# Patient Record
Sex: MALE | Race: WHITE | NOT HISPANIC OR LATINO | Employment: OTHER | URBAN - METROPOLITAN AREA
[De-identification: names, ages, dates, MRNs, and addresses within clinical notes are randomized per-mention and may not be internally consistent; named-entity substitution may affect disease eponyms.]

---

## 2017-05-28 ENCOUNTER — GENERIC CONVERSION - ENCOUNTER (OUTPATIENT)
Dept: OTHER | Facility: OTHER | Age: 72
End: 2017-05-28

## 2017-05-30 ENCOUNTER — GENERIC CONVERSION - ENCOUNTER (OUTPATIENT)
Dept: OTHER | Facility: OTHER | Age: 72
End: 2017-05-30

## 2017-06-20 ENCOUNTER — ALLSCRIPTS OFFICE VISIT (OUTPATIENT)
Dept: OTHER | Facility: OTHER | Age: 72
End: 2017-06-20

## 2017-09-01 DIAGNOSIS — N18.30 CHRONIC KIDNEY DISEASE, STAGE III (MODERATE) (HCC): ICD-10-CM

## 2017-09-01 DIAGNOSIS — E87.6 HYPOKALEMIA: ICD-10-CM

## 2017-09-01 DIAGNOSIS — I12.9 HYPERTENSIVE CHRONIC KIDNEY DISEASE WITH STAGE 1 THROUGH STAGE 4 CHRONIC KIDNEY DISEASE, OR UNSPECIFIED CHRONIC KIDNEY DISEASE: ICD-10-CM

## 2017-09-01 DIAGNOSIS — R60.0 LOCALIZED EDEMA: ICD-10-CM

## 2017-09-01 DIAGNOSIS — R80.9 PROTEINURIA: ICD-10-CM

## 2017-09-11 ENCOUNTER — ALLSCRIPTS OFFICE VISIT (OUTPATIENT)
Dept: OTHER | Facility: OTHER | Age: 72
End: 2017-09-11

## 2017-09-18 LAB — HCV AB SER-ACNC: NON REACTIVE

## 2017-09-28 ENCOUNTER — ALLSCRIPTS OFFICE VISIT (OUTPATIENT)
Dept: OTHER | Facility: OTHER | Age: 72
End: 2017-09-28

## 2017-10-09 DIAGNOSIS — I12.9 HYPERTENSIVE CHRONIC KIDNEY DISEASE WITH STAGE 1 THROUGH STAGE 4 CHRONIC KIDNEY DISEASE, OR UNSPECIFIED CHRONIC KIDNEY DISEASE: ICD-10-CM

## 2017-10-17 ENCOUNTER — GENERIC CONVERSION - ENCOUNTER (OUTPATIENT)
Dept: OTHER | Facility: OTHER | Age: 72
End: 2017-10-17

## 2017-10-26 NOTE — PROGRESS NOTES
Assessment  1  Benign hypertension with chronic kidney disease, stage III (403 10,585 3) (I12 9,N18 3)   2  Chronic kidney disease (CKD), stage III (moderate) (585 3) (N18 3)   3  Hypokalemia (276 8) (E87 6)   4  Proteinuria (791 0) (R80 9)    Plan  Benign hypertension with chronic kidney disease, stage III    · Doxazosin Mesylate 8 MG Oral Tablet; take 1/2 tablet bid   Rx By: Louis Gentile; Dispense: 30 Days ; #:15 Tablet; Refill: 0;For: Benign hypertension with chronic kidney disease, stage III; EDDIE = N; Record  Benign hypertension with chronic kidney disease, stage III, Bilateral edema of lower  extremity, Chronic kidney disease (CKD), stage III (moderate), Hypokalemia, Proteinuria    · VAS LOWER LIMB VENOUS DUPLEX STUDY, COMPLETE BILATERAL; Status:Hold For -  Scheduling; Requested for:50Yis4651;    Perform:[facility]; Due:46Evz2025; Ordered; For:Benign hypertension with chronic kidney disease, stage III, Bilateral edema of lower extremity, Chronic kidney disease (CKD), stage III (moderate), Hypokalemia, Proteinuria; Ordered By:Misael Yun;  Benign hypertension with chronic kidney disease, stage III, Chronic kidney disease  (CKD), stage III (moderate), Hypokalemia, Proteinuria    · Edarbi 40 MG Oral Tablet; TAKE 1 TABLET DAILY; To Be Done: [id number]   Rx By: Louis Gentile; For: Benign hypertension with chronic kidney disease, stage III, Chronic kidney disease (CKD), stage III (moderate), Hypokalemia, Proteinuria; EDDIE = N; Request Administration; Last Updated By: Aric Bowden; 9/11/2017 2:05:37 PM   · (1) BASIC METABOLIC PROFILE; Status:Active; Requested KLE:[id number];    Perform:[facility]; Due:07Ghh0429; Ordered; For:Benign hypertension with chronic kidney disease, stage III, Chronic kidney disease (CKD), stage III (moderate), Hypokalemia, Proteinuria; Ordered By:Anjel Yun Proper;   · (1) CBC/PLT/DIFF; Status:Active; Requested JXM:[id number];     Perform:[facility]; NTA:[id number];ZHZWUGN; For:Benign hypertension with chronic kidney disease, stage III, Chronic kidney disease (CKD), stage III (moderate), Hypokalemia, Proteinuria; Ordered By:Misael Yun;   · (1) CK (CPK); Status:Active; Requested CQX:[id number]; Perform:[facility]; GPX:[id number];VUUUNOY; For:Benign hypertension with chronic kidney disease, stage III, Chronic kidney disease (CKD), stage III (moderate), Hypokalemia, Proteinuria; Ordered By:Fox Yun;   · (1) COMPREHENSIVE METABOLIC PANEL; Status:Active; Requested IJN:[id number]; Perform:[facility]; JKK:[id number];TODZWAE; For:Benign hypertension with chronic kidney disease, stage III, Chronic kidney disease (CKD), stage III (moderate), Hypokalemia, Proteinuria; Ordered By:Fox Yun;   · (1) MAGNESIUM; Status:Active; Requested ZND:[id number]; Perform:[facility]; EJL:[id number];FMTEFTR; For:Benign hypertension with chronic kidney disease, stage III, Chronic kidney disease (CKD), stage III (moderate), Hypokalemia, Proteinuria; Ordered By:Fox Yun;   · (1) PHOSPHORUS; Status:Active; Requested VALDO:[id number]; Perform:[facility]; OPH:[id number];JAJLHOB; For:Benign hypertension with chronic kidney disease, stage III, Chronic kidney disease (CKD), stage III (moderate), Hypokalemia, Proteinuria; Ordered By:Misael Yun;   · (1) PTH N-TERMINAL (INTACT); Status:Active; Requested UQR:[id number]; Perform:[facility]; [id number];LHSJAWV; For:Benign hypertension with chronic kidney disease, stage III, Chronic kidney disease (CKD), stage III (moderate), Hypokalemia, Proteinuria; Ordered By:Misael Yun;   · (1) URINE PROTEIN CREATININE RATIO; Status:Active; Requested NQC:[id number]; Perform:[facility]; MYU:[id number];GQLWSGO; For:Benign hypertension with chronic kidney disease, stage III, Chronic kidney disease (CKD), stage III (moderate), Hypokalemia, Proteinuria;  Ordered By:Jama Nina Kwon;   · (1) VITAMIN D 25-HYDROXY; Status:Active; Requested IPI:[id number]; Perform:[facility]; QLQ:49JME1401;BORDKLC; For:Benign hypertension with chronic kidney disease, stage III, Chronic kidney disease (CKD), stage III (moderate), Hypokalemia, Proteinuria; Ordered By:Nina Yun;   · Follow-up visit in 4 Months Evaluation and Treatment  Follow-up  Status: Hold For -  Scheduling  Requested for: 27Awh5901   Ordered; For: Benign hypertension with chronic kidney disease, stage III, Chronic kidney disease (CKD), stage III (moderate), Hypokalemia, Proteinuria; Ordered By: August Paz Performed:  Due: 79UNF9220   · 1 - Ella Doss (Nurse Practitioner) Co-Management  *Please recheck blood  pressure check blood pressure machine  Goal less than 130/80 given proteinuria  Goal to taper off doxazosin and use Edarbi and possibly switch cardia XT to amlodipine  given the fact the patient is on a beta blocker  May require a small dose of a diuretic going forward given edema  Status: Hold For -  Scheduling  Requested for: 90LKO9953   Ordered; For: Benign hypertension with chronic kidney disease, stage III, Chronic kidney disease (CKD), stage III (moderate), Hypokalemia, Proteinuria; Ordered By: August Paz Performed:  Due: 80DJU1046  Care Summary provided  : Yes    1  Medication changes today:  -Decrease doxazosin  -Begin Edarbi 40 mg a day in the morning  2  Please go for lab work approximately September 20 which is about 1-2 weeks after making the above medication change  3  Please take one week of blood pressure readings morning and evening, sitting and standing  Bring in those blood pressure readings along with your blood pressure machine to see my advance practitioner either [facility] or Juliet Arce in about 2-3 weeks  4   Your goal blood pressure at home on average is less than 130/80 given the protein in the urine    5   Please go for an ultrasound of your legs now to make sure you do not have a blood clot in the legs  6   General measures:  -Avoid salt, avoidance will help swelling, also help your blood pressure and also help your protein in the urine  -Continue exercising just as you're doing  -Keep your legs elevated when you're sitting if possible to help the swelling, and uses support hose as needed  7  Follow-up in 4 months with the lab work is ordered  At that appointment in 4 months, bring in one week of blood pressure readings morning and evening, sitting and standing  Discussion/Summary    #1  Chronic Kidney Disease stage III: Baseline creatinine appears 1 2?1 5 mg/dL  There is no hematuria but there is some degree of proteinuria 1 3 g by a urine protein creatinine ratio  The diagnosis is most compatible with diabetic nephropathy/hypertensive nephrosclerosis/arteriolar nephrosclerosis  The patient did have an episode of AK I most likely related to mild volume depletion/angiotensin receptor 2 blocker  for the proteinuria:are normalnormalchains normalB/C normalstudies:blood pressure less than 130/80 given proteinuriaantihypertensive regimen from doxazosin to an angiotensin receptor 2 blocker  The doxazosin may contribute to orthostatic symptoms  The [place] will help his proteinuria  We will monitor labs closely as we make the switch  I would start with 40 mg once daily without a diuretic dyslipidemia as you're doingdiabetes mellitus as you're doingVolume: Patient has edema right greater than left which he states is related to total hip replacement  To be complete I would check a venous duplex  duplex nowsaltleg elevated when ablehose as neededfor adding a small dose of torsemide going forward depending on his blood pressure and swellingHypertension: Goals outlined less than 130/80  saltchanges as outlined aboveoptions:to taper doxazosinadding a small dose of torsemideswitching Cartia XT 2 amlodipine given the fact he is on a beta blockerElectrolytes: Normal at this time   Maintain potassium citrate for stone preventionMineral bone disorder: Monitor vitamin D, PTH intact level, magnesium calcium and phosphorus  Treat accordingly  Anemia: Hemoglobin remains normal Diabetes mellitus/dyslipidemia per your discretionspent an additional 25 minutes going over the differential diagnosis for his kidney disease with the patient and answering all questions  I will reviewed ways to treat swelling and protein in his urine  The patient was counseled regarding diagnostic results,-- instructions for management,-- risk factor reductions,-- prognosis,-- impressions,-- importance of compliance with treatment  total time of encounter was 50 minutes-- and-- 25 minutes was spent counseling  The patient has the current Goals: Monitor blood pressure at homesaltfor testing as outlined  Patient is able to Self-Care  Possible side effects of new medications were reviewed with the patient/guardian today  The treatment plan was reviewed with the patient/guardian  The patient/guardian understands and agrees with the treatment plan      Reason For Visit  Chronic Kidney Disease stage III      History of Present Illness  42-year-old male who is in [facility] May 2017  Apryl Russell He apparently had AK I  He also had orthostatic hypotension on an angiotensin receptor 2 blocker and alpha blocker and diuretic  He was restarted on doxazosin  Creatinine in June was 1 5 mg/dL which was felt to be baseline  Baseline apparently 1 2?1 5 mg/dL  Apparently renal ultrasound and urinalysis was bland  Of note, creatinine is 2 0 which came down to 1 12 mg/dL in the hospital demonstrated ejection fraction of 22%, grade 1 diastolic dysfunction, mild aortic insufficiency and mild tricuspid regurgitationultrasound was unremarkable except for nonobstructing left renal calculus measuring 1 cm, no hydronephrosis or any other abnormality dating 5/30/17asymptomatic does complain of modest foamy urine?  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 and does use compression stockings at times  GI symptomsheadaches, occasional dizziness upon standing a week to every other week  They last just a few seconds  Blood pressure may run 90?110/60s at that time  wrist symptoms at times and left knee related to goutunusual skin rash      Review of Systems  See history of present illnessrest of review of systems as completely reviewed the patient is otherwise negative      Past Medical History    The active problems and past medical history were reviewed and updated today  1  As aboveSquamous cell CA of both hands removed in the pastSarcoidosis in remissionHistory of goutHistory of ankylosing spondylitisHypertension controlledDiabetes mellitus for proximally 10 years, no overt retinopathy or neuropathy      Surgical History    The surgical history was reviewed and updated today  Family History    The family history was reviewed and updated today  Father had ESRD? Etiology was on hemodialysis for 10 yearsgrandfather had kidney disease?with a history of hypertension       Social History  The social history was reviewed and updated today  Current Meds   1  Allopurinol 300 MG Oral Tablet; take 1/2 tablet daily; Therapy: 38VTA2694 to Recorded   2  Breo Ellipta 100-25 MCG/INH Inhalation Aerosol Powder Breath Activated; USE 1   INHALATION ONCE DAILY; Therapy: 00PJP0301 to Recorded   3  Cartia  MG Oral Capsule Extended Release 24 Hour; take 1 capsule daily; Therapy: 97PFH8284 to Recorded   4   Colcrys 0 6 MG Oral Tablet; TAKE 1 TABLET DAILY AS DIRECTED; Therapy: 10GQY4729 to Recorded   5  Doxazosin Mesylate 8 MG Oral Tablet; take 1/2 tablet bid; Therapy: 04JBV5979 to Recorded   6  Incruse Ellipta 62 5 MCG/INH Inhalation Aerosol Powder Breath Activated; INHALE 1   PUFF DAILY; Therapy: 62JHR4665 to Recorded   7  Iron 325 (65 Fe) MG Oral Tablet; Take 1 tablet daily; Therapy: 45CGS5645 to (Evaluate:05Yrf1278) Recorded   8  Januvia 100 MG Oral Tablet; TAKE 1 TABLET DAILY; Therapy: 63LIB6137 to Recorded   9  Metoprolol Tartrate 100 MG Oral Tablet; TAKE 1 TABLET TWICE DAILY; Therapy: 55WQI3548 to (Evaluate:64Let9175) Recorded   10  NexIUM 40 MG Oral Capsule Delayed Release; take 1 capsule daily; Therapy: 51ODR2587 to (Evaluate:2018) Recorded   11  Potassium Citrate ER 10 MEQ (1080 MG) Oral Tablet Extended Release; Take 1 tablet    twice daily; Therapy: 87ETX6347 to Recorded   12  Pravastatin Sodium 40 MG Oral Tablet; Take 1 tablet daily; Therapy: 69GNP5171 to Recorded   13  Slow-Mag 71 5-119 MG Oral Tablet Delayed Release; take 2 tablet twice daily; Therapy: 03XCO1244 to Recorded   14  SulfaSALAzine 500 MG Oral Tablet Delayed Release; ONE TABLET IN THE AM, 1/2    TABLET IN PM;    Therapy: 86Lhz9437 to Recorded   15  Superplex-T Oral Tablet; TAKE 1 TABLET DAILY AS DIRECTED; Therapy: 69BUO8939 to Recorded   16  Vitamin D3 2000 UNIT Oral Capsule; Take 1 tablet daily; Therapy: 49SKY0917 to Recorded    The medication list was reviewed and updated today  Allergies  1   No Known Drug Allergies    Vitals  Vital Signs    Recorded: 2017 01:38PM Recorded: 2017 01:13PM   Heart Rate 56     Pulse Quality Regular     Systolic 627, LUE, Sitting 148, LUE, Standing 144, LUE, Sitting   Diastolic 72, LUE, Sitting 70, LUE, Standing 78, LUE, Sitting   BP Comments BP equal; BP standin/66 hr 56 reg     Height  6 ft 7 in    Weight  185 lb 9 6 oz    BMI Calculated  20 91    BSA Calculated  2 21      Physical Exam    Constitutional: General appearance: No acute distress, well appearing and well nourished  -- Well-developed well-nourished no well-developed, well-nourished and in no acute distress  ENT: External ears and nose appear normal      Eyes: Anicteric sclerae  Neck: No bruit heard over either carotid  JVD:  No JVD present  Pulmonary: Respiratory effort: No increased work of breathing or signs of respiratory distress  -- Auscultation of lungs: Clear to auscultation  Cardiovascular: Auscultation of heart: Normal rate and rhythm, normal S1 and S2, without murmurs  -- Regular rate and rhythm without murmurs rubs or gallops  Abdomen: Non-tender, no masses  -- Normal bowel sounds, soft and nontender without hepatosplenomegaly or bruits  Extremities: No cyanosis, clubbing or edema  -- Clubbing, cyanosis; 1?2 plus lower extremity edema two thirds of the way up the pretibial region right greater than left, trace on the left  Rash: No rash present  Neurologic: Non Focal      Psychiatric: Orientation to person, place, and time: Normal  -- and-- Mood and affect: Normal     Back: No CVA tenderness        Results/Data  Nephrology Flowsheet [id number] 04:05PM [facility]     Test Name Result Flag Reference   Urine Protein Creatinine Ratio 1 3       Nephrology Flowsheet [id number] 03:33PM Truitt Kussmaul     Test Name Result Flag Reference   WBC 6 9     Hemoglobin 14 3     Hematocrit 41 7     Platelets 923     Sodium 145     Potassium 4 5     Chloride 109     Carbon Dioxide 28     Calcium 9 5     GLUCOSE 122     BUN 17 6     Serum Creatinine 1 25     GFR, NON- 58     Magnesium 1 9     Phosphorus 3 5     Urinalysis Date 09/01/2017     SPECIFIC GRAVITY UA 1 011     BLOOD UA Negative     PH UA 5 0     PROTEIN UA 2+     NITRITE UA Negative     LEUKOCYTE ESTERASE UA Negative     WBC, Urine 0-5     RBC 0-5     BACTERIA None Seen     Glucose, Urine Negative         Thank you very much for allowing me to participate in the care of this patient  If you have any questions, please do not hesitate to contact me        Signatures   Electronically signed by : SURENDRA Wilson ; Sep 11 2017  2:10PM EST                       (Author)

## 2017-10-27 NOTE — PROGRESS NOTES
Assessment  1  Benign hypertension with chronic kidney disease, stage III (403 10,585 3) (I12 9,N18 3)   2  Bilateral edema of lower extremity (782 3) (R60 0)   3  Chronic kidney disease (CKD), stage III (moderate) (585 3) (N18 3)   4  Proteinuria (791 0) (R80 9)    Plan  Benign hypertension with chronic kidney disease, stage III    · Doxazosin Mesylate 8 MG Oral Tablet   Rx By: Truitt Kussmaul; Dispense: 30 Days ; #:15 Tablet; Refill: 0;For: Benign hypertension with chronic kidney disease, stage III; EDDIE = N; Record; Last Updated By: Guero Keyes; 2017 10:40:38 AM   · Latrice Levers 40 MG Oral Tablet; TAKE 1 TABLET DAILY   Rx By: Guero Keyes; Dispense: 30 Days ; #:30 Tablet; Refill: 5;For: Benign hypertension with chronic kidney disease, stage III; EDDIE = N; Record   · (1) BASIC METABOLIC PROFILE; Status:Active; Requested JFU:[id number];    Perform:[facility]; WVJ:[id number]; Ordered; For:Benign hypertension with chronic kidney disease, stage III; Ordered By:Yvonne Ron;   · (1) BASIC METABOLIC PROFILE; Status:Active; Requested GDY:[id number];    Perform:[facility]; RGC:[id number]; Ordered; For:Benign hypertension with chronic kidney disease, stage III; Ordered By:Yvonne Ron;   · (1) CBC/ PLT (NO DIFF); Status:Active; Requested [id number];    Perform:[facility]; SXO:[id number]; Ordered; For:Benign hypertension with chronic kidney disease, stage III; Ordered By:Yvonne Ron;   · (1) MAGNESIUM; Status:Active; Requested RLJ:[id number];    Perform:[facility]; [id number]; Ordered; For:Benign hypertension with chronic kidney disease, stage III; Ordered By:Yvonne Ron;   · (1) PHOSPHORUS; Status:Active; Requested DMI:[id number];    Perform:[facility]; MHK:[id number]; Ordered; For:Benign hypertension with chronic kidney disease, stage III; Ordered By:Yvnone Ron;   · (1) URINE PROTEIN CREATININE RATIO; Status:Active;  Requested for:2017; Perform:[facility]; IWF:36CDT1351; Ordered; For:Benign hypertension with chronic kidney disease, stage III; Ordered By:Yvonne Ron;   · Follow-up visit in 6 weeks Evaluation and Treatment  Follow-up  Status: Hold For -  Scheduling  Requested for: [id number]   Ordered; For: Benign hypertension with chronic kidney disease, stage III; Ordered By: Homer Donato Performed:  Due: 69JLT3217    Discussion/Summary    Hypertension- Goal blood pressure is <130/80  Antihypertensive regimen includes cartia xl 240mg daily, doxazosin 4mg twice a day, metoprolol 100mg twice a day, and edarbi 40mg daily  Avoid salt in your diet  Exercise as able  Please continue to monitor your blood pressures at home  Your blood pressure today is just slightly elevated  Home blood pressure cuff correlates well  Please take edarbi 40mg daily and stop doxazosin completely  I will call your cardiologist, Dr Magdalena Montilla, to let him know  Kidney Disease stage III- Baseline creatinine 1 2-1 5  Creatinine and electrolytes stable  Venous Doppler which was done at [facility] was negative for LE DVT  up with me in November and Dr Radhika Etienne in January  Please call the office with any questions or concerns  The patient was counseled regarding instructions for management  Patient is able to Self-Care  Possible side effects of new medications were reviewed with the patient/guardian today  The treatment plan was reviewed with the patient/guardian  The patient/guardian understands and agrees with the treatment plan      Reason For Visit  follow up      History of Present Illness  Mehreen Laurent presents to the office for a blood pressure check and home cuff correlation  At his last office appointment, his doxazosin was decreased and he was started on edarbi 40mg daily  The patient is feeling well  He is wearing a boot in the office today since he had surgery last week for a toe wound  He has no pain   He is still walking, with his crutches, every night after dinner  He denies SOB  He denies lightheadedness or dizziness  The patient was not taking his edarbi or his doxazosin recently as his cardiologist told him not to take the edarbi and continue the doxazosin which was in opposition to what Dr Benjamin Perdue had told him  The patient has proteinuria and edarbi would improve this  His blood pressures at home are usually in the 130s/70s  Review of Systems    Constitutional: no fever,-- no chills,-- no recent weight gain-- and-- no recent weight loss  Integumentary: no rashes  Gastrointestinal: no nausea,-- no diarrhea-- and-- no vomiting  Respiratory: no shortness of breath  Cardiovascular: no chest pain-- and-- no lower extremity edema  Musculoskeletal: no joint pain-- and-- no joint swelling  Neurological: no headache,-- no lightheadedness-- and-- no dizziness  Genitourinary: no dysuria  Past Medical History    The active problems and past medical history were reviewed and updated today  Surgical History    The surgical history was reviewed and updated today  Family History    The family history was reviewed and updated today  Social History  The social history was reviewed and updated today  The social history was reviewed and is unchanged  Current Meds   1  Allopurinol 300 MG Oral Tablet; take 1/2 tablet daily; Therapy: [id number] to Recorded   2  Breo Ellipta 100-25 MCG/INH Inhalation Aerosol Powder Breath Activated; USE 1   INHALATION ONCE DAILY; Therapy: 94DJI6439 to Recorded   3  Cartia  MG Oral Capsule Extended Release 24 Hour; take 1 capsule daily; Therapy: 21EUO6369 to Recorded   4  Colcrys 0 6 MG Oral Tablet; TAKE 1 TABLET DAILY AS DIRECTED; Therapy: 51VZN7430 to Recorded   5  Doxazosin Mesylate 8 MG Oral Tablet; take 1/2 tablet bid; Therapy: 41TPM4405 to (Evaluate:11Oct2017); Last Rx:11Sep2017 Ordered   6   Incruse Ellipta 62 5 MCG/INH Inhalation Aerosol Powder Breath Activated; INHALE 1   PUFF DAILY; Therapy: 07OOS6180 to Recorded   7  Iron 325 (65 Fe) MG Oral Tablet; Take 1 tablet daily; Therapy: 00MUI0178 to (Evaluate:71Mvq8670) Recorded   8  Januvia 100 MG Oral Tablet; TAKE 1 TABLET DAILY; Therapy: 90TQK1124 to Recorded   9  Metoprolol Tartrate 100 MG Oral Tablet; TAKE 1 TABLET TWICE DAILY; Therapy: 10SXF7671 to (Evaluate:63Ixb9001) Recorded   10  NexIUM 40 MG Oral Capsule Delayed Release; take 1 capsule daily; Therapy: 68RME1366 to (Evaluate:15Jun2018) Recorded   11  Potassium Citrate ER 10 MEQ (1080 MG) Oral Tablet Extended Release; Take 1 tablet    twice daily; Therapy: 41VZT2252 to Recorded   12  Pravastatin Sodium 40 MG Oral Tablet; Take 1 tablet daily; Therapy: 65AUG8355 to Recorded   13  Slow-Mag 71 5-119 MG Oral Tablet Delayed Release; take 2 tablet twice daily; Therapy: 19RKK6878 to Recorded   14  SulfaSALAzine 500 MG Oral Tablet Delayed Release; ONE TABLET IN THE AM, 1/2    TABLET IN PM;    Therapy: 20Jun2017 to Recorded   15  Superplex-T Oral Tablet; TAKE 1 TABLET DAILY AS DIRECTED; Therapy: 43GJI7557 to Recorded   16  Vitamin D3 2000 UNIT Oral Capsule; Take 1 tablet daily; Therapy: 70UNA4819 to Recorded    The medication list was reviewed and updated today  Allergies  1  No Known Drug Allergies    Vitals  Vital Signs    ** Printed in Appendix #1 below  Physical Exam    Constitutional: General appearance: No acute distress, well appearing and well nourished  ENT: External ears and nose appear normal      Eyes: Anicteric sclerae  Neck: No bruit heard over either carotid  JVD:  No JVD present  Pulmonary: Respiratory effort: No increased work of breathing or signs of respiratory distress  -- Auscultation of lungs: Clear to auscultation  Cardiovascular: Auscultation of heart: Normal rate and rhythm, normal S1 and S2, without murmurs  Abdomen: Non-tender, no masses  Extremities:  trace le edema  Rash: No rash present  Neurologic: Non Focal      Psychiatric: Orientation to person, place, and time: Normal  -- and-- Mood and affect: Normal        Results/Data  Nephrology Flowsheet [id number] 08:56AM Ezequiel Padilla   [facility]     Test Name Result Flag Reference   Sodium 139     Potassium 4 5     Chloride 103     Carbon Dioxide 29     Calcium 9 7     GLUCOSE 126     BUN 28     Serum Creatinine 1 42     GFR, NON- 48         Thank you very much for allowing me to participate in the care of this patient  If you have any questions, please do not hesitate to contact me        Signatures   Electronically signed by : PHIL Amaro; Sep 28 2017 10:40AM EST                       (Author)    Electronically signed by : Rosanne Ellis MD; Sep 28 2017  5:35PM EST                          Appendix #1     Vital Signs   Patient: Jimenez Barajas ; : 1945; MRN: [id number]      Recorded: 63LCO6224 10:10AM Recorded: 98YVA5224 10:09AM Recorded: 67NTD3165 09:58AM   Heart Rate 53  60     Systolic 775 mm Hg, LUE, Sitting 158 mm Hg, RUE, Sitting 160 mm Hg, LUE, Sitting 152 mm Hg, RUE, Sitting    Diastolic 87 mm Hg, LUE, Sitting 76 mm Hg, RUE, Sitting 80 mm Hg, LUE, Sitting 77 mm Hg, RUE, Sitting    BP Comments home office office home    Height     6 ft 7 in   Weight     181 lb    BMI Calculated     20 39 kg/m2   BSA Calculated     2 18 m2

## 2017-11-10 ENCOUNTER — ALLSCRIPTS OFFICE VISIT (OUTPATIENT)
Dept: OTHER | Facility: OTHER | Age: 72
End: 2017-11-10

## 2017-11-11 NOTE — PROGRESS NOTES
Assessment    1  Benign hypertension with chronic kidney disease, stage III (403 10,585 3) (I12 9,N18 3)   2  Bilateral edema of lower extremity (782 3) (R60 0)    Plan  Benign hypertension with chronic kidney disease, stage III    · Restrict the salt in your diet by avoiding highly salted foods ; Status:Complete;   Done:10Nov2017   Ordered;hypertension with chronic kidney disease, stage III; Ordered By:Yvonne Ron; Discussion/Summary    Hypertension- Goal blood pressure is <130/80  Antihypertensive regimen includes cartia xl 240mg daily, metoprolol 50mg twice a day, and edarbi 40mg daily  Avoid salt in your diet  Exercise as able  Please continue to monitor your blood pressures at home  Your blood pressure today is slightly elevated in the office but at home his pressures are controlled  No changes today  Will continue to monitor  Kidney Disease stage III- Baseline creatinine 1 2-1 5  Creatinine and electrolytes stable  Resolved  up with Dr Patric Barrow in January 5 @ 3pm  Please call the office with any questions or concerns  The patient was counseled regarding diagnostic results,-- instructions for management  Patient is able to Self-Care  Possible side effects of new medications were reviewed with the patient/guardian today  The treatment plan was reviewed with the patient/guardian  The patient/guardian understands and agrees with the treatment plan      Reason For Visit  follow up      History of Present Illness  Sylvester Walter presents to the office for a blood pressure check  His blood pressure does drop when he stands but he does not have any symptoms  His blood pressures at home sitting range from 720-450 systolic  Standing his blood pressures are 923 to 261 systolic  He states if he doesnât rest before taking his blood pressure, they will be more elevated up in the 895Z systolic  He tries to eat a low sodium diet but is not always able to on October 17th with two stents placed   They started him on effient and decreased his metoprolol  Review of Systems   Constitutional: no fever,-- no chills,-- no anorexia,-- no recent weight gain-- and-- no recent weight loss  Integumentary: no rashes  Gastrointestinal: no nausea,-- no diarrhea-- and-- no vomiting  Respiratory: no shortness of breath  Cardiovascular: no chest pain-- and-- no lower extremity edema  Musculoskeletal: no joint pain-- and-- no joint swelling  Neurological: no headache,-- no lightheadedness-- and-- no dizziness  Genitourinary: no dysuria  Past Medical History    The active problems and past medical history were reviewed and updated today  Surgical History    The surgical history was reviewed and updated today  Family History    The family history was reviewed and updated today  Social History  The social history was reviewed and updated today  The social history was reviewed and is unchanged  Current Meds   1  Allopurinol 300 MG Oral Tablet; take 1/2 tablet daily; Therapy: 99JSE0106 to Recorded   2  Breo Ellipta 100-25 MCG/INH Inhalation Aerosol Powder Breath Activated; USE 1 INHALATION ONCE DAILY; Therapy: 66ZZY8959 to Recorded   3  Cartia  MG Oral Capsule Extended Release 24 Hour; take 1 capsule daily; Therapy: 11HHU4357 to Recorded   4  Colcrys 0 6 MG Oral Tablet; TAKE 1 TABLET DAILY AS DIRECTED; Therapy: 21NUX2662 to Recorded   5  Edarbi 40 MG Oral Tablet; TAKE 1 TABLET DAILY; Therapy: 27XCB0210 to (Evaluate:27Mar2018); Last Rx:73Voa2848 Ordered   6  Incruse Ellipta 62 5 MCG/INH Inhalation Aerosol Powder Breath Activated; INHALE 1 PUFF DAILY; Therapy: 61DHG0080 to Recorded   7  Iron 325 (65 Fe) MG Oral Tablet; Take 1 tablet daily; Therapy: 36ADV7861 to (Evaluate:05Xfa9744) Recorded   8  Januvia 100 MG Oral Tablet; TAKE 1 TABLET DAILY; Therapy: 70KFK3838 to Recorded   9  Metoprolol Tartrate 100 MG Oral Tablet; TAKE 1 TABLET TWICE DAILY; Therapy: 48NIV6769 to (Evaluate:78Sct4214) Recorded   10  NexIUM 40 MG Oral Capsule Delayed Release; take 1 capsule daily; Therapy: 18FHJ6747 to (Evaluate:15Jun2018) Recorded   11  Potassium Citrate ER 10 MEQ (1080 MG) Oral Tablet Extended Release; Take 1 tablet  twice daily; Therapy: 09SNR8359 to Recorded   12  Pravastatin Sodium 40 MG Oral Tablet; Take 1 tablet daily; Therapy: 03WFE0705 to Recorded   13  Slow-Mag 71 5-119 MG Oral Tablet Delayed Release; take 2 tablet twice daily; Therapy: 24TFJ0450 to Recorded   14  SulfaSALAzine 500 MG Oral Tablet Delayed Release; ONE TABLET IN THE AM, 1/2  TABLET IN PM;  Therapy: 96Aah1389 to Recorded   15  Superplex-T Oral Tablet; TAKE 1 TABLET DAILY AS DIRECTED; Therapy: 32LUA7652 to Recorded   16  Vitamin D3 2000 UNIT Oral Capsule; Take 1 tablet daily; Therapy: 40CCT3999 to Recorded    The medication list was reviewed and updated today  Allergies    1  No Known Drug Allergies    Vitals  Vital Signs    ** Printed in Appendix #1 below  Physical Exam   Constitutional: General appearance: No acute distress, well appearing and well nourished  ENT: External ears and nose appear normal     Eyes: Anicteric sclerae  Neck: No bruit heard over either carotid  JVD:  No JVD present  Pulmonary: Respiratory effort: No increased work of breathing or signs of respiratory distress  -- Auscultation of lungs: Clear to auscultation  Cardiovascular: Auscultation of heart: Normal rate and rhythm, normal S1 and S2, without murmurs  Abdomen: Non-tender, no masses  Extremities: No cyanosis, clubbing or edema  Rash: No rash present    Neurologic: Non Focal     Psychiatric: Orientation to person, place, and time: Normal  -- and-- Mood and affect: Normal        Results/Data  Nephrology Flowsheet [id number] 03:27PM [facility] Scheu[facility]     Test Name Result Flag Reference   Urine Protein Creatinine Ratio 1 0       Nephrology Flowsheet [id number] 03:25PM Melbourne Scheuermann [facility]     Test Name Result Flag Reference   Sodium 140     Potassium 4 6     Chloride 102     Carbon Dioxide 30     Calcium 9 7     GLUCOSE 133     BUN 17     Serum Creatinine 1 48     GFR, NON- 48     Magnesium 1 9     Phosphorus 3 5     Uric Acid 6 6           Thank you very much for allowing me to participate in the care of this patient  If you have any questions, please do not hesitate to contact me  Future Appointments    Date/Time Provider Specialty Site   2018 03:00 PM SURENDRA Cruz   Nephrology [address]       Signatures   Electronically signed by : Marin Worthy, [facility]; Nov 10 2017  9:12AM EST                       (Author)    Electronically signed by : SURENDRA Villanueva ; Nov 10 2017  2:53PM EST                          Appendix #1  Vital Signs   Patient: Paulette Monk ; : 1945; MRN: [id number]   Recorded: 22BOU3196 09:02AM Recorded: 19UMO5647 09:01AM Recorded: 02KZO4481 08:54AM   Heart Rate  52    Systolic 310 mm Hg, RUE, Sitting 158 mm Hg, RUE, Sitting    Diastolic 82 mm Hg, RUE, Sitting 82 mm Hg, RUE, Sitting    Height  6 ft 7 in 6 ft 7 in   Weight  178 lb  178 lb    BMI Calculated  20 05 kg/m2 20 05 kg/m2   BSA Calculated  2 17 m2 2 17 m2

## 2018-01-02 ENCOUNTER — GENERIC CONVERSION - ENCOUNTER (OUTPATIENT)
Dept: OTHER | Facility: OTHER | Age: 73
End: 2018-01-02

## 2018-01-02 DIAGNOSIS — I12.9 HYPERTENSIVE CHRONIC KIDNEY DISEASE WITH STAGE 1 THROUGH STAGE 4 CHRONIC KIDNEY DISEASE, OR UNSPECIFIED CHRONIC KIDNEY DISEASE: ICD-10-CM

## 2018-01-02 DIAGNOSIS — N18.30 CHRONIC KIDNEY DISEASE, STAGE III (MODERATE) (HCC): ICD-10-CM

## 2018-01-02 DIAGNOSIS — R60.0 LOCALIZED EDEMA: ICD-10-CM

## 2018-01-02 DIAGNOSIS — E87.6 HYPOKALEMIA: ICD-10-CM

## 2018-01-02 DIAGNOSIS — R80.9 PROTEINURIA: ICD-10-CM

## 2018-01-03 ENCOUNTER — GENERIC CONVERSION - ENCOUNTER (OUTPATIENT)
Dept: OTHER | Facility: OTHER | Age: 73
End: 2018-01-03

## 2018-01-05 ENCOUNTER — GENERIC CONVERSION - ENCOUNTER (OUTPATIENT)
Dept: OTHER | Facility: OTHER | Age: 73
End: 2018-01-05

## 2018-01-14 VITALS
BODY MASS INDEX: 20.94 KG/M2 | HEIGHT: 78 IN | DIASTOLIC BLOOD PRESSURE: 87 MMHG | HEART RATE: 53 BPM | SYSTOLIC BLOOD PRESSURE: 173 MMHG | WEIGHT: 181 LBS

## 2018-01-14 VITALS
WEIGHT: 178 LBS | SYSTOLIC BLOOD PRESSURE: 154 MMHG | BODY MASS INDEX: 20.59 KG/M2 | HEIGHT: 78 IN | DIASTOLIC BLOOD PRESSURE: 82 MMHG | HEART RATE: 52 BPM

## 2018-01-14 VITALS
HEIGHT: 78 IN | BODY MASS INDEX: 21.47 KG/M2 | WEIGHT: 185.6 LBS | HEART RATE: 56 BPM | SYSTOLIC BLOOD PRESSURE: 158 MMHG | DIASTOLIC BLOOD PRESSURE: 72 MMHG

## 2018-01-15 VITALS
DIASTOLIC BLOOD PRESSURE: 66 MMHG | WEIGHT: 181 LBS | SYSTOLIC BLOOD PRESSURE: 136 MMHG | HEIGHT: 78 IN | HEART RATE: 48 BPM | BODY MASS INDEX: 20.94 KG/M2

## 2018-01-24 VITALS
SYSTOLIC BLOOD PRESSURE: 112 MMHG | WEIGHT: 183 LBS | HEART RATE: 56 BPM | HEIGHT: 78 IN | BODY MASS INDEX: 21.17 KG/M2 | DIASTOLIC BLOOD PRESSURE: 82 MMHG

## 2018-01-24 VITALS
DIASTOLIC BLOOD PRESSURE: 82 MMHG | SYSTOLIC BLOOD PRESSURE: 114 MMHG | HEART RATE: 56 BPM | WEIGHT: 183 LBS | HEIGHT: 78 IN | BODY MASS INDEX: 21.17 KG/M2

## 2018-01-24 VITALS — SYSTOLIC BLOOD PRESSURE: 120 MMHG | DIASTOLIC BLOOD PRESSURE: 70 MMHG | HEART RATE: 64 BPM

## 2018-05-01 DIAGNOSIS — R60.0 LOCALIZED EDEMA: ICD-10-CM

## 2018-05-01 DIAGNOSIS — I12.9 HYPERTENSIVE CHRONIC KIDNEY DISEASE WITH STAGE 1 THROUGH STAGE 4 CHRONIC KIDNEY DISEASE, OR UNSPECIFIED CHRONIC KIDNEY DISEASE: ICD-10-CM

## 2018-05-01 DIAGNOSIS — N18.30 CHRONIC KIDNEY DISEASE, STAGE III (MODERATE) (HCC): ICD-10-CM

## 2018-05-01 DIAGNOSIS — R80.9 PROTEINURIA: ICD-10-CM

## 2018-05-16 ENCOUNTER — OFFICE VISIT (OUTPATIENT)
Dept: NEPHROLOGY | Facility: CLINIC | Age: 73
End: 2018-05-16
Payer: MEDICARE

## 2018-05-16 VITALS
BODY MASS INDEX: 26.48 KG/M2 | HEIGHT: 70 IN | SYSTOLIC BLOOD PRESSURE: 144 MMHG | DIASTOLIC BLOOD PRESSURE: 80 MMHG | WEIGHT: 185 LBS

## 2018-05-16 DIAGNOSIS — I12.9 HYPERTENSIVE CHRONIC KIDNEY DISEASE WITH STAGE 1 THROUGH STAGE 4 CHRONIC KIDNEY DISEASE, OR UNSPECIFIED CHRONIC KIDNEY DISEASE: ICD-10-CM

## 2018-05-16 DIAGNOSIS — R80.1 PERSISTENT PROTEINURIA: ICD-10-CM

## 2018-05-16 DIAGNOSIS — N18.30 CHRONIC KIDNEY DISEASE, STAGE III (MODERATE) (HCC): Primary | ICD-10-CM

## 2018-05-16 DIAGNOSIS — E78.5 DYSLIPIDEMIA: ICD-10-CM

## 2018-05-16 PROCEDURE — 99214 OFFICE O/P EST MOD 30 MIN: CPT | Performed by: INTERNAL MEDICINE

## 2018-05-16 RX ORDER — DILTIAZEM HYDROCHLORIDE 240 MG/1
1 CAPSULE, COATED, EXTENDED RELEASE ORAL DAILY
COMMUNITY
Start: 2017-06-20 | End: 2019-08-14

## 2018-05-16 RX ORDER — PRAVASTATIN SODIUM 40 MG
40 TABLET ORAL DAILY
Refills: 3 | COMMUNITY
Start: 2018-05-08 | End: 2019-08-14

## 2018-05-16 RX ORDER — METOPROLOL TARTRATE 50 MG/1
50 TABLET, FILM COATED ORAL 2 TIMES DAILY
Refills: 3 | COMMUNITY
Start: 2018-03-15 | End: 2018-12-18 | Stop reason: ALTCHOICE

## 2018-05-16 RX ORDER — PNV NO.95/FERROUS FUM/FOLIC AC 28MG-0.8MG
1 TABLET ORAL DAILY
COMMUNITY
Start: 2017-06-20

## 2018-05-16 RX ORDER — SULFASALAZINE 500 MG/1
TABLET ORAL
Refills: 3 | COMMUNITY
Start: 2018-05-08 | End: 2020-12-30

## 2018-05-16 RX ORDER — PRASUGREL 10 MG/1
10 TABLET, FILM COATED ORAL DAILY
COMMUNITY

## 2018-05-16 RX ORDER — ALLOPURINOL 300 MG/1
0.5 TABLET ORAL DAILY
COMMUNITY
Start: 2017-06-20 | End: 2021-01-27 | Stop reason: SDUPTHER

## 2018-05-16 RX ORDER — ESOMEPRAZOLE MAGNESIUM 40 MG/1
40 CAPSULE, DELAYED RELEASE ORAL DAILY
Refills: 6 | COMMUNITY
Start: 2018-04-23 | End: 2020-11-11

## 2018-05-16 RX ORDER — AZILSARTAN KAMEDOXOMIL 40 MG/1
40 TABLET ORAL 2 TIMES DAILY
Refills: 5 | COMMUNITY
Start: 2018-04-26 | End: 2018-06-22 | Stop reason: SDUPTHER

## 2018-05-16 RX ORDER — B-COMPLEX WITH VITAMIN C
1 TABLET ORAL DAILY
COMMUNITY
Start: 2017-06-20

## 2018-05-16 RX ORDER — ASPIRIN 325 MG
81 TABLET ORAL DAILY
COMMUNITY
End: 2020-09-15

## 2018-05-16 RX ORDER — CHLORTHALIDONE 25 MG/1
25 TABLET ORAL DAILY
Qty: 30 TABLET | Refills: 5 | Status: SHIPPED | OUTPATIENT
Start: 2018-05-16 | End: 2018-07-12 | Stop reason: SDUPTHER

## 2018-05-16 RX ORDER — FLUTICASONE FUROATE AND VILANTEROL 100; 25 UG/1; UG/1
POWDER RESPIRATORY (INHALATION) DAILY
COMMUNITY
Start: 2017-06-20 | End: 2020-06-26 | Stop reason: SDUPTHER

## 2018-05-16 RX ORDER — CHOLECALCIFEROL (VITAMIN D3) 125 MCG
1 CAPSULE ORAL DAILY
COMMUNITY
Start: 2017-06-20

## 2018-05-16 RX ORDER — COLCHICINE 0.6 MG/1
0.6 TABLET, FILM COATED ORAL DAILY
Refills: 3 | COMMUNITY
Start: 2018-05-08 | End: 2020-06-16 | Stop reason: SDUPTHER

## 2018-05-16 RX ORDER — UMECLIDINIUM 62.5 UG/1
1 AEROSOL, POWDER ORAL DAILY
Refills: 5 | COMMUNITY
Start: 2018-04-16 | End: 2021-07-08 | Stop reason: SDUPTHER

## 2018-05-16 NOTE — PATIENT INSTRUCTIONS
1   Medication changes today:  -add chlorthalidone 25 mg once a day  Watch for worsening of dizziness or lightheadedness  2   Please wait 1-2 weeks after starting the new medication and then go for nonfasting any time of the day lab work  3   Please wait a full 2 weeks after starting the new medication, and then take 1 week a blood pressure readings morning and evening, sitting and standing and send those in   4   Follow-up in 3 months:  -please go for fasting lab work prior to your appointment  -please bring in 1 week a blood pressure readings morning and evening, sitting and standing  5 General instructions:  -avoid salt  -avoid medications such as Motrin, Naprosyn, ibuprofen, Aleve or Advil or Celebrex as they can affect your kidney function; you can use Tylenol as needed for pain or fevers if you have no liver problems  -avoid medications such as Sudafed or other medications with decongestants as they can raise your blood pressure  -try to exercise at least 30 minutes at least 3 days a week with an ultimate goal of 5 days a week  You 6    Your goal blood pressure at home is on average less than 118/75

## 2018-05-16 NOTE — LETTER
May 16, 2018     MARGE Whitfield DO  [address]  [address] [address]    Patient: Ton Sahu   YOB: 1945   Date of Visit: 5/16/2018       Dear Dr Juan Connors: Thank you for referring Pernell Aguilar to me for evaluation  Below are my notes for this consultation  If you have questions, please do not hesitate to call me  I look forward to following your patient along with you  Sincerely,        Deepa Pfeiffer MD        CC: MD Alfredo Jung MD Bonnell Brookes, MD  5/16/2018  9:10 AM  Sign at close encounter  RENAL FOLLOW UP NOTE: td    ASSESSMENT AND PLAN:     1  Chronic Kidney Disease stage III: Baseline creatinine appears 1 2-1 5 mg/dL  This is most compatible diabetic nephropathy  Of note, all serologies are negative including SPEP UPEP and light chains  The diagnosis is most compatible with diabetic nephropathy/hypertensive nephrosclerosis/arteriolar nephrosclerosis/episode of SHAY in the past   Currently creatinine doing well at 1 24 mg/dL  However, urine protein creatinine ratio is 3 1 g  his blood pressure however is not at goal which would at least be less than 130/80 possibly even closer to 125/75 given proteinuria  Recommendations:  -add chlorthalidone 25 mg daily  -monitor renal function as we make the adjustments  -monitor blood pressure closely to get to goal  -consider adding spironolactone if needed  -goal try to maintain proteinuria is close to 1 g or less as possible  -continue to treat diabetes mellitus and dyslipidemia per your discretion  2  Volume:  Euvolemic at this time  3  Hypertension:  Goal blood pressure less than 125/75 is outlined above  Please see above recommendations  4   Electrolytes: All acceptable at this time  Monitor potassium  5   Mineral bone disorder:  All acceptable including PTH intact level  Check vitamin-D next visit    6   Anemia:  Hemoglobin normal   7   Dyslipidemia:  Treat dyslipidemia as you're doing 8   Diabetes mellitus:  Per your discretion  9  CAD:  Followed by Dr Ghislaine Simon at    Status post MI in October involving 2 cardiac stents  10   Sarcoidosis  Followed closely by Dr Dinora German  PATIENT INSTRUCTIONS:    Patient Instructions   1  Medication changes today:  -add chlorthalidone 25 mg once a day  Watch for worsening of dizziness or lightheadedness  2   Please wait 1-2 weeks after starting the new medication and then go for nonfasting any time of the day lab work  3   Please wait a full 2 weeks after starting the new medication, and then take 1 week a blood pressure readings morning and evening, sitting and standing and send those in   4   Follow-up in 3 months:  -please go for fasting lab work prior to your appointment  -please bring in 1 week a blood pressure readings morning and evening, sitting and standing  5 General instructions:  -avoid salt  -avoid medications such as Motrin, Naprosyn, ibuprofen, Aleve or Advil or Celebrex as they can affect your kidney function; you can use Tylenol as needed for pain or fevers if you have no liver problems  -avoid medications such as Sudafed or other medications with decongestants as they can raise your blood pressure  -try to exercise at least 30 minutes at least 3 days a week with an ultimate goal of 5 days a week  You 6  Your goal blood pressure at home is on average less than 118/75        Subjective:   Patient overall feels fairly well  No fevers chills cough or colds except for postnasal drip related to allergies  Good appetite energy  No gastrointestinal symptoms  Very rare intermittent chest pain followed closely by Cardiology and unchanged, dyspnea on exertion which is chronic related to sarcoidosis, leg swelling on the right lower extremity which is chronic status post hip replacement and hernia repair with negative Dopplers in the past according the patient    No active urinary symptoms except for slight bubbles in his urine which is unchanged  No headaches, occasional dizziness lightheadedness upon standing about 2 times a week which is chronic  He gets up slowly  Blood pressure medications:  -diltiazem extended release:  240 mg daily  - Edarbi 40 mg twice a day  -Lopressor 50 mg twice a day  BP AT HOME:  -a m :  143/77 without orthostatic changes  -supper:  143/77 without orthostatic changes  -bedtime:  147/78 without orthostatic changes  Heart rate typically 50s-60s    ROS:  See HPI, otherwise review of systems as completely reviewed with the patient are negative    Past Medical History:   Diagnosis Date    Anemia     Chronic kidney disease     [facility]     [facility]     Hypertension      No past surgical history on file  Family History   Problem Relation Age of Onset    Hypertension Mother     Kidney disease Father       reports that he has never smoked  He has never used smokeless tobacco  He reports that he does not drink alcohol or use drugs  I COMPLETELY REVIEWED THE PAST MEDICAL HISTORY/PAST SURGICAL HISTORY/SOCIAL HISTORY/FAMILY HISTORY/AND MEDICATIONS  AND UPDATED ALL    Objective:     Vitals:   Vitals:    05/16/18 0835   BP: 144/80   BP sitting on left:  150/74 with a heart of 60 and regular a  BP standing on left:  158/84 with a heart rate of 64 and regular    Weight (last 2 days)     Date/Time   Weight    05/16/18 0829  83 9 (185)            Body mass index is 26 54 kg/m²      Physical Exam: General:  No acute distress  Skin:  No acute rash  Eyes:  No scleral icterus, noninjected  ENT:  Moist mucous membranes  Neck:  Supple, no jugular venous distention  Back   No CVAT  Chest:  Clear to auscultation and percussion  CVS:  Regular rate and rhythm without a rub or gallop; grade 2/6 systolic ejection type murmur  Abdomen:  Soft and nontender with normal bowel sounds  Extremities:  No cyanosis and no edema  Neuro:  Grossly intact  Psych:  Alert, oriented x3 and appropriate      Medications:    Current Outpatient Prescriptions:     allopurinol (ZYLOPRIM) 300 mg tablet, Take 0 5 tablets by mouth daily, Disp: , Rfl:     aspirin 325 mg tablet, Take 325 mg by mouth daily, Disp: , Rfl:     B Complex-C (SUPERPLEX-T) TABS, Take 1 tablet by mouth daily, Disp: , Rfl:     Cholecalciferol (VITAMIN D3) 2000 units TABS, Take 1 tablet by mouth daily, Disp: , Rfl:     COLCRYS 0 6 MG tablet, Take 0 6 mg by mouth daily, Disp: , Rfl: 3    diltiazem (CARTIA XT) 240 mg 24 hr capsule, Take 1 capsule by mouth daily, Disp: , Rfl:     EDARBI 40 MG tablet, Take 40 tablets by mouth 2 (two) times a day, Disp: , Rfl: 5    esomeprazole (NexIUM) 40 MG capsule, Take 40 mg by mouth 2 (two) times a day, Disp: , Rfl: 6    Ferrous Sulfate (IRON) 325 (65 Fe) MG TABS, Take 1 tablet by mouth daily, Disp: , Rfl:     fluticasone furoate-vilanterol (BREO ELLIPTA), Inhale daily, Disp: , Rfl:     INCRUSE ELLIPTA 62 5 MCG/INH AEPB, Take 1 puff by mouth daily, Disp: , Rfl: 5    magnesium chloride-calcium (SLOW-MAG) 71 5-119 mg, Take 2 tablets by mouth 2 (two) times a day, Disp: , Rfl:     metoprolol tartrate (LOPRESSOR) 50 mg tablet, Take 50 mg by mouth 2 (two) times a day, Disp: , Rfl: 3    prasugrel (EFFIENT) tablet, Take 1 tablet by mouth daily, Disp: , Rfl:     pravastatin (PRAVACHOL) 40 mg tablet, Take 40 mg by mouth daily, Disp: , Rfl: 3    sitaGLIPtin (JANUVIA) 100 mg tablet, Take 1 tablet by mouth daily, Disp: , Rfl:     sulfaSALAzine (AZULFIDINE) 500 mg tablet, TAKE 1 AND 1/2 TABLETS BY MOUTH ONE TIME DAILY AS DIRECTED, Disp: , Rfl: 3    chlorthalidone 25 mg tablet, Take 1 tablet (25 mg total) by mouth daily, Disp: 30 tablet, Rfl: 5    Lab, Imaging and other studies: I have personally reviewed pertinent labs  Laboratory Results:  No results found for this or any previous visit              Radiology review:   chest X-ray    Ultrasound      Portions of the record may have been created with voice recognition software   Occasional wrong word or "sound a like" substitutions may have occurred due to the inherent limitations of voice recognition software   Read the chart carefully and recognize, using context, where substitutions have occurred

## 2018-05-16 NOTE — PROGRESS NOTES
RENAL FOLLOW UP NOTE: td    ASSESSMENT AND PLAN:     1  Chronic Kidney Disease stage III: Baseline creatinine appears 1 2-1 5 mg/dL  This is most compatible diabetic nephropathy  Of note, all serologies are negative including SPEP UPEP and light chains  The diagnosis is most compatible with diabetic nephropathy/hypertensive nephrosclerosis/arteriolar nephrosclerosis/episode of SHAY in the past   Currently creatinine doing well at 1 24 mg/dL  However, urine protein creatinine ratio is 3 1 g  his blood pressure however is not at goal which would at least be less than 130/80 possibly even closer to 125/75 given proteinuria  Recommendations:  -add chlorthalidone 25 mg daily  -monitor renal function as we make the adjustments  -monitor blood pressure closely to get to goal  -consider adding spironolactone if needed  -goal try to maintain proteinuria is close to 1 g or less as possible  -continue to treat diabetes mellitus and dyslipidemia per your discretion  2  Volume:  Euvolemic at this time  3  Hypertension:  Goal blood pressure less than 125/75 is outlined above  Please see above recommendations  4   Electrolytes: All acceptable at this time  Monitor potassium  5   Mineral bone disorder:  All acceptable including PTH intact level  Check vitamin-D next visit  6   Anemia:  Hemoglobin normal   7   Dyslipidemia:  Treat dyslipidemia as you're doing   8  Diabetes mellitus:  Per your discretion  9  CAD:  Followed by Dr Aida Bear at [facility]   Status post MI in October involving 2 cardiac stents  10   Sarcoidosis  Followed closely by Dr Gunjan Thompson  PATIENT INSTRUCTIONS:    Patient Instructions   1  Medication changes today:  -add chlorthalidone 25 mg once a day  Watch for worsening of dizziness or lightheadedness  2   Please wait 1-2 weeks after starting the new medication and then go for nonfasting any time of the day lab work    3   Please wait a full 2 weeks after starting the new medication, and then take 1 week a blood pressure readings morning and evening, sitting and standing and send those in   4   Follow-up in 3 months:  -please go for fasting lab work prior to your appointment  -please bring in 1 week a blood pressure readings morning and evening, sitting and standing  5 General instructions:  -avoid salt  -avoid medications such as Motrin, Naprosyn, ibuprofen, Aleve or Advil or Celebrex as they can affect your kidney function; you can use Tylenol as needed for pain or fevers if you have no liver problems  -avoid medications such as Sudafed or other medications with decongestants as they can raise your blood pressure  -try to exercise at least 30 minutes at least 3 days a week with an ultimate goal of 5 days a week  You 6  Your goal blood pressure at home is on average less than 118/75        Subjective:   Patient overall feels fairly well  No fevers chills cough or colds except for postnasal drip related to allergies  Good appetite energy  No gastrointestinal symptoms  Very rare intermittent chest pain followed closely by Cardiology and unchanged, dyspnea on exertion which is chronic related to sarcoidosis, leg swelling on the right lower extremity which is chronic status post hip replacement and hernia repair with negative Dopplers in the past according the patient  No active urinary symptoms except for slight bubbles in his urine which is unchanged  No headaches, occasional dizziness lightheadedness upon standing about 2 times a week which is chronic  He gets up slowly    Blood pressure medications:  -diltiazem extended release:  240 mg daily  - Edarbi 40 mg twice a day  -Lopressor 50 mg twice a day  BP AT HOME:  -a m :  143/77 without orthostatic changes  -supper:  143/77 without orthostatic changes  -bedtime:  147/78 without orthostatic changes  Heart rate typically 50s-60s    ROS:  See HPI, otherwise review of systems as completely reviewed with the patient are negative    Past Medical History:   Diagnosis Date    Anemia     Chronic kidney disease     [facility] [address] [facility]     [facility]     Hypertension      No past surgical history on file  Family History   Problem Relation Age of Onset    Hypertension Mother     Kidney disease Father       reports that he has never smoked  He has never used smokeless tobacco  He reports that he does not drink alcohol or use drugs  I COMPLETELY REVIEWED THE PAST MEDICAL HISTORY/PAST SURGICAL HISTORY/SOCIAL HISTORY/FAMILY HISTORY/AND MEDICATIONS  AND UPDATED ALL    Objective:     Vitals:   Vitals:    05/16/18 0835   BP: 144/80   BP sitting on left:  150/74 with a heart of 60 and regular a  BP standing on left:  158/84 with a heart rate of 64 and regular    Weight (last 2 days)     Date/Time   Weight    05/16/18 0829  83 9 (185)            Body mass index is 26 54 kg/m²      Physical Exam: General:  No acute distress  Skin:  No acute rash  Eyes:  No scleral icterus, noninjected  ENT:  Moist mucous membranes  Neck:  Supple, no jugular venous distention  Back   No CVAT  Chest:  Clear to auscultation and percussion  CVS:  Regular rate and rhythm without a rub or gallop; grade 2/6 systolic ejection type murmur  Abdomen:  Soft and nontender with normal bowel sounds  Extremities:  No cyanosis and no edema  Neuro:  Grossly intact  Psych:  Alert, oriented x3 and appropriate      Medications:    Current Outpatient Prescriptions:     allopurinol (ZYLOPRIM) 300 mg tablet, Take 0 5 tablets by mouth daily, Disp: , Rfl:     aspirin 325 mg tablet, Take 325 mg by mouth daily, Disp: , Rfl:     B Complex-C (SUPERPLEX-T) TABS, Take 1 tablet by mouth daily, Disp: , Rfl:     Cholecalciferol (VITAMIN D3) 2000 units TABS, Take 1 tablet by mouth daily, Disp: , Rfl:     COLCRYS 0 6 MG tablet, Take 0 6 mg by mouth daily, Disp: , Rfl: 3    diltiazem (CARTIA XT) 240 mg 24 hr capsule, Take 1 capsule by mouth daily, Disp: , Rfl:     EDARBI 40 MG tablet, Take 40 tablets by mouth 2 (two) times a day, Disp: , Rfl: 5    esomeprazole (NexIUM) 40 MG capsule, Take 40 mg by mouth 2 (two) times a day, Disp: , Rfl: 6    Ferrous Sulfate (IRON) 325 (65 Fe) MG TABS, Take 1 tablet by mouth daily, Disp: , Rfl:     fluticasone furoate-vilanterol (BREO ELLIPTA), Inhale daily, Disp: , Rfl:     INCRUSE ELLIPTA 62 5 MCG/INH AEPB, Take 1 puff by mouth daily, Disp: , Rfl: 5    magnesium chloride-calcium (SLOW-MAG) 71 5-119 mg, Take 2 tablets by mouth 2 (two) times a day, Disp: , Rfl:     metoprolol tartrate (LOPRESSOR) 50 mg tablet, Take 50 mg by mouth 2 (two) times a day, Disp: , Rfl: 3    prasugrel (EFFIENT) tablet, Take 1 tablet by mouth daily, Disp: , Rfl:     pravastatin (PRAVACHOL) 40 mg tablet, Take 40 mg by mouth daily, Disp: , Rfl: 3    sitaGLIPtin (JANUVIA) 100 mg tablet, Take 1 tablet by mouth daily, Disp: , Rfl:     sulfaSALAzine (AZULFIDINE) 500 mg tablet, TAKE 1 AND 1/2 TABLETS BY MOUTH ONE TIME DAILY AS DIRECTED, Disp: , Rfl: 3    chlorthalidone 25 mg tablet, Take 1 tablet (25 mg total) by mouth daily, Disp: 30 tablet, Rfl: 5    Lab, Imaging and other studies: I have personally reviewed pertinent labs  Laboratory Results:  No results found for this or any previous visit  Radiology review:   chest X-ray    Ultrasound      Portions of the record may have been created with voice recognition software   Occasional wrong word or "sound a like" substitutions may have occurred due to the inherent limitations of voice recognition software   Read the chart carefully and recognize, using context, where substitutions have occurred

## 2018-06-05 ENCOUNTER — DOCUMENTATION (OUTPATIENT)
Dept: NEPHROLOGY | Facility: CLINIC | Age: 73
End: 2018-06-05

## 2018-06-05 DIAGNOSIS — I12.9 HYPERTENSIVE CHRONIC KIDNEY DISEASE WITH STAGE 1 THROUGH STAGE 4 CHRONIC KIDNEY DISEASE, OR UNSPECIFIED CHRONIC KIDNEY DISEASE: ICD-10-CM

## 2018-06-05 DIAGNOSIS — N18.30 CHRONIC KIDNEY DISEASE, STAGE 3 (MODERATE): Primary | ICD-10-CM

## 2018-06-05 LAB
BUN/CREAT SERPL: 18.5
EXT GLUCOSE BLD: 120
EXTERNAL ANION GAP: 14
EXTERNAL BUN: 28
EXTERNAL CALCIUM: 10.1
EXTERNAL CHLORIDE: 103
EXTERNAL CO2: 28
EXTERNAL CREATININE: 1.51
EXTERNAL EGFR: 46
EXTERNAL POTASSIUM: 4.8
EXTERNAL SODIUM: 140

## 2018-06-06 DIAGNOSIS — N18.30 CKD (CHRONIC KIDNEY DISEASE), STAGE III (HCC): Primary | ICD-10-CM

## 2018-06-20 ENCOUNTER — TELEPHONE (OUTPATIENT)
Dept: NEPHROLOGY | Facility: CLINIC | Age: 73
End: 2018-06-20

## 2018-06-20 NOTE — TELEPHONE ENCOUNTER
Left message for patient to return call to office to discuss creatinine level which is 1 58  This is close to baseline  NO changes needed at this time and patient to repeat BMP in three weeks

## 2018-06-22 DIAGNOSIS — I12.9 PARENCHYMAL RENAL HYPERTENSION, STAGE 1 THROUGH STAGE 4 OR UNSPECIFIED CHRONIC KIDNEY DISEASE: Primary | ICD-10-CM

## 2018-06-25 RX ORDER — AZILSARTAN KAMEDOXOMIL 40 MG/1
TABLET ORAL
Qty: 60 TABLET | Refills: 6 | Status: SHIPPED | OUTPATIENT
Start: 2018-06-25 | End: 2020-04-30

## 2018-06-25 RX ORDER — AZILSARTAN KAMEDOXOMIL 40 MG/1
TABLET ORAL
Qty: 60 TABLET | Refills: 5 | Status: SHIPPED | OUTPATIENT
Start: 2018-06-25 | End: 2018-11-16 | Stop reason: SDUPTHER

## 2018-07-12 ENCOUNTER — TELEPHONE (OUTPATIENT)
Dept: NEPHROLOGY | Facility: CLINIC | Age: 73
End: 2018-07-12

## 2018-07-12 DIAGNOSIS — R80.1 PERSISTENT PROTEINURIA: ICD-10-CM

## 2018-07-12 DIAGNOSIS — I12.9 HYPERTENSIVE CHRONIC KIDNEY DISEASE WITH STAGE 1 THROUGH STAGE 4 CHRONIC KIDNEY DISEASE, OR UNSPECIFIED CHRONIC KIDNEY DISEASE: ICD-10-CM

## 2018-07-12 DIAGNOSIS — N18.30 CHRONIC KIDNEY DISEASE, STAGE III (MODERATE) (HCC): ICD-10-CM

## 2018-07-12 DIAGNOSIS — E78.5 DYSLIPIDEMIA: ICD-10-CM

## 2018-07-12 RX ORDER — CHLORTHALIDONE 25 MG/1
12.5 TABLET ORAL DAILY
Qty: 30 TABLET | Refills: 5 | Status: SHIPPED | OUTPATIENT
Start: 2018-07-12 | End: 2019-05-10 | Stop reason: SDUPTHER

## 2018-07-12 NOTE — TELEPHONE ENCOUNTER
Left message for patient to return call regarding reduction of chlorthalidone to 12 5 mg by mouth once daily and to repeat morning/evening/sitting/standing BP and heart rates in 3 weeks after the start of the 12 5 mg chlorthalidone    Thank you       ----- Message from Pascual Amin MD sent at 7/12/2018 11:01 AM EDT -----  See my note and orders

## 2018-07-12 NOTE — PROGRESS NOTES
Left message for patient to return call regarding reduction of chlorthalidone to 12 5 mg by mouth once daily and to repeat morning/evening/sitting/standing BP and heart rates in 3 weeks after the start of the 12 5 mg chlorthalidone  Thank you

## 2018-07-12 NOTE — PROGRESS NOTES
Blood pressures at home:  -a m :  124/65  -p m :  110/63  Patient also had a little bit of lightheadedness in the evening  Recommendations:  -decrease chlorthalidone 12 5 mg daily  -wait 2-3 weeks and take another week a readings morning and evening, sitting only

## 2018-08-03 ENCOUNTER — OFFICE VISIT (OUTPATIENT)
Dept: NEPHROLOGY | Facility: CLINIC | Age: 73
End: 2018-08-03
Payer: MEDICARE

## 2018-08-03 VITALS
HEART RATE: 47 BPM | HEIGHT: 70 IN | WEIGHT: 178 LBS | DIASTOLIC BLOOD PRESSURE: 48 MMHG | SYSTOLIC BLOOD PRESSURE: 114 MMHG | BODY MASS INDEX: 25.48 KG/M2

## 2018-08-03 DIAGNOSIS — I12.9 HYPERTENSIVE CHRONIC KIDNEY DISEASE WITH STAGE 1 THROUGH STAGE 4 CHRONIC KIDNEY DISEASE, OR UNSPECIFIED CHRONIC KIDNEY DISEASE: ICD-10-CM

## 2018-08-03 DIAGNOSIS — R80.1 PERSISTENT PROTEINURIA: ICD-10-CM

## 2018-08-03 DIAGNOSIS — E78.5 DYSLIPIDEMIA: ICD-10-CM

## 2018-08-03 DIAGNOSIS — N18.30 CHRONIC KIDNEY DISEASE, STAGE III (MODERATE) (HCC): Primary | ICD-10-CM

## 2018-08-03 PROCEDURE — 99214 OFFICE O/P EST MOD 30 MIN: CPT | Performed by: INTERNAL MEDICINE

## 2018-08-03 NOTE — LETTER
August 3, 2018     MARGE Barahona DO  [address]  [address] [address]    Patient: Jovani Baig   YOB: 1945   Date of Visit: 8/3/2018       Dear Dr Mehreen Nielson: Thank you for referring Zaid Mandujano to me for evaluation  Below are my notes for this consultation  If you have questions, please do not hesitate to call me  I look forward to following your patient along with you  Sincerely,        Carole Barragan MD        CC: MD Anne Randle MD Milissa Logan, MD  8/3/2018  9:16 AM  Sign at close encounter  RENAL FOLLOW UP NOTE: td    ASSESSMENT AND PLAN:  1   CKD stage 3   · Etiology:  Diabetic nephropathy/hypertensive nephrosclerosis/arteriolar nephrosclerosis/episodes of SHAY in the past   All serologies were negative including multiple myeloma evaluation  · Baseline creatinine:  1 2-1 5  · Current creatinine:  1 48  · Urine protein creatinine ratio: To be done  Recommendations:  · Treat hypertension-please see below  · Treat dyslipidemia-please see below  · Maintain proteinuria less than 1 g or as low as possible  · Avoid nephrotoxic agents such as NSAIDs, patient counseled as such  2   Volume:  Euvolemic  3  Hypertension:       Current blood pressure averages:  AM:  129/67 without orthostatic changes  PM:  127/68 without orthostatic changes  Heart rate in the 50s    · Goal blood pressure:  125/75 or less  Recommendations:  ·   No changes at this time  He tolerates his minimal orthostatic symptoms  4   Electrolytes:  Acceptable at this time  5   Mineral bone disorder:  · Calcium/magnesium/phosphorus:  Calcium 10 no other labs available  Will obtain  · PTH intact: To be done  · Vitamin-D:  To be done  6  Dyslipidemia:  · Goal LDL:  Less than 70 given CAD  Recommendations: To be done  7  Anemia: To be done  8    Other problems:  · Diabetes mellitus per your discretion  · CAD followed by Dr Didi Santana through [facility]   Status post MI last October involving to cardiac stents  Patient may require further testing/intervention  I did discuss with the patient regarding the possibility of a cardiac catheterization which may lead to worsening kidney function in even occasional hemodialysis with the kidney machine in rarely permanent hemodialysis the kidney machine  If we do go this step, I would recommend admitting him the day before for hydration and Mucomyst   Patient understands all of the implications  · Sarcoidosis followed by Ainsley [facility]          PATIENT INSTRUCTIONS:    Patient Instructions   1  Please go for fasting lab work in the morning now, in the next few days  2  No medication changes today  3   If you do need any further heart testing such as a cardiac catheterization please let me know right away so that I can coordinate care with Dr Guan Cons your cardiologist   4   Follow-up in 4 months:  · Please go for fasting lab work prior to your appointment  · Please bring in 1 week a blood pressure readings morning and evening, sitting and standing  Make sure to do the morning readings before taking any blood pressure medications, and do the evening readings at consistent time such as close to bedtime  · Please bring in your blood pressure machine next visit in 4 months for correlation with the office machine  5  General instructions:  -avoid salt  -avoid medications such as Motrin, Naprosyn, ibuprofen, Aleve or Advil or Celebrex as they can affect your kidney function; you can use Tylenol as needed for pain or fevers if you have no liver problems  -avoid medications such as Sudafed or other medications with decongestants as they can raise your blood pressure  -try to exercise at least 30 minutes at least 3 days a week with an ultimate goal of 5 days a week            Subjective:   Currently patient is having difficulty with anginal type symptoms when he exerts himself which usually resolves after about 10 min    It is burning type substernal   He did have an exercise stress test this last week and is going to follow up with his cardiologist   It is possible he may need further testing  Currently has no chest pain as we are sitting here  He denies any change in his usual dyspnea on exertion  He denies any leg swelling  He denies any fevers chills cough or colds  Good energy and good appetite  He denies any nausea vomiting or diarrhea  He denies any dysuria or hematuria, but does have his chronic foamy urine  No headaches, but he does have some occasional dizziness and lightheadedness upon standing  Blood pressure medications:  -chlorthalidone 12 5 mg daily  -cardia  mg daily  - Edarbi 40 mg daily  -Lopressor 50 mg twice a day    ROS:  See HPI, otherwise review of systems as completely reviewed with the patient are negative    Past Medical History:   Diagnosis Date    Anemia     Chronic kidney disease     [facility]osis [facility] [address] [facility]     Diabetes mellitus ([facility] )     Hypertension      History reviewed  No pertinent surgical history  Family History   Problem Relation Age of Onset    Hypertension Mother     Kidney disease Father       reports that he has never smoked  He has never used smokeless tobacco  He reports that he does not drink alcohol or use drugs  I COMPLETELY REVIEWED THE PAST MEDICAL HISTORY/PAST SURGICAL HISTORY/SOCIAL HISTORY/FAMILY HISTORY/AND MEDICATIONS  AND UPDATED ALL    Objective:     Vitals:   Vitals:    08/03/18 0836   BP: (!) 114/48   Pulse: (!) 47   BP sitting on right:  140/62 with a heart rate of 52 and regular  BP standing on right:  128/62 with a heart rate of 52 and regular    Weight (last 2 days)     Date/Time   Weight    08/03/18 0836  80 7 (178)            Body mass index is 25 54 kg/m²      Physical Exam: General:  No acute distress  Skin:  No acute rash  Eyes:  No scleral icterus, noninjected  ENT:  Moist mucous membranes  Neck:  Supple, no jugular venous distention  Back   No CVAT  Chest:  Clear to auscultation and percussion  CVS:  Regular rate and rhythm with grade 1/6 systolic ejection type murmur, no rub or gallops appreciable  Abdomen:  Soft and nontender with normal bowel sounds  Extremities:  No cyanosis and no edema  Neuro:  Grossly intact  Psych:  Alert, oriented x3 and appropriate      Medications:    Current Outpatient Prescriptions:     allopurinol (ZYLOPRIM) 300 mg tablet, Take 0 5 tablets by mouth daily, Disp: , Rfl:     aspirin 325 mg tablet, Take 325 mg by mouth daily, Disp: , Rfl:     B Complex-C (SUPERPLEX-T) TABS, Take 1 tablet by mouth daily, Disp: , Rfl:     chlorthalidone 25 mg tablet, Take 0 5 tablets (12 5 mg total) by mouth daily, Disp: 30 tablet, Rfl: 5    Cholecalciferol (VITAMIN D3) 2000 units TABS, Take 1 tablet by mouth daily, Disp: , Rfl:     COLCRYS 0 6 MG tablet, Take 0 6 mg by mouth daily, Disp: , Rfl: 3    diltiazem (CARTIA XT) 240 mg 24 hr capsule, Take 1 capsule by mouth daily, Disp: , Rfl:     EDARBI 40 MG tablet, TAKE 1 TABLET TWICE A DAY, Disp: 60 tablet, Rfl: 6    EDARBI 40 MG tablet, TAKE 1 TABLET TWICE A DAY, Disp: 60 tablet, Rfl: 5    esomeprazole (NexIUM) 40 MG capsule, Take 40 mg by mouth 2 (two) times a day, Disp: , Rfl: 6    Ferrous Sulfate (IRON) 325 (65 Fe) MG TABS, Take 1 tablet by mouth daily, Disp: , Rfl:     fluticasone furoate-vilanterol (BREO ELLIPTA), Inhale daily, Disp: , Rfl:     INCRUSE ELLIPTA 62 5 MCG/INH AEPB, Take 1 puff by mouth daily, Disp: , Rfl: 5    magnesium chloride-calcium (SLOW-MAG) 71 5-119 mg, Take 2 tablets by mouth 2 (two) times a day, Disp: , Rfl:     metoprolol tartrate (LOPRESSOR) 50 mg tablet, Take 50 mg by mouth 2 (two) times a day, Disp: , Rfl: 3    prasugrel (EFFIENT) tablet, Take 1 tablet by mouth daily, Disp: , Rfl:     pravastatin (PRAVACHOL) 40 mg tablet, Take 40 mg by mouth daily, Disp: , Rfl: 3    sitaGLIPtin (JANUVIA) 100 mg tablet, Take 1 tablet by mouth daily, Disp: , Rfl:     sulfaSALAzine (AZULFIDINE) 500 mg tablet, TAKE 1 AND 1/2 TABLETS BY MOUTH ONE TIME DAILY AS DIRECTED, Disp: , Rfl: 3    Lab, Imaging and other studies: I have personally reviewed pertinent labs  Laboratory Results:  Results for orders placed or performed in visit on 78/52/58   Basic metabolic panel   Result Value Ref Range    EXTERNAL SODIUM 140     EXTERNAL POTASSIUM 4 8     EXTERNAL CHLORIDE 103     EXTERNAL CO2 28     EXTERNAL BUN 28     EXTERNAL CREATININE 1 51     EXT Glucose Bld 120     EXTERNAL CALCIUM 10 1     EXTERNAL ANION GAP 14     EXTERNAL EGFR 46     BUN/Creatinine Ratio 18 5                Radiology review:   chest X-ray    Ultrasound      Portions of the record may have been created with voice recognition software   Occasional wrong word or "sound a like" substitutions may have occurred due to the inherent limitations of voice recognition software   Read the chart carefully and recognize, using context, where substitutions have occurred

## 2018-08-03 NOTE — PROGRESS NOTES
RENAL FOLLOW UP NOTE: td    ASSESSMENT AND PLAN:  1   CKD stage 3   · Etiology:  Diabetic nephropathy/hypertensive nephrosclerosis/arteriolar nephrosclerosis/episodes of SHAY in the past   All serologies were negative including multiple myeloma evaluation  · Baseline creatinine:  1 2-1 5  · Current creatinine:  1 48  · Urine protein creatinine ratio: To be done  Recommendations:  · Treat hypertension-please see below  · Treat dyslipidemia-please see below  · Maintain proteinuria less than 1 g or as low as possible  · Avoid nephrotoxic agents such as NSAIDs, patient counseled as such  2   Volume:  Euvolemic  3  Hypertension:       Current blood pressure averages:  AM:  129/67 without orthostatic changes  PM:  127/68 without orthostatic changes  Heart rate in the 50s    · Goal blood pressure:  125/75 or less  Recommendations:  ·   No changes at this time  He tolerates his minimal orthostatic symptoms  4   Electrolytes:  Acceptable at this time  5   Mineral bone disorder:  · Calcium/magnesium/phosphorus:  Calcium 10 no other labs available  Will obtain  · PTH intact: To be done  · Vitamin-D:  To be done  6  Dyslipidemia:  · Goal LDL:  Less than 70 given CAD  Recommendations: To be done  7  Anemia: To be done  8  Other problems:  · Diabetes mellitus per your discretion  · CAD followed by Dr Magdalena Montilla through [facility]   Status post MI last October involving to cardiac stents  Patient may require further testing/intervention  I did discuss with the patient regarding the possibility of a cardiac catheterization which may lead to worsening kidney function in even occasional hemodialysis with the kidney machine in rarely permanent hemodialysis the kidney machine  If we do go this step, I would recommend admitting him the day before for hydration and Mucomyst   Patient understands all of the implications  · Sarcoidosis followed by Fer Beaulieu          PATIENT INSTRUCTIONS:    Patient Instructions   1  Please go for fasting lab work in the morning now, in the next few days  2  No medication changes today  3   If you do need any further heart testing such as a cardiac catheterization please let me know right away so that I can coordinate care with Dr Anabella Spencer your cardiologist   4   Follow-up in 4 months:  · Please go for fasting lab work prior to your appointment  · Please bring in 1 week a blood pressure readings morning and evening, sitting and standing  Make sure to do the morning readings before taking any blood pressure medications, and do the evening readings at consistent time such as close to bedtime  · Please bring in your blood pressure machine next visit in 4 months for correlation with the office machine  5  General instructions:  -avoid salt  -avoid medications such as Motrin, Naprosyn, ibuprofen, Aleve or Advil or Celebrex as they can affect your kidney function; you can use Tylenol as needed for pain or fevers if you have no liver problems  -avoid medications such as Sudafed or other medications with decongestants as they can raise your blood pressure  -try to exercise at least 30 minutes at least 3 days a week with an ultimate goal of 5 days a week            Subjective:   Currently patient is having difficulty with anginal type symptoms when he exerts himself which usually resolves after about 10 min  It is burning type substernal   He did have an exercise stress test this last week and is going to follow up with his cardiologist   It is possible he may need further testing  Currently has no chest pain as we are sitting here  He denies any change in his usual dyspnea on exertion  He denies any leg swelling    He denies any fevers chills cough or colds  Good energy and good appetite  He denies any nausea vomiting or diarrhea  He denies any dysuria or hematuria, but does have his chronic foamy urine  No headaches, but he does have some occasional dizziness and lightheadedness upon standing  Blood pressure medications:  -chlorthalidone 12 5 mg daily  -cardia  mg daily  - Edarbi 40 mg daily  -Lopressor 50 mg twice a day    ROS:  See HPI, otherwise review of systems as completely reviewed with the patient are negative    Past Medical History:   Diagnosis Date    Anemia     Chronic kidney disease     [facility]osis ([facility] )     Diabetes mellitus ([facility] )     Hypertension      History reviewed  No pertinent surgical history  Family History   Problem Relation Age of Onset    Hypertension Mother     Kidney disease Father       reports that he has never smoked  He has never used smokeless tobacco  He reports that he does not drink alcohol or use drugs  I COMPLETELY REVIEWED THE PAST MEDICAL HISTORY/PAST SURGICAL HISTORY/SOCIAL HISTORY/FAMILY HISTORY/AND MEDICATIONS  AND UPDATED ALL    Objective:     Vitals:   Vitals:    08/03/18 0836   BP: (!) 114/48   Pulse: (!) 47   BP sitting on right:  140/62 with a heart rate of 52 and regular  BP standing on right:  128/62 with a heart rate of 52 and regular    Weight (last 2 days)     Date/Time   Weight    08/03/18 0836  80 7 (178)            Body mass index is 25 54 kg/m²      Physical Exam: General:  No acute distress  Skin:  No acute rash  Eyes:  No scleral icterus, noninjected  ENT:  Moist mucous membranes  Neck:  Supple, no jugular venous distention  Back   No CVAT  Chest:  Clear to auscultation and percussion  CVS:  Regular rate and rhythm with grade 1/6 systolic ejection type murmur, no rub or gallops appreciable  Abdomen:  Soft and nontender with normal bowel sounds  Extremities:  No cyanosis and no edema  Neuro:  Grossly intact  Psych:  Alert, oriented x3 and appropriate      Medications:    Current Outpatient Prescriptions:     allopurinol (ZYLOPRIM) 300 mg tablet, Take 0 5 tablets by mouth daily, Disp: , Rfl:     aspirin 325 mg tablet, Take 325 mg by mouth daily, Disp: , Rfl:     B Complex-C (SUPERPLEX-T) TABS, Take 1 tablet by mouth daily, Disp: , Rfl:     chlorthalidone 25 mg tablet, Take 0 5 tablets (12 5 mg total) by mouth daily, Disp: 30 tablet, Rfl: 5    Cholecalciferol (VITAMIN D3) 2000 units TABS, Take 1 tablet by mouth daily, Disp: , Rfl:     COLCRYS 0 6 MG tablet, Take 0 6 mg by mouth daily, Disp: , Rfl: 3    diltiazem (CARTIA XT) 240 mg 24 hr capsule, Take 1 capsule by mouth daily, Disp: , Rfl:     EDARBI 40 MG tablet, TAKE 1 TABLET TWICE A DAY, Disp: 60 tablet, Rfl: 6    EDARBI 40 MG tablet, TAKE 1 TABLET TWICE A DAY, Disp: 60 tablet, Rfl: 5    esomeprazole (NexIUM) 40 MG capsule, Take 40 mg by mouth 2 (two) times a day, Disp: , Rfl: 6    Ferrous Sulfate (IRON) 325 (65 Fe) MG TABS, Take 1 tablet by mouth daily, Disp: , Rfl:     fluticasone furoate-vilanterol (BREO ELLIPTA), Inhale daily, Disp: , Rfl:     INCRUSE ELLIPTA 62 5 MCG/INH AEPB, Take 1 puff by mouth daily, Disp: , Rfl: 5    magnesium chloride-calcium (SLOW-MAG) 71 5-119 mg, Take 2 tablets by mouth 2 (two) times a day, Disp: , Rfl:     metoprolol tartrate (LOPRESSOR) 50 mg tablet, Take 50 mg by mouth 2 (two) times a day, Disp: , Rfl: 3    prasugrel (EFFIENT) tablet, Take 1 tablet by mouth daily, Disp: , Rfl:     pravastatin (PRAVACHOL) 40 mg tablet, Take 40 mg by mouth daily, Disp: , Rfl: 3    sitaGLIPtin (JANUVIA) 100 mg tablet, Take 1 tablet by mouth daily, Disp: , Rfl:     sulfaSALAzine (AZULFIDINE) 500 mg tablet, TAKE 1 AND 1/2 TABLETS BY MOUTH ONE TIME DAILY AS DIRECTED, Disp: , Rfl: 3    Lab, Imaging and other studies: I have personally reviewed pertinent labs    Laboratory Results:  Results for orders placed or performed in visit on 31/98/72   Basic metabolic panel   Result Value Ref Range    EXTERNAL SODIUM 140     EXTERNAL POTASSIUM 4 8     EXTERNAL CHLORIDE 103     EXTERNAL CO2 28     EXTERNAL BUN 28     EXTERNAL CREATININE 1 51     EXT Glucose Bld 120     EXTERNAL CALCIUM 10 1     EXTERNAL ANION GAP 14     EXTERNAL EGFR 46     BUN/Creatinine Ratio 18 5 Radiology review:   chest X-ray    Ultrasound      Portions of the record may have been created with voice recognition software   Occasional wrong word or "sound a like" substitutions may have occurred due to the inherent limitations of voice recognition software   Read the chart carefully and recognize, using context, where substitutions have occurred

## 2018-08-03 NOTE — PATIENT INSTRUCTIONS
1   Please go for fasting lab work in the morning now, in the next few days  2  No medication changes today  3   If you do need any further heart testing such as a cardiac catheterization please let me know right away so that I can coordinate care with Dr Manohar Hernandez your cardiologist   4   Follow-up in 4 months:  · Please go for fasting lab work prior to your appointment  · Please bring in 1 week a blood pressure readings morning and evening, sitting and standing  Make sure to do the morning readings before taking any blood pressure medications, and do the evening readings at consistent time such as close to bedtime  · Please bring in your blood pressure machine next visit in 4 months for correlation with the office machine  5   General instructions:  -avoid salt  -avoid medications such as Motrin, Naprosyn, ibuprofen, Aleve or Advil or Celebrex as they can affect your kidney function; you can use Tylenol as needed for pain or fevers if you have no liver problems  -avoid medications such as Sudafed or other medications with decongestants as they can raise your blood pressure  -try to exercise at least 30 minutes at least 3 days a week with an ultimate goal of 5 days a week

## 2018-08-13 LAB
25(OH)D3 SERPL-MCNC: 46.5 NG/ML (ref 30–100)
BILIRUB UR QL STRIP: NEGATIVE
EXT GLUCOSE BLD: 132
EXTERNAL BACTERIA (UA): ABNORMAL
EXTERNAL BUN: 28
EXTERNAL CALCIUM: 9.8
EXTERNAL CHLORIDE: 103
EXTERNAL CO2: 30
EXTERNAL CREATININE: 1.46
EXTERNAL EGFR: 48
EXTERNAL POTASSIUM: 4.4
EXTERNAL SODIUM: 139
GLUCOSE UR STRIP-MCNC: NEGATIVE MG/DL
HCT VFR BLD AUTO: 40.2 % (ref 36.5–49.3)
HGB BLD-MCNC: 13.5 G/DL (ref 12–17)
HGB UR QL STRIP.AUTO: NEGATIVE
KETONES UR STRIP-MCNC: NEGATIVE MG/DL
LEUKOCYTE ESTERASE UR QL STRIP: NEGATIVE
NITRITE UR QL STRIP: NEGATIVE
PH UR STRIP.AUTO: 5 [PH] (ref 4.5–8)
PLATELET # BLD AUTO: 164 THOUSANDS/UL (ref 149–390)
PROT UR STRIP-MCNC: ABNORMAL MG/DL
PROT/CREAT 24H UR: 0.5 MG/G{CREAT}
SL AMB POCT EPITHELEAL CELLS UA: ABNORMAL
SP GR UR STRIP.AUTO: 1.01 (ref 1–1.03)
WBC # BLD AUTO: 8.7 THOUSAND/UL
WBC #/AREA URNS AUTO: ABNORMAL /HPF

## 2018-08-16 DIAGNOSIS — N18.30 CKD (CHRONIC KIDNEY DISEASE), STAGE III (HCC): Primary | ICD-10-CM

## 2018-08-16 RX ORDER — SPIRONOLACTONE 25 MG/1
TABLET ORAL
Qty: 15 TABLET | Refills: 5 | Status: SHIPPED | OUTPATIENT
Start: 2018-08-16 | End: 2018-12-18

## 2018-08-16 NOTE — TELEPHONE ENCOUNTER
----- Message from Martínez Harris MD sent at 8/16/2018  2:06 PM EDT -----  The patient's potassium is 3 4  Recommendations:  1  Increase potassium in the diet  2  Add spironolactone 25 mg Monday Wednesday and Friday  3  Watch for any breast enlargement/breast soreness  4  Repeat a basic metabolic profile 1-2 weeks  5    Repeat blood pressures in 2-3 weeks morning and evening, sitting and standing

## 2018-08-17 LAB
EXT GLUCOSE BLD: 96
EXTERNAL ALBUMIN: 4.1
EXTERNAL ALK PHOS: 74
EXTERNAL ALT: 22
EXTERNAL AST: 30
EXTERNAL BUN: 22
EXTERNAL CALCIUM: 9.5
EXTERNAL CHLORIDE: 104
EXTERNAL CO2: 26
EXTERNAL CREATININE: 1.26
EXTERNAL EGFR: 57
EXTERNAL POTASSIUM: 3.4
EXTERNAL SODIUM: 140
EXTERNAL T.BILIRUBIN: 0.6
EXTERNAL TOTAL PROTEIN: 6.6
HCT VFR BLD AUTO: 41.4 % (ref 36.5–49.3)
HGB BLD-MCNC: 14.4 G/DL (ref 12–17)
PLATELET # BLD AUTO: 177 THOUSANDS/UL (ref 149–390)
WBC # BLD AUTO: 10.5 THOUSAND/UL

## 2018-11-16 DIAGNOSIS — I12.9 PARENCHYMAL RENAL HYPERTENSION, STAGE 1 THROUGH STAGE 4 OR UNSPECIFIED CHRONIC KIDNEY DISEASE: ICD-10-CM

## 2018-11-19 RX ORDER — AZILSARTAN KAMEDOXOMIL 40 MG/1
TABLET ORAL
Qty: 60 TABLET | Refills: 5 | Status: SHIPPED | OUTPATIENT
Start: 2018-11-19 | End: 2018-12-18 | Stop reason: SDUPTHER

## 2018-12-14 LAB
25(OH)D3 SERPL-MCNC: 38.8 NG/ML (ref 30–100)
CHOLEST SERPL-MCNC: 175 MG/DL (ref 50–200)
CHOLEST/HDLC SERPL: 4.06 {RATIO}
EXT GLUCOSE BLD: 129
EXTERNAL ALBUMIN: 4
EXTERNAL ALK PHOS: 75
EXTERNAL ALT: 17
EXTERNAL AST: 22
EXTERNAL BUN: 35
EXTERNAL CALCIUM: 9.5
EXTERNAL CHLORIDE: 108
EXTERNAL CO2: 23
EXTERNAL CREATININE: 1.62
EXTERNAL EGFR: 42
EXTERNAL POTASSIUM: 5.7
EXTERNAL SODIUM: 137
EXTERNAL T.BILIRUBIN: 0.4
EXTERNAL TOTAL PROTEIN: 6.8
HCT VFR BLD AUTO: 37.3 % (ref 36.5–49.3)
HDLC SERPL-MCNC: 43 MG/DL (ref 40–60)
HGB BLD-MCNC: 12.5 G/DL (ref 12–17)
LDLC SERPL CALC-MCNC: 97 MG/DL (ref 0–100)
PLATELET # BLD AUTO: 191 THOUSANDS/UL (ref 149–390)
PROT/CREAT UR: 0.11 MG/G{CREAT}
PTH-INTACT SERPL-MCNC: 28 PG/ML (ref 18.4–80.1)
TRIGL SERPL-MCNC: 175 MG/DL (ref ?–150)
WBC # BLD AUTO: 10.1 THOUSAND/UL

## 2018-12-17 ENCOUNTER — TELEPHONE (OUTPATIENT)
Dept: NEPHROLOGY | Facility: CLINIC | Age: 73
End: 2018-12-17

## 2018-12-17 NOTE — TELEPHONE ENCOUNTER
----- Message from Jocelyn Holden MD sent at 12/14/2018 10:11 AM EST -----  The patient's creatinine slightly higher  His potassium is 5 7   1  Please have the patient stop spironolactone  2  Review all medications with the patient's and update the medication less  3  Have him follow a low-potassium diet  4  Please have him go for a repeat basic metabolic profile early next week either 12/17, or 12/18 nonfasting  5  He needs to go for also the rest the follow-up labs that were ordered last appointment that he did not go for including a urine protein creatinine ratio when he does his labs early next week in the morning but again not fasting

## 2018-12-18 ENCOUNTER — OFFICE VISIT (OUTPATIENT)
Dept: NEPHROLOGY | Facility: CLINIC | Age: 73
End: 2018-12-18
Payer: MEDICARE

## 2018-12-18 VITALS
WEIGHT: 188 LBS | BODY MASS INDEX: 26.92 KG/M2 | SYSTOLIC BLOOD PRESSURE: 122 MMHG | HEART RATE: 60 BPM | HEIGHT: 70 IN | DIASTOLIC BLOOD PRESSURE: 58 MMHG

## 2018-12-18 DIAGNOSIS — I12.9 HYPERTENSIVE CHRONIC KIDNEY DISEASE WITH STAGE 1 THROUGH STAGE 4 CHRONIC KIDNEY DISEASE, OR UNSPECIFIED CHRONIC KIDNEY DISEASE: ICD-10-CM

## 2018-12-18 DIAGNOSIS — N18.30 CHRONIC KIDNEY DISEASE, STAGE III (MODERATE) (HCC): Primary | ICD-10-CM

## 2018-12-18 DIAGNOSIS — E87.5 HYPERKALEMIA: ICD-10-CM

## 2018-12-18 DIAGNOSIS — R80.1 PERSISTENT PROTEINURIA: ICD-10-CM

## 2018-12-18 PROCEDURE — 99214 OFFICE O/P EST MOD 30 MIN: CPT | Performed by: PHYSICIAN ASSISTANT

## 2018-12-18 RX ORDER — METOPROLOL TARTRATE 50 MG/1
50 TABLET, FILM COATED ORAL
COMMUNITY
End: 2021-01-27 | Stop reason: SDUPTHER

## 2018-12-18 RX ORDER — POTASSIUM CHLORIDE 750 MG/1
20 TABLET, EXTENDED RELEASE ORAL 2 TIMES DAILY
Refills: 3 | COMMUNITY
Start: 2018-11-20 | End: 2018-12-18 | Stop reason: ALTCHOICE

## 2018-12-18 NOTE — PROGRESS NOTES
Assessment and Plan:    Luma Read was seen today for follow-up and chronic kidney disease  Diagnoses and all orders for this visit:    Chronic kidney disease, stage III (moderate) ([facility])  -     Basic metabolic panel  -     Basic metabolic panel; Future  -     Magnesium; Future  -     Phosphorus; Future  -     Protein / creatinine ratio, urine; Future  -     CBC; Future  -     PTH, intact; Future    Hypertensive chronic kidney disease with stage 1 through stage 4 chronic kidney disease, or unspecified chronic kidney disease    Persistent proteinuria    Hyperkalemia      Chronic Kidney Disease stage III- Baseline creatinine is 1 2-1 5  Etiology presumed from diabetic nephropathy, hypertensive nephrosclerosis, arteriolar nephrosclerosis, and SHAY episodes  Hypertension- Goal blood pressure is <125/75  Avoid salt in your diet  Stay active  Avoid non steroidals  Antihypertensive regimen includes edarbi 40mg twice a day, chlorthalidone 12 5mg daily, lopressor 50mg twice a day, diltiazem 240mg daily  Please call the office for blood pressures >759 systolic since you are stopping the spironolactone  Proteinuria- UPC ratio in August was 0 5 and now is 0 11  Bone Mineral Disease- PTH and Vitamin D levels acceptable  Hyperkalemia- Spironolactone stopped on 12/18/18  Please stop potassium supplement also  Repeat blood work after the holidays  Follow up with Dr Erlinda Shin in 5 months  Please call the office with any questions or concerns  Reason for Visit: Follow-up and Chronic Kidney Disease    HPI: Valerie Alarcon is a 72 y o  male who is here for follow up of chronic kidney disease  Since his last appointment 4 months ago, he has been feeling well and offers no acute complaints  His potassium was elevated on his last blood work and Dr Erlinda Shin stopped his spironolactone  At home his blood pressures are well below 139 systolic  He denies lightheadedness or dizziness    He keeps quite active and still works part time at [employer]  ROS: A complete review of systems was performed and was negative unless otherwise noted in the history of present illness      Allergies:   Oxycodone    Medications:     Current Outpatient Prescriptions:     allopurinol (ZYLOPRIM) 300 mg tablet, Take 0 5 tablets by mouth daily, Disp: , Rfl:     aspirin 325 mg tablet, Take 325 mg by mouth daily, Disp: , Rfl:     B Complex-C (SUPERPLEX-T) TABS, Take 1 tablet by mouth daily, Disp: , Rfl:     chlorthalidone 25 mg tablet, Take 0 5 tablets (12 5 mg total) by mouth daily, Disp: 30 tablet, Rfl: 5    Cholecalciferol (VITAMIN D3) 2000 units TABS, Take 1 tablet by mouth daily, Disp: , Rfl:     COLCRYS 0 6 MG tablet, Take 0 6 mg by mouth daily, Disp: , Rfl: 3    diltiazem (CARTIA XT) 240 mg 24 hr capsule, Take 1 capsule by mouth daily, Disp: , Rfl:     EDARBI 40 MG tablet, TAKE 1 TABLET TWICE A DAY, Disp: 60 tablet, Rfl: 6    esomeprazole (NexIUM) 40 MG capsule, Take 40 mg by mouth 2 (two) times a day, Disp: , Rfl: 6    Ferrous Sulfate (IRON) 325 (65 Fe) MG TABS, Take 1 tablet by mouth daily, Disp: , Rfl:     fluticasone furoate-vilanterol (BREO ELLIPTA), Inhale daily, Disp: , Rfl:     INCRUSE ELLIPTA 62 5 MCG/INH AEPB, Take 1 puff by mouth daily, Disp: , Rfl: 5    magnesium chloride-calcium (SLOW-MAG) 71 5-119 mg, Take 2 tablets by mouth 2 (two) times a day, Disp: , Rfl:     metoprolol tartrate (LOPRESSOR) 50 mg tablet, Take 50 mg by mouth every 12 (twelve) hours, Disp: , Rfl:     prasugrel (EFFIENT) tablet, Take 1 tablet by mouth daily, Disp: , Rfl:     pravastatin (PRAVACHOL) 40 mg tablet, Take 40 mg by mouth daily, Disp: , Rfl: 3    sitaGLIPtin (JANUVIA) 100 mg tablet, Take 1 tablet by mouth daily, Disp: , Rfl:     sulfaSALAzine (AZULFIDINE) 500 mg tablet, TAKE 1 AND 1/2 TABLETS BY MOUTH ONE TIME DAILY AS DIRECTED, Disp: , Rfl: 3    Past Medical History:   Diagnosis Date    Anemia     Chronic kidney disease  Cirrhosis ([facility] )     Diabetes mellitus ([facility] )     Hypertension      No past surgical history on file  Family History   Problem Relation Age of Onset    Hypertension Mother     Kidney disease Father       reports that he has never smoked  He has never used smokeless tobacco  He reports that he does not drink alcohol or use drugs  Physical Exam:   Vitals:    12/18/18 1109 12/18/18 1133 12/18/18 1134   BP:  136/72 122/58   BP Location:  Left arm Right arm   Patient Position:  Sitting Sitting   Cuff Size:  Standard Standard   Pulse:  60    Weight: 85 3 kg (188 lb)     Height: 5' 10" (1 778 m)       Body mass index is 26 98 kg/m²  General: NAD  Neuro: AAO  Neck: supple  Skin: no rash  Heart: RRR  Lungs: CTAB  Abdomen: soft nt nd  Extremities: no edema    Procedure:  No results found for this or any previous visit  Labs reviewed      Lab Results   Component Value Date    CALCIUM 9 5 12/11/2018    K 5 7 12/11/2018    CO2 23 12/11/2018     12/11/2018    BUN 35 12/11/2018    CREATININE 1 62 12/11/2018

## 2018-12-18 NOTE — LETTER
2018     MARGE Brenner DO  [address]  [address] [address]    Patient: Donell Lacey   YOB: 1945   Date of Visit: 2018       Dear Dr Ameena Interiano: Thank you for referring Radha Thomas to me for evaluation  Below are my notes for this consultation  If you have questions, please do not hesitate to call me  I look forward to following your patient along with you  Sincerely,        [address] [place] [address], [place]        CC: No Recipients  [address] [place] [address]  2018 11:35 AM  Incomplete  Assessment and Plan:    Karis Ramsey was seen today for follow-up and chronic kidney disease  Diagnoses and all orders for this visit:    Chronic kidney disease, stage III (moderate) (HCC)    Hypertensive chronic kidney disease with stage 1 through stage 4 chronic kidney disease, or unspecified chronic kidney disease    Persistent proteinuria    Hyperkalemia      Chronic Kidney Disease stage III- Baseline creatinine is 1 2-1 5  Etiology presumed from diabetic nephropathy, hypertensive nephrosclerosis, arteriolar nephrosclerosis, and SHAY episodes  Hypertension- Goal blood pressure is <125/75  Avoid salt in your diet  Stay active  Avoid non steroidals  Antihypertensive regimen includes edarbi 40mg twice a day, chlorthalidone 12 5mg daily, lopressor 50mg twice a day, diltiazem 240mg daily  Please call the office for     Proteinuria- UPC ratio in August was 0 5 and now is 0 11  Bone Mineral Disease- Vitamin D level acceptable  Hyperkalemia- Spironolactone stopped on 18  Please stop potassium supplement also  Repeat blood work after the holidays  Follow up with Dr Eric Zurita in 5 months  Please call the office with any questions or concerns  Reason for Visit: Follow-up and Chronic Kidney Disease    HPI: Donell Lacey is a 72 y o  male who is here for follow up of chronic kidney disease    Since his last appointment 4 months ago, he has been feeling well and offers no acute complaints  His potassium was elevated on his last blood work and Dr Eric Zurita stopped his spironolactone  At home his blood pressures are well below 939 systolic  He denies lightheadedness or dizziness  ROS: A complete review of systems was performed and was negative unless otherwise noted in the history of present illness      Allergies:   Oxycodone    Medications:     Current Outpatient Prescriptions:     allopurinol (ZYLOPRIM) 300 mg tablet, Take 0 5 tablets by mouth daily, Disp: , Rfl:     aspirin 325 mg tablet, Take 325 mg by mouth daily, Disp: , Rfl:     B Complex-C (SUPERPLEX-T) TABS, Take 1 tablet by mouth daily, Disp: , Rfl:     chlorthalidone 25 mg tablet, Take 0 5 tablets (12 5 mg total) by mouth daily, Disp: 30 tablet, Rfl: 5    Cholecalciferol (VITAMIN D3) 2000 units TABS, Take 1 tablet by mouth daily, Disp: , Rfl:     COLCRYS 0 6 MG tablet, Take 0 6 mg by mouth daily, Disp: , Rfl: 3    diltiazem (CARTIA XT) 240 mg 24 hr capsule, Take 1 capsule by mouth daily, Disp: , Rfl:     EDARBI 40 MG tablet, TAKE 1 TABLET TWICE A DAY, Disp: 60 tablet, Rfl: 6    EDARBI 40 MG tablet, TAKE 1 TABLET TWICE A DAY, Disp: 60 tablet, Rfl: 5    esomeprazole (NexIUM) 40 MG capsule, Take 40 mg by mouth 2 (two) times a day, Disp: , Rfl: 6    Ferrous Sulfate (IRON) 325 (65 Fe) MG TABS, Take 1 tablet by mouth daily, Disp: , Rfl:     fluticasone furoate-vilanterol (BREO ELLIPTA), Inhale daily, Disp: , Rfl:     INCRUSE ELLIPTA 62 5 MCG/INH AEPB, Take 1 puff by mouth daily, Disp: , Rfl: 5    magnesium chloride-calcium (SLOW-MAG) 71 5-119 mg, Take 2 tablets by mouth 2 (two) times a day, Disp: , Rfl:     metoprolol tartrate (LOPRESSOR) 50 mg tablet, Take 50 mg by mouth 2 (two) times a day, Disp: , Rfl: 3    potassium chloride (K-DUR,KLOR-CON) 10 mEq tablet, Take 20 mEq by mouth 2 (two) times a day, Disp: , Rfl: 3    prasugrel (EFFIENT) tablet, Take 1 tablet by mouth daily, Disp: , Rfl:    pravastatin (PRAVACHOL) 40 mg tablet, Take 40 mg by mouth daily, Disp: , Rfl: 3    sitaGLIPtin (JANUVIA) 100 mg tablet, Take 1 tablet by mouth daily, Disp: , Rfl:     spironolactone (ALDACTONE) 25 mg tablet, Take one tablet on Monday,Wednesday and Friday , Disp: 15 tablet, Rfl: 5    sulfaSALAzine (AZULFIDINE) 500 mg tablet, TAKE 1 AND 1/2 TABLETS BY MOUTH ONE TIME DAILY AS DIRECTED, Disp: , Rfl: 3    Past Medical History:   Diagnosis Date    Anemia     Chronic kidney disease     [facility]     [facility]     Hypertension      No past surgical history on file  Family History   Problem Relation Age of Onset    Hypertension Mother     Kidney disease Father       reports that he has never smoked  He has never used smokeless tobacco  He reports that he does not drink alcohol or use drugs  Physical Exam:   Vitals:    12/18/18 1109   Weight: 85 3 kg (188 lb)   Height: 5' 10" (1 778 m)     Body mass index is 26 98 kg/m²  General: NAD  Neuro: AAO  Neck: supple  Skin: no rash  Heart: RRR  Lungs: CTAB  Abdomen: soft nt nd  Extremities: no edema    Procedure:  No results found for this or any previous visit  Labs reviewed      Lab Results   Component Value Date    CALCIUM 9 5 12/11/2018    K 5 7 12/11/2018    CO2 23 12/11/2018     12/11/2018    BUN 35 12/11/2018    CREATININE 1 62 12/11/2018

## 2018-12-18 NOTE — PATIENT INSTRUCTIONS
Chronic Kidney Disease stage III- Baseline creatinine is 1 2-1 5  Etiology presumed from diabetic nephropathy, hypertensive nephrosclerosis, arteriolar nephrosclerosis, and SHAY episodes  Hypertension- Goal blood pressure is <125/75  Avoid salt in your diet  Stay active  Avoid non steroidals  Antihypertensive regimen includes edarbi 40mg twice a day, chlorthalidone 12 5mg daily, lopressor 50mg twice a day, diltiazem 240mg daily  Please call the office for blood pressures >729 systolic since you are stopping the spironolactone  Proteinuria- UPC ratio in August was 0 5 and now is 0 11  Bone Mineral Disease- Vitamin D level acceptable  Hyperkalemia- Spironolactone stopped on 12/18/18  Please stop potassium supplement also  Repeat blood work after the holidays  Follow up with Dr Thea Kendall in 5 months  Please call the office with any questions or concerns

## 2019-01-03 LAB
EXT GLUCOSE BLD: 129
EXTERNAL BUN: 24
EXTERNAL CALCIUM: 9.4
EXTERNAL CHLORIDE: 103
EXTERNAL CO2: 27
EXTERNAL CREATININE: 1.53
EXTERNAL EGFR: 45
EXTERNAL POTASSIUM: 3.9
EXTERNAL SODIUM: 139

## 2019-01-16 DIAGNOSIS — N18.30 CKD (CHRONIC KIDNEY DISEASE), STAGE III (HCC): ICD-10-CM

## 2019-01-16 RX ORDER — SPIRONOLACTONE 25 MG/1
TABLET ORAL
Qty: 15 TABLET | Refills: 5 | OUTPATIENT
Start: 2019-01-16

## 2019-01-30 DIAGNOSIS — E87.5 HYPERKALEMIA: ICD-10-CM

## 2019-01-30 DIAGNOSIS — N18.30 CKD (CHRONIC KIDNEY DISEASE), STAGE III (HCC): Primary | ICD-10-CM

## 2019-01-30 DIAGNOSIS — I10 HYPERTENSION, UNSPECIFIED TYPE: ICD-10-CM

## 2019-01-30 DIAGNOSIS — R80.1 PERSISTENT PROTEINURIA: ICD-10-CM

## 2019-01-30 NOTE — TELEPHONE ENCOUNTER
Pt recently changes to [facility] and will not cover his Edarbi 40 mg, pt was provided a short supply for 30 days until Provider can switch medication  Alternative drugs provided by [facility]    Losartan 50 mg   Valsartan 160 mg

## 2019-01-31 RX ORDER — VALSARTAN 160 MG/1
160 TABLET ORAL DAILY
Qty: 90 TABLET | Refills: 3 | OUTPATIENT
Start: 2019-01-31

## 2019-01-31 NOTE — TELEPHONE ENCOUNTER
There was a warning from FDA that the valsartan is being recalled    Please find out which is the approved medications by the insurance is not on recall and then I can refill as

## 2019-02-18 ENCOUNTER — TELEPHONE (OUTPATIENT)
Dept: NEPHROLOGY | Facility: CLINIC | Age: 74
End: 2019-02-18

## 2019-02-18 NOTE — TELEPHONE ENCOUNTER
I called and left message for patient to call back to schedule April follow up appt with Dr Good [place]

## 2019-03-28 LAB
CREAT ?TM UR-SCNC: 97 UMOL/L
EXT PROTEIN URINE: 28.9
PROT/CREAT UR: 0.29 MG/G{CREAT}

## 2019-03-29 LAB
EXT GLUCOSE BLD: 135
EXTERNAL BUN: 30
EXTERNAL CALCIUM: 9.8
EXTERNAL CHLORIDE: 103
EXTERNAL CO2: 29
EXTERNAL CREATININE: 1.61
EXTERNAL EGFR: 42
EXTERNAL POTASSIUM: 4.7
EXTERNAL SODIUM: 139
HCT VFR BLD AUTO: 41.3 % (ref 36.5–49.3)
HGB BLD-MCNC: 13.7 G/DL (ref 12–17)
MAGNESIUM SERPL-MCNC: 2.2 MG/DL (ref 1.6–2.6)
PHOSPHATE SERPL-MCNC: 3.6 MG/DL (ref 2.3–4.1)
PLATELET # BLD AUTO: 183 THOUSANDS/UL (ref 149–390)
PROT/CREAT UR: 0.29 MG/G{CREAT} (ref 0–0.1)
PTH-INTACT SERPL-MCNC: 31 PG/ML
WBC # BLD AUTO: 8.7 THOUSAND/UL

## 2019-04-05 ENCOUNTER — OFFICE VISIT (OUTPATIENT)
Dept: NEPHROLOGY | Facility: CLINIC | Age: 74
End: 2019-04-05
Payer: MEDICARE

## 2019-04-05 VITALS — HEIGHT: 70 IN | BODY MASS INDEX: 27.57 KG/M2 | WEIGHT: 192.6 LBS

## 2019-04-05 DIAGNOSIS — R80.1 PERSISTENT PROTEINURIA: ICD-10-CM

## 2019-04-05 DIAGNOSIS — I12.9 HYPERTENSIVE CHRONIC KIDNEY DISEASE WITH STAGE 1 THROUGH STAGE 4 CHRONIC KIDNEY DISEASE, OR UNSPECIFIED CHRONIC KIDNEY DISEASE: Primary | ICD-10-CM

## 2019-04-05 DIAGNOSIS — N18.30 CHRONIC KIDNEY DISEASE, STAGE III (MODERATE) (HCC): ICD-10-CM

## 2019-04-05 DIAGNOSIS — E78.5 DYSLIPIDEMIA: ICD-10-CM

## 2019-04-05 PROCEDURE — 99214 OFFICE O/P EST MOD 30 MIN: CPT | Performed by: INTERNAL MEDICINE

## 2019-05-10 DIAGNOSIS — N18.30 CHRONIC KIDNEY DISEASE, STAGE III (MODERATE) (HCC): ICD-10-CM

## 2019-05-10 DIAGNOSIS — R80.1 PERSISTENT PROTEINURIA: ICD-10-CM

## 2019-05-10 DIAGNOSIS — E78.5 DYSLIPIDEMIA: ICD-10-CM

## 2019-05-10 DIAGNOSIS — I12.9 PARENCHYMAL RENAL HYPERTENSION, STAGE 1 THROUGH STAGE 4 OR UNSPECIFIED CHRONIC KIDNEY DISEASE: ICD-10-CM

## 2019-05-10 DIAGNOSIS — I12.9 HYPERTENSIVE CHRONIC KIDNEY DISEASE WITH STAGE 1 THROUGH STAGE 4 CHRONIC KIDNEY DISEASE, OR UNSPECIFIED CHRONIC KIDNEY DISEASE: ICD-10-CM

## 2019-05-10 RX ORDER — CHLORTHALIDONE 25 MG/1
TABLET ORAL
Qty: 15 TABLET | Refills: 4 | Status: SHIPPED | OUTPATIENT
Start: 2019-05-10 | End: 2019-10-09 | Stop reason: SDUPTHER

## 2019-05-10 RX ORDER — AZILSARTAN KAMEDOXOMIL 40 MG/1
TABLET ORAL
Qty: 60 TABLET | Refills: 5 | Status: SHIPPED | OUTPATIENT
Start: 2019-05-10 | End: 2019-11-08 | Stop reason: SDUPTHER

## 2019-08-12 ENCOUNTER — TELEPHONE (OUTPATIENT)
Dept: NEPHROLOGY | Facility: CLINIC | Age: 74
End: 2019-08-12

## 2019-08-14 ENCOUNTER — OFFICE VISIT (OUTPATIENT)
Dept: NEPHROLOGY | Facility: CLINIC | Age: 74
End: 2019-08-14
Payer: MEDICARE

## 2019-08-14 VITALS
SYSTOLIC BLOOD PRESSURE: 124 MMHG | HEIGHT: 70 IN | HEART RATE: 60 BPM | WEIGHT: 177.8 LBS | DIASTOLIC BLOOD PRESSURE: 72 MMHG | BODY MASS INDEX: 25.45 KG/M2

## 2019-08-14 DIAGNOSIS — I12.9 HYPERTENSIVE CHRONIC KIDNEY DISEASE WITH STAGE 1 THROUGH STAGE 4 CHRONIC KIDNEY DISEASE, OR UNSPECIFIED CHRONIC KIDNEY DISEASE: Primary | ICD-10-CM

## 2019-08-14 DIAGNOSIS — E78.5 DYSLIPIDEMIA: ICD-10-CM

## 2019-08-14 DIAGNOSIS — R80.1 PERSISTENT PROTEINURIA: ICD-10-CM

## 2019-08-14 DIAGNOSIS — N18.30 CHRONIC KIDNEY DISEASE, STAGE III (MODERATE) (HCC): ICD-10-CM

## 2019-08-14 LAB
25(OH)D3 SERPL-MCNC: 42.6 NG/ML (ref 30–100)
CHOLEST SERPL-MCNC: 166 MG/DL (ref 50–200)
CHOLEST/HDLC SERPL: 3.53 {RATIO}
CK SERPL-CCNC: 112 U/L (ref 39–308)
EXT GLUCOSE BLD: 110
EXTERNAL ALBUMIN: 4.2
EXTERNAL ALK PHOS: 64
EXTERNAL ALT: 21
EXTERNAL AST: 29
EXTERNAL BUN: 30
EXTERNAL CALCIUM: 9.5
EXTERNAL CHLORIDE: 107
EXTERNAL CO2: 28
EXTERNAL CREATININE: 1.57
EXTERNAL EGFR: 44
EXTERNAL POTASSIUM: 4.5
EXTERNAL SODIUM: 142
EXTERNAL T.BILIRUBIN: 0.6
EXTERNAL TOTAL PROTEIN: 6.8
HCT VFR BLD AUTO: 40.3 % (ref 36.5–49.3)
HDLC SERPL-MCNC: 47 MG/DL (ref 40–60)
HGB BLD-MCNC: 13.6 G/DL (ref 12–17)
LDLC SERPL CALC-MCNC: 93 MG/DL (ref 0–100)
MAGNESIUM SERPL-MCNC: 2 MG/DL (ref 1.6–2.6)
PHOSPHATE SERPL-MCNC: 3.4 MG/DL (ref 2.3–4.1)
PLATELET # BLD AUTO: 154 THOUSANDS/UL (ref 149–390)
PROT/CREAT UR: 0.21 MG/G{CREAT} (ref 0–0.1)
PTH-INTACT SERPL-MCNC: 35 PG/ML
TRIGL SERPL-MCNC: 128 MG/DL (ref ?–150)
WBC # BLD AUTO: 8.8 THOUSAND/UL

## 2019-08-14 PROCEDURE — 99214 OFFICE O/P EST MOD 30 MIN: CPT | Performed by: INTERNAL MEDICINE

## 2019-08-14 RX ORDER — ATORVASTATIN CALCIUM 20 MG/1
20 TABLET, FILM COATED ORAL DAILY
Qty: 30 TABLET | Refills: 5 | Status: SHIPPED | OUTPATIENT
Start: 2019-08-14 | End: 2019-12-05 | Stop reason: SDUPTHER

## 2019-08-14 RX ORDER — AMLODIPINE BESYLATE 5 MG/1
5 TABLET ORAL DAILY
Qty: 30 TABLET | Refills: 5 | Status: SHIPPED | OUTPATIENT
Start: 2019-08-14 | End: 2019-12-27 | Stop reason: SDUPTHER

## 2019-08-14 NOTE — LETTER
August 14, 2019     MARGE Ozuna DO  [address]  [address] [address]    Patient: Pretty Mike   YOB: 1945   Date of Visit: 8/14/2019       Dear Dr Pretty Escamilla: Thank you for referring Juliana Colon to me for evaluation  Below are my notes for this consultation  If you have questions, please do not hesitate to call me  I look forward to following your patient along with you  Sincerely,        Daneen Gilford, MD        CC: Beverlee Richard, MD Curtistine Nation, MD Daneen Gilford, MD  8/14/2019 11:31 AM  Sign at close encounter  RENAL FOLLOW UP NOTE: td    ASSESSMENT AND PLAN:  1   CKD OWSEV 9  · Etiology:  Diabetic nephropathy/hypertensive nephrosclerosis/arteriolar nephrosclerosis/episodes of SHAY in the past   All serologies were negative including multiple myeloma evaluation  · Baseline creatinine:  1 2-1 62  · Current creatinine:  1 57 at  baseline  · Urine protein creatinine ratio:  0 2 1 g at goal  Recommendations:  · Treat hypertension-please see below  · Treat dyslipidemia-please see below  · Maintain proteinuria less than 1 g or as low as possible  · Avoid nephrotoxic agents such as NSAIDs, patient counseled as such  2   Volume:  Euvolemic  3   Hypertension:       Current blood pressure averages:  AM:  120/66, standing 108/67  PM:  112/64, standing 111/66  Heart rate:   50-60 range occasionally in the 40s     · Goal blood pressure:  Less than  120-125/80   Recommendations:  ·  Push nonmedical regimen including weight loss, isotonic exercise and avoidance of salt; patient counseled as such  · Medication changes today:  Switch Cardizem to amlodipine 5 mg daily watch for swelling and monitor blood pressure  This is because of the bradycardia that was witnessed under anesthesia  There is no absolute indication for the Cardizem given the lack of arrhythmia as   4   Electrolytes:  Acceptable at this time    5   Mineral bone disorder:   · Calcium/magnesium/phosphorus: All acceptable including calcium 9 5  · PTH intact:  3 5 at goal  · Vitamin-D:  42 6 AT GOAL  6   Dyslipidemia:  · Goal LDL:  Less than 70 given CAD  · Current lipid profile:  LDL 93/HDL 47/triglycerides 128  Recommendations:  ADJUST by switching to atorvastatin 20 mg daily may have to adjust the dose going forward to get goal   7   Anemia:  normal hemoglobin at 13 6  8   Other problems:  · Diabetes mellitus per your discretion  · CAD followed by Dr Cynthia Ventura through [facility]   Status post MI last October involving to cardiac stents  · Sarcoidosis followed by [facility]          PATIENT INSTRUCTIONS:    Patient Instructions   1  Medication changes today:  · Stop Pravastatin/Pravachol  · Begin atorvastatin/Lipitor 20 mg daily  Watch for muscle aches or joint pains and call if you developed any  · Begin coenzyme Q10 100 mg daily which helps decrease any side effects of cholesterol medications and is an anti inflammatory  · Stop Carti xt or diltiazem XT now  · Begin amlodipine 5 mg daily in the morning  Watch for swelling and call if you developed any  If he notices any dizziness or lightheadedness call me  2  Please wait 2 weeks after making the above medication changes and send in an additional week a blood pressure readings morning evening, sitting and standing:  · Take the morning readings before any medications  · Take the evening readings before supper  · When taking standing readings, keep your arm supported at heart level and not dangling  Then please send these readings in    3  Please go for nonfasting lab work in 6 weeks    4  Follow-up in 4 months:  -please bring in 1 week a blood pressure readings morning evening, sitting and standing is outlined above  -please go for fasting lab work prior to your appointment    5  General instructions:  -avoid salt  -avoid medications such as Motrin, Naprosyn, ibuprofen, Aleve or Advil or Celebrex as they can affect your kidney function; you can use Tylenol as needed for pain or fevers if you have no liver problems  -avoid medications such as Sudafed or other medications with decongestants as they can raise your blood pressure  -try to exercise at least 30 minutes at least 3 days a week with an ultimate goal of 5 days a week            Subjective: The patient overall is feeling well  No fevers chills cough or colds  Good appetite and good energy  No urinary symptoms except for minimal bubbles/foam in the urine which is unchanged  No gastrointestinal symptoms  No cardiovascular symptoms including swelling of the legs except for rare ankle edema  No headaches, dizziness or lightheadedness  Blood pressure medications:  -lopressor 50 mg the morning and 25 mg in the evening  -Edarbi 40 mg twice a day  -cartia  mg daily  -chlorthalidone 12 5 mg daily    ROS:  See HPI, otherwise review of systems as completely reviewed with the patient are negative    Past Medical History:   Diagnosis Date    Anemia     Cancer ([facility] )     Chronic kidney disease     Cirrhosis ([facility] )     Diabetes mellitus ([place] [address] )     H/O heart artery stent     Hypertension      Past Surgical History:   Procedure Laterality Date    COLONOSCOPY  06/24/2019    EGD AND COLONOSCOPY  06/24/2019     Family History   Problem Relation Age of Onset    Hypertension Mother     Kidney disease Father       reports that he has never smoked  He has never used smokeless tobacco  He reports that he does not drink alcohol or use drugs      I COMPLETELY REVIEWED THE PAST MEDICAL HISTORY/PAST SURGICAL HISTORY/SOCIAL HISTORY/FAMILY HISTORY/AND MEDICATIONS  AND UPDATED ALL    Objective:     Vitals:   Vitals:    08/14/19 1049   BP: 124/72   Pulse: 60    BP sitting on right:  140/62 with a heart rate of 60 and regular  BP standing on right:  120/64 with a heart rate of 60 and regular    Weight (last 2 days)     Date/Time   Weight    08/14/19 1049   80 6 (177 8)            Wt Readings from Last 3 Encounters:   08/14/19 80 6 kg (177 lb 12 8 oz)   04/05/19 87 4 kg (192 lb 9 6 oz)   12/18/18 85 3 kg (188 lb)       Body mass index is 25 51 kg/m²      Physical Exam: General:  No acute distress  Skin:  No acute rash  Eyes:  No scleral icterus, noninjected; mild ptosis of the left eye which is chronic  ENT:  Moist mucous membranes  Neck:  Supple, no jugular venous distention  Back   No CVAT  Chest:  Clear to auscultation and percussion, good respiratory effort  CVS:  Regular rate and rhythm without a rub, murmurs or gallops  Abdomen:  Soft and nontender with normal bowel sounds  Extremities:  No cyanosis and trace ankle edema  Neuro:  Grossly intact  Psych:  Alert, oriented x3 and appropriate      Medications:    Current Outpatient Medications:     allopurinol (ZYLOPRIM) 300 mg tablet, Take 0 5 tablets by mouth daily, Disp: , Rfl:     aspirin 325 mg tablet, Take 81 mg by mouth daily , Disp: , Rfl:     B Complex-C (SUPERPLEX-T) TABS, Take 1 tablet by mouth daily, Disp: , Rfl:     chlorthalidone 25 mg tablet, TAKE "ONE-HALF" TABLET (12 5 MG) BY MOUTH DAILY, Disp: 15 tablet, Rfl: 4    Cholecalciferol (VITAMIN D3) 2000 units TABS, Take 1 tablet by mouth daily, Disp: , Rfl:     COLCRYS 0 6 MG tablet, Take 0 6 mg by mouth daily, Disp: , Rfl: 3    EDARBI 40 MG tablet, TAKE 1 TABLET TWICE A DAY, Disp: 60 tablet, Rfl: 6    esomeprazole (NexIUM) 40 MG capsule, Take 40 mg by mouth daily, Disp: , Rfl: 6    Ferrous Sulfate (IRON) 325 (65 Fe) MG TABS, Take 1 tablet by mouth daily, Disp: , Rfl:     fluticasone furoate-vilanterol (BREO ELLIPTA), Inhale daily, Disp: , Rfl:     INCRUSE ELLIPTA 62 5 MCG/INH AEPB, Take 1 puff by mouth daily, Disp: , Rfl: 5    magnesium chloride-calcium (SLOW-MAG) 71 5-119 mg, Take 2 tablets by mouth 2 (two) times a day, Disp: , Rfl:     metoprolol tartrate (LOPRESSOR) 50 mg tablet, Take 50 mg by mouth Take 50mg in the Am and 25 mg in the evening, Disp: , Rfl:     prasugrel (EFFIENT) tablet, Take 10 mg by mouth daily , Disp: , Rfl:     sitaGLIPtin (JANUVIA) 100 mg tablet, Take 1 tablet by mouth daily, Disp: , Rfl:     sulfaSALAzine (AZULFIDINE) 500 mg tablet, TAKE 1 AND 1/2 TABLETS BY MOUTH ONE TIME DAILY AS DIRECTED, Disp: , Rfl: 3    amLODIPine (NORVASC) 5 mg tablet, Take 1 tablet (5 mg total) by mouth daily, Disp: 30 tablet, Rfl: 5    atorvastatin (LIPITOR) 20 mg tablet, Take 1 tablet (20 mg total) by mouth daily, Disp: 30 tablet, Rfl: 5    EDARBI 40 MG tablet, TAKE 1 TABLET TWICE A DAY (Patient not taking: Reported on 8/14/2019), Disp: 60 tablet, Rfl: 5    Lab, Imaging and other studies: I have personally reviewed pertinent labs  Laboratory Results:  Results for orders placed or performed in visit on 08/14/19   Comprehensive metabolic panel   Result Value Ref Range    SODIUM 142     POTASSIUM 4 5     CHLORIDE 107     CO2 28     BUN 30     CREATININE 1 57     Glucose 110     EXTERNAL CALCIUM 9 5     TOTAL PROTEIN 6 8     ALBUMIN 4 2     AST 29     ALT 21     ALK PHOS 64     EXTERNAL TOT  BILIRUBIN 0 6     EXTERNAL EGFR 44     Phosphorus 3 4 2 3 - 4 1 mg/dL    Magnesium 2 0 1 6 - 2 6 mg/dL    Total  39 - 308 U/L    Triglycerides 128 150 mg/dL    Cholesterol 166 50 - 200 mg/dL    LDL Calculated 93 0 - 100 mg/dL    HDL, Direct 47 40 - 60 mg/dL    Chol HDLC Ratio 3 53     Vit D, 25-Hydroxy 42 6 30 0 - 100 0 ng/mL    PTH, Intact 35     WBC 8 80 Thousand/uL    Hemoglobin 13 6 12 0 - 17 0 g/dL    Hematocrit 40 3 36 5 - 49 3 %    Platelets 653 339 - 255 Thousands/uL    Prot/Creat Ratio, Ur 0 21 (A) 0 00 - 0 10             Invalid input(s): ALBUMIN      Radiology review:   chest X-ray    Ultrasound      Portions of the record may have been created with voice recognition software  Occasional wrong word or "sound a like" substitutions may have occurred due to the inherent limitations of voice recognition software  Read the chart carefully and recognize, using context, where substitutions have occurred

## 2019-08-14 NOTE — PATIENT INSTRUCTIONS
1  Medication changes today:  · Stop Pravastatin/Pravachol  · Begin atorvastatin/Lipitor 20 mg daily  Watch for muscle aches or joint pains and call if you developed any  · Begin coenzyme Q10 100 mg daily which helps decrease any side effects of cholesterol medications and is an anti inflammatory  · Stop Carti xt or diltiazem XT now  · Begin amlodipine 5 mg daily in the morning  Watch for swelling and call if you developed any  If he notices any dizziness or lightheadedness call me  2  Please wait 2 weeks after making the above medication changes and send in an additional week a blood pressure readings morning evening, sitting and standing:  · Take the morning readings before any medications  · Take the evening readings before supper  · When taking standing readings, keep your arm supported at heart level and not dangling  Then please send these readings in    3  Please go for nonfasting lab work in 6 weeks    4  Follow-up in 4 months:  -please bring in 1 week a blood pressure readings morning evening, sitting and standing is outlined above  -please go for fasting lab work prior to your appointment    5  General instructions:  -avoid salt  -avoid medications such as Motrin, Naprosyn, ibuprofen, Aleve or Advil or Celebrex as they can affect your kidney function; you can use Tylenol as needed for pain or fevers if you have no liver problems  -avoid medications such as Sudafed or other medications with decongestants as they can raise your blood pressure  -try to exercise at least 30 minutes at least 3 days a week with an ultimate goal of 5 days a week

## 2019-09-09 ENCOUNTER — DOCUMENTATION (OUTPATIENT)
Dept: NEPHROLOGY | Facility: CLINIC | Age: 74
End: 2019-09-09

## 2019-09-09 NOTE — PROGRESS NOTES
Patient's home blood pressure readings are as follows:  -a m :  111/65 without orthostatic changes  -p  m :  110/68 without orthostatic changes  Heart rate 50-60    Blood pressure is doing quite well no changes at this time  Make sure he has no signs or symptoms of dizziness or lightheadedness or leg swelling

## 2019-09-10 LAB
CK SERPL-CCNC: 184 U/L (ref 39–308)
EXT GLUCOSE BLD: 111
EXTERNAL ALBUMIN: 4
EXTERNAL ALK PHOS: 67
EXTERNAL ALT: 27
EXTERNAL AST: 37
EXTERNAL BUN: 31
EXTERNAL CALCIUM: 9.5
EXTERNAL CHLORIDE: 105
EXTERNAL CO2: 27
EXTERNAL CREATININE: 1.35
EXTERNAL EGFR: 52
EXTERNAL POTASSIUM: 4.4
EXTERNAL SODIUM: 140
EXTERNAL T.BILIRUBIN: 0.6
EXTERNAL TOTAL PROTEIN: 6.7

## 2019-09-10 NOTE — PROGRESS NOTES
Lm for patient regarding his blood pressure reading advise pt to call if he is having and symptoms of dizziness, lightheaded, or leg swelling

## 2019-10-09 DIAGNOSIS — I12.9 HYPERTENSIVE CHRONIC KIDNEY DISEASE WITH STAGE 1 THROUGH STAGE 4 CHRONIC KIDNEY DISEASE, OR UNSPECIFIED CHRONIC KIDNEY DISEASE: ICD-10-CM

## 2019-10-09 DIAGNOSIS — N18.30 CHRONIC KIDNEY DISEASE, STAGE III (MODERATE) (HCC): ICD-10-CM

## 2019-10-09 DIAGNOSIS — E78.5 DYSLIPIDEMIA: ICD-10-CM

## 2019-10-09 DIAGNOSIS — R80.1 PERSISTENT PROTEINURIA: ICD-10-CM

## 2019-10-09 RX ORDER — CHLORTHALIDONE 25 MG/1
TABLET ORAL
Qty: 15 TABLET | Refills: 4 | Status: SHIPPED | OUTPATIENT
Start: 2019-10-09 | End: 2020-03-03

## 2019-10-22 ENCOUNTER — TELEPHONE (OUTPATIENT)
Dept: NEPHROLOGY | Facility: CLINIC | Age: 74
End: 2019-10-22

## 2019-10-22 NOTE — TELEPHONE ENCOUNTER
I called and left message for patient to call back to schedule December follow up appt with Dr Kimberly Way

## 2019-11-08 DIAGNOSIS — I12.9 PARENCHYMAL RENAL HYPERTENSION, STAGE 1 THROUGH STAGE 4 OR UNSPECIFIED CHRONIC KIDNEY DISEASE: ICD-10-CM

## 2019-11-08 RX ORDER — AZILSARTAN KAMEDOXOMIL 40 MG/1
TABLET ORAL
Qty: 60 TABLET | Refills: 5 | Status: SHIPPED | OUTPATIENT
Start: 2019-11-08 | End: 2020-04-21

## 2019-12-02 ENCOUNTER — DOCUMENTATION (OUTPATIENT)
Dept: NEPHROLOGY | Facility: CLINIC | Age: 74
End: 2019-12-02

## 2019-12-02 LAB
CHOLEST SERPL-MCNC: 140 MG/DL (ref 50–200)
CHOLEST/HDLC SERPL: 3.04 {RATIO}
CK SERPL-CCNC: 156 U/L (ref 39–308)
EXT GLUCOSE BLD: 114
EXTERNAL ALBUMIN: 4.1
EXTERNAL ALK PHOS: 67
EXTERNAL ALT: 22
EXTERNAL AST: 31
EXTERNAL BUN: 23
EXTERNAL CALCIUM: 9.5
EXTERNAL CHLORIDE: 101
EXTERNAL CO2: 27
EXTERNAL CREATININE: 1.33
EXTERNAL EGFR: 53
EXTERNAL POTASSIUM: 3.6
EXTERNAL SODIUM: 138
EXTERNAL T.BILIRUBIN: 0.6
EXTERNAL TOTAL PROTEIN: 6.9
HCT VFR BLD AUTO: 41 % (ref 36.5–49.3)
HDLC SERPL-MCNC: 46 MG/DL (ref 40–?)
HGB BLD-MCNC: 13.8 G/DL (ref 12–17)
LDLC SERPL CALC-MCNC: 72 MG/DL (ref 0–100)
MAGNESIUM SERPL-MCNC: 1.8 MG/DL (ref 1.6–2.6)
PHOSPHATE SERPL-MCNC: 3.4 MG/DL (ref 2.3–4.1)
PLATELET # BLD AUTO: 183 THOUSANDS/UL (ref 149–390)
PROT/CREAT UR: 0.16 MG/G{CREAT} (ref 0–0.1)
PTH-INTACT SERPL-MCNC: 29 PG/ML
TRIGL SERPL-MCNC: 111 MG/DL (ref ?–150)
WBC # BLD AUTO: 7.8 THOUSAND/UL

## 2019-12-05 ENCOUNTER — OFFICE VISIT (OUTPATIENT)
Dept: NEPHROLOGY | Facility: CLINIC | Age: 74
End: 2019-12-05
Payer: MEDICARE

## 2019-12-05 VITALS
HEART RATE: 57 BPM | WEIGHT: 176.6 LBS | DIASTOLIC BLOOD PRESSURE: 59 MMHG | SYSTOLIC BLOOD PRESSURE: 116 MMHG | BODY MASS INDEX: 25.28 KG/M2 | HEIGHT: 70 IN

## 2019-12-05 DIAGNOSIS — E78.5 DYSLIPIDEMIA: ICD-10-CM

## 2019-12-05 DIAGNOSIS — R80.1 PERSISTENT PROTEINURIA: ICD-10-CM

## 2019-12-05 DIAGNOSIS — E87.6 HYPOKALEMIA: ICD-10-CM

## 2019-12-05 DIAGNOSIS — I12.9 HYPERTENSIVE CHRONIC KIDNEY DISEASE WITH STAGE 1 THROUGH STAGE 4 CHRONIC KIDNEY DISEASE, OR UNSPECIFIED CHRONIC KIDNEY DISEASE: Primary | ICD-10-CM

## 2019-12-05 DIAGNOSIS — N18.30 CHRONIC KIDNEY DISEASE, STAGE III (MODERATE) (HCC): ICD-10-CM

## 2019-12-05 PROCEDURE — 99214 OFFICE O/P EST MOD 30 MIN: CPT | Performed by: INTERNAL MEDICINE

## 2019-12-05 RX ORDER — ATORVASTATIN CALCIUM 20 MG/1
30 TABLET, FILM COATED ORAL DAILY
Qty: 45 TABLET | Refills: 5 | Status: SHIPPED | OUTPATIENT
Start: 2019-12-05 | End: 2020-04-30

## 2019-12-05 NOTE — PATIENT INSTRUCTIONS
1  Medication changes today:  · Please increase atorvastatin/Lipitor to 30 mg a day or 1 and 1/2 of a tablet once a day  Watch for body aches or joint pains and call if you developed any  2  Please increase the potassium in your diet including a lot of fruits and vegetables  3  Please go for nonfasting lab work any time of the day in about 1-2 weeks after adjusting your diet    4  Please go for nonfasting lab work in 6 weeks after the above medication changes    5  Follow-up in 4 months:  -please bring in 1 week a blood pressure readings morning evening, sitting and standing is outlined below:  · Take the morning readings before any medications  · Take the evening readings before supper and before taking any medications at that time  · When taking standing readings, keep your arm supported at heart level and not dangling  · Make sure you are sitting with your back supported in feet on the ground on crossed  · Make sure you have not taken any coffee or caffeine products or exercised or smoke cigarettes at least 30 minutes before taking your blood pressure  -PLEASE BRING IN YOUR BLOOD PRESSURE MACHINE AT THE NEXT VISIT TO CORRELATE WITH THE OFFICE MACHINE  -please go for fasting lab work prior to your appointment    6  General instructions:   AVOID SALT BUT NOT ADDING AN READING LABELS TO MAKE SURE THERE IS LOW-SALT IN THE FOOD THAT YOU ARE EATING     Avoid nonsteroidal anti-inflammatory drugs such as Naprosyn, ibuprofen, Aleve, Advil, Celebrex, etc   You can use Tylenol as needed if you do not have any liver condition to be concerned about     Avoid medications such as Sudafed or decongestants and antihistamines that contained the D component which is the decongestant  You can take antihistamines without the decongestant or D component   Try to avoid medications such as pantoprazole or  Protonix/Nexium or Esomeprazole)/Prilosec or omeprazole/Prevacid or lansoprazole/AcipHex or Rabeprazole    If you are able to, use Pepcid as this is safer for your kidneys   Try to exercise at least 30 minutes 3 days a week to begin with with an ultimate goal of 5 days a week for at least 30 minutes     Please do not drink more than 2 glasses of alcohol/wine on a daily basis as this may contribute to your high blood pressure

## 2019-12-05 NOTE — PROGRESS NOTES
RENAL FOLLOW UP NOTE: td    ASSESSMENT AND PLAN:  1   CKD stage 3  · Etiology:  Diabetic nephropathy/hypertensive nephrosclerosis/arteriolar nephrosclerosis/episodes of SHAY in the past   All serologies were negative including multiple myeloma evaluation  · Baseline creatinine:  1 2-1 62  · Current creatinine:  1 33 at baseline  · Urine protein creatinine ratio:  0 16 g at goal  Recommendations:  · Treat hypertension-please see below  · Treat dyslipidemia-please see below  · Maintain proteinuria less than 1 g or as low as possible  · Avoid nephrotoxic agents such as NSAIDs, patient counseled as such  2   Volume:  Euvolemic  3   Hypertension:       Current blood pressure averages:  AM:   114/67 without orthostatic changes  PM:  116/68 without orthostatic changes  Heart rate:  50-60 range     · Goal blood pressure:  Less than  125/80 given CAD  Recommendations:  ·  Push nonmedical regimen including weight loss, isotonic exercise and avoidance of salt; patient counseled as such  · Medication changes today:  No changes as patient is at goal  4   Electrolytes:  Borderline hypokalemia 3 6:  Recheck on high potassium diet if still low consider small dose of spironolactone  5   Mineral bone disorder:   · Calcium/magnesium/phosphorus:  All acceptable including calcium 9 5  · PTH intact:  29 at goal  · Vitamin-D:  42 6 at goal from last visit recheck next visit  Currently on supplements  6   Dyslipidemia:  · Goal LDL:  Less than 70 given CAD  · Current lipid profile:  LDL 72/HDL 46/triglycerides 111  Recommendations:  Adjust:  I would increase atorvastatin at 30 mg daily  7   Anemia:  normal hemoglobin at 13 8  8   Other problems:  · Diabetes mellitus per your discretion  · CAD followed by Dr Hector Galeano through [facility]   Status post MI last October involving to cardiac stents  :  Recent emesis of LAD lesion with repeat PTCA drug-eluting stent 2018  Circumflex artery and right coronary artery 50% stenosis    Ejection fraction 55%  · Sarcoidosis followed by Dr Street  · Kyphoscoliosis  · Cirrhosis of the liver  · Gout  · Ankylosing spondylitis       PATIENT INSTRUCTIONS:    Patient Instructions   1  Medication changes today:  · Please increase atorvastatin/Lipitor to 30 mg a day or 1 and 1/2 of a tablet once a day  Watch for body aches or joint pains and call if you developed any  2  Please increase the potassium in your diet including a lot of fruits and vegetables  3  Please go for nonfasting lab work any time of the day in about 1-2 weeks after adjusting your diet    4  Please go for nonfasting lab work in 6 weeks after the above medication changes    5  Follow-up in 4 months:  -please bring in 1 week a blood pressure readings morning evening, sitting and standing is outlined below:  · Take the morning readings before any medications  · Take the evening readings before supper and before taking any medications at that time  · When taking standing readings, keep your arm supported at heart level and not dangling  · Make sure you are sitting with your back supported in feet on the ground on crossed  · Make sure you have not taken any coffee or caffeine products or exercised or smoke cigarettes at least 30 minutes before taking your blood pressure  -PLEASE BRING IN YOUR BLOOD PRESSURE MACHINE AT THE NEXT VISIT TO CORRELATE WITH THE OFFICE MACHINE  -please go for fasting lab work prior to your appointment    6  General instructions:   AVOID SALT BUT NOT ADDING AN READING LABELS TO MAKE SURE THERE IS LOW-SALT IN THE FOOD THAT YOU ARE EATING     Avoid nonsteroidal anti-inflammatory drugs such as Naprosyn, ibuprofen, Aleve, Advil, Celebrex, etc   You can use Tylenol as needed if you do not have any liver condition to be concerned about     Avoid medications such as Sudafed or decongestants and antihistamines that contained the D component which is the decongestant    You can take antihistamines without the decongestant or D component   Try to avoid medications such as pantoprazole or  Protonix/Nexium or Esomeprazole)/Prilosec or omeprazole/Prevacid or lansoprazole/AcipHex or Rabeprazole  If you are able to, use Pepcid as this is safer for your kidneys   Try to exercise at least 30 minutes 3 days a week to begin with with an ultimate goal of 5 days a week for at least 30 minutes     Please do not drink more than 2 glasses of alcohol/wine on a daily basis as this may contribute to your high blood pressure  Subjective: There has been no hospitalizations or acute illnesses since last visit  The patient overall is feeling well  No fevers chills cough or colds  Good appetite and good energy  No hematuria, dysuria, voiding symptoms or foamy urine  No gastrointestinal symptoms  No chest pain; only mild dyspnea on exertion which is chronic; mild swelling of his legs only at certain times but nothing persistent  No headaches, dizziness or lightheadedness  Blood pressure medications:  · Metoprolol 50 mg in the morning, 25 mg in the evening  · Edarbi 20 mg twice a day  · Chlorthalidone 12 5 mg daily in the morning  · Amlodipine 5 mg daily in the morning    ROS:  See HPI, otherwise review of systems as completely reviewed with the patient are negative    Past Medical History:   Diagnosis Date    Anemia     [facility] [facility]     Chronic kidney disease     [facility]     [facility]     H/O heart artery stent     Hypertension      Past Surgical History:   Procedure Laterality Date    COLONOSCOPY  06/24/2019    EGD AND COLONOSCOPY  06/24/2019     Family History   Problem Relation Age of Onset    Hypertension Mother     Kidney disease Father       reports that he has never smoked  He has never used smokeless tobacco  He reports that he does not drink alcohol or use drugs      I COMPLETELY REVIEWED THE PAST MEDICAL HISTORY/PAST SURGICAL HISTORY/SOCIAL HISTORY/FAMILY HISTORY/AND MEDICATIONS  AND UPDATED ALL    Objective:     Vitals:   Vitals:    12/05/19 0918   BP: 116/59   Pulse: 57    BP sitting on right:  120/58 with a heart rate of 60 and regular  BP standing on right:  120/58 with a heart rate of 60 and regular    Weight (last 2 days)     Date/Time   Weight    12/05/19 0918   80 1 (176 6)            Wt Readings from Last 3 Encounters:   12/05/19 80 1 kg (176 lb 9 6 oz)   08/14/19 80 6 kg (177 lb 12 8 oz)   04/05/19 87 4 kg (192 lb 9 6 oz)       Body mass index is 25 34 kg/m²      Physical Exam: General:  No acute distress  Skin:  No acute rash  Eyes:  No scleral icterus, noninjected, no discharge from eyes  ENT:  Moist mucous membranes  Neck:  Supple, no jugular venous distention, no lymphadenopathy and no thyromegaly  Back   No CVAT/kyphoscoliosis  Chest:  Clear to auscultation and percussion, good respiratory effort  CVS:  Regular rate and rhythm without a rub, murmurs or gallops  Abdomen:  Soft and nontender with normal bowel sounds  Extremities:  No cyanosis and no edema, no arthritic changes, normal range of motion  Neuro:  Grossly intact  Psych:  Alert, oriented x3 and appropriate      Medications:    Current Outpatient Medications:     allopurinol (ZYLOPRIM) 300 mg tablet, Take 0 5 tablets by mouth daily, Disp: , Rfl:     amLODIPine (NORVASC) 5 mg tablet, Take 1 tablet (5 mg total) by mouth daily, Disp: 30 tablet, Rfl: 5    aspirin 325 mg tablet, Take 81 mg by mouth daily , Disp: , Rfl:     atorvastatin (LIPITOR) 20 mg tablet, Take 1 5 tablets (30 mg total) by mouth daily, Disp: 45 tablet, Rfl: 5    B Complex-C (SUPERPLEX-T) TABS, Take 1 tablet by mouth daily, Disp: , Rfl:     chlorthalidone 25 mg tablet, TAKE "ONE-HALF" TABLET (12 5 MG) BY MOUTH DAILY, Disp: 15 tablet, Rfl: 4    Cholecalciferol (VITAMIN D3) 2000 units TABS, Take 1 tablet by mouth daily, Disp: , Rfl:     COLCRYS 0 6 MG tablet, Take 0 6 mg by mouth daily, Disp: , Rfl: 3    EDARBI 40 MG tablet, TAKE 1 TABLET TWICE A DAY, Disp: 60 tablet, Rfl: 6    EDARBI 40 MG tablet, TAKE 1 TABLET TWICE A DAY, Disp: 60 tablet, Rfl: 5    esomeprazole (NexIUM) 40 MG capsule, Take 40 mg by mouth daily, Disp: , Rfl: 6    Ferrous Sulfate (IRON) 325 (65 Fe) MG TABS, Take 1 tablet by mouth daily, Disp: , Rfl:     fluticasone furoate-vilanterol (BREO ELLIPTA), Inhale daily, Disp: , Rfl:     INCRUSE ELLIPTA 62 5 MCG/INH AEPB, Take 1 puff by mouth daily, Disp: , Rfl: 5    magnesium chloride-calcium (SLOW-MAG) 71 5-119 mg, Take 2 tablets by mouth 2 (two) times a day, Disp: , Rfl:     metoprolol tartrate (LOPRESSOR) 50 mg tablet, Take 50 mg by mouth Take 50mg in the Am and 25 mg in the evening, Disp: , Rfl:     prasugrel (EFFIENT) tablet, Take 10 mg by mouth daily , Disp: , Rfl:     sitaGLIPtin (JANUVIA) 100 mg tablet, Take 1 tablet by mouth daily, Disp: , Rfl:     sulfaSALAzine (AZULFIDINE) 500 mg tablet, TAKE 1 AND 1/2 TABLETS BY MOUTH ONE TIME DAILY AS DIRECTED, Disp: , Rfl: 3    Lab, Imaging and other studies: I have personally reviewed pertinent labs  Laboratory Results:  Results for orders placed or performed in visit on 12/02/19   Comprehensive metabolic panel   Result Value Ref Range    SODIUM 138     POTASSIUM 3 6     CHLORIDE 101     CO2 27     BUN 23     CREATININE 1 33     Glucose 114     EXTERNAL CALCIUM 9 5     TOTAL PROTEIN 6 9     ALBUMIN 4 1     AST 31     ALT 22     ALK PHOS 67     EXTERNAL TOT   BILIRUBIN 0 6     EXTERNAL EGFR 53     Phosphorus 3 4 2 3 - 4 1 mg/dL    Magnesium 1 8 1 6 - 2 6 mg/dL    Total  39 - 308 U/L    Triglycerides 111 150 mg/dL    Cholesterol 140 50 - 200 mg/dL    LDL Calculated 72 0 - 100 mg/dL    HDL, Direct 46 40 mg/dL    Chol HDLC Ratio 3 04     PTH, Intact 29     Prot/Creat Ratio, Ur 0 16 (A) 0 00 - 0 10    WBC 7 80 Thousand/uL    Hemoglobin 13 8 12 0 - 17 0 g/dL    Hematocrit 41 0 36 5 - 49 3 %    Platelets 862 993 - 757 Thousands/uL       Results from last 7 days   Lab Units 11/30/19   WBC Thousand/uL 7 80 HEMOGLOBIN g/dL 13 8   HEMATOCRIT % 41 0   PLATELETS Thousands/uL 183   POTASSIUM  3 6   CHLORIDE  101   CO2  27   BUN  23   CREATININE  1 33   CALCIUM  9 5   MAGNESIUM mg/dL 1 8   PHOSPHORUS mg/dL 3 4         Radiology review:   chest X-ray    Ultrasound      Portions of the record may have been created with voice recognition software  Occasional wrong word or "sound a like" substitutions may have occurred due to the inherent limitations of voice recognition software  Read the chart carefully and recognize, using context, where substitutions have occurred

## 2019-12-05 NOTE — LETTER
December 5, 2019     MARGE Verde DO  [address]  [address] [address]    Patient: Bunny Underwood   YOB: 1945   Date of Visit: 12/5/2019       Dear Dr Eladia Sequeira: Thank you for referring Kenn Favre to me for evaluation  Below are my notes for this consultation  If you have questions, please do not hesitate to call me  I look forward to following your patient along with you  Sincerely,        Ulysses Felix MD        CC: MD Jesus Shirley MD Delmon Addison, MD  12/5/2019  9:55 AM  Sign at close encounter  RENAL FOLLOW UP NOTE: td    ASSESSMENT AND PLAN:  1   CKD BRGGD 1  · Etiology:  Diabetic nephropathy/hypertensive nephrosclerosis/arteriolar nephrosclerosis/episodes of SHAY in the past   All serologies were negative including multiple myeloma evaluation  · Baseline creatinine:  1 2-1 62  · Current creatinine:  1 33 at baseline  · Urine protein creatinine ratio:  0 16 g at goal  Recommendations:  · Treat hypertension-please see below  · Treat dyslipidemia-please see below  · Maintain proteinuria less than 1 g or as low as possible  · Avoid nephrotoxic agents such as NSAIDs, patient counseled as such  2   Volume:  Euvolemic  3   Hypertension:       Current blood pressure averages:  AM:    114/67 without orthostatic changes  PM:  116/68 without orthostatic changes  Heart rate:  50-60 range     · Goal blood pressure:  Less than  125/80 given CAD  Recommendations:  ·  Push nonmedical regimen including weight loss, isotonic exercise and avoidance of salt; patient counseled as such  · Medication changes today:  No changes as patient is at goal  4   Electrolytes:  Borderline hypokalemia 3 6:  Recheck on high potassium diet if still low consider small dose of spironolactone    5   Mineral bone disorder:   · Calcium/magnesium/phosphorus:  All acceptable including calcium 9 5  · PTH intact:  29 at goal  · Vitamin-D:  42 6  at goal from last visit recheck next visit  Currently on supplements  6   Dyslipidemia:  · Goal LDL:  Less than 70 given CAD  · Current lipid profile:  LDL  72/HDL 46/triglycerides 111  Recommendations:  Adjust:  I would increase atorvastatin at 30 mg daily  7   Anemia:  normal hemoglobin at 13 8  8   Other problems:  · Diabetes mellitus per your discretion  · CAD followed by Dr Jamilah Portillo [facility]   Status post MI last October involving to cardiac stents  :  Recent emesis of LAD lesion with repeat PTCA drug-eluting stent 2018  Circumflex artery and right coronary artery 50% stenosis  Ejection fraction 55%  · Sarcoidosis followed by Dr Street  · Kyphoscoliosis  · Cirrhosis of the liver  · Gout  · Ankylosing spondylitis       PATIENT INSTRUCTIONS:    Patient Instructions   1  Medication changes today:  · Please increase atorvastatin/Lipitor to 30 mg a day or 1 and 1/2 of a tablet once a day  Watch for body aches or joint pains and call if you developed any  2  Please increase the potassium in your diet including a lot of fruits and vegetables  3  Please go for nonfasting lab work any time of the day in about 1-2 weeks after adjusting your diet    4  Please go for nonfasting lab work in 6 weeks after the above medication changes    5   Follow-up in 4 months:  -please bring in 1 week a blood pressure readings morning evening, sitting and standing is outlined below:  · Take the morning readings before any medications  · Take the evening readings before supper and before taking any medications at that time  · When taking standing readings, keep your arm supported at heart level and not dangling  · Make sure you are sitting with your back supported in feet on the ground on crossed  · Make sure you have not taken any coffee or caffeine products or exercised or smoke cigarettes at least 30 minutes before taking your blood pressure  -PLEASE BRING IN YOUR BLOOD PRESSURE MACHINE AT THE NEXT VISIT TO CORRELATE WITH THE OFFICE MACHINE  -please go for fasting lab work prior to your appointment    6  General instructions:   AVOID SALT BUT NOT ADDING AN READING LABELS TO MAKE SURE THERE IS LOW-SALT IN THE FOOD THAT YOU ARE EATING     Avoid nonsteroidal anti-inflammatory drugs such as Naprosyn, ibuprofen, Aleve, Advil, Celebrex, etc   You can use Tylenol as needed if you do not have any liver condition to be concerned about     Avoid medications such as Sudafed or decongestants and antihistamines that contained the D component which is the decongestant  You can take antihistamines without the decongestant or D component   Try to avoid medications such as pantoprazole or  Protonix/Nexium or Esomeprazole)/Prilosec or omeprazole/Prevacid or lansoprazole/AcipHex or Rabeprazole  If you are able to, use Pepcid as this is safer for your kidneys   Try to exercise at least 30 minutes 3 days a week to begin with with an ultimate goal of 5 days a week for at least 30 minutes     Please do not drink more than 2 glasses of alcohol/wine on a daily basis as this may contribute to your high blood pressure  Subjective: There has been no hospitalizations or acute illnesses since last visit  The patient overall is feeling well  No fevers chills cough or colds    Good appetite and good energy  No hematuria, dysuria, voiding symptoms or foamy urine  No gastrointestinal symptoms  No chest pain; only mild dyspnea on exertion which is chronic; mild swelling of his legs only at certain times but nothing persistent  No headaches, dizziness or lightheadedness  Blood pressure medications:  · Metoprolol 50 mg in the morning, 25 mg in the evening  · Edarbi 20 mg twice a day  · Chlorthalidone 12 5 mg daily in the morning  · Amlodipine 5 mg daily in the morning    ROS:  See HPI, otherwise review of systems as completely reviewed with the patient are negative    Past Medical History:   Diagnosis Date    Anemia     [facility] [address] [facility]     Chronic kidney disease     [facility]     [facility] ([facility]     H/O heart artery stent     Hypertension      Past Surgical History:   Procedure Laterality Date    COLONOSCOPY  06/24/2019    EGD AND COLONOSCOPY  06/24/2019     Family History   Problem Relation Age of Onset    Hypertension Mother     Kidney disease Father       reports that he has never smoked  He has never used smokeless tobacco  He reports that he does not drink alcohol or use drugs  I COMPLETELY REVIEWED THE PAST MEDICAL HISTORY/PAST SURGICAL HISTORY/SOCIAL HISTORY/FAMILY HISTORY/AND MEDICATIONS  AND UPDATED ALL    Objective:     Vitals:   Vitals:    12/05/19 0918   BP: 116/59   Pulse: 57    BP sitting on right:  120/58 with a heart rate of 60 and regular  BP standing on right:  120/58 with a heart rate of 60 and regular    Weight (last 2 days)     Date/Time   Weight    12/05/19 0918   80 1 (176 6)            Wt Readings from Last 3 Encounters:   12/05/19 80 1 kg (176 lb 9 6 oz)   08/14/19 80 6 kg (177 lb 12 8 oz)   04/05/19 87 4 kg (192 lb 9 6 oz)       Body mass index is 25 34 kg/m²      Physical Exam: General:  No acute distress  Skin:  No acute rash  Eyes:  No scleral icterus, noninjected, no discharge from eyes  ENT:  Moist mucous membranes  Neck:  Supple, no jugular venous distention, no lymphadenopathy and no thyromegaly  Back   No CVAT/kyphoscoliosis  Chest:  Clear to auscultation and percussion, good respiratory effort  CVS:  Regular rate and rhythm without a rub, murmurs or gallops  Abdomen:  Soft and nontender with normal bowel sounds  Extremities:  No cyanosis and no edema, no arthritic changes, normal range of motion  Neuro:  Grossly intact  Psych:  Alert, oriented x3 and appropriate      Medications:    Current Outpatient Medications:     allopurinol (ZYLOPRIM) 300 mg tablet, Take 0 5 tablets by mouth daily, Disp: , Rfl:     amLODIPine (NORVASC) 5 mg tablet, Take 1 tablet (5 mg total) by mouth daily, Disp: 30 tablet, Rfl: 5    aspirin 325 mg tablet, Take 81 mg by mouth daily , Disp: , Rfl:     atorvastatin (LIPITOR) 20 mg tablet, Take 1 5 tablets (30 mg total) by mouth daily, Disp: 45 tablet, Rfl: 5    B Complex-C (SUPERPLEX-T) TABS, Take 1 tablet by mouth daily, Disp: , Rfl:     chlorthalidone 25 mg tablet, TAKE "ONE-HALF" TABLET (12 5 MG) BY MOUTH DAILY, Disp: 15 tablet, Rfl: 4    Cholecalciferol (VITAMIN D3) 2000 units TABS, Take 1 tablet by mouth daily, Disp: , Rfl:     COLCRYS 0 6 MG tablet, Take 0 6 mg by mouth daily, Disp: , Rfl: 3    EDARBI 40 MG tablet, TAKE 1 TABLET TWICE A DAY, Disp: 60 tablet, Rfl: 6    EDARBI 40 MG tablet, TAKE 1 TABLET TWICE A DAY, Disp: 60 tablet, Rfl: 5    esomeprazole (NexIUM) 40 MG capsule, Take 40 mg by mouth daily, Disp: , Rfl: 6    Ferrous Sulfate (IRON) 325 (65 Fe) MG TABS, Take 1 tablet by mouth daily, Disp: , Rfl:     fluticasone furoate-vilanterol (BREO ELLIPTA), Inhale daily, Disp: , Rfl:     INCRUSE ELLIPTA 62 5 MCG/INH AEPB, Take 1 puff by mouth daily, Disp: , Rfl: 5    magnesium chloride-calcium (SLOW-MAG) 71 5-119 mg, Take 2 tablets by mouth 2 (two) times a day, Disp: , Rfl:     metoprolol tartrate (LOPRESSOR) 50 mg tablet, Take 50 mg by mouth Take 50mg in the Am and 25 mg in the evening, Disp: , Rfl:     prasugrel (EFFIENT) tablet, Take 10 mg by mouth daily , Disp: , Rfl:     sitaGLIPtin (JANUVIA) 100 mg tablet, Take 1 tablet by mouth daily, Disp: , Rfl:     sulfaSALAzine (AZULFIDINE) 500 mg tablet, TAKE 1 AND 1/2 TABLETS BY MOUTH ONE TIME DAILY AS DIRECTED, Disp: , Rfl: 3    Lab, Imaging and other studies: I have personally reviewed pertinent labs    Laboratory Results:  Results for orders placed or performed in visit on 12/02/19   Comprehensive metabolic panel   Result Value Ref Range    SODIUM 138     POTASSIUM 3 6     CHLORIDE 101     CO2 27     BUN 23     CREATININE 1 33     Glucose 114     EXTERNAL CALCIUM 9 5     TOTAL PROTEIN 6 9     ALBUMIN 4 1     AST 31     ALT 22     ALK PHOS 67     EXTERNAL TOT  BILIRUBIN 0 6     EXTERNAL EGFR 53     Phosphorus 3 4 2 3 - 4 1 mg/dL    Magnesium 1 8 1 6 - 2 6 mg/dL    Total  39 - 308 U/L    Triglycerides 111 150 mg/dL    Cholesterol 140 50 - 200 mg/dL    LDL Calculated 72 0 - 100 mg/dL    HDL, Direct 46 40 mg/dL    Chol HDLC Ratio 3 04     PTH, Intact 29     Prot/Creat Ratio, Ur 0 16 (A) 0 00 - 0 10    WBC 7 80 Thousand/uL    Hemoglobin 13 8 12 0 - 17 0 g/dL    Hematocrit 41 0 36 5 - 49 3 %    Platelets 417 816 - 178 Thousands/uL       Results from last 7 days   Lab Units 11/30/19   WBC Thousand/uL 7 80   HEMOGLOBIN g/dL 13 8   HEMATOCRIT % 41 0   PLATELETS Thousands/uL 183   POTASSIUM  3 6   CHLORIDE  101   CO2  27   BUN  23   CREATININE  1 33   CALCIUM  9 5   MAGNESIUM mg/dL 1 8   PHOSPHORUS mg/dL 3 4         Radiology review:   chest X-ray    Ultrasound      Portions of the record may have been created with voice recognition software  Occasional wrong word or "sound a like" substitutions may have occurred due to the inherent limitations of voice recognition software  Read the chart carefully and recognize, using context, where substitutions have occurred

## 2019-12-17 ENCOUNTER — DOCUMENTATION (OUTPATIENT)
Dept: NEPHROLOGY | Facility: CLINIC | Age: 74
End: 2019-12-17

## 2019-12-17 LAB — POTASSIUM SERPL-SCNC: 4.4 MMOL/L (ref 3.5–5.3)

## 2019-12-27 DIAGNOSIS — N18.30 CHRONIC KIDNEY DISEASE, STAGE III (MODERATE) (HCC): ICD-10-CM

## 2019-12-27 DIAGNOSIS — I12.9 HYPERTENSIVE CHRONIC KIDNEY DISEASE WITH STAGE 1 THROUGH STAGE 4 CHRONIC KIDNEY DISEASE, OR UNSPECIFIED CHRONIC KIDNEY DISEASE: ICD-10-CM

## 2019-12-27 DIAGNOSIS — R80.1 PERSISTENT PROTEINURIA: ICD-10-CM

## 2019-12-27 DIAGNOSIS — E78.5 DYSLIPIDEMIA: ICD-10-CM

## 2019-12-27 RX ORDER — AMLODIPINE BESYLATE 5 MG/1
TABLET ORAL
Qty: 30 TABLET | Refills: 5 | Status: SHIPPED | OUTPATIENT
Start: 2019-12-27 | End: 2020-06-30

## 2020-01-10 ENCOUNTER — DOCUMENTATION (OUTPATIENT)
Dept: NEPHROLOGY | Facility: CLINIC | Age: 75
End: 2020-01-10

## 2020-01-10 LAB
CK SERPL-CCNC: 122 U/L (ref 39–308)
EXT GLUCOSE BLD: 118
EXTERNAL ALBUMIN: 3.9
EXTERNAL ALK PHOS: 78
EXTERNAL ALT: 26
EXTERNAL AST: 30
EXTERNAL BUN: 20
EXTERNAL CALCIUM: 9.6
EXTERNAL CHLORIDE: 102
EXTERNAL CO2: 27
EXTERNAL CREATININE: 1.27
EXTERNAL EGFR: 56
EXTERNAL POTASSIUM: 3.7
EXTERNAL SODIUM: 139
EXTERNAL T.BILIRUBIN: 0.6
EXTERNAL TOTAL PROTEIN: 6.5

## 2020-01-13 ENCOUNTER — TELEPHONE (OUTPATIENT)
Dept: NEPHROLOGY | Facility: CLINIC | Age: 75
End: 2020-01-13

## 2020-01-13 ENCOUNTER — DOCUMENTATION (OUTPATIENT)
Dept: NEPHROLOGY | Facility: CLINIC | Age: 75
End: 2020-01-13

## 2020-01-13 DIAGNOSIS — I12.9 HYPERTENSIVE CHRONIC KIDNEY DISEASE WITH STAGE 1 THROUGH STAGE 4 CHRONIC KIDNEY DISEASE, OR UNSPECIFIED CHRONIC KIDNEY DISEASE: ICD-10-CM

## 2020-01-13 DIAGNOSIS — R80.1 PERSISTENT PROTEINURIA: ICD-10-CM

## 2020-01-13 DIAGNOSIS — N18.30 CHRONIC KIDNEY DISEASE, STAGE III (MODERATE) (HCC): ICD-10-CM

## 2020-01-13 DIAGNOSIS — E78.5 DYSLIPIDEMIA: ICD-10-CM

## 2020-01-13 NOTE — TELEPHONE ENCOUNTER
Patient called the office stated that he is having shoulder pain for about a month now  Last seen 12/5/2019 atorvastatin was increased to 30 mg daily; most recent labs from 1/9/2020 CK level of 122  Verified with patient if he has done any excessive work stated he has not  Patient is unable to do much with the pain he is having

## 2020-03-03 DIAGNOSIS — N18.30 CHRONIC KIDNEY DISEASE, STAGE III (MODERATE) (HCC): ICD-10-CM

## 2020-03-03 DIAGNOSIS — R80.1 PERSISTENT PROTEINURIA: ICD-10-CM

## 2020-03-03 DIAGNOSIS — I12.9 HYPERTENSIVE CHRONIC KIDNEY DISEASE WITH STAGE 1 THROUGH STAGE 4 CHRONIC KIDNEY DISEASE, OR UNSPECIFIED CHRONIC KIDNEY DISEASE: ICD-10-CM

## 2020-03-03 DIAGNOSIS — E78.5 DYSLIPIDEMIA: ICD-10-CM

## 2020-03-03 RX ORDER — CHLORTHALIDONE 25 MG/1
TABLET ORAL
Qty: 15 TABLET | Refills: 4 | Status: SHIPPED | OUTPATIENT
Start: 2020-03-03 | End: 2020-07-29

## 2020-03-23 ENCOUNTER — TELEPHONE (OUTPATIENT)
Dept: NEPHROLOGY | Facility: CLINIC | Age: 75
End: 2020-03-23

## 2020-03-23 NOTE — TELEPHONE ENCOUNTER
Rescheduled from 3/31 due to COVID 19  Would like to reschedule appt at a later date, pt refused a virtual visit by phone or video

## 2020-03-23 NOTE — TELEPHONE ENCOUNTER
I called and left a message asking patient if we were able to do a Tele - Med phone call visit for 3/31 kristina lewis/ Dr Kimberly [address]

## 2020-04-15 ENCOUNTER — TELEPHONE (OUTPATIENT)
Dept: NEPHROLOGY | Facility: CLINIC | Age: 75
End: 2020-04-15

## 2020-04-16 ENCOUNTER — DOCUMENTATION (OUTPATIENT)
Dept: NEPHROLOGY | Facility: CLINIC | Age: 75
End: 2020-04-16

## 2020-04-16 LAB
CHOLEST SERPL-MCNC: 136 MG/DL (ref 50–200)
CHOLEST/HDLC SERPL: 2.95 {RATIO}
CK SERPL-CCNC: 134 U/L (ref 39–308)
EXT GLUCOSE BLD: 115
EXTERNAL ALBUMIN: 4
EXTERNAL ALK PHOS: 72
EXTERNAL ALT: 24
EXTERNAL AST: 30
EXTERNAL BUN: 25
EXTERNAL CALCIUM: 9.4
EXTERNAL CHLORIDE: 101
EXTERNAL CO2: 30
EXTERNAL CREATININE: 1.43
EXTERNAL EGFR: 49
EXTERNAL POTASSIUM: 4.1
EXTERNAL SODIUM: 139
EXTERNAL T.BILIRUBIN: 0.5
EXTERNAL TOTAL PROTEIN: 6.8
HCT VFR BLD AUTO: 40.8 % (ref 36.5–49.3)
HDLC SERPL-MCNC: 46 MG/DL (ref 40–?)
HGB BLD-MCNC: 13.8 G/DL (ref 12–17)
LDLC SERPL CALC-MCNC: 70 MG/DL (ref 0–100)
MAGNESIUM SERPL-MCNC: 1.8 MG/DL (ref 1.6–2.6)
PHOSPHATE SERPL-MCNC: 3.6 MG/DL (ref 2.3–4.1)
PLATELET # BLD AUTO: 180 THOUSANDS/UL (ref 149–390)
PROT/CREAT UR: 0.07 MG/G{CREAT} (ref 0–0.1)
PTH-INTACT SERPL-MCNC: 44 PG/ML
TRIGL SERPL-MCNC: 98 MG/DL (ref ?–150)
WBC # BLD AUTO: 8.5 THOUSAND/UL

## 2020-04-21 ENCOUNTER — TELEMEDICINE (OUTPATIENT)
Dept: NEPHROLOGY | Facility: CLINIC | Age: 75
End: 2020-04-21
Payer: MEDICARE

## 2020-04-21 VITALS — WEIGHT: 170 LBS | BODY MASS INDEX: 24.34 KG/M2 | HEIGHT: 70 IN

## 2020-04-21 DIAGNOSIS — N18.30 CHRONIC KIDNEY DISEASE, STAGE III (MODERATE) (HCC): ICD-10-CM

## 2020-04-21 DIAGNOSIS — R80.1 PERSISTENT PROTEINURIA: ICD-10-CM

## 2020-04-21 DIAGNOSIS — E87.5 HYPERKALEMIA: ICD-10-CM

## 2020-04-21 DIAGNOSIS — E78.5 DYSLIPIDEMIA: ICD-10-CM

## 2020-04-21 DIAGNOSIS — E55.9 VITAMIN D DEFICIENCY: ICD-10-CM

## 2020-04-21 DIAGNOSIS — I12.9 HYPERTENSIVE CHRONIC KIDNEY DISEASE WITH STAGE 1 THROUGH STAGE 4 CHRONIC KIDNEY DISEASE, OR UNSPECIFIED CHRONIC KIDNEY DISEASE: Primary | ICD-10-CM

## 2020-04-21 PROCEDURE — 99442 PR PHYS/QHP TELEPHONE EVALUATION 11-20 MIN: CPT | Performed by: INTERNAL MEDICINE

## 2020-04-23 ENCOUNTER — DOCUMENTATION (OUTPATIENT)
Dept: NEPHROLOGY | Facility: CLINIC | Age: 75
End: 2020-04-23

## 2020-04-30 DIAGNOSIS — R80.1 PERSISTENT PROTEINURIA: ICD-10-CM

## 2020-04-30 DIAGNOSIS — N18.30 CHRONIC KIDNEY DISEASE, STAGE III (MODERATE) (HCC): ICD-10-CM

## 2020-04-30 DIAGNOSIS — I12.9 HYPERTENSIVE CHRONIC KIDNEY DISEASE WITH STAGE 1 THROUGH STAGE 4 CHRONIC KIDNEY DISEASE, OR UNSPECIFIED CHRONIC KIDNEY DISEASE: ICD-10-CM

## 2020-04-30 DIAGNOSIS — E78.5 DYSLIPIDEMIA: ICD-10-CM

## 2020-04-30 DIAGNOSIS — I12.9 PARENCHYMAL RENAL HYPERTENSION, STAGE 1 THROUGH STAGE 4 OR UNSPECIFIED CHRONIC KIDNEY DISEASE: ICD-10-CM

## 2020-04-30 RX ORDER — ATORVASTATIN CALCIUM 20 MG/1
TABLET, FILM COATED ORAL
Qty: 45 TABLET | Refills: 5 | Status: SHIPPED | OUTPATIENT
Start: 2020-04-30 | End: 2020-09-15

## 2020-04-30 RX ORDER — AZILSARTAN KAMEDOXOMIL 40 MG/1
TABLET ORAL
Qty: 60 TABLET | Refills: 5 | Status: SHIPPED | OUTPATIENT
Start: 2020-04-30 | End: 2020-10-22

## 2020-06-10 ENCOUNTER — OFFICE VISIT (OUTPATIENT)
Dept: FAMILY MEDICINE CLINIC | Facility: CLINIC | Age: 75
End: 2020-06-10
Payer: MEDICARE

## 2020-06-10 VITALS
HEIGHT: 71 IN | DIASTOLIC BLOOD PRESSURE: 86 MMHG | RESPIRATION RATE: 16 BRPM | SYSTOLIC BLOOD PRESSURE: 152 MMHG | OXYGEN SATURATION: 97 % | WEIGHT: 171 LBS | TEMPERATURE: 98.8 F | BODY MASS INDEX: 23.94 KG/M2 | HEART RATE: 76 BPM

## 2020-06-10 DIAGNOSIS — I12.9 HYPERTENSIVE CHRONIC KIDNEY DISEASE WITH STAGE 1 THROUGH STAGE 4 CHRONIC KIDNEY DISEASE, OR UNSPECIFIED CHRONIC KIDNEY DISEASE: ICD-10-CM

## 2020-06-10 DIAGNOSIS — N18.30 CHRONIC KIDNEY DISEASE, STAGE III (MODERATE) (HCC): ICD-10-CM

## 2020-06-10 DIAGNOSIS — E87.5 HYPERKALEMIA: Primary | ICD-10-CM

## 2020-06-10 DIAGNOSIS — E78.5 DYSLIPIDEMIA: ICD-10-CM

## 2020-06-10 PROCEDURE — 3008F BODY MASS INDEX DOCD: CPT | Performed by: FAMILY MEDICINE

## 2020-06-10 PROCEDURE — 3079F DIAST BP 80-89 MM HG: CPT | Performed by: FAMILY MEDICINE

## 2020-06-10 PROCEDURE — 99214 OFFICE O/P EST MOD 30 MIN: CPT | Performed by: FAMILY MEDICINE

## 2020-06-10 PROCEDURE — 3077F SYST BP >= 140 MM HG: CPT | Performed by: FAMILY MEDICINE

## 2020-06-10 PROCEDURE — 1036F TOBACCO NON-USER: CPT | Performed by: FAMILY MEDICINE

## 2020-06-10 PROCEDURE — 1160F RVW MEDS BY RX/DR IN RCRD: CPT | Performed by: FAMILY MEDICINE

## 2020-06-10 RX ORDER — ASPIRIN 81 MG/1
81 TABLET ORAL DAILY
COMMUNITY

## 2020-06-16 DIAGNOSIS — M1A.9XX0 CHRONIC GOUT WITHOUT TOPHUS, UNSPECIFIED CAUSE, UNSPECIFIED SITE: Primary | ICD-10-CM

## 2020-06-16 RX ORDER — COLCHICINE 0.6 MG/1
0.6 TABLET, FILM COATED ORAL DAILY
Qty: 30 TABLET | Refills: 3 | Status: SHIPPED | OUTPATIENT
Start: 2020-06-16 | End: 2020-10-13 | Stop reason: SDUPTHER

## 2020-06-26 DIAGNOSIS — J43.9 PULMONARY EMPHYSEMA, UNSPECIFIED EMPHYSEMA TYPE (HCC): Primary | ICD-10-CM

## 2020-06-26 RX ORDER — FLUTICASONE FUROATE AND VILANTEROL 100; 25 UG/1; UG/1
1 POWDER RESPIRATORY (INHALATION) DAILY
Qty: 1 INHALER | Refills: 3 | Status: SHIPPED | OUTPATIENT
Start: 2020-06-26 | End: 2020-10-13 | Stop reason: SDUPTHER

## 2020-06-30 DIAGNOSIS — I12.9 HYPERTENSIVE CHRONIC KIDNEY DISEASE WITH STAGE 1 THROUGH STAGE 4 CHRONIC KIDNEY DISEASE, OR UNSPECIFIED CHRONIC KIDNEY DISEASE: ICD-10-CM

## 2020-06-30 DIAGNOSIS — E78.5 DYSLIPIDEMIA: ICD-10-CM

## 2020-06-30 DIAGNOSIS — N18.30 CHRONIC KIDNEY DISEASE, STAGE III (MODERATE) (HCC): ICD-10-CM

## 2020-06-30 DIAGNOSIS — R80.1 PERSISTENT PROTEINURIA: ICD-10-CM

## 2020-06-30 RX ORDER — AMLODIPINE BESYLATE 5 MG/1
TABLET ORAL
Qty: 30 TABLET | Refills: 5 | Status: SHIPPED | OUTPATIENT
Start: 2020-06-30 | End: 2020-12-21

## 2020-07-29 ENCOUNTER — OFFICE VISIT (OUTPATIENT)
Dept: PULMONOLOGY | Facility: CLINIC | Age: 75
End: 2020-07-29
Payer: MEDICARE

## 2020-07-29 VITALS
HEART RATE: 62 BPM | SYSTOLIC BLOOD PRESSURE: 126 MMHG | HEIGHT: 71 IN | TEMPERATURE: 98 F | WEIGHT: 161.25 LBS | OXYGEN SATURATION: 96 % | DIASTOLIC BLOOD PRESSURE: 70 MMHG | BODY MASS INDEX: 22.57 KG/M2

## 2020-07-29 DIAGNOSIS — E78.5 DYSLIPIDEMIA: ICD-10-CM

## 2020-07-29 DIAGNOSIS — R80.1 PERSISTENT PROTEINURIA: ICD-10-CM

## 2020-07-29 DIAGNOSIS — I12.9 HYPERTENSIVE CHRONIC KIDNEY DISEASE WITH STAGE 1 THROUGH STAGE 4 CHRONIC KIDNEY DISEASE, OR UNSPECIFIED CHRONIC KIDNEY DISEASE: ICD-10-CM

## 2020-07-29 DIAGNOSIS — N18.30 CHRONIC KIDNEY DISEASE, STAGE III (MODERATE) (HCC): ICD-10-CM

## 2020-07-29 DIAGNOSIS — D86.9 SARCOIDOSIS: ICD-10-CM

## 2020-07-29 DIAGNOSIS — I10 HYPERTENSION, ESSENTIAL: ICD-10-CM

## 2020-07-29 DIAGNOSIS — J44.9 CHRONIC OBSTRUCTIVE PULMONARY DISEASE, UNSPECIFIED COPD TYPE (HCC): Primary | ICD-10-CM

## 2020-07-29 PROCEDURE — 3074F SYST BP LT 130 MM HG: CPT | Performed by: INTERNAL MEDICINE

## 2020-07-29 PROCEDURE — 3078F DIAST BP <80 MM HG: CPT | Performed by: INTERNAL MEDICINE

## 2020-07-29 PROCEDURE — 1160F RVW MEDS BY RX/DR IN RCRD: CPT | Performed by: INTERNAL MEDICINE

## 2020-07-29 PROCEDURE — 1036F TOBACCO NON-USER: CPT | Performed by: INTERNAL MEDICINE

## 2020-07-29 PROCEDURE — 3008F BODY MASS INDEX DOCD: CPT | Performed by: INTERNAL MEDICINE

## 2020-07-29 PROCEDURE — 99213 OFFICE O/P EST LOW 20 MIN: CPT | Performed by: INTERNAL MEDICINE

## 2020-07-29 RX ORDER — CHLORTHALIDONE 25 MG/1
TABLET ORAL
Qty: 15 TABLET | Refills: 4 | Status: SHIPPED | OUTPATIENT
Start: 2020-07-29 | End: 2020-12-21

## 2020-07-29 NOTE — PROGRESS NOTES
Assessment/Plan:    COPD (chronic obstructive pulmonary disease) [facility])    Mr Yeyo Castro was diagnosed with COPD many years back and is currently on treatment with Brio 100, Incruse and Ventolin  Currently his exercise tolerance is more than 2 blocks and he has occasional cough and no wheezing  No phlegm  No chest pain or palpitations  No hoarseness of voice or dysphagia  He gargles throat after using Breo  He is a never smoker but admits to secondhand smoke exposure  He also worked 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 [employer] industry  His previous PFT from May 2015 showed severe airflow obstruction with diffusion defect  He also had evidence of air trapping  There was improvement in FEV1 with bronchodilator consistent with asthma  His last CT scan from February 2019 showed scarring in the left lung  He denies any runny nose or sneezing  On clinical examination, his chest was clear except for occasional coarse inspiratory crackles at the left lung base  I have advised him to continue breo and incruse  I had a long discussion with him and have answered all his questions  Sarcoidosis    He was diagnosed with sarcoidosis many years back after a lung biopsy and was given steroid therapy for some time  His previous PFT from May 2015 showed severe airflow obstruction with diffusion defect  He has history of working with beryllium  Hypertension, essential  He has history of hypertension and is on treatment with amlodipine chlorthalidone and metoprolol       Diagnoses and all orders for this visit:    Chronic obstructive pulmonary disease, unspecified COPD type ([address] )    Sarcoidosis    Hypertension, essential    Other orders  -     co-enzyme Q-10 50 MG capsule; Take by mouth daily          Subjective:      Patient ID: Yadira Dobbs is a 69 y o  male  Mr Yeyo Castro has COPD from his previous smoking    Currently his exercise tolerance is at least 2 blocks and has no significant cough or phlegm or wheezing or chest pain   He has been on treatment with Breo 100 and in flows Ellipta  He has no hoarseness of voice or significant dysphagia  He rinse his mouth and gargles throat after using Breo  He also has a rescue albuterol inhaler which she uses rare rarely  His previous PFT showed severe airflow obstruction with diffusion defect  His previous CT scan showed evidence of scarring  He is up-to-date with his influenza and pneumococcal vaccination  He denies any hemoptysis weight loss or anorexia  He has history of pulmonary sarcoidosis and was on steroid therapy in the past   Currently he is not on any specific therapy  His CT scan showed evidence of scarring  He denies any joint pain or eye symptoms or skin rashes  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     He also has history of gout  He is on chronic sulfasalazine therapy  He also has history of hypertension hyperlipidemia and gastroesophageal reflux disease  The following portions of the patient's history were reviewed and updated as appropriate: allergies, current medications, past family history, past medical history, past social history, past surgical history and problem list     Review of Systems   Constitutional: Negative for activity change, chills, fever and unexpected weight change  HENT: Negative for congestion, postnasal drip, rhinorrhea, sore throat and voice change  Eyes: Negative for discharge and visual disturbance  Respiratory: Positive for shortness of breath  Negative for cough, choking, chest tightness, wheezing and stridor  Cardiovascular: Negative for chest pain, palpitations and leg swelling  Gastrointestinal: Negative for abdominal distention, abdominal pain, constipation, diarrhea, nausea and vomiting  Endocrine: Negative for polyuria  Genitourinary: Negative for difficulty urinating, frequency and urgency  Musculoskeletal: Negative for arthralgias, back pain and joint swelling  Skin: Negative for color change and pallor  Allergic/Immunologic: Negative for environmental allergies  Neurological: Negative for dizziness, syncope and headaches  Psychiatric/Behavioral: Negative for agitation and sleep disturbance  The patient is not nervous/anxious  Objective:      /70 (BP Location: Right arm, Patient Position: Sitting, Cuff Size: Adult)   Pulse 62   Temp 98 °F (36 7 °C) (Tympanic)   Ht 5' 11" (1 803 m)   Wt 73 1 kg (161 lb 4 oz)   SpO2 96%   BMI 22 49 kg/m²          Physical Exam   Constitutional: He is oriented to person, place, and time  He appears well-developed and well-nourished  HENT:   Head: Normocephalic  Eyes: Conjunctivae are normal  Right eye exhibits no discharge  Left eye exhibits no discharge  No scleral icterus  Neck: No JVD present  No tracheal deviation present  No thyromegaly present  Cardiovascular: Normal rate, regular rhythm and normal heart sounds  No murmur heard  Pulmonary/Chest: Effort normal  No respiratory distress  He has no wheezes  He has rales (Occasional coarse crackles right lung base)  Surgical scar right side posteriorly  Abdominal: He exhibits no distension  There is no tenderness  There is no guarding  Musculoskeletal: He exhibits no edema, tenderness or deformity  Lymphadenopathy:     He has no cervical adenopathy  Neurological: He is alert and oriented to person, place, and time  Skin: Skin is warm and dry  No rash noted  No erythema  No pallor  Psychiatric: He has a normal mood and affect   Judgment and thought content normal

## 2020-07-29 NOTE — LETTER
July 29, 2020     MARGE Cuevas DO  [address]  [address] [address]    Patient: Geovany Grimes   YOB: 1945   Date of Visit: 7/29/2020       Dear Dr John Camara: Thank you for referring Ama Fisher to me for evaluation  Below are my notes for this consultation  If you have questions, please do not hesitate to call me  I look forward to following your patient along with you  Sincerely,        Lizzeth Walton MD        CC: No Recipients  Lizzeth Walton MD  7/29/2020  4:19 PM  Sign at close encounter  Assessment/Plan:    COPD (chronic obstructive pulmonary disease) 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  His previous PFT from May 2015 showed severe airflow obstruction with diffusion defect  He also had evidence of air trapping  There was improvement in FEV1 with bronchodilator consistent with asthma  His last CT scan from February 2019 showed scarring in the left lung  He denies any runny nose or sneezing  On clinical examination, his chest was clear except for occasional coarse inspiratory crackles at the left lung base  I have advised him to continue breo and incruse  I had a long discussion with him and have answered all his questions  Sarcoidosis    He was diagnosed with sarcoidosis many years back after a lung biopsy and was given steroid therapy for some time  His previous PFT from May 2015 showed severe airflow obstruction with diffusion defect  He has history of working with beryllium      Hypertension, essential  He has history of hypertension and is on treatment with amlodipine chlorthalidone and metoprolol       Diagnoses and all orders for this visit:    Chronic obstructive pulmonary disease, unspecified COPD type ([facility] )    Sarcoidosis    Hypertension, essential    Other orders  -     co-enzyme Q-10 50 MG capsule; Take by mouth daily          Subjective:      Patient ID: Rosemary Awad is a 69 y o  male  Mr Duane Parks has COPD from his previous smoking  Currently his exercise tolerance is at least 2 blocks and has no significant cough or phlegm or wheezing or chest pain  He has been on treatment with Breo 100 and in flows Ellipta  He has no hoarseness of voice or significant dysphagia  He rinse his mouth and gargles throat after using Breo  He also has a rescue albuterol inhaler which she uses rare rarely  His previous PFT showed severe airflow obstruction with diffusion defect  His previous CT scan showed evidence of scarring  He is up-to-date with his influenza and pneumococcal vaccination  He denies any hemoptysis weight loss or anorexia  He has history of pulmonary sarcoidosis and was on steroid therapy in the past   Currently he is not on any specific therapy  His CT scan showed evidence of scarring  He denies any joint pain or eye symptoms or skin rashes  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     He also has history of gout  He is on chronic sulfasalazine therapy  He also has history of hypertension hyperlipidemia and gastroesophageal reflux disease  The following portions of the patient's history were reviewed and updated as appropriate: allergies, current medications, past family history, past medical history, past social history, past surgical history and problem list     Review of Systems   Constitutional: Negative for activity change, chills, fever and unexpected weight change     HENT: Negative for congestion, postnasal drip, rhinorrhea, sore throat and voice change  Eyes: Negative for discharge and visual disturbance  Respiratory: Positive for shortness of breath  Negative for cough, choking, chest tightness, wheezing and stridor  Cardiovascular: Negative for chest pain, palpitations and leg swelling  Gastrointestinal: Negative for abdominal distention, abdominal pain, constipation, diarrhea, nausea and vomiting  Endocrine: Negative for polyuria  Genitourinary: Negative for difficulty urinating, frequency and urgency  Musculoskeletal: Negative for arthralgias, back pain and joint swelling  Skin: Negative for color change and pallor  Allergic/Immunologic: Negative for environmental allergies  Neurological: Negative for dizziness, syncope and headaches  Psychiatric/Behavioral: Negative for agitation and sleep disturbance  The patient is not nervous/anxious  Objective:      /70 (BP Location: Right arm, Patient Position: Sitting, Cuff Size: Adult)   Pulse 62   Temp 98 °F (36 7 °C) (Tympanic)   Ht 5' 11" (1 803 m)   Wt 73 1 kg (161 lb 4 oz)   SpO2 96%   BMI 22 49 kg/m²           Physical Exam   Constitutional: He is oriented to person, place, and time  He appears well-developed and well-nourished  HENT:   Head: Normocephalic  Eyes: Conjunctivae are normal  Right eye exhibits no discharge  Left eye exhibits no discharge  No scleral icterus  Neck: No JVD present  No tracheal deviation present  No thyromegaly present  Cardiovascular: Normal rate, regular rhythm and normal heart sounds  No murmur heard  Pulmonary/Chest: Effort normal  No respiratory distress  He has no wheezes  He has rales (Occasional coarse crackles right lung base)  Surgical scar right side posteriorly  Abdominal: He exhibits no distension  There is no tenderness  There is no guarding  Musculoskeletal: He exhibits no edema, tenderness or deformity  Lymphadenopathy:     He has no cervical adenopathy     Neurological: He is alert and oriented to person, place, and time  Skin: Skin is warm and dry  No rash noted  No erythema  No pallor  Psychiatric: He has a normal mood and affect  Judgment and thought content normal          Jonnathan Katz MD  7/29/2020  4:14 PM  Sign at close encounter  Assessment/Plan:    COP[facility]ch[facility]) [facility] )    Mr Lobito Chapin was diagnosed with COPD many years back and is currently on treatment with Brio 100, Incruse and Ventolin  Currently his exercise tolerance is more than 2 blocks and he has occasional cough and no wheezing  No phlegm  No chest pain or palpitations  No hoarseness of voice or dysphagia  He gargles throat after using Breo  He is a never smoker but admits to secondhand smoke exposure  He also worked 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 [employer] industry  His previous PFT from May 2015 showed severe airflow obstruction with diffusion defect  He also had evidence of air trapping  There was improvement in FEV1 with bronchodilator consistent with asthma  His last CT scan from February 2019 showed scarring in the left lung  He denies any runny nose or sneezing  On clinical examination, his chest was clear except for occasional coarse inspiratory crackles at the left lung base  I have advised him to continue breo and incruse  I had a long discussion with him and have answered all his questions  Sarcoidosis    He was diagnosed with sarcoidosis many years back after a lung biopsy and was given steroid therapy for some time  His previous PFT from May 2015 showed severe airflow obstruction with diffusion defect  He has history of working with beryllium  Hypertension, essential  He has history of hypertension and is on treatment with amlodipine chlorthalidone and metoprolol       {Assess/PlanSmartLinks:88518}      Subjective:      Patient ID: Noel Jhaveri is a 69 y o  male      HPI    {Common ambulatory SmartLinks:68124}    Review of Systems      Objective:      /70 (BP Location: Right arm, Patient Position: Sitting, Cuff Size: Adult)   Pulse 62   Temp 98 °F (36 7 °C) (Tympanic)   Ht 5' 11" (1 803 m)   Wt 73 1 kg (161 lb 4 oz)   SpO2 96%   BMI 22 49 kg/m²           Physical Exam

## 2020-07-29 NOTE — LETTER
July 29, 2020     MARGE Velarde DO  [address]  [address] [address]    Patient: Alejandra Prader   YOB: 1945   Date of Visit: 7/29/2020       Dear Dr Ladarius Castellano: Thank you for referring Lynnae Boeck to me for evaluation  Below are my notes for this consultation  If you have questions, please do not hesitate to call me  I look forward to following your patient along with you  Sincerely,        Len Koyanagi, MD        CC: No Recipients  Len Koyanagi, MD  7/29/2020  4:19 PM  Sign at close encounter  Assessment/Plan:    COPD (chronic obstructive pulmonary disease) [facility])    Mr Lynnae Boeck was diagnosed with COPD many years back and is currently on treatment with Brio 100, Incruse and Ventolin  Currently his exercise tolerance is more than 2 blocks and he has occasional cough and no wheezing  No phlegm  No chest pain or palpitations  No hoarseness of voice or dysphagia  He gargles throat after using Breo  He is a never smoker but admits to secondhand smoke exposure  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  His previous PFT from May 2015 showed severe airflow obstruction with diffusion defect  He also had evidence of air trapping  There was improvement in FEV1 with bronchodilator consistent with asthma  His last CT scan from February 2019 showed scarring in the left lung  He denies any runny nose or sneezing  On clinical examination, his chest was clear except for occasional coarse inspiratory crackles at the left lung base  I have advised him to continue breo and incruse  I had a long discussion with him and have answered all his questions  Sarcoidosis    He was diagnosed with sarcoidosis many years back after a lung biopsy and was given steroid therapy for some time  His previous PFT from May 2015 showed severe airflow obstruction with diffusion defect  He has history of working with beryllium      Hypertension, essential  He has history of hypertension and is on treatment with amlodipine chlorthalidone and metoprolol       Diagnoses and all orders for this visit:    Chronic obstructive pulmonary disease, unspecified COPD type ([address] )    Sarcoidosis    Hypertension, essential    Other orders  -     co-enzyme Q-10 50 MG capsule; Take by mouth daily          Subjective:      Patient ID: Brittani Herring is a 69 y o  male  Mr Nitin Tong has COPD from his previous smoking  Currently his exercise tolerance is at least 2 blocks and has no significant cough or phlegm or wheezing or chest pain  He has been on treatment with Breo 100 and in flows Ellipta  He has no hoarseness of voice or significant dysphagia  He rinse his mouth and gargles throat after using Breo  He also has a rescue albuterol inhaler which she uses rare rarely  His previous PFT showed severe airflow obstruction with diffusion defect  His previous CT scan showed evidence of scarring  He is up-to-date with his influenza and pneumococcal vaccination  He denies any hemoptysis weight loss or anorexia  He has history of pulmonary sarcoidosis and was on steroid therapy in the past   Currently he is not on any specific therapy  His CT scan showed evidence of scarring  He denies any joint pain or eye symptoms or skin rashes  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     He also has history of gout  He is on chronic sulfasalazine therapy  He also has history of hypertension hyperlipidemia and gastroesophageal reflux disease  The following portions of the patient's history were reviewed and updated as appropriate: allergies, current medications, past family history, past medical history, past social history, past surgical history and problem list     Review of Systems   Constitutional: Negative for activity change, chills, fever and unexpected weight change     HENT: Negative for congestion, postnasal drip, rhinorrhea, sore throat and voice change  Eyes: Negative for discharge and visual disturbance  Respiratory: Positive for shortness of breath  Negative for cough, choking, chest tightness, wheezing and stridor  Cardiovascular: Negative for chest pain, palpitations and leg swelling  Gastrointestinal: Negative for abdominal distention, abdominal pain, constipation, diarrhea, nausea and vomiting  Endocrine: Negative for polyuria  Genitourinary: Negative for difficulty urinating, frequency and urgency  Musculoskeletal: Negative for arthralgias, back pain and joint swelling  Skin: Negative for color change and pallor  Allergic/Immunologic: Negative for environmental allergies  Neurological: Negative for dizziness, syncope and headaches  Psychiatric/Behavioral: Negative for agitation and sleep disturbance  The patient is not nervous/anxious  Objective:      /70 (BP Location: Right arm, Patient Position: Sitting, Cuff Size: Adult)   Pulse 62   Temp 98 °F (36 7 °C) (Tympanic)   Ht 5' 11" (1 803 m)   Wt 73 1 kg (161 lb 4 oz)   SpO2 96%   BMI 22 49 kg/m²           Physical Exam   Constitutional: He is oriented to person, place, and time  He appears well-developed and well-nourished  HENT:   Head: Normocephalic  Eyes: Conjunctivae are normal  Right eye exhibits no discharge  Left eye exhibits no discharge  No scleral icterus  Neck: No JVD present  No tracheal deviation present  No thyromegaly present  Cardiovascular: Normal rate, regular rhythm and normal heart sounds  No murmur heard  Pulmonary/Chest: Effort normal  No respiratory distress  He has no wheezes  He has rales (Occasional coarse crackles right lung base)  Surgical scar right side posteriorly  Abdominal: He exhibits no distension  There is no tenderness  There is no guarding  Musculoskeletal: He exhibits no edema, tenderness or deformity  Lymphadenopathy:     He has no cervical adenopathy     Neurological: He is alert and oriented to person, place, and time  Skin: Skin is warm and dry  No rash noted  No erythema  No pallor  Psychiatric: He has a normal mood and affect  Judgment and thought content normal          Titus Richter MD  7/29/2020  4:14 PM  Sign at close encounter  Assessment/Plan:    [facility]    Mr Nino Camp was diagnosed with COPD many years back and is currently on treatment with Brio 100, Incruse and Ventolin  Currently his exercise tolerance is more than 2 blocks and he has occasional cough and no wheezing  No phlegm  No chest pain or palpitations  No hoarseness of voice or dysphagia  He gargles throat after using Breo  He is a never smoker but admits to secondhand smoke exposure  He also worked 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 [employer] industry  His previous PFT from May 2015 showed severe airflow obstruction with diffusion defect  He also had evidence of air trapping  There was improvement in FEV1 with bronchodilator consistent with asthma  His last CT scan from February 2019 showed scarring in the left lung  He denies any runny nose or sneezing  On clinical examination, his chest was clear except for occasional coarse inspiratory crackles at the left lung base  I have advised him to continue breo and incruse  I had a long discussion with him and have answered all his questions  Sarcoidosis    He was diagnosed with sarcoidosis many years back after a lung biopsy and was given steroid therapy for some time  His previous PFT from May 2015 showed severe airflow obstruction with diffusion defect  He has history of working with beryllium  Hypertension, essential  He has history of hypertension and is on treatment with amlodipine chlorthalidone and metoprolol       {Assess/PlanSmartLinks:40555}      Subjective:      Patient ID: Ashley Kaufman is a 69 y o  male      HPI    {Common ambulatory SmartLinks:40155}    Review of Systems      Objective:      /70 (BP Location: Right arm, Patient Position: Sitting, Cuff Size: Adult)   Pulse 62   Temp 98 °F (36 7 °C) (Tympanic)   Ht 5' 11" (1 803 m)   Wt 73 1 kg (161 lb 4 oz)   SpO2 96%   BMI 22 49 kg/m²           Physical Exam

## 2020-07-29 NOTE — LETTER
July 29, 2020     MARGE Bhardwaj DO  [address]  [address] [address]    Patient: Alanna Chong   YOB: 1945   Date of Visit: 7/29/2020       Dear Dr Noreen Márquez: Thank you for referring Lara Ellis to me for evaluation  Below are my notes for this consultation  If you have questions, please do not hesitate to call me  I look forward to following your patient along with you  Sincerely,        Tobi Estrada MD        CC: No Recipients  Tobi Estrada MD  7/29/2020  4:19 PM  Sign at close encounter  Assessment/Plan:    COPD (chronic obstructive pulmonary disease) [facility])    Mr Lara Ellis was diagnosed with COPD many years back and is currently on treatment with Brio 100, Incruse and Ventolin  Currently his exercise tolerance is more than 2 blocks and he has occasional cough and no wheezing  No phlegm  No chest pain or palpitations  No hoarseness of voice or dysphagia  He gargles throat after using Breo  He is a never smoker but admits to secondhand smoke exposure  He also worked 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 [employer] industry  His previous PFT from May 2015 showed severe airflow obstruction with diffusion defect  He also had evidence of air trapping  There was improvement in FEV1 with bronchodilator consistent with asthma  His last CT scan from February 2019 showed scarring in the left lung  He denies any runny nose or sneezing  On clinical examination, his chest was clear except for occasional coarse inspiratory crackles at the left lung base  I have advised him to continue breo and incruse  I had a long discussion with him and have answered all his questions  Sarcoidosis    He was diagnosed with sarcoidosis many years back after a lung biopsy and was given steroid therapy for some time  His previous PFT from May 2015 showed severe airflow obstruction with diffusion defect  He has history of working with beryllium      Hypertension, essential  He has history of hypertension and is on treatment with amlodipine chlorthalidone and metoprolol       Diagnoses and all orders for this visit:    Chronic obstructive pulmonary disease, unspecified COPD type ([facility] [address] )    Sarcoidosis    Hypertension, essential    Other orders  -     co-enzyme Q-10 50 MG capsule; Take by mouth daily          Subjective:      Patient ID: Elin Olsen is a 69 y o  male  Mr Fu Later has COPD from his previous smoking  Currently his exercise tolerance is at least 2 blocks and has no significant cough or phlegm or wheezing or chest pain  He has been on treatment with Breo 100 and in flows Ellipta  He has no hoarseness of voice or significant dysphagia  He rinse his mouth and gargles throat after using Breo  He also has a rescue albuterol inhaler which she uses rare rarely  His previous PFT showed severe airflow obstruction with diffusion defect  His previous CT scan showed evidence of scarring  He is up-to-date with his influenza and pneumococcal vaccination  He denies any hemoptysis weight loss or anorexia  He has history of pulmonary sarcoidosis and was on steroid therapy in the past   Currently he is not on any specific therapy  His CT scan showed evidence of scarring  He denies any joint pain or eye symptoms or skin rashes  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     He also has history of gout  He is on chronic sulfasalazine therapy  He also has history of hypertension hyperlipidemia and gastroesophageal reflux disease  The following portions of the patient's history were reviewed and updated as appropriate: allergies, current medications, past family history, past medical history, past social history, past surgical history and problem list     Review of Systems   Constitutional: Negative for activity change, chills, fever and unexpected weight change     HENT: Negative for congestion, postnasal drip, rhinorrhea, sore throat and voice change  Eyes: Negative for discharge and visual disturbance  Respiratory: Positive for shortness of breath  Negative for cough, choking, chest tightness, wheezing and stridor  Cardiovascular: Negative for chest pain, palpitations and leg swelling  Gastrointestinal: Negative for abdominal distention, abdominal pain, constipation, diarrhea, nausea and vomiting  Endocrine: Negative for polyuria  Genitourinary: Negative for difficulty urinating, frequency and urgency  Musculoskeletal: Negative for arthralgias, back pain and joint swelling  Skin: Negative for color change and pallor  Allergic/Immunologic: Negative for environmental allergies  Neurological: Negative for dizziness, syncope and headaches  Psychiatric/Behavioral: Negative for agitation and sleep disturbance  The patient is not nervous/anxious  Objective:      /70 (BP Location: Right arm, Patient Position: Sitting, Cuff Size: Adult)   Pulse 62   Temp 98 °F (36 7 °C) (Tympanic)   Ht 5' 11" (1 803 m)   Wt 73 1 kg (161 lb 4 oz)   SpO2 96%   BMI 22 49 kg/m²           Physical Exam   Constitutional: He is oriented to person, place, and time  He appears well-developed and well-nourished  HENT:   Head: Normocephalic  Eyes: Conjunctivae are normal  Right eye exhibits no discharge  Left eye exhibits no discharge  No scleral icterus  Neck: No JVD present  No tracheal deviation present  No thyromegaly present  Cardiovascular: Normal rate, regular rhythm and normal heart sounds  No murmur heard  Pulmonary/Chest: Effort normal  No respiratory distress  He has no wheezes  He has rales (Occasional coarse crackles right lung base)  Surgical scar right side posteriorly  Abdominal: He exhibits no distension  There is no tenderness  There is no guarding  Musculoskeletal: He exhibits no edema, tenderness or deformity  Lymphadenopathy:     He has no cervical adenopathy     Neurological: He is alert and oriented to person, place, and time  Skin: Skin is warm and dry  No rash noted  No erythema  No pallor  Psychiatric: He has a normal mood and affect  Judgment and thought content normal          Sonia Carrillo MD  2020  4:20 PM  Sign at close encounter  Assessment/Plan:    COPD (chronic obstructive pulmonary disease) ([facility] )    Mr Radha Thomas was diagnosed with COPD many years back and is currently on treatment with Brio 100, Incruse and Ventolin  Currently his exercise tolerance is more than 2 blocks and he has occasional cough and no wheezing  No phlegm  No chest pain or palpitations  No hoarseness of voice or dysphagia  He gargles throat after using Breo  He is a never smoker but admits to secondhand smoke exposure  He also worked 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 [employer] industry  His previous PFT from May 2015 showed severe airflow obstruction with diffusion defect  He also had evidence of air trapping  There was improvement in FEV1 with bronchodilator consistent with asthma  His last CT scan from 2019 showed scarring in the left lung  He denies any runny nose or sneezing  On clinical examination, his chest was clear except for occasional coarse inspiratory crackles at the left lung base  I have advised him to continue breo and incruse  I had a long discussion with him and have answered all his questions  Sarcoidosis    He was diagnosed with sarcoidosis many years back after a lung biopsy and was given steroid therapy for some time  His previous PFT from May 2015 showed severe airflow obstruction with diffusion defect  He has history of working with beryllium  Hypertension, essential  He has history of hypertension and is on treatment with amlodipine chlorthalidone and metoprolol       Diagnoses and all orders for this visit:    Chronic obstructive pulmonary disease, unspecified COPD type ([facility] )    Sarcoidosis    Hypertension, essential    Other orders  -     co-enzyme Q-10 50 MG capsule;  Take by mouth daily          Subjective:      Patient ID: Alejandra Prader is a 69 y o  male      HPI    The following portions of the patient's history were reviewed and updated as appropriate: allergies, current medications, past family history, past medical history, past social history, past surgical history and problem list     Review of Systems      Objective:      /70 (BP Location: Right arm, Patient Position: Sitting, Cuff Size: Adult)   Pulse 62   Temp 98 °F (36 7 °C) (Tympanic)   Ht 5' 11" (1 803 m)   Wt 73 1 kg (161 lb 4 oz)   SpO2 96%   BMI 22 49 kg/m²           Physical Exam

## 2020-07-29 NOTE — ASSESSMENT & PLAN NOTE
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

## 2020-07-29 NOTE — ASSESSMENT & PLAN NOTE
He was diagnosed with sarcoidosis many years back after a lung biopsy and was given steroid therapy for some time  His previous PFT from May 2015 showed severe airflow obstruction with diffusion defect  He has history of working with beryllium

## 2020-07-29 NOTE — LETTER
July 29, 2020     MARGE Meier DO  [address]  [address] [address]    Patient: Richardean Dubin   YOB: 1945   Date of Visit: 7/29/2020       Dear Dr Ly Civil: Thank you for referring Kala Gomez to me for evaluation  Below are my notes for this consultation  If you have questions, please do not hesitate to call me  I look forward to following your patient along with you  Sincerely,        Joey Herring MD        CC: No Recipients  Joey Herring MD  7/29/2020  4:16 PM  Sign at close encounter  Assessment/Plan:    COPD (chronic obstructive pulmonary disease) [facility])    Mr Kala Gomez was diagnosed with COPD many years back and is currently on treatment with Brio 100, Incruse and Ventolin  Currently his exercise tolerance is more than 2 blocks and he has occasional cough and no wheezing  No phlegm  No chest pain or palpitations  No hoarseness of voice or dysphagia  He gargles throat after using Breo  He is a never smoker but admits to secondhand smoke exposure  He also worked 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  His previous PFT from May 2015 showed severe airflow obstruction with diffusion defect  He also had evidence of air trapping  There was improvement in FEV1 with bronchodilator consistent with asthma  His last CT scan from February 2019 showed scarring in the left lung  He denies any runny nose or sneezing  On clinical examination, his chest was clear except for occasional coarse inspiratory crackles at the left lung base  I have advised him to continue breo and incruse  I had a long discussion with him and have answered all his questions  Sarcoidosis    He was diagnosed with sarcoidosis many years back after a lung biopsy and was given steroid therapy for some time  His previous PFT from May 2015 showed severe airflow obstruction with diffusion defect  He has history of working with beryllium      Hypertension, essential  He has history of hypertension and is on treatment with amlodipine chlorthalidone and metoprolol       {Assess/PlanSmartLinks:33623}      Subjective:      Patient ID: Kalpesh Miller is a 69 y o  male  Mr Lauren Lorenz has COPD from his previous smoking  Currently his exercise tolerance is at least 2 blocks and has no significant cough or phlegm or wheezing or chest pain  He has been on treatment with Breo 100 and in flows Ellipta  He has no hoarseness of voice or significant dysphagia  He rinse his mouth and gargles throat after using Breo  He also has a rescue albuterol inhaler which she uses rare rarely  His previous PFT showed severe airflow obstruction with diffusion defect  His previous CT scan showed evidence of scarring  He is up-to-date with his influenza and pneumococcal vaccination  He denies any hemoptysis weight loss or anorexia  He has history of pulmonary sarcoidosis and was on steroid therapy in the past   Currently he is not on any specific therapy  His CT scan showed evidence of scarring  He denies any joint pain or eye symptoms or skin rashes  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     He also has history of gout  He is on chronic sulfasalazine therapy  He also has history of hypertension hyperlipidemia and gastroesophageal reflux disease  {[facility] ambulatory SmartLinks:54466}    Review of Systems   Constitutional: Negative for activity change, chills, fever and unexpected weight change  HENT: Negative for congestion, postnasal drip, rhinorrhea, sore throat and voice change  Eyes: Negative for discharge and visual disturbance  Respiratory: Positive for shortness of breath  Negative for cough, choking, chest tightness, wheezing and stridor  Cardiovascular: Negative for chest pain, palpitations and leg swelling  Gastrointestinal: Negative for abdominal distention, abdominal pain, constipation, diarrhea, nausea and vomiting     Endocrine: Negative for polyuria  Genitourinary: Negative for difficulty urinating, frequency and urgency  Musculoskeletal: Negative for arthralgias, back pain and joint swelling  Skin: Negative for color change and pallor  Allergic/Immunologic: Negative for environmental allergies  Neurological: Negative for dizziness, syncope and headaches  Psychiatric/Behavioral: Negative for agitation and sleep disturbance  The patient is not nervous/anxious  Objective:      /70 (BP Location: Right arm, Patient Position: Sitting, Cuff Size: Adult)   Pulse 62   Temp 98 °F (36 7 °C) (Tympanic)   Ht 5' 11" (1 803 m)   Wt 73 1 kg (161 lb 4 oz)   SpO2 96%   BMI 22 49 kg/m²           Physical Exam   Constitutional: He is oriented to person, place, and time  He appears well-developed and well-nourished  HENT:   Head: Normocephalic  Eyes: Conjunctivae are normal  Right eye exhibits no discharge  Left eye exhibits no discharge  No scleral icterus  Neck: No JVD present  No tracheal deviation present  No thyromegaly present  Cardiovascular: Normal rate, regular rhythm and normal heart sounds  No murmur heard  Pulmonary/Chest: Effort normal  No respiratory distress  He has no wheezes  He has rales (Occasional coarse crackles right lung base)  Surgical scar right side posteriorly  Abdominal: He exhibits no distension  There is no tenderness  There is no guarding  Musculoskeletal: He exhibits no edema, tenderness or deformity  Lymphadenopathy:     He has no cervical adenopathy  Neurological: He is alert and oriented to person, place, and time  Skin: Skin is warm and dry  No rash noted  No erythema  No pallor  Psychiatric: He has a normal mood and affect   Judgment and thought content normal          Cezar Ervin MD  7/29/2020  4:14 PM  Sign at close encounter  Assessment/Plan:    COPD (chronic obstructive pulmonary disease) [facility])    Mr Taisha Young was diagnosed with COPD many years back and is currently on treatment with Brio 100, Incruse and Ventolin  Currently his exercise tolerance is more than 2 blocks and he has occasional cough and no wheezing  No phlegm  No chest pain or palpitations  No hoarseness of voice or dysphagia  He gargles throat after using Breo  He is a never smoker but admits to secondhand smoke exposure  He also worked 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 [employer] industry  His previous PFT from May 2015 showed severe airflow obstruction with diffusion defect  He also had evidence of air trapping  There was improvement in FEV1 with bronchodilator consistent with asthma  His last CT scan from February 2019 showed scarring in the left lung  He denies any runny nose or sneezing  On clinical examination, his chest was clear except for occasional coarse inspiratory crackles at the left lung base  I have advised him to continue breo and incruse  I had a long discussion with him and have answered all his questions  Sarcoidosis    He was diagnosed with sarcoidosis many years back after a lung biopsy and was given steroid therapy for some time  His previous PFT from May 2015 showed severe airflow obstruction with diffusion defect  He has history of working with beryllium  Hypertension, essential  He has history of hypertension and is on treatment with amlodipine chlorthalidone and metoprolol       {Assess/PlanSmartLinks:77986}      Subjective:      Patient ID: Juan Joshi is a 69 y o  male      HPI    {Common ambulatory SmartLinks:31195}    Review of Systems      Objective:      /70 (BP Location: Right arm, Patient Position: Sitting, Cuff Size: Adult)   Pulse 62   Temp 98 °F (36 7 °C) (Tympanic)   Ht 5' 11" (1 803 m)   Wt 73 1 kg (161 lb 4 oz)   SpO2 96%   BMI 22 49 kg/m²           Physical Exam

## 2020-07-29 NOTE — PROGRESS NOTES
Assessment/Plan:    COPD (chronic obstructive pulmonary disease) 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  His previous PFT from May 2015 showed severe airflow obstruction with diffusion defect  He also had evidence of air trapping  There was improvement in FEV1 with bronchodilator consistent with asthma  His last CT scan from February 2019 showed scarring in the left lung  He denies any runny nose or sneezing  On clinical examination, his chest was clear except for occasional coarse inspiratory crackles at the left lung base  I have advised him to continue breo and incruse  I had a long discussion with him and have answered all his questions  Sarcoidosis    He was diagnosed with sarcoidosis many years back after a lung biopsy and was given steroid therapy for some time  His previous PFT from May 2015 showed severe airflow obstruction with diffusion defect  He has history of working with beryllium  Hypertension, essential  He has history of hypertension and is on treatment with amlodipine chlorthalidone and metoprolol       Diagnoses and all orders for this visit:    Chronic obstructive pulmonary disease, unspecified COPD type ([address] )    Sarcoidosis    Hypertension, essential    Other orders  -     co-enzyme Q-10 50 MG capsule; Take by mouth daily          Subjective:      Patient ID: Immanuel Morris is a 69 y o  male      HPI    The following portions of the patient's history were reviewed and updated as appropriate: allergies, current medications, past family history, past medical history, past social history, past surgical history and problem list     Review of Systems      Objective:      /70 (BP Location: Right arm, Patient Position: Sitting, Cuff Size: Adult)   Pulse 62   Temp 98 °F (36 7 °C) (Tympanic)   Ht 5' 11" (1 803 m)   Wt 73 1 kg (161 lb 4 oz)   SpO2 96%   BMI 22 49 kg/m²          Physical Exam

## 2020-08-13 ENCOUNTER — OFFICE VISIT (OUTPATIENT)
Dept: FAMILY MEDICINE CLINIC | Facility: CLINIC | Age: 75
End: 2020-08-13
Payer: MEDICARE

## 2020-08-13 VITALS
BODY MASS INDEX: 22.51 KG/M2 | WEIGHT: 160.8 LBS | OXYGEN SATURATION: 96 % | HEART RATE: 66 BPM | HEIGHT: 71 IN | DIASTOLIC BLOOD PRESSURE: 66 MMHG | SYSTOLIC BLOOD PRESSURE: 110 MMHG | TEMPERATURE: 97.3 F | RESPIRATION RATE: 18 BRPM

## 2020-08-13 DIAGNOSIS — J43.9 PULMONARY EMPHYSEMA, UNSPECIFIED EMPHYSEMA TYPE (HCC): ICD-10-CM

## 2020-08-13 DIAGNOSIS — I12.9 HYPERTENSIVE CHRONIC KIDNEY DISEASE WITH STAGE 1 THROUGH STAGE 4 CHRONIC KIDNEY DISEASE, OR UNSPECIFIED CHRONIC KIDNEY DISEASE: ICD-10-CM

## 2020-08-13 DIAGNOSIS — J44.9 CHRONIC OBSTRUCTIVE PULMONARY DISEASE, UNSPECIFIED COPD TYPE (HCC): Primary | ICD-10-CM

## 2020-08-13 DIAGNOSIS — E78.5 DYSLIPIDEMIA: ICD-10-CM

## 2020-08-13 DIAGNOSIS — N18.30 CHRONIC KIDNEY DISEASE, STAGE III (MODERATE) (HCC): ICD-10-CM

## 2020-08-13 DIAGNOSIS — H92.02 EARACHE ON LEFT: ICD-10-CM

## 2020-08-13 PROCEDURE — 3078F DIAST BP <80 MM HG: CPT | Performed by: FAMILY MEDICINE

## 2020-08-13 PROCEDURE — 3008F BODY MASS INDEX DOCD: CPT | Performed by: FAMILY MEDICINE

## 2020-08-13 PROCEDURE — 3074F SYST BP LT 130 MM HG: CPT | Performed by: FAMILY MEDICINE

## 2020-08-13 PROCEDURE — 1160F RVW MEDS BY RX/DR IN RCRD: CPT | Performed by: FAMILY MEDICINE

## 2020-08-13 PROCEDURE — 99214 OFFICE O/P EST MOD 30 MIN: CPT | Performed by: FAMILY MEDICINE

## 2020-08-13 PROCEDURE — 4040F PNEUMOC VAC/ADMIN/RCVD: CPT | Performed by: FAMILY MEDICINE

## 2020-08-13 PROCEDURE — 1036F TOBACCO NON-USER: CPT | Performed by: FAMILY MEDICINE

## 2020-08-13 NOTE — PROGRESS NOTES
Assessment/Plan: Can't hear, either ear  Bad prob for him  Wants to find out why  F/u and eval multiple med issues as listed  Spring, BBG's which he is taking QID and one page scanned in  Ave fbs 140 and PP BS s 140 denHCOPD - stable, f/u and eval beHL - controlled be   Problem List Items Addressed This Visit     None            Subjective:      Patient ID: Enoch Bro is a 69 y o  male  HPI    The following portions of the patient's history were reviewed and updated as appropriate:   He has a past medical history of Anemia, Arthritis, Gee's esophagus, Breast lump, Cancer ([facility] [address] ), Chronic kidney disease, Cirrhosis ([facility] [address] ), Coronary artery disease, Diabetes mellitus ([facility] [address] ), H/O heart artery stent, Hypertension, Inguinal hernia, 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[facility] [address] ), Skin cancer, and SOB (shortness of breath)  ,  does not have any pertinent problems on file  ,   has a past surgical history that includes Colonoscopy (04/11/2016); EGD AND COLONOSCOPY (06/24/2019); Cardiac catheterization; Tonsillectomy; Thumb fusion; Hernia repair; ERCP (09/11/2014); Multiple tooth extractions; Cervical fusion; Liver biopsy; Hip surgery (Right); Kidney stone surgery; Cholecystectomy; and Skin biopsy (05/2020)  ,  family history includes Atrial fibrillation in his mother; COPD in his father; Heart disease in his father and mother; Hypertension in his mother; Kidney disease in his father  ,   reports that he has never smoked  He has never used smokeless tobacco  He reports that he does not drink alcohol or use drugs  ,  is allergic to oxycodone     Current Outpatient Medications   Medication Sig Dispense Refill    allopurinol (ZYLOPRIM) 300 mg tablet Take 0 5 tablets by mouth daily      amLODIPine (NORVASC) 5 mg tablet TAKE 1 TABLET (5 MG) BY MOUTH DAILY 30 tablet 5    aspirin (ECOTRIN LOW STRENGTH) 81 mg EC tablet Take 81 mg by mouth daily      atorvastatin (LIPITOR) 20 mg tablet ONE AND "ONE-HALF" TABLETS EVERY DAY (Patient taking differently: Take 20 mg by mouth daily ) 45 tablet 5    B Complex-C (SUPERPLEX-T) TABS Take 1 tablet by mouth daily      chlorthalidone 25 mg tablet TAKE "ONE-HALF" TABLET (12 5 MG) BY MOUTH DAILY 15 tablet 4    Cholecalciferol (VITAMIN D3) 2000 units TABS Take 1 tablet by mouth daily      co-enzyme Q-10 50 MG capsule Take by mouth daily      COLCRYS 0 6 MG tablet Take 1 tablet (0 6 mg total) by mouth daily 30 tablet 3    EDARBI 40 MG tablet TAKE 1 TABLET TWICE A DAY 60 tablet 5    esomeprazole (NexIUM) 40 MG capsule Take 40 mg by mouth daily  6    Ferrous Sulfate (IRON) 325 (65 Fe) MG TABS Take 1 tablet by mouth daily      fluticasone-vilanterol (Breo Ellipta) 100-25 mcg/inh inhaler Inhale 1 puff daily 1 Inhaler 3    INCRUSE ELLIPTA 62 5 MCG/INH AEPB Take 1 puff by mouth daily  5    magnesium chloride-calcium (SLOW-MAG) 71 5-119 mg Take 2 tablets by mouth 2 (two) times a day      metoprolol tartrate (LOPRESSOR) 50 mg tablet Take 50 mg by mouth Take 50mg in the Am and 25 mg in the evening      prasugrel (EFFIENT) tablet Take 10 mg by mouth daily       sitaGLIPtin (JANUVIA) 100 mg tablet Take 1 tablet by mouth daily      sulfaSALAzine (AZULFIDINE) 500 mg tablet TAKE 1 AND 1/2 TABLETS BY MOUTH ONE TIME DAILY AS DIRECTED  3    aspirin 325 mg tablet Take 81 mg by mouth daily        No current facility-administered medications for this visit  Review of Systems   Constitutional: Negative for activity change, appetite change, chills, diaphoresis, fatigue, fever and unexpected weight change  HENT: Negative for congestion, dental problem, drooling, ear discharge, ear pain, facial swelling, mouth sores, nosebleeds, postnasal drip, rhinorrhea, trouble swallowing and voice change  Eyes: Negative for photophobia, pain, discharge, redness, itching and visual disturbance     Respiratory: Negative for apnea (e what every 3 months the), cough ( no on June stressed the was), choking, chest tightness and shortness of breath  Cardiovascular: Negative for chest pain and leg swelling  Gastrointestinal: Negative for abdominal distention, abdominal pain, constipation, diarrhea and nausea  Endocrine: Negative for polydipsia, polyphagia and polyuria  Genitourinary: Negative for decreased urine volume, difficulty urinating, dysuria, enuresis and hematuria (mono was going on but seemed cancel okay this very odd)  Musculoskeletal: Negative for arthralgias, back pain, gait problem and joint swelling  Skin: Negative for color change, pallor, rash and wound  Allergic/Immunologic: Negative for immunocompromised state  Neurological: Negative for dizziness, seizures, syncope, facial asymmetry, speech difficulty, light-headedness and headaches  Hematological: Negative for adenopathy  Psychiatric/Behavioral: Negative for agitation, behavioral problems, confusion and decreased concentration  Objective:  Vitals:    08/13/20 1003   BP: 110/66   BP Location: Left arm   Patient Position: Sitting   Cuff Size: Adult   Pulse: 66   Resp: 18   Temp: (!) 97 3 °F (36 3 °C)   TempSrc: Temporal   SpO2: 96%   Weight: 72 9 kg (160 lb 12 8 oz)   Height: 5' 11" (1 803 m)     Body mass index is 22 43 kg/m²  Physical Exam  Vitals signs and nursing note reviewed  Constitutional:       Appearance: He is well-developed  He is not diaphoretic  HENT:      Head: Normocephalic and atraumatic  Nose: Nose normal    Eyes:      Pupils: Pupils are equal, round, and reactive to light  Neck:      Musculoskeletal: Normal range of motion and neck supple  Trachea: No tracheal deviation  Cardiovascular:      Rate and Rhythm: Normal rate and regular rhythm  Heart sounds: Normal heart sounds  Pulmonary:      Effort: Pulmonary effort is normal       Breath sounds: Normal breath sounds  No wheezing  Abdominal:      General: Bowel sounds are normal       Palpations: Abdomen is soft  Musculoskeletal: Normal range of motion  Lymphadenopathy:      Cervical: No cervical adenopathy  Skin:     General: Skin is warm and dry  Neurological:      Mental Status: He is alert and oriented to person, place, and time     Psychiatric:         Behavior: Behavior normal

## 2020-08-13 NOTE — PATIENT INSTRUCTIONS
Of the house are about life Cerumen Impaction   WHAT YOU NEED TO KNOW:   Cerumen impaction is the blockage of the outer ear canal by tightly packed cerumen (earwax)  It is generally treated with procedures such as flushing or suctioning the ear canal or the use of instruments to remove the impaction  DISCHARGE INSTRUCTIONS:   Medicines:  · Ear drops: These are used to soften the wax in your ear  Wax softening ear drops may be bought without a prescription  Ask your healthcare provider how often you should use this medicine  Read the instructions carefully before you use the ear drops  Do the following when you put in ear drops:     ¨ Warm the drops by holding the bottle in your hands for a few minutes  Cold ear drops may make you dizzy  ¨ Lie down with the affected ear toward the ceiling  You may also stand with your head tilted to one side  ¨ Pull your ear lobe up and back, and place the correct number of drops into the ear  ¨ Keep your ear facing up for 5 to 10 minutes so the drops coat the outer ear canal      ¨ Gently clean the outer part of the ear with a cotton swab  Do not  place the cotton swab or anything inside your ear canal  This increases the risk of damaging your eardrum  · Take your medicine as directed  Contact your healthcare provider if you think your medicine is not helping or if you have side effects  Tell him of her if you are allergic to any medicine  Keep a list of the medicines, vitamins, and herbs you take  Include the amounts, and when and why you take them  Bring the list or the pill bottles to follow-up visits  Carry your medicine list with you in case of an emergency  Follow up with your healthcare provider as directed:  Write down your questions so you remember to ask them during your visits  Contact your healthcare provider if:   · You have a fever  · You have trouble hearing or ringing in your ear      · You have questions about your condition or care   Return to the emergency department if:   · You feel dizzy  · You have discharge or blood coming out of your ear  · Your ear pain does not go away or gets worse  © 2017 [employer] Information is for End User's use only and may not be sold, redistributed or otherwise used for commercial purposes  All illustrations and images included in CareNotes® are the copyrighted property of [employer]  or [employer]ped[employer]  The above information is an  only  It is not intended as medical advice for individual conditions or treatments  Talk to your doctor, nurse or pharmacist before following any medical regimen to see if it is safe and effective for you

## 2020-09-10 NOTE — PROGRESS NOTES
RENAL FOLLOW UP NOTE: td    ASSESSMENT AND PLAN:   1   CKD stage 3  · Etiology:  Diabetic nephropathy/hypertensive nephrosclerosis/arteriolar nephrosclerosis/episodes of SHAY in the past   All serologies were negative including multiple myeloma evaluation  · Baseline creatinine:  1 2-1 62  · Current creatinine:  1 35 at baseline  · Urine protein creatinine ratio:  0 19 g at goal  Recommendations:  · Treat hypertension-please see below  · Treat dyslipidemia-please see below  · Maintain proteinuria less than 1 g or as low as possible  · Avoid nephrotoxic agents such as NSAIDs, patient counseled as such    2   Volume:  Euvolemic    3   Hypertension:       Current blood pressure averages:  None at this time    · Goal blood pressure:  Less than  125/80 given CAD    Recommendations:  ·  Push nonmedical regimen including weight loss, isotonic exercise and avoidance of salt; patient counseled as such  · Medication changes today:  I will have the patient send in 1 week a blood pressure readings  Currently his diastolics are low so therefore, would not push the issue of decreasing his systolic readings in order to avoid hypoperfusion  4   Electrolytes:  All acceptable including a potassium of 4 4     5   Mineral bone disorder:   · Calcium/magnesium/phosphorus:  All acceptable including calcium 9 5  · PTH intact:  37 2 which is normal  · Vitamin-D:Currently on supplements  49 9 at goal    6   Dyslipidemia:  · Goal LDL:  Less than 70 given CAD  · Current lipid profile:  LDL 84/HDL 48/triglycerides 113 8  Recommendations:  Above goal so adjust statin:  I would increase atorvastatin to 40 mg daily    7   Anemia:  normal hemoglobin at 14 2    8   Other problems:  · Diabetes mellitus per your discretion  · CAD followed by Dr Shea Urrutia through [facility]   Status post MI last October involving to cardiac stents  :  Recent emesis of LAD lesion with repeat PTCA drug-eluting stent 2018   Circumflex artery and right coronary artery 50% stenosis   Ejection fraction 55%  · Sarcoidosis followed by Dr Street  · Kyphoscoliosis  · Cirrhosis of the liver  · Gout  · Ankylosing spondylitis    PATIENT INSTRUCTIONS:    Patient Instructions   1  Medication changes today:   Please increase atorvastatin/Lipitor to 40 mg a day  Watch for body aches or joint pains and call if you developed any  2  Please go for not fasting  lab work in [address] [place] [address] after making the above medication change  3  Please take 1 week a blood pressure readings AT THIS TIME     AS FOLLOWS  MORNING AND EVENING, SITTING AND STANDING as follows:  · TAKE THE MORNING READINGS BEFORE ANY MEDICATIONS AND WHEN YOU ARE RELAX FOR SEVERAL MINUTES  · TAKE THE EVENING READINGS BEFORE SUPPER/DINNER AND BEFORE ANY MEDICATIONS AND AGAIN WHEN YOU ARE RELAX FOR SEVERAL MINUTES  · PLEASE INCLUDE HEART RATE WITH YOUR BLOOD PRESSURE READINGS  · When taking standing readings, keep your arm supported at heart level and not dangling  · Make sure you are sitting with your back supported and feet on the ground and do not cross your legs or feet  · Make sure you have not taken any coffee or caffeine products or exercised or smoke cigarettes at least 30 minutes before taking your blood pressure  Then please mail these readings into the office    4  Follow-up in 4  months   Please bring in 1 week a blood pressure readings morning evening, sitting and standing is outlined above   PLEASE BRING IN YOUR BLOOD PRESSURE MACHINE FOR CORRELATION WITH THE OFFICE MACHINE AT THE NEXT VISIT   Please go for fasting lab work 1-2 weeks prior to your appointment      5   General instructions:   AVOID SALT BUT NOT ADDING AN READING LABELS TO MAKE SURE THERE IS LOW-SALT IN THE FOOD THAT YOU ARE EATING     Avoid nonsteroidal anti-inflammatory drugs such as Naprosyn, ibuprofen, Aleve, Advil, Celebrex, Meloxicam (Mobic) etc   You can use Tylenol as needed if you do not have any liver condition to be concerned about     Avoid medications such as Sudafed or decongestants and antihistamines that contained the D component which is the decongestant  You can take antihistamines without the decongestant or D component   Try to avoid medications such as pantoprazole or  Protonix/Nexium or Esomeprazole)/Prilosec or omeprazole/Prevacid or lansoprazole/AcipHex or Rabeprazole  If you are able to, use Pepcid as this is safer for your kidneys   Try to exercise at least 30 minutes 3 days a week to begin with with an ultimate goal of 5 days a week for at least 30 minutes       Please do not drink more than 2 glasses of alcohol/wine on a daily basis as this may contribute to your high blood pressure  Heart Healthy Diet   WHAT YOU NEED TO KNOW:   A heart healthy diet is an eating plan low in total fat, unhealthy fats, and sodium (salt)  A heart healthy diet helps decrease your risk for heart disease and stroke  Limit the amount of fat you eat to 25% to 35% of your total daily calories  Limit sodium to less than 2,300 mg each day  DISCHARGE INSTRUCTIONS:   Healthy fats:  Healthy fats can help improve cholesterol levels  The risk for heart disease is decreased when cholesterol levels are normal  Choose healthy fats, such as the following:  · Unsaturated fat  is found in foods such as soybean, canola, olive, corn, and safflower oils  It is also found in soft tub margarine that is made with liquid vegetable oil  · Omega-3 fat  is found in certain fish, such as salmon, tuna, and trout, and in walnuts and flaxseed  Unhealthy fats:  Unhealthy fats can cause unhealthy cholesterol levels in your blood and increase your risk of heart disease  Limit unhealthy fats, such as the following:  · Cholesterol  is found in animal foods, such as eggs and lobster, and in dairy products made from whole milk  Limit cholesterol to less than 300 milligrams (mg) each day   You may need to limit cholesterol to 200 mg each day if you have heart disease  · Saturated fat  is found in meats, such as schuster and hamburger  It is also found in chicken or turkey skin, whole milk, and butter  Limit saturated fat to less than 7% of your total daily calories  Limit saturated fat to less than 6% if you have heart disease or are at increased risk for it  · Trans fat  is found in packaged foods, such as potato chips and cookies  It is also in hard margarine, some fried foods, and shortening  Avoid trans fats as much as possible    Heart healthy foods and drinks to include:  Ask your dietitian or healthcare provider how many servings to have from each of the following food groups:  · Grains:      ¨ Whole-wheat breads, cereals, and pastas, and brown rice    ¨ Low-fat, low-sodium crackers and chips    · Vegetables:      ¨ Broccoli, green beans, green peas, and spinach    ¨ Collards, kale, and lima beans    ¨ Carrots, sweet potatoes, tomatoes, and peppers    ¨ Canned vegetables with no salt added    · Fruits:      ¨ Bananas, peaches, pears, and pineapple    ¨ Grapes, raisins, and dates    ¨ Oranges, tangerines, grapefruit, orange juice, and grapefruit juice    ¨ Apricots, mangoes, melons, and papaya    ¨ Raspberries and strawberries    ¨ Canned fruit with no added sugar    · Low-fat dairy products:      ¨ Nonfat (skim) milk, 1% milk, and low-fat almond, cashew, or soy milks fortified with calcium    ¨ Low-fat cheese, regular or frozen yogurt, and cottage cheese    · Meats and proteins , such as lean cuts of beef and pork (loin, leg, round), skinless chicken and turkey, legumes, soy products, egg whites, and nuts  Foods and drinks to limit or avoid:  Ask your dietitian or healthcare provider about these and other foods that are high in unhealthy fat, sodium, and sugar:  · Snack or packaged foods , such as frozen dinners, cookies, macaroni and cheese, and cereals with more than 300 mg of sodium per serving    · Canned or dry mixes  for cakes, soups, sauces, or gravies    · Vegetables with added sodium , such as instant potatoes, vegetables with added sauces, or regular canned vegetables    · Other foods high in sodium , such as ketchup, barbecue sauce, salad dressing, pickles, olives, soy sauce, and miso    · High-fat dairy foods  such as whole or 2% milk, cream cheese, or sour cream, and cheeses     · High-fat protein foods  such as high-fat cuts of beef (T-bone steaks, ribs), chicken or turkey with skin, and organ meats, such as liver    · Cured or smoked meats , such as hot dogs, schuster, and sausage    · Unhealthy fats and oils , such as butter, stick margarine, shortening, and cooking oils such as coconut or palm oil    · Food and drinks high in sugar , such as soft drinks (soda), sports drinks, sweetened tea, candy, cake, cookies, pies, and doughnuts  Other diet guidelines to follow:   · Eat more foods containing omega-3 fats  Eat fish high in omega-3 fats at least 2 times a week  · Limit alcohol  Too much alcohol can damage your heart and raise your blood pressure  Women should limit alcohol to 1 drink a day  Men should limit alcohol to 2 drinks a day  A drink of alcohol is 12 ounces of beer, 5 ounces of wine, or 1½ ounces of liquor  · Choose low-sodium foods  High-sodium foods can lead to high blood pressure  Add little or no salt to food you prepare  Use herbs and spices in place of salt  · Eat more fiber  to help lower cholesterol levels  Eat at least 5 servings of fruits and vegetables each day  Eat 3 ounces of whole-grain foods each day  Legumes (beans) are also a good source of fiber  Lifestyle guidelines:   · Do not smoke  Nicotine and other chemicals in cigarettes and cigars can cause lung and heart damage  Ask your healthcare provider for information if you currently smoke and need help to quit  E-cigarettes or smokeless tobacco still contain nicotine  Talk to your healthcare provider before you use these products       · Exercise regularly  to help you maintain a healthy weight and improve your blood pressure and cholesterol levels  Ask your healthcare provider about the best exercise plan for you  Do not start an exercise program without asking your healthcare provider  Follow up with your healthcare provider as directed:  Write down your questions so you remember to ask them during your visits  © 2017 [employer] Information is for End User's use only and may not be sold, redistributed or otherwise used for commercial purposes  All illustrations and images included in CareNotes® are the copyrighted property of [employer]  or [employer]  The above information is an  only  It is not intended as medical advice for individual conditions or treatments  Talk to your doctor, nurse or pharmacist before following any medical regimen to see if it is safe and effective for you  Subjective: There has been no hospitalizations or acute illnesses since last visit  The patient overall is feeling well  No fevers, chills, or cough or colds    Good appetite and good energy  No hematuria, dysuria, voiding symptoms or foamy urine, except for occasional bubbles in the urine  No gastrointestinal symptoms  No cardiovascular symptoms including swelling of the legs  No headaches, dizziness or lightheadedness  Blood pressure medications:  · Metoprolol tartrate 50 mg in the morning and 25 mg the evening  · Edarbi 40 mg twice a day  · Chlorthalidone 12 5 mg daily  · Amlodipine 5 mg daily in the morning    ROS:  See HPI, otherwise review of systems as completely reviewed with the patient are negative    Past Medical History:   Diagnosis Date    Anemia     Arthritis     Gee's esophagus     Breast lump     Cancer [facility]     Chronic kidney disease     [facility]     Coronary artery disease     cardiac cath; stents     Diabetes mellitus [facility]     H/O heart artery stent     Hypertension     Inguinal hernia     Right    Sarcoidosis     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     Skin cancer     SOB (shortness of breath)      Past Surgical History:   Procedure Laterality Date    CARDIAC CATHETERIZATION      CERVICAL FUSION      CHOLECYSTECTOMY      COLONOSCOPY  04/11/2016    EGD AND COLONOSCOPY  06/24/2019    ERCP  09/11/2014    transab esop hiat griffin rpr    ESOPHAGOGASTRIC FUNDOPLASTY      HERNIA REPAIR Right     p I/griffin init reduc >5 yr    HIP SURGERY Right     Replacement    KIDNEY STONE SURGERY      Fragmenting    LIVER BIOPSY      MULTIPLE TOOTH EXTRACTIONS      "extraction of all maxillary teeth"    SKIN BIOPSY  05/2020    THUMB FUSION      with graft    TONSILLECTOMY       Family History   Problem Relation Age of Onset    Hypertension Mother     Atrial fibrillation Mother     Heart disease Mother     Kidney disease Father     COPD Father     Heart disease Father     Asthma Neg Hx     Lung cancer Neg Hx       reports that he has never smoked  He has never used smokeless tobacco  He reports that he does not drink alcohol or use drugs  I COMPLETELY REVIEWED THE PAST MEDICAL HISTORY/PAST SURGICAL HISTORY/SOCIAL HISTORY/FAMILY HISTORY/AND MEDICATIONS  AND UPDATED ALL    Objective:     Vitals:   BP sitting on right:  140/60 with a heart rate of 64 regular  BP standing on right:  130/70 with a heart rate of 64 regular  Vitals:    09/15/20 0924   Temp: (!) 97 2 °F (36 2 °C)       Weight (last 2 days)     Date/Time   Weight    09/15/20 0924   72 6 (160)            Wt Readings from Last 3 Encounters:   09/15/20 72 6 kg (160 lb)   08/13/20 72 9 kg (160 lb 12 8 oz)   07/29/20 73 1 kg (161 lb 4 oz)       Body mass index is 22 32 kg/m²      Physical Exam: General:  No acute distress  Skin:  No acute rash  Eyes:  No scleral icterus, noninjected, no discharge from eyes  ENT:  Moist mucous membranes  Neck:  Supple, no jugular venous distention, trachea is midline, no lymphadenopathy and no thyromegaly  Back No CVAT  Chest:  Clear to auscultation and percussion, good respiratory effort  CVS:  Regular rate and rhythm without a rub, or gallops or murmurs  Abdomen:  Soft and nontender with normal bowel sounds  Extremities:  No cyanosis and no edema, no arthritic changes, normal range of motion  Neuro:  Grossly intact  Psych:  Alert, oriented x3 and appropriate      Medications:    Current Outpatient Medications:     allopurinol (ZYLOPRIM) 300 mg tablet, Take 0 5 tablets by mouth daily, Disp: , Rfl:     amLODIPine (NORVASC) 5 mg tablet, TAKE 1 TABLET (5 MG) BY MOUTH DAILY, Disp: 30 tablet, Rfl: 5    aspirin (ECOTRIN LOW STRENGTH) 81 mg EC tablet, Take 81 mg by mouth daily, Disp: , Rfl:     atorvastatin (LIPITOR) 20 mg tablet, Take 2 tablets (40 mg total) by mouth daily, Disp: 60 tablet, Rfl: 5    B Complex-C (SUPERPLEX-T) TABS, Take 1 tablet by mouth daily, Disp: , Rfl:     chlorthalidone 25 mg tablet, TAKE "ONE-HALF" TABLET (12 5 MG) BY MOUTH DAILY, Disp: 15 tablet, Rfl: 4    Cholecalciferol (VITAMIN D3) 2000 units TABS, Take 1 tablet by mouth daily, Disp: , Rfl:     co-enzyme Q-10 50 MG capsule, Take by mouth daily, Disp: , Rfl:     COLCRYS 0 6 MG tablet, Take 1 tablet (0 6 mg total) by mouth daily, Disp: 30 tablet, Rfl: 3    EDARBI 40 MG tablet, TAKE 1 TABLET TWICE A DAY, Disp: 60 tablet, Rfl: 5    esomeprazole (NexIUM) 40 MG capsule, Take 40 mg by mouth daily, Disp: , Rfl: 6    Ferrous Sulfate (IRON) 325 (65 Fe) MG TABS, Take 1 tablet by mouth daily, Disp: , Rfl:     fluticasone-vilanterol (Breo Ellipta) 100-25 mcg/inh inhaler, Inhale 1 puff daily, Disp: 1 Inhaler, Rfl: 3    INCRUSE ELLIPTA 62 5 MCG/INH AEPB, Take 1 puff by mouth daily, Disp: , Rfl: 5    magnesium chloride-calcium (SLOW-MAG) 71 5-119 mg, Take 2 tablets by mouth 2 (two) times a day, Disp: , Rfl:     metoprolol tartrate (LOPRESSOR) 50 mg tablet, Take 50 mg by mouth Take 50mg in the Am and 25 mg in the evening, Disp: , Rfl:     prasugrel (EFFIENT) tablet, Take 10 mg by mouth daily , Disp: , Rfl:     sitaGLIPtin (JANUVIA) 100 mg tablet, Take 1 tablet by mouth daily, Disp: , Rfl:     sulfaSALAzine (AZULFIDINE) 500 mg tablet, TAKE 1 AND 1/2 TABLETS BY MOUTH ONE TIME DAILY AS DIRECTED, Disp: , Rfl: 3    Lab, Imaging and other studies: I have personally reviewed pertinent labs    Laboratory Results:  Results for orders placed or performed in visit on 09/11/20   Comprehensive metabolic panel   Result Value Ref Range    Sodium 141 133 - 145 mmol/L    Potassium 4 4 3 5 - 5 0 mmol/L    Chloride 106 96 - 108 mmol/L    CO2 28 22 - 33 mmol/L    ANION GAP 7 4 - 13 mmol/L    BUN 27 (H) 6 - 20 mg/dL    Creatinine 1 35 (H) 0 50 - 1 20 mg/dL    Glucose, Fasting 126 (H) 70 - 100 mg/dL    Calcium 9 5 8 4 - 10 2 mg/dL    AST 24 15 - 41 U/L    ALT 18 5 - 63 U/L    Alkaline Phosphatase 63 8 10 - 129 U/L    Total Protein 6 6 6 4 - 8 3 g/dL    Albumin 4 2 3 4 - 4 8 g/dL    Total Bilirubin 0 58 0 30 - 1 20 mg/dL    eGFR 52 ml/min/1 73sq m   CK   Result Value Ref Range    Total CK 92 8 49 - 397 U/L   CBC   Result Value Ref Range    WBC 8 40 4 31 - 10 16 Thousand/uL    RBC 4 37 3 88 - 5 62 Million/uL    Hemoglobin 14 2 12 0 - 17 0 g/dL    Hematocrit 41 6 36 5 - 49 3 %    MCV 95 82 - 98 fL    MCH 32 5 26 8 - 34 3 pg    MCHC 34 1 31 4 - 37 4 g/dL    RDW 13 1 11 6 - 15 1 %    Platelets 969 788 - 784 Thousands/uL    MPV 9 2 8 9 - 12 7 fL   Lipid Panel with Direct LDL reflex   Result Value Ref Range    Cholesterol 155 <=200 mg/dL    Triglycerides 113 8 <=150 mg/dL    HDL, Direct 48 >=40 mg/dL    LDL Calculated 84 0 - 100 mg/dL   Magnesium   Result Value Ref Range    Magnesium 2 0 1 6 - 2 6 mg/dL   Phosphorus   Result Value Ref Range    Phosphorus 3 3 2 5 - 5 0 mg/dL   Vitamin D 25 hydroxy   Result Value Ref Range    Vit D, 25-Hydroxy 49 9 30 0 - 100 0 ng/mL   PTH, intact   Result Value Ref Range    PTH 37 2 18 4 - 80 1 pg/mL       Results from last 7 days   Lab Units 09/11/20  1025   WBC Thousand/uL 8 40   HEMOGLOBIN g/dL 14 2   HEMATOCRIT % 41 6   PLATELETS Thousands/uL 178   POTASSIUM mmol/L 4 4   CHLORIDE mmol/L 106   CO2 mmol/L 28   BUN mg/dL 27*   CREATININE mg/dL 1 35*   CALCIUM mg/dL 9 5   MAGNESIUM mg/dL 2 0   PHOSPHORUS mg/dL 3 3         Radiology review:   chest X-ray    Ultrasound      Portions of the record may have been created with voice recognition software  Occasional wrong word or "sound a like" substitutions may have occurred due to the inherent limitations of voice recognition software  Read the chart carefully and recognize, using context, where substitutions have occurred

## 2020-09-11 ENCOUNTER — TELEPHONE (OUTPATIENT)
Dept: NEPHROLOGY | Facility: CLINIC | Age: 75
End: 2020-09-11

## 2020-09-11 ENCOUNTER — APPOINTMENT (OUTPATIENT)
Dept: LAB | Facility: HOSPITAL | Age: 75
End: 2020-09-11
Attending: INTERNAL MEDICINE
Payer: MEDICARE

## 2020-09-11 DIAGNOSIS — E87.5 HYPERKALEMIA: ICD-10-CM

## 2020-09-11 DIAGNOSIS — R80.1 PERSISTENT PROTEINURIA: ICD-10-CM

## 2020-09-11 DIAGNOSIS — E78.5 DYSLIPIDEMIA: ICD-10-CM

## 2020-09-11 DIAGNOSIS — N18.30 CHRONIC KIDNEY DISEASE, STAGE III (MODERATE) (HCC): ICD-10-CM

## 2020-09-11 DIAGNOSIS — I12.9 HYPERTENSIVE CHRONIC KIDNEY DISEASE WITH STAGE 1 THROUGH STAGE 4 CHRONIC KIDNEY DISEASE, OR UNSPECIFIED CHRONIC KIDNEY DISEASE: ICD-10-CM

## 2020-09-11 DIAGNOSIS — N18.30 CHRONIC KIDNEY DISEASE, STAGE III (MODERATE) (HCC): Primary | ICD-10-CM

## 2020-09-11 DIAGNOSIS — E55.9 VITAMIN D DEFICIENCY: ICD-10-CM

## 2020-09-11 LAB
25(OH)D3 SERPL-MCNC: 49.9 NG/ML (ref 30–100)
ALBUMIN SERPL BCP-MCNC: 4.2 G/DL (ref 3.4–4.8)
ALP SERPL-CCNC: 63.8 U/L (ref 10–129)
ALT SERPL W P-5'-P-CCNC: 18 U/L (ref 5–63)
ANION GAP SERPL CALCULATED.3IONS-SCNC: 7 MMOL/L (ref 4–13)
AST SERPL W P-5'-P-CCNC: 24 U/L (ref 15–41)
BILIRUB SERPL-MCNC: 0.58 MG/DL (ref 0.3–1.2)
BUN SERPL-MCNC: 27 MG/DL (ref 6–20)
CALCIUM SERPL-MCNC: 9.5 MG/DL (ref 8.4–10.2)
CHLORIDE SERPL-SCNC: 106 MMOL/L (ref 96–108)
CHOLEST SERPL-MCNC: 155 MG/DL
CK SERPL-CCNC: 92.8 U/L (ref 49–397)
CO2 SERPL-SCNC: 28 MMOL/L (ref 22–33)
CREAT SERPL-MCNC: 1.35 MG/DL (ref 0.5–1.2)
CREAT UR-MCNC: 88.3 MG/DL
ERYTHROCYTE [DISTWIDTH] IN BLOOD BY AUTOMATED COUNT: 13.1 % (ref 11.6–15.1)
GFR SERPL CREATININE-BSD FRML MDRD: 52 ML/MIN/1.73SQ M
GLUCOSE P FAST SERPL-MCNC: 126 MG/DL (ref 70–100)
HCT VFR BLD AUTO: 41.6 % (ref 36.5–49.3)
HDLC SERPL-MCNC: 48 MG/DL
HGB BLD-MCNC: 14.2 G/DL (ref 12–17)
LDLC SERPL CALC-MCNC: 84 MG/DL (ref 0–100)
MAGNESIUM SERPL-MCNC: 2 MG/DL (ref 1.6–2.6)
MCH RBC QN AUTO: 32.5 PG (ref 26.8–34.3)
MCHC RBC AUTO-ENTMCNC: 34.1 G/DL (ref 31.4–37.4)
MCV RBC AUTO: 95 FL (ref 82–98)
PHOSPHATE SERPL-MCNC: 3.3 MG/DL (ref 2.5–5)
PLATELET # BLD AUTO: 178 THOUSANDS/UL (ref 149–390)
PMV BLD AUTO: 9.2 FL (ref 8.9–12.7)
POTASSIUM SERPL-SCNC: 4.4 MMOL/L (ref 3.5–5)
PROT SERPL-MCNC: 6.6 G/DL (ref 6.4–8.3)
PROT UR-MCNC: 17 MG/DL
PROT/CREAT UR: 0.19 MG/G{CREAT} (ref 0–0.1)
PTH-INTACT SERPL-MCNC: 37.2 PG/ML (ref 18.4–80.1)
RBC # BLD AUTO: 4.37 MILLION/UL (ref 3.88–5.62)
SODIUM SERPL-SCNC: 141 MMOL/L (ref 133–145)
TRIGL SERPL-MCNC: 113.8 MG/DL
WBC # BLD AUTO: 8.4 THOUSAND/UL (ref 4.31–10.16)

## 2020-09-11 PROCEDURE — 82570 ASSAY OF URINE CREATININE: CPT | Performed by: INTERNAL MEDICINE

## 2020-09-11 PROCEDURE — 83735 ASSAY OF MAGNESIUM: CPT

## 2020-09-11 PROCEDURE — 80053 COMPREHEN METABOLIC PANEL: CPT

## 2020-09-11 PROCEDURE — 84156 ASSAY OF PROTEIN URINE: CPT | Performed by: INTERNAL MEDICINE

## 2020-09-11 PROCEDURE — 83970 ASSAY OF PARATHORMONE: CPT

## 2020-09-11 PROCEDURE — 36415 COLL VENOUS BLD VENIPUNCTURE: CPT

## 2020-09-11 PROCEDURE — 84100 ASSAY OF PHOSPHORUS: CPT

## 2020-09-11 PROCEDURE — 85027 COMPLETE CBC AUTOMATED: CPT

## 2020-09-11 PROCEDURE — 82550 ASSAY OF CK (CPK): CPT

## 2020-09-11 PROCEDURE — 80061 LIPID PANEL: CPT

## 2020-09-11 PROCEDURE — 82306 VITAMIN D 25 HYDROXY: CPT

## 2020-09-11 NOTE — TELEPHONE ENCOUNTER
Patient needs orders placed, he will be going for labs at [facility]

## 2020-09-15 ENCOUNTER — OFFICE VISIT (OUTPATIENT)
Dept: NEPHROLOGY | Facility: CLINIC | Age: 75
End: 2020-09-15
Payer: MEDICARE

## 2020-09-15 VITALS — HEIGHT: 71 IN | WEIGHT: 160 LBS | TEMPERATURE: 97.2 F | BODY MASS INDEX: 22.4 KG/M2

## 2020-09-15 DIAGNOSIS — E87.5 HYPERKALEMIA: ICD-10-CM

## 2020-09-15 DIAGNOSIS — N18.30 CHRONIC KIDNEY DISEASE, STAGE III (MODERATE) (HCC): ICD-10-CM

## 2020-09-15 DIAGNOSIS — I12.9 HYPERTENSIVE CHRONIC KIDNEY DISEASE WITH STAGE 1 THROUGH STAGE 4 CHRONIC KIDNEY DISEASE, OR UNSPECIFIED CHRONIC KIDNEY DISEASE: Primary | ICD-10-CM

## 2020-09-15 DIAGNOSIS — R80.1 PERSISTENT PROTEINURIA: ICD-10-CM

## 2020-09-15 DIAGNOSIS — E78.5 DYSLIPIDEMIA: ICD-10-CM

## 2020-09-15 PROCEDURE — 99214 OFFICE O/P EST MOD 30 MIN: CPT | Performed by: INTERNAL MEDICINE

## 2020-09-15 RX ORDER — ATORVASTATIN CALCIUM 20 MG/1
40 TABLET, FILM COATED ORAL DAILY
Qty: 60 TABLET | Refills: 5 | Status: SHIPPED | OUTPATIENT
Start: 2020-09-15 | End: 2021-02-25

## 2020-09-15 NOTE — LETTER
September 15, 2020     MARGE Byrd DO  [address]  [address] [address]    Patient: Yadira Dobbs   YOB: 1945   Date of Visit: 9/15/2020       Dear Dr Savannah Grant: Thank you for referring Yeyo Castro to me for evaluation  Below are my notes for this consultation  If you have questions, please do not hesitate to call me  I look forward to following your patient along with you  Sincerely,        Lamin Chaudhry MD        CC: MD Felisha Iniguez MD Rojean Mighty, MD  9/15/2020  9:53 AM  Sign when Signing Visit  RENAL FOLLOW UP NOTE: td    ASSESSMENT AND PLAN:   1   CKD OUSFK 5  · Etiology:  Diabetic nephropathy/hypertensive nephrosclerosis/arteriolar nephrosclerosis/episodes of SHAY in the past   All serologies were negative including multiple myeloma evaluation  · Baseline creatinine:  1 2-1 62  · Current creatinine:  1 35 at baseline  · Urine protein creatinine ratio:  0 19 g at goal  Recommendations:  · Treat hypertension-please see below  · Treat dyslipidemia-please see below  · Maintain proteinuria less than 1 g or as low as possible  · Avoid nephrotoxic agents such as NSAIDs, patient counseled as such    2   Volume:  Euvolemic    3   Hypertension:       Current blood pressure averages:  None at this time    · Goal blood pressure:  Less than  125/80 given CAD    Recommendations:  ·  Push nonmedical regimen including weight loss, isotonic exercise and avoidance of salt; patient counseled as such  · Medication changes today:  I will have the patient send in 1 week a blood pressure readings  Currently his diastolics are low so therefore, would not push the issue of decreasing his systolic readings in order to avoid hypoperfusion      4   Electrolytes:  All acceptable including a potassium of 4 4     5   Mineral bone disorder:   · Calcium/magnesium/phosphorus:  All acceptable including calcium 9 5  · PTH intact:  37 2 which is normal  · Vitamin-D:Currently on supplements  49 9 at goal    6   Dyslipidemia:  · Goal LDL:  Less than 70 given CAD  · Current lipid profile:  LDL 84/HDL 48/triglycerides 113 8  Recommendations:  Above goal so adjust statin:  I would increase atorvastatin to 40 mg daily    7   Anemia:  normal hemoglobin at 14 2    8   Other problems:  · Diabetes mellitus per your discretion  · CAD followed by Dr Yves Jean through [facility]   Status post MI last October involving to cardiac stents  :  Recent emesis of LAD lesion with repeat PTCA drug-eluting stent 2018  Circumflex artery and right coronary artery 50% stenosis   Ejection fraction 55%  · Sarcoidosis followed by Dr Street  · Kyphoscoliosis  · Cirrhosis of the liver  · Gout  · Ankylosing spondylitis    PATIENT INSTRUCTIONS:    Patient Instructions   1  Medication changes today:   Please increase atorvastatin/Lipitor to 40 mg a day  Watch for body aches or joint pains and call if you developed any  2  Please go for not fasting  lab work in [address] [place] [address] after making the above medication change  3  Please take 1 week a blood pressure readings AT THIS TIME     AS FOLLOWS  MORNING AND EVENING, SITTING AND STANDING as follows:  · TAKE THE MORNING READINGS BEFORE ANY MEDICATIONS AND WHEN YOU ARE RELAX FOR SEVERAL MINUTES  · TAKE THE EVENING READINGS BEFORE SUPPER/DINNER AND BEFORE ANY MEDICATIONS AND AGAIN WHEN YOU ARE RELAX FOR SEVERAL MINUTES  · PLEASE INCLUDE HEART RATE WITH YOUR BLOOD PRESSURE READINGS  · When taking standing readings, keep your arm supported at heart level and not dangling  · Make sure you are sitting with your back supported and feet on the ground and do not cross your legs or feet  · Make sure you have not taken any coffee or caffeine products or exercised or smoke cigarettes at least 30 minutes before taking your blood pressure  Then please mail these readings into the office    4   Follow-up in 4  months   Please bring in 1 week a blood pressure readings morning evening, sitting and standing is outlined above   PLEASE BRING IN YOUR BLOOD PRESSURE MACHINE FOR CORRELATION WITH THE OFFICE MACHINE AT THE NEXT VISIT   Please go for fasting lab work 1-2 weeks prior to your appointment      5  General instructions:   AVOID SALT BUT NOT ADDING AN READING LABELS TO MAKE SURE THERE IS LOW-SALT IN THE FOOD THAT YOU ARE EATING     Avoid nonsteroidal anti-inflammatory drugs such as Naprosyn, ibuprofen, Aleve, Advil, Celebrex, Meloxicam (Mobic) etc   You can use Tylenol as needed if you do not have any liver condition to be concerned about     Avoid medications such as Sudafed or decongestants and antihistamines that contained the D component which is the decongestant  You can take antihistamines without the decongestant or D component   Try to avoid medications such as pantoprazole or  Protonix/Nexium or Esomeprazole)/Prilosec or omeprazole/Prevacid or lansoprazole/AcipHex or Rabeprazole  If you are able to, use Pepcid as this is safer for your kidneys   Try to exercise at least 30 minutes 3 days a week to begin with with an ultimate goal of 5 days a week for at least 30 minutes       Please do not drink more than 2 glasses of alcohol/wine on a daily basis as this may contribute to your high blood pressure  Heart Healthy Diet   WHAT YOU NEED TO KNOW:   A heart healthy diet is an eating plan low in total fat, unhealthy fats, and sodium (salt)  A heart healthy diet helps decrease your risk for heart disease and stroke  Limit the amount of fat you eat to 25% to 35% of your total daily calories  Limit sodium to less than 2,300 mg each day  DISCHARGE INSTRUCTIONS:   Healthy fats:  Healthy fats can help improve cholesterol levels  The risk for heart disease is decreased when cholesterol levels are normal  Choose healthy fats, such as the following:  · Unsaturated fat  is found in foods such as soybean, canola, olive, corn, and safflower oils   It is also found in soft tub margarine that is made with liquid vegetable oil  · Omega-3 fat  is found in certain fish, such as salmon, tuna, and trout, and in walnuts and flaxseed  Unhealthy fats:  Unhealthy fats can cause unhealthy cholesterol levels in your blood and increase your risk of heart disease  Limit unhealthy fats, such as the following:  · Cholesterol  is found in animal foods, such as eggs and lobster, and in dairy products made from whole milk  Limit cholesterol to less than 300 milligrams (mg) each day  You may need to limit cholesterol to 200 mg each day if you have heart disease  · Saturated fat  is found in meats, such as schuster and hamburger  It is also found in chicken or turkey skin, whole milk, and butter  Limit saturated fat to less than 7% of your total daily calories  Limit saturated fat to less than 6% if you have heart disease or are at increased risk for it  · Trans fat  is found in packaged foods, such as potato chips and cookies  It is also in hard margarine, some fried foods, and shortening  Avoid trans fats as much as possible    Heart healthy foods and drinks to include:  Ask your dietitian or healthcare provider how many servings to have from each of the following food groups:  · Grains:      ¨ Whole-wheat breads, cereals, and pastas, and brown rice    ¨ Low-fat, low-sodium crackers and chips    · Vegetables:      ¨ Broccoli, green beans, green peas, and spinach    ¨ Collards, kale, and lima beans    ¨ Carrots, sweet potatoes, tomatoes, and peppers    ¨ Canned vegetables with no salt added    · Fruits:      ¨ Bananas, peaches, pears, and pineapple    ¨ Grapes, raisins, and dates    ¨ Oranges, tangerines, grapefruit, orange juice, and grapefruit juice    ¨ Apricots, mangoes, melons, and papaya    ¨ Raspberries and strawberries    ¨ Canned fruit with no added sugar    · Low-fat dairy products:      ¨ Nonfat (skim) milk, 1% milk, and low-fat almond, cashew, or soy milks fortified with calcium    ¨ Low-fat cheese, regular or frozen yogurt, and cottage cheese    · Meats and proteins , such as lean cuts of beef and pork (loin, leg, round), skinless chicken and turkey, legumes, soy products, egg whites, and nuts  Foods and drinks to limit or avoid:  Ask your dietitian or healthcare provider about these and other foods that are high in unhealthy fat, sodium, and sugar:  · Snack or packaged foods , such as frozen dinners, cookies, macaroni and cheese, and cereals with more than 300 mg of sodium per serving    · Canned or dry mixes  for cakes, soups, sauces, or gravies    · Vegetables with added sodium , such as instant potatoes, vegetables with added sauces, or regular canned vegetables    · Other foods high in sodium , such as ketchup, barbecue sauce, salad dressing, pickles, olives, soy sauce, and miso    · High-fat dairy foods  such as whole or 2% milk, cream cheese, or sour cream, and cheeses     · High-fat protein foods  such as high-fat cuts of beef (T-bone steaks, ribs), chicken or turkey with skin, and organ meats, such as liver    · Cured or smoked meats , such as hot dogs, schuster, and sausage    · Unhealthy fats and oils , such as butter, stick margarine, shortening, and cooking oils such as coconut or palm oil    · Food and drinks high in sugar , such as soft drinks (soda), sports drinks, sweetened tea, candy, cake, cookies, pies, and doughnuts  Other diet guidelines to follow:   · Eat more foods containing omega-3 fats  Eat fish high in omega-3 fats at least 2 times a week  · Limit alcohol  Too much alcohol can damage your heart and raise your blood pressure  Women should limit alcohol to 1 drink a day  Men should limit alcohol to 2 drinks a day  A drink of alcohol is 12 ounces of beer, 5 ounces of wine, or 1½ ounces of liquor  · Choose low-sodium foods  High-sodium foods can lead to high blood pressure  Add little or no salt to food you prepare   Use herbs and spices in place of salt     · Eat more fiber  to help lower cholesterol levels  Eat at least 5 servings of fruits and vegetables each day  Eat 3 ounces of whole-grain foods each day  Legumes (beans) are also a good source of fiber  Lifestyle guidelines:   · Do not smoke  Nicotine and other chemicals in cigarettes and cigars can cause lung and heart damage  Ask your healthcare provider for information if you currently smoke and need help to quit  E-cigarettes or smokeless tobacco still contain nicotine  Talk to your healthcare provider before you use these products  · Exercise regularly  to help you maintain a healthy weight and improve your blood pressure and cholesterol levels  Ask your healthcare provider about the best exercise plan for you  Do not start an exercise program without asking your healthcare provider  Follow up with your healthcare provider as directed:  Write down your questions so you remember to ask them during your visits  © 2017 [facility] Information is for End User's use only and may not be sold, redistributed or otherwise used for commercial purposes  All illustrations and images included in CareNotes® are the copyrighted property of A D A M [employer] Inc  or Niko Cespedes  The above information is an  only  It is not intended as medical advice for individual conditions or treatments  Talk to your doctor, nurse or pharmacist before following any medical regimen to see if it is safe and effective for you  Subjective: There has been no hospitalizations or acute illnesses since last visit  The patient overall is feeling well  No fevers, chills, or cough or colds    Good appetite and good energy  No hematuria, dysuria, voiding symptoms or foamy urine, except for occasional bubbles in the urine  No gastrointestinal symptoms  No cardiovascular symptoms including swelling of the legs  No headaches, dizziness or lightheadedness  Blood pressure medications:  · Metoprolol tartrate 50 mg in the morning and 25 mg the evening  · Edarbi 40 mg twice a day  · Chlorthalidone 12 5 mg daily  · Amlodipine 5 mg daily in the morning    ROS:  See HPI, otherwise review of systems as completely reviewed with the patient are negative    Past Medical History:   Diagnosis Date    Anemia     Arthritis     Gee's esophagus     Breast lump     Cancer ([place] [id number] )     Chronic kidney disease     Cirrhosis ([place] [address] )     Coronary artery disease     cardiac cath; stents     Diabetes mellitus ([place] [address] )     H/O heart artery stent     Hypertension     Inguinal hernia     Right    Sarcoidosis     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[place] [address] )     Skin cancer     SOB (shortness of breath)      Past Surgical History:   Procedure Laterality Date    CARDIAC CATHETERIZATION      CERVICAL FUSION      CHOLECYSTECTOMY      COLONOSCOPY  04/11/2016    EGD AND COLONOSCOPY  06/24/2019    ERCP  09/11/2014    transab esop hiat griffin rpr    ESOPHAGOGASTRIC FUNDOPLASTY      HERNIA REPAIR Right     p I/griffin init reduc >5 yr    HIP SURGERY Right     Replacement    KIDNEY STONE SURGERY      Fragmenting    LIVER BIOPSY      MULTIPLE TOOTH EXTRACTIONS      "extraction of all maxillary teeth"    SKIN BIOPSY  05/2020    THUMB FUSION      with graft    TONSILLECTOMY       Family History   Problem Relation Age of Onset    Hypertension Mother     Atrial fibrillation Mother     Heart disease Mother     Kidney disease Father     COPD Father     Heart disease Father     Asthma Neg Hx     Lung cancer Neg Hx       reports that he has never smoked  He has never used smokeless tobacco  He reports that he does not drink alcohol or use drugs      I COMPLETELY REVIEWED THE PAST MEDICAL HISTORY/PAST SURGICAL HISTORY/SOCIAL HISTORY/FAMILY HISTORY/AND MEDICATIONS  AND UPDATED ALL    Objective:     Vitals:   BP sitting on right:  140/60 with a heart rate of 64 regular  BP standing on right:  130/70 with a heart rate of 64 regular  Vitals:    09/15/20 0924   Temp: (!) 97 2 °F (36 2 °C)       Weight (last 2 days)     Date/Time   Weight    09/15/20 0924   72 6 (160)            Wt Readings from Last 3 Encounters:   09/15/20 72 6 kg (160 lb)   08/13/20 72 9 kg (160 lb 12 8 oz)   07/29/20 73 1 kg (161 lb 4 oz)       Body mass index is 22 32 kg/m²      Physical Exam: General:  No acute distress  Skin:  No acute rash  Eyes:  No scleral icterus, noninjected, no discharge from eyes  ENT:  Moist mucous membranes  Neck:  Supple, no jugular venous distention, trachea is midline, no lymphadenopathy and no thyromegaly  Back   No CVAT  Chest:  Clear to auscultation and percussion, good respiratory effort  CVS:  Regular rate and rhythm without a rub, or gallops or murmurs  Abdomen:  Soft and nontender with normal bowel sounds  Extremities:  No cyanosis and no edema, no arthritic changes, normal range of motion  Neuro:  Grossly intact  Psych:  Alert, oriented x3 and appropriate      Medications:    Current Outpatient Medications:     allopurinol (ZYLOPRIM) 300 mg tablet, Take 0 5 tablets by mouth daily, Disp: , Rfl:     amLODIPine (NORVASC) 5 mg tablet, TAKE 1 TABLET (5 MG) BY MOUTH DAILY, Disp: 30 tablet, Rfl: 5    aspirin (ECOTRIN LOW STRENGTH) 81 mg EC tablet, Take 81 mg by mouth daily, Disp: , Rfl:     atorvastatin (LIPITOR) 20 mg tablet, Take 2 tablets (40 mg total) by mouth daily, Disp: 60 tablet, Rfl: 5    B Complex-C (SUPERPLEX-T) TABS, Take 1 tablet by mouth daily, Disp: , Rfl:     chlorthalidone 25 mg tablet, TAKE "ONE-HALF" TABLET (12 5 MG) BY MOUTH DAILY, Disp: 15 tablet, Rfl: 4    Cholecalciferol (VITAMIN D3) 2000 units TABS, Take 1 tablet by mouth daily, Disp: , Rfl:     co-enzyme Q-10 50 MG capsule, Take by mouth daily, Disp: , Rfl:     COLCRYS 0 6 MG tablet, Take 1 tablet (0 6 mg total) by mouth daily, Disp: 30 tablet, Rfl: 3    EDARBI 40 MG tablet, TAKE 1 TABLET TWICE A DAY, Disp: 60 tablet, Rfl: 5    esomeprazole (NexIUM) 40 MG capsule, Take 40 mg by mouth daily, Disp: , Rfl: 6    Ferrous Sulfate (IRON) 325 (65 Fe) MG TABS, Take 1 tablet by mouth daily, Disp: , Rfl:     fluticasone-vilanterol (Breo Ellipta) 100-25 mcg/inh inhaler, Inhale 1 puff daily, Disp: 1 Inhaler, Rfl: 3    INCRUSE ELLIPTA 62 5 MCG/INH AEPB, Take 1 puff by mouth daily, Disp: , Rfl: 5    magnesium chloride-calcium (SLOW-MAG) 71 5-119 mg, Take 2 tablets by mouth 2 (two) times a day, Disp: , Rfl:     metoprolol tartrate (LOPRESSOR) 50 mg tablet, Take 50 mg by mouth Take 50mg in the Am and 25 mg in the evening, Disp: , Rfl:     prasugrel (EFFIENT) tablet, Take 10 mg by mouth daily , Disp: , Rfl:     sitaGLIPtin (JANUVIA) 100 mg tablet, Take 1 tablet by mouth daily, Disp: , Rfl:     sulfaSALAzine (AZULFIDINE) 500 mg tablet, TAKE 1 AND 1/2 TABLETS BY MOUTH ONE TIME DAILY AS DIRECTED, Disp: , Rfl: 3    Lab, Imaging and other studies: I have personally reviewed pertinent labs    Laboratory Results:  Results for orders placed or performed in visit on 09/11/20   Comprehensive metabolic panel   Result Value Ref Range    Sodium 141 133 - 145 mmol/L    Potassium 4 4 3 5 - 5 0 mmol/L    Chloride 106 96 - 108 mmol/L    CO2 28 22 - 33 mmol/L    ANION GAP 7 4 - 13 mmol/L    BUN 27 (H) 6 - 20 mg/dL    Creatinine 1 35 (H) 0 50 - 1 20 mg/dL    Glucose, Fasting 126 (H) 70 - 100 mg/dL    Calcium 9 5 8 4 - 10 2 mg/dL    AST 24 15 - 41 U/L    ALT 18 5 - 63 U/L    Alkaline Phosphatase 63 8 10 - 129 U/L    Total Protein 6 6 6 4 - 8 3 g/dL    Albumin 4 2 3 4 - 4 8 g/dL    Total Bilirubin 0 58 0 30 - 1 20 mg/dL    eGFR 52 ml/min/1 73sq m   CK   Result Value Ref Range    Total CK 92 8 49 - 397 U/L   CBC   Result Value Ref Range    WBC 8 40 4 31 - 10 16 Thousand/uL    RBC 4 37 3 88 - 5 62 Million/uL    Hemoglobin 14 2 12 0 - 17 0 g/dL    Hematocrit 41 6 36 5 - 49 3 %    MCV 95 82 - 98 fL    MCH 32 5 26 8 - 34 3 pg    MCHC 34 1 31 4 - 37 4 g/dL    RDW 13 1 11 6 - 15 1 %    Platelets 113 149 - 390 Thousands/uL    MPV 9 2 8 9 - 12 7 fL   Lipid Panel with Direct LDL reflex   Result Value Ref Range    Cholesterol 155 <=200 mg/dL    Triglycerides 113 8 <=150 mg/dL    HDL, Direct 48 >=40 mg/dL    LDL Calculated 84 0 - 100 mg/dL   Magnesium   Result Value Ref Range    Magnesium 2 0 1 6 - 2 6 mg/dL   Phosphorus   Result Value Ref Range    Phosphorus 3 3 2 5 - 5 0 mg/dL   Vitamin D 25 hydroxy   Result Value Ref Range    Vit D, 25-Hydroxy 49 9 30 0 - 100 0 ng/mL   PTH, intact   Result Value Ref Range    PTH 37 2 18 4 - 80 1 pg/mL       Results from last 7 days   Lab Units 09/11/20  1025   WBC Thousand/uL 8 40   HEMOGLOBIN g/dL 14 2   HEMATOCRIT % 41 6   PLATELETS Thousands/uL 178   POTASSIUM mmol/L 4 4   CHLORIDE mmol/L 106   CO2 mmol/L 28   BUN mg/dL 27*   CREATININE mg/dL 1 35*   CALCIUM mg/dL 9 5   MAGNESIUM mg/dL 2 0   PHOSPHORUS mg/dL 3 3         Radiology review:   chest X-ray    Ultrasound      Portions of the record may have been created with voice recognition software  Occasional wrong word or "sound a like" substitutions may have occurred due to the inherent limitations of voice recognition software  Read the chart carefully and recognize, using context, where substitutions have occurred

## 2020-09-15 NOTE — LETTER
September 15, 2020     MARGE Hdez DO  [address]  [address] [place] [address]    Patient: Kalpesh Miller   YOB: 1945   Date of Visit: 9/15/2020       Dear Dr Chilo Sun: Thank you for referring Lauren Lorenz to me for evaluation  Below are my notes for this consultation  If you have questions, please do not hesitate to call me  I look forward to following your patient along with you  Sincerely,        Herminia Jc MD        CC: MD Gloria Rajan MD Birdie Sjogren, MD  9/15/2020  9:52 AM  Incomplete  RENAL FOLLOW UP NOTE: td    ASSESSMENT AND PLAN:   1   CKD stage 3  · Etiology:  Diabetic nephropathy/hypertensive nephrosclerosis/arteriolar nephrosclerosis/episodes of SHAY in the past   All serologies were negative including multiple myeloma evaluation  · Baseline creatinine:  1 2-1 62  · Current creatinine:  1 35 at baseline  · Urine protein creatinine ratio:  0 19 g at goal  Recommendations:  · Treat hypertension-please see below  · Treat dyslipidemia-please see below  · Maintain proteinuria less than 1 g or as low as possible  · Avoid nephrotoxic agents such as NSAIDs, patient counseled as such    2   Volume:  Euvolemic    3   Hypertension:       Current blood pressure averages:  None at this time    · Goal blood pressure:  Less than  125/80 given CAD    Recommendations:  ·  Push nonmedical regimen including weight loss, isotonic exercise and avoidance of salt; patient counseled as such  · Medication changes today:  I will have the patient send in 1 week a blood pressure readings  Currently his diastolics are low so therefore, would not push the issue of decreasing his systolic readings in order to avoid hypoperfusion      4   Electrolytes:  All acceptable including a potassium of 4 4     5   Mineral bone disorder:   · Calcium/magnesium/phosphorus:  All acceptable including calcium 9 5  · PTH intact:  37 2 which is normal  · Vitamin-D:Currently on supplements  49 9 at goal    6   Dyslipidemia:  · Goal LDL:  Less than 70 given CAD  · Current lipid profile:  LDL 84/HDL 48/triglycerides 113 8  Recommendations:  Above goal so adjust statin:  I would increase atorvastatin to 40 mg daily    7   Anemia:  normal hemoglobin at 14 2    8   Other problems:  · Diabetes mellitus per your discretion  · CAD followed by Dr Kyle Branham through [facility]   Status post MI last October involving to cardiac stents  :  Recent emesis of LAD lesion with repeat PTCA drug-eluting stent 2018  Circumflex artery and right coronary artery 50% stenosis   Ejection fraction 55%  · Sarcoidosis followed by Dr Street  · Kyphoscoliosis  · Cirrhosis of the liver  · Gout  · Ankylosing spondylitis    PATIENT INSTRUCTIONS:    Patient Instructions   1  Medication changes today:   Please increase atorvastatin/Lipitor to 40 mg a day  Watch for body aches or joint pains and call if you developed any  2  Please go for not fasting  lab work in [address] [place] [address] after making the above medication change  3  Please take 1 week a blood pressure readings AT THIS TIME     AS FOLLOWS  MORNING AND EVENING, SITTING AND STANDING as follows:  · TAKE THE MORNING READINGS BEFORE ANY MEDICATIONS AND WHEN YOU ARE RELAX FOR SEVERAL MINUTES  · TAKE THE EVENING READINGS BEFORE SUPPER/DINNER AND BEFORE ANY MEDICATIONS AND AGAIN WHEN YOU ARE RELAX FOR SEVERAL MINUTES  · PLEASE INCLUDE HEART RATE WITH YOUR BLOOD PRESSURE READINGS  · When taking standing readings, keep your arm supported at heart level and not dangling  · Make sure you are sitting with your back supported and feet on the ground and do not cross your legs or feet  · Make sure you have not taken any coffee or caffeine products or exercised or smoke cigarettes at least 30 minutes before taking your blood pressure  Then please mail these readings into the office    4   Follow-up in 4  months   Please bring in 1 week a blood pressure readings morning evening, sitting and standing is outlined above   PLEASE BRING IN YOUR BLOOD PRESSURE MACHINE FOR CORRELATION WITH THE OFFICE MACHINE AT THE NEXT VISIT   Please go for fasting lab work 1-2 weeks prior to your appointment      5  General instructions:   AVOID SALT BUT NOT ADDING AN READING LABELS TO MAKE SURE THERE IS LOW-SALT IN THE FOOD THAT YOU ARE EATING     Avoid nonsteroidal anti-inflammatory drugs such as Naprosyn, ibuprofen, Aleve, Advil, Celebrex, Meloxicam (Mobic) etc   You can use Tylenol as needed if you do not have any liver condition to be concerned about     Avoid medications such as Sudafed or decongestants and antihistamines that contained the D component which is the decongestant  You can take antihistamines without the decongestant or D component   Try to avoid medications such as pantoprazole or  Protonix/Nexium or Esomeprazole)/Prilosec or omeprazole/Prevacid or lansoprazole/AcipHex or Rabeprazole  If you are able to, use Pepcid as this is safer for your kidneys   Try to exercise at least 30 minutes 3 days a week to begin with with an ultimate goal of 5 days a week for at least 30 minutes       Please do not drink more than 2 glasses of alcohol/wine on a daily basis as this may contribute to your high blood pressure  Heart Healthy Diet   WHAT YOU NEED TO KNOW:   A heart healthy diet is an eating plan low in total fat, unhealthy fats, and sodium (salt)  A heart healthy diet helps decrease your risk for heart disease and stroke  Limit the amount of fat you eat to 25% to 35% of your total daily calories  Limit sodium to less than 2,300 mg each day  DISCHARGE INSTRUCTIONS:   Healthy fats:  Healthy fats can help improve cholesterol levels  The risk for heart disease is decreased when cholesterol levels are normal  Choose healthy fats, such as the following:  · Unsaturated fat  is found in foods such as soybean, canola, olive, corn, and safflower oils   It is also found in soft tub margarine that is made with liquid vegetable oil  · Omega-3 fat  is found in certain fish, such as salmon, tuna, and trout, and in walnuts and flaxseed  Unhealthy fats:  Unhealthy fats can cause unhealthy cholesterol levels in your blood and increase your risk of heart disease  Limit unhealthy fats, such as the following:  · Cholesterol  is found in animal foods, such as eggs and lobster, and in dairy products made from whole milk  Limit cholesterol to less than 300 milligrams (mg) each day  You may need to limit cholesterol to 200 mg each day if you have heart disease  · Saturated fat  is found in meats, such as schuster and hamburger  It is also found in chicken or turkey skin, whole milk, and butter  Limit saturated fat to less than 7% of your total daily calories  Limit saturated fat to less than 6% if you have heart disease or are at increased risk for it  · Trans fat  is found in packaged foods, such as potato chips and cookies  It is also in hard margarine, some fried foods, and shortening  Avoid trans fats as much as possible    Heart healthy foods and drinks to include:  Ask your dietitian or healthcare provider how many servings to have from each of the following food groups:  · Grains:      ¨ Whole-wheat breads, cereals, and pastas, and brown rice    ¨ Low-fat, low-sodium crackers and chips    · Vegetables:      ¨ Broccoli, green beans, green peas, and spinach    ¨ Collards, kale, and lima beans    ¨ Carrots, sweet potatoes, tomatoes, and peppers    ¨ Canned vegetables with no salt added    · Fruits:      ¨ Bananas, peaches, pears, and pineapple    ¨ Grapes, raisins, and dates    ¨ Oranges, tangerines, grapefruit, orange juice, and grapefruit juice    ¨ Apricots, mangoes, melons, and papaya    ¨ Raspberries and strawberries    ¨ Canned fruit with no added sugar    · Low-fat dairy products:      ¨ Nonfat (skim) milk, 1% milk, and low-fat almond, cashew, or soy milks fortified with calcium    ¨ Low-fat cheese, regular or frozen yogurt, and cottage cheese    · Meats and proteins , such as lean cuts of beef and pork (loin, leg, round), skinless chicken and turkey, legumes, soy products, egg whites, and nuts  Foods and drinks to limit or avoid:  Ask your dietitian or healthcare provider about these and other foods that are high in unhealthy fat, sodium, and sugar:  · Snack or packaged foods , such as frozen dinners, cookies, macaroni and cheese, and cereals with more than 300 mg of sodium per serving    · Canned or dry mixes  for cakes, soups, sauces, or gravies    · Vegetables with added sodium , such as instant potatoes, vegetables with added sauces, or regular canned vegetables    · Other foods high in sodium , such as ketchup, barbecue sauce, salad dressing, pickles, olives, soy sauce, and miso    · High-fat dairy foods  such as whole or 2% milk, cream cheese, or sour cream, and cheeses     · High-fat protein foods  such as high-fat cuts of beef (T-bone steaks, ribs), chicken or turkey with skin, and organ meats, such as liver    · Cured or smoked meats , such as hot dogs, schuster, and sausage    · Unhealthy fats and oils , such as butter, stick margarine, shortening, and cooking oils such as coconut or palm oil    · Food and drinks high in sugar , such as soft drinks (soda), sports drinks, sweetened tea, candy, cake, cookies, pies, and doughnuts  Other diet guidelines to follow:   · Eat more foods containing omega-3 fats  Eat fish high in omega-3 fats at least 2 times a week  · Limit alcohol  Too much alcohol can damage your heart and raise your blood pressure  Women should limit alcohol to 1 drink a day  Men should limit alcohol to 2 drinks a day  A drink of alcohol is 12 ounces of beer, 5 ounces of wine, or 1½ ounces of liquor  · Choose low-sodium foods  High-sodium foods can lead to high blood pressure  Add little or no salt to food you prepare   Use herbs and spices in place of salt     · Eat more fiber  to help lower cholesterol levels  Eat at least 5 servings of fruits and vegetables each day  Eat 3 ounces of whole-grain foods each day  Legumes (beans) are also a good source of fiber  Lifestyle guidelines:   · Do not smoke  Nicotine and other chemicals in cigarettes and cigars can cause lung and heart damage  Ask your healthcare provider for information if you currently smoke and need help to quit  E-cigarettes or smokeless tobacco still contain nicotine  Talk to your healthcare provider before you use these products  · Exercise regularly  to help you maintain a healthy weight and improve your blood pressure and cholesterol levels  Ask your healthcare provider about the best exercise plan for you  Do not start an exercise program without asking your healthcare provider  Follow up with your healthcare provider as directed:  Write down your questions so you remember to ask them during your visits  © 2017 [facility] Information is for End User's use only and may not be sold, redistributed or otherwise used for commercial purposes  All illustrations and images included in CareNotes® are the copyrighted property of A D A M [employer]  or Niko Cespedes  The above information is an  only  It is not intended as medical advice for individual conditions or treatments  Talk to your doctor, nurse or pharmacist before following any medical regimen to see if it is safe and effective for you  Subjective: There has been no hospitalizations or acute illnesses since last visit  The patient overall is feeling well  No fevers, chills, or cough or colds    Good appetite and good energy  No hematuria, dysuria, voiding symptoms or foamy urine, except for occasional bubbles in the urine  No gastrointestinal symptoms  No cardiovascular symptoms including swelling of the legs  No headaches, dizziness or lightheadedness  Blood pressure medications:  · Metoprolol tartrate 50 mg in the morning and 25 mg the evening  · Edarbi 40 mg twice a day  · Chlorthalidone 12 5 mg daily  · Amlodipine 5 mg daily in the morning    ROS:  See HPI, otherwise review of systems as completely reviewed with the patient are negative    Past Medical History:   Diagnosis Date    Anemia     Arthritis     Gee's esophagus     Breast lump     Cancer ([place] [address] )     Chronic kidney disease     Cirrhosis ([place] [address] )     Coronary artery disease     cardiac cath; stents     Diabetes mellitus ([place] [address] )     H/O heart artery stent     Hypertension     Inguinal hernia     Right    Sarcoidosis     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[place] [address] )     Skin cancer     SOB (shortness of breath)      Past Surgical History:   Procedure Laterality Date    CARDIAC CATHETERIZATION      CERVICAL FUSION      CHOLECYSTECTOMY      COLONOSCOPY  04/11/2016    EGD AND COLONOSCOPY  06/24/2019    ERCP  09/11/2014    transab esop hiat griffin rpr    ESOPHAGOGASTRIC FUNDOPLASTY      HERNIA REPAIR Right     p I/griffin init reduc >5 yr    HIP SURGERY Right     Replacement    KIDNEY STONE SURGERY      Fragmenting    LIVER BIOPSY      MULTIPLE TOOTH EXTRACTIONS      "extraction of all maxillary teeth"    SKIN BIOPSY  05/2020    THUMB FUSION      with graft    TONSILLECTOMY       Family History   Problem Relation Age of Onset    Hypertension Mother     Atrial fibrillation Mother     Heart disease Mother     Kidney disease Father     COPD Father     Heart disease Father     Asthma Neg Hx     Lung cancer Neg Hx       reports that he has never smoked  He has never used smokeless tobacco  He reports that he does not drink alcohol or use drugs      I COMPLETELY REVIEWED THE PAST MEDICAL HISTORY/PAST SURGICAL HISTORY/SOCIAL HISTORY/FAMILY HISTORY/AND MEDICATIONS  AND UPDATED ALL    Objective:     Vitals:   BP sitting on right:  140/60 with a heart rate of 64 regular  BP standing on right:  130/70 with a heart rate of 64 regular  Vitals:    09/15/20 0924   Temp: (!) 97 2 °F (36 2 °C)       Weight (last 2 days)     Date/Time   Weight    09/15/20 0924   72 6 (160)            Wt Readings from Last 3 Encounters:   09/15/20 72 6 kg (160 lb)   08/13/20 72 9 kg (160 lb 12 8 oz)   07/29/20 73 1 kg (161 lb 4 oz)       Body mass index is 22 32 kg/m²      Physical Exam: General:  No acute distress  Skin:  No acute rash  Eyes:  No scleral icterus, noninjected, no discharge from eyes  ENT:  Moist mucous membranes  Neck:  Supple, no jugular venous distention, trachea is midline, no lymphadenopathy and no thyromegaly  Back   No CVAT  Chest:  Clear to auscultation and percussion, good respiratory effort  CVS:  Regular rate and rhythm without a rub, or gallops or murmurs  Abdomen:  Soft and nontender with normal bowel sounds  Extremities:  No cyanosis and no edema, no arthritic changes, normal range of motion  Neuro:  Grossly intact  Psych:  Alert, oriented x3 and appropriate      Medications:    Current Outpatient Medications:     allopurinol (ZYLOPRIM) 300 mg tablet, Take 0 5 tablets by mouth daily, Disp: , Rfl:     amLODIPine (NORVASC) 5 mg tablet, TAKE 1 TABLET (5 MG) BY MOUTH DAILY, Disp: 30 tablet, Rfl: 5    aspirin (ECOTRIN LOW STRENGTH) 81 mg EC tablet, Take 81 mg by mouth daily, Disp: , Rfl:     atorvastatin (LIPITOR) 20 mg tablet, Take 2 tablets (40 mg total) by mouth daily, Disp: 60 tablet, Rfl: 5    B Complex-C (SUPERPLEX-T) TABS, Take 1 tablet by mouth daily, Disp: , Rfl:     chlorthalidone 25 mg tablet, TAKE "ONE-HALF" TABLET (12 5 MG) BY MOUTH DAILY, Disp: 15 tablet, Rfl: 4    Cholecalciferol (VITAMIN D3) 2000 units TABS, Take 1 tablet by mouth daily, Disp: , Rfl:     co-enzyme Q-10 50 MG capsule, Take by mouth daily, Disp: , Rfl:     COLCRYS 0 6 MG tablet, Take 1 tablet (0 6 mg total) by mouth daily, Disp: 30 tablet, Rfl: 3    EDARBI 40 MG tablet, TAKE 1 TABLET TWICE A DAY, Disp: 60 tablet, Rfl: 5    esomeprazole (NexIUM) 40 MG capsule, Take 40 mg by mouth daily, Disp: , Rfl: 6    Ferrous Sulfate (IRON) 325 (65 Fe) MG TABS, Take 1 tablet by mouth daily, Disp: , Rfl:     fluticasone-vilanterol (Breo Ellipta) 100-25 mcg/inh inhaler, Inhale 1 puff daily, Disp: 1 Inhaler, Rfl: 3    INCRUSE ELLIPTA 62 5 MCG/INH AEPB, Take 1 puff by mouth daily, Disp: , Rfl: 5    magnesium chloride-calcium (SLOW-MAG) 71 5-119 mg, Take 2 tablets by mouth 2 (two) times a day, Disp: , Rfl:     metoprolol tartrate (LOPRESSOR) 50 mg tablet, Take 50 mg by mouth Take 50mg in the Am and 25 mg in the evening, Disp: , Rfl:     prasugrel (EFFIENT) tablet, Take 10 mg by mouth daily , Disp: , Rfl:     sitaGLIPtin (JANUVIA) 100 mg tablet, Take 1 tablet by mouth daily, Disp: , Rfl:     sulfaSALAzine (AZULFIDINE) 500 mg tablet, TAKE 1 AND 1/2 TABLETS BY MOUTH ONE TIME DAILY AS DIRECTED, Disp: , Rfl: 3    Lab, Imaging and other studies: I have personally reviewed pertinent labs    Laboratory Results:  Results for orders placed or performed in visit on 09/11/20   Comprehensive metabolic panel   Result Value Ref Range    Sodium 141 133 - 145 mmol/L    Potassium 4 4 3 5 - 5 0 mmol/L    Chloride 106 96 - 108 mmol/L    CO2 28 22 - 33 mmol/L    ANION GAP 7 4 - 13 mmol/L    BUN 27 (H) 6 - 20 mg/dL    Creatinine 1 35 (H) 0 50 - 1 20 mg/dL    Glucose, Fasting 126 (H) 70 - 100 mg/dL    Calcium 9 5 8 4 - 10 2 mg/dL    AST 24 15 - 41 U/L    ALT 18 5 - 63 U/L    Alkaline Phosphatase 63 8 10 - 129 U/L    Total Protein 6 6 6 4 - 8 3 g/dL    Albumin 4 2 3 4 - 4 8 g/dL    Total Bilirubin 0 58 0 30 - 1 20 mg/dL    eGFR 52 ml/min/1 73sq m   CK   Result Value Ref Range    Total CK 92 8 49 - 397 U/L   CBC   Result Value Ref Range    WBC 8 40 4 31 - 10 16 Thousand/uL    RBC 4 37 3 88 - 5 62 Million/uL    Hemoglobin 14 2 12 0 - 17 0 g/dL    Hematocrit 41 6 36 5 - 49 3 %    MCV 95 82 - 98 fL    MCH 32 5 26 8 - 34 3 pg    MCHC 34 1 31 4 - 37 4 g/dL    RDW 13 1 11 6 - 15 1 %    Platelets 108 149 - 390 Thousands/uL    MPV 9 2 8 9 - 12 7 fL   Lipid Panel with Direct LDL reflex   Result Value Ref Range    Cholesterol 155 <=200 mg/dL    Triglycerides 113 8 <=150 mg/dL    HDL, Direct 48 >=40 mg/dL    LDL Calculated 84 0 - 100 mg/dL   Magnesium   Result Value Ref Range    Magnesium 2 0 1 6 - 2 6 mg/dL   Phosphorus   Result Value Ref Range    Phosphorus 3 3 2 5 - 5 0 mg/dL   Vitamin D 25 hydroxy   Result Value Ref Range    Vit D, 25-Hydroxy 49 9 30 0 - 100 0 ng/mL   PTH, intact   Result Value Ref Range    PTH 37 2 18 4 - 80 1 pg/mL       Results from last 7 days   Lab Units 09/11/20  1025   WBC Thousand/uL 8 40   HEMOGLOBIN g/dL 14 2   HEMATOCRIT % 41 6   PLATELETS Thousands/uL 178   POTASSIUM mmol/L 4 4   CHLORIDE mmol/L 106   CO2 mmol/L 28   BUN mg/dL 27*   CREATININE mg/dL 1 35*   CALCIUM mg/dL 9 5   MAGNESIUM mg/dL 2 0   PHOSPHORUS mg/dL 3 3         Radiology review:   chest X-ray    Ultrasound      Portions of the record may have been created with voice recognition software  Occasional wrong word or "sound a like" substitutions may have occurred due to the inherent limitations of voice recognition software  Read the chart carefully and recognize, using context, where substitutions have occurred  Lore Pérez MD  9/14/2020  7:39 AM  Sign when Signing Visit  RENAL FOLLOW UP NOTE: td    ASSESSMENT AND PLAN:   1   CKD JAFBM 1  · Etiology:  Diabetic nephropathy/hypertensive nephrosclerosis/arteriolar nephrosclerosis/episodes of SHAY in the past   All serologies were negative including multiple myeloma evaluation  · Baseline creatinine:  1 2-1 62  · Current creatinine:  1 35 at baseline  · Urine protein creatinine ratio:  0 19 g at goal  Recommendations:  · Treat hypertension-please see below  · Treat dyslipidemia-please see below  · Maintain proteinuria less than 1 g or as low as possible  · Avoid nephrotoxic agents such as NSAIDs, patient counseled as such    2   Volume:  Euvolemic    3   Hypertension:       Current blood pressure averages:  A m :  P m :    HR :    · Goal blood pressure:  Less than  125/80 given CAD    Recommendations:  ·  Push nonmedical regimen including weight loss, isotonic exercise and avoidance of salt; patient counseled as such  · Medication changes today:***    4   Electrolytes:  All acceptable including a potassium of 4 4     5   Mineral bone disorder:   · Calcium/magnesium/phosphorus:  All acceptable including calcium 9 5  · PTH intact:  37 2 which is normal  · Vitamin-D:Currently on supplements  49 9 at goal    6   Dyslipidemia:  · Goal LDL:  Less than 70 given CAD  · Current lipid profile:  LDL 84/HDL 48/triglycerides 113 8  Recommendations:  Above goal so adjust statin:***    7   Anemia:  normal hemoglobin at 14 2    8   Other problems:  · Diabetes mellitus per your discretion  · CAD followed by Dr Jose F Tran [facility]   Status post MI last October involving to cardiac stents  :  Recent emesis of LAD lesion with repeat PTCA drug-eluting stent 2018  Circumflex artery and right coronary artery 50% stenosis   Ejection fraction 55%  · Sarcoidosis followed by Dr Street  · Kyphoscoliosis  · Cirrhosis of the liver  · Gout  · Ankylosing spondylitis    PATIENT INSTRUCTIONS:    There are no Patient Instructions on file for this visit  Subjective: There has been no hospitalizations or acute illnesses since last visit  The patient overall is feeling well  No fevers, chills, or cough or colds    Good appetite and good energy  No hematuria, dysuria, voiding symptoms or foamy urine  No gastrointestinal symptoms  No cardiovascular symptoms including swelling of the legs  No headaches, dizziness or lightheadedness  Blood pressure medications:      ROS:  See HPI, otherwise review of systems as completely reviewed with the patient are negative    Past Medical History:   Diagnosis Date    Anemia     Arthritis     Gee's esophagus     Breast lump     [facility]     Chronic kidney disease     [facility]rhosis [facility]     Coronary artery disease     cardiac cath; stents     Diabetes mellitus [facility]     H/O heart artery stent     Hypertension     Inguinal hernia     Right    Sarcoidosis     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[facility])     Skin cancer     SOB (shortness of breath)      Past Surgical History:   Procedure Laterality Date    CARDIAC CATHETERIZATION      CERVICAL FUSION      CHOLECYSTECTOMY      COLONOSCOPY  04/11/2016    EGD AND COLONOSCOPY  06/24/2019    ERCP  09/11/2014    transab esop hiat griffin rpr    ESOPHAGOGASTRIC FUNDOPLASTY      HERNIA REPAIR Right     p I/griffin init reduc >5 yr    HIP SURGERY Right     Replacement    KIDNEY STONE SURGERY      Fragmenting    LIVER BIOPSY      MULTIPLE TOOTH EXTRACTIONS      "extraction of all maxillary teeth"    SKIN BIOPSY  05/2020    THUMB FUSION      with graft    TONSILLECTOMY       Family History   Problem Relation Age of Onset    Hypertension Mother     Atrial fibrillation Mother     Heart disease Mother     Kidney disease Father     COPD Father     Heart disease Father     Asthma Neg Hx     Lung cancer Neg Hx       reports that he has never smoked  He has never used smokeless tobacco  He reports that he does not drink alcohol or use drugs  I COMPLETELY REVIEWED THE PAST MEDICAL HISTORY/PAST SURGICAL HISTORY/SOCIAL HISTORY/FAMILY HISTORY/AND MEDICATIONS  AND UPDATED ALL    Objective:     Vitals: There were no vitals filed for this visit  Weight (last 2 days)     None        Wt Readings from Last 3 Encounters:   08/13/20 72 9 kg (160 lb 12 8 oz)   07/29/20 73 1 kg (161 lb 4 oz)   06/10/20 77 6 kg (171 lb)       There is no height or weight on file to calculate BMI      Physical Exam: General:  No acute distress  Skin:  No acute rash  Eyes:  No scleral icterus, noninjected, no discharge from eyes  ENT:  Moist mucous membranes  Neck:  Supple, no jugular venous distention, trachea is midline, no lymphadenopathy and no thyromegaly  Back   No CVAT  Chest:  Clear to auscultation and percussion, good respiratory effort  CVS:  Regular rate and rhythm without a rub, or gallops or murmurs  Abdomen:  Soft and nontender with normal bowel sounds  Extremities:  No cyanosis and no edema, no arthritic changes, normal range of motion  Neuro:  Grossly intact  Psych:  Alert, oriented x3 and appropriate      Medications:    Current Outpatient Medications:     allopurinol (ZYLOPRIM) 300 mg tablet, Take 0 5 tablets by mouth daily, Disp: , Rfl:     amLODIPine (NORVASC) 5 mg tablet, TAKE 1 TABLET (5 MG) BY MOUTH DAILY, Disp: 30 tablet, Rfl: 5    aspirin (ECOTRIN LOW STRENGTH) 81 mg EC tablet, Take 81 mg by mouth daily, Disp: , Rfl:     aspirin 325 mg tablet, Take 81 mg by mouth daily , Disp: , Rfl:     atorvastatin (LIPITOR) 20 mg tablet, ONE AND "ONE-HALF" TABLETS EVERY DAY (Patient taking differently: Take 20 mg by mouth daily ), Disp: 45 tablet, Rfl: 5    B Complex-C (SUPERPLEX-T) TABS, Take 1 tablet by mouth daily, Disp: , Rfl:     chlorthalidone 25 mg tablet, TAKE "ONE-HALF" TABLET (12 5 MG) BY MOUTH DAILY, Disp: 15 tablet, Rfl: 4    Cholecalciferol (VITAMIN D3) 2000 units TABS, Take 1 tablet by mouth daily, Disp: , Rfl:     co-enzyme Q-10 50 MG capsule, Take by mouth daily, Disp: , Rfl:     COLCRYS 0 6 MG tablet, Take 1 tablet (0 6 mg total) by mouth daily, Disp: 30 tablet, Rfl: 3    EDARBI 40 MG tablet, TAKE 1 TABLET TWICE A DAY, Disp: 60 tablet, Rfl: 5    esomeprazole (NexIUM) 40 MG capsule, Take 40 mg by mouth daily, Disp: , Rfl: 6    Ferrous Sulfate (IRON) 325 (65 Fe) MG TABS, Take 1 tablet by mouth daily, Disp: , Rfl:     fluticasone-vilanterol (Breo Ellipta) 100-25 mcg/inh inhaler, Inhale 1 puff daily, Disp: 1 Inhaler, Rfl: 3    INCRUSE ELLIPTA 62 5 MCG/INH AEPB, Take 1 puff by mouth daily, Disp: , Rfl: 5    magnesium chloride-calcium (SLOW-MAG) 71 5-119 mg, Take 2 tablets by mouth 2 (two) times a day, Disp: , Rfl:     metoprolol tartrate (LOPRESSOR) 50 mg tablet, Take 50 mg by mouth Take 50mg in the Am and 25 mg in the evening, Disp: , Rfl:     prasugrel (EFFIENT) tablet, Take 10 mg by mouth daily , Disp: , Rfl:     sitaGLIPtin (JANUVIA) 100 mg tablet, Take 1 tablet by mouth daily, Disp: , Rfl:     sulfaSALAzine (AZULFIDINE) 500 mg tablet, TAKE 1 AND 1/2 TABLETS BY MOUTH ONE TIME DAILY AS DIRECTED, Disp: , Rfl: 3    Lab, Imaging and other studies: I have personally reviewed pertinent labs  Laboratory Results:  Results for orders placed or performed in visit on 04/16/20   Comprehensive metabolic panel   Result Value Ref Range    SODIUM 139     POTASSIUM 4 1     CHLORIDE 101     CO2 30     BUN 25     CREATININE 1 43     Glucose 115     EXTERNAL CALCIUM 9 4     TOTAL PROTEIN 6 8     ALBUMIN 4 0     AST 30     ALT 24     ALK PHOS 72     EXTERNAL TOT  BILIRUBIN 0 5     EXTERNAL EGFR 49     Phosphorus 3 6 2 3 - 4 1 mg/dL    Magnesium 1 8 1 6 - 2 6 mg/dL    Total  39 - 308 U/L    Triglycerides 98 150 mg/dL    Cholesterol 136 50 - 200 mg/dL    LDL Calculated 70 0 - 100 mg/dL    HDL, Direct 46 40 mg/dL    Chol HDLC Ratio 2 95     PTH, Intact 44     Prot/Creat Ratio, Ur 0 07 0 00 - 0 10    WBC 8 50 Thousand/uL    Hemoglobin 13 8 12 0 - 17 0 g/dL    Hematocrit 40 8 36 5 - 49 3 %    Platelets 897 927 - 552 Thousands/uL             Invalid input(s): ALBUMIN      Radiology review:   chest X-ray    Ultrasound      Portions of the record may have been created with voice recognition software  Occasional wrong word or "sound a like" substitutions may have occurred due to the inherent limitations of voice recognition software  Read the chart carefully and recognize, using context, where substitutions have occurred

## 2020-09-15 NOTE — PATIENT INSTRUCTIONS
1  Medication changes today:   Please increase atorvastatin/Lipitor to 40 mg a day  Watch for body aches or joint pains and call if you developed any  2  Please go for not fasting  lab work in [address] [place] [address] after making the above medication change  3  Please take 1 week a blood pressure readings AT THIS TIME     AS FOLLOWS  MORNING AND EVENING, SITTING AND STANDING as follows:  · TAKE THE MORNING READINGS BEFORE ANY MEDICATIONS AND WHEN YOU ARE RELAX FOR SEVERAL MINUTES  · TAKE THE EVENING READINGS BEFORE SUPPER/DINNER AND BEFORE ANY MEDICATIONS AND AGAIN WHEN YOU ARE RELAX FOR SEVERAL MINUTES  · PLEASE INCLUDE HEART RATE WITH YOUR BLOOD PRESSURE READINGS  · When taking standing readings, keep your arm supported at heart level and not dangling  · Make sure you are sitting with your back supported and feet on the ground and do not cross your legs or feet  · Make sure you have not taken any coffee or caffeine products or exercised or smoke cigarettes at least 30 minutes before taking your blood pressure  Then please mail these readings into the office    4  Follow-up in 4  months   Please bring in 1 week a blood pressure readings morning evening, sitting and standing is outlined above   PLEASE BRING IN YOUR BLOOD PRESSURE MACHINE FOR CORRELATION WITH THE OFFICE MACHINE AT THE NEXT VISIT   Please go for fasting lab work 1-2 weeks prior to your appointment      5  General instructions:   AVOID SALT BUT NOT ADDING AN READING LABELS TO MAKE SURE THERE IS LOW-SALT IN THE FOOD THAT YOU ARE EATING     Avoid nonsteroidal anti-inflammatory drugs such as Naprosyn, ibuprofen, Aleve, Advil, Celebrex, Meloxicam (Mobic) etc   You can use Tylenol as needed if you do not have any liver condition to be concerned about     Avoid medications such as Sudafed or decongestants and antihistamines that contained the D component which is the decongestant  You can take antihistamines without the decongestant or D component       Try to avoid medications such as pantoprazole or  Protonix/Nexium or Esomeprazole)/Prilosec or omeprazole/Prevacid or lansoprazole/AcipHex or Rabeprazole  If you are able to, use Pepcid as this is safer for your kidneys   Try to exercise at least 30 minutes 3 days a week to begin with with an ultimate goal of 5 days a week for at least 30 minutes       Please do not drink more than 2 glasses of alcohol/wine on a daily basis as this may contribute to your high blood pressure  Heart Healthy Diet   WHAT YOU NEED TO KNOW:   A heart healthy diet is an eating plan low in total fat, unhealthy fats, and sodium (salt)  A heart healthy diet helps decrease your risk for heart disease and stroke  Limit the amount of fat you eat to 25% to 35% of your total daily calories  Limit sodium to less than 2,300 mg each day  DISCHARGE INSTRUCTIONS:   Healthy fats:  Healthy fats can help improve cholesterol levels  The risk for heart disease is decreased when cholesterol levels are normal  Choose healthy fats, such as the following:  · Unsaturated fat  is found in foods such as soybean, canola, olive, corn, and safflower oils  It is also found in soft tub margarine that is made with liquid vegetable oil  · Omega-3 fat  is found in certain fish, such as salmon, tuna, and trout, and in walnuts and flaxseed  Unhealthy fats:  Unhealthy fats can cause unhealthy cholesterol levels in your blood and increase your risk of heart disease  Limit unhealthy fats, such as the following:  · Cholesterol  is found in animal foods, such as eggs and lobster, and in dairy products made from whole milk  Limit cholesterol to less than 300 milligrams (mg) each day  You may need to limit cholesterol to 200 mg each day if you have heart disease  · Saturated fat  is found in meats, such as schuster and hamburger  It is also found in chicken or turkey skin, whole milk, and butter  Limit saturated fat to less than 7% of your total daily calories  Limit saturated fat to less than 6% if you have heart disease or are at increased risk for it  · Trans fat  is found in packaged foods, such as potato chips and cookies  It is also in hard margarine, some fried foods, and shortening  Avoid trans fats as much as possible    Heart healthy foods and drinks to include:  Ask your dietitian or healthcare provider how many servings to have from each of the following food groups:  · Grains:      ¨ Whole-wheat breads, cereals, and pastas, and brown rice    ¨ Low-fat, low-sodium crackers and chips    · Vegetables:      ¨ Broccoli, green beans, green peas, and spinach    ¨ Collards, kale, and lima beans    ¨ Carrots, sweet potatoes, tomatoes, and peppers    ¨ Canned vegetables with no salt added    · Fruits:      ¨ Bananas, peaches, pears, and pineapple    ¨ Grapes, raisins, and dates    ¨ Oranges, tangerines, grapefruit, orange juice, and grapefruit juice    ¨ Apricots, mangoes, melons, and papaya    ¨ Raspberries and strawberries    ¨ Canned fruit with no added sugar    · Low-fat dairy products:      ¨ Nonfat (skim) milk, 1% milk, and low-fat almond, cashew, or soy milks fortified with calcium    ¨ Low-fat cheese, regular or frozen yogurt, and cottage cheese    · Meats and proteins , such as lean cuts of beef and pork (loin, leg, round), skinless chicken and turkey, legumes, soy products, egg whites, and nuts  Foods and drinks to limit or avoid:  Ask your dietitian or healthcare provider about these and other foods that are high in unhealthy fat, sodium, and sugar:  · Snack or packaged foods , such as frozen dinners, cookies, macaroni and cheese, and cereals with more than 300 mg of sodium per serving    · Canned or dry mixes  for cakes, soups, sauces, or gravies    · Vegetables with added sodium , such as instant potatoes, vegetables with added sauces, or regular canned vegetables    · Other foods high in sodium , such as ketchup, barbecue sauce, salad dressing, pickles, olives, soy sauce, and miso    · High-fat dairy foods  such as whole or 2% milk, cream cheese, or sour cream, and cheeses     · High-fat protein foods  such as high-fat cuts of beef (T-bone steaks, ribs), chicken or turkey with skin, and organ meats, such as liver    · Cured or smoked meats , such as hot dogs, schuster, and sausage    · Unhealthy fats and oils , such as butter, stick margarine, shortening, and cooking oils such as coconut or palm oil    · Food and drinks high in sugar , such as soft drinks (soda), sports drinks, sweetened tea, candy, cake, cookies, pies, and doughnuts  Other diet guidelines to follow:   · Eat more foods containing omega-3 fats  Eat fish high in omega-3 fats at least 2 times a week  · Limit alcohol  Too much alcohol can damage your heart and raise your blood pressure  Women should limit alcohol to 1 drink a day  Men should limit alcohol to 2 drinks a day  A drink of alcohol is 12 ounces of beer, 5 ounces of wine, or 1½ ounces of liquor  · Choose low-sodium foods  High-sodium foods can lead to high blood pressure  Add little or no salt to food you prepare  Use herbs and spices in place of salt  · Eat more fiber  to help lower cholesterol levels  Eat at least 5 servings of fruits and vegetables each day  Eat 3 ounces of whole-grain foods each day  Legumes (beans) are also a good source of fiber  Lifestyle guidelines:   · Do not smoke  Nicotine and other chemicals in cigarettes and cigars can cause lung and heart damage  Ask your healthcare provider for information if you currently smoke and need help to quit  E-cigarettes or smokeless tobacco still contain nicotine  Talk to your healthcare provider before you use these products  · Exercise regularly  to help you maintain a healthy weight and improve your blood pressure and cholesterol levels  Ask your healthcare provider about the best exercise plan for you   Do not start an exercise program without asking your healthcare provider  Follow up with your healthcare provider as directed:  Write down your questions so you remember to ask them during your visits  © 2017 [employer] Information is for End User's use only and may not be sold, redistributed or otherwise used for commercial purposes  All illustrations and images included in CareNotes® are the copyrighted property of [employer]  or [employer]ped[employer]  The above information is an  only  It is not intended as medical advice for individual conditions or treatments  Talk to your doctor, nurse or pharmacist before following any medical regimen to see if it is safe and effective for you

## 2020-10-13 DIAGNOSIS — J43.9 PULMONARY EMPHYSEMA, UNSPECIFIED EMPHYSEMA TYPE (HCC): ICD-10-CM

## 2020-10-13 DIAGNOSIS — M1A.9XX0 CHRONIC GOUT WITHOUT TOPHUS, UNSPECIFIED CAUSE, UNSPECIFIED SITE: ICD-10-CM

## 2020-10-13 RX ORDER — FLUTICASONE FUROATE AND VILANTEROL 100; 25 UG/1; UG/1
1 POWDER RESPIRATORY (INHALATION) DAILY
COMMUNITY
End: 2020-10-13 | Stop reason: SDUPTHER

## 2020-10-13 RX ORDER — COLCHICINE 0.6 MG/1
0.6 TABLET, FILM COATED ORAL DAILY
Qty: 30 TABLET | Refills: 3 | Status: SHIPPED | OUTPATIENT
Start: 2020-10-13 | End: 2021-01-27 | Stop reason: SDUPTHER

## 2020-10-13 RX ORDER — FLUTICASONE FUROATE AND VILANTEROL TRIFENATATE 100; 25 UG/1; UG/1
1 POWDER RESPIRATORY (INHALATION) DAILY
Qty: 1 INHALER | Refills: 3 | Status: SHIPPED | OUTPATIENT
Start: 2020-10-13 | End: 2021-02-08

## 2020-10-22 DIAGNOSIS — I12.9 PARENCHYMAL RENAL HYPERTENSION, STAGE 1 THROUGH STAGE 4 OR UNSPECIFIED CHRONIC KIDNEY DISEASE: ICD-10-CM

## 2020-10-22 RX ORDER — AZILSARTAN KAMEDOXOMIL 40 MG/1
TABLET ORAL
Qty: 60 TABLET | Refills: 5 | Status: SHIPPED | OUTPATIENT
Start: 2020-10-22 | End: 2021-04-22

## 2020-10-23 ENCOUNTER — LAB (OUTPATIENT)
Dept: LAB | Facility: HOSPITAL | Age: 75
End: 2020-10-23
Attending: INTERNAL MEDICINE
Payer: MEDICARE

## 2020-10-23 DIAGNOSIS — E87.5 HYPERKALEMIA: ICD-10-CM

## 2020-10-23 DIAGNOSIS — E78.5 DYSLIPIDEMIA: ICD-10-CM

## 2020-10-23 DIAGNOSIS — R80.1 PERSISTENT PROTEINURIA: ICD-10-CM

## 2020-10-23 DIAGNOSIS — I12.9 HYPERTENSIVE CHRONIC KIDNEY DISEASE WITH STAGE 1 THROUGH STAGE 4 CHRONIC KIDNEY DISEASE, OR UNSPECIFIED CHRONIC KIDNEY DISEASE: ICD-10-CM

## 2020-10-23 DIAGNOSIS — N18.30 CHRONIC KIDNEY DISEASE, STAGE III (MODERATE) (HCC): ICD-10-CM

## 2020-10-23 LAB
ALBUMIN SERPL BCP-MCNC: 4.1 G/DL (ref 3.4–4.8)
ALP SERPL-CCNC: 64.2 U/L (ref 10–129)
ALT SERPL W P-5'-P-CCNC: 19 U/L (ref 5–63)
ANION GAP SERPL CALCULATED.3IONS-SCNC: 8 MMOL/L (ref 4–13)
AST SERPL W P-5'-P-CCNC: 28 U/L (ref 15–41)
BILIRUB SERPL-MCNC: 0.38 MG/DL (ref 0.3–1.2)
BUN SERPL-MCNC: 24 MG/DL (ref 6–20)
CALCIUM SERPL-MCNC: 9.6 MG/DL (ref 8.4–10.2)
CHLORIDE SERPL-SCNC: 105 MMOL/L (ref 96–108)
CK SERPL-CCNC: 144.8 U/L (ref 49–397)
CO2 SERPL-SCNC: 30 MMOL/L (ref 22–33)
CREAT SERPL-MCNC: 1.26 MG/DL (ref 0.5–1.2)
GFR SERPL CREATININE-BSD FRML MDRD: 57 ML/MIN/1.73SQ M
GLUCOSE P FAST SERPL-MCNC: 110 MG/DL (ref 70–100)
POTASSIUM SERPL-SCNC: 4.7 MMOL/L (ref 3.5–5)
PROT SERPL-MCNC: 6.5 G/DL (ref 6.4–8.3)
SODIUM SERPL-SCNC: 143 MMOL/L (ref 133–145)

## 2020-10-23 PROCEDURE — 82550 ASSAY OF CK (CPK): CPT

## 2020-10-23 PROCEDURE — 36415 COLL VENOUS BLD VENIPUNCTURE: CPT

## 2020-10-23 PROCEDURE — 80053 COMPREHEN METABOLIC PANEL: CPT

## 2020-10-27 ENCOUNTER — DOCUMENTATION (OUTPATIENT)
Dept: NEPHROLOGY | Facility: CLINIC | Age: 75
End: 2020-10-27

## 2020-11-11 DIAGNOSIS — K21.9 GASTROESOPHAGEAL REFLUX DISEASE, UNSPECIFIED WHETHER ESOPHAGITIS PRESENT: Primary | ICD-10-CM

## 2020-11-11 RX ORDER — ESOMEPRAZOLE MAGNESIUM 40 MG/1
CAPSULE, DELAYED RELEASE ORAL
Qty: 90 CAPSULE | Refills: 3 | Status: SHIPPED | OUTPATIENT
Start: 2020-11-11 | End: 2021-03-09 | Stop reason: CLARIF

## 2020-11-12 ENCOUNTER — OFFICE VISIT (OUTPATIENT)
Dept: FAMILY MEDICINE CLINIC | Facility: CLINIC | Age: 75
End: 2020-11-12
Payer: MEDICARE

## 2020-11-12 VITALS
BODY MASS INDEX: 23.24 KG/M2 | HEART RATE: 55 BPM | SYSTOLIC BLOOD PRESSURE: 140 MMHG | TEMPERATURE: 96.8 F | WEIGHT: 166 LBS | OXYGEN SATURATION: 98 % | DIASTOLIC BLOOD PRESSURE: 80 MMHG | HEIGHT: 71 IN | RESPIRATION RATE: 18 BRPM

## 2020-11-12 DIAGNOSIS — J44.9 CHRONIC OBSTRUCTIVE PULMONARY DISEASE, UNSPECIFIED COPD TYPE (HCC): ICD-10-CM

## 2020-11-12 DIAGNOSIS — N18.31 STAGE 3A CHRONIC KIDNEY DISEASE (HCC): ICD-10-CM

## 2020-11-12 DIAGNOSIS — K21.9 GASTROESOPHAGEAL REFLUX DISEASE, UNSPECIFIED WHETHER ESOPHAGITIS PRESENT: Primary | ICD-10-CM

## 2020-11-12 DIAGNOSIS — J43.9 PULMONARY EMPHYSEMA, UNSPECIFIED EMPHYSEMA TYPE (HCC): ICD-10-CM

## 2020-11-12 DIAGNOSIS — I12.9 HYPERTENSIVE CHRONIC KIDNEY DISEASE WITH STAGE 1 THROUGH STAGE 4 CHRONIC KIDNEY DISEASE, OR UNSPECIFIED CHRONIC KIDNEY DISEASE: ICD-10-CM

## 2020-11-12 PROBLEM — M47.895 OTHER SPONDYLOSIS, THORACOLUMBAR REGION: Status: ACTIVE | Noted: 2020-11-12

## 2020-11-12 PROBLEM — E11.9 TYPE 2 DIABETES MELLITUS WITHOUT COMPLICATION, WITHOUT LONG-TERM CURRENT USE OF INSULIN (HCC): Status: ACTIVE | Noted: 2020-11-12

## 2020-11-12 PROCEDURE — 99215 OFFICE O/P EST HI 40 MIN: CPT | Performed by: FAMILY MEDICINE

## 2020-11-12 RX ORDER — A/SINGAPORE/GP1908/2015 IVR-180 (AN A/MICHIGAN/45/2015 (H1N1)PDM09-LIKE VIRUS, A/HONG KONG/4801/2014, NYMC X-263B (H3N2) (AN A/HONG KONG/4801/2014-LIKE VIRUS), AND B/BRISBANE/60/2008, WILD TYPE (A B/BRISBANE/60/2008-LIKE VIRUS) 15; 15; 15 UG/.5ML; UG/.5ML; UG/.5ML
1 INJECTION, SUSPENSION INTRAMUSCULAR
COMMUNITY
Start: 2020-10-12 | End: 2021-01-28 | Stop reason: ALTCHOICE

## 2020-11-16 ENCOUNTER — TELEPHONE (OUTPATIENT)
Dept: ADMINISTRATIVE | Facility: OTHER | Age: 75
End: 2020-11-16

## 2020-11-24 LAB
LEFT EYE DIABETIC RETINOPATHY: NORMAL
RIGHT EYE DIABETIC RETINOPATHY: NORMAL

## 2020-12-21 DIAGNOSIS — I12.9 HYPERTENSIVE CHRONIC KIDNEY DISEASE WITH STAGE 1 THROUGH STAGE 4 CHRONIC KIDNEY DISEASE, OR UNSPECIFIED CHRONIC KIDNEY DISEASE: ICD-10-CM

## 2020-12-21 DIAGNOSIS — R80.1 PERSISTENT PROTEINURIA: ICD-10-CM

## 2020-12-21 DIAGNOSIS — N18.30 CHRONIC KIDNEY DISEASE, STAGE III (MODERATE) (HCC): ICD-10-CM

## 2020-12-21 DIAGNOSIS — E78.5 DYSLIPIDEMIA: ICD-10-CM

## 2020-12-21 RX ORDER — CHLORTHALIDONE 25 MG/1
TABLET ORAL
Qty: 15 TABLET | Refills: 4 | Status: SHIPPED | OUTPATIENT
Start: 2020-12-21 | End: 2021-05-17

## 2020-12-21 RX ORDER — AMLODIPINE BESYLATE 5 MG/1
TABLET ORAL
Qty: 30 TABLET | Refills: 5 | Status: SHIPPED | OUTPATIENT
Start: 2020-12-21 | End: 2021-06-18

## 2020-12-30 DIAGNOSIS — M47.895 OTHER SPONDYLOSIS, THORACOLUMBAR REGION: Primary | ICD-10-CM

## 2020-12-30 RX ORDER — SULFASALAZINE 500 MG/1
TABLET ORAL
Qty: 135 TABLET | Refills: 3 | Status: SHIPPED | OUTPATIENT
Start: 2020-12-30 | End: 2021-11-22 | Stop reason: SDUPTHER

## 2021-01-13 ENCOUNTER — TRANSCRIBE ORDERS (OUTPATIENT)
Dept: LAB | Facility: CLINIC | Age: 76
End: 2021-01-13

## 2021-01-13 ENCOUNTER — TRANSCRIBE ORDERS (OUTPATIENT)
Dept: LAB | Facility: HOSPITAL | Age: 76
End: 2021-01-13

## 2021-01-13 ENCOUNTER — LAB (OUTPATIENT)
Dept: LAB | Facility: HOSPITAL | Age: 76
End: 2021-01-13
Attending: INTERNAL MEDICINE
Payer: MEDICARE

## 2021-01-13 ENCOUNTER — TRANSCRIBE ORDERS (OUTPATIENT)
Dept: ADMINISTRATIVE | Facility: HOSPITAL | Age: 76
End: 2021-01-13

## 2021-01-13 DIAGNOSIS — I25.119 ATHEROSCLEROSIS OF NATIVE CORONARY ARTERY WITH ANGINA PECTORIS, UNSPECIFIED WHETHER NATIVE OR TRANSPLANTED HEART (HCC): ICD-10-CM

## 2021-01-13 DIAGNOSIS — I25.2 OLD MYOCARDIAL INFARCTION: ICD-10-CM

## 2021-01-13 DIAGNOSIS — R80.1 PERSISTENT PROTEINURIA: ICD-10-CM

## 2021-01-13 DIAGNOSIS — Z51.81 ENCOUNTER FOR THERAPEUTIC DRUG MONITORING: ICD-10-CM

## 2021-01-13 DIAGNOSIS — Z79.02 ENCOUNTER FOR LONG-TERM (CURRENT) USE OF ANTIPLATELETS/ANTITHROMBOTICS: ICD-10-CM

## 2021-01-13 DIAGNOSIS — Z95.5 STENTED CORONARY ARTERY: ICD-10-CM

## 2021-01-13 DIAGNOSIS — N18.30 CHRONIC KIDNEY DISEASE, STAGE III (MODERATE) (HCC): ICD-10-CM

## 2021-01-13 DIAGNOSIS — R23.8 VESICULAR ERUPTION OF SKIN: ICD-10-CM

## 2021-01-13 DIAGNOSIS — E78.5 HYPERLIPIDEMIA, UNSPECIFIED HYPERLIPIDEMIA TYPE: Primary | ICD-10-CM

## 2021-01-13 DIAGNOSIS — E78.9 LIPID DISORDER: Primary | ICD-10-CM

## 2021-01-13 DIAGNOSIS — I12.9 HYPERTENSIVE CHRONIC KIDNEY DISEASE WITH STAGE 1 THROUGH STAGE 4 CHRONIC KIDNEY DISEASE, OR UNSPECIFIED CHRONIC KIDNEY DISEASE: ICD-10-CM

## 2021-01-13 DIAGNOSIS — E87.5 HYPERKALEMIA: ICD-10-CM

## 2021-01-13 DIAGNOSIS — E78.5 HYPERLIPIDEMIA, UNSPECIFIED HYPERLIPIDEMIA TYPE: ICD-10-CM

## 2021-01-13 DIAGNOSIS — E78.5 DYSLIPIDEMIA: ICD-10-CM

## 2021-01-13 DIAGNOSIS — Z95.5 STENTED CORONARY ARTERY: Primary | ICD-10-CM

## 2021-01-13 LAB
ALBUMIN SERPL BCP-MCNC: 4 G/DL (ref 3.4–4.8)
ALP SERPL-CCNC: 69.3 U/L (ref 10–129)
ALT SERPL W P-5'-P-CCNC: 21 U/L (ref 5–63)
ANION GAP SERPL CALCULATED.3IONS-SCNC: 6 MMOL/L (ref 4–13)
AST SERPL W P-5'-P-CCNC: 27 U/L (ref 15–41)
BILIRUB SERPL-MCNC: 0.59 MG/DL (ref 0.3–1.2)
BUN SERPL-MCNC: 25 MG/DL (ref 6–20)
CALCIUM SERPL-MCNC: 9.5 MG/DL (ref 8.4–10.2)
CHLORIDE SERPL-SCNC: 104 MMOL/L (ref 96–108)
CHOLEST SERPL-MCNC: 148 MG/DL
CK SERPL-CCNC: 140.8 U/L (ref 49–397)
CO2 SERPL-SCNC: 30 MMOL/L (ref 22–33)
CREAT SERPL-MCNC: 1.36 MG/DL (ref 0.5–1.2)
CREAT UR-MCNC: 101 MG/DL
ERYTHROCYTE [DISTWIDTH] IN BLOOD BY AUTOMATED COUNT: 13.3 % (ref 11.6–15.1)
GFR SERPL CREATININE-BSD FRML MDRD: 51 ML/MIN/1.73SQ M
GLUCOSE P FAST SERPL-MCNC: 128 MG/DL (ref 70–105)
HCT VFR BLD AUTO: 41.4 % (ref 36.5–49.3)
HDLC SERPL-MCNC: 48 MG/DL
HGB BLD-MCNC: 13.8 G/DL (ref 12–17)
LDLC SERPL CALC-MCNC: 79 MG/DL (ref 0–100)
MAGNESIUM SERPL-MCNC: 1.9 MG/DL (ref 1.6–2.6)
MCH RBC QN AUTO: 31.6 PG (ref 26.8–34.3)
MCHC RBC AUTO-ENTMCNC: 33.3 G/DL (ref 31.4–37.4)
MCV RBC AUTO: 95 FL (ref 82–98)
PA ADP BLD-ACNC: 75 PRU (ref 194–418)
PHOSPHATE SERPL-MCNC: 3.1 MG/DL (ref 2.5–5)
PLATELET # BLD AUTO: 176 THOUSANDS/UL (ref 149–390)
PMV BLD AUTO: 9.3 FL (ref 8.9–12.7)
POTASSIUM SERPL-SCNC: 4.8 MMOL/L (ref 3.5–5)
PROT SERPL-MCNC: 6.4 G/DL (ref 6.4–8.3)
PROT UR-MCNC: 88 MG/DL
PROT/CREAT UR: 0.87 MG/G{CREAT} (ref 0–0.1)
RBC # BLD AUTO: 4.37 MILLION/UL (ref 3.88–5.62)
SODIUM SERPL-SCNC: 140 MMOL/L (ref 133–145)
TRIGL SERPL-MCNC: 105.9 MG/DL
WBC # BLD AUTO: 7.08 THOUSAND/UL (ref 4.31–10.16)

## 2021-01-13 PROCEDURE — 82550 ASSAY OF CK (CPK): CPT

## 2021-01-13 PROCEDURE — 83735 ASSAY OF MAGNESIUM: CPT

## 2021-01-13 PROCEDURE — 85576 BLOOD PLATELET AGGREGATION: CPT

## 2021-01-13 PROCEDURE — 85027 COMPLETE CBC AUTOMATED: CPT

## 2021-01-13 PROCEDURE — 84100 ASSAY OF PHOSPHORUS: CPT

## 2021-01-13 PROCEDURE — 80053 COMPREHEN METABOLIC PANEL: CPT

## 2021-01-13 PROCEDURE — 80061 LIPID PANEL: CPT

## 2021-01-13 PROCEDURE — 36415 COLL VENOUS BLD VENIPUNCTURE: CPT

## 2021-01-13 PROCEDURE — 82570 ASSAY OF URINE CREATININE: CPT

## 2021-01-13 PROCEDURE — 84156 ASSAY OF PROTEIN URINE: CPT

## 2021-01-14 ENCOUNTER — IMMUNIZATIONS (OUTPATIENT)
Dept: FAMILY MEDICINE CLINIC | Facility: HOSPITAL | Age: 76
End: 2021-01-14

## 2021-01-14 DIAGNOSIS — Z23 ENCOUNTER FOR IMMUNIZATION: Primary | ICD-10-CM

## 2021-01-14 PROCEDURE — 0001A SARS-COV-2 / COVID-19 MRNA VACCINE (PFIZER-BIONTECH) 30 MCG: CPT

## 2021-01-14 PROCEDURE — 91300 SARS-COV-2 / COVID-19 MRNA VACCINE (PFIZER-BIONTECH) 30 MCG: CPT

## 2021-01-18 ENCOUNTER — DOCUMENTATION (OUTPATIENT)
Dept: NEPHROLOGY | Facility: CLINIC | Age: 76
End: 2021-01-18

## 2021-01-18 NOTE — PROGRESS NOTES
Home blood pressure readings:  -a m :  119/63 no orthostatic changes  -p  m :  124/65, no orthostatic changes  -heart rate:  50-70 range      Patient is at goal so no changes

## 2021-01-25 ENCOUNTER — LAB (OUTPATIENT)
Dept: LAB | Facility: HOSPITAL | Age: 76
End: 2021-01-25
Payer: MEDICARE

## 2021-01-25 DIAGNOSIS — E78.9 LIPID DISORDER: ICD-10-CM

## 2021-01-25 PROBLEM — E11.21 DIABETIC NEPHROPATHY ASSOCIATED WITH TYPE 2 DIABETES MELLITUS (HCC): Status: ACTIVE | Noted: 2021-01-25

## 2021-01-25 LAB
ALT SERPL W P-5'-P-CCNC: 27 U/L (ref 5–63)
AST SERPL W P-5'-P-CCNC: 34 U/L (ref 15–41)
CK MB SERPL-MCNC: 2.4 % (ref 0–2.5)
CK MB SERPL-MCNC: 4.4 NG/ML (ref 0.1–5.9)
CK SERPL-CCNC: 183.6 U/L (ref 49–397)
GLUCOSE P FAST SERPL-MCNC: 126 MG/DL (ref 70–105)
LDLC SERPL DIRECT ASSAY-MCNC: 73.3 MG/DL (ref 0–129)

## 2021-01-25 PROCEDURE — 82553 CREATINE MB FRACTION: CPT

## 2021-01-25 PROCEDURE — 83721 ASSAY OF BLOOD LIPOPROTEIN: CPT

## 2021-01-25 PROCEDURE — 84450 TRANSFERASE (AST) (SGOT): CPT

## 2021-01-25 PROCEDURE — 82947 ASSAY GLUCOSE BLOOD QUANT: CPT

## 2021-01-25 PROCEDURE — 84460 ALANINE AMINO (ALT) (SGPT): CPT

## 2021-01-25 PROCEDURE — 36415 COLL VENOUS BLD VENIPUNCTURE: CPT

## 2021-01-25 PROCEDURE — 82550 ASSAY OF CK (CPK): CPT

## 2021-01-27 DIAGNOSIS — M1A.9XX0 CHRONIC GOUT WITHOUT TOPHUS, UNSPECIFIED CAUSE, UNSPECIFIED SITE: ICD-10-CM

## 2021-01-27 DIAGNOSIS — I10 HYPERTENSION, ESSENTIAL: Primary | ICD-10-CM

## 2021-01-27 RX ORDER — COLCHICINE 0.6 MG/1
0.6 TABLET, FILM COATED ORAL DAILY
Qty: 30 TABLET | Refills: 6 | Status: SHIPPED | OUTPATIENT
Start: 2021-01-27 | End: 2021-01-28

## 2021-01-27 RX ORDER — ALLOPURINOL 300 MG/1
150 TABLET ORAL DAILY
Qty: 45 TABLET | Refills: 6 | Status: SHIPPED | OUTPATIENT
Start: 2021-01-27 | End: 2021-07-09 | Stop reason: SDUPTHER

## 2021-01-27 RX ORDER — METOPROLOL TARTRATE 50 MG/1
50 TABLET, FILM COATED ORAL 2 TIMES DAILY
Qty: 60 TABLET | Refills: 6 | Status: SHIPPED | OUTPATIENT
Start: 2021-01-27 | End: 2021-01-28

## 2021-01-28 ENCOUNTER — OFFICE VISIT (OUTPATIENT)
Dept: NEPHROLOGY | Facility: CLINIC | Age: 76
End: 2021-01-28
Payer: MEDICARE

## 2021-01-28 ENCOUNTER — OFFICE VISIT (OUTPATIENT)
Dept: PULMONOLOGY | Facility: CLINIC | Age: 76
End: 2021-01-28
Payer: MEDICARE

## 2021-01-28 VITALS — HEIGHT: 71 IN | WEIGHT: 169 LBS | BODY MASS INDEX: 23.66 KG/M2

## 2021-01-28 VITALS
HEIGHT: 71 IN | TEMPERATURE: 96.8 F | BODY MASS INDEX: 23.73 KG/M2 | WEIGHT: 169.5 LBS | OXYGEN SATURATION: 98 % | HEART RATE: 66 BPM | DIASTOLIC BLOOD PRESSURE: 72 MMHG | SYSTOLIC BLOOD PRESSURE: 130 MMHG

## 2021-01-28 DIAGNOSIS — N18.31 STAGE 3A CHRONIC KIDNEY DISEASE (HCC): ICD-10-CM

## 2021-01-28 DIAGNOSIS — R80.1 PERSISTENT PROTEINURIA: ICD-10-CM

## 2021-01-28 DIAGNOSIS — D86.9 SARCOIDOSIS: ICD-10-CM

## 2021-01-28 DIAGNOSIS — E11.21 DIABETIC NEPHROPATHY ASSOCIATED WITH TYPE 2 DIABETES MELLITUS (HCC): ICD-10-CM

## 2021-01-28 DIAGNOSIS — I12.9 HYPERTENSIVE CHRONIC KIDNEY DISEASE WITH STAGE 1 THROUGH STAGE 4 CHRONIC KIDNEY DISEASE, OR UNSPECIFIED CHRONIC KIDNEY DISEASE: Primary | ICD-10-CM

## 2021-01-28 DIAGNOSIS — I10 HYPERTENSION, ESSENTIAL: ICD-10-CM

## 2021-01-28 DIAGNOSIS — E78.5 DYSLIPIDEMIA: ICD-10-CM

## 2021-01-28 DIAGNOSIS — M1A.9XX0 CHRONIC GOUT WITHOUT TOPHUS, UNSPECIFIED CAUSE, UNSPECIFIED SITE: ICD-10-CM

## 2021-01-28 DIAGNOSIS — J44.9 CHRONIC OBSTRUCTIVE PULMONARY DISEASE, UNSPECIFIED COPD TYPE (HCC): Primary | ICD-10-CM

## 2021-01-28 DIAGNOSIS — E87.5 HYPERKALEMIA: ICD-10-CM

## 2021-01-28 PROCEDURE — 99214 OFFICE O/P EST MOD 30 MIN: CPT | Performed by: INTERNAL MEDICINE

## 2021-01-28 PROCEDURE — 99213 OFFICE O/P EST LOW 20 MIN: CPT | Performed by: INTERNAL MEDICINE

## 2021-01-28 RX ORDER — COLCHICINE 0.6 MG/1
TABLET, FILM COATED ORAL
Qty: 30 TABLET | Refills: 3 | Status: SHIPPED | OUTPATIENT
Start: 2021-01-28 | End: 2021-02-03 | Stop reason: SDUPTHER

## 2021-01-28 RX ORDER — METOPROLOL TARTRATE 50 MG/1
TABLET, FILM COATED ORAL
Qty: 180 TABLET | Refills: 3 | Status: SHIPPED | OUTPATIENT
Start: 2021-01-28 | End: 2021-02-03 | Stop reason: SDUPTHER

## 2021-01-28 NOTE — PROGRESS NOTES
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 [employer]  His previous PFT from May 2015 showed severe airflow obstruction with diffusion defect  He also had evidence of air trapping  There was improvement in FEV1 with bronchodilator indicating a component of asthma  His last CT scan from February 2019 showed scarring in the left lung  He denies any runny nose or sneezing  On clinical examination, his chest was clear  I have advised him to continue breo and incruse  I had a long discussion with him and have answered all his questions    Sarcoidosis  He was diagnosed with sarcoidosis many years back after a lung biopsy and was given steroid therapy for some time  His previous PFT from May 2015 showed severe airflow obstruction with diffusion defect  He has history of working with beryllium  Hypertension, essential   He has history of hypertension and has been on treatment with chlorthalidone and amlodipine  Diagnoses and all orders for this visit:    Chronic obstructive pulmonary disease, unspecified COPD type ([address] )    Sarcoidosis    Hypertension, essential          Subjective:      Patient ID: Karyn Huber is a 70 y o  male  Mr Dania Mcgraw has come for follow-up for his COPD  Currently his exercise tolerance is good and he does not have any significant cough or phlegm or wheeze or chest pain  He denies any swelling of feet  He has no hoarseness of voice or dysphagia  He has been on [employer] and Incruse Ellipta    He gargles throat after using the steroid inhaler  He has no fever or chills  He has been observing COVID precautions  he has history of sarcoidosis  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  He has history of hypertension and this has been controlled with chlorthalidone and amlodipine      The following portions of the patient's history were reviewed and updated as appropriate: allergies, current medications, past family history, past medical history, past social history, past surgical history and problem list     Review of Systems   Constitutional: Negative for appetite change, chills, fatigue and fever  HENT: Negative for congestion, hearing loss, postnasal drip, rhinorrhea, sneezing, sore throat, trouble swallowing and voice change  Eyes: Negative for visual disturbance  Respiratory: Negative for cough, chest tightness, shortness of breath and wheezing  Gastrointestinal: Negative for abdominal pain, constipation, diarrhea, nausea and vomiting  Endocrine: Negative for polyuria  Genitourinary: Negative for dysuria, frequency and urgency  Musculoskeletal: Positive for joint swelling (ankles)  Negative for arthralgias and gait problem  Neurological: Negative for dizziness, seizures and headaches  Psychiatric/Behavioral: Negative for sleep disturbance  The patient is not nervous/anxious  Objective:      /72 (BP Location: Right arm, Patient Position: Sitting, Cuff Size: Adult)   Pulse 66   Temp (!) 96 8 °F (36 °C) (Tympanic)   Ht 5' 11" (1 803 m)   Wt 76 9 kg (169 lb 8 oz)   SpO2 98%   BMI 23 64 kg/m²          Physical Exam  Vitals signs reviewed  Constitutional:       General: He is not in acute distress  Appearance: He is not ill-appearing or toxic-appearing  HENT:      Head: Normocephalic  Eyes:      General: No scleral icterus  Conjunctiva/sclera: Conjunctivae normal    Cardiovascular:      Rate and Rhythm: Normal rate and regular rhythm        Heart sounds: Normal heart sounds  No murmur  Pulmonary:      Effort: No respiratory distress  Breath sounds: Normal breath sounds  No stridor  No wheezing, rhonchi or rales  Abdominal:      General: Bowel sounds are normal       Palpations: Abdomen is soft  Tenderness: There is no abdominal tenderness  There is no guarding  Musculoskeletal:      Right lower leg: No edema  Left lower leg: No edema  Lymphadenopathy:      Cervical: No cervical adenopathy  Skin:     Coloration: Skin is pale  Skin is not jaundiced  Neurological:      Mental Status: He is alert and oriented to person, place, and time  Gait: Gait normal    Psychiatric:         Mood and Affect: Mood normal          Behavior: Behavior normal          Thought Content:  Thought content normal          Judgment: Judgment normal

## 2021-01-28 NOTE — PATIENT INSTRUCTIONS
1  Medication changes today:   No medication changes today    2  In 3 months:  · Please go for non fasting  lab work   · Please also send me 1 week a blood pressure readings as outlined:  TAKE THE MORNING READINGS BEFORE ANY MEDICATIONS AND WHEN YOU ARE RELAXED FOR SEVERAL MINUTES  TAKE THE EVENING READINGS BETWEEN 7PM AND 10PM AT LEAST 30 MINUTES BEFORE OR AFTER DINNER/EATING/COFFEE INTAKE OR ALCOHOL INTAKE, AND CERTAINLY BEFORE ANY BLOOD PRESSURE MEDICATIONS  AGAIN, PLEASE MAKE SURE YOU ARE RELAXED FOR SEVERAL MINUTES BEFORE TAKING HER BLOOD PRESSURE READINGS  PLEASE INCLUDE HEART RATE WITH YOUR BLOOD PRESSURE READINGS  When taking standing readings, keep your arm supported at heart level and not dangling  Make sure you are sitting with your back supported and feet on the ground and do not cross your legs or feet  Make sure you have not taken any coffee or caffeine products or exercised or smoke cigarettes at least 30 minutes before taking your blood pressure        3  Follow-up in 6  months   Please bring in 1 week a blood pressure readings morning evening, sitting and standing is outlined above   PLEASE BRING AN YOUR BLOOD PRESSURE MACHINE TO CORRELATE WITH THE OFFICE MACHINE AT THIS NEXT SCHEDULED VISIT   Please go for fasting lab work 1-2 weeks prior to your appointment      4  General instructions:   AVOID SALT BUT NOT ADDING AN READING LABELS TO MAKE SURE THERE IS LOW-SALT IN THE FOOD THAT YOU ARE EATING  o Goal is less than 2 g of sodium intake or less than 5 g of sodium chloride intake per day     Avoid nonsteroidal anti-inflammatory drugs such as Naprosyn, ibuprofen, Aleve, Advil, Celebrex, Meloxicam (Mobic) etc   You can use Tylenol as needed if you do not have any liver condition to be concerned about     Avoid medications such as Sudafed or decongestants and antihistamines that contained the D component which is the decongestant    You can take antihistamines without the decongestant or D component   Try to avoid medications such as pantoprazole or  Protonix/Nexium or Esomeprazole)/Prilosec or omeprazole/Prevacid or lansoprazole/AcipHex or Rabeprazole  If you are able to, use Pepcid as this is safer for your kidneys   Try to exercise at least 30 minutes 3 days a week to begin with with an ultimate goal of 5 days a week for at least 30 minutes     Please do not drink more than 2 glasses of alcohol/wine on a daily basis as this may contribute to your high blood pressure

## 2021-01-28 NOTE — ASSESSMENT & PLAN NOTE
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

## 2021-01-28 NOTE — LETTER
January 28, 2021     Kvng Davis, [address] [facility]  [address] [address]  Professor [address]iana [place] [address]    Patient: Erika Fofana   YOB: 1945   Date of Visit: 1/28/2021       Dear Dr Tracy Worthy: Thank you for referring Yahaira Sherwood to me for evaluation  Below are my notes for this consultation  If you have questions, please do not hesitate to call me  I look forward to following your patient along with you  Sincerely,        Jane Richter MD        CC: MD William Huddleston MD [facility], MD  1/28/2021 12:03 PM  Sign when Signing Visit  RENAL FOLLOW UP NOTE: td    ASSESSMENT AND PLAN:  1   CKD LLRUI 8  · Etiology:  Diabetic nephropathy/hypertensive nephrosclerosis/arteriolar nephrosclerosis/episodes of SHAY in the past   All serologies were negative including multiple myeloma evaluation  · Baseline creatinine:  1 2-1 62  · Current creatinine:  1 36 at baseline  · Urine protein creatinine ratio:  0 87 g at goal  Recommendations:  · Treat hypertension-please see below  · Treat dyslipidemia-please see below  · Maintain proteinuria less than 1 g or as low as possible  · Avoid nephrotoxic agents such as NSAIDs, patient counseled as such     2   Volume:  Euvolemic     3   Hypertension:       Current blood pressure averages:   A m :  119/63 without orthostatic changes  P m :  124/65 without orthostatic changes  Heart rate:  50-70 range     · Goal blood pressure:  Less than  125/80 given CAD     Recommendations:  ·  Push nonmedical regimen including weight loss, isotonic exercise and avoidance of salt; patient counseled as such  · Medication changes today:    · No changes as patient is at goal     4   Electrolytes:  All acceptable including a potassium of 4 8     5   Mineral bone disorder:   · Calcium/magnesium/phosphorus:  All acceptable including calcium 9 5  · PTH intact:  37 2 which is normal, from last visit  · Vitamin-D:Currently on supplements   49 9 at goal, from last visit     6   Dyslipidemia:  · Goal LDL:  Less than 70 given CAD  · Current lipid profile:  LDL 79/HDL 48/triglycerides 106  Recommendations:  Above goal so adjust statin :  Atorvastatin just increased to 40 mg twice a day per Cardiology      7   Anemia:  normal hemoglobin at 13 8     8   Other problems:  · Diabetes mellitus per your discretion  · CAD followed by Dr Tammie Fenton through [address] post MI last October involving to cardiac stents  :  Recent emesis of LAD lesion with repeat PTCA drug-eluting stent 2018  Circumflex artery and right coronary artery 50% stenosis   Ejection fraction 55%  · Sarcoidosis followed by Dr Street  · Kyphoscoliosis  · Cirrhosis of the liver  · Gout  · Ankylosing spondylitis      PATIENT INSTRUCTIONS:    Patient Instructions   1  Medication changes today:   No medication changes today    2  In 3 months:  · Please go for non fasting  lab work   · Please also send me 1 week a blood pressure readings as outlined:  TAKE THE MORNING READINGS BEFORE ANY MEDICATIONS AND WHEN YOU ARE RELAXED FOR SEVERAL MINUTES  TAKE THE EVENING READINGS BETWEEN 7PM AND 10PM AT LEAST 30 MINUTES BEFORE OR AFTER DINNER/EATING/COFFEE INTAKE OR ALCOHOL INTAKE, AND CERTAINLY BEFORE ANY BLOOD PRESSURE MEDICATIONS  AGAIN, PLEASE MAKE SURE YOU ARE RELAXED FOR SEVERAL MINUTES BEFORE TAKING HER BLOOD PRESSURE READINGS  PLEASE INCLUDE HEART RATE WITH YOUR BLOOD PRESSURE READINGS  When taking standing readings, keep your arm supported at heart level and not dangling  Make sure you are sitting with your back supported and feet on the ground and do not cross your legs or feet  Make sure you have not taken any coffee or caffeine products or exercised or smoke cigarettes at least 30 minutes before taking your blood pressure        3   Follow-up in 6  months   Please bring in 1 week a blood pressure readings morning evening, sitting and standing is outlined above   PLEASE BRING AN YOUR BLOOD PRESSURE MACHINE TO CORRELATE WITH THE OFFICE MACHINE AT THIS NEXT SCHEDULED VISIT   Please go for fasting lab work 1-2 weeks prior to your appointment      4  General instructions:   AVOID SALT BUT NOT ADDING AN READING LABELS TO MAKE SURE THERE IS LOW-SALT IN THE FOOD THAT YOU ARE EATING  o Goal is less than 2 g of sodium intake or less than 5 g of sodium chloride intake per day     Avoid nonsteroidal anti-inflammatory drugs such as Naprosyn, ibuprofen, Aleve, Advil, Celebrex, Meloxicam (Mobic) etc   You can use Tylenol as needed if you do not have any liver condition to be concerned about     Avoid medications such as Sudafed or decongestants and antihistamines that contained the D component which is the decongestant  You can take antihistamines without the decongestant or D component   Try to avoid medications such as pantoprazole or  Protonix/Nexium or Esomeprazole)/Prilosec or omeprazole/Prevacid or lansoprazole/AcipHex or Rabeprazole  If you are able to, use Pepcid as this is safer for your kidneys   Try to exercise at least 30 minutes 3 days a week to begin with with an ultimate goal of 5 days a week for at least 30 minutes     Please do not drink more than 2 glasses of alcohol/wine on a daily basis as this may contribute to your high blood pressure  Subjective: There has been no hospitalizations or acute illnesses since last visit  The patient overall is feeling well  No fevers, chills, or cough or colds    Good appetite and good energy  No hematuria, dysuria, voiding symptoms or foamy urine  No gastrointestinal symptoms  The patient denies any chest pain, does have very mild shortness of breath with exertion secondary to sarcoidosis; only intermittent occasional lower extremity swelling that is not persistent  No headaches, dizziness or lightheadedness  Blood pressure medications:  · Metoprolol 50 mg in the a m, 25 mg in the evening  · Edarbi 40 mg twice a day  · Chlorthalidone 12 5 mg daily in the morning  · Amlodipine 5 mg daily in the morning    ROS:  See HPI, otherwise review of systems as completely reviewed with the patient are negative    Past Medical History:   Diagnosis Date    Anemia     Arthritis     Gee's esophagus     Breast lump     [facility] )     Chronic kidney disease     Cirrhosis ([facility] )     Coronary artery disease     cardiac cath; stents     Diabetes mellitus ([facility] )     H/O heart artery stent     Hypertension     Inguinal hernia     Right    Sarcoidosis     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[facility] )     Skin cancer     SOB (shortness of breath)      Past Surgical History:   Procedure Laterality Date    CARDIAC CATHETERIZATION      CERVICAL FUSION      CHOLECYSTECTOMY      COLONOSCOPY  04/11/2016    EGD AND COLONOSCOPY  06/24/2019    ERCP  09/11/2014    transab esop hiat griffin rpr    ESOPHAGOGASTRIC FUNDOPLASTY      HERNIA REPAIR Right     p I/griffin init reduc >5 yr    HIP SURGERY Right     Replacement    KIDNEY STONE SURGERY      Fragmenting    LIVER BIOPSY      MULTIPLE TOOTH EXTRACTIONS      "extraction of all maxillary teeth"    SKIN BIOPSY  05/2020    THUMB FUSION      with graft    TONSILLECTOMY       Family History   Problem Relation Age of Onset    Hypertension Mother     Atrial fibrillation Mother     Heart disease Mother     Kidney disease Father     COPD Father     Heart disease Father     Asthma Neg Hx     Lung cancer Neg Hx       reports that he has never smoked  He has never used smokeless tobacco  He reports that he does not drink alcohol or use drugs      I COMPLETELY REVIEWED THE PAST MEDICAL HISTORY/PAST SURGICAL HISTORY/SOCIAL HISTORY/FAMILY HISTORY/AND MEDICATIONS  AND UPDATED ALL    Objective:     Vitals:   BP sitting on right:146/66 with a heart rate of 56 and regular  BP standing on right:  128/68 with a heart rate of 56 regular      Weight (last 2 days)     Date/Time   Weight    01/28/21 1134   76 7 (169)            Wt Readings from Last 3 Encounters:   01/28/21 76 7 kg (169 lb)   01/28/21 76 9 kg (169 lb 8 oz)   11/12/20 75 3 kg (166 lb)       Body mass index is 23 57 kg/m²      Physical Exam: General:  No acute distress  Skin:  No acute rash  Eyes:  No scleral icterus, noninjected, no discharge from eyes  ENT:  Moist mucous membranes  Neck:  Supple, no jugular venous distention, trachea is midline, no lymphadenopathy and no thyromegaly  Back   No CVAT  Chest:  Clear to auscultation and percussion, good respiratory effort  CVS:  Regular rate and rhythm without a rub, or gallops or murmurs  Abdomen:  Soft and nontender with normal bowel sounds  Extremities:  No cyanosis and no edema, no arthritic changes, normal range of motion  Neuro:  Grossly intact  Psych:  Alert, oriented x3 and appropriate      Medications:    Current Outpatient Medications:     allopurinol (ZYLOPRIM) 300 mg tablet, Take 0 5 tablets (150 mg total) by mouth daily, Disp: 45 tablet, Rfl: 6    amLODIPine (NORVASC) 5 mg tablet, TAKE 1 TABLET (5 MG) BY MOUTH DAILY, Disp: 30 tablet, Rfl: 5    aspirin (ECOTRIN LOW STRENGTH) 81 mg EC tablet, Take 81 mg by mouth daily, Disp: , Rfl:     atorvastatin (LIPITOR) 20 mg tablet, Take 2 tablets (40 mg total) by mouth daily (Patient taking differently: Take 40 mg by mouth 2 (two) times a day ), Disp: 60 tablet, Rfl: 5    B Complex-C (SUPERPLEX-T) TABS, Take 1 tablet by mouth daily, Disp: , Rfl:     chlorthalidone 25 mg tablet, TAKE "ONE-HALF" TABLET (12 5 MG) BY MOUTH DAILY, Disp: 15 tablet, Rfl: 4    Cholecalciferol (VITAMIN D3) 2000 units TABS, Take 1 tablet by mouth daily, Disp: , Rfl:     co-enzyme Q-10 50 MG capsule, Take by mouth daily, Disp: , Rfl:     Colcrys 0 6 MG tablet, Take 1 tablet (0 6 mg total) by mouth daily, Disp: 30 tablet, Rfl: 6    Edarbi 40 MG tablet, TAKE 1 TABLET TWICE A DAY, Disp: 60 tablet, Rfl: 5    esomeprazole (NexIUM) 40 MG capsule, TAKE 1 CAPSULE BY MOUTH EVERY DAY, Disp: 90 capsule, Rfl: 3    Ferrous Sulfate (IRON) 325 (65 Fe) MG TABS, Take 1 tablet by mouth daily, Disp: , Rfl:     fluticasone-vilanterol (Breo Ellipta) 100-25 mcg/inh inhaler, Inhale 1 puff daily, Disp: 1 Inhaler, Rfl: 3    INCRUSE ELLIPTA 62 5 MCG/INH AEPB, Take 1 puff by mouth daily, Disp: , Rfl: 5    magnesium chloride-calcium (SLOW-MAG) 71 5-119 mg, Take 2 tablets by mouth 2 (two) times a day, Disp: , Rfl:     metoprolol tartrate (LOPRESSOR) 50 mg tablet, Take 1 tablet (50 mg total) by mouth 2 (two) times a day Take 50mg in the Am and 25 mg in the evening, Disp: 60 tablet, Rfl: 6    prasugrel (EFFIENT) tablet, Take 10 mg by mouth daily , Disp: , Rfl:     sitaGLIPtin (JANUVIA) 100 mg tablet, Take 1 tablet by mouth daily, Disp: , Rfl:     sulfaSALAzine (AZULFIDINE) 500 mg tablet, TAKE 1 AND 1/2 TABLETS BY MOUTH ONE TIME DAILY, Disp: 135 tablet, Rfl: 3    Lab, Imaging and other studies: I have personally reviewed pertinent labs  Laboratory Results:  Results for orders placed or performed in visit on 01/25/21   LDL cholesterol, direct   Result Value Ref Range    LDL Direct 73 3 0 - 129 mg/dl   ALT   Result Value Ref Range    ALT 27 5 - 63 U/L   AST   Result Value Ref Range    AST 34 15 - 41 U/L   CK (with reflex to MB)   Result Value Ref Range    Total  6 49 - 397 U/L   Glucose, fasting   Result Value Ref Range    Glucose, Fasting 126 (H) 70 - 105 mg/dL   CKMB   Result Value Ref Range    CK-MB Index 2 4 0 0 - 2 5 %    CK-MB 4 4 0 1 - 5 9 ng/mL             Invalid input(s): ALBUMIN      Radiology review:   chest X-ray    Ultrasound      Portions of the record may have been created with voice recognition software  Occasional wrong word or "sound a like" substitutions may have occurred due to the inherent limitations of voice recognition software  Read the chart carefully and recognize, using context, where substitutions have occurred

## 2021-02-03 DIAGNOSIS — M1A.9XX0 CHRONIC GOUT WITHOUT TOPHUS, UNSPECIFIED CAUSE, UNSPECIFIED SITE: ICD-10-CM

## 2021-02-03 DIAGNOSIS — I10 HYPERTENSION, ESSENTIAL: ICD-10-CM

## 2021-02-03 RX ORDER — METOPROLOL TARTRATE 50 MG/1
50 TABLET, FILM COATED ORAL 2 TIMES DAILY
Qty: 180 TABLET | Refills: 3 | Status: SHIPPED | OUTPATIENT
Start: 2021-02-03 | End: 2021-11-22 | Stop reason: SDUPTHER

## 2021-02-03 RX ORDER — COLCHICINE 0.6 MG/1
0.6 TABLET, FILM COATED ORAL DAILY
Qty: 30 TABLET | Refills: 3 | Status: SHIPPED | OUTPATIENT
Start: 2021-02-03 | End: 2021-06-06

## 2021-02-05 ENCOUNTER — IMMUNIZATIONS (OUTPATIENT)
Dept: FAMILY MEDICINE CLINIC | Facility: HOSPITAL | Age: 76
End: 2021-02-05

## 2021-02-05 DIAGNOSIS — Z23 ENCOUNTER FOR IMMUNIZATION: Primary | ICD-10-CM

## 2021-02-05 PROCEDURE — 0002A SARS-COV-2 / COVID-19 MRNA VACCINE (PFIZER-BIONTECH) 30 MCG: CPT

## 2021-02-05 PROCEDURE — 91300 SARS-COV-2 / COVID-19 MRNA VACCINE (PFIZER-BIONTECH) 30 MCG: CPT

## 2021-02-08 DIAGNOSIS — J43.9 PULMONARY EMPHYSEMA, UNSPECIFIED EMPHYSEMA TYPE (HCC): ICD-10-CM

## 2021-02-08 RX ORDER — FLUTICASONE FUROATE AND VILANTEROL TRIFENATATE 100; 25 UG/1; UG/1
POWDER RESPIRATORY (INHALATION)
Qty: 60 INHALER | Refills: 3 | Status: SHIPPED | OUTPATIENT
Start: 2021-02-08 | End: 2021-06-06

## 2021-02-25 DIAGNOSIS — N18.30 CHRONIC KIDNEY DISEASE, STAGE III (MODERATE) (HCC): ICD-10-CM

## 2021-02-25 DIAGNOSIS — R80.1 PERSISTENT PROTEINURIA: ICD-10-CM

## 2021-02-25 DIAGNOSIS — I12.9 HYPERTENSIVE CHRONIC KIDNEY DISEASE WITH STAGE 1 THROUGH STAGE 4 CHRONIC KIDNEY DISEASE, OR UNSPECIFIED CHRONIC KIDNEY DISEASE: ICD-10-CM

## 2021-02-25 DIAGNOSIS — E78.5 DYSLIPIDEMIA: ICD-10-CM

## 2021-02-25 RX ORDER — ATORVASTATIN CALCIUM 20 MG/1
40 TABLET, FILM COATED ORAL DAILY
Qty: 60 TABLET | Refills: 5 | Status: SHIPPED | OUTPATIENT
Start: 2021-02-25 | End: 2021-07-09 | Stop reason: SDUPTHER

## 2021-02-26 DIAGNOSIS — E11.9 TYPE 2 DIABETES MELLITUS WITHOUT COMPLICATION, WITHOUT LONG-TERM CURRENT USE OF INSULIN (HCC): Primary | ICD-10-CM

## 2021-02-26 RX ORDER — SITAGLIPTIN 100 MG/1
TABLET, FILM COATED ORAL
Qty: 90 TABLET | Refills: 3 | Status: SHIPPED | OUTPATIENT
Start: 2021-02-26 | End: 2022-03-25

## 2021-03-08 ENCOUNTER — TELEPHONE (OUTPATIENT)
Dept: FAMILY MEDICINE CLINIC | Facility: CLINIC | Age: 76
End: 2021-03-08

## 2021-03-08 DIAGNOSIS — K21.00 GASTROESOPHAGEAL REFLUX DISEASE WITH ESOPHAGITIS WITHOUT HEMORRHAGE: Primary | ICD-10-CM

## 2021-03-08 NOTE — TELEPHONE ENCOUNTER
Pt is out of esomeprazole (NexIUM) 40 MG capsule       It is not formulary other choices are:    Famotadine 20/40  Omeprazole 10/20/40  Pantoprazole 20/40  Ranitidine 150/300    These are all his Tier 1 choices        Jennifer Feliciano

## 2021-03-09 DIAGNOSIS — K21.9 GASTROESOPHAGEAL REFLUX DISEASE, UNSPECIFIED WHETHER ESOPHAGITIS PRESENT: Primary | ICD-10-CM

## 2021-03-09 RX ORDER — PANTOPRAZOLE SODIUM 40 MG/1
40 TABLET, DELAYED RELEASE ORAL DAILY
Qty: 90 TABLET | Refills: 1 | Status: CANCELLED | OUTPATIENT
Start: 2021-03-09

## 2021-03-09 RX ORDER — PANTOPRAZOLE SODIUM 40 MG/1
40 TABLET, DELAYED RELEASE ORAL DAILY
COMMUNITY
End: 2021-07-20

## 2021-03-11 RX ORDER — PANTOPRAZOLE SODIUM 40 MG/1
40 TABLET, DELAYED RELEASE ORAL DAILY
COMMUNITY
End: 2021-03-11 | Stop reason: SDUPTHER

## 2021-03-12 RX ORDER — PANTOPRAZOLE SODIUM 40 MG/1
TABLET, DELAYED RELEASE ORAL
Qty: 90 TABLET | Refills: 1 | Status: SHIPPED | OUTPATIENT
Start: 2021-03-12 | End: 2021-07-19

## 2021-03-16 ENCOUNTER — OFFICE VISIT (OUTPATIENT)
Dept: FAMILY MEDICINE CLINIC | Facility: CLINIC | Age: 76
End: 2021-03-16
Payer: MEDICARE

## 2021-03-16 VITALS
TEMPERATURE: 96.7 F | DIASTOLIC BLOOD PRESSURE: 92 MMHG | HEIGHT: 71 IN | SYSTOLIC BLOOD PRESSURE: 146 MMHG | WEIGHT: 168.8 LBS | RESPIRATION RATE: 18 BRPM | HEART RATE: 70 BPM | OXYGEN SATURATION: 97 % | BODY MASS INDEX: 23.63 KG/M2

## 2021-03-16 DIAGNOSIS — Z71.89 LIVING WILL, COUNSELING/DISCUSSION: ICD-10-CM

## 2021-03-16 DIAGNOSIS — E11.9 TYPE 2 DIABETES MELLITUS WITHOUT COMPLICATION, WITHOUT LONG-TERM CURRENT USE OF INSULIN (HCC): Primary | ICD-10-CM

## 2021-03-16 DIAGNOSIS — R63.8 INCREASED BMI: ICD-10-CM

## 2021-03-16 DIAGNOSIS — Z00.00 ENCOUNTER FOR MEDICARE ANNUAL WELLNESS EXAM: ICD-10-CM

## 2021-03-16 PROCEDURE — 99215 OFFICE O/P EST HI 40 MIN: CPT | Performed by: FAMILY MEDICINE

## 2021-03-16 PROCEDURE — G0438 PPPS, INITIAL VISIT: HCPCS | Performed by: FAMILY MEDICINE

## 2021-03-16 PROCEDURE — 1123F ACP DISCUSS/DSCN MKR DOCD: CPT | Performed by: FAMILY MEDICINE

## 2021-03-16 NOTE — PROGRESS NOTES
Assessment/Plan:  70 y o male, well-known to me for many years presents for f/u and evaluation of the following medical issues:     F/u and eval HTN:  Controlled on 3 agents, nicely    F/u and eval NIDDM: taking Januvia 100 mg OD  Cks BBG four times a day  Consdidering going to TID    F/u and eval HLD:  On Lipitor 20 mg  1 5 tabs OD    F/u and eval GERD-- Nexium was d/c by insur, so I rx'ed Protonix 40 mg OD on 3/9/21    F/u and eval deconditioning:  Walking less and less  Stay more active     F/u and eval polypharmacy:  All medications reviewed in depth and discussed with pt, jemma the use of acid reducing medications, both OTC and Rx  F/u and eval Covid vacc:   rec'd 1st dose Jan 14 and 2d on 2/5/21 at [facility]           Problem List Items Addressed This Visit        Endocrine    Type 2 diabetes mellitus without complication, without long-term current use of insulin ([facility] ) - Primary    Relevant Orders    HEMOGLOBIN A1C W/ EAG ESTIMATION      Other Visit Diagnoses     Increased BMI        Encounter for Medicare annual wellness exam        Living will, counseling/discussion        Five Wishes given            Subjective:      Patient ID: Becky Montesinos is a 70 y o  male  HPI   -- 70 y o male, well-known to me for many years presents for f/u and evaluation of the following medical issues:     F/u and eval HTN:  Controlled on 3 agents, nicely    F/u and eval NIDDM: taking Januvia 100 mg OD  Cks BBG four times a day  Consdidering going to TID    F/u and eval HLD:  On Lipitor 20 mg  1 5 tabs OD    F/u and eval GERD-- Nexium was d/c by insur, so I rx'ed Protonix 40 mg OD on 3/9/21    F/u and eval deconditioning:  Walking less and less  Stay more active     F/u and eval polypharmacy:  All medications reviewed in depth and discussed with pt, jemma the use of acid reducing medications, both OTC and Rx       F/u and eval Covid vacc:   rec'd 1st dose Jan 14 and 2d on 2/5/21 at [facility]    The following portions of the patient's history were reviewed and updated as appropriate:   Past Medical History:  He has a past medical history of Anemia, Arthritis, Gee's esophagus, Breast lump, Cancer ([facility] ), Chronic kidney disease, Cirrhosis ([place] [id number] ), Coronary artery disease, Diabetes mellitus ([place] [address] ), H/O heart artery stent, Hypertension, Inguinal hernia, Sarcoidosis, 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[place] [address] ), Skin cancer, and SOB (shortness of breath)  ,  _______________________________________________________________________  Medical Problems:  does not have any pertinent problems on file ,  _______________________________________________________________________  Past Surgical History:   has a past surgical history that includes Colonoscopy (04/11/2016); EGD AND COLONOSCOPY (06/24/2019); Cardiac catheterization; Tonsillectomy; Thumb fusion; ERCP (09/11/2014); Multiple tooth extractions; Cervical fusion; Liver biopsy; Hip surgery (Right); Kidney stone surgery; Cholecystectomy; Skin biopsy (05/2020); Esophagogastric fundoplasty; and Hernia repair (Right)  ,  _______________________________________________________________________  Family History:  family history includes Atrial fibrillation in his mother; COPD in his father; Heart disease in his father and mother; Hypertension in his mother; Kidney disease in his father ,  _______________________________________________________________________  Social History:   reports that he has never smoked  He has never used smokeless tobacco  He reports that he does not drink alcohol or use drugs  ,  _______________________________________________________________________  Allergies:  is allergic to oxycodone     _______________________________________________________________________  Current Outpatient Medications   Medication Sig Dispense Refill    allopurinol (ZYLOPRIM) 300 mg tablet Take 0 5 tablets (150 mg total) by mouth daily 45 tablet 6    amLODIPine (NORVASC) 5 mg tablet TAKE 1 TABLET (5 MG) BY MOUTH DAILY 30 tablet 5    aspirin (ECOTRIN LOW STRENGTH) 81 mg EC tablet Take 81 mg by mouth daily      atorvastatin (LIPITOR) 20 mg tablet TAKE 2 TABLETS (40 MG TOTAL) BY MOUTH DAILY 60 tablet 5    B Complex-C (SUPERPLEX-T) TABS Take 1 tablet by mouth daily      Breo Ellipta 100-25 MCG/INH inhaler INHALE 1 PUFF BY MOUTH EVERY DAY 60 Inhaler 3    chlorthalidone 25 mg tablet TAKE "ONE-HALF" TABLET (12 5 MG) BY MOUTH DAILY 15 tablet 4    Cholecalciferol (VITAMIN D3) 2000 units TABS Take 1 tablet by mouth daily      co-enzyme Q-10 50 MG capsule Take by mouth daily      Colcrys 0 6 MG tablet Take 1 tablet (0 6 mg total) by mouth daily 30 tablet 3    Edarbi 40 MG tablet TAKE 1 TABLET TWICE A DAY 60 tablet 5    Ferrous Sulfate (IRON) 325 (65 Fe) MG TABS Take 1 tablet by mouth daily      INCRUSE ELLIPTA 62 5 MCG/INH AEPB Take 1 puff by mouth daily  5    Januvia 100 MG tablet TAKE 1 TABLET BY MOUTH EVERY DAY 90 tablet 3    magnesium chloride-calcium (SLOW-MAG) 71 5-119 mg Take 2 tablets by mouth 2 (two) times a day      metoprolol tartrate (LOPRESSOR) 50 mg tablet Take 1 tablet (50 mg total) by mouth 2 (two) times a day 180 tablet 3    pantoprazole (PROTONIX) 40 mg tablet Take 40 mg by mouth daily half an hour before breakfast  90 tablet 1    prasugrel (EFFIENT) tablet Take 10 mg by mouth daily       sulfaSALAzine (AZULFIDINE) 500 mg tablet TAKE 1 AND 1/2 TABLETS BY MOUTH ONE TIME DAILY 135 tablet 3    pantoprazole (PROTONIX) 40 mg tablet Take 40 mg by mouth daily       No current facility-administered medications for this visit       _______________________________________________________________________  Review of Systems   Constitutional: Negative for activity change, appetite change, chills, diaphoresis, fatigue, fever and unexpected weight change     HENT: Negative for congestion, dental problem, drooling, ear discharge, ear pain, facial swelling, hearing loss, mouth sores, nosebleeds, postnasal drip, rhinorrhea, sinus pain, trouble swallowing and voice change  Eyes: Negative for photophobia, pain, discharge, redness, itching and visual disturbance  Respiratory: Negative for apnea, cough, choking, chest tightness and shortness of breath  Quite good now - controlled on meds  Cardiovascular: Negative for chest pain, palpitations and leg swelling  Denies any and all cardiac symptoms   Gastrointestinal: Negative for abdominal distention, abdominal pain, constipation, diarrhea and nausea  Endocrine: Negative for polydipsia, polyphagia and polyuria  Genitourinary: Negative for decreased urine volume, difficulty urinating, dysuria, enuresis and hematuria  Musculoskeletal: Positive for arthralgias, back pain, gait problem and myalgias  Negative for joint swelling  Has alkalosing spondylitis, ch, burned out   Skin: Negative for color change, pallor, rash and wound  Allergic/Immunologic: Negative for immunocompromised state  Neurological: Negative for dizziness, seizures, syncope, facial asymmetry, speech difficulty, light-headedness and headaches  Hematological: Negative for adenopathy  Psychiatric/Behavioral: Negative for agitation, behavioral problems, confusion and decreased concentration  The patient is nervous/anxious  Objective:  Vitals:    03/16/21 1009   BP: 146/92   Pulse: 70   Resp: 18   Temp: (!) 96 7 °F (35 9 °C)   SpO2: 97%   Weight: 76 6 kg (168 lb 12 8 oz)   Height: 5' 11" (1 803 m)     Body mass index is 23 54 kg/m²  Physical Exam  Vitals signs and nursing note reviewed  Constitutional:       Appearance: Normal appearance  He is well-developed and normal weight  He is not diaphoretic  HENT:      Head: Normocephalic and atraumatic  Nose: Nose normal    Eyes:      General: No scleral icterus  Pupils: Pupils are equal, round, and reactive to light  Neck:      Musculoskeletal: Normal range of motion and neck supple        Trachea: No tracheal deviation  Cardiovascular:      Rate and Rhythm: Normal rate and regular rhythm  Heart sounds: Normal heart sounds  No murmur  No gallop  Pulmonary:      Effort: Pulmonary effort is normal  No respiratory distress  Breath sounds: Normal breath sounds  No stridor  No wheezing, rhonchi or rales  Abdominal:      General: Bowel sounds are normal       Palpations: Abdomen is soft  Comments: Pt thinner than I've ever seen him  Wt 166 lbs, but was reportedly 160 3-4 mos ago    Musculoskeletal: Normal range of motion  General: Swelling, tenderness and deformity (significant scoliosis and effect upon the chest cavity) present  Right lower leg: No edema  Left lower leg: Edema present  Lymphadenopathy:      Cervical: No cervical adenopathy  Skin:     General: Skin is warm and dry  Coloration: Skin is not jaundiced or pale  Findings: No bruising (particularly on forearms, 2o Effient)  Neurological:      General: No focal deficit present  Mental Status: He is alert and oriented to person, place, and time  Mental status is at baseline  Cranial Nerves: No cranial nerve deficit  Sensory: No sensory deficit  Motor: No weakness  Coordination: Coordination normal    Psychiatric:         Mood and Affect: Mood normal          Behavior: Behavior normal          Thought Content: Thought content normal          Judgment: Judgment normal         Comorbidities addressed herein increase the amount and complexity of the data evaluated by me and pose a risk of complications and /or morbidity re pt management  It is my role to address these issues with the pt, and family if present, such that their understanding of the key problems and issues listed and discussed  are understood     This I do through thorough explaination, both verbally, and with illustrations--eg:  Barrentt's esophagus, long discussion, In addition, considerable time was spent by me in reviewing all tests, consults from speciality physicians, ordering medications, and determining the best tests to be ordered for this patient's medical condition  I have spent 40 minutes with Patient  today in which greater than 50% of this time was spent in counseling/coordination of care regarding Diagnostic results, Prognosis, Risks and benefits of tx options, Intructions for management, Patient and family education, Importance of tx compliance, Risk factor reductions and Impressions

## 2021-03-16 NOTE — PATIENT INSTRUCTIONS
Medicare Preventive Visit Patient Instructions  Thank you for completing your Welcome to Medicare Visit or Medicare Annual Wellness Visit today  Your next wellness visit will be due in one year (3/17/2022)  The screening/preventive services that you may require over the next 5-10 years are detailed below  Some tests may not apply to you based off risk factors and/or age  Screening tests ordered at today's visit but not completed yet may show as past due  Also, please note that scanned in results may not display below  Preventive Screenings:  Service Recommendations Previous Testing/Comments   Colorectal Cancer Screening  · Colonoscopy    · Fecal Occult Blood Test (FOBT)/Fecal Immunochemical Test (FIT)  · Fecal DNA/Cologuard Test  · Flexible Sigmoidoscopy Age: 54-65 years old   Colonoscopy: every 10 years (May be performed more frequently if at higher risk)  OR  FOBT/FIT: every 1 year  OR  Cologuard: every 3 years  OR  Sigmoidoscopy: every 5 years  Screening may be recommended earlier than age 48 if at higher risk for colorectal cancer  Also, an individualized decision between you and your healthcare provider will decide whether screening between the ages of 74-80 would be appropriate   Colonoscopy: 06/24/2019  FOBT/FIT: Not on file  Cologuard: Not on file  Sigmoidoscopy: Not on file    Screening Current     Prostate Cancer Screening Individualized decision between patient and health care provider in men between ages of 53-78   Medicare will cover every 12 months beginning on the day after your 50th birthday PSA: No results in last 5 years           Hepatitis C Screening Once for adults born between 1945 and 1965  More frequently in patients at high risk for Hepatitis C Hep C Antibody: 09/08/2017    Screening Current   Diabetes Screening 1-2 times per year if you're at risk for diabetes or have pre-diabetes Fasting glucose: 126 mg/dL   A1C: No results in last 5 years    Screening Not Indicated  History Diabetes Cholesterol Screening Once every 5 years if you don't have a lipid disorder  May order more often based on risk factors  Lipid panel: 01/13/2021    Screening Not Indicated  History Lipid Disorder      Other Preventive Screenings Covered by Medicare:  1  Abdominal Aortic Aneurysm (AAA) Screening: covered once if your at risk  You're considered to be at risk if you have a family history of AAA or a male between the age of 73-68 who smoking at least 100 cigarettes in your lifetime  2  Lung Cancer Screening: covers low dose CT scan once per year if you meet all of the following conditions: (1) Age 50-69; (2) No signs or symptoms of lung cancer; (3) Current smoker or have quit smoking within the last 15 years; (4) You have a tobacco smoking history of at least 30 pack years (packs per day x number of years you smoked); (5) You get a written order from a healthcare provider  3  Glaucoma Screening: covered annually if you're considered high risk: (1) You have diabetes OR (2) Family history of glaucoma OR (3)  aged 48 and older OR (3)  American aged 72 and older  3  Osteoporosis Screening: covered every 2 years if you meet one of the following conditions: (1) Have a vertebral abnormality; (2) On glucocorticoid therapy for more than 3 months; (3) Have primary hyperparathyroidism; (4) On osteoporosis medications and need to assess response to drug therapy  5  HIV Screening: covered annually if you're between the age of 12-76  Also covered annually if you are younger than 13 and older than 72 with risk factors for HIV infection  For pregnant patients, it is covered up to 3 times per pregnancy      Immunizations:  Immunization Recommendations   Influenza Vaccine Annual influenza vaccination during flu season is recommended for all persons aged >= 6 months who do not have contraindications   Pneumococcal Vaccine (Prevnar and Pneumovax)  * Prevnar = PCV13  * Pneumovax = PPSV23 Adults 25-60 years old: 1-3 doses may be recommended based on certain risk factors  Adults 72 years old: Prevnar (PCV13) vaccine recommended followed by Pneumovax (PPSV23) vaccine  If already received PPSV23 since turning 65, then PCV13 recommended at least one year after PPSV23 dose  Hepatitis B Vaccine 3 dose series if at intermediate or high risk (ex: diabetes, end stage renal disease, liver disease)   Tetanus (Td) Vaccine - COST NOT COVERED BY MEDICARE PART B Following completion of primary series, a booster dose should be given every 10 years to maintain immunity against tetanus  Td may also be given as tetanus wound prophylaxis  Tdap Vaccine - COST NOT COVERED BY MEDICARE PART B Recommended at least once for all adults  For pregnant patients, recommended with each pregnancy  Shingles Vaccine (Shingrix) - COST NOT COVERED BY MEDICARE PART B  2 shot series recommended in those aged 48 and above     Health Maintenance Due:      Topic Date Due    Colorectal Cancer Screening  06/24/2029    Hepatitis C Screening  Completed     Immunizations Due:      Topic Date Due    Pneumococcal Vaccine: 65+ Years (2 of 2 - PPSV23) 11/17/2015    DTaP,Tdap,and Td Vaccines (2 - Td) 11/22/2020     Advance Directives   What are advance directives? Advance directives are legal documents that state your wishes and plans for medical care  These plans are made ahead of time in case you lose your ability to make decisions for yourself  Advance directives can apply to any medical decision, such as the treatments you want, and if you want to donate organs  What are the types of advance directives? There are many types of advance directives, and each state has rules about how to use them  You may choose a combination of any of the following:  · Living will: This is a written record of the treatment you want  You can also choose which treatments you do not want, which to limit, and which to stop at a certain time   This includes surgery, medicine, IV fluid, and tube feedings  · Durable power of  for healthcare [facility]): This is a written record that states who you want to make healthcare choices for you when you are unable to make them for yourself  This person, called a proxy, is usually a family member or a friend  You may choose more than 1 proxy  · Do not resuscitate (DNR) order:  A DNR order is used in case your heart stops beating or you stop breathing  It is a request not to have certain forms of treatment, such as CPR  A DNR order may be included in other types of advance directives  · Medical directive: This covers the care that you want if you are in a coma, near death, or unable to make decisions for yourself  You can list the treatments you want for each condition  Treatment may include pain medicine, surgery, blood transfusions, dialysis, IV or tube feedings, and a ventilator (breathing machine)  · Values history: This document has questions about your views, beliefs, and how you feel and think about life  This information can help others choose the care that you would choose  Why are advance directives important? An advance directive helps you control your care  Although spoken wishes may be used, it is better to have your wishes written down  Spoken wishes can be misunderstood, or not followed  Treatments may be given even if you do not want them  An advance directive may make it easier for your family to make difficult choices about your care  © Copyright [employer] 2018 Information is for End User's use only and may not be sold, redistributed or otherwise used for commercial purposes  All illustrations and images included in CareNotes® are the copyrighted property of [employer]  or [employer] DevicesType 2 Diabetes in the Older Adult   WHAT YOU NEED TO KNOW:   The risk for type 2 diabetes increases as a person gets older   Type 2 diabetes means your pancreas does not make enough insulin, or your body does not use insulin well  Insulin helps move sugar out of the blood so it can be used for energy  Diabetes cannot be cured, but it can be managed  DISCHARGE INSTRUCTIONS:   Call or have someone close to you call your local emergency number (911 in the [address] [facility]) for any of the following:   · You have any of the following signs of a stroke:      ? Numbness or drooping on one side of your face     ? Weakness in an arm or leg    ? Confusion or difficulty speaking    ? Dizziness, a severe headache, or vision loss    · You have any of the following signs of a heart attack:      ? Squeezing, pressure, or pain in your chest    ? You may  also have any of the following:     § Discomfort or pain in your back, neck, jaw, stomach, or arm    § Shortness of breath    § Nausea or vomiting    § Lightheadedness or a sudden cold sweat    Return to the emergency department if:   · Your blood sugar level is higher than your goal and does not come down with treatment  · You have signs of a high blood sugar level, such as blurred or double vision  · You have signs of a high ketone level, such as fruity, sweet smelling breath, or shallow breathing  · You have symptoms of a low blood sugar level, such as trouble thinking, sweating, or a pounding heartbeat  · Your blood sugar level is lower than normal and does not improve with treatment  Call your doctor or diabetes care team if:   · You are vomiting or have diarrhea  · You have an upset stomach and cannot eat the foods on your meal plan  · You feel weak or more tired than usual     · You feel dizzy, have headaches, or are easily irritated  · Your skin is red, warm, dry, or swollen  · You have a wound that does not heal     · You have numbness in your arms or legs  · You have trouble coping with diabetes, or you feel anxious or depressed  · You have problems with your memory  · You have changes in your vision      · You have questions or concerns about your condition or care  Manage diabetes and prevent problems:  Sometimes type 2 diabetes can be managed with changes in nutrition and physical activity  · Work with your diabetes care team to create plans to meet your needs  Your diabetes care team may include a physician, nurse practitioner, and physician assistant  It may also include a diabetes nurse educator, dietitian, and an exercise specialist  Family members, or others who are close to you, may also be part of the team  You and your team will make goals and plans to manage diabetes and other health problems  For example, the plan will include how to manage medicines you may take for diabetes and for other health conditions  The plans and goals will be specific to your needs and abilities  Your plan will change as your needs and abilities change  · Manage other health issues as directed  Health issues may include high blood pressure, high cholesterol levels, and heart problems  Health issues may also include depression  Together you and your care team can create a plan to manage any other health issues  · Try to be physically active for 30 to 60 minutes most days of the week  Physical activity, such as exercise, helps keep your blood sugar level steady and lowers your risk for heart disease  Physical activity can help improve your balance and strength and lower your risk for falls  Start slowly  Activity can be done in 10-minute intervals  ? Set a goal for 30 minutes of aerobic activity at least 5 times a week  Aerobic activity helps your heart stay strong  Aerobic activity includes walking, bicycling, dancing, swimming, and raking leaves  ? Set a goal for strength training 2 times a week  Strength training helps you keep the muscles you have and build new muscles  Strength training includes lifting weights, climbing stairs, and doing yoga or ananya chi     ? Stay steady on your on your feet with balancing activities    These include walking backwards, standing on one foot, and walking heel to toe in a straight line  · Maintain a healthy weight  Ask your provider what a healthy weight is for you  A healthy weight can help you control diabetes and prevent heart disease  Ask your provider to help you create a weight loss plan if you are overweight  Weight loss of 10 to 15 pounds can help make a difference in managing diabetes  Together you and your care team can set manageable weight loss goals  · Know the risks if you choose to drink alcohol  Alcohol can cause your blood sugar levels to be low if you use insulin  Alcohol can cause high blood sugar levels and weight gain if you drink too much  Women 21 years or older and men 72 years or older should limit alcohol to 1 drink a day  Men 21 to 64 years should limit alcohol to 2 drinks a day  A drink of alcohol is 12 ounces of beer, 5 ounces of wine, or 1½ ounces of liquor  · Do not smoke  Nicotine and other chemicals in cigarettes can cause lung disease and other health problems  It can also cause blood vessel damage that makes diabetes more difficult to manage  Ask your healthcare provider for information if you currently smoke and need help to quit  Do not use e-cigarettes or smokeless tobacco in place of cigarettes or to help you quit  They still contain nicotine  Diabetes education:  Diabetes education will start right away  Members of your care team teach you, your family, and caregivers the following:  · How to check your blood sugar level: You will learn when to check your blood sugar level and what the level should be  You will learn what to do if your level is too high or too low  Write down the times of your checks and your levels  Take them to all follow-up appointments  · About diabetes medicine: You and your family members will be taught how to draw up and give insulin, if needed  You will learn how much insulin you need and what time to inject insulin   You will be taught when not to give insulin  Your team will also teach you how to dispose of needles and syringes  If you need oral diabetes medicine, you will be taught about side effects  You will also be taught when to take or not take the medicine  · About nutrition:  A dietitian will help you make a meal plan to keep your blood sugar level steady  You will learn how food affects your blood sugar levels  You will also learn to keep track of sugar and starchy foods (carbohydrates)  Do not skip meals  Your blood sugar level may drop too low if you have taken insulin and do not eat  · How to prevent complications:  Diabetes that is not well controlled can lead to health problems  Examples include foot sores, retinopathy (vision loss), and peripheral neuropathy (loss of feeling in your hands and feet)  Your team will help you know when to get regular checkups, such as vision checks  They will teach you how to watch for problems and when to get a problem checked  Other ways to manage diabetes:   · Check your feet every day for sores  Look at your whole foot, including the bottom, and between and under your toes  Check for wounds, corns, and calluses  Use a mirror to see the bottom of your feet  The skin on your feet may be shiny, tight, dry, or darker than normal  Your feet may also be cold and pale  Feel your feet by running your hands along the tops, bottoms, sides, and between your toes  Redness, swelling, and warmth are signs of blood flow problems that can lead to a foot ulcer  Do not try to remove corns or calluses yourself  · Wear medical alert identification  Wear medical alert jewelry or carry a card that says you have type 2 diabetes  Ask your healthcare provider where to get these items  · Ask about vaccines  You have a higher risk for serious illness if you get the flu, pneumonia, or hepatitis   Ask your healthcare provider if you should get a flu, pneumonia, shingles, or hepatitis B vaccine, and when to get the vaccine  · Get help from family and friends  You may need help checking your blood sugar level, giving insulin injections, or preparing your meals  You may also need help to check your feet for sores  Ask your family and friends to help you with these tasks  Talk to your care team if you need someone at home to help you  Follow up with your doctor or diabetes care team as directed: You will need to return to meet with different care team members  You may need tests to monitor for problems  Write down your questions so you remember to ask them during your visits  © Copyright [address] [place] [address] Information is for End User's use only and may not be sold, redistributed or otherwise used for commercial purposes  All illustrations and images included in CareNotes® are the copyrighted property of [employer]  or [facility][note 3] Rebecca Putnam   The above information is an  only  It is not intended as medical advice for individual conditions or treatments  Talk to your doctor, nurse or pharmacist before following any medical regimen to see if it is safe and effective for you

## 2021-03-16 NOTE — PROGRESS NOTES
Diabetic Foot Exam    Right Foot/Ankle   Right Foot Inspection  Skin Exam: skin normal, skin intact, dry skin, callus and callus no warmth, no erythema, no maceration, no abnormal color, no pre-ulcer and no ulcer                          Toe Exam: ROM and strength within normal limits  Sensory   Vibration: intact  Proprioception: intact   Monofilament testing: intact  Vascular  Capillary refills: < 3 seconds  The right DP pulse is 2+  The right PT pulse is 1+  Left Foot/Ankle  Left Foot Inspection  Skin Exam: skin normal, skin intact, dry skin and callusno warmth, no erythema, no maceration, normal color, no pre-ulcer and no ulcer                         Toe Exam: ROM and strength within normal limits                   Sensory   Vibration: intact  Proprioception: intact  Monofilament: intact  Vascular  Capillary refills: elevated  The left DP pulse is 2+  The left PT pulse is 1+  Assign Risk Category:  Deformity present; No loss of protective sensation; No weak pulses       Risk: 1   Assessment and Plan:     Problem List Items Addressed This Visit     None           Preventive health issues were discussed with patient, and age appropriate screening tests were ordered as noted in patient's After Visit Summary  Personalized health advice and appropriate referrals for health education or preventive services given if needed, as noted in patient's After Visit Summary       History of Present Illness:     Patient presents for Medicare Annual Wellness visit    Patient Care Team:  Liz Sullivan DO as PCP - General (Family Medicine)  MD Michael Shirley MD (Pulmonary Disease)     Problem List:     Patient Active Problem List   Diagnosis    Chronic kidney disease, stage III (moderate)    Dyslipidemia    Persistent proteinuria    Hypertensive chronic kidney disease with stage 1 through stage 4 chronic kidney disease, or unspecified chronic kidney disease    Hyperkalemia    Hypertension, essential  COPD (chronic obstructive pulmonary disease) (HCC)    Sarcoidosis    Other spondylosis, thoracolumbar region    Type 2 diabetes mellitus without complication, without long-term current use of insulin (HCC)    Diabetic nephropathy associated with type 2 diabetes mellitus ([facility] [address] [facility] )      Past Medical and Surgical History:     Past Medical History:   Diagnosis Date    Anemia     Arthritis     Gee's esophagus     Breast lump     Cancer (HCC)     Chronic kidney disease     Cirrhosis ([facility] )     Coronary artery disease     cardiac cath; stents     [facility]     H/O heart artery stent     Hypertension     Inguinal hernia     Right    Sarcoidosis     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HCC)     Skin cancer     SOB (shortness of breath)      Past Surgical History:   Procedure Laterality Date    CARDIAC CATHETERIZATION      CERVICAL FUSION      CHOLECYSTECTOMY      COLONOSCOPY  04/11/2016    EGD AND COLONOSCOPY  06/24/2019    ERCP  09/11/2014    transab esop hiat griffin rpr    ESOPHAGOGASTRIC FUNDOPLASTY      HERNIA REPAIR Right     p I/griffin init reduc >5 yr    HIP SURGERY Right     Replacement    KIDNEY STONE SURGERY      Fragmenting    LIVER BIOPSY      MULTIPLE TOOTH EXTRACTIONS      "extraction of all maxillary teeth"    SKIN BIOPSY  05/2020    THUMB FUSION      with graft    TONSILLECTOMY        Family History:     Family History   Problem Relation Age of Onset    Hypertension Mother     Atrial fibrillation Mother     Heart disease Mother     Kidney disease Father     COPD Father     Heart disease Father     Asthma Neg Hx     Lung cancer Neg Hx       Social History:     E-Cigarette/Vaping    E-Cigarette Use Never User      E-Cigarette/Vaping Substances    Nicotine No     THC No     CBD No     Flavoring No     Other No     Unknown No      Social History     Socioeconomic History    Marital status:       Spouse name: None    Number of children: 0    Years of education: None    Highest education level: Associate degree: occupational, technical, or vocational program   Occupational History    Occupation: RETIRED     Comment: jewlery repair   Social Needs    Financial resource strain: Not very hard    Food insecurity     Worry: Never true     Inability: Never true    Transportation needs     Medical: No     Non-medical: No   Tobacco Use    Smoking status: Never Smoker    Smokeless tobacco: Never Used    Tobacco comment: RETIRED   Substance and Sexual Activity    Alcohol use: No     Comment: Alcohol intake:   Occasional wine - As per [employer] Drug use: No    Sexual activity: None   Lifestyle    Physical activity     Days per week: 7 days     Minutes per session: 30 min    Stress: Not at all   Relationships    Social connections     Talks on phone: More than three times a week     Gets together: More than three times a week     Attends Scientologist service: Never     Active member of club or organization: Yes     Attends meetings of clubs or organizations: 1 to 4 times per year     Relationship status:     Intimate partner violence     Fear of current or ex partner: None     Emotionally abused: None     Physically abused: None     Forced sexual activity: None   Other Topics Concern    None   Social History Narrative    · Most recent tobacco use screenin2020      · Do you currently or have you served in [employer]:   No      · Were you activated, into active duty, as a member of the [employer] or as a Reservist:   No      · Alcohol intake:   Occasional,Wine     · Asbestos exposure:   No      · TB exposure:   No      · Animal exposure:   No      · Exercise level:   Heavy      · Caffeine intake:    Moderate                 no cpap or oxygen     - As per [facility]       Medications and Allergies:     Current Outpatient Medications   Medication Sig Dispense Refill    allopurinol (ZYLOPRIM) 300 mg tablet Take 0 5 tablets (150 mg total) by mouth daily 45 tablet 6    amLODIPine (NORVASC) 5 mg tablet TAKE 1 TABLET (5 MG) BY MOUTH DAILY 30 tablet 5    aspirin (ECOTRIN LOW STRENGTH) 81 mg EC tablet Take 81 mg by mouth daily      atorvastatin (LIPITOR) 20 mg tablet TAKE 2 TABLETS (40 MG TOTAL) BY MOUTH DAILY 60 tablet 5    B Complex-C (SUPERPLEX-T) TABS Take 1 tablet by mouth daily      Breo Ellipta 100-25 MCG/INH inhaler INHALE 1 PUFF BY MOUTH EVERY DAY 60 Inhaler 3    chlorthalidone 25 mg tablet TAKE "ONE-HALF" TABLET (12 5 MG) BY MOUTH DAILY 15 tablet 4    Cholecalciferol (VITAMIN D3) 2000 units TABS Take 1 tablet by mouth daily      co-enzyme Q-10 50 MG capsule Take by mouth daily      Colcrys 0 6 MG tablet Take 1 tablet (0 6 mg total) by mouth daily 30 tablet 3    Edarbi 40 MG tablet TAKE 1 TABLET TWICE A DAY 60 tablet 5    Ferrous Sulfate (IRON) 325 (65 Fe) MG TABS Take 1 tablet by mouth daily      INCRUSE ELLIPTA 62 5 MCG/INH AEPB Take 1 puff by mouth daily  5    Januvia 100 MG tablet TAKE 1 TABLET BY MOUTH EVERY DAY 90 tablet 3    magnesium chloride-calcium (SLOW-MAG) 71 5-119 mg Take 2 tablets by mouth 2 (two) times a day      metoprolol tartrate (LOPRESSOR) 50 mg tablet Take 1 tablet (50 mg total) by mouth 2 (two) times a day 180 tablet 3    pantoprazole (PROTONIX) 40 mg tablet Take 40 mg by mouth daily half an hour before breakfast  90 tablet 1    prasugrel (EFFIENT) tablet Take 10 mg by mouth daily       sulfaSALAzine (AZULFIDINE) 500 mg tablet TAKE 1 AND 1/2 TABLETS BY MOUTH ONE TIME DAILY 135 tablet 3    pantoprazole (PROTONIX) 40 mg tablet Take 40 mg by mouth daily       No current facility-administered medications for this visit        Allergies   Allergen Reactions    Oxycodone GI Intolerance      Immunizations:     Immunization History   Administered Date(s) Administered    H1N1, All Formulations 12/28/2009    INFLUENZA 09/23/2010, 09/13/2011, 08/30/2012, 10/11/2013, 10/15/2014, 10/27/2015, 09/29/2016, 10/26/2017, 11/20/2018, 11/07/2019, 10/16/2020    Pneumococcal Conjugate 13-Valent 11/17/2014    Pneumococcal Polysaccharide PPV23 10/01/1997, 10/01/2003    SARS-CoV-2 / COVID-19 mRNA IM (Pfizer-BioNTech) 01/14/2021, 02/05/2021    Tdap 11/22/2010    Zoster 01/01/2017    influenza, trivalent, adjuvanted 11/07/2019      Health Maintenance:         Topic Date Due    Colorectal Cancer Screening  06/24/2029    Hepatitis C Screening  Completed         Topic Date Due    Pneumococcal Vaccine: 65+ Years (2 of 2 - PPSV23) 11/17/2015    DTaP,Tdap,and Td Vaccines (2 - Td) 11/22/2020      Medicare Health Risk Assessment:     /92   Pulse 70   Temp (!) 96 7 °F (35 9 °C)   Resp 18   Ht 5' 11" (1 803 m)   Wt 76 6 kg (168 lb 12 8 oz)   SpO2 97%   BMI 23 54 kg/m²          Health Risk Assessment:   Patient rates overall health as good  Patient feels that their physical health rating is same  Patient is very satisfied with their life  Eyesight was rated as same  Hearing was rated as same  Patient feels that their emotional and mental health rating is same  Patients states they are never, rarely angry  Patient states they are never, rarely unusually tired/fatigued  Pain experienced in the last 7 days has been none  Patient states that he has experienced no weight loss or gain in last 6 months  Depression Screening:   PHQ-2 Score: 0      Fall Risk Screening: In the past year, patient has experienced: no history of falling in past year      Home Safety:  Patient does not have trouble with stairs inside or outside of their home  Patient has working smoke alarms and has working carbon monoxide detector  Home safety hazards include: none  Nutrition:   Current diet is Regular  Medications:   Patient is currently taking over-the-counter supplements  OTC medications include: see medication list  Patient is able to manage medications       Activities of Daily Living (ADLs)/Instrumental Activities of Daily Living (IADLs): Walk and transfer into and out of bed and chair?: Yes  Dress and groom yourself?: Yes    Bathe or shower yourself?: Yes    Feed yourself? Yes  Do your laundry/housekeeping?: Yes  Manage your money, pay your bills and track your expenses?: Yes  Make your own meals?: Yes    Do your own shopping?: Yes    Previous Hospitalizations:   Any hospitalizations or ED visits within the last 12 months?: No      Advance Care Planning:   Living will: Yes    Durable POA for healthcare: Yes    Advanced directive: Yes    Advanced directive counseling given: Yes    Five wishes given: Yes    Patient declined ACP directive: No    End of Life Decisions reviewed with patient: Yes    Provider agrees with end of life decisions: Yes      Comments: Has living will  POA is Karen Cano -- contractor, "he'll take care of me"    Cognitive Screening:   Provider or family/friend/caregiver concerned regarding cognition?: No    PREVENTIVE SCREENINGS      Cardiovascular Screening:    General: Screening Not Indicated, History Lipid Disorder and Risks and Benefits Discussed      Diabetes Screening:     General: Screening Not Indicated, History Diabetes and Risks and Benefits Discussed      Colorectal Cancer Screening:     General: Screening Current      Prostate Cancer Screening:      Due for: PSA      Osteoporosis Screening:    General: Risks and Benefits Discussed    Due for: DXA Axial      Abdominal Aortic Aneurysm (AAA) Screening:    Risk factors include: age between 73-69 yo        General: Screening Not Indicated      Lung Cancer Screening:     General: Screening Not Indicated      Hepatitis C Screening:    General: Screening Current    Hep C Screening Accepted: No     Screening, Brief Intervention, and Referral to Treatment (SBIRT)    Screening  Typical number of drinks in a day: 0  Typical number of drinks in a week: 0  Interpretation: Low risk drinking behavior      Single Item Drug Screening:  How often have you used an illegal drug (including marijuana) or a prescription medication for non-medical reasons in the past year? never    Single Item Drug Screen Score: 0  Interpretation: Negative screen for possible drug use disorder    Brief Intervention  Alcohol & drug use screenings were reviewed  No concerns regarding substance use disorder identified  Other Counseling Topics:   Car/seat belt/driving safety, skin self-exam, sunscreen and calcium and vitamin D intake and regular weightbearing exercise         Harris Frey, DO

## 2021-04-22 DIAGNOSIS — I12.9 PARENCHYMAL RENAL HYPERTENSION, STAGE 1 THROUGH STAGE 4 OR UNSPECIFIED CHRONIC KIDNEY DISEASE: ICD-10-CM

## 2021-04-22 RX ORDER — AZILSARTAN KAMEDOXOMIL 40 MG/1
TABLET ORAL
Qty: 60 TABLET | Refills: 5 | Status: SHIPPED | OUTPATIENT
Start: 2021-04-22 | End: 2021-10-15

## 2021-04-23 ENCOUNTER — APPOINTMENT (OUTPATIENT)
Dept: LAB | Facility: HOSPITAL | Age: 76
End: 2021-04-23
Attending: INTERNAL MEDICINE
Payer: MEDICARE

## 2021-04-23 DIAGNOSIS — E87.5 HYPERKALEMIA: ICD-10-CM

## 2021-04-23 DIAGNOSIS — E11.9 TYPE 2 DIABETES MELLITUS WITHOUT COMPLICATION, WITHOUT LONG-TERM CURRENT USE OF INSULIN (HCC): ICD-10-CM

## 2021-04-23 DIAGNOSIS — E78.5 DYSLIPIDEMIA: ICD-10-CM

## 2021-04-23 DIAGNOSIS — N18.31 STAGE 3A CHRONIC KIDNEY DISEASE (HCC): ICD-10-CM

## 2021-04-23 DIAGNOSIS — I12.9 HYPERTENSIVE CHRONIC KIDNEY DISEASE WITH STAGE 1 THROUGH STAGE 4 CHRONIC KIDNEY DISEASE, OR UNSPECIFIED CHRONIC KIDNEY DISEASE: ICD-10-CM

## 2021-04-23 DIAGNOSIS — E11.21 DIABETIC NEPHROPATHY ASSOCIATED WITH TYPE 2 DIABETES MELLITUS (HCC): ICD-10-CM

## 2021-04-23 DIAGNOSIS — R80.1 PERSISTENT PROTEINURIA: ICD-10-CM

## 2021-04-23 LAB
ANION GAP SERPL CALCULATED.3IONS-SCNC: 6 MMOL/L (ref 4–13)
BUN SERPL-MCNC: 33 MG/DL (ref 6–20)
CALCIUM SERPL-MCNC: 9.6 MG/DL (ref 8.4–10.2)
CHLORIDE SERPL-SCNC: 104 MMOL/L (ref 96–108)
CO2 SERPL-SCNC: 31 MMOL/L (ref 22–33)
CREAT SERPL-MCNC: 1.3 MG/DL (ref 0.5–1.2)
EST. AVERAGE GLUCOSE BLD GHB EST-MCNC: 128 MG/DL
GFR SERPL CREATININE-BSD FRML MDRD: 54 ML/MIN/1.73SQ M
GLUCOSE P FAST SERPL-MCNC: 141 MG/DL (ref 70–105)
HBA1C MFR BLD: 6.1 %
POTASSIUM SERPL-SCNC: 4.6 MMOL/L (ref 3.5–5)
SODIUM SERPL-SCNC: 141 MMOL/L (ref 133–145)

## 2021-04-23 PROCEDURE — 80048 BASIC METABOLIC PNL TOTAL CA: CPT

## 2021-04-23 PROCEDURE — 83036 HEMOGLOBIN GLYCOSYLATED A1C: CPT

## 2021-04-23 PROCEDURE — 36415 COLL VENOUS BLD VENIPUNCTURE: CPT

## 2021-04-26 PROBLEM — E11.22 TYPE 2 DIABETES MELLITUS WITH STAGE 3 CHRONIC KIDNEY DISEASE (HCC): Status: ACTIVE | Noted: 2021-04-26

## 2021-04-26 PROBLEM — N18.30 TYPE 2 DIABETES MELLITUS WITH STAGE 3 CHRONIC KIDNEY DISEASE (HCC): Status: ACTIVE | Noted: 2021-04-26

## 2021-04-27 NOTE — PROGRESS NOTES
Monitor Summary:   -ST   (r)PVCs   .14/.08/.36     RENAL FOLLOW UP NOTE: td    ASSESSMENT AND PLAN:  1   CKD stage 3  · Etiology:  Diabetic nephropathy/hypertensive nephrosclerosis/arteriolar nephrosclerosis/episodes of SHAY in the past   All serologies were negative including multiple myeloma evaluation  · Baseline creatinine:  1 2-1 62  · Current creatinine:  1 57 at baseline  · Urine protein creatinine ratio:  0 21 g at goal  Recommendations:  · Treat hypertension-please see below  · Treat dyslipidemia-please see below  · Maintain proteinuria less than 1 g or as low as possible  · Avoid nephrotoxic agents such as NSAIDs, patient counseled as such  2   Volume:  Euvolemic  3   Hypertension:       Current blood pressure averages:  AM:  120/66, standing 108/67  PM:  112/64, standing 111/66  Heart rate:  50-60 range occasionally in the 40s     · Goal blood pressure:  Less than  120-125/80   Recommendations:  ·  Push nonmedical regimen including weight loss, isotonic exercise and avoidance of salt; patient counseled as such  · Medication changes today:  Switch Cardizem to amlodipine 5 mg daily watch for swelling and monitor blood pressure  This is because of the bradycardia that was witnessed under anesthesia  There is no absolute indication for the Cardizem given the lack of arrhythmia as   4   Electrolytes:  Acceptable at this time  5   Mineral bone disorder:   · Calcium/magnesium/phosphorus:  All acceptable including calcium 9 5  · PTH intact:  35 at goal  · Vitamin-D:  42 6 AT GOAL  6   Dyslipidemia:  · Goal LDL:  Less than 70 given CAD  · Current lipid profile:  LDL 93/HDL 47/triglycerides 128  Recommendations:  ADJUST by switching to atorvastatin 20 mg daily may have to adjust the dose going forward to get goal   7   Anemia:  normal hemoglobin at 13 6  8   Other problems:  · Diabetes mellitus per your discretion  · CAD followed by Dr Fiona Pike through [facility]   Status post MI last October involving to cardiac stents     · Sarcoidosis followed by Dr Street          PATIENT INSTRUCTIONS:    Patient Instructions   1  Medication changes today:  · Stop Pravastatin/Pravachol  · Begin atorvastatin/Lipitor 20 mg daily  Watch for muscle aches or joint pains and call if you developed any  · Begin coenzyme Q10 100 mg daily which helps decrease any side effects of cholesterol medications and is an anti inflammatory  · Stop Carti xt or diltiazem XT now  · Begin amlodipine 5 mg daily in the morning  Watch for swelling and call if you developed any  If he notices any dizziness or lightheadedness call me  2  Please wait 2 weeks after making the above medication changes and send in an additional week a blood pressure readings morning evening, sitting and standing:  · Take the morning readings before any medications  · Take the evening readings before supper  · When taking standing readings, keep your arm supported at heart level and not dangling  Then please send these readings in    3  Please go for nonfasting lab work in 6 weeks    4  Follow-up in 4 months:  -please bring in 1 week a blood pressure readings morning evening, sitting and standing is outlined above  -please go for fasting lab work prior to your appointment    5  General instructions:  -avoid salt  -avoid medications such as Motrin, Naprosyn, ibuprofen, Aleve or Advil or Celebrex as they can affect your kidney function; you can use Tylenol as needed for pain or fevers if you have no liver problems  -avoid medications such as Sudafed or other medications with decongestants as they can raise your blood pressure  -try to exercise at least 30 minutes at least 3 days a week with an ultimate goal of 5 days a week            Subjective: The patient overall is feeling well  No fevers chills cough or colds    Good appetite and good energy  No urinary symptoms except for minimal bubbles/foam in the urine which is unchanged  No gastrointestinal symptoms  No cardiovascular symptoms including swelling of the legs except for rare ankle edema  No headaches, dizziness or lightheadedness  Blood pressure medications:  -lopressor 50 mg the morning and 25 mg in the evening  -Edarbi 40 mg twice a day  -cartia  mg daily  -chlorthalidone 12 5 mg daily    ROS:  See HPI, otherwise review of systems as completely reviewed with the patient are negative    Past Medical History:   Diagnosis Date    Anemia     Cancer ([facility]     Chronic kidney disease     Cirrhosis ([facility] )     Diabetes mellitus ([facility] )     H/O heart artery stent     Hypertension      Past Surgical History:   Procedure Laterality Date    COLONOSCOPY  06/24/2019    EGD AND COLONOSCOPY  06/24/2019     Family History   Problem Relation Age of Onset    Hypertension Mother     Kidney disease Father       reports that he has never smoked  He has never used smokeless tobacco  He reports that he does not drink alcohol or use drugs  I COMPLETELY REVIEWED THE PAST MEDICAL HISTORY/PAST SURGICAL HISTORY/SOCIAL HISTORY/FAMILY HISTORY/AND MEDICATIONS  AND UPDATED ALL    Objective:     Vitals:   Vitals:    08/14/19 1049   BP: 124/72   Pulse: 60    BP sitting on right:  140/62 with a heart rate of 60 and regular  BP standing on right:  120/64 with a heart rate of 60 and regular    Weight (last 2 days)     Date/Time   Weight    08/14/19 1049   80 6 (177 8)            Wt Readings from Last 3 Encounters:   08/14/19 80 6 kg (177 lb 12 8 oz)   04/05/19 87 4 kg (192 lb 9 6 oz)   12/18/18 85 3 kg (188 lb)       Body mass index is 25 51 kg/m²      Physical Exam: General:  No acute distress  Skin:  No acute rash  Eyes:  No scleral icterus, noninjected; mild ptosis of the left eye which is chronic  ENT:  Moist mucous membranes  Neck:  Supple, no jugular venous distention  Back   No CVAT  Chest:  Clear to auscultation and percussion, good respiratory effort  CVS:  Regular rate and rhythm without a rub, murmurs or gallops  Abdomen:  Soft and nontender with normal bowel sounds  Extremities: No cyanosis and trace ankle edema  Neuro:  Grossly intact  Psych:  Alert, oriented x3 and appropriate      Medications:    Current Outpatient Medications:     allopurinol (ZYLOPRIM) 300 mg tablet, Take 0 5 tablets by mouth daily, Disp: , Rfl:     aspirin 325 mg tablet, Take 81 mg by mouth daily , Disp: , Rfl:     B Complex-C (SUPERPLEX-T) TABS, Take 1 tablet by mouth daily, Disp: , Rfl:     chlorthalidone 25 mg tablet, TAKE "ONE-HALF" TABLET (12 5 MG) BY MOUTH DAILY, Disp: 15 tablet, Rfl: 4    Cholecalciferol (VITAMIN D3) 2000 units TABS, Take 1 tablet by mouth daily, Disp: , Rfl:     COLCRYS 0 6 MG tablet, Take 0 6 mg by mouth daily, Disp: , Rfl: 3    EDARBI 40 MG tablet, TAKE 1 TABLET TWICE A DAY, Disp: 60 tablet, Rfl: 6    esomeprazole (NexIUM) 40 MG capsule, Take 40 mg by mouth daily, Disp: , Rfl: 6    Ferrous Sulfate (IRON) 325 (65 Fe) MG TABS, Take 1 tablet by mouth daily, Disp: , Rfl:     fluticasone furoate-vilanterol (BREO ELLIPTA), Inhale daily, Disp: , Rfl:     INCRUSE ELLIPTA 62 5 MCG/INH AEPB, Take 1 puff by mouth daily, Disp: , Rfl: 5    magnesium chloride-calcium (SLOW-MAG) 71 5-119 mg, Take 2 tablets by mouth 2 (two) times a day, Disp: , Rfl:     metoprolol tartrate (LOPRESSOR) 50 mg tablet, Take 50 mg by mouth Take 50mg in the Am and 25 mg in the evening, Disp: , Rfl:     prasugrel (EFFIENT) tablet, Take 10 mg by mouth daily , Disp: , Rfl:     sitaGLIPtin (JANUVIA) 100 mg tablet, Take 1 tablet by mouth daily, Disp: , Rfl:     sulfaSALAzine (AZULFIDINE) 500 mg tablet, TAKE 1 AND 1/2 TABLETS BY MOUTH ONE TIME DAILY AS DIRECTED, Disp: , Rfl: 3    amLODIPine (NORVASC) 5 mg tablet, Take 1 tablet (5 mg total) by mouth daily, Disp: 30 tablet, Rfl: 5    atorvastatin (LIPITOR) 20 mg tablet, Take 1 tablet (20 mg total) by mouth daily, Disp: 30 tablet, Rfl: 5    EDARBI 40 MG tablet, TAKE 1 TABLET TWICE A DAY (Patient not taking: Reported on 8/14/2019), Disp: 60 tablet, Rfl: 5    Lab, Imaging and other studies: I have personally reviewed pertinent labs  Laboratory Results:  Results for orders placed or performed in visit on 08/14/19   Comprehensive metabolic panel   Result Value Ref Range    SODIUM 142     POTASSIUM 4 5     CHLORIDE 107     CO2 28     BUN 30     CREATININE 1 57     Glucose 110     EXTERNAL CALCIUM 9 5     TOTAL PROTEIN 6 8     ALBUMIN 4 2     AST 29     ALT 21     ALK PHOS 64     EXTERNAL TOT  BILIRUBIN 0 6     EXTERNAL EGFR 44     Phosphorus 3 4 2 3 - 4 1 mg/dL    Magnesium 2 0 1 6 - 2 6 mg/dL    Total  39 - 308 U/L    Triglycerides 128 150 mg/dL    Cholesterol 166 50 - 200 mg/dL    LDL Calculated 93 0 - 100 mg/dL    HDL, Direct 47 40 - 60 mg/dL    Chol HDLC Ratio 3 53     Vit D, 25-Hydroxy 42 6 30 0 - 100 0 ng/mL    PTH, Intact 35     WBC 8 80 Thousand/uL    Hemoglobin 13 6 12 0 - 17 0 g/dL    Hematocrit 40 3 36 5 - 49 3 %    Platelets 741 385 - 837 Thousands/uL    Prot/Creat Ratio, Ur 0 21 (A) 0 00 - 0 10             Invalid input(s): ALBUMIN      Radiology review:   chest X-ray    Ultrasound      Portions of the record may have been created with voice recognition software  Occasional wrong word or "sound a like" substitutions may have occurred due to the inherent limitations of voice recognition software  Read the chart carefully and recognize, using context, where substitutions have occurred

## 2021-04-28 ENCOUNTER — CLINICAL SUPPORT (OUTPATIENT)
Dept: FAMILY MEDICINE CLINIC | Facility: CLINIC | Age: 76
End: 2021-04-28
Payer: MEDICARE

## 2021-04-28 DIAGNOSIS — Z23 NEED FOR VACCINATION: Primary | ICD-10-CM

## 2021-04-28 PROCEDURE — G0009 ADMIN PNEUMOCOCCAL VACCINE: HCPCS

## 2021-04-28 PROCEDURE — 90471 IMMUNIZATION ADMIN: CPT

## 2021-04-28 PROCEDURE — 90715 TDAP VACCINE 7 YRS/> IM: CPT

## 2021-04-28 PROCEDURE — 90732 PPSV23 VACC 2 YRS+ SUBQ/IM: CPT

## 2021-05-17 DIAGNOSIS — I12.9 HYPERTENSIVE CHRONIC KIDNEY DISEASE WITH STAGE 1 THROUGH STAGE 4 CHRONIC KIDNEY DISEASE, OR UNSPECIFIED CHRONIC KIDNEY DISEASE: ICD-10-CM

## 2021-05-17 DIAGNOSIS — N18.30 CHRONIC KIDNEY DISEASE, STAGE III (MODERATE) (HCC): ICD-10-CM

## 2021-05-17 DIAGNOSIS — E78.5 DYSLIPIDEMIA: ICD-10-CM

## 2021-05-17 DIAGNOSIS — R80.1 PERSISTENT PROTEINURIA: ICD-10-CM

## 2021-05-17 RX ORDER — CHLORTHALIDONE 25 MG/1
TABLET ORAL
Qty: 15 TABLET | Refills: 4 | Status: SHIPPED | OUTPATIENT
Start: 2021-05-17 | End: 2021-10-15

## 2021-06-06 DIAGNOSIS — J43.9 PULMONARY EMPHYSEMA, UNSPECIFIED EMPHYSEMA TYPE (HCC): ICD-10-CM

## 2021-06-06 DIAGNOSIS — M1A.9XX0 CHRONIC GOUT WITHOUT TOPHUS, UNSPECIFIED CAUSE, UNSPECIFIED SITE: ICD-10-CM

## 2021-06-06 RX ORDER — FLUTICASONE FUROATE AND VILANTEROL TRIFENATATE 100; 25 UG/1; UG/1
POWDER RESPIRATORY (INHALATION)
Qty: 1 EACH | Refills: 3 | Status: SHIPPED | OUTPATIENT
Start: 2021-06-06 | End: 2021-11-01

## 2021-06-06 RX ORDER — COLCHICINE 0.6 MG/1
TABLET, FILM COATED ORAL
Qty: 30 TABLET | Refills: 3 | Status: SHIPPED | OUTPATIENT
Start: 2021-06-06 | End: 2021-06-08 | Stop reason: SDUPTHER

## 2021-06-08 DIAGNOSIS — M1A.9XX0 CHRONIC GOUT WITHOUT TOPHUS, UNSPECIFIED CAUSE, UNSPECIFIED SITE: ICD-10-CM

## 2021-06-08 RX ORDER — COLCHICINE 0.6 MG/1
0.6 TABLET ORAL DAILY
Qty: 30 TABLET | Refills: 3 | Status: SHIPPED | OUTPATIENT
Start: 2021-06-08 | End: 2021-08-28

## 2021-06-18 DIAGNOSIS — E78.5 DYSLIPIDEMIA: ICD-10-CM

## 2021-06-18 DIAGNOSIS — I12.9 HYPERTENSIVE CHRONIC KIDNEY DISEASE WITH STAGE 1 THROUGH STAGE 4 CHRONIC KIDNEY DISEASE, OR UNSPECIFIED CHRONIC KIDNEY DISEASE: ICD-10-CM

## 2021-06-18 DIAGNOSIS — N18.30 CHRONIC KIDNEY DISEASE, STAGE III (MODERATE) (HCC): ICD-10-CM

## 2021-06-18 DIAGNOSIS — R80.1 PERSISTENT PROTEINURIA: ICD-10-CM

## 2021-06-18 RX ORDER — AMLODIPINE BESYLATE 5 MG/1
TABLET ORAL
Qty: 30 TABLET | Refills: 5 | Status: SHIPPED | OUTPATIENT
Start: 2021-06-18 | End: 2021-12-15

## 2021-07-02 ENCOUNTER — TELEPHONE (OUTPATIENT)
Dept: NEPHROLOGY | Facility: CLINIC | Age: 76
End: 2021-07-02

## 2021-07-05 ENCOUNTER — APPOINTMENT (OUTPATIENT)
Dept: LAB | Facility: HOSPITAL | Age: 76
End: 2021-07-05
Payer: MEDICARE

## 2021-07-05 DIAGNOSIS — E78.2: ICD-10-CM

## 2021-07-05 LAB
ALT SERPL W P-5'-P-CCNC: 18 U/L (ref 5–63)
AST SERPL W P-5'-P-CCNC: 23 U/L (ref 15–41)
CHOLEST SERPL-MCNC: 153 MG/DL
HDLC SERPL-MCNC: 49 MG/DL
LDLC SERPL CALC-MCNC: 74 MG/DL (ref 0–100)
NONHDLC SERPL-MCNC: 104 MG/DL
TRIGL SERPL-MCNC: 151 MG/DL

## 2021-07-05 PROCEDURE — 80061 LIPID PANEL: CPT

## 2021-07-05 PROCEDURE — 84460 ALANINE AMINO (ALT) (SGPT): CPT

## 2021-07-05 PROCEDURE — 84450 TRANSFERASE (AST) (SGOT): CPT

## 2021-07-05 PROCEDURE — 36415 COLL VENOUS BLD VENIPUNCTURE: CPT

## 2021-07-08 DIAGNOSIS — J43.9 PULMONARY EMPHYSEMA, UNSPECIFIED EMPHYSEMA TYPE (HCC): Primary | ICD-10-CM

## 2021-07-08 RX ORDER — UMECLIDINIUM 62.5 UG/1
1 AEROSOL, POWDER ORAL DAILY
Qty: 30 BLISTER | Refills: 0 | Status: SHIPPED | OUTPATIENT
Start: 2021-07-08 | End: 2021-08-26 | Stop reason: SDUPTHER

## 2021-07-08 NOTE — TELEPHONE ENCOUNTER
Cl Gomez from [email or web address] called for refill request for [employer]  States she tried to transfer from [facility] however the rx was too old and she was unable to do so  New order is now pending your approval for refill

## 2021-07-09 DIAGNOSIS — M1A.9XX0 CHRONIC GOUT WITHOUT TOPHUS, UNSPECIFIED CAUSE, UNSPECIFIED SITE: ICD-10-CM

## 2021-07-09 DIAGNOSIS — I12.9 HYPERTENSIVE CHRONIC KIDNEY DISEASE WITH STAGE 1 THROUGH STAGE 4 CHRONIC KIDNEY DISEASE, OR UNSPECIFIED CHRONIC KIDNEY DISEASE: ICD-10-CM

## 2021-07-09 DIAGNOSIS — E78.5 DYSLIPIDEMIA: ICD-10-CM

## 2021-07-09 DIAGNOSIS — N18.30 CHRONIC KIDNEY DISEASE, STAGE III (MODERATE) (HCC): ICD-10-CM

## 2021-07-09 DIAGNOSIS — R80.1 PERSISTENT PROTEINURIA: ICD-10-CM

## 2021-07-09 RX ORDER — ALLOPURINOL 300 MG/1
150 TABLET ORAL DAILY
Qty: 45 TABLET | Refills: 3 | Status: SHIPPED | OUTPATIENT
Start: 2021-07-09 | End: 2021-11-22 | Stop reason: SDUPTHER

## 2021-07-09 RX ORDER — ATORVASTATIN CALCIUM 20 MG/1
40 TABLET, FILM COATED ORAL DAILY
Qty: 180 TABLET | Refills: 3 | Status: SHIPPED | OUTPATIENT
Start: 2021-07-09 | End: 2021-07-20

## 2021-07-19 DIAGNOSIS — K21.00 GASTROESOPHAGEAL REFLUX DISEASE WITH ESOPHAGITIS WITHOUT HEMORRHAGE: ICD-10-CM

## 2021-07-19 RX ORDER — PANTOPRAZOLE SODIUM 40 MG/1
TABLET, DELAYED RELEASE ORAL
Qty: 30 TABLET | Refills: 1 | Status: SHIPPED | OUTPATIENT
Start: 2021-07-19 | End: 2021-09-16

## 2021-07-20 ENCOUNTER — OFFICE VISIT (OUTPATIENT)
Dept: FAMILY MEDICINE CLINIC | Facility: CLINIC | Age: 76
End: 2021-07-20
Payer: MEDICARE

## 2021-07-20 VITALS
DIASTOLIC BLOOD PRESSURE: 68 MMHG | OXYGEN SATURATION: 98 % | SYSTOLIC BLOOD PRESSURE: 132 MMHG | BODY MASS INDEX: 24.08 KG/M2 | HEIGHT: 71 IN | TEMPERATURE: 97.5 F | WEIGHT: 172 LBS | HEART RATE: 67 BPM | RESPIRATION RATE: 18 BRPM

## 2021-07-20 DIAGNOSIS — I10 HYPERTENSION, ESSENTIAL: ICD-10-CM

## 2021-07-20 DIAGNOSIS — K21.00 GASTROESOPHAGEAL REFLUX DISEASE WITH ESOPHAGITIS WITHOUT HEMORRHAGE: Primary | ICD-10-CM

## 2021-07-20 DIAGNOSIS — J44.9 CHRONIC OBSTRUCTIVE PULMONARY DISEASE, UNSPECIFIED COPD TYPE (HCC): ICD-10-CM

## 2021-07-20 DIAGNOSIS — Z23 NEED FOR VACCINATION: ICD-10-CM

## 2021-07-20 DIAGNOSIS — M1A.9XX0 CHRONIC GOUT WITHOUT TOPHUS, UNSPECIFIED CAUSE, UNSPECIFIED SITE: ICD-10-CM

## 2021-07-20 DIAGNOSIS — N18.31 STAGE 3A CHRONIC KIDNEY DISEASE (HCC): ICD-10-CM

## 2021-07-20 DIAGNOSIS — E11.9 TYPE 2 DIABETES MELLITUS WITHOUT COMPLICATION, WITHOUT LONG-TERM CURRENT USE OF INSULIN (HCC): ICD-10-CM

## 2021-07-20 PROCEDURE — 99214 OFFICE O/P EST MOD 30 MIN: CPT | Performed by: FAMILY MEDICINE

## 2021-07-20 RX ORDER — ATORVASTATIN CALCIUM 80 MG/1
80 TABLET, FILM COATED ORAL DAILY
COMMUNITY
End: 2021-11-22 | Stop reason: SDUPTHER

## 2021-07-20 NOTE — PROGRESS NOTES
Assessment/Plan:  70 y o male, alert, in NAD, well-known to me for many years presents for f/u and evaluation of the following medical issues:     F/u and eval HTN: 132/68 at goal, on Norvasc, Edarbi, metoprolol    F/u and eval NIDDM: on Januvia 100 mg OD    F/u and eval HLD:  On Lipitor 80  mg OD-- Dr Henrique Story put on as chol 174, on June 30  Dr Alexander Mcgee ordered more lab for 2-3  Mo     F/u and eval deconditioning:  Walking , remodeling Mother's house-- she was a hoarder    F/u and eval polypharmacy:  All med reviewed and discussed  Problem List Items Addressed This Visit        Endocrine    Type 2 diabetes mellitus without complication, without long-term current use of insulin (HCC)       Respiratory    COPD (chronic obstructive pulmonary disease) ([facility])       Cardiovascular and Mediastinum    Hypertension, essential       Genitourinary    Chronic kidney disease, stage III (moderate) (HCC)      Other Visit Diagnoses     Gastroesophageal reflux disease with esophagitis without hemorrhage    -  Primary    on Protonix 40 mg 1/2 hr ac b'fst - been 2 yrs since sees Dr Moose Barrow and see soon for EGD    Chronic gout without tophus, unspecified cause, unspecified site        Need for vaccination        Had both Covid shots  Subjective: 70 y o male, in NAD     Patient ID: Bertis Mcburney is a 70 y o  male  HPI-- 70 y o male, alert, in NAD, well-known to me for many years presents for f/u and evaluation of the following medical issues:     F/u and eval HTN: 132/68 at goal, on Norvasc, Edarbi, metoprolol    F/u and eval NIDDM: on Januvia 100 mg OD    F/u and eval HLD:  On Lipitor 80  mg OD-- Dr Henrique Story put on as chol 174, on June 30    Dr Alexander Mcgee ordered more lab for 2-3  Mo     F/u and eval deconditioning:  Walking , remodeling Mother's house-- she was a hoarder    F/u and eval polypharmacy:  All med reviewed and discussed    The following portions of the patient's history were reviewed and updated as appropriate:   Past Medical History:  He has a past medical history of Anemia, Arthritis, Gee's esophagus, Breast lump, Cancer ([facility] ), Chronic kidney disease, Cirrhosis ([facility] ), Coronary artery disease, Diabetes mellitus ([facility] ), H/O heart artery stent, Hypertension, Inguinal hernia, Sarcoidosis, 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[facility] ), Skin cancer, and SOB (shortness of breath)  ,  _______________________________________________________________________  Medical Problems:  does not have any pertinent problems on file ,  _______________________________________________________________________  Past Surgical History:   has a past surgical history that includes Colonoscopy (04/11/2016); EGD AND COLONOSCOPY (06/24/2019); Cardiac catheterization; Tonsillectomy; Thumb fusion; ERCP (09/11/2014); Multiple tooth extractions; Cervical fusion; Liver biopsy; Hip surgery (Right); Kidney stone surgery; Cholecystectomy; Skin biopsy (05/2020); Esophagogastric fundoplasty; Hernia repair (Right); and US guidance (10/22/2014)  ,  _______________________________________________________________________  Family History:  family history includes Atrial fibrillation in his mother; COPD in his father; Heart disease in his father and mother; Hypertension in his mother; Kidney disease in his father ,  _______________________________________________________________________  Social History:   reports that he has never smoked  He has never used smokeless tobacco  He reports that he does not drink alcohol and does not use drugs  ,  _______________________________________________________________________  Allergies:  is allergic to oxycodone     _______________________________________________________________________  Current Outpatient Medications   Medication Sig Dispense Refill    allopurinol (ZYLOPRIM) 300 mg tablet Take 0 5 tablets (150 mg total) by mouth daily 45 tablet 3    amLODIPine (NORVASC) 5 mg tablet TAKE 1 TABLET (5 MG) BY MOUTH DAILY 30 tablet 5    aspirin (ECOTRIN LOW STRENGTH) 81 mg EC tablet Take 81 mg by mouth daily      atorvastatin (LIPITOR) 80 mg tablet Take 80 mg by mouth daily      B Complex-C (SUPERPLEX-T) TABS Take 1 tablet by mouth daily      Breo Ellipta 100-25 MCG/INH inhaler INHALE 1 PUFF BY MOUTH EVERY DAY 1 each 3    chlorthalidone 25 mg tablet TAKE "ONE-HALF" TABLET (12 5 MG) BY MOUTH DAILY 15 tablet 4    Cholecalciferol (VITAMIN D3) 2000 units TABS Take 1 tablet by mouth daily      co-enzyme Q-10 50 MG capsule Take by mouth daily      colchicine (Colcrys) 0 6 mg tablet Take 1 tablet (0 6 mg total) by mouth daily 30 tablet 03    Edarbi 40 MG tablet TAKE 1 TABLET TWICE A DAY 60 tablet 5    Ferrous Sulfate (IRON) 325 (65 Fe) MG TABS Take 1 tablet by mouth daily      Incruse Ellipta 62 5 MCG/INH AEPB inhaler Inhale 1 puff daily 30 blister 0    Januvia 100 MG tablet TAKE 1 TABLET BY MOUTH EVERY DAY 90 tablet 3    magnesium chloride-calcium (SLOW-MAG) 71 5-119 mg Take 2 tablets by mouth 2 (two) times a day      metoprolol tartrate (LOPRESSOR) 50 mg tablet Take 1 tablet (50 mg total) by mouth 2 (two) times a day 180 tablet 3    pantoprazole (PROTONIX) 40 mg tablet TAKE 40 MG BY MOUTH DAILY HALF AN HOUR BEFORE BREAKFAST  30 tablet 1    prasugrel (EFFIENT) tablet Take 10 mg by mouth daily       sulfaSALAzine (AZULFIDINE) 500 mg tablet TAKE 1 AND 1/2 TABLETS BY MOUTH ONE TIME DAILY 135 tablet 3     No current facility-administered medications for this visit      _______________________________________________________________________  Review of Systems   Constitutional: Negative for activity change, appetite change, chills, diaphoresis, fatigue, fever and unexpected weight change  HENT: Negative for congestion, dental problem, drooling, ear discharge, ear pain, facial swelling, hearing loss, mouth sores, nosebleeds, postnasal drip, rhinorrhea, sinus pain, trouble swallowing and voice change      Eyes: Negative for photophobia, pain, discharge, redness, itching and visual disturbance  Respiratory: Negative for apnea, cough, choking, chest tightness and shortness of breath  Quite good now - controlled on meds  Cardiovascular: Negative for chest pain, palpitations and leg swelling  Denies any and all cardiac symptoms   Gastrointestinal: Negative for abdominal distention, abdominal pain, constipation, diarrhea and nausea  Endocrine: Negative for polydipsia, polyphagia and polyuria  Genitourinary: Negative for decreased urine volume, difficulty urinating, dysuria, enuresis and hematuria  Musculoskeletal: Positive for arthralgias, back pain, gait problem and myalgias  Negative for joint swelling  Has alkalosing spondylitis, ch, burned out   Skin: Negative for color change, pallor, rash and wound  Allergic/Immunologic: Negative for immunocompromised state  Neurological: Negative for dizziness, seizures, syncope, facial asymmetry, speech difficulty, light-headedness and headaches  Hematological: Negative for adenopathy  Psychiatric/Behavioral: Negative for agitation, behavioral problems, confusion and decreased concentration  The patient is nervous/anxious  Objective:  Vitals:    07/20/21 1215   BP: 132/68   Pulse: 67   Resp: 18   Temp: 97 5 °F (36 4 °C)   SpO2: 98%   Weight: 78 kg (172 lb)   Height: 5' 11" (1 803 m)     Body mass index is 23 99 kg/m²  Physical Exam  Vitals and nursing note reviewed  Constitutional:       General: He is not in acute distress  Appearance: Normal appearance  He is well-developed and normal weight  He is not ill-appearing, toxic-appearing or diaphoretic  HENT:      Head: Normocephalic and atraumatic  Nose: Nose normal    Eyes:      General: No scleral icterus  Pupils: Pupils are equal, round, and reactive to light  Neck:      Trachea: No tracheal deviation  Cardiovascular:      Rate and Rhythm: Normal rate and regular rhythm  Heart sounds: Normal heart sounds  No murmur heard  No gallop  Pulmonary:      Effort: Pulmonary effort is normal  No respiratory distress  Breath sounds: Normal breath sounds  No stridor  No wheezing, rhonchi or rales  Abdominal:      General: Bowel sounds are normal       Palpations: Abdomen is soft  Comments: Pt thinner than I've ever seen him  Wt 166 lbs, but was reportedly 160 3-4 mos ago    Musculoskeletal:         General: Swelling, tenderness and deformity (significant scoliosis and effect upon the chest cavity) present  Normal range of motion  Cervical back: Normal range of motion and neck supple  Right lower leg: No edema  Left lower leg: No edema  Lymphadenopathy:      Cervical: No cervical adenopathy  Skin:     General: Skin is warm and dry  Coloration: Skin is not jaundiced or pale  Findings: No bruising (particularly on forearms, 2o Effient)  Neurological:      General: No focal deficit present  Mental Status: He is alert and oriented to person, place, and time  Mental status is at baseline  Cranial Nerves: No cranial nerve deficit  Sensory: No sensory deficit  Motor: No weakness  Coordination: Coordination normal    Psychiatric:         Mood and Affect: Mood normal          Behavior: Behavior normal          Thought Content: Thought content normal          Judgment: Judgment normal        35 min with pt in uninterrupted time  This time was spent reviewing previous records, reviewing previous laboratory and other tests, taking history from patient, examination of patient, discussion of prognosis and treatment, ordering laboratory tests, ordering medications, and completion of the medical record

## 2021-08-16 ENCOUNTER — APPOINTMENT (OUTPATIENT)
Dept: LAB | Facility: HOSPITAL | Age: 76
End: 2021-08-16
Payer: MEDICARE

## 2021-08-16 DIAGNOSIS — E78.5 HYPERLIPIDEMIA, UNSPECIFIED HYPERLIPIDEMIA TYPE: ICD-10-CM

## 2021-08-16 DIAGNOSIS — I25.119 ATHEROSCLEROSIS OF NATIVE CORONARY ARTERY WITH ANGINA PECTORIS, UNSPECIFIED WHETHER NATIVE OR TRANSPLANTED HEART (HCC): ICD-10-CM

## 2021-08-16 DIAGNOSIS — Z95.5 HISTORY OF HEART ARTERY STENT: ICD-10-CM

## 2021-08-16 LAB
ALT SERPL W P-5'-P-CCNC: 21 U/L (ref 5–63)
AST SERPL W P-5'-P-CCNC: 26 U/L (ref 15–41)
CK SERPL-CCNC: 116.9 U/L (ref 49–397)
GLUCOSE P FAST SERPL-MCNC: 135 MG/DL (ref 70–105)
LDLC SERPL DIRECT ASSAY-MCNC: 51 MG/DL (ref 0–129)

## 2021-08-16 PROCEDURE — 82550 ASSAY OF CK (CPK): CPT

## 2021-08-16 PROCEDURE — 83721 ASSAY OF BLOOD LIPOPROTEIN: CPT

## 2021-08-16 PROCEDURE — 36415 COLL VENOUS BLD VENIPUNCTURE: CPT

## 2021-08-16 PROCEDURE — 84450 TRANSFERASE (AST) (SGOT): CPT

## 2021-08-16 PROCEDURE — 82947 ASSAY GLUCOSE BLOOD QUANT: CPT

## 2021-08-16 PROCEDURE — 84460 ALANINE AMINO (ALT) (SGPT): CPT

## 2021-08-26 ENCOUNTER — OFFICE VISIT (OUTPATIENT)
Dept: PULMONOLOGY | Facility: CLINIC | Age: 76
End: 2021-08-26
Payer: MEDICARE

## 2021-08-26 VITALS
TEMPERATURE: 97.4 F | HEIGHT: 71 IN | OXYGEN SATURATION: 97 % | WEIGHT: 171 LBS | BODY MASS INDEX: 23.94 KG/M2 | DIASTOLIC BLOOD PRESSURE: 68 MMHG | SYSTOLIC BLOOD PRESSURE: 128 MMHG | HEART RATE: 63 BPM

## 2021-08-26 DIAGNOSIS — I10 HYPERTENSION, ESSENTIAL: ICD-10-CM

## 2021-08-26 DIAGNOSIS — D86.9 SARCOIDOSIS: ICD-10-CM

## 2021-08-26 DIAGNOSIS — J43.9 PULMONARY EMPHYSEMA, UNSPECIFIED EMPHYSEMA TYPE (HCC): Primary | ICD-10-CM

## 2021-08-26 PROCEDURE — 99214 OFFICE O/P EST MOD 30 MIN: CPT | Performed by: INTERNAL MEDICINE

## 2021-08-26 RX ORDER — UMECLIDINIUM 62.5 UG/1
1 AEROSOL, POWDER ORAL DAILY
Qty: 90 BLISTER | Refills: 0 | Status: SHIPPED | OUTPATIENT
Start: 2021-08-26 | End: 2022-04-12

## 2021-08-26 NOTE — ASSESSMENT & PLAN NOTE
He has history of hypertension and has been on treatment with metoprolol,  chlorthalidone and amlodipine

## 2021-08-26 NOTE — ASSESSMENT & PLAN NOTE
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

## 2021-08-26 NOTE — PROGRESS NOTES
Assessment/Plan:    COPD (chronic obstructive pulmonary disease) [facility])  Mr Cristela Pond was diagnosed with COPD many years back and is currently on treatment with Breo 100, Incruse and Ventolin  Currently his exercise tolerance is more than 2 blocks and he has occasional cough and no wheezing  No phlegm  No chest pain or palpitations  No hoarseness of voice or dysphagia  He gargles throat after using Breo  He is a never smoker but admits to secondhand smoke exposure  He also worked 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 [employer]  His previous PFT from May 2015 showed severe airflow obstruction with diffusion defect  He also had evidence of air trapping  There was improvement in FEV1 with bronchodilator indicating a component of asthma  His previous CT scan from February 2019 showed scarring in the left lung  He denies any runny nose or sneezing  On clinical examination, his chest was clear  I have advised him to continue breo and incruse  I had a long discussion with him and have answered all his questions  Sarcoidosis  He was diagnosed with sarcoidosis many years back after a lung biopsy and was given steroid therapy for some time  His previous PFT from May 2015 showed severe airflow obstruction with diffusion defect  He has history of working with beryllium  But the sarcoidosis happened before his exposure to beryllium  Hypertension, essential  He has history of hypertension and has been on treatment with metoprolol,  chlorthalidone and amlodipine           Diagnoses and all orders for this visit:    Pulmonary emphysema, unspecified emphysema type ([address] )  -     Incruse Ellipta 62 5 MCG/INH AEPB inhaler; Inhale 1 puff daily    Sarcoidosis    Hypertension, essential          Subjective:      Patient ID: Jay Enriquez is a 70 y o  male  Mr Cristela Pond has come for follow-up for his COPD  Currently he is on treatment with Breo and Incruse    His exercise tolerance is more than 2 blocks and he does not have any significant cough or phlegm or wheeze or chest pain  No palpitations  No swelling of feet  No hoarseness of voice or dysphagia  He gargles throat after using Breo  He had COVID vaccination  His last CT scan from February 2019 showed only scarring  He is a never smoker but has history of secondhand smoke exposure  He also has previous history of sarcoidosis and he had it around the age of 14  He also subsequently worked with [employer]  Currently he is asymptomatic with regard to that  He also has hypertension and currently this well controlled  The following portions of the patient's history were reviewed and updated as appropriate: allergies, current medications, past family history, past medical history, past social history, past surgical history and problem list     Review of Systems   Constitutional: Negative for appetite change, chills, fatigue and fever  HENT: Negative for hearing loss, rhinorrhea, sneezing, sore throat, trouble swallowing and voice change  Eyes: Negative for visual disturbance (Cataract and glaucoma)  Respiratory: Positive for shortness of breath  Negative for cough, chest tightness, wheezing and stridor  Cardiovascular: Negative for chest pain, palpitations and leg swelling  Gastrointestinal: Negative for abdominal pain, constipation, diarrhea, nausea and vomiting  Endocrine: Negative for polyuria  Genitourinary: Negative for dysuria, frequency and urgency  Musculoskeletal: Positive for arthralgias  Negative for back pain, gait problem and neck pain  Skin: Negative for rash  Allergic/Immunologic: Negative for environmental allergies  Neurological: Positive for light-headedness  Negative for dizziness, seizures, syncope and headaches  Psychiatric/Behavioral: Negative for agitation and sleep disturbance  The patient is not nervous/anxious            Objective:      /68 (BP Location: Right arm, Patient Position: Sitting, Cuff Size: Adult)   Pulse 63   Temp (!) 97 4 °F (36 3 °C) (Tympanic)   Ht 5' 11" (1 803 m)   Wt 77 6 kg (171 lb)   SpO2 97%   BMI 23 85 kg/m²          Physical Exam  Vitals reviewed  Constitutional:       General: He is not in acute distress  Appearance: He is not ill-appearing, toxic-appearing or diaphoretic  HENT:      Head: Normocephalic  Eyes:      General: No scleral icterus  Conjunctiva/sclera: Conjunctivae normal    Neck:      Comments: Carotid surgery scar  Cardiovascular:      Rate and Rhythm: Normal rate and regular rhythm  Heart sounds: Normal heart sounds  No murmur heard  Pulmonary:      Effort: No respiratory distress  Breath sounds: Normal breath sounds  No stridor  No wheezing, rhonchi or rales  Comments: Mild kyphosis  Chest:      Chest wall: No tenderness  Abdominal:      General: Bowel sounds are normal       Palpations: Abdomen is soft  Tenderness: There is no abdominal tenderness  There is no guarding  Musculoskeletal:      Cervical back: No rigidity  Right lower leg: No edema  Left lower leg: No edema  Comments: Deeper trans contracture left palm   Lymphadenopathy:      Cervical: No cervical adenopathy  Skin:     General: Skin is warm  Coloration: Skin is not jaundiced or pale  Neurological:      Mental Status: He is alert and oriented to person, place, and time  Gait: Gait normal    Psychiatric:         Mood and Affect: Mood normal          Behavior: Behavior normal          Thought Content:  Thought content normal          Judgment: Judgment normal

## 2021-08-26 NOTE — ASSESSMENT & PLAN NOTE
He was diagnosed with sarcoidosis many years back after a lung biopsy and was given steroid therapy for some time  His previous PFT from May 2015 showed severe airflow obstruction with diffusion defect  He has history of working with beryllium  But the sarcoidosis happened before his exposure to beryllium

## 2021-08-27 DIAGNOSIS — M1A.9XX0 CHRONIC GOUT WITHOUT TOPHUS, UNSPECIFIED CAUSE, UNSPECIFIED SITE: ICD-10-CM

## 2021-08-28 RX ORDER — COLCHICINE 0.6 MG/1
TABLET, FILM COATED ORAL
Qty: 30 TABLET | Refills: 3 | Status: SHIPPED | OUTPATIENT
Start: 2021-08-28 | End: 2021-08-30

## 2021-08-30 DIAGNOSIS — M1A.9XX0 CHRONIC GOUT WITHOUT TOPHUS, UNSPECIFIED CAUSE, UNSPECIFIED SITE: ICD-10-CM

## 2021-08-30 RX ORDER — COLCHICINE 0.6 MG/1
TABLET, FILM COATED ORAL
Qty: 30 TABLET | Refills: 3 | Status: SHIPPED | OUTPATIENT
Start: 2021-08-30 | End: 2021-12-15

## 2021-08-30 NOTE — TELEPHONE ENCOUNTER
Medication pended again for approval due to error with e prescribing this to [facility]

## 2021-09-02 ENCOUNTER — TELEPHONE (OUTPATIENT)
Dept: FAMILY MEDICINE CLINIC | Facility: CLINIC | Age: 76
End: 2021-09-02

## 2021-09-03 ENCOUNTER — TELEPHONE (OUTPATIENT)
Dept: FAMILY MEDICINE CLINIC | Facility: CLINIC | Age: 76
End: 2021-09-03

## 2021-09-16 DIAGNOSIS — K21.00 GASTROESOPHAGEAL REFLUX DISEASE WITH ESOPHAGITIS WITHOUT HEMORRHAGE: ICD-10-CM

## 2021-09-16 RX ORDER — PANTOPRAZOLE SODIUM 40 MG/1
TABLET, DELAYED RELEASE ORAL
Qty: 30 TABLET | Refills: 1 | Status: SHIPPED | OUTPATIENT
Start: 2021-09-16 | End: 2021-11-12

## 2021-10-15 DIAGNOSIS — I12.9 HYPERTENSIVE CHRONIC KIDNEY DISEASE WITH STAGE 1 THROUGH STAGE 4 CHRONIC KIDNEY DISEASE, OR UNSPECIFIED CHRONIC KIDNEY DISEASE: ICD-10-CM

## 2021-10-15 DIAGNOSIS — R80.1 PERSISTENT PROTEINURIA: ICD-10-CM

## 2021-10-15 DIAGNOSIS — E78.5 DYSLIPIDEMIA: ICD-10-CM

## 2021-10-15 DIAGNOSIS — N18.30 CHRONIC KIDNEY DISEASE, STAGE III (MODERATE) (HCC): ICD-10-CM

## 2021-10-15 DIAGNOSIS — I12.9 PARENCHYMAL RENAL HYPERTENSION, STAGE 1 THROUGH STAGE 4 OR UNSPECIFIED CHRONIC KIDNEY DISEASE: ICD-10-CM

## 2021-10-15 RX ORDER — CHLORTHALIDONE 25 MG/1
TABLET ORAL
Qty: 15 TABLET | Refills: 4 | Status: SHIPPED | OUTPATIENT
Start: 2021-10-15 | End: 2021-12-16

## 2021-10-15 RX ORDER — AZILSARTAN KAMEDOXOMIL 40 MG/1
TABLET ORAL
Qty: 60 TABLET | Refills: 5 | Status: SHIPPED | OUTPATIENT
Start: 2021-10-15 | End: 2021-11-22 | Stop reason: SDUPTHER

## 2021-10-21 NOTE — PATIENT INSTRUCTIONS
Chronic Hypertension   WHAT YOU NEED TO KNOW:   Hypertension is high blood pressure  Your blood pressure is the force of your blood moving against the walls of your arteries  Hypertension causes your blood pressure to get so high that your heart has to work much harder than normal  This can damage your heart  Even if you have hypertension for years, lifestyle changes, medicines, or both can help bring your blood pressure to normal   DISCHARGE INSTRUCTIONS:   Call your local emergency number (911 in the [address] [facility]) or have someone call if:   · You have chest pain  · You have any of the following signs of a heart attack:      ? Squeezing, pressure, or pain in your chest    ? You may  also have any of the following:     § Discomfort or pain in your back, neck, jaw, stomach, or arm    § Shortness of breath    § Nausea or vomiting    § Lightheadedness or a sudden cold sweat    · You become confused or have difficulty speaking  · You suddenly feel lightheaded or have trouble breathing  Return to the emergency department if:   · You have a severe headache or vision loss  · You have weakness in an arm or leg  Call your doctor if:   · You feel faint, dizzy, confused, or drowsy  · You have been taking your blood pressure medicine but your pressure is higher than your provider says it should be  · You have questions or concerns about your condition or care  Medicines: You may need any of the following:  · Antihypertensives  may be used to help lower your blood pressure  Several kinds of medicines are available  Your healthcare provider may change the medicine or medicines you currently take  This may be needed if your blood pressure is often high when you check it at home or you are having other problems with blood pressure control  · Diuretics  help decrease extra fluid that collects in your body  This will help lower your BP  You may urinate more often while you take this medicine      · Cholesterol Vaccine Information Statement(s) was given today. This has been reviewed, questions answered, and verbal consent given by Parent for injection(s) and administration of Influenza (Inactivated).      Patient tolerated without incident. See immunization grid for documentation.         medicine  helps lower your cholesterol level  A low cholesterol level helps prevent heart disease and makes it easier to control your blood pressure  · Take your medicine as directed  Contact your healthcare provider if you think your medicine is not helping or if you have side effects  Tell him or her if you are allergic to any medicine  Keep a list of the medicines, vitamins, and herbs you take  Include the amounts, and when and why you take them  Bring the list or the pill bottles to follow-up visits  Carry your medicine list with you in case of an emergency  Follow up with your doctor as directed: You will need to return to have your blood pressure checked and to have other lab tests done  Write down your questions so you remember to ask them during your visits  Stages of hypertension:       · Normal blood pressure is 119/79 or lower   Your healthcare provider may only check your blood pressure each year if it stays at a normal level  · Elevated blood pressure is 120/79 to 129/79   This is sometimes called prehypertension  Your healthcare provider may suggest lifestyle changes to help lower your blood pressure to a normal level  He or she may then check it again in 3 to 6 months  · Stage 1 hypertension is 130/80  to 139/89   Your provider may recommend lifestyle changes, medication, and checks every 3 to 6 months until your blood pressure is controlled  · Stage 2 hypertension is 140/90 or higher   Your provider will recommend lifestyle changes and have you take 2 kinds of hypertension medicines  You will also need to have your blood pressure checked monthly until it is controlled  Manage chronic hypertension:   · Check your blood pressure at home  Avoid smoking, caffeine, and exercise at least 30 minutes before checking your blood pressure  Sit and rest for 5 minutes before you take your blood pressure  Extend your arm and support it on a flat surface   Your arm should be at the same level as your heart  Follow the directions that came with your blood pressure monitor  Check your blood pressure 2 times, 1 minute apart, before you take your medicine in the morning  Also check your blood pressure before your evening meal  Keep a record of your readings and bring it to your follow-up visits  Ask your healthcare provider what your blood pressure should be  · Manage any other health conditions you have  Health conditions such as diabetes can increase your risk for hypertension  Follow your healthcare provider's instructions and take all your medicines as directed  Talk to your healthcare provider about any new health conditions you have recently developed  · Ask about all medicines  Certain medicines can increase your blood pressure  Examples include oral birth control pills, decongestants, herbal supplements, and NSAIDs, such as ibuprofen  Your healthcare provider can tell you which medicines are safe for you to take  This includes prescription and over-the-counter medicines  Lifestyle changes you can make to lower your blood pressure: Your provider may want you to make more lifestyle changes if you are having trouble controlling your blood pressure  This may feel difficult over time, especially if you think you are making good changes but your pressure is still high  It might help to focus on one new change at a time  For example, try to add 1 more day of exercise, or exercise for an extra 10 minutes on 2 days  Small changes can make a big difference  Your healthcare provider can also refer you to specialists such as a dietitian who can help you make small changes  Your family members may be included in helping you learn to create lifestyle changes, such as the following:  · Limit sodium (salt) as directed  Too much sodium can affect your fluid balance  Check labels to find low-sodium or no-salt-added foods  Some low-sodium foods use potassium salts for flavor   Too much potassium can also cause health problems  Your healthcare provider will tell you how much sodium and potassium are safe for you to have in a day  He or she may recommend that you limit sodium to 2,300 mg a day  · Follow the meal plan recommended by your healthcare provider  A dietitian or your provider can give you more information on low-sodium plans or the DASH (Dietary Approaches to Stop Hypertension) eating plan  The DASH plan is low in sodium, processed sugar, unhealthy fats, and total fat  It is high in potassium, calcium, and fiber  These can be found in vegetables, fruit, and whole-grain foods  · Be physically active throughout the day  Physical activity, such as exercise, can help control your blood pressure and your weight  Be physically active for at least 30 minutes per day, on most days of the week  Include aerobic activity, such as walking or riding a bicycle  Also include strength training at least 2 times each week  Your healthcare providers can help you create a physical activity plan  · Decrease stress  This may help lower your blood pressure  Learn ways to relax, such as deep breathing or listening to music  · Limit alcohol as directed  Alcohol can increase your blood pressure  A drink of alcohol is 12 ounces of beer, 5 ounces of wine, or 1½ ounces of liquor  · Do not smoke  Nicotine and other chemicals in cigarettes and cigars can increase your blood pressure and also cause lung damage  Ask your healthcare provider for information if you currently smoke and need help to quit  E-cigarettes or smokeless tobacco still contain nicotine  Talk to your healthcare provider before you use these products  © Copyright [employer] 2021 Information is for End User's use only and may not be sold, redistributed or otherwise used for commercial purposes   All illustrations and images included in CareNotes® are the copyrighted property of A D A M , Inc  or Sonu Orosco  The above information is an  only  It is not intended as medical advice for individual conditions or treatments  Talk to your doctor, nurse or pharmacist before following any medical regimen to see if it is safe and effective for you

## 2021-10-27 ENCOUNTER — APPOINTMENT (OUTPATIENT)
Dept: LAB | Facility: HOSPITAL | Age: 76
End: 2021-10-27
Attending: INTERNAL MEDICINE
Payer: MEDICARE

## 2021-10-27 DIAGNOSIS — N18.31 STAGE 3A CHRONIC KIDNEY DISEASE (HCC): ICD-10-CM

## 2021-10-27 DIAGNOSIS — I12.9 HYPERTENSIVE CHRONIC KIDNEY DISEASE WITH STAGE 1 THROUGH STAGE 4 CHRONIC KIDNEY DISEASE, OR UNSPECIFIED CHRONIC KIDNEY DISEASE: ICD-10-CM

## 2021-10-27 DIAGNOSIS — E78.5 DYSLIPIDEMIA: ICD-10-CM

## 2021-10-27 DIAGNOSIS — R80.1 PERSISTENT PROTEINURIA: ICD-10-CM

## 2021-10-27 DIAGNOSIS — E87.5 HYPERKALEMIA: ICD-10-CM

## 2021-10-27 DIAGNOSIS — E11.21 DIABETIC NEPHROPATHY ASSOCIATED WITH TYPE 2 DIABETES MELLITUS (HCC): ICD-10-CM

## 2021-10-28 ENCOUNTER — APPOINTMENT (OUTPATIENT)
Dept: LAB | Facility: HOSPITAL | Age: 76
End: 2021-10-28
Attending: INTERNAL MEDICINE
Payer: MEDICARE

## 2021-10-28 LAB
ALBUMIN SERPL BCP-MCNC: 3.9 G/DL (ref 3.4–4.8)
ALP SERPL-CCNC: 93.2 U/L (ref 10–129)
ALT SERPL W P-5'-P-CCNC: 19 U/L (ref 5–63)
ANION GAP SERPL CALCULATED.3IONS-SCNC: 7 MMOL/L (ref 4–13)
AST SERPL W P-5'-P-CCNC: 25 U/L (ref 15–41)
BILIRUB SERPL-MCNC: 0.33 MG/DL (ref 0.3–1.2)
BUN SERPL-MCNC: 32 MG/DL (ref 6–20)
CALCIUM SERPL-MCNC: 9.5 MG/DL (ref 8.4–10.2)
CHLORIDE SERPL-SCNC: 101 MMOL/L (ref 96–108)
CHOLEST SERPL-MCNC: 115 MG/DL
CK SERPL-CCNC: 122.6 U/L (ref 49–397)
CO2 SERPL-SCNC: 29 MMOL/L (ref 22–33)
CREAT SERPL-MCNC: 1.33 MG/DL (ref 0.5–1.2)
CREAT UR-MCNC: 46.9 MG/DL
ERYTHROCYTE [DISTWIDTH] IN BLOOD BY AUTOMATED COUNT: 13 % (ref 11.6–15.1)
GFR SERPL CREATININE-BSD FRML MDRD: 53 ML/MIN/1.73SQ M
GLUCOSE P FAST SERPL-MCNC: 170 MG/DL (ref 70–105)
HCT VFR BLD AUTO: 41 % (ref 36.5–49.3)
HDLC SERPL-MCNC: 43 MG/DL
HGB BLD-MCNC: 13.8 G/DL (ref 12–17)
LDLC SERPL CALC-MCNC: 43 MG/DL (ref 0–100)
MAGNESIUM SERPL-MCNC: 1.8 MG/DL (ref 1.6–2.6)
MCH RBC QN AUTO: 32 PG (ref 26.8–34.3)
MCHC RBC AUTO-ENTMCNC: 33.7 G/DL (ref 31.4–37.4)
MCV RBC AUTO: 95 FL (ref 82–98)
PHOSPHATE SERPL-MCNC: 3.7 MG/DL (ref 2.5–5)
PLATELET # BLD AUTO: 180 THOUSANDS/UL (ref 149–390)
PMV BLD AUTO: 9.3 FL (ref 8.9–12.7)
POTASSIUM SERPL-SCNC: 4.4 MMOL/L (ref 3.5–5)
PROT SERPL-MCNC: 6.8 G/DL (ref 6.4–8.3)
PROT UR-MCNC: 16 MG/DL
PROT/CREAT UR: 0.34 MG/G{CREAT} (ref 0–0.1)
PTH-INTACT SERPL-MCNC: 33.9 PG/ML (ref 18.4–80.1)
RBC # BLD AUTO: 4.31 MILLION/UL (ref 3.88–5.62)
SODIUM SERPL-SCNC: 137 MMOL/L (ref 133–145)
TRIGL SERPL-MCNC: 142.9 MG/DL
WBC # BLD AUTO: 8.04 THOUSAND/UL (ref 4.31–10.16)

## 2021-10-28 PROCEDURE — 85027 COMPLETE CBC AUTOMATED: CPT

## 2021-10-28 PROCEDURE — 83970 ASSAY OF PARATHORMONE: CPT

## 2021-10-28 PROCEDURE — 80061 LIPID PANEL: CPT

## 2021-10-28 PROCEDURE — 80053 COMPREHEN METABOLIC PANEL: CPT

## 2021-10-28 PROCEDURE — 82306 VITAMIN D 25 HYDROXY: CPT

## 2021-10-28 PROCEDURE — 83735 ASSAY OF MAGNESIUM: CPT

## 2021-10-28 PROCEDURE — 82570 ASSAY OF URINE CREATININE: CPT

## 2021-10-28 PROCEDURE — 84100 ASSAY OF PHOSPHORUS: CPT

## 2021-10-28 PROCEDURE — 84156 ASSAY OF PROTEIN URINE: CPT

## 2021-10-28 PROCEDURE — 36415 COLL VENOUS BLD VENIPUNCTURE: CPT

## 2021-10-28 PROCEDURE — 82550 ASSAY OF CK (CPK): CPT

## 2021-10-30 DIAGNOSIS — J43.9 PULMONARY EMPHYSEMA, UNSPECIFIED EMPHYSEMA TYPE (HCC): ICD-10-CM

## 2021-11-01 RX ORDER — FLUTICASONE FUROATE AND VILANTEROL TRIFENATATE 100; 25 UG/1; UG/1
POWDER RESPIRATORY (INHALATION)
Qty: 60 BLISTER | Refills: 3 | Status: SHIPPED | OUTPATIENT
Start: 2021-11-01 | End: 2021-11-30

## 2021-11-03 LAB
25(OH)D2 SERPL-MCNC: <1 NG/ML
25(OH)D3 SERPL-MCNC: 41 NG/ML
25(OH)D3+25(OH)D2 SERPL-MCNC: 41 NG/ML

## 2021-11-05 ENCOUNTER — OFFICE VISIT (OUTPATIENT)
Dept: FAMILY MEDICINE CLINIC | Facility: CLINIC | Age: 76
End: 2021-11-05
Payer: MEDICARE

## 2021-11-05 VITALS
TEMPERATURE: 96.7 F | SYSTOLIC BLOOD PRESSURE: 140 MMHG | HEART RATE: 62 BPM | RESPIRATION RATE: 20 BRPM | HEIGHT: 71 IN | DIASTOLIC BLOOD PRESSURE: 78 MMHG | OXYGEN SATURATION: 95 % | BODY MASS INDEX: 24.78 KG/M2 | WEIGHT: 177 LBS

## 2021-11-05 DIAGNOSIS — I10 HYPERTENSION, ESSENTIAL: ICD-10-CM

## 2021-11-05 DIAGNOSIS — Z13.9 ENCOUNTER FOR SCREENING INVOLVING SOCIAL DETERMINANTS OF HEALTH (SDOH): ICD-10-CM

## 2021-11-05 DIAGNOSIS — J44.9 CHRONIC OBSTRUCTIVE PULMONARY DISEASE, UNSPECIFIED COPD TYPE (HCC): ICD-10-CM

## 2021-11-05 DIAGNOSIS — K21.00 GASTROESOPHAGEAL REFLUX DISEASE WITH ESOPHAGITIS WITHOUT HEMORRHAGE: ICD-10-CM

## 2021-11-05 DIAGNOSIS — J43.9 PULMONARY EMPHYSEMA, UNSPECIFIED EMPHYSEMA TYPE (HCC): Primary | ICD-10-CM

## 2021-11-05 DIAGNOSIS — N18.31 STAGE 3A CHRONIC KIDNEY DISEASE (HCC): ICD-10-CM

## 2021-11-05 DIAGNOSIS — Z79.899 ENCOUNTER FOR LONG-TERM (CURRENT) USE OF MEDICATIONS: ICD-10-CM

## 2021-11-05 DIAGNOSIS — E11.9 TYPE 2 DIABETES MELLITUS WITHOUT COMPLICATION, WITHOUT LONG-TERM CURRENT USE OF INSULIN (HCC): ICD-10-CM

## 2021-11-05 DIAGNOSIS — Z79.899 ENCOUNTER FOR LONG-TERM CURRENT USE OF HIGH RISK MEDICATION: ICD-10-CM

## 2021-11-05 DIAGNOSIS — Z79.899 POLYPHARMACY: ICD-10-CM

## 2021-11-05 PROCEDURE — 99214 OFFICE O/P EST MOD 30 MIN: CPT | Performed by: FAMILY MEDICINE

## 2021-11-10 ENCOUNTER — OFFICE VISIT (OUTPATIENT)
Dept: NEPHROLOGY | Facility: CLINIC | Age: 76
End: 2021-11-10
Payer: MEDICARE

## 2021-11-10 VITALS — BODY MASS INDEX: 24.78 KG/M2 | WEIGHT: 177 LBS | HEIGHT: 71 IN

## 2021-11-10 DIAGNOSIS — I12.9 HYPERTENSIVE CHRONIC KIDNEY DISEASE WITH STAGE 1 THROUGH STAGE 4 CHRONIC KIDNEY DISEASE, OR UNSPECIFIED CHRONIC KIDNEY DISEASE: Primary | ICD-10-CM

## 2021-11-10 DIAGNOSIS — R80.1 PERSISTENT PROTEINURIA: ICD-10-CM

## 2021-11-10 DIAGNOSIS — E78.5 DYSLIPIDEMIA: ICD-10-CM

## 2021-11-10 DIAGNOSIS — N18.31 STAGE 3A CHRONIC KIDNEY DISEASE (HCC): ICD-10-CM

## 2021-11-10 DIAGNOSIS — E11.22 TYPE 2 DIABETES MELLITUS WITH STAGE 3A CHRONIC KIDNEY DISEASE, UNSPECIFIED WHETHER LONG TERM INSULIN USE (HCC): ICD-10-CM

## 2021-11-10 DIAGNOSIS — N18.31 TYPE 2 DIABETES MELLITUS WITH STAGE 3A CHRONIC KIDNEY DISEASE, UNSPECIFIED WHETHER LONG TERM INSULIN USE (HCC): ICD-10-CM

## 2021-11-10 PROCEDURE — 99214 OFFICE O/P EST MOD 30 MIN: CPT | Performed by: INTERNAL MEDICINE

## 2021-11-11 ENCOUNTER — DOCUMENTATION (OUTPATIENT)
Dept: NEPHROLOGY | Facility: CLINIC | Age: 76
End: 2021-11-11

## 2021-11-11 ENCOUNTER — IMMUNIZATIONS (OUTPATIENT)
Dept: FAMILY MEDICINE CLINIC | Facility: HOSPITAL | Age: 76
End: 2021-11-11

## 2021-11-11 DIAGNOSIS — Z23 ENCOUNTER FOR IMMUNIZATION: Primary | ICD-10-CM

## 2021-11-11 PROCEDURE — 0001A COVID-19 PFIZER VACC 0.3 ML: CPT

## 2021-11-11 PROCEDURE — 91300 COVID-19 PFIZER VACC 0.3 ML: CPT

## 2021-11-12 DIAGNOSIS — K21.00 GASTROESOPHAGEAL REFLUX DISEASE WITH ESOPHAGITIS WITHOUT HEMORRHAGE: ICD-10-CM

## 2021-11-12 RX ORDER — PANTOPRAZOLE SODIUM 40 MG/1
TABLET, DELAYED RELEASE ORAL
Qty: 30 TABLET | Refills: 1 | Status: SHIPPED | OUTPATIENT
Start: 2021-11-12 | End: 2021-11-22 | Stop reason: SDUPTHER

## 2021-11-22 ENCOUNTER — TELEPHONE (OUTPATIENT)
Dept: FAMILY MEDICINE CLINIC | Facility: CLINIC | Age: 76
End: 2021-11-22

## 2021-11-22 DIAGNOSIS — K21.00 GASTROESOPHAGEAL REFLUX DISEASE WITH ESOPHAGITIS WITHOUT HEMORRHAGE: ICD-10-CM

## 2021-11-22 DIAGNOSIS — I12.9 PARENCHYMAL RENAL HYPERTENSION, STAGE 1 THROUGH STAGE 4 OR UNSPECIFIED CHRONIC KIDNEY DISEASE: ICD-10-CM

## 2021-11-22 DIAGNOSIS — M1A.9XX0 CHRONIC GOUT WITHOUT TOPHUS, UNSPECIFIED CAUSE, UNSPECIFIED SITE: ICD-10-CM

## 2021-11-22 DIAGNOSIS — E78.5 DYSLIPIDEMIA: Primary | ICD-10-CM

## 2021-11-22 DIAGNOSIS — I10 HYPERTENSION, ESSENTIAL: ICD-10-CM

## 2021-11-22 DIAGNOSIS — M47.895 OTHER SPONDYLOSIS, THORACOLUMBAR REGION: ICD-10-CM

## 2021-11-22 RX ORDER — AZILSARTAN KAMEDOXOMIL 40 MG/1
40 TABLET ORAL 2 TIMES DAILY
Qty: 180 TABLET | Refills: 3 | Status: SHIPPED | OUTPATIENT
Start: 2021-11-22

## 2021-11-22 RX ORDER — METOPROLOL TARTRATE 50 MG/1
50 TABLET, FILM COATED ORAL 2 TIMES DAILY
Qty: 180 TABLET | Refills: 3 | Status: SHIPPED | OUTPATIENT
Start: 2021-11-22

## 2021-11-22 RX ORDER — PANTOPRAZOLE SODIUM 40 MG/1
TABLET, DELAYED RELEASE ORAL
Qty: 90 TABLET | Refills: 3 | Status: SHIPPED | OUTPATIENT
Start: 2021-11-22

## 2021-11-22 RX ORDER — ALLOPURINOL 300 MG/1
150 TABLET ORAL DAILY
Qty: 45 TABLET | Refills: 3 | Status: SHIPPED | OUTPATIENT
Start: 2021-11-22

## 2021-11-22 RX ORDER — SULFASALAZINE 500 MG/1
750 TABLET ORAL DAILY
Qty: 135 TABLET | Refills: 3 | Status: SHIPPED | OUTPATIENT
Start: 2021-11-22 | End: 2022-05-25 | Stop reason: SDUPTHER

## 2021-11-22 RX ORDER — ATORVASTATIN CALCIUM 80 MG/1
80 TABLET, FILM COATED ORAL DAILY
Qty: 90 TABLET | Refills: 3 | Status: SHIPPED | OUTPATIENT
Start: 2021-11-22

## 2021-11-30 DIAGNOSIS — J43.9 PULMONARY EMPHYSEMA, UNSPECIFIED EMPHYSEMA TYPE (HCC): ICD-10-CM

## 2021-11-30 RX ORDER — FLUTICASONE FUROATE AND VILANTEROL TRIFENATATE 100; 25 UG/1; UG/1
POWDER RESPIRATORY (INHALATION)
Qty: 60 EACH | Refills: 2 | Status: SHIPPED | OUTPATIENT
Start: 2021-11-30 | End: 2022-04-12 | Stop reason: SDUPTHER

## 2021-12-15 DIAGNOSIS — R80.1 PERSISTENT PROTEINURIA: ICD-10-CM

## 2021-12-15 DIAGNOSIS — N18.30 CHRONIC KIDNEY DISEASE, STAGE III (MODERATE) (HCC): ICD-10-CM

## 2021-12-15 DIAGNOSIS — E78.5 DYSLIPIDEMIA: ICD-10-CM

## 2021-12-15 DIAGNOSIS — M1A.9XX0 CHRONIC GOUT WITHOUT TOPHUS, UNSPECIFIED CAUSE, UNSPECIFIED SITE: ICD-10-CM

## 2021-12-15 DIAGNOSIS — I12.9 HYPERTENSIVE CHRONIC KIDNEY DISEASE WITH STAGE 1 THROUGH STAGE 4 CHRONIC KIDNEY DISEASE, OR UNSPECIFIED CHRONIC KIDNEY DISEASE: ICD-10-CM

## 2021-12-15 RX ORDER — AMLODIPINE BESYLATE 5 MG/1
TABLET ORAL
Qty: 30 TABLET | Refills: 5 | Status: SHIPPED | OUTPATIENT
Start: 2021-12-15 | End: 2022-03-09

## 2021-12-15 RX ORDER — COLCHICINE 0.6 MG/1
TABLET ORAL
Qty: 30 TABLET | Refills: 3 | Status: SHIPPED | OUTPATIENT
Start: 2021-12-15 | End: 2021-12-16

## 2021-12-16 DIAGNOSIS — M1A.9XX0 CHRONIC GOUT WITHOUT TOPHUS, UNSPECIFIED CAUSE, UNSPECIFIED SITE: ICD-10-CM

## 2021-12-16 RX ORDER — CHLORTHALIDONE 25 MG/1
TABLET ORAL
Qty: 45 TABLET | Refills: 4 | Status: SHIPPED | OUTPATIENT
Start: 2021-12-16 | End: 2022-03-09

## 2021-12-16 RX ORDER — COLCHICINE 0.6 MG/1
TABLET ORAL
Qty: 90 TABLET | Refills: 3 | Status: SHIPPED | OUTPATIENT
Start: 2021-12-16

## 2021-12-21 ENCOUNTER — TELEPHONE (OUTPATIENT)
Dept: FAMILY MEDICINE CLINIC | Facility: CLINIC | Age: 76
End: 2021-12-21

## 2021-12-21 DIAGNOSIS — R06.02 SHORTNESS OF BREATH: Primary | ICD-10-CM

## 2021-12-21 RX ORDER — ALBUTEROL SULFATE 90 UG/1
2 AEROSOL, METERED RESPIRATORY (INHALATION) EVERY 6 HOURS PRN
COMMUNITY
End: 2021-12-21 | Stop reason: SDUPTHER

## 2021-12-21 RX ORDER — ALBUTEROL SULFATE 90 UG/1
2 AEROSOL, METERED RESPIRATORY (INHALATION) EVERY 6 HOURS PRN
Qty: 6.7 G | Refills: 2 | Status: SHIPPED | OUTPATIENT
Start: 2021-12-21

## 2022-01-13 ENCOUNTER — OFFICE VISIT (OUTPATIENT)
Dept: FAMILY MEDICINE CLINIC | Facility: CLINIC | Age: 77
End: 2022-01-13
Payer: MEDICARE

## 2022-01-13 VITALS
HEIGHT: 71 IN | HEART RATE: 60 BPM | SYSTOLIC BLOOD PRESSURE: 165 MMHG | TEMPERATURE: 98.4 F | BODY MASS INDEX: 24.95 KG/M2 | OXYGEN SATURATION: 95 % | DIASTOLIC BLOOD PRESSURE: 85 MMHG | WEIGHT: 178.2 LBS

## 2022-01-13 DIAGNOSIS — R07.9 CHEST PAIN, UNSPECIFIED TYPE: ICD-10-CM

## 2022-01-13 DIAGNOSIS — Z79.899 ENCOUNTER FOR LONG-TERM (CURRENT) USE OF MEDICATIONS: ICD-10-CM

## 2022-01-13 DIAGNOSIS — J44.9 CHRONIC OBSTRUCTIVE PULMONARY DISEASE, UNSPECIFIED COPD TYPE (HCC): ICD-10-CM

## 2022-01-13 DIAGNOSIS — Z79.899 POLYPHARMACY: ICD-10-CM

## 2022-01-13 DIAGNOSIS — Z13.9 ENCOUNTER FOR SCREENING INVOLVING SOCIAL DETERMINANTS OF HEALTH (SDOH): ICD-10-CM

## 2022-01-13 DIAGNOSIS — N18.31 STAGE 3A CHRONIC KIDNEY DISEASE (HCC): ICD-10-CM

## 2022-01-13 DIAGNOSIS — I10 HYPERTENSION, ESSENTIAL: Primary | ICD-10-CM

## 2022-01-13 PROCEDURE — 99496 TRANSJ CARE MGMT HIGH F2F 7D: CPT | Performed by: FAMILY MEDICINE

## 2022-01-13 NOTE — PROGRESS NOTES
Assessment/Plan:  78 y o male, in NAD, is well-known to me for many years presents for f/u and evaluation of the following medical issues for a TCM visit after hosp at the [facility] Jan 6-7 for chest pain  I have reviewed the discharge summary from [place] and have discussed all his medications with him  Blood pressure initially slightly higher but upon recheck by me 130/ 80s  He does see renal specialist, Dr Janiya Bassett, about every 6 months and is generally well controlled  We talked about his GFR which is nicely in the 50-54 range  He was concerned about that, but I reassured him  1  Hypertension, essential  Comments:  165/85    2  Chest pain, unspecified type  Comments:  went to [place], 2 days, ECHO done and enzymes were neg  Went Jan 6-7, 2021  No problem sinse    3  Chronic obstructive pulmonary disease, unspecified COPD type ([facility] [address] )    4  Polypharmacy    5  Encounter for long-term (current) use of medications  Comments: Many meds - all reviewed  6  Encounter for screening involving social determinants of health [facility])  Comments:  Patient has cataracts and does not want to drive at night  7  Stage 3a chronic kidney disease (HCC)  Comments:  Seeing Dr Pam Garcia q 6 mo  GFR fifty-six which places have squarely mild renal insufficiency group          Subjective:      Patient ID: Verita Gottron is a 78 y o  male  HPI    The following portions of the patient's history were reviewed and updated as appropriate: He  has a past medical history of Anemia, Arthritis, Gee's esophagus, Breast lump, Cancer ([address] ), Chronic kidney disease, Cirrhosis ([address] ), Coronary artery disease, Diabetes mellitus ([address] ), H/O heart artery stent, Hypertension, Inguinal hernia, Sarcoidosis, 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[address] ), Skin cancer, and SOB (shortness of breath)    He   Patient Active Problem List    Diagnosis Date Noted    Type 2 diabetes mellitus with stage 3 chronic kidney disease ([address] ) 04/26/2021    Diabetic nephropathy associated with type 2 diabetes mellitus ([place] [id number] ) 01/25/2021    Other spondylosis, thoracolumbar region 11/12/2020    Type 2 diabetes mellitus without complication, without long-term current use of insulin ([place] [id number] ) 11/12/2020    COPD (chronic obstructive pulmonary disease) ([place] [address] ) 07/29/2020    Sarcoidosis 07/29/2020    Hypertension, essential 12/04/2019    Hyperkalemia 12/18/2018    Chronic kidney disease, stage III (moderate) ([place] [address] ) 05/16/2018    Dyslipidemia 05/16/2018    Persistent proteinuria 05/16/2018    Hypertensive chronic kidney disease with stage 1 through stage 4 chronic kidney disease, or unspecified chronic kidney disease 05/16/2018     He  has a past surgical history that includes Colonoscopy (04/11/2016); EGD AND COLONOSCOPY (06/24/2019); Cardiac catheterization; Tonsillectomy; Thumb fusion; ERCP (09/11/2014); Multiple tooth extractions; Cervical fusion; Liver biopsy; Hip surgery (Right); Kidney stone surgery; Cholecystectomy; Skin biopsy (05/2020); Esophagogastric fundoplasty; Hernia repair (Right); and US guidance (10/22/2014)  His family history includes Atrial fibrillation in his mother; COPD in his father; Heart disease in his father and mother; Hypertension in his mother; Kidney disease in his father  He  reports that he has never smoked  He has never used smokeless tobacco  He reports that he does not drink alcohol and does not use drugs    Current Outpatient Medications   Medication Sig Dispense Refill    albuterol (PROVENTIL HFA,VENTOLIN HFA) 90 mcg/act inhaler Inhale 2 puffs every 6 (six) hours as needed for wheezing or shortness of breath 6 7 g 2    allopurinol (ZYLOPRIM) 300 mg tablet Take 0 5 tablets (150 mg total) by mouth daily 45 tablet 3    amLODIPine (NORVASC) 5 mg tablet TAKE 1 TABLET (5 MG) BY MOUTH DAILY 30 tablet 5    aspirin (ECOTRIN LOW STRENGTH) 81 mg EC tablet Take 81 mg by mouth daily      atorvastatin (LIPITOR) 80 mg tablet Take 1 tablet (80 mg total) by mouth daily 90 tablet 3    azilsartan medoxomil (Edarbi) 40 MG tablet Take 1 tablet (40 mg total) by mouth 2 (two) times a day 180 tablet 3    B Complex-C (SUPERPLEX-T) TABS Take 1 tablet by mouth daily      Breo Ellipta 100-25 MCG/INH inhaler INHALE ONE PUFF BY MOUTH EVERY DAY 60 each 2    chlorthalidone 25 mg tablet TAKE "ONE-HALF" TABLET (12 5 MG) BY MOUTH DAILY 45 tablet 4    Cholecalciferol (VITAMIN D3) 2000 units TABS Take 1 tablet by mouth daily      co-enzyme Q-10 50 MG capsule Take by mouth daily      colchicine (COLCRYS) 0 6 mg tablet TAKE 1 TABLET (0 6 MG TOTAL) BY MOUTH DAILY 90 tablet 3    Ferrous Sulfate (IRON) 325 (65 Fe) MG TABS Take 1 tablet by mouth daily      Incruse Ellipta 62 5 MCG/INH AEPB inhaler Inhale 1 puff daily 90 blister 0    Januvia 100 MG tablet TAKE 1 TABLET BY MOUTH EVERY DAY (Patient not taking: Reported on 11/10/2021) 90 tablet 3    magnesium chloride-calcium (SLOW-MAG) 71 5-119 mg Take 2 tablets by mouth 2 (two) times a day      metoprolol tartrate (LOPRESSOR) 50 mg tablet Take 1 tablet (50 mg total) by mouth 2 (two) times a day 50 mg in the morning and 25 mg in the evening 180 tablet 3    pantoprazole (PROTONIX) 40 mg tablet Take 1 tablet daily half hour before eating breakfast 90 tablet 3    prasugrel (EFFIENT) tablet Take 10 mg by mouth daily       sulfaSALAzine (AZULFIDINE) 500 mg tablet Take 1 5 tablets (750 mg total) by mouth daily 135 tablet 3     No current facility-administered medications for this visit       Current Outpatient Medications on File Prior to Visit   Medication Sig    albuterol (PROVENTIL HFA,VENTOLIN HFA) 90 mcg/act inhaler Inhale 2 puffs every 6 (six) hours as needed for wheezing or shortness of breath    allopurinol (ZYLOPRIM) 300 mg tablet Take 0 5 tablets (150 mg total) by mouth daily    amLODIPine (NORVASC) 5 mg tablet TAKE 1 TABLET (5 MG) BY MOUTH DAILY    aspirin (ECOTRIN LOW STRENGTH) 81 mg EC tablet Take 81 mg by mouth daily    atorvastatin (LIPITOR) 80 mg tablet Take 1 tablet (80 mg total) by mouth daily    azilsartan medoxomil (Edarbi) 40 MG tablet Take 1 tablet (40 mg total) by mouth 2 (two) times a day    B Complex-C (SUPERPLEX-T) TABS Take 1 tablet by mouth daily    Breo Ellipta 100-25 MCG/INH inhaler INHALE ONE PUFF BY MOUTH EVERY DAY    chlorthalidone 25 mg tablet TAKE "ONE-HALF" TABLET (12 5 MG) BY MOUTH DAILY    Cholecalciferol (VITAMIN D3) 2000 units TABS Take 1 tablet by mouth daily    co-enzyme Q-10 50 MG capsule Take by mouth daily    colchicine (COLCRYS) 0 6 mg tablet TAKE 1 TABLET (0 6 MG TOTAL) BY MOUTH DAILY    Ferrous Sulfate (IRON) 325 (65 Fe) MG TABS Take 1 tablet by mouth daily    Incruse Ellipta 62 5 MCG/INH AEPB inhaler Inhale 1 puff daily    Januvia 100 MG tablet TAKE 1 TABLET BY MOUTH EVERY DAY (Patient not taking: Reported on 11/10/2021)    magnesium chloride-calcium (SLOW-MAG) 71 5-119 mg Take 2 tablets by mouth 2 (two) times a day    metoprolol tartrate (LOPRESSOR) 50 mg tablet Take 1 tablet (50 mg total) by mouth 2 (two) times a day 50 mg in the morning and 25 mg in the evening    pantoprazole (PROTONIX) 40 mg tablet Take 1 tablet daily half hour before eating breakfast    prasugrel (EFFIENT) tablet Take 10 mg by mouth daily     sulfaSALAzine (AZULFIDINE) 500 mg tablet Take 1 5 tablets (750 mg total) by mouth daily     No current facility-administered medications on file prior to visit  He is allergic to oxycodone       Review of Systems   Constitutional: Negative for activity change, appetite change, chills, diaphoresis, fatigue, fever and unexpected weight change  HENT: Negative for congestion, dental problem, drooling, ear discharge, ear pain, facial swelling, hearing loss, mouth sores, nosebleeds, postnasal drip, rhinorrhea, sinus pain, trouble swallowing and voice change  Eyes: Negative for photophobia, pain, discharge, redness, itching and visual disturbance     Respiratory: Negative for apnea, cough, choking, chest tightness and shortness of breath  Quite good now - controlled on meds  Cardiovascular: Negative for chest pain, palpitations and leg swelling  Denies any and all cardiac symptoms   Gastrointestinal: Negative for abdominal distention, abdominal pain, constipation, diarrhea and nausea  Endocrine: Negative for polydipsia, polyphagia and polyuria  Genitourinary: Negative for decreased urine volume, difficulty urinating, dysuria, enuresis and hematuria  Musculoskeletal: Positive for arthralgias, back pain, gait problem and myalgias  Negative for joint swelling  Has alkalosing spondylitis, ch, burned out   Skin: Negative for color change, pallor, rash and wound  Allergic/Immunologic: Negative for immunocompromised state  Neurological: Negative for dizziness, seizures, syncope, facial asymmetry, speech difficulty, light-headedness and headaches  Hematological: Negative for adenopathy  Psychiatric/Behavioral: Negative for agitation, behavioral problems, confusion and decreased concentration  The patient is nervous/anxious  Objective:      /85 (BP Location: Right arm, Patient Position: Sitting)   Pulse 60   Temp 98 4 °F (36 9 °C)   Ht 5' 11" (1 803 m)   Wt 80 8 kg (178 lb 3 2 oz)   SpO2 95%   BMI 24 85 kg/m²          Physical Exam  Vitals and nursing note reviewed  Constitutional:       General: He is not in acute distress  Appearance: Normal appearance  He is well-developed and normal weight  He is not ill-appearing, toxic-appearing or diaphoretic  HENT:      Head: Normocephalic and atraumatic  Right Ear: Tympanic membrane normal       Left Ear: Tympanic membrane normal       Nose: Nose normal    Eyes:      General: No scleral icterus  Pupils: Pupils are equal, round, and reactive to light  Neck:      Trachea: No tracheal deviation  Cardiovascular:      Rate and Rhythm: Normal rate and regular rhythm        Heart sounds: Normal heart sounds  No murmur heard  No gallop  Pulmonary:      Effort: Pulmonary effort is normal  No respiratory distress  Breath sounds: Normal breath sounds  No stridor  No wheezing, rhonchi or rales  Abdominal:      General: Bowel sounds are normal       Palpations: Abdomen is soft  Comments: Pt thinner than I've ever seen him  Wt 166 lbs, but was reportedly 160 3-4 mos ago    Musculoskeletal:         General: No swelling, tenderness or deformity (significant scoliosis and effect upon the chest cavity)  Normal range of motion  Cervical back: Normal range of motion and neck supple  Right lower leg: No edema  Left lower leg: No edema  Lymphadenopathy:      Cervical: No cervical adenopathy  Skin:     General: Skin is warm and dry  Coloration: Skin is not jaundiced or pale  Findings: No bruising (particularly on forearms, 2o Effient)  Neurological:      General: No focal deficit present  Mental Status: He is alert and oriented to person, place, and time  Mental status is at baseline  Cranial Nerves: No cranial nerve deficit  Sensory: No sensory deficit  Motor: No weakness  Coordination: Coordination normal    Psychiatric:         Mood and Affect: Mood normal          Behavior: Behavior normal          Thought Content: Thought content normal          Judgment: Judgment normal          40-45 min with pt from 4:25 to 5:28 pm     This time was spent reviewing previous records, reviewing previous laboratory and other tests, taking history from patient, examination of patient, discussion of prognosis and treatment, ordering laboratory tests, ordering medications, and completion of the medical record

## 2022-01-13 NOTE — PATIENT INSTRUCTIONS
Shortness of Breath   WHAT YOU NEED TO KNOW:   Shortness of breath is a feeling that you cannot get enough air when you breathe in  You may have this feeling only during activity, or all the time  Your symptoms can range from mild to severe  Shortness of breath may be a sign of a serious health condition that needs immediate care  DISCHARGE INSTRUCTIONS:   Return to the emergency department if:   · Your signs and symptoms are the same or worse within 24 hours of treatment  · The skin over your ribs or on your neck sinks in when you breathe  · You feel confused or dizzy  Contact your healthcare provider if:   · You have new or worsening symptoms  · You have questions or concerns about your condition or care  Medicines:   · Medicines  may be used to treat the cause of your symptoms  You may need medicine to treat a bacterial infection or reduce anxiety  Other medicines may be used to open your airway, reduce swelling, or remove extra fluid  If you have a heart condition, you may need medicine to help your heart beat more strongly or regularly  · Take your medicine as directed  Contact your healthcare provider if you think your medicine is not helping or if you have side effects  Tell him or her if you are allergic to any medicine  Keep a list of the medicines, vitamins, and herbs you take  Include the amounts, and when and why you take them  Bring the list or the pill bottles to follow-up visits  Carry your medicine list with you in case of an emergency  Manage shortness of breath:   · Create an action plan  You and your healthcare provider can work together to create a plan for how to handle shortness of breath  The plan can include daily activities, treatment changes, and what to do if you have severe breathing problems  · Lean forward on your elbows when you sit  This helps your lungs expand and may make it easier to breathe      · Use pursed-lip breathing any time you feel short of breath  Breathe in through your nose and then slowly breathe out through your mouth with your lips slightly puckered  It should take you twice as long to breathe out as it did to breathe in  · Do not smoke  Nicotine and other chemicals in cigarettes and cigars can cause lung damage and make shortness of breath worse  Ask your healthcare provider for information if you currently smoke and need help to quit  E-cigarettes or smokeless tobacco still contain nicotine  Talk to your healthcare provider before you use these products  · Reach or maintain a healthy weight  Your healthcare provider can help you create a safe weight loss plan if you are overweight  · Exercise as directed  Exercise can help your lungs work more easily  Exercise can also help you lose weight if needed  Try to get at least 30 minutes of exercise most days of the week  Follow up with your healthcare provider or specialist as directed:  Write down your questions so you remember to ask them during your visits  © [employer] 2021 Information is for End User's use only and may not be sold, redistributed or otherwise used for commercial purposes  All illustrations and images included in CareNotes® are the copyrighted property of [employer]  or [address] [place], [place] [address]jasmin   The above information is an  only  It is not intended as medical advice for individual conditions or treatments  Talk to your doctor, nurse or pharmacist before following any medical regimen to see if it is safe and effective for you

## 2022-02-08 ENCOUNTER — APPOINTMENT (OUTPATIENT)
Dept: LAB | Facility: HOSPITAL | Age: 77
End: 2022-02-08
Attending: INTERNAL MEDICINE
Payer: MEDICARE

## 2022-02-08 DIAGNOSIS — E78.5 DYSLIPIDEMIA: ICD-10-CM

## 2022-02-08 DIAGNOSIS — N18.31 TYPE 2 DIABETES MELLITUS WITH STAGE 3A CHRONIC KIDNEY DISEASE, UNSPECIFIED WHETHER LONG TERM INSULIN USE (HCC): ICD-10-CM

## 2022-02-08 DIAGNOSIS — N18.31 STAGE 3A CHRONIC KIDNEY DISEASE (HCC): ICD-10-CM

## 2022-02-08 DIAGNOSIS — R80.1 PERSISTENT PROTEINURIA: ICD-10-CM

## 2022-02-08 DIAGNOSIS — I12.9 HYPERTENSIVE CHRONIC KIDNEY DISEASE WITH STAGE 1 THROUGH STAGE 4 CHRONIC KIDNEY DISEASE, OR UNSPECIFIED CHRONIC KIDNEY DISEASE: ICD-10-CM

## 2022-02-08 DIAGNOSIS — E11.22 TYPE 2 DIABETES MELLITUS WITH STAGE 3A CHRONIC KIDNEY DISEASE, UNSPECIFIED WHETHER LONG TERM INSULIN USE (HCC): ICD-10-CM

## 2022-02-08 LAB
ANION GAP SERPL CALCULATED.3IONS-SCNC: 8 MMOL/L (ref 4–13)
BUN SERPL-MCNC: 26 MG/DL (ref 6–20)
CALCIUM SERPL-MCNC: 9.7 MG/DL (ref 8.4–10.2)
CHLORIDE SERPL-SCNC: 103 MMOL/L (ref 96–108)
CO2 SERPL-SCNC: 27 MMOL/L (ref 22–33)
CREAT SERPL-MCNC: 1.55 MG/DL (ref 0.5–1.2)
CREAT UR-MCNC: 148 MG/DL
GFR SERPL CREATININE-BSD FRML MDRD: 43 ML/MIN/1.73SQ M
GLUCOSE P FAST SERPL-MCNC: 161 MG/DL (ref 70–105)
POTASSIUM SERPL-SCNC: 4.9 MMOL/L (ref 3.5–5)
PROT UR-MCNC: 23 MG/DL
PROT/CREAT UR: 0.16 MG/G{CREAT} (ref 0–0.1)
SODIUM SERPL-SCNC: 138 MMOL/L (ref 133–145)

## 2022-02-08 PROCEDURE — 80048 BASIC METABOLIC PNL TOTAL CA: CPT

## 2022-02-08 PROCEDURE — 82570 ASSAY OF URINE CREATININE: CPT

## 2022-02-08 PROCEDURE — 84156 ASSAY OF PROTEIN URINE: CPT

## 2022-02-08 PROCEDURE — 36415 COLL VENOUS BLD VENIPUNCTURE: CPT

## 2022-02-14 LAB
LEFT EYE DIABETIC RETINOPATHY: NORMAL
RIGHT EYE DIABETIC RETINOPATHY: NORMAL
SEVERITY (EYE EXAM): NORMAL

## 2022-03-01 ENCOUNTER — OFFICE VISIT (OUTPATIENT)
Dept: PULMONOLOGY | Facility: CLINIC | Age: 77
End: 2022-03-01
Payer: MEDICARE

## 2022-03-01 VITALS
WEIGHT: 181.5 LBS | TEMPERATURE: 97 F | SYSTOLIC BLOOD PRESSURE: 146 MMHG | HEART RATE: 70 BPM | OXYGEN SATURATION: 97 % | BODY MASS INDEX: 25.41 KG/M2 | HEIGHT: 71 IN | DIASTOLIC BLOOD PRESSURE: 80 MMHG

## 2022-03-01 DIAGNOSIS — I10 HYPERTENSION, ESSENTIAL: ICD-10-CM

## 2022-03-01 DIAGNOSIS — J44.9 CHRONIC OBSTRUCTIVE PULMONARY DISEASE, UNSPECIFIED COPD TYPE (HCC): Primary | ICD-10-CM

## 2022-03-01 DIAGNOSIS — D86.9 SARCOIDOSIS: ICD-10-CM

## 2022-03-01 PROCEDURE — 99214 OFFICE O/P EST MOD 30 MIN: CPT | Performed by: INTERNAL MEDICINE

## 2022-03-01 NOTE — ASSESSMENT & PLAN NOTE
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  His previous PFT from May 2015 showed severe airflow obstruction with diffusion defect  He also had evidence of air trapping  There was improvement in FEV1 with bronchodilator indicating a component of asthma  His previous CT scan from February 2019 showed scarring in the left lung  He denies any runny nose or sneezing  On clinical examination, his chest auscultation revealed bilateral inspiratory coarse crackles at lung bases  I have ordered a high-resolution CT scan of the chest  I have advised him to continue breo and incruse  I had a long discussion with him and have answered all his questions

## 2022-03-01 NOTE — PROGRESS NOTES
Assessment/Plan:    COPD (chronic obstructive pulmonary disease) 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 [employer] industry  His previous PFT from May 2015 showed severe airflow obstruction with diffusion defect  He also had evidence of air trapping  There was improvement in FEV1 with bronchodilator indicating a component of asthma  His previous CT scan from February 2019 showed scarring in the left lung  He denies any runny nose or sneezing  On clinical examination, his chest auscultation revealed bilateral inspiratory coarse crackles at lung bases  I have ordered a high-resolution CT scan of the chest  I have advised him to continue breo and incruse  I had a long discussion with him and have answered all his questions  Sarcoidosis  He was diagnosed with sarcoidosis many years back after a lung biopsy and was given steroid therapy for some time  His previous PFT from May 2015 showed severe airflow obstruction with diffusion defect  He has history of working with beryllium  But the sarcoidosis happened before his exposure to beryllium           Diagnoses and all orders for this visit:    Chronic obstructive pulmonary disease, unspecified COPD type ([address] )  -     CT chest high resolution; Future    Sarcoidosis  -     CT chest high resolution; Future    Hypertension, essential          Subjective:      Patient ID: Yadira Dobbs is a 78 y o  male  Yeyo Castro came for follow-up for his COPD currently his exercise tolerance is at least 2 blocks and he does not have any significant wheezing    He has occasional nonproductive cough  No sinus issues  He has been on treatment with Breo and Incruse  He rarely uses albuterol inhaler  He denied any chest pain or palpitations  He has previous history of sarcoidosis  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  No fever or chills  His appetite is good  He has no hoarseness of voice or dysphagia  No swelling of feet or palpitations  He remains very active      The following portions of the patient's history were reviewed and updated as appropriate: allergies, current medications, past family history, past medical history, past social history, past surgical history and problem list     Review of Systems   Constitutional: Negative for appetite change, chills, fatigue and fever  HENT: Negative for hearing loss, rhinorrhea, sneezing, sore throat and trouble swallowing  Eyes: Positive for visual disturbance  Respiratory: Positive for shortness of breath  Negative for cough, chest tightness and wheezing  Cardiovascular: Negative for chest pain, palpitations and leg swelling  Gastrointestinal: Negative for abdominal pain, diarrhea, nausea and vomiting  Genitourinary: Negative for dysuria, frequency and urgency  Musculoskeletal: Positive for arthralgias  Negative for gait problem  Skin: Negative for rash  Allergic/Immunologic: Negative for environmental allergies  Neurological: Negative for dizziness, seizures, syncope, light-headedness and headaches  Psychiatric/Behavioral: Negative for agitation and sleep disturbance  The patient is not nervous/anxious  Objective:      /80 (BP Location: Left arm, Patient Position: Sitting, Cuff Size: Adult)   Pulse 70   Temp (!) 97 °F (36 1 °C) (Tympanic)   Ht 5' 11" (1 803 m)   Wt 82 3 kg (181 lb 8 oz)   SpO2 97%   BMI 25 31 kg/m²          Physical Exam  Vitals reviewed  Constitutional:       General: He is not in acute distress       Appearance: He is not ill-appearing, toxic-appearing or diaphoretic  HENT:      Head: Normocephalic  Mouth/Throat:      Mouth: Mucous membranes are moist    Eyes:      General: No scleral icterus  Conjunctiva/sclera: Conjunctivae normal    Cardiovascular:      Rate and Rhythm: Normal rate and regular rhythm  Heart sounds: Normal heart sounds  No murmur heard  Pulmonary:      Effort: No respiratory distress  Breath sounds: No stridor  Rales (Bibasilar inspiratory crackles) present  No wheezing or rhonchi  Comments: Mild kyphosis  Chest:      Chest wall: No tenderness  Abdominal:      General: Bowel sounds are normal       Palpations: Abdomen is soft  Tenderness: There is no abdominal tenderness  There is no guarding  Musculoskeletal:      Cervical back: No rigidity  Right lower leg: No edema  Left lower leg: No edema  Lymphadenopathy:      Cervical: No cervical adenopathy  Skin:     Coloration: Skin is not jaundiced or pale  Findings: No rash  Neurological:      Mental Status: He is alert and oriented to person, place, and time  Gait: Gait normal    Psychiatric:         Mood and Affect: Mood normal          Behavior: Behavior normal          Thought Content: Thought content normal          Judgment: Judgment normal        I spent 30 minutes of time taking care of this patient  The majority of this time was spent directly with the patient counseling as well as coordinating care

## 2022-03-04 ENCOUNTER — HOSPITAL ENCOUNTER (OUTPATIENT)
Dept: CT IMAGING | Facility: HOSPITAL | Age: 77
Discharge: HOME | End: 2022-03-04
Attending: INTERNAL MEDICINE
Payer: MEDICARE

## 2022-03-04 DIAGNOSIS — J44.9 CHRONIC OBSTRUCTIVE PULMONARY DISEASE, UNSPECIFIED COPD TYPE (HCC): ICD-10-CM

## 2022-03-04 DIAGNOSIS — D86.9 SARCOIDOSIS: ICD-10-CM

## 2022-03-04 PROCEDURE — 71250 CT THORAX DX C-: CPT

## 2022-03-04 PROCEDURE — G1004 CDSM NDSC: HCPCS

## 2022-03-09 DIAGNOSIS — R80.1 PERSISTENT PROTEINURIA: ICD-10-CM

## 2022-03-09 DIAGNOSIS — I12.9 HYPERTENSIVE CHRONIC KIDNEY DISEASE WITH STAGE 1 THROUGH STAGE 4 CHRONIC KIDNEY DISEASE, OR UNSPECIFIED CHRONIC KIDNEY DISEASE: ICD-10-CM

## 2022-03-09 DIAGNOSIS — E78.5 DYSLIPIDEMIA: ICD-10-CM

## 2022-03-09 DIAGNOSIS — N18.30 CHRONIC KIDNEY DISEASE, STAGE III (MODERATE) (HCC): ICD-10-CM

## 2022-03-09 RX ORDER — CHLORTHALIDONE 25 MG/1
TABLET ORAL
Qty: 15 TABLET | Refills: 4 | Status: SHIPPED | OUTPATIENT
Start: 2022-03-09 | End: 2022-06-02

## 2022-03-09 RX ORDER — AMLODIPINE BESYLATE 5 MG/1
TABLET ORAL
Qty: 90 TABLET | Refills: 5 | Status: SHIPPED | OUTPATIENT
Start: 2022-03-09

## 2022-03-11 DIAGNOSIS — J44.9 CHRONIC OBSTRUCTIVE PULMONARY DISEASE, UNSPECIFIED COPD TYPE (HCC): Primary | ICD-10-CM

## 2022-03-11 NOTE — PROGRESS NOTES
Called patient on his cell phone  Left message regarding CT scan results  He has not had a PFT recently  We will order 1 and follow him in the pulmonary office as scheduled

## 2022-03-14 ENCOUNTER — OFFICE VISIT (OUTPATIENT)
Dept: FAMILY MEDICINE CLINIC | Facility: CLINIC | Age: 77
End: 2022-03-14
Payer: MEDICARE

## 2022-03-14 VITALS
HEIGHT: 71 IN | RESPIRATION RATE: 18 BRPM | HEART RATE: 71 BPM | SYSTOLIC BLOOD PRESSURE: 130 MMHG | OXYGEN SATURATION: 96 % | TEMPERATURE: 97.4 F | BODY MASS INDEX: 25.48 KG/M2 | DIASTOLIC BLOOD PRESSURE: 68 MMHG | WEIGHT: 182 LBS

## 2022-03-14 DIAGNOSIS — E11.9 TYPE 2 DIABETES MELLITUS WITHOUT COMPLICATION, WITHOUT LONG-TERM CURRENT USE OF INSULIN (HCC): ICD-10-CM

## 2022-03-14 DIAGNOSIS — N18.31 STAGE 3A CHRONIC KIDNEY DISEASE (HCC): ICD-10-CM

## 2022-03-14 DIAGNOSIS — Z01.818 PRE-OP EVALUATION: ICD-10-CM

## 2022-03-14 DIAGNOSIS — Z79.899 ENCOUNTER FOR LONG-TERM CURRENT USE OF HIGH RISK MEDICATION: ICD-10-CM

## 2022-03-14 DIAGNOSIS — M47.895 OTHER SPONDYLOSIS, THORACOLUMBAR REGION: ICD-10-CM

## 2022-03-14 DIAGNOSIS — I10 HYPERTENSION, ESSENTIAL: Primary | ICD-10-CM

## 2022-03-14 DIAGNOSIS — Z79.899 ENCOUNTER FOR LONG-TERM (CURRENT) USE OF MEDICATIONS: ICD-10-CM

## 2022-03-14 DIAGNOSIS — Z79.899 POLYPHARMACY: ICD-10-CM

## 2022-03-14 PROCEDURE — 99215 OFFICE O/P EST HI 40 MIN: CPT | Performed by: FAMILY MEDICINE

## 2022-03-14 NOTE — PROGRESS NOTES
Assessment/Plan:  78 y o male, in NAD , is here for clearance of cataract surgery on OS 4/8 and OD 4/22 by Dr Anai Rivera  Pt is fine and looking forward to the surg in Apr   No distress  He is concerned however because he cannot lay flat due to spondylosis of cervical spine  I told him this should not be a problem and that actually he should go right down the street several blocks to the ophthalmology operating center and test out the table or chair that he will sit in  This will give him reassurance and he should not have to worry  F/u and eval HTN:  Blood pressure controlled    F/u and eval NIDDM:  Doing well on monotherapy-Januvia 100 mg once daily  Relevant labs:  Hemoglobin A1C/EST AVG Glucose   Lab Results   Component Value Date    HGBA1C 6 4 (H) 01/07/2022     01/07/2022     Fasting glucose   Lab Results   Component Value Date    GLUF 161 (H) 02/08/2022       F/u and eval HLD:  Doing well on Lipitor 80 mg once daily  Relevant labs:  Lipid Profile:   Lab Results   Component Value Date    CHOLESTEROL 115 10/28/2021    HDL 43 10/28/2021    TRIG 142 9 10/28/2021    LDLCALC 43 10/28/2021    [place] 104 07/05/2021       F/u and eval deconditioning:  Tries to walk and stay active but is limited by his terrible spinal spondylosis and kyphosis     F/u weights:  Claims he is at his [employer] and that is up about 4-5 lb from normal  Previous weights: Wt Readings from Last 3 Encounters:   03/14/22 82 6 kg (182 lb)   03/01/22 82 3 kg (181 lb 8 oz)   01/13/22 80 8 kg (178 lb 3 2 oz)       F/u and eval polypharmacy:  All medications have been reviewed and there are many  All questions answered           1  Hypertension, essential  Comments:  Controlled on multiple meds    2  Polypharmacy  Comments: All medications reviewed and discussed    3  Encounter for long-term (current) use of medications  Comments:  Patient on high dose Lipitor--80 mg plus others    4   Stage 3a chronic kidney disease ([place] [address] )    5  Other spondylosis, thoracolumbar region  Comments:  Has said it difficult impact on lifestyle exercise walking sleeping relaxing    6  Type 2 diabetes mellitus without complication, without long-term current use of insulin ([facility])  Comments:  Note patient on Januvia 100 mg once daily    7  Encounter for long-term current use of high risk medication  Comments:  Continues on Effient 10 mg once daily status post pulmonary emboli, remote    8  Pre-op evaluation  Comments:  Preop evaluation performed and he is obviously high risk but this is a minor surgery so he should do fine          Subjective:      Patient ID: Verita Gottron is a 78 y o  male  HPI    The following portions of the patient's history were reviewed and updated as appropriate: He  has a past medical history of Anemia, Arthritis, Gee's esophagus, Breast lump, Cancer ([place] [address] ), Chronic kidney disease, Cirrhosis ([place] [address] ), Coronary artery disease, Diabetes mellitus ([place] [address] ), H/O heart artery stent, Hypertension, Inguinal hernia, Sarcoidosis, 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[place] [address] ), Skin cancer, and SOB (shortness of breath)    He   Patient Active Problem List    Diagnosis Date Noted    Type 2 diabetes mellitus with stage 3 chronic kidney disease ([place] [address] ) 04/26/2021    Diabetic nephropathy associated with type 2 diabetes mellitus ([place] [address] ) 01/25/2021    Other spondylosis, thoracolumbar region 11/12/2020    Type 2 diabetes mellitus without complication, without long-term current use of insulin ([place] [address] ) 11/12/2020    COPD (chronic obstructive pulmonary disease) ([place] [address] ) 07/29/2020    Sarcoidosis 07/29/2020    Hypertension, essential 12/04/2019    Hyperkalemia 12/18/2018    [facility], [facility] III ([facility] ) 05/16/2018    Dyslipidemia 05/16/2018    Persistent proteinuria 05/16/2018    Hypertensive chronic kidney disease with stage 1 through stage 4 chronic kidney disease, or unspecified chronic kidney disease 05/16/2018     He  has a past surgical history that includes Colonoscopy (04/11/2016); EGD AND COLONOSCOPY (06/24/2019); Cardiac catheterization; Tonsillectomy; Thumb fusion; ERCP (09/11/2014); Multiple tooth extractions; Cervical fusion; Liver biopsy; Hip surgery (Right); Kidney stone surgery; Cholecystectomy; Skin biopsy (05/2020); Esophagogastric fundoplasty; Hernia repair (Right); and US guidance (10/22/2014)  His family history includes Atrial fibrillation in his mother; COPD in his father; Heart disease in his father and mother; Hypertension in his mother; Kidney disease in his father  He  reports that he has never smoked  He has never used smokeless tobacco  He reports that he does not drink alcohol and does not use drugs    Current Outpatient Medications   Medication Sig Dispense Refill    albuterol (PROVENTIL HFA,VENTOLIN HFA) 90 mcg/act inhaler Inhale 2 puffs every 6 (six) hours as needed for wheezing or shortness of breath 6 7 g 2    allopurinol (ZYLOPRIM) 300 mg tablet Take 0 5 tablets (150 mg total) by mouth daily 45 tablet 3    amLODIPine (NORVASC) 5 mg tablet TAKE 1 TABLET (5 MG) BY MOUTH DAILY 90 tablet 5    aspirin (ECOTRIN LOW STRENGTH) 81 mg EC tablet Take 81 mg by mouth daily      atorvastatin (LIPITOR) 80 mg tablet Take 1 tablet (80 mg total) by mouth daily 90 tablet 3    azilsartan medoxomil (Edarbi) 40 MG tablet Take 1 tablet (40 mg total) by mouth 2 (two) times a day 180 tablet 3    B Complex-C (SUPERPLEX-T) TABS Take 1 tablet by mouth daily      Breo Ellipta 100-25 MCG/INH inhaler INHALE ONE PUFF BY MOUTH EVERY DAY 60 each 2    chlorthalidone 25 mg tablet TAKE "ONE-HALF" TABLET (12 5 MG) BY MOUTH DAILY 15 tablet 4    Cholecalciferol (VITAMIN D3) 2000 units TABS Take 1 tablet by mouth daily      co-enzyme Q-10 50 MG capsule Take by mouth daily      colchicine (COLCRYS) 0 6 mg tablet TAKE 1 TABLET (0 6 MG TOTAL) BY MOUTH DAILY 90 tablet 3    Ferrous Sulfate (IRON) 325 (65 Fe) MG TABS Take 1 tablet by mouth daily      Incruse Ellipta 62 5 MCG/INH AEPB inhaler Inhale 1 puff daily 90 blister 0    Januvia 100 MG tablet TAKE 1 TABLET BY MOUTH EVERY DAY 90 tablet 3    magnesium chloride-calcium (SLOW-MAG) 71 5-119 mg Take 2 tablets by mouth 2 (two) times a day      metoprolol tartrate (LOPRESSOR) 50 mg tablet Take 1 tablet (50 mg total) by mouth 2 (two) times a day 50 mg in the morning and 25 mg in the evening 180 tablet 3    pantoprazole (PROTONIX) 40 mg tablet Take 1 tablet daily half hour before eating breakfast 90 tablet 3    prasugrel (EFFIENT) tablet Take 10 mg by mouth daily       sulfaSALAzine (AZULFIDINE) 500 mg tablet Take 1 5 tablets (750 mg total) by mouth daily 135 tablet 3     No current facility-administered medications for this visit       Current Outpatient Medications on File Prior to Visit   Medication Sig    albuterol (PROVENTIL HFA,VENTOLIN HFA) 90 mcg/act inhaler Inhale 2 puffs every 6 (six) hours as needed for wheezing or shortness of breath    allopurinol (ZYLOPRIM) 300 mg tablet Take 0 5 tablets (150 mg total) by mouth daily    amLODIPine (NORVASC) 5 mg tablet TAKE 1 TABLET (5 MG) BY MOUTH DAILY    aspirin (ECOTRIN LOW STRENGTH) 81 mg EC tablet Take 81 mg by mouth daily    atorvastatin (LIPITOR) 80 mg tablet Take 1 tablet (80 mg total) by mouth daily    azilsartan medoxomil (Edarbi) 40 MG tablet Take 1 tablet (40 mg total) by mouth 2 (two) times a day    B Complex-C (SUPERPLEX-T) TABS Take 1 tablet by mouth daily    Breo Ellipta 100-25 MCG/INH inhaler INHALE ONE PUFF BY MOUTH EVERY DAY    chlorthalidone 25 mg tablet TAKE "ONE-HALF" TABLET (12 5 MG) BY MOUTH DAILY    Cholecalciferol (VITAMIN D3) 2000 units TABS Take 1 tablet by mouth daily    co-enzyme Q-10 50 MG capsule Take by mouth daily    colchicine (COLCRYS) 0 6 mg tablet TAKE 1 TABLET (0 6 MG TOTAL) BY MOUTH DAILY    Ferrous Sulfate (IRON) 325 (65 Fe) MG TABS Take 1 tablet by mouth daily    Incruse Ellipta 62 5 MCG/INH AEPB inhaler Inhale 1 puff daily    Januvia 100 MG tablet TAKE 1 TABLET BY MOUTH EVERY DAY    magnesium chloride-calcium (SLOW-MAG) 71 5-119 mg Take 2 tablets by mouth 2 (two) times a day    metoprolol tartrate (LOPRESSOR) 50 mg tablet Take 1 tablet (50 mg total) by mouth 2 (two) times a day 50 mg in the morning and 25 mg in the evening    pantoprazole (PROTONIX) 40 mg tablet Take 1 tablet daily half hour before eating breakfast    prasugrel (EFFIENT) tablet Take 10 mg by mouth daily     sulfaSALAzine (AZULFIDINE) 500 mg tablet Take 1 5 tablets (750 mg total) by mouth daily     No current facility-administered medications on file prior to visit  He is allergic to oxycodone       Review of Systems   Constitutional: Negative for activity change, appetite change, chills, diaphoresis, fatigue, fever and unexpected weight change  HENT: Negative for congestion, dental problem, drooling, ear discharge, ear pain, facial swelling, hearing loss, mouth sores, nosebleeds, postnasal drip, rhinorrhea, sinus pain, trouble swallowing and voice change  Eyes: Negative for photophobia, pain, discharge, redness, itching and visual disturbance  Respiratory: Negative for apnea, cough, choking, chest tightness and shortness of breath  Quite good now - controlled on meds  Cardiovascular: Negative for chest pain, palpitations and leg swelling  Denies any and all cardiac symptoms   Gastrointestinal: Negative for abdominal distention, abdominal pain, constipation, diarrhea and nausea  Endocrine: Negative for polydipsia, polyphagia and polyuria  Genitourinary: Negative for decreased urine volume, difficulty urinating, dysuria, enuresis and hematuria  Musculoskeletal: Positive for arthralgias, back pain, gait problem and myalgias  Negative for joint swelling  Has alkalosing spondylitis, ch, burned out   Skin: Negative for color change, pallor, rash and wound     Allergic/Immunologic: Negative for immunocompromised state  Neurological: Negative for dizziness, seizures, syncope, facial asymmetry, speech difficulty, light-headedness and headaches  Hematological: Negative for adenopathy  Psychiatric/Behavioral: Negative for agitation, behavioral problems, confusion and decreased concentration  The patient is nervous/anxious  Objective:      /68 (BP Location: Left arm, Patient Position: Sitting, Cuff Size: Standard)   Pulse 71   Temp (!) 97 4 °F (36 3 °C) (Temporal)   Resp 18   Ht 5' 11" (1 803 m)   Wt 82 6 kg (182 lb)   SpO2 96%   BMI 25 38 kg/m²          Physical Exam  Vitals and nursing note reviewed  Constitutional:       General: He is not in acute distress  Appearance: Normal appearance  He is well-developed and normal weight  He is not ill-appearing, toxic-appearing or diaphoretic  HENT:      Head: Normocephalic and atraumatic  Right Ear: Tympanic membrane normal       Left Ear: Tympanic membrane normal       Nose: Nose normal    Eyes:      General: No scleral icterus  Pupils: Pupils are equal, round, and reactive to light  Neck:      Trachea: No tracheal deviation  Cardiovascular:      Rate and Rhythm: Normal rate and regular rhythm  Heart sounds: Normal heart sounds  No murmur heard  No gallop  Pulmonary:      Effort: Pulmonary effort is normal  No respiratory distress  Breath sounds: Normal breath sounds  No stridor  No wheezing, rhonchi or rales  Abdominal:      General: Bowel sounds are normal       Palpations: Abdomen is soft  Comments: Pt thinner than I've ever seen him  Wt 166 lbs, but was reportedly 160 3-4 mos ago    Musculoskeletal:         General: No swelling, tenderness or deformity (significant scoliosis and effect upon the chest cavity)  Normal range of motion  Cervical back: Normal range of motion and neck supple  Right lower leg: No edema  Left lower leg: No edema     Lymphadenopathy:      Cervical: No cervical adenopathy  Skin:     General: Skin is warm and dry  Coloration: Skin is not jaundiced or pale  Findings: No bruising (particularly on forearms, 2o Effient)  Neurological:      General: No focal deficit present  Mental Status: He is alert and oriented to person, place, and time  Mental status is at baseline  Cranial Nerves: No cranial nerve deficit  Sensory: No sensory deficit  Motor: No weakness  Coordination: Coordination normal    Psychiatric:         Mood and Affect: Mood normal          Behavior: Behavior normal          Thought Content: Thought content normal          Judgment: Judgment normal        40 min with pt and all the above issues addressed and all questions aswers -- even those fears he has re the cataract surgeries  NOTE: all of the above issues discussed include chronic illness(es)  with severe exacerbations OR pose threats to life or body function if not managed/monitored effectively  I am as concerned about the long term effects of these disease states, as much as the short term effects  I spent considerable time assuring that my patient  understands  I reviewed prior internal and external notes,  consults,  hospital discharges,  and any other appropriate documents  I  have discussed the management and interpretation of them as well  Again, patient expresses understanding

## 2022-03-14 NOTE — PATIENT INSTRUCTIONS
Hypoglycemia in a Person with Diabetes   WHAT YOU NEED TO KNOW:   Hypoglycemia is a serious condition that happens when your blood glucose (sugar) level drops too low  The blood sugar level is usually too high in a person with diabetes, but the level can also drop too low  It is important to follow your diabetes management plan to keep your blood sugar level steady  DISCHARGE INSTRUCTIONS:   Have someone call your local emergency number (911 in the [address] [facility]) if:   · You have a seizure or pass out  · Your blood sugar is less than 50 mg/dL and does not respond to treatment  · You feel you are going to pass out  · You have trouble thinking clearly  Call your doctor or diabetes care team if:   · You have had symptoms of low blood sugar several times  · You have questions about the amount of insulin or diabetes medicine you are taking  · You have questions or concerns about your condition or care  Medicines:   · Insulin or diabetes medicine  help to keep your blood sugar under control  · Glucagon  may be needed if you have severe hypoglycemia  · Take your medicine as directed  Contact your healthcare provider if you think your medicine is not helping or if you have side effects  Tell him or her if you are allergic to any medicine  Keep a list of the medicines, vitamins, and herbs you take  Include the amounts, and when and why you take them  Bring the list or the pill bottles to follow-up visits  Carry your medicine list with you in case of an emergency  Manage hypoglycemia:   · Check your blood sugar level right away if you have symptoms of hypoglycemia  Hypoglycemia usually happens when your blood sugar level is 70 mg/dL or below  Ask your diabetes care team what blood sugar level is too low for you  · If your blood sugar level is too low, eat or drink 15 grams of fast-acting carbohydrate    Examples of this amount of fast-acting carbohydrate are 4 ounces (½ cup) of fruit juice or 4 ounces of regular soda  Other examples are 2 tablespoons of raisins or 1 tube of glucose gel  Check your blood sugar level 15 minutes later  Sit still as you wait  If the level is still low (less than 100 mg/dL), have another 15 grams of carbohydrate  When the level returns to 100 mg/dL, eat a meal if it is time  If your meal time is more than 1 hour away, eat a snack  The snack should contain carbohydrates, such as the following:    ? 3/4 cup of cereal    ? 1 cup of skim or low fat milk    ? 6 soda crackers    ? 1/2 of a turkey sandwich    ? 15 fat-free chips  This will help prevent another drop in blood sugar  Always carefully follow your diabetes care team's instructions on how to treat low blood sugar levels  · Always carry a source of fast-acting carbohydrate  If you have symptoms of hypoglycemia and you do not have a blood glucose meter, have a source of fast-acting carbohydrate anyway  Avoid carbohydrate foods that are high in fat  The fat content may make the carbohydrate take longer to increase your blood sugar level  Ask your diabetes care team if you should carry a glucagon kit  Glucagon is a medicine that is injected when you develop severe hypoglycemia and become unconscious  Check the expiration date every month and replace it before it expires  · Teach others how to help you if you have symptoms of hypoglycemia  Tell them about the symptoms of hypoglycemia  Ask them to give you a source of fast-acting carbohydrate if you cannot get it yourself  Ask them to give you a glucagon injection if you have signs of hypoglycemia and you become unconscious or have a seizure  Ask them to call the local emergency number (911 in the [address] [facility])   This is an emergency  Tell them never to try to make you swallow anything if you faint or have a seizure  · Wear medical alert jewelry  or carry a card that says you have diabetes  Ask where to get these items         Prevent hypoglycemia:   · Take diabetes medicine as directed  Take your medicine at the right time and in the right amount  Do not  double the amount of medicine you take unless instructed by your diabetes care team  Ally Gage may need oral diabetes medicine, insulin, or both to help control your blood sugar levels  Your healthcare provider will teach you how and when to take oral diabetes medicine  You will also be taught about side effects oral diabetes medicine can cause  Insulin may be added if oral diabetes medicine becomes less effective over time  Insulin may be injected, or given through a pump or pen  You and your care team will discuss which method is best for you  ? An insulin pump  is an implanted device that gives your insulin 24 hours a day  An insulin pump prevents the need for multiple insulin injections in a day  ? An insulin pen  is a device prefilled with the right amount of insulin  · Eat meals and snacks as directed  Talk to your dietitian or diabetes care team about a meal plan that is right for you  Do not skip meals  · Check your blood sugar level as directed  Ask your diabetes care team what your blood sugar levels should be before and after you eat  Ask when and how often to check your blood sugar level  You may need to check a drop of blood in a glucose test machine  You may need to check at least 3 times each day  Record your blood sugar level results and take the record with you when you see your care team  They may use it to make changes to your medicine, food, or exercise schedules  Your care team provider may recommend a continuous glucose monitor (CGM)  A CGM is a device that is worn at all times  The CGM checks your blood sugar every 5 minutes  It sends results to an electronic device such as a smart phone  · Check your blood sugar level before you exercise  Physical activity, such as exercise, can decrease your blood sugar level   If your blood sugar level is less than 100 mg/dL, have a carbohydrate snack  Examples are 4 to 6 crackers, ½ banana, 8 ounces (1 cup) of nonfat or 1% milk, or 4 ounces (½ cup) of juice  If you will be active for more than 1 hour, you may need to check your blood sugar level every 30 minutes  Your diabetes care team may also recommend that you check your blood sugar level after your activity  · Know the risks if you choose to drink alcohol  Alcohol can cause your blood sugar levels to be low if you use insulin  Alcohol can cause high blood sugar levels and weight gain if you drink too much  Women 21 years or older and men 72 years or older should limit alcohol to 1 drink a day  Men aged 24 to 59 years should limit alcohol to 2 drinks a day  A drink of alcohol is 12 ounces of beer, 5 ounces of wine, or 1½ ounces of liquor  Follow up with your doctor or diabetes care team or specialist as directed: You may need your insulin or diabetes medicine changed if you continue to have hypoglycemia episodes  Write down your questions so you remember to ask them during your visits  © [employer] 2022 Information is for End User's use only and may not be sold, redistributed or otherwise used for commercial purposes  All illustrations and images included in CareNotes® are the copyrighted property of A D A M , Inc  or [facility] Rebecca Putnam   The above information is an  only  It is not intended as medical advice for individual conditions or treatments  Talk to your doctor, nurse or pharmacist before following any medical regimen to see if it is safe and effective for you  Hypoglycemia in a Person with Diabetes   WHAT YOU NEED TO KNOW:   Hypoglycemia is a serious condition that happens when your blood glucose (sugar) level drops too low  The blood sugar level is usually too high in a person with diabetes, but the level can also drop too low  It is important to follow your diabetes management plan to keep your blood sugar level steady    DISCHARGE INSTRUCTIONS:   Have someone call your local emergency number (911 in the [address] [facility]) if:   · You have a seizure or pass out  · Your blood sugar is less than 50 mg/dL and does not respond to treatment  · You feel you are going to pass out  · You have trouble thinking clearly  Call your doctor or diabetes care team if:   · You have had symptoms of low blood sugar several times  · You have questions about the amount of insulin or diabetes medicine you are taking  · You have questions or concerns about your condition or care  Medicines:   · Insulin or diabetes medicine  help to keep your blood sugar under control  · Glucagon  may be needed if you have severe hypoglycemia  · Take your medicine as directed  Contact your healthcare provider if you think your medicine is not helping or if you have side effects  Tell him or her if you are allergic to any medicine  Keep a list of the medicines, vitamins, and herbs you take  Include the amounts, and when and why you take them  Bring the list or the pill bottles to follow-up visits  Carry your medicine list with you in case of an emergency  Manage hypoglycemia:   · Check your blood sugar level right away if you have symptoms of hypoglycemia  Hypoglycemia usually happens when your blood sugar level is 70 mg/dL or below  Ask your diabetes care team what blood sugar level is too low for you  · If your blood sugar level is too low, eat or drink 15 grams of fast-acting carbohydrate  Examples of this amount of fast-acting carbohydrate are 4 ounces (½ cup) of fruit juice or 4 ounces of regular soda  Other examples are 2 tablespoons of raisins or 1 tube of glucose gel  Check your blood sugar level 15 minutes later  Sit still as you wait  If the level is still low (less than 100 mg/dL), have another 15 grams of carbohydrate  When the level returns to 100 mg/dL, eat a meal if it is time  If your meal time is more than 1 hour away, eat a snack   The snack should contain carbohydrates, such as the following:    ? 3/4 cup of cereal    ? 1 cup of skim or low fat milk    ? 6 soda crackers    ? 1/2 of a turkey sandwich    ? 15 fat-free chips  This will help prevent another drop in blood sugar  Always carefully follow your diabetes care team's instructions on how to treat low blood sugar levels  · Always carry a source of fast-acting carbohydrate  If you have symptoms of hypoglycemia and you do not have a blood glucose meter, have a source of fast-acting carbohydrate anyway  Avoid carbohydrate foods that are high in fat  The fat content may make the carbohydrate take longer to increase your blood sugar level  Ask your diabetes care team if you should carry a glucagon kit  Glucagon is a medicine that is injected when you develop severe hypoglycemia and become unconscious  Check the expiration date every month and replace it before it expires  · Teach others how to help you if you have symptoms of hypoglycemia  Tell them about the symptoms of hypoglycemia  Ask them to give you a source of fast-acting carbohydrate if you cannot get it yourself  Ask them to give you a glucagon injection if you have signs of hypoglycemia and you become unconscious or have a seizure  Ask them to call the local emergency number (911 in the [address] [place] [address],[address])   This is an emergency  Tell them never to try to make you swallow anything if you faint or have a seizure  · Wear medical alert jewelry  or carry a card that says you have diabetes  Ask where to get these items  Prevent hypoglycemia:   · Take diabetes medicine as directed  Take your medicine at the right time and in the right amount  Do not  double the amount of medicine you take unless instructed by your diabetes care team  Delon Posada may need oral diabetes medicine, insulin, or both to help control your blood sugar levels  Your healthcare provider will teach you how and when to take oral diabetes medicine   You will also be taught about side effects oral diabetes medicine can cause  Insulin may be added if oral diabetes medicine becomes less effective over time  Insulin may be injected, or given through a pump or pen  You and your care team will discuss which method is best for you  ? An insulin pump  is an implanted device that gives your insulin 24 hours a day  An insulin pump prevents the need for multiple insulin injections in a day  ? An insulin pen  is a device prefilled with the right amount of insulin  · Eat meals and snacks as directed  Talk to your dietitian or diabetes care team about a meal plan that is right for you  Do not skip meals  · Check your blood sugar level as directed  Ask your diabetes care team what your blood sugar levels should be before and after you eat  Ask when and how often to check your blood sugar level  You may need to check a drop of blood in a glucose test machine  You may need to check at least 3 times each day  Record your blood sugar level results and take the record with you when you see your care team  They may use it to make changes to your medicine, food, or exercise schedules  Your care team provider may recommend a continuous glucose monitor (CGM)  A CGM is a device that is worn at all times  The CGM checks your blood sugar every 5 minutes  It sends results to an electronic device such as a smart phone  · Check your blood sugar level before you exercise  Physical activity, such as exercise, can decrease your blood sugar level  If your blood sugar level is less than 100 mg/dL, have a carbohydrate snack  Examples are 4 to 6 crackers, ½ banana, 8 ounces (1 cup) of nonfat or 1% milk, or 4 ounces (½ cup) of juice  If you will be active for more than 1 hour, you may need to check your blood sugar level every 30 minutes  Your diabetes care team may also recommend that you check your blood sugar level after your activity  · Know the risks if you choose to drink alcohol    Alcohol can cause your blood sugar levels to be low if you use insulin  Alcohol can cause high blood sugar levels and weight gain if you drink too much  Women 21 years or older and men 72 years or older should limit alcohol to 1 drink a day  Men aged 24 to 59 years should limit alcohol to 2 drinks a day  A drink of alcohol is 12 ounces of beer, 5 ounces of wine, or 1½ ounces of liquor  Follow up with your doctor or diabetes care team or specialist as directed: You may need your insulin or diabetes medicine changed if you continue to have hypoglycemia episodes  Write down your questions so you remember to ask them during your visits  © Copyright [employer] 2022 Information is for End User's use only and may not be sold, redistributed or otherwise used for commercial purposes  All illustrations and images included in CareNotes® are the copyrighted property of A [employer] A M , Inc  or Sonu Putnam   The above information is an  only  It is not intended as medical advice for individual conditions or treatments  Talk to your doctor, nurse or pharmacist before following any medical regimen to see if it is safe and effective for you  Cataract Removal   AMBULATORY CARE:   What you need to know about cataract removal:  Cataract removal is a procedure to remove a cloudy lens from your eye  An artificial lens called an intraocular lens (IOL) is put in its place  This will improve your vision  The procedure is usually done on one eye at a time  Your healthcare provider will wait about 4 weeks before removing your other cataract  How to prepare for cataract removal:   · Your healthcare provider will measure your eye's length and how much it curves  He or she will also check your vision  This helps him or her choose the best IOL to put in your eye when the cataract is removed  · Your healthcare provider may tell you to stop taking anticoagulants 1 to 2 weeks before your procedure   Your provider may give you antibiotic eyedrops to use for a few days before your procedure to prevent infection  Make arrangements for someone to drive you home after your procedure  What happens during the procedure:   · Your healthcare provider may put drops in your eye to dilate your pupil  This makes it easier for healthcare providers to put in the IOL without damaging your eye  He or she may also use eye drops to numb your eyes  Your healthcare provider may, instead, give you a shot of numbing medicine beside, under, or in your eye  You will also be given a pill to relax you and reduce your anxiety  · After your pupil is fully dilated, a tool will hold your eye open to prevent blinking  A small incision will be made in your cornea (clear covering over your iris)  A tiny instrument will be placed next to the cloudy lens  This instrument uses sound waves to break the lens into small pieces that are suctioned out  The IOL will then be placed in this area  The incision will close on its own  Antibiotic and anti-inflammatory drops may be placed in your eye  What will happen after the procedure: You will be able to go home after the procedure  · An eye shield  may be used to cover your eye and protect it from damage  · Eyedrops  will be given to help prevent infection and decrease inflammation  Risks: You may develop an eye infection and bleeding inside the eye  Your retina may swell, or a piece may break off  This is called detachment  You may go blind  You may develop glaucoma (increased pressure inside your eye)  You may have swelling in and damage to your cornea  Sometimes the area where the IOL is placed also clouds up  This may happen months or even years after the procedure  Seek care immediately if:   · You suddenly see flashes or floaters, followed by partial vision loss like a curtain covered your eye  · Your eye suddenly goes blind       · You have severe eye pain with eye redness, swelling, new floaters, and more blurry vision  Contact your ophthalmologist if:   · You have changes in your vision  · Your eye pain increases  · Your eye is red, swollen, or draining fluid or pus  · You have a headache or nausea, or you are vomiting  · You have questions or concerns about your condition or care  Medicines:   · Eyedrops  contain medicine to help prevent infection and decrease inflammation  Wash your hands before you instill eyedrops  Do not touch the tip of the dropper to your eye  Ask your healthcare provider for more information about how to instill eyedrops  · Take your medicine as directed  Contact your healthcare provider if you think your medicine is not helping or if you have side effects  Tell him or her if you are allergic to any medicine  Keep a list of the medicines, vitamins, and herbs you take  Include the amounts, and when and why you take them  Bring the list or the pill bottles to follow-up visits  Carry your medicine list with you in case of an emergency  Follow up with your ophthalmologist within 24 hours and as directed: You will need to have your eye checked  Write down your questions so you remember to ask them during your visits  Eye care:   · Wear your eye shield when you sleep to prevent damage  · Do not rub your eye  · Keep dust, dirt, and water out of your eye to prevent infection  · Do not lift heavy objects or bend over  Both can increase the pressure in your eye  Ask your healthcare provider how much weight is safe for you to lift  You may be told not to lift anything heavier than 25 pounds for 3 weeks after your procedure  · Wear UVB protective sunglasses and a brimmed hat outside to help prevent sun damage to your eyes  · If you smoke, it is never too late to quit  Smoking can damage your eye and prevent it from healing after your procedure  Do not smoke or let anyone smoke around you   Ask your healthcare provider for information if you need help quitting  © Copyright [employer] 2022 Information is for End User's use only and may not be sold, redistributed or otherwise used for commercial purposes  All illustrations and images included in CareNotes® are the copyrighted property of A D A M , Inc  or Sonu Orosco  The above information is an  only  It is not intended as medical advice for individual conditions or treatments  Talk to your doctor, nurse or pharmacist before following any medical regimen to see if it is safe and effective for you

## 2022-03-25 DIAGNOSIS — E11.9 TYPE 2 DIABETES MELLITUS WITHOUT COMPLICATION, WITHOUT LONG-TERM CURRENT USE OF INSULIN (HCC): ICD-10-CM

## 2022-03-25 RX ORDER — SITAGLIPTIN 100 MG/1
TABLET, FILM COATED ORAL
Qty: 90 TABLET | Refills: 0 | Status: SHIPPED | OUTPATIENT
Start: 2022-03-25 | End: 2022-06-27

## 2022-04-12 DIAGNOSIS — J43.9 PULMONARY EMPHYSEMA, UNSPECIFIED EMPHYSEMA TYPE (HCC): ICD-10-CM

## 2022-04-12 NOTE — TELEPHONE ENCOUNTER
4/12/2022 - Pt walked in and said he needs refills of his Breo Ellipta inhaler and his Incruse Ellipta inhaler  There is an unsigned order in the system already for the Incruse Ellipta inhaler  Pt wants the refills sent to [address] and please advise him when the scripts have been sent

## 2022-04-13 RX ORDER — UMECLIDINIUM 62.5 UG/1
1 AEROSOL, POWDER ORAL DAILY
Qty: 90 BLISTER | Refills: 1 | Status: SHIPPED | OUTPATIENT
Start: 2022-04-13 | End: 2022-08-01

## 2022-04-13 RX ORDER — FLUTICASONE FUROATE AND VILANTEROL TRIFENATATE 100; 25 UG/1; UG/1
1 POWDER RESPIRATORY (INHALATION) DAILY
Qty: 60 EACH | Refills: 2 | Status: SHIPPED | OUTPATIENT
Start: 2022-04-13 | End: 2022-06-01

## 2022-05-05 ENCOUNTER — APPOINTMENT (OUTPATIENT)
Dept: LAB | Facility: HOSPITAL | Age: 77
End: 2022-05-05
Attending: INTERNAL MEDICINE
Payer: MEDICARE

## 2022-05-05 DIAGNOSIS — R80.1 PERSISTENT PROTEINURIA: ICD-10-CM

## 2022-05-05 DIAGNOSIS — I12.9 HYPERTENSIVE CHRONIC KIDNEY DISEASE WITH STAGE 1 THROUGH STAGE 4 CHRONIC KIDNEY DISEASE, OR UNSPECIFIED CHRONIC KIDNEY DISEASE: ICD-10-CM

## 2022-05-05 DIAGNOSIS — N18.31 TYPE 2 DIABETES MELLITUS WITH STAGE 3A CHRONIC KIDNEY DISEASE, UNSPECIFIED WHETHER LONG TERM INSULIN USE (HCC): ICD-10-CM

## 2022-05-05 DIAGNOSIS — E11.22 TYPE 2 DIABETES MELLITUS WITH STAGE 3A CHRONIC KIDNEY DISEASE, UNSPECIFIED WHETHER LONG TERM INSULIN USE (HCC): ICD-10-CM

## 2022-05-05 DIAGNOSIS — N18.31 STAGE 3A CHRONIC KIDNEY DISEASE (HCC): ICD-10-CM

## 2022-05-05 DIAGNOSIS — E78.5 DYSLIPIDEMIA: ICD-10-CM

## 2022-05-05 LAB
ALBUMIN SERPL BCP-MCNC: 4 G/DL (ref 3.5–5)
ALP SERPL-CCNC: 64 U/L (ref 34–104)
ALT SERPL W P-5'-P-CCNC: 17 U/L (ref 7–52)
ANION GAP SERPL CALCULATED.3IONS-SCNC: 7 MMOL/L (ref 4–13)
AST SERPL W P-5'-P-CCNC: 26 U/L (ref 13–39)
BILIRUB SERPL-MCNC: 0.43 MG/DL (ref 0.2–1)
BUN SERPL-MCNC: 24 MG/DL (ref 5–25)
CALCIUM SERPL-MCNC: 9.6 MG/DL (ref 8.4–10.2)
CHLORIDE SERPL-SCNC: 102 MMOL/L (ref 96–108)
CK MB SERPL-MCNC: 2.5 % (ref 0–2.5)
CK MB SERPL-MCNC: 4.5 NG/ML (ref 0.6–6.3)
CK SERPL-CCNC: 178 U/L (ref 39–308)
CO2 SERPL-SCNC: 30 MMOL/L (ref 21–32)
CREAT SERPL-MCNC: 1.48 MG/DL (ref 0.6–1.3)
CREAT UR-MCNC: 77.6 MG/DL
ERYTHROCYTE [DISTWIDTH] IN BLOOD BY AUTOMATED COUNT: 13 % (ref 11.6–15.1)
GFR SERPL CREATININE-BSD FRML MDRD: 45 ML/MIN/1.73SQ M
GLUCOSE P FAST SERPL-MCNC: 128 MG/DL (ref 65–99)
HCT VFR BLD AUTO: 42.5 % (ref 36.5–49.3)
HGB BLD-MCNC: 14.8 G/DL (ref 12–17)
MAGNESIUM SERPL-MCNC: 1.9 MG/DL (ref 1.9–2.7)
MCH RBC QN AUTO: 32.9 PG (ref 26.8–34.3)
MCHC RBC AUTO-ENTMCNC: 34.8 G/DL (ref 31.4–37.4)
MCV RBC AUTO: 94 FL (ref 82–98)
PHOSPHATE SERPL-MCNC: 3.5 MG/DL (ref 2.3–4.1)
PLATELET # BLD AUTO: 175 THOUSANDS/UL (ref 149–390)
PMV BLD AUTO: 9 FL (ref 8.9–12.7)
POTASSIUM SERPL-SCNC: 4.7 MMOL/L (ref 3.5–5.3)
PROT SERPL-MCNC: 6.9 G/DL (ref 6.4–8.4)
PROT UR-MCNC: 28 MG/DL
PROT/CREAT UR: 0.36 MG/G{CREAT} (ref 0–0.1)
PTH-INTACT SERPL-MCNC: 46 PG/ML (ref 18.4–80.1)
RBC # BLD AUTO: 4.5 MILLION/UL (ref 3.88–5.62)
SODIUM SERPL-SCNC: 139 MMOL/L (ref 135–147)
WBC # BLD AUTO: 8.27 THOUSAND/UL (ref 4.31–10.16)

## 2022-05-05 PROCEDURE — 80053 COMPREHEN METABOLIC PANEL: CPT

## 2022-05-05 PROCEDURE — 84156 ASSAY OF PROTEIN URINE: CPT

## 2022-05-05 PROCEDURE — 82550 ASSAY OF CK (CPK): CPT

## 2022-05-05 PROCEDURE — 83735 ASSAY OF MAGNESIUM: CPT

## 2022-05-05 PROCEDURE — 84100 ASSAY OF PHOSPHORUS: CPT

## 2022-05-05 PROCEDURE — 85027 COMPLETE CBC AUTOMATED: CPT

## 2022-05-05 PROCEDURE — 82553 CREATINE MB FRACTION: CPT

## 2022-05-05 PROCEDURE — 36415 COLL VENOUS BLD VENIPUNCTURE: CPT

## 2022-05-05 PROCEDURE — 82570 ASSAY OF URINE CREATININE: CPT

## 2022-05-05 PROCEDURE — 83970 ASSAY OF PARATHORMONE: CPT

## 2022-05-18 ENCOUNTER — OFFICE VISIT (OUTPATIENT)
Dept: NEPHROLOGY | Facility: CLINIC | Age: 77
End: 2022-05-18
Payer: MEDICARE

## 2022-05-18 VITALS — WEIGHT: 178 LBS | HEIGHT: 71 IN | BODY MASS INDEX: 24.92 KG/M2

## 2022-05-18 DIAGNOSIS — I12.9 HYPERTENSIVE CHRONIC KIDNEY DISEASE WITH STAGE 1 THROUGH STAGE 4 CHRONIC KIDNEY DISEASE, OR UNSPECIFIED CHRONIC KIDNEY DISEASE: Primary | ICD-10-CM

## 2022-05-18 DIAGNOSIS — N18.32 STAGE 3B CHRONIC KIDNEY DISEASE (HCC): ICD-10-CM

## 2022-05-18 DIAGNOSIS — R80.1 PERSISTENT PROTEINURIA: ICD-10-CM

## 2022-05-18 DIAGNOSIS — E78.5 DYSLIPIDEMIA: ICD-10-CM

## 2022-05-18 DIAGNOSIS — E87.5 HYPERKALEMIA: ICD-10-CM

## 2022-05-18 DIAGNOSIS — E11.21 DIABETIC NEPHROPATHY ASSOCIATED WITH TYPE 2 DIABETES MELLITUS (HCC): ICD-10-CM

## 2022-05-18 PROCEDURE — 99214 OFFICE O/P EST MOD 30 MIN: CPT | Performed by: INTERNAL MEDICINE

## 2022-05-18 NOTE — LETTER
May 18, 2022     Rigo Rasheed DO  [address] [facility]  [address] [facility]  [address] [place] [place] [address]    Patient: Key Gusman   YOB: 1945   Date of Visit: 5/18/2022       Dear Dr Boston He: Thank you for referring Alfredo Martinez to me for evaluation  Below are my notes for this consultation  If you have questions, please do not hesitate to call me  I look forward to following your patient along with you  Sincerely,        Hong Brannon MD        CC: MD Hong Nelson MD  5/18/2022  2:23 PM  Sign when Signing Visit  RENAL FOLLOW UP NOTE: td     ASSESSMENT AND PLAN:  1  CKD stage 3  Etiology: Diabetic nephropathy/hypertensive nephrosclerosis/arteriolar nephrosclerosis/episodes of SHAY in the past  All serologies were negative including multiple myeloma evaluation  Baseline creatinine: 1 2-1 62  Current creatinine: 1 48 at baseline   Urine protein creatinine ratio: 0 36 g at goal  Recommendations:   Treat hypertension-please see below   Treat dyslipidemia-please see below   Maintain proteinuria less than 1 g or as low as possible   Avoid nephrotoxic agents such as NSAIDs, patient counseled as such  2  Volume: Euvolemic   3  Hypertension:   Current blood pressure averages:   He will drop of his readings but apparently doing quite well   Goal blood pressure: Less than 125/80 given CAD  Recommendations:   Push nonmedical regimen including weight loss, isotonic exercise and avoidance of salt; patient counseled as such   Medication changes today:   No changes pending home readings:  Make sure not too low we potentially could decrease his medications  4  Electrolytes: All acceptable   5  Mineral bone disorder:   Calcium/magnesium/phosphorus: All acceptable   PTH intact: 46 which is normal, from last visit   Vitamin-D:Currently on supplements  41 0 as of 10/28/2021 at goal  6   Dyslipidemia:   Goal LDL: Less than 70 given CAD   Current lipid profile: LDL 43/HDL 43/triglycerides 143  Recommendations: At goal so no changes   7  Anemia: normal hemoglobin at 14 8   8  Other problems:   Diabetes mellitus per your discretion   CAD followed by Dr Tammie Fenton through [facility]  Status post MI last October involving to cardiac stents  : Recent emesis of LAD lesion with repeat PTCA drug-eluting stent 2018  Circumflex artery and right coronary artery 50% stenosis  Ejection fraction 55%  Sarcoidosis followed by Kanchan Hughes   Kyphoscoliosis   Cirrhosis of the liver   Gout   Ankylosing spondylitis      GI health maintenance: Last colonoscopy: APPROXIMATELY 3-4 YEARS AGO, 5 years follow-up so approximately 1-2 years per Dr Briceno [place]:    Patient Instructions   1  Medication changes today:   No medication changes today  2  Please take 1 week a blood pressure readings  at this time     AS FOLLOWS  MORNING AND EVENING, SITTING AND STANDING as follows:  · TAKE THE MORNING READINGS BEFORE ANY MEDICATIONS AND WHEN YOU ARE RELAXED FOR SEVERAL MINUTES  · TAKE THE EVENING READINGS:  BETWEEN 7-10 P M ; PRIOR TO ANY MEDICATIONS; AT LEAST IN OUR  FROM DINNER; AND CERTAINLY AFTER RELAXING FOR A FEW MINUTES  · PLEASE INCLUDE HEART RATE WITH YOUR BLOOD PRESSURE READINGS  · When taking standing readings, keep your arm supported at heart level and not dangling  · Make sure you are sitting with your back supported and feet on the ground and do not cross your legs or feet  · Make sure you have not taken any coffee or caffeine products or exercised or smoke cigarettes at least 30 minutes before taking your blood pressure  Then please mail these readings into the office    3   Follow-up in 6  months   Please bring in 1 week a blood pressure readings morning evening, sitting and standing is outlined above   PLEASE BRING AN YOUR BLOOD PRESSURE MACHINE TO CORRELATE WITH THE OFFICE MACHINE AT THIS NEXT SCHEDULED VISIT   Please go for fasting lab work 1-2 weeks prior to your appointment      4  General instructions:   AVOID SALT BUT NOT ADDING AN READING LABELS TO MAKE SURE THERE IS LOW-SALT IN THE FOOD THAT YOU ARE EATING  o Goal is less than 2 g of sodium intake or less than 5 g of sodium chloride intake per day     Avoid nonsteroidal anti-inflammatory drugs such as Naprosyn, ibuprofen, Aleve, Advil, Celebrex, Meloxicam (Mobic) etc   You can use Tylenol as needed if you do not have any liver condition to be concerned about     Avoid medications such as Sudafed or decongestants and antihistamines that contained the D component which is the decongestant  You can take antihistamines without the decongestant or D component   Try to exercise at least 30 minutes 3 days a week to begin with with an ultimate goal of 5 days a week for at least 30 minutes     Please do not drink more than 2 glasses of alcohol/wine on a daily basis as this may contribute to your high blood pressure  Subjective: There has been no hospitalizations or acute illnesses since last visit  The patient overall is feeling well  No fevers, chills, or cough or colds    Good appetite and good energy  No hematuria, dysuria, voiding symptoms or foamy urine  No gastrointestinal symptoms  No cardiovascular symptoms including swelling of the legs  No headaches, dizziness or lightheadedness  Blood pressure medications renal pertinent medications:   Metoprolol tartrate 50 mg daily in the morning and 25 in the evening   Chlorthalidone 12 5 mg daily   Amlodipine 5 mg daily morning   Edarbi 40 mg twice a day   Slow-Mag 2 tablets twice a day   Iron once a day   Vitamin-D 2000 units daily   Atorvastatin 80 mg daily   Januvia 100 mg daily in the morning      ROS:  See HPI, otherwise review of systems as completely reviewed with the patient are negative    Past Medical History:   Diagnosis Date    Anemia     Arthritis     Gee's esophagus     Breast lump     Cancer [facility] [address] [facility]     Chronic kidney disease     [facility]osis [facility] [address] [facility]     Coronary artery disease     cardiac cath; stents     Diabetes mellitus ([facility] [address] [facility]     H/O heart artery stent     Hypertension     Inguinal hernia     Right    Sarcoidosis     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HCC)     Skin cancer     SOB (shortness of breath)      Past Surgical History:   Procedure Laterality Date    CARDIAC CATHETERIZATION      CERVICAL FUSION      CHOLECYSTECTOMY      COLONOSCOPY  04/11/2016    EGD AND COLONOSCOPY  06/24/2019    ERCP  09/11/2014    transab esop hiat griffin rpr    ESOPHAGOGASTRIC FUNDOPLASTY      HERNIA REPAIR Right     p I/griffin init reduc >5 yr    HIP SURGERY Right     Replacement    KIDNEY STONE SURGERY      Fragmenting    LIVER BIOPSY      MULTIPLE TOOTH EXTRACTIONS      "extraction of all maxillary teeth"    SKIN BIOPSY  05/2020    THUMB FUSION      with graft    TONSILLECTOMY      US GUIDANCE  10/22/2014     Family History   Problem Relation Age of Onset    Hypertension Mother     Atrial fibrillation Mother     Heart disease Mother     Kidney disease Father     COPD Father     Heart disease Father     Asthma Neg Hx     Lung cancer Neg Hx       reports that he has never smoked  He has never used smokeless tobacco  He reports that he does not drink alcohol and does not use drugs  I COMPLETELY REVIEWED THE PAST MEDICAL HISTORY/PAST SURGICAL HISTORY/SOCIAL HISTORY/FAMILY HISTORY/AND MEDICATIONS  AND UPDATED ALL    Objective:     Vitals:   BP sitting on right:  118/60 with a heart rate of 64 regular  BP standing on right:  110/62 with a heart rate of 64 regular    Weight (last 2 days)     Date/Time Weight    05/18/22 1404 80 7 (178)        Wt Readings from Last 3 Encounters:   05/18/22 80 7 kg (178 lb)   03/14/22 82 6 kg (182 lb)   03/01/22 82 3 kg (181 lb 8 oz)       Body mass index is 24 83 kg/m²      Physical Exam: General:  No acute distress kyphoscoliosis  Skin:  No acute rash  Eyes:  No scleral icterus, noninjected, no discharge from eyes  ENT:  Moist mucous membranes  Neck:  Supple, no jugular venous distention, trachea is midline, no lymphadenopathy and no thyromegaly  Back   No CVAT  Chest:  Clear to auscultation and percussion, good respiratory effort  CVS:  Regular rate and rhythm without a rub, or gallops or murmurs  Abdomen:  Soft and nontender with normal bowel sounds  Extremities:  No cyanosis and no edema, no arthritic changes, normal range of motion  Neuro:  Grossly intact  Psych:  Alert, oriented x3 and appropriate      Medications:    Current Outpatient Medications:     albuterol (PROVENTIL HFA,VENTOLIN HFA) 90 mcg/act inhaler, Inhale 2 puffs every 6 (six) hours as needed for wheezing or shortness of breath, Disp: 6 7 g, Rfl: 2    allopurinol (ZYLOPRIM) 300 mg tablet, Take 0 5 tablets (150 mg total) by mouth daily, Disp: 45 tablet, Rfl: 3    amLODIPine (NORVASC) 5 mg tablet, TAKE 1 TABLET (5 MG) BY MOUTH DAILY, Disp: 90 tablet, Rfl: 5    aspirin (ECOTRIN LOW STRENGTH) 81 mg EC tablet, Take 81 mg by mouth daily, Disp: , Rfl:     atorvastatin (LIPITOR) 80 mg tablet, Take 1 tablet (80 mg total) by mouth daily, Disp: 90 tablet, Rfl: 3    azilsartan medoxomil (Edarbi) 40 MG tablet, Take 1 tablet (40 mg total) by mouth 2 (two) times a day, Disp: 180 tablet, Rfl: 3    B Complex-C (SUPERPLEX-T) TABS, Take 1 tablet by mouth daily, Disp: , Rfl:     chlorthalidone 25 mg tablet, TAKE "ONE-HALF" TABLET (12 5 MG) BY MOUTH DAILY, Disp: 15 tablet, Rfl: 4    Cholecalciferol (VITAMIN D3) 2000 units TABS, Take 1 tablet by mouth daily, Disp: , Rfl:     co-enzyme Q-10 50 MG capsule, Take 100 mg by mouth in the morning , Disp: , Rfl:     colchicine (COLCRYS) 0 6 mg tablet, TAKE 1 TABLET (0 6 MG TOTAL) BY MOUTH DAILY, Disp: 90 tablet, Rfl: 3    Ferrous Sulfate (IRON) 325 (65 Fe) MG TABS, Take 1 tablet by mouth daily, Disp: , Rfl:     fluticasone-vilanterol (Breo Ellipta) 100-25 mcg/inh inhaler, Inhale 1 puff daily Rinse mouth after use , Disp: 60 each, Rfl: 2    Incruse Ellipta 62 5 MCG/INH AEPB inhaler, INHALE 1 PUFF DAILY, Disp: 90 blister, Rfl: 1    Januvia 100 MG tablet, TAKE ONE TABLET DAILY, Disp: 90 tablet, Rfl: 0    magnesium chloride-calcium (SLOW-MAG) 71 5-119 mg, Take 2 tablets by mouth 2 (two) times a day, Disp: , Rfl:     metoprolol tartrate (LOPRESSOR) 50 mg tablet, Take 1 tablet (50 mg total) by mouth 2 (two) times a day 50 mg in the morning and 25 mg in the evening, Disp: 180 tablet, Rfl: 3    pantoprazole (PROTONIX) 40 mg tablet, Take 1 tablet daily half hour before eating breakfast, Disp: 90 tablet, Rfl: 3    prasugrel (EFFIENT) tablet, Take 10 mg by mouth daily , Disp: , Rfl:     sulfaSALAzine (AZULFIDINE) 500 mg tablet, Take 1 5 tablets (750 mg total) by mouth daily, Disp: 135 tablet, Rfl: 3    Lab, Imaging and other studies: I have personally reviewed pertinent labs    Laboratory Results:  Results for orders placed or performed in visit on 05/05/22   Comprehensive metabolic panel   Result Value Ref Range    Sodium 139 135 - 147 mmol/L    Potassium 4 7 3 5 - 5 3 mmol/L    Chloride 102 96 - 108 mmol/L    CO2 30 21 - 32 mmol/L    ANION GAP 7 4 - 13 mmol/L    BUN 24 5 - 25 mg/dL    Creatinine 1 48 (H) 0 60 - 1 30 mg/dL    Glucose, Fasting 128 (H) 65 - 99 mg/dL    Calcium 9 6 8 4 - 10 2 mg/dL    AST 26 13 - 39 U/L    ALT 17 7 - 52 U/L    Alkaline Phosphatase 64 34 - 104 U/L    Total Protein 6 9 6 4 - 8 4 g/dL    Albumin 4 0 3 5 - 5 0 g/dL    Total Bilirubin 0 43 0 20 - 1 00 mg/dL    eGFR 45 ml/min/1 73sq m   CBC   Result Value Ref Range    WBC 8 27 4 31 - 10 16 Thousand/uL    RBC 4 50 3 88 - 5 62 Million/uL    Hemoglobin 14 8 12 0 - 17 0 g/dL    Hematocrit 42 5 36 5 - 49 3 %    MCV 94 82 - 98 fL    MCH 32 9 26 8 - 34 3 pg    MCHC 34 8 31 4 - 37 4 g/dL    RDW 13 0 11 6 - 15 1 %    Platelets 058 880 - 323 Thousands/uL    MPV 9 0 8 9 - 12 7 fL   CK   Result Value Ref Range    Total  39 - 308 U/L   Magnesium   Result Value Ref Range    Magnesium 1 9 1 9 - 2 7 mg/dL   Phosphorus   Result Value Ref Range    Phosphorus 3 5 2 3 - 4 1 mg/dL   Protein / creatinine ratio, urine   Result Value Ref Range    Creatinine, Ur 77 6 mg/dL    Protein Urine Random 28 mg/dL    Prot/Creat Ratio, Ur 0 36 (H) 0 00 - 0 10   PTH, intact   Result Value Ref Range    PTH 46 0 18 4 - 80 1 pg/mL   CKMB   Result Value Ref Range    CK-MB Index 2 5 0 0 - 2 5 %    CK-MB 4 5 0 6 - 6 3 ng/mL             Invalid input(s): ALBUMIN      Radiology review:   chest X-ray    Ultrasound      Portions of the record may have been created with voice recognition software  Occasional wrong word or "sound a like" substitutions may have occurred due to the inherent limitations of voice recognition software  Read the chart carefully and recognize, using context, where substitutions have occurred

## 2022-05-18 NOTE — PATIENT INSTRUCTIONS
1  Medication changes today:  No medication changes today  2  Please take 1 week a blood pressure readings  at this time     AS FOLLOWS  MORNING AND EVENING, SITTING AND STANDING as follows:  TAKE THE MORNING READINGS BEFORE ANY MEDICATIONS AND WHEN YOU ARE RELAXED FOR SEVERAL MINUTES  TAKE THE EVENING READINGS:  BETWEEN 7-10 P M ; PRIOR TO ANY MEDICATIONS; AT LEAST IN OUR  FROM DINNER; AND CERTAINLY AFTER RELAXING FOR A FEW MINUTES  PLEASE INCLUDE HEART RATE WITH YOUR BLOOD PRESSURE READINGS  When taking standing readings, keep your arm supported at heart level and not dangling  Make sure you are sitting with your back supported and feet on the ground and do not cross your legs or feet  Make sure you have not taken any coffee or caffeine products or exercised or smoke cigarettes at least 30 minutes before taking your blood pressure  Then please mail these readings into the office    3  Follow-up in 6  months  Please bring in 1 week a blood pressure readings morning evening, sitting and standing is outlined above  PLEASE BRING AN YOUR BLOOD PRESSURE MACHINE TO CORRELATE WITH THE OFFICE MACHINE AT THIS NEXT SCHEDULED VISIT  Please go for fasting lab work 1-2 weeks prior to your appointment      4  General instructions:  AVOID SALT BUT NOT ADDING AN READING LABELS TO MAKE SURE THERE IS LOW-SALT IN THE FOOD THAT YOU ARE EATING  Goal is less than 2 g of sodium intake or less than 5 g of sodium chloride intake per day    Avoid nonsteroidal anti-inflammatory drugs such as Naprosyn, ibuprofen, Aleve, Advil, Celebrex, Meloxicam (Mobic) etc   You can use Tylenol as needed if you do not have any liver condition to be concerned about    Avoid medications such as Sudafed or decongestants and antihistamines that contained the D component which is the decongestant  You can take antihistamines without the decongestant or D component        Try to exercise at least 30 minutes 3 days a week to begin with with an ultimate goal of 5 days a week for at least 30 minutes    Please do not drink more than 2 glasses of alcohol/wine on a daily basis as this may contribute to your high blood pressure

## 2022-05-18 NOTE — PROGRESS NOTES
RENAL FOLLOW UP NOTE: td     ASSESSMENT AND PLAN:  1  CKD stage 3  Etiology: Diabetic nephropathy/hypertensive nephrosclerosis/arteriolar nephrosclerosis/episodes of SHAY in the past  All serologies were negative including multiple myeloma evaluation  Baseline creatinine: 1 2-1 62  Current creatinine: 1 48 at baseline   Urine protein creatinine ratio: 0 36 g at goal  Recommendations:   Treat hypertension-please see below   Treat dyslipidemia-please see below   Maintain proteinuria less than 1 g or as low as possible   Avoid nephrotoxic agents such as NSAIDs, patient counseled as such  2  Volume: Euvolemic   3  Hypertension:   Current blood pressure averages:   He will drop of his readings but apparently doing quite well   Goal blood pressure: Less than 125/80 given CAD  Recommendations:   Push nonmedical regimen including weight loss, isotonic exercise and avoidance of salt; patient counseled as such   Medication changes today:   No changes pending home readings:  Make sure not too low we potentially could decrease his medications  4  Electrolytes: All acceptable   5  Mineral bone disorder:   Calcium/magnesium/phosphorus: All acceptable   PTH intact: 46 which is normal, from last visit   Vitamin-D:Currently on supplements  41 0 as of 10/28/2021 at goal  6  Dyslipidemia:   Goal LDL: Less than 70 given CAD   Current lipid profile: LDL 43/HDL 43/triglycerides 143  Recommendations: At goal so no changes   7  Anemia: normal hemoglobin at 14 8   8  Other problems:   Diabetes mellitus per your discretion   CAD followed by Dr Luz Arnett [facility]  Status post MI last October involving to cardiac stents  : Recent emesis of LAD lesion with repeat PTCA drug-eluting stent 2018  Circumflex artery and right coronary artery 50% stenosis  Ejection fraction 55%     Sarcoidosis followed by Dr Street   Kyphoscoliosis   Cirrhosis of the liver   Gout   Ankylosing spondylitis      GI health maintenance: Last colonoscopy: APPROXIMATELY 3-4 YEARS AGO, 5 years follow-up so approximately 1-2 years per Dr Mary Jane Durham:    Patient Instructions   1  Medication changes today:   No medication changes today  2  Please take 1 week a blood pressure readings  at this time     AS FOLLOWS  MORNING AND EVENING, SITTING AND STANDING as follows:  · TAKE THE MORNING READINGS BEFORE ANY MEDICATIONS AND WHEN YOU ARE RELAXED FOR SEVERAL MINUTES  · TAKE THE EVENING READINGS:  BETWEEN 7-10 P M ; PRIOR TO ANY MEDICATIONS; AT LEAST IN OUR  FROM DINNER; AND CERTAINLY AFTER RELAXING FOR A FEW MINUTES  · PLEASE INCLUDE HEART RATE WITH YOUR BLOOD PRESSURE READINGS  · When taking standing readings, keep your arm supported at heart level and not dangling  · Make sure you are sitting with your back supported and feet on the ground and do not cross your legs or feet  · Make sure you have not taken any coffee or caffeine products or exercised or smoke cigarettes at least 30 minutes before taking your blood pressure  Then please mail these readings into the office    3  Follow-up in 6  months   Please bring in 1 week a blood pressure readings morning evening, sitting and standing is outlined above   PLEASE BRING AN YOUR BLOOD PRESSURE MACHINE TO CORRELATE WITH THE OFFICE MACHINE AT THIS NEXT SCHEDULED VISIT   Please go for fasting lab work 1-2 weeks prior to your appointment      4   General instructions:   AVOID SALT BUT NOT ADDING AN READING LABELS TO MAKE SURE THERE IS LOW-SALT IN THE FOOD THAT YOU ARE EATING  o Goal is less than 2 g of sodium intake or less than 5 g of sodium chloride intake per day     Avoid nonsteroidal anti-inflammatory drugs such as Naprosyn, ibuprofen, Aleve, Advil, Celebrex, Meloxicam (Mobic) etc   You can use Tylenol as needed if you do not have any liver condition to be concerned about     Avoid medications such as Sudafed or decongestants and antihistamines that contained the D component which is the decongestant  You can take antihistamines without the decongestant or D component   Try to exercise at least 30 minutes 3 days a week to begin with with an ultimate goal of 5 days a week for at least 30 minutes     Please do not drink more than 2 glasses of alcohol/wine on a daily basis as this may contribute to your high blood pressure  Subjective: There has been no hospitalizations or acute illnesses since last visit  The patient overall is feeling well  No fevers, chills, or cough or colds    Good appetite and good energy  No hematuria, dysuria, voiding symptoms or foamy urine  No gastrointestinal symptoms  No cardiovascular symptoms including swelling of the legs  No headaches, dizziness or lightheadedness  Blood pressure medications renal pertinent medications:   Metoprolol tartrate 50 mg daily in the morning and 25 in the evening   Chlorthalidone 12 5 mg daily   Amlodipine 5 mg daily morning   Edarbi 40 mg twice a day   Slow-Mag 2 tablets twice a day   Iron once a day   Vitamin-D 2000 units daily   Atorvastatin 80 mg daily   Januvia 100 mg daily in the morning      ROS:  See HPI, otherwise review of systems as completely reviewed with the patient are negative    Past Medical History:   Diagnosis Date    Anemia     Arthritis     Gee's esophagus     Breast lump     [facility] [address]     Chronic kidney disease     [facility] [address]     Coronary artery disease     cardiac cath; stents     Diabetes mellitus [facility] [address] [facility]     H/O heart artery stent     Hypertension     Inguinal hernia     Right    Sarcoidosis     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 [address] [facility]     Skin cancer     SOB (shortness of breath)      Past Surgical History:   Procedure Laterality Date    CARDIAC CATHETERIZATION      CERVICAL FUSION      CHOLECYSTECTOMY      COLONOSCOPY  04/11/2016    EGD AND COLONOSCOPY  06/24/2019    ERCP  09/11/2014    transab esop hiat griffin rpr    ESOPHAGOGASTRIC FUNDOPLASTY      HERNIA REPAIR Right     p I/griffin init reduc >5 yr    HIP SURGERY Right     Replacement    KIDNEY STONE SURGERY      Fragmenting    LIVER BIOPSY      MULTIPLE TOOTH EXTRACTIONS      "extraction of all maxillary teeth"    SKIN BIOPSY  05/2020    THUMB FUSION      with graft    TONSILLECTOMY      US GUIDANCE  10/22/2014     Family History   Problem Relation Age of Onset    Hypertension Mother     Atrial fibrillation Mother     Heart disease Mother     Kidney disease Father     COPD Father     Heart disease Father     Asthma Neg Hx     Lung cancer Neg Hx       reports that he has never smoked  He has never used smokeless tobacco  He reports that he does not drink alcohol and does not use drugs  I COMPLETELY REVIEWED THE PAST MEDICAL HISTORY/PAST SURGICAL HISTORY/SOCIAL HISTORY/FAMILY HISTORY/AND MEDICATIONS  AND UPDATED ALL    Objective:     Vitals:   BP sitting on right:  118/60 with a heart rate of 64 regular  BP standing on right:  110/62 with a heart rate of 64 regular    Weight (last 2 days)     Date/Time Weight    05/18/22 1404 80 7 (178)        Wt Readings from Last 3 Encounters:   05/18/22 80 7 kg (178 lb)   03/14/22 82 6 kg (182 lb)   03/01/22 82 3 kg (181 lb 8 oz)       Body mass index is 24 83 kg/m²      Physical Exam: General:  No acute distress kyphoscoliosis  Skin:  No acute rash  Eyes:  No scleral icterus, noninjected, no discharge from eyes  ENT:  Moist mucous membranes  Neck:  Supple, no jugular venous distention, trachea is midline, no lymphadenopathy and no thyromegaly  Back   No CVAT  Chest:  Clear to auscultation and percussion, good respiratory effort  CVS:  Regular rate and rhythm without a rub, or gallops or murmurs  Abdomen:  Soft and nontender with normal bowel sounds  Extremities:  No cyanosis and no edema, no arthritic changes, normal range of motion  Neuro:  Grossly intact  Psych:  Alert, oriented x3 and appropriate      Medications:    Current Outpatient Medications:    albuterol (PROVENTIL HFA,VENTOLIN HFA) 90 mcg/act inhaler, Inhale 2 puffs every 6 (six) hours as needed for wheezing or shortness of breath, Disp: 6 7 g, Rfl: 2    allopurinol (ZYLOPRIM) 300 mg tablet, Take 0 5 tablets (150 mg total) by mouth daily, Disp: 45 tablet, Rfl: 3    amLODIPine (NORVASC) 5 mg tablet, TAKE 1 TABLET (5 MG) BY MOUTH DAILY, Disp: 90 tablet, Rfl: 5    aspirin (ECOTRIN LOW STRENGTH) 81 mg EC tablet, Take 81 mg by mouth daily, Disp: , Rfl:     atorvastatin (LIPITOR) 80 mg tablet, Take 1 tablet (80 mg total) by mouth daily, Disp: 90 tablet, Rfl: 3    azilsartan medoxomil (Edarbi) 40 MG tablet, Take 1 tablet (40 mg total) by mouth 2 (two) times a day, Disp: 180 tablet, Rfl: 3    B Complex-C (SUPERPLEX-T) TABS, Take 1 tablet by mouth daily, Disp: , Rfl:     chlorthalidone 25 mg tablet, TAKE "ONE-HALF" TABLET (12 5 MG) BY MOUTH DAILY, Disp: 15 tablet, Rfl: 4    Cholecalciferol (VITAMIN D3) 2000 units TABS, Take 1 tablet by mouth daily, Disp: , Rfl:     co-enzyme Q-10 50 MG capsule, Take 100 mg by mouth in the morning , Disp: , Rfl:     colchicine (COLCRYS) 0 6 mg tablet, TAKE 1 TABLET (0 6 MG TOTAL) BY MOUTH DAILY, Disp: 90 tablet, Rfl: 3    Ferrous Sulfate (IRON) 325 (65 Fe) MG TABS, Take 1 tablet by mouth daily, Disp: , Rfl:     fluticasone-vilanterol (Breo Ellipta) 100-25 mcg/inh inhaler, Inhale 1 puff daily Rinse mouth after use , Disp: 60 each, Rfl: 2    Incruse Ellipta 62 5 MCG/INH AEPB inhaler, INHALE 1 PUFF DAILY, Disp: 90 blister, Rfl: 1    Januvia 100 MG tablet, TAKE ONE TABLET DAILY, Disp: 90 tablet, Rfl: 0    magnesium chloride-calcium (SLOW-MAG) 71 5-119 mg, Take 2 tablets by mouth 2 (two) times a day, Disp: , Rfl:     metoprolol tartrate (LOPRESSOR) 50 mg tablet, Take 1 tablet (50 mg total) by mouth 2 (two) times a day 50 mg in the morning and 25 mg in the evening, Disp: 180 tablet, Rfl: 3    pantoprazole (PROTONIX) 40 mg tablet, Take 1 tablet daily half hour before eating breakfast, Disp: 90 tablet, Rfl: 3    prasugrel (EFFIENT) tablet, Take 10 mg by mouth daily , Disp: , Rfl:     sulfaSALAzine (AZULFIDINE) 500 mg tablet, Take 1 5 tablets (750 mg total) by mouth daily, Disp: 135 tablet, Rfl: 3    Lab, Imaging and other studies: I have personally reviewed pertinent labs    Laboratory Results:  Results for orders placed or performed in visit on 05/05/22   Comprehensive metabolic panel   Result Value Ref Range    Sodium 139 135 - 147 mmol/L    Potassium 4 7 3 5 - 5 3 mmol/L    Chloride 102 96 - 108 mmol/L    CO2 30 21 - 32 mmol/L    ANION GAP 7 4 - 13 mmol/L    BUN 24 5 - 25 mg/dL    Creatinine 1 48 (H) 0 60 - 1 30 mg/dL    Glucose, Fasting 128 (H) 65 - 99 mg/dL    Calcium 9 6 8 4 - 10 2 mg/dL    AST 26 13 - 39 U/L    ALT 17 7 - 52 U/L    Alkaline Phosphatase 64 34 - 104 U/L    Total Protein 6 9 6 4 - 8 4 g/dL    Albumin 4 0 3 5 - 5 0 g/dL    Total Bilirubin 0 43 0 20 - 1 00 mg/dL    eGFR 45 ml/min/1 73sq m   CBC   Result Value Ref Range    WBC 8 27 4 31 - 10 16 Thousand/uL    RBC 4 50 3 88 - 5 62 Million/uL    Hemoglobin 14 8 12 0 - 17 0 g/dL    Hematocrit 42 5 36 5 - 49 3 %    MCV 94 82 - 98 fL    MCH 32 9 26 8 - 34 3 pg    MCHC 34 8 31 4 - 37 4 g/dL    RDW 13 0 11 6 - 15 1 %    Platelets 011 500 - 566 Thousands/uL    MPV 9 0 8 9 - 12 7 fL   CK   Result Value Ref Range    Total  39 - 308 U/L   Magnesium   Result Value Ref Range    Magnesium 1 9 1 9 - 2 7 mg/dL   Phosphorus   Result Value Ref Range    Phosphorus 3 5 2 3 - 4 1 mg/dL   Protein / creatinine ratio, urine   Result Value Ref Range    Creatinine, Ur 77 6 mg/dL    Protein Urine Random 28 mg/dL    Prot/Creat Ratio, Ur 0 36 (H) 0 00 - 0 10   PTH, intact   Result Value Ref Range    PTH 46 0 18 4 - 80 1 pg/mL   CKMB   Result Value Ref Range    CK-MB Index 2 5 0 0 - 2 5 %    CK-MB 4 5 0 6 - 6 3 ng/mL             Invalid input(s): ALBUMIN      Radiology review:   chest X-ray    Ultrasound      Portions of the record may have been created with voice recognition software  Occasional wrong word or "sound a like" substitutions may have occurred due to the inherent limitations of voice recognition software  Read the chart carefully and recognize, using context, where substitutions have occurred

## 2022-05-25 DIAGNOSIS — M47.895 OTHER SPONDYLOSIS, THORACOLUMBAR REGION: ICD-10-CM

## 2022-05-25 RX ORDER — SULFASALAZINE 500 MG/1
750 TABLET ORAL DAILY
Qty: 45 TABLET | Refills: 3 | Status: SHIPPED | OUTPATIENT
Start: 2022-05-25

## 2022-05-31 ENCOUNTER — OFFICE VISIT (OUTPATIENT)
Dept: FAMILY MEDICINE CLINIC | Facility: CLINIC | Age: 77
End: 2022-05-31
Payer: MEDICARE

## 2022-05-31 VITALS
BODY MASS INDEX: 25 KG/M2 | OXYGEN SATURATION: 91 % | SYSTOLIC BLOOD PRESSURE: 146 MMHG | RESPIRATION RATE: 17 BRPM | TEMPERATURE: 97.9 F | HEIGHT: 71 IN | HEART RATE: 70 BPM | WEIGHT: 178.6 LBS | DIASTOLIC BLOOD PRESSURE: 70 MMHG

## 2022-05-31 DIAGNOSIS — I25.118 CORONARY ARTERY DISEASE OF NATIVE HEART WITH STABLE ANGINA PECTORIS, UNSPECIFIED VESSEL OR LESION TYPE (HCC): ICD-10-CM

## 2022-05-31 DIAGNOSIS — J06.9 UPPER RESPIRATORY INFECTION, VIRAL: Primary | ICD-10-CM

## 2022-05-31 PROCEDURE — 99213 OFFICE O/P EST LOW 20 MIN: CPT | Performed by: INTERNAL MEDICINE

## 2022-05-31 PROCEDURE — U0003 INFECTIOUS AGENT DETECTION BY NUCLEIC ACID (DNA OR RNA); SEVERE ACUTE RESPIRATORY SYNDROME CORONAVIRUS 2 (SARS-COV-2) (CORONAVIRUS DISEASE [COVID-19]), AMPLIFIED PROBE TECHNIQUE, MAKING USE OF HIGH THROUGHPUT TECHNOLOGIES AS DESCRIBED BY CMS-2020-01-R: HCPCS | Performed by: INTERNAL MEDICINE

## 2022-05-31 PROCEDURE — U0005 INFEC AGEN DETEC AMPLI PROBE: HCPCS | Performed by: INTERNAL MEDICINE

## 2022-05-31 RX ORDER — OFLOXACIN 3 MG/ML
SOLUTION/ DROPS OPHTHALMIC
COMMUNITY
Start: 2022-04-01

## 2022-05-31 RX ORDER — LORATADINE 10 MG/1
10 TABLET ORAL DAILY
Qty: 10 TABLET | Refills: 0 | Status: SHIPPED | OUTPATIENT
Start: 2022-05-31

## 2022-05-31 RX ORDER — BROMFENAC SODIUM 0.7 MG/ML
SOLUTION/ DROPS OPHTHALMIC
COMMUNITY
Start: 2022-04-01

## 2022-05-31 RX ORDER — UMECLIDINIUM BROMIDE AND VILANTEROL TRIFENATATE 62.5; 25 UG/1; UG/1
1 POWDER RESPIRATORY (INHALATION) DAILY
COMMUNITY
End: 2022-06-01 | Stop reason: ALTCHOICE

## 2022-05-31 RX ORDER — BENZONATATE 200 MG/1
200 CAPSULE ORAL 3 TIMES DAILY PRN
Qty: 20 CAPSULE | Refills: 0 | Status: SHIPPED | OUTPATIENT
Start: 2022-05-31

## 2022-05-31 NOTE — ASSESSMENT & PLAN NOTE
Check COVID-19 PCR even though clinically less likely  Start Claritin and benzonatate  No COPD exacerbation

## 2022-05-31 NOTE — PROGRESS NOTES
Assessment/Plan:         Problem List Items Addressed This Visit        Respiratory    RESOLVED: Upper respiratory infection, viral - Primary     Check COVID-19 PCR even though clinically less likely  Start Claritin and benzonatate  No COPD exacerbation  Relevant Medications    benzonatate (TESSALON) 200 MG capsule    loratadine (CLARITIN) 10 mg tablet    Other Relevant Orders    COVID Only- Office Collect       Cardiovascular and Mediastinum    Coronary artery disease of native heart with stable angina pectoris, unspecified vessel or lesion type (HCC)            Subjective:      Patient ID: Paulina Short is a 78 y o  male  1  Cough- started 3 days ago with scratchy throat and clear runny nose  No sneezing  No sore throat  No headache  Slight body ache  No anorexia  No loss of taste or smell  No increased dyspnea or increased wheezing  No chest pain  He did not sleep well last night due to coughing  That is why his feeling tired little bit  No sick contacts  He is fully vaccinated including booster shots  No diarrhea      The following portions of the patient's history were reviewed and updated as appropriate:   Past Medical History:  He has a past medical history of Anemia, Arthritis, Gee's esophagus, Breast lump, Cancer ([facility] [address] Chronic kidney disease, Cirrhosis ([facility] [address] ), Coronary artery disease, Diabetes mellitus ([facility] [address] ), H/O heart artery stent, Hypertension, Inguinal hernia, Sarcoidosis, 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[facility] [address] ), Skin cancer, SOB (shortness of breath), and Upper respiratory infection, viral (5/31/2022)  ,  _______________________________________________________________________  Medical Problems:  does not have any pertinent problems on file ,  _______________________________________________________________________  Past Surgical History:   has a past surgical history that includes Colonoscopy (04/11/2016); EGD AND COLONOSCOPY (06/24/2019); Cardiac catheterization; Tonsillectomy;  Thumb fusion; ERCP (09/11/2014); Multiple tooth extractions; Cervical fusion; Liver biopsy; Hip surgery (Right); Kidney stone surgery; Cholecystectomy; Skin biopsy (05/2020); Esophagogastric fundoplasty; Hernia repair (Right); and US guidance (10/22/2014)  ,  _______________________________________________________________________  Family History:  family history includes Atrial fibrillation in his mother; COPD in his father; Heart disease in his father and mother; Hypertension in his mother; Kidney disease in his father ,  _______________________________________________________________________  Social History:   reports that he has never smoked  He has never used smokeless tobacco  He reports that he does not drink alcohol and does not use drugs  ,  _______________________________________________________________________  Allergies:  is allergic to oxycodone     _______________________________________________________________________  Current Outpatient Medications   Medication Sig Dispense Refill    benzonatate (TESSALON) 200 MG capsule Take 1 capsule (200 mg total) by mouth 3 (three) times a day as needed for cough 20 capsule 0    loratadine (CLARITIN) 10 mg tablet Take 1 tablet (10 mg total) by mouth daily 10 tablet 0    albuterol (PROVENTIL HFA,VENTOLIN HFA) 90 mcg/act inhaler Inhale 2 puffs every 6 (six) hours as needed for wheezing or shortness of breath 6 7 g 2    allopurinol (ZYLOPRIM) 300 mg tablet Take 0 5 tablets (150 mg total) by mouth daily 45 tablet 3    amLODIPine (NORVASC) 5 mg tablet TAKE 1 TABLET (5 MG) BY MOUTH DAILY 90 tablet 5    aspirin (ECOTRIN LOW STRENGTH) 81 mg EC tablet Take 81 mg by mouth daily      atorvastatin (LIPITOR) 80 mg tablet Take 1 tablet (80 mg total) by mouth daily 90 tablet 3    azilsartan medoxomil (Edarbi) 40 MG tablet Take 1 tablet (40 mg total) by mouth 2 (two) times a day 180 tablet 3    B Complex-C (SUPERPLEX-T) TABS Take 1 tablet by mouth daily      chlorthalidone 25 mg tablet TAKE "ONE-HALF" TABLET (12 5 MG) BY MOUTH DAILY 15 tablet 4    Cholecalciferol (VITAMIN D3) 2000 units TABS Take 1 tablet by mouth daily      co-enzyme Q-10 50 MG capsule Take 100 mg by mouth in the morning   colchicine (COLCRYS) 0 6 mg tablet TAKE 1 TABLET (0 6 MG TOTAL) BY MOUTH DAILY 90 tablet 3    Ferrous Sulfate (IRON) 325 (65 Fe) MG TABS Take 1 tablet by mouth daily      fluticasone-vilanterol (Breo Ellipta) 100-25 mcg/inh inhaler Inhale 1 puff daily Rinse mouth after use  60 each 2    Incruse Ellipta 62 5 MCG/INH AEPB inhaler INHALE 1 PUFF DAILY 90 blister 1    Januvia 100 MG tablet TAKE ONE TABLET DAILY 90 tablet 0    magnesium chloride-calcium (SLOW-MAG) 71 5-119 mg Take 2 tablets by mouth 2 (two) times a day      metoprolol tartrate (LOPRESSOR) 50 mg tablet Take 1 tablet (50 mg total) by mouth 2 (two) times a day 50 mg in the morning and 25 mg in the evening 180 tablet 3    ofloxacin (OCUFLOX) 0 3 % ophthalmic solution       pantoprazole (PROTONIX) 40 mg tablet Take 1 tablet daily half hour before eating breakfast 90 tablet 3    prasugrel (EFFIENT) tablet Take 10 mg by mouth daily       Prolensa 0 07 % SOLN       sulfaSALAzine (AZULFIDINE) 500 mg tablet Take 1 5 tablets (750 mg total) by mouth daily 45 tablet 3    umeclidinium-vilanterol (Anoro Ellipta) 62 5-25 MCG/INH inhaler Inhale 1 puff daily       No current facility-administered medications for this visit      _______________________________________________________________________  Review of Systems      Objective:  Vitals:    05/31/22 1609   BP: 146/70   BP Location: Left arm   Patient Position: Sitting   Cuff Size: Standard   Pulse: 70   Resp: 17   Temp: 97 9 °F (36 6 °C)   TempSrc: Temporal   SpO2: 91%   Weight: 81 kg (178 lb 9 6 oz)   Height: 5' 11" (1 803 m)     Body mass index is 24 91 kg/m²  Physical Exam  Constitutional:       Appearance: Normal appearance  He is not ill-appearing or diaphoretic     HENT: Nose: Congestion and rhinorrhea present  Mouth/Throat:      Mouth: Mucous membranes are moist       Pharynx: Posterior oropharyngeal erythema present  No oropharyngeal exudate  Cardiovascular:      Rate and Rhythm: Regular rhythm  Pulmonary:      Effort: Pulmonary effort is normal       Breath sounds: No wheezing or rhonchi  Musculoskeletal:      Cervical back: Neck supple  Neurological:      Mental Status: He is alert

## 2022-06-01 ENCOUNTER — TELEMEDICINE (OUTPATIENT)
Dept: FAMILY MEDICINE CLINIC | Facility: CLINIC | Age: 77
End: 2022-06-01
Payer: MEDICARE

## 2022-06-01 DIAGNOSIS — N18.31 TYPE 2 DIABETES MELLITUS WITH STAGE 3A CHRONIC KIDNEY DISEASE, UNSPECIFIED WHETHER LONG TERM INSULIN USE (HCC): ICD-10-CM

## 2022-06-01 DIAGNOSIS — J43.9 PULMONARY EMPHYSEMA, UNSPECIFIED EMPHYSEMA TYPE (HCC): ICD-10-CM

## 2022-06-01 DIAGNOSIS — U07.1 COVID-19: Primary | ICD-10-CM

## 2022-06-01 DIAGNOSIS — E11.22 TYPE 2 DIABETES MELLITUS WITH STAGE 3A CHRONIC KIDNEY DISEASE, UNSPECIFIED WHETHER LONG TERM INSULIN USE (HCC): ICD-10-CM

## 2022-06-01 LAB — SARS-COV-2 RNA RESP QL NAA+PROBE: POSITIVE

## 2022-06-01 PROCEDURE — 99443 PR PHYS/QHP TELEPHONE EVALUATION 21-30 MIN: CPT | Performed by: INTERNAL MEDICINE

## 2022-06-01 RX ORDER — SODIUM CHLORIDE 9 MG/ML
20 INJECTION, SOLUTION INTRAVENOUS ONCE
Status: CANCELLED | OUTPATIENT
Start: 2022-06-04

## 2022-06-01 RX ORDER — ONDANSETRON 2 MG/ML
4 INJECTION INTRAMUSCULAR; INTRAVENOUS ONCE AS NEEDED
Status: CANCELLED | OUTPATIENT
Start: 2022-06-04

## 2022-06-01 RX ORDER — BEBTELOVIMAB 87.5 MG/ML
175 INJECTION, SOLUTION INTRAVENOUS ONCE
Status: CANCELLED | OUTPATIENT
Start: 2022-06-04

## 2022-06-01 RX ORDER — FLUTICASONE FUROATE AND VILANTEROL TRIFENATATE 100; 25 UG/1; UG/1
1 POWDER RESPIRATORY (INHALATION) DAILY
Qty: 60 EACH | Refills: 2 | Status: SHIPPED | OUTPATIENT
Start: 2022-06-01

## 2022-06-01 RX ORDER — ALBUTEROL SULFATE 90 UG/1
3 AEROSOL, METERED RESPIRATORY (INHALATION) ONCE AS NEEDED
Status: CANCELLED | OUTPATIENT
Start: 2022-06-04

## 2022-06-01 RX ORDER — ACETAMINOPHEN 325 MG/1
650 TABLET ORAL ONCE AS NEEDED
Status: CANCELLED | OUTPATIENT
Start: 2022-06-04

## 2022-06-01 NOTE — ASSESSMENT & PLAN NOTE
Today is day 4  He received 3 COVID shot  Last vaccine was in November 2021  Therapy options were discussed with patient  He would like to proceed with monoclonal antibody infusion due to risk of drug interactions with paxlovid  Risk versus benefits were discussed with the patient

## 2022-06-01 NOTE — PROGRESS NOTES
COVID-19 Outpatient Progress Note    Assessment/Plan:    Problem List Items Addressed This Visit        Endocrine    Type 2 diabetes mellitus with stage 3 chronic kidney disease ([facility] [address] )       Other    COVID-19 - Primary     Today is day 4  He received 3 COVID shot  Last vaccine was in November 2021  Therapy options were discussed with patient  He would like to proceed with monoclonal antibody infusion due to risk of drug interactions with paxlovid  Risk versus benefits were discussed with the patient  Disposition:       Patient meets criteria for Bebtelovimab infusion  They were counseled in regards to risks, benefits, and side effects of this infusion  Darrick Jose is an investigational medicine used to treat mild-to-moderate symptoms of COVID-19 in adults and children (15years of age and older weighing at least 80 pounds (40 kg)) with positive results of direct SARS-CoV-2 viral testing, and who are at high risk of progression to severe COVID-19, including hospitalization or death, and for whom other COVID-19 treatment options approved or authorized by FDA are not available or clinically appropriate  Bebtelovimab is investigational because it is still being studied  There is limited information about the safety and effectiveness of using bebtelovimab to treat people with mild-to-moderate COVID-19  The FDA has authorized the emergency use of bebtelovimab for the treatment of COVID-19 under an Emergency Use Authorization (EUA)       Darrick Jose is not authorized for use in people who:  - are likely to be infected with a SARS-CoV-2 variant that is not able to be treated by bebtelovimab based on the circulating variants in your area (ask your health care provider about FDA and CDCs latest information on circulating variants by geographic area), or  - are hospitalized due to COVID-19, or  - require oxygen therapy and/or respiratory support due to COVID-19, or  - require an increase in baseline oxygen flow rate and/or respiratory support due to COVID19 and are on chronic oxygen therapy and/or respiratory support due to underlying nonCOVID-19 related comorbidity  How will I receive Bebtelovimab? [place] will be given as an injection through a vein (intravenously or IV) over at least 30 seconds  You will be observed by your healthcare provider for at least 1 hour after you receive bebtelovimab  Possible side effects of Bebtelovimab: Allergic reactions can happen during and after infusion with bebtelovimab  Possible reactions include: fever, chills, nausea, headache, shortness of breath, low or high blood pressure, rapid or slow heart rate, chest discomfort or pain, weakness, confusion, feeling tired, wheezing, swelling of your lips, face, or throat, rash including hives, itching, muscle aches, dizziness, and sweating  These reactions may be severe or life threatening  Worsening symptoms after treatment: You may experience new or worsening symptoms after infusion, including fever, difficulty breathing, rapid or slow heart rate, tiredness, weakness or confusion  If these occur, contact your healthcare provider or seek immediate medical attention as some of these events have required hospitalization  It is unknown if these events are related to treatment or are due to the progression of COVID19  The side effects of getting any medicine by vein may include brief pain, bleeding, bruising of the skin, soreness, swelling, and possible infection at the infusion site  These are not all the possible side effects of bebtelovimab  Not a lot of people have been given bebtelovimab  Serious and unexpected side effects may happen  Bebtelovimab are still being studied so it is possible that all of the risks are not known at this time  It is possible that bebtelovimab could interfere with your body's own ability to fight off a future infection of SARS-CoV-2   Similarly, bebtelovimab may reduce your bodys immune response to a vaccine for SARS-CoV-2  Specific studies have not been conducted to address these possible risks  Talk to your healthcare provider if you have any questions  Emergency Use Authorization:    [facility] FDA has made bebtelovimab available under an emergency access mechanism called an EUA  The EUA is supported by a  of Health and Human Service ([facility]) declaration that circumstances exist to justify the emergency use of drugs and biological products during the COVID-19 pandemic  Bebtelovimab have not undergone the same type of review as an FDA-approved or cleared product  The FDA may issue an EUA when certain criteria are met, which includes that there are no adequate, approved, and available alternatives  In addition, the FDA decision is based on the totality of scientific evidence available showing that it is reasonable to believe that the product meets certain criteria for safety, performance, and labeling and may be effective in treatment of patients during the COVID-19 pandemic  All of these criteria must be met to allow for the product to be used in the treatment of patients during the COVID-19 pandemic  The EUA for bebtelovimab together is in effect for the duration of the COVID-19 declaration justifying emergency use of these products, unless terminated or revoked (after which the product may no longer be used)  What if I am pregnant or breastfeeding? There is no experience treating pregnant women or breastfeeding mothers with bebtelovimab  For a mother and unborn baby, the benefit of receiving bebtelovimab may be greater than the risk from the treatment  If you are pregnant or breastfeeding, discuss your options and specific situation with your healthcare provider  How do I report side effects with Bebtelovimab? Contact your healthcare provider if you have any side effects that bother you or do not go away      Report side effects to FDA MedWatch at www fda gov/medwatch, or call 0-508-AWU-2336 or to [address] [place] [address]  as shown below  Email: [email or web address]  [email or web address]   Fax number: [phone]   Telephone number: 7-118-JERQHF53 ([phone])    Full fact sheet document for patients can be found at: http://[employer]/    The patient consents to proceed with bebtelovimab infusion  I have spent 26 minutes directly with the patient  Greater than 50% of this time was spent in counseling/coordination of care regarding: risks and benefits of treatment options and patient and family education  Encounter provider Saima Wright MD    Provider located at [address] [place] [id number]  [phone]    Recent Visits  Date Type Provider Dept   05/31/22 Office Visit Breanna Pollack MD [facility]   Showing recent visits within past 7 days and meeting all other requirements  Today's Visits  Date Type Provider Dept   06/01/22 Telemedicine Breanna Pollack MD  [facility]   Showing today's visits and meeting all other requirements  Future Appointments  No visits were found meeting these conditions  Showing future appointments within next 150 days and meeting all other requirements     This virtual check-in was done via telephone and he agrees to proceed  Patient agrees to participate in a virtual check in via telephone or video visit instead of presenting to the office to address urgent/immediate medical needs  Patient is aware this is a billable service  After connecting through Telephone, the patient was identified by name and date of birth  Kd Cormier was informed that this was a telemedicine visit and that the exam was being conducted confidentially over secure lines  My office door was closed  No one else was in the room   Kd Cormier acknowledged consent and understanding of privacy and security of the telemedicine visit  I informed the patient that I have reviewed his record in Epic and presented the opportunity for him to ask any questions regarding the visit today  The patient agreed to participate  It was my intent to perform this visit via video technology but the patient was not able to do a video connection so the visit was completed via audio telephone only  Verification of patient location:  Patient is located in the following state in which I hold an active license: [place]    Subjective:   Wil Walker is a 78 y o  male who is concerned about COVID-19  Patient's symptoms include fatigue, nasal congestion, rhinorrhea, cough and headache  Patient denies fever, chills, malaise, sore throat, anosmia, loss of taste, shortness of breath, chest tightness, abdominal pain, nausea, vomiting, diarrhea and myalgias       - Date of symptom onset: 5/29/2022      COVID-19 vaccination status: Fully vaccinated with booster    Exposure:   Contact with a person who is under investigation (PUI) for or who is positive for COVID-19 within the last 14 days?: No    Hospitalized recently for fever and/or lower respiratory symptoms?: No      Currently a healthcare worker that is involved in direct patient care?: No      Works in a special setting where the risk of COVID-19 transmission may be high? (this may include long-term care, correctional and jail facilities; homeless shelters; assisted-living facilities and group homes ): No      Resident in a special setting where the risk of COVID-19 transmission may be high? (this may include long-term care, correctional and jail facilities; homeless shelters; assisted-living facilities and group homes ): No      Lab Results   Component Value Date    SARSCOV2 Positive (A) 05/31/2022     Past Medical History:   Diagnosis Date    Anemia     Arthritis     Gee's esophagus     Breast lump     Cancer ([facility] [address] )     Chronic kidney disease     [facility]rhosis ([facility] [address] )     Coronary artery disease     cardiac cath; stents     [facility]     H/O heart artery stent     Hypertension     Inguinal hernia     Right    Sarcoidosis     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HCC)     Skin cancer     SOB (shortness of breath)     Upper respiratory infection, viral 5/31/2022     Past Surgical History:   Procedure Laterality Date    CARDIAC CATHETERIZATION      CERVICAL FUSION      CHOLECYSTECTOMY      COLONOSCOPY  04/11/2016    EGD AND COLONOSCOPY  06/24/2019    ERCP  09/11/2014    transab esop hiat griffin rpr    ESOPHAGOGASTRIC FUNDOPLASTY      HERNIA REPAIR Right     p I/griffin init reduc >5 yr    HIP SURGERY Right     Replacement    KIDNEY STONE SURGERY      Fragmenting    LIVER BIOPSY      MULTIPLE TOOTH EXTRACTIONS      "extraction of all maxillary teeth"    SKIN BIOPSY  05/2020    THUMB FUSION      with graft    TONSILLECTOMY      US GUIDANCE  10/22/2014     Current Outpatient Medications   Medication Sig Dispense Refill    albuterol (PROVENTIL HFA,VENTOLIN HFA) 90 mcg/act inhaler Inhale 2 puffs every 6 (six) hours as needed for wheezing or shortness of breath 6 7 g 2    allopurinol (ZYLOPRIM) 300 mg tablet Take 0 5 tablets (150 mg total) by mouth daily 45 tablet 3    amLODIPine (NORVASC) 5 mg tablet TAKE 1 TABLET (5 MG) BY MOUTH DAILY 90 tablet 5    aspirin (ECOTRIN LOW STRENGTH) 81 mg EC tablet Take 81 mg by mouth daily      atorvastatin (LIPITOR) 80 mg tablet Take 1 tablet (80 mg total) by mouth daily 90 tablet 3    azilsartan medoxomil (Edarbi) 40 MG tablet Take 1 tablet (40 mg total) by mouth 2 (two) times a day 180 tablet 3    B Complex-C (SUPERPLEX-T) TABS Take 1 tablet by mouth daily      benzonatate (TESSALON) 200 MG capsule Take 1 capsule (200 mg total) by mouth 3 (three) times a day as needed for cough 20 capsule 0    chlorthalidone 25 mg tablet TAKE "ONE-HALF" TABLET (12 5 MG) BY MOUTH DAILY 15 tablet 4    Cholecalciferol (VITAMIN D3) 2000 units TABS Take 1 tablet by mouth daily      co-enzyme Q-10 50 MG capsule Take 100 mg by mouth in the morning   colchicine (COLCRYS) 0 6 mg tablet TAKE 1 TABLET (0 6 MG TOTAL) BY MOUTH DAILY 90 tablet 3    Ferrous Sulfate (IRON) 325 (65 Fe) MG TABS Take 1 tablet by mouth daily      fluticasone-vilanterol (Breo Ellipta) 100-25 mcg/inh inhaler Inhale 1 puff daily Rinse mouth after use  60 each 2    Incruse Ellipta 62 5 MCG/INH AEPB inhaler INHALE 1 PUFF DAILY 90 blister 1    Januvia 100 MG tablet TAKE ONE TABLET DAILY 90 tablet 0    loratadine (CLARITIN) 10 mg tablet Take 1 tablet (10 mg total) by mouth daily 10 tablet 0    magnesium chloride-calcium (SLOW-MAG) 71 5-119 mg Take 2 tablets by mouth 2 (two) times a day      metoprolol tartrate (LOPRESSOR) 50 mg tablet Take 1 tablet (50 mg total) by mouth 2 (two) times a day 50 mg in the morning and 25 mg in the evening 180 tablet 3    ofloxacin (OCUFLOX) 0 3 % ophthalmic solution       pantoprazole (PROTONIX) 40 mg tablet Take 1 tablet daily half hour before eating breakfast 90 tablet 3    prasugrel (EFFIENT) tablet Take 10 mg by mouth daily       Prolensa 0 07 % SOLN       sulfaSALAzine (AZULFIDINE) 500 mg tablet Take 1 5 tablets (750 mg total) by mouth daily 45 tablet 3    umeclidinium-vilanterol (Anoro Ellipta) 62 5-25 MCG/INH inhaler Inhale 1 puff daily       No current facility-administered medications for this visit  Allergies   Allergen Reactions    Oxycodone GI Intolerance       Review of Systems   Constitutional: Positive for fatigue  Negative for chills and fever  HENT: Positive for congestion and rhinorrhea  Negative for sore throat  Respiratory: Positive for cough  Negative for chest tightness and shortness of breath  Gastrointestinal: Negative for abdominal pain, diarrhea, nausea and vomiting  Musculoskeletal: Negative for myalgias  Neurological: Positive for headaches  Objective:     There were no vitals filed for this visit  Physical Exam  Constitutional:       General: He is not in acute distress  Pulmonary:      Effort: Pulmonary effort is normal    Neurological:      Mental Status: He is alert and oriented to person, place, and time  VIRTUAL VISIT [address] verbally agrees to participate in [employer]  Pt is aware that [employer] could be limited without vital signs or the ability to perform a full hands-on physical exam  Bean Lopez understands he or the provider may request at any time to terminate the video visit and request the patient to seek care or treatment in person

## 2022-06-02 ENCOUNTER — TELEPHONE (OUTPATIENT)
Dept: FAMILY MEDICINE CLINIC | Facility: CLINIC | Age: 77
End: 2022-06-02

## 2022-06-02 DIAGNOSIS — I12.9 HYPERTENSIVE CHRONIC KIDNEY DISEASE WITH STAGE 1 THROUGH STAGE 4 CHRONIC KIDNEY DISEASE, OR UNSPECIFIED CHRONIC KIDNEY DISEASE: ICD-10-CM

## 2022-06-02 DIAGNOSIS — E78.5 DYSLIPIDEMIA: ICD-10-CM

## 2022-06-02 DIAGNOSIS — R80.1 PERSISTENT PROTEINURIA: ICD-10-CM

## 2022-06-02 DIAGNOSIS — N18.30 CHRONIC KIDNEY DISEASE, STAGE III (MODERATE) (HCC): ICD-10-CM

## 2022-06-02 RX ORDER — CHLORTHALIDONE 25 MG/1
TABLET ORAL
Qty: 45 TABLET | Refills: 4 | Status: SHIPPED | OUTPATIENT
Start: 2022-06-02

## 2022-06-02 NOTE — TELEPHONE ENCOUNTER
CALLED AND SPOKE TO [address] [place] [address] AND PATIENT IS SCHEDULED FOR 05/04/2022 AT 8:30 AM  CALLED AND GAVE PATIENT INSTRUCTIONS     Yudi Doe

## 2022-06-04 ENCOUNTER — HOSPITAL ENCOUNTER (OUTPATIENT)
Dept: INFUSION CENTER | Facility: HOSPITAL | Age: 77
Discharge: HOME | End: 2022-06-04
Attending: INTERNAL MEDICINE
Payer: MEDICARE

## 2022-06-04 VITALS
HEART RATE: 56 BPM | SYSTOLIC BLOOD PRESSURE: 125 MMHG | RESPIRATION RATE: 18 BRPM | OXYGEN SATURATION: 97 % | TEMPERATURE: 97.5 F | DIASTOLIC BLOOD PRESSURE: 64 MMHG

## 2022-06-04 DIAGNOSIS — U07.1 COVID-19: ICD-10-CM

## 2022-06-04 DIAGNOSIS — N18.31 TYPE 2 DIABETES MELLITUS WITH STAGE 3A CHRONIC KIDNEY DISEASE, UNSPECIFIED WHETHER LONG TERM INSULIN USE (HCC): Primary | ICD-10-CM

## 2022-06-04 DIAGNOSIS — E11.22 TYPE 2 DIABETES MELLITUS WITH STAGE 3A CHRONIC KIDNEY DISEASE, UNSPECIFIED WHETHER LONG TERM INSULIN USE (HCC): Primary | ICD-10-CM

## 2022-06-04 PROCEDURE — M0222 HB BEBTELOVIMAB INJECTION: HCPCS | Performed by: INTERNAL MEDICINE

## 2022-06-04 RX ORDER — ACETAMINOPHEN 325 MG/1
650 TABLET ORAL ONCE AS NEEDED
Status: DISCONTINUED | OUTPATIENT
Start: 2022-06-04 | End: 2022-06-07 | Stop reason: HOSPADM

## 2022-06-04 RX ORDER — ACETAMINOPHEN 325 MG/1
650 TABLET ORAL ONCE AS NEEDED
Status: CANCELLED | OUTPATIENT
Start: 2022-06-04

## 2022-06-04 RX ORDER — SODIUM CHLORIDE 9 MG/ML
20 INJECTION, SOLUTION INTRAVENOUS ONCE
Status: DISCONTINUED | OUTPATIENT
Start: 2022-06-04 | End: 2022-06-07 | Stop reason: HOSPADM

## 2022-06-04 RX ORDER — ALBUTEROL SULFATE 90 UG/1
3 AEROSOL, METERED RESPIRATORY (INHALATION) ONCE AS NEEDED
Status: CANCELLED | OUTPATIENT
Start: 2022-06-04

## 2022-06-04 RX ORDER — BEBTELOVIMAB 87.5 MG/ML
175 INJECTION, SOLUTION INTRAVENOUS ONCE
Status: COMPLETED | OUTPATIENT
Start: 2022-06-04 | End: 2022-06-04

## 2022-06-04 RX ORDER — ALBUTEROL SULFATE 90 UG/1
3 AEROSOL, METERED RESPIRATORY (INHALATION) ONCE AS NEEDED
Status: DISCONTINUED | OUTPATIENT
Start: 2022-06-04 | End: 2022-06-07 | Stop reason: HOSPADM

## 2022-06-04 RX ORDER — ONDANSETRON 2 MG/ML
4 INJECTION INTRAMUSCULAR; INTRAVENOUS ONCE AS NEEDED
Status: DISCONTINUED | OUTPATIENT
Start: 2022-06-04 | End: 2022-06-07 | Stop reason: HOSPADM

## 2022-06-04 RX ORDER — SODIUM CHLORIDE 9 MG/ML
20 INJECTION, SOLUTION INTRAVENOUS ONCE
Status: CANCELLED | OUTPATIENT
Start: 2022-06-04

## 2022-06-04 RX ORDER — ONDANSETRON 2 MG/ML
4 INJECTION INTRAMUSCULAR; INTRAVENOUS ONCE AS NEEDED
Status: CANCELLED | OUTPATIENT
Start: 2022-06-04

## 2022-06-04 RX ORDER — BEBTELOVIMAB 87.5 MG/ML
175 INJECTION, SOLUTION INTRAVENOUS ONCE
Status: CANCELLED | OUTPATIENT
Start: 2022-06-04

## 2022-06-04 RX ADMIN — BEBTELOVIMAB 175 MG: 87.5 INJECTION, SOLUTION INTRAVENOUS at 08:31

## 2022-06-07 ENCOUNTER — APPOINTMENT (EMERGENCY)
Dept: CT IMAGING | Facility: HOSPITAL | Age: 77
End: 2022-06-07
Payer: MEDICARE

## 2022-06-07 ENCOUNTER — HOSPITAL ENCOUNTER (EMERGENCY)
Facility: HOSPITAL | Age: 77
Discharge: HOME | End: 2022-06-07
Attending: EMERGENCY MEDICINE
Payer: MEDICARE

## 2022-06-07 ENCOUNTER — NURSE TRIAGE (OUTPATIENT)
Dept: OTHER | Facility: OTHER | Age: 77
End: 2022-06-07

## 2022-06-07 VITALS
RESPIRATION RATE: 16 BRPM | TEMPERATURE: 97.8 F | DIASTOLIC BLOOD PRESSURE: 74 MMHG | SYSTOLIC BLOOD PRESSURE: 148 MMHG | OXYGEN SATURATION: 96 % | HEART RATE: 63 BPM

## 2022-06-07 DIAGNOSIS — U07.1 COVID-19: Primary | ICD-10-CM

## 2022-06-07 LAB
2HR DELTA HS TROPONIN: -2 NG/L
ALBUMIN SERPL BCP-MCNC: 3.7 G/DL (ref 3.5–5)
ALP SERPL-CCNC: 73 U/L (ref 34–104)
ALT SERPL W P-5'-P-CCNC: 17 U/L (ref 7–52)
ANION GAP SERPL CALCULATED.3IONS-SCNC: 9 MMOL/L (ref 4–13)
AST SERPL W P-5'-P-CCNC: 25 U/L (ref 13–39)
BASOPHILS # BLD AUTO: 0.04 THOUSANDS/ΜL (ref 0–0.1)
BASOPHILS NFR BLD AUTO: 0 % (ref 0–1)
BILIRUB SERPL-MCNC: 0.39 MG/DL (ref 0.2–1)
BUN SERPL-MCNC: 30 MG/DL (ref 5–25)
CALCIUM SERPL-MCNC: 8.7 MG/DL (ref 8.4–10.2)
CARDIAC TROPONIN I PNL SERPL HS: 13 NG/L
CARDIAC TROPONIN I PNL SERPL HS: 15 NG/L
CHLORIDE SERPL-SCNC: 102 MMOL/L (ref 96–108)
CO2 SERPL-SCNC: 25 MMOL/L (ref 21–32)
CREAT SERPL-MCNC: 1.22 MG/DL (ref 0.6–1.3)
EOSINOPHIL # BLD AUTO: 0.27 THOUSAND/ΜL (ref 0–0.61)
EOSINOPHIL NFR BLD AUTO: 3 % (ref 0–6)
ERYTHROCYTE [DISTWIDTH] IN BLOOD BY AUTOMATED COUNT: 12.4 % (ref 11.6–15.1)
GFR SERPL CREATININE-BSD FRML MDRD: 58 ML/MIN/1.73SQ M
GLUCOSE SERPL-MCNC: 256 MG/DL (ref 65–140)
HCT VFR BLD AUTO: 39.7 % (ref 36.5–49.3)
HGB BLD-MCNC: 13.7 G/DL (ref 12–17)
IMM GRANULOCYTES # BLD AUTO: 0.07 THOUSAND/UL (ref 0–0.2)
IMM GRANULOCYTES NFR BLD AUTO: 1 % (ref 0–2)
LYMPHOCYTES # BLD AUTO: 1.48 THOUSANDS/ΜL (ref 0.6–4.47)
LYMPHOCYTES NFR BLD AUTO: 17 % (ref 14–44)
MCH RBC QN AUTO: 32.2 PG (ref 26.8–34.3)
MCHC RBC AUTO-ENTMCNC: 34.5 G/DL (ref 31.4–37.4)
MCV RBC AUTO: 93 FL (ref 82–98)
MONOCYTES # BLD AUTO: 0.58 THOUSAND/ΜL (ref 0.17–1.22)
MONOCYTES NFR BLD AUTO: 7 % (ref 4–12)
NEUTROPHILS # BLD AUTO: 6.47 THOUSANDS/ΜL (ref 1.85–7.62)
NEUTS SEG NFR BLD AUTO: 72 % (ref 43–75)
NRBC BLD AUTO-RTO: 0 /100 WBCS
PLATELET # BLD AUTO: 171 THOUSANDS/UL (ref 149–390)
PMV BLD AUTO: 9 FL (ref 8.9–12.7)
POTASSIUM SERPL-SCNC: 3.4 MMOL/L (ref 3.5–5.3)
PROT SERPL-MCNC: 6.6 G/DL (ref 6.4–8.4)
RBC # BLD AUTO: 4.26 MILLION/UL (ref 3.88–5.62)
SODIUM SERPL-SCNC: 136 MMOL/L (ref 135–147)
WBC # BLD AUTO: 8.91 THOUSAND/UL (ref 4.31–10.16)

## 2022-06-07 PROCEDURE — 99285 EMERGENCY DEPT VISIT HI MDM: CPT

## 2022-06-07 PROCEDURE — 36415 COLL VENOUS BLD VENIPUNCTURE: CPT | Performed by: EMERGENCY MEDICINE

## 2022-06-07 PROCEDURE — 96374 THER/PROPH/DIAG INJ IV PUSH: CPT

## 2022-06-07 PROCEDURE — 80053 COMPREHEN METABOLIC PANEL: CPT | Performed by: EMERGENCY MEDICINE

## 2022-06-07 PROCEDURE — 99284 EMERGENCY DEPT VISIT MOD MDM: CPT | Performed by: EMERGENCY MEDICINE

## 2022-06-07 PROCEDURE — 94640 AIRWAY INHALATION TREATMENT: CPT

## 2022-06-07 PROCEDURE — 71250 CT THORAX DX C-: CPT

## 2022-06-07 PROCEDURE — 84484 ASSAY OF TROPONIN QUANT: CPT | Performed by: EMERGENCY MEDICINE

## 2022-06-07 PROCEDURE — 93005 ELECTROCARDIOGRAM TRACING: CPT

## 2022-06-07 PROCEDURE — 85025 COMPLETE CBC W/AUTO DIFF WBC: CPT | Performed by: EMERGENCY MEDICINE

## 2022-06-07 RX ORDER — SODIUM CHLORIDE 9 MG/ML
3 INJECTION INTRAVENOUS
Status: DISCONTINUED | OUTPATIENT
Start: 2022-06-07 | End: 2022-06-07 | Stop reason: HOSPADM

## 2022-06-07 RX ORDER — DEXAMETHASONE SODIUM PHOSPHATE 10 MG/ML
10 INJECTION, SOLUTION INTRAMUSCULAR; INTRAVENOUS ONCE
Status: COMPLETED | OUTPATIENT
Start: 2022-06-07 | End: 2022-06-07

## 2022-06-07 RX ORDER — IPRATROPIUM BROMIDE AND ALBUTEROL SULFATE 2.5; .5 MG/3ML; MG/3ML
3 SOLUTION RESPIRATORY (INHALATION) ONCE
Status: COMPLETED | OUTPATIENT
Start: 2022-06-07 | End: 2022-06-07

## 2022-06-07 RX ADMIN — IPRATROPIUM BROMIDE AND ALBUTEROL SULFATE 3 ML: 2.5; .5 SOLUTION RESPIRATORY (INHALATION) at 10:00

## 2022-06-07 RX ADMIN — DEXAMETHASONE SODIUM PHOSPHATE 10 MG: 10 INJECTION, SOLUTION INTRAMUSCULAR; INTRAVENOUS at 09:30

## 2022-06-07 NOTE — ED PROVIDER NOTES
History  Chief Complaint   Patient presents with    Shortness of Breath     DX Covid + 5/31  Reports ongoing SOB on exertion, worsening productive cough with yellow mucus  Started bebtelovimab on Saturday with no relief  Pt with PMH Sarcoid, COPD, CAD, HTN, DM  One week of covid, initially had rhinorrhea, sore throat, mild malaise  Here b/c he has feeling of "congestion" or being full of sputum he has urge to cough up, mostly left chest  Not pain/pressure  However has history of CAD, and the RN he spoke with mentioned concern about his CAD history and said he needed to go to ER  No exertional CP or SOB  NO n/v/d  No dysuria/freq/hematuria  Prior to Admission Medications   Prescriptions Last Dose Informant Patient Reported? Taking? B Complex-C (SUPERPLEX-T) TABS  Self Yes No   Sig: Take 1 tablet by mouth daily   Breo Ellipta 100-25 MCG/INH inhaler   No No   Sig: INHALE 1 PUFF DAILY RINSE MOUTH AFTER USE     Cholecalciferol (VITAMIN D3) 2000 units TABS  Self Yes No   Sig: Take 1 tablet by mouth daily   Ferrous Sulfate (IRON) 325 (65 Fe) MG TABS  Self Yes No   Sig: Take 1 tablet by mouth daily   Incruse Ellipta 62 5 MCG/INH AEPB inhaler   No No   Sig: INHALE 1 PUFF DAILY   Januvia 100 MG tablet   No No   Sig: TAKE ONE TABLET DAILY   Prolensa 0 07 % SOLN   Yes No   albuterol (PROVENTIL HFA,VENTOLIN HFA) 90 mcg/act inhaler   No No   Sig: Inhale 2 puffs every 6 (six) hours as needed for wheezing or shortness of breath   allopurinol (ZYLOPRIM) 300 mg tablet   No No   Sig: Take 0 5 tablets (150 mg total) by mouth daily   amLODIPine (NORVASC) 5 mg tablet   No No   Sig: TAKE 1 TABLET (5 MG) BY MOUTH DAILY   aspirin (ECOTRIN LOW STRENGTH) 81 mg EC tablet  Self Yes No   Sig: Take 81 mg by mouth daily   atorvastatin (LIPITOR) 80 mg tablet   No No   Sig: Take 1 tablet (80 mg total) by mouth daily   azilsartan medoxomil (Edarbi) 40 MG tablet   No No   Sig: Take 1 tablet (40 mg total) by mouth 2 (two) times a day benzonatate (TESSALON) 200 MG capsule   No No   Sig: Take 1 capsule (200 mg total) by mouth 3 (three) times a day as needed for cough   chlorthalidone 25 mg tablet   No No   Sig: TAKE "ONE-HALF" TABLET (12 5 MG) BY MOUTH DAILY   co-enzyme Q-10 50 MG capsule  Self Yes No   Sig: Take 100 mg by mouth in the morning     colchicine (COLCRYS) 0 6 mg tablet   No No   Sig: TAKE 1 TABLET (0 6 MG TOTAL) BY MOUTH DAILY   loratadine (CLARITIN) 10 mg tablet   No No   Sig: Take 1 tablet (10 mg total) by mouth daily   magnesium chloride-calcium (SLOW-MAG) 71 5-119 mg  Self Yes No   Sig: Take 2 tablets by mouth 2 (two) times a day   metoprolol tartrate (LOPRESSOR) 50 mg tablet   No No   Sig: Take 1 tablet (50 mg total) by mouth 2 (two) times a day 50 mg in the morning and 25 mg in the evening   ofloxacin (OCUFLOX) 0 3 % ophthalmic solution   Yes No   pantoprazole (PROTONIX) 40 mg tablet   No No   Sig: Take 1 tablet daily half hour before eating breakfast   prasugrel (EFFIENT) tablet  Self Yes No   Sig: Take 10 mg by mouth daily    sulfaSALAzine (AZULFIDINE) 500 mg tablet   No No   Sig: Take 1 5 tablets (750 mg total) by mouth daily      Facility-Administered Medications: None       Past Medical History:   Diagnosis Date    Anemia     Arthritis     Gee's esophagus     Breast lump     Cancer (HCC)     Chronic kidney disease     [facility]osis [facility] [address] [facility]     Coronary artery disease     cardiac cath; stents     Diabetes mellitus ([facility] [address] )     H/O heart artery stent     Hypertension     Inguinal hernia     Right    Sarcoidosis     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HCC)     Skin cancer     SOB (shortness of breath)     Upper respiratory infection, viral 5/31/2022       Past Surgical History:   Procedure Laterality Date    CARDIAC CATHETERIZATION      CERVICAL FUSION      CHOLECYSTECTOMY      COLONOSCOPY  04/11/2016    EGD AND COLONOSCOPY  06/24/2019    ERCP  09/11/2014    transab esop hiat griffin rpr    ESOPHAGOGASTRIC FUNDOPLASTY      HERNIA REPAIR Right     p I/griffin init reduc >5 yr    HIP SURGERY Right     Replacement    KIDNEY STONE SURGERY      Fragmenting    LIVER BIOPSY      MULTIPLE TOOTH EXTRACTIONS      "extraction of all maxillary teeth"    SKIN BIOPSY  05/2020    THUMB FUSION      with graft    TONSILLECTOMY      US GUIDANCE  10/22/2014       Family History   Problem Relation Age of Onset    Hypertension Mother     Atrial fibrillation Mother     Heart disease Mother     Kidney disease Father     COPD Father     Heart disease Father     Asthma Neg Hx     Lung cancer Neg Hx      I have reviewed and agree with the history as documented  E-Cigarette/Vaping    E-Cigarette Use Never User      E-Cigarette/Vaping Substances    Nicotine No     THC No     CBD No     Flavoring No     Other No     Unknown No      Social History     Tobacco Use    Smoking status: Never Smoker    Smokeless tobacco: Never Used    Tobacco comment: RETIRED   Vaping Use    Vaping Use: Never used   Substance Use Topics    Alcohol use: No     Comment: Alcohol intake:   Occasional wine - As per Esvin Gomez Drug use: No       Review of Systems   Constitutional: Negative for fever  HENT: Negative for rhinorrhea  Eyes: Negative for visual disturbance  Respiratory: Positive for cough  Negative for shortness of breath  Cardiovascular: Negative for chest pain  Gastrointestinal: Negative for abdominal pain, diarrhea and vomiting  Endocrine: Negative for polydipsia  Genitourinary: Negative for dysuria, frequency and hematuria  Musculoskeletal: Negative for neck stiffness  Skin: Negative for rash  Allergic/Immunologic: Negative for immunocompromised state  Neurological: Negative for speech difficulty, weakness and numbness  Psychiatric/Behavioral: Negative for suicidal ideas  Physical Exam  Physical Exam  Constitutional:       General: He is not in acute distress  HENT:      Head: Normocephalic and atraumatic  Right Ear: External ear normal       Left Ear: External ear normal       Nose: Nose normal       Mouth/Throat:      Pharynx: Oropharynx is clear  Eyes:      Conjunctiva/sclera: Conjunctivae normal    Cardiovascular:      Rate and Rhythm: Normal rate and regular rhythm  Heart sounds: Normal heart sounds  No murmur heard  Pulmonary:      Effort: Pulmonary effort is normal       Breath sounds: Rhonchi present  Abdominal:      General: Bowel sounds are normal       Palpations: Abdomen is soft  There is no mass  Tenderness: There is no abdominal tenderness  There is no guarding  Musculoskeletal:         General: No swelling or tenderness  Cervical back: Normal range of motion and neck supple  Right lower leg: No edema  Left lower leg: No edema  Skin:     General: Skin is warm and dry  Capillary Refill: Capillary refill takes less than 2 seconds  Neurological:      General: No focal deficit present  Mental Status: He is alert and oriented to person, place, and time     Psychiatric:         Mood and Affect: Mood normal          Vital Signs  ED Triage Vitals [06/07/22 0908]   Temp Pulse Respirations BP SpO2   -- 78 16 -- 98 %      Temp src Heart Rate Source Patient Position - Orthostatic VS BP Location FiO2 (%)   -- Monitor -- -- --      Pain Score       --           Vitals:    06/07/22 0908   Pulse: 78         Visual Acuity      ED Medications  Medications   sodium chloride (PF) 0 9 % injection 3 mL (has no administration in time range)       Diagnostic Studies  Results Reviewed     None                 No orders to display              Procedures  Procedures         ED Course  ED Course as of 06/07/22 1848 Tue Jun 07, 2022   1013 ECG 12 lead  EKG: Sinus rhythm, rate within normal limits, QRS axis -43, partial rbbb, no acute ischemic changes                                               MDM  Number of Diagnoses or Management Options  COVID-19  Diagnosis management comments: Covid-19, negative workup for CP, and no imaging findings or other clinical features requiring hospitalization  Dc home, pmd f/u      Disposition  Final diagnoses:   None     ED Disposition     None      Follow-up Information    None         Patient's Medications   Discharge Prescriptions    No medications on file       No discharge procedures on file      PDMP Review     None          ED Provider  Electronically Signed by           Claire Glasgow MD  06/07/22 6109

## 2022-06-07 NOTE — TELEPHONE ENCOUNTER
Reason for Disposition   MODERATE difficulty breathing (e g , speaks in phrases, SOB even at rest, pulse 100-120)    Answer Assessment - Initial Assessment Questions  1  COVID-19 DIAGNOSIS: "Who made your COVID-19 diagnosis?" "Was it confirmed by a positive lab test or self-test?" If not diagnosed by a doctor (or NP/PA), ask "Are there lots of cases (community spread) where you live?" Note: See public health department website, if unsure  Patient was tested positive for Covid on 31/5/22   3  ONSET: "When did the COVID-19 symptoms start?"       A week ago   4  WORST SYMPTOM: "What is your worst symptom?" (e g , cough, fever, shortness of breath, muscle aches)      SOB on exertion, cough   5  COUGH: "Do you have a cough?" If Yes, ask: "How bad is the cough?"        Dry cough   6  FEVER: "Do you have a fever?" If Yes, ask: "What is your temperature, how was it measured, and when did it start?"      No   7  RESPIRATORY STATUS: "Describe your breathing?" (e g , shortness of breath, wheezing, unable to speak)       SOB on exertion   8  BETTER-SAME-WORSE: "Are you getting better, staying the same or getting worse compared to yesterday?"  If getting worse, ask, "In what way?"      Same   9  HIGH RISK DISEASE: "Do you have any chronic medical problems?" (e g , asthma, heart or lung disease, weak immune system, obesity, etc )       Hx of MI and sarcoidosis of the lungs    10  VACCINE: "Have you had the COVID-19 vaccine?" If Yes, ask: "Which one, how many shots, when did you get it?"        Pfizer  11  BOOSTER: "Have you received your COVID-19 booster?" If Yes, ask: "Which one and when did you get it?"        Boosted   13  OTHER SYMPTOMS: "Do you have any other symptoms?"  (e g , chills, fatigue, headache, loss of smell or taste, muscle pain, sore throat)       SOB on exertion, and when laying down, cough increases at night when laying down     14  O2 SATURATION MONITOR:  "Do you use an oxygen saturation monitor (pulse oximeter) at home?" If Yes, ask "What is your reading (oxygen level) today?" "What is your usual oxygen saturation reading?" (e g , 95%)        No    Protocols used: CORONAVIRUS (COVID-19) DIAGNOSED OR SUSPECTED-ADULT-AH

## 2022-06-07 NOTE — TELEPHONE ENCOUNTER
Regarding: +Covid, cough w/SOB, mucus build up in chest, sore chest from cough, nasal congestion  ----- Message from [place] [employer] sent at 6/7/2022  8:26 AM EDT -----  "I have COVID and I cannot stop coughing, I have shortness of breath, I have a lot of mucus build up in my chest, my chest is sore from coughing so much and I have nasal congestion "

## 2022-06-07 NOTE — ED NOTES
Patient transported to [address] [place] [address] , [facility] / [facility]  06/07/22 2095

## 2022-06-08 ENCOUNTER — OFFICE VISIT (OUTPATIENT)
Dept: FAMILY MEDICINE CLINIC | Facility: CLINIC | Age: 77
End: 2022-06-08
Payer: MEDICARE

## 2022-06-08 VITALS
BODY MASS INDEX: 25.4 KG/M2 | HEART RATE: 71 BPM | OXYGEN SATURATION: 98 % | DIASTOLIC BLOOD PRESSURE: 80 MMHG | RESPIRATION RATE: 16 BRPM | TEMPERATURE: 96.9 F | WEIGHT: 181.4 LBS | HEIGHT: 71 IN | SYSTOLIC BLOOD PRESSURE: 150 MMHG

## 2022-06-08 DIAGNOSIS — J21.9 BRONCHIOLITIS: Primary | ICD-10-CM

## 2022-06-08 DIAGNOSIS — M79.672 ACUTE FOOT PAIN, LEFT: ICD-10-CM

## 2022-06-08 PROCEDURE — 99214 OFFICE O/P EST MOD 30 MIN: CPT | Performed by: FAMILY MEDICINE

## 2022-06-08 RX ORDER — DOXYCYCLINE HYCLATE 100 MG/1
100 CAPSULE ORAL EVERY 12 HOURS SCHEDULED
Qty: 20 CAPSULE | Refills: 0 | Status: SHIPPED | OUTPATIENT
Start: 2022-06-08 | End: 2022-06-18

## 2022-06-08 NOTE — PROGRESS NOTES
Procedures  Patient complains of pain due to an ingrown callus exactly at the mid center of the lateral left foot  This is causing him considerable pain  He states he had a similar thing on the right foot but podiatrist trimmed and made it better  This is an ingrown callus and I have taken a 15 blade scalpel and essentially deeper I did that callus and have removed the inner core hard small mass that is leaning on the nerve causing his discomfort  Patient experienced immediate relief

## 2022-06-08 NOTE — PROGRESS NOTES
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 and was + 8 days ago  He feels it is a "chest cold"  He had the MAB 7 a m  [place] [facility] [place]  Today is Wed  No fevers, + cough and tiredness  Sl more SOB now  1  Bronchiolitis  Comments:  see CC and CXR of yesterday  Orders:  -     doxycycline hyclate (VIBRAMYCIN) 100 mg capsule; Take 1 capsule (100 mg total) by mouth every 12 (twelve) hours for 10 days    2  Acute foot pain, left  Comments: This is callous lat aspect L foot  I "paired" it down using a #15 blade with immedicate relief  Subjective:      Patient ID: Anil Gabriel is a 78 y o  male  HPI    The following portions of the patient's history were reviewed and updated as appropriate: He  has a past medical history of Anemia, Arthritis, Gee's esophagus, Breast lump, Cancer ([address] ), Chronic kidney disease, Cirrhosis ([address] ), Coronary artery disease, Diabetes mellitus ([address] ), H/O heart artery stent, Hypertension, Inguinal hernia, Sarcoidosis, 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[address] ), Skin cancer, SOB (shortness of breath), and Upper respiratory infection, viral (5/31/2022)    He   Patient Active Problem List    Diagnosis Date Noted    COVID-19 06/01/2022    Coronary artery disease of native heart with stable angina pectoris, unspecified vessel or lesion type ([address] ) 05/31/2022    Type 2 diabetes mellitus with stage 3 chronic kidney disease ([address] ) 04/26/2021    Diabetic nephropathy associated with type 2 diabetes mellitus ([address] ) 01/25/2021    Other spondylosis, thoracolumbar region 11/12/2020    Type 2 diabetes mellitus without complication, without long-term current use of insulin ([address] ) 11/12/2020    COPD (chronic obstructive pulmonary disease) ([address] ) 07/29/2020    Sarcoidosis 07/29/2020    Hypertension, essential 12/04/2019    Hyperkalemia 12/18/2018    Chronic kidney disease, stage III (moderate) ([address] ) 05/16/2018    Dyslipidemia 05/16/2018    Persistent proteinuria 05/16/2018    Hypertensive chronic kidney disease with stage 1 through stage 4 chronic kidney disease, or unspecified chronic kidney disease 05/16/2018     He  has a past surgical history that includes Colonoscopy (04/11/2016); EGD AND COLONOSCOPY (06/24/2019); Cardiac catheterization; Tonsillectomy; Thumb fusion; ERCP (09/11/2014); Multiple tooth extractions; Cervical fusion; Liver biopsy; Hip surgery (Right); Kidney stone surgery; Cholecystectomy; Skin biopsy (05/2020); Esophagogastric fundoplasty; Hernia repair (Right); US guidance (10/22/2014); and Cataract extraction, bilateral   His family history includes Atrial fibrillation in his mother; COPD in his father; Heart disease in his father and mother; Hypertension in his mother; Kidney disease in his father  He  reports that he has never smoked  He has never used smokeless tobacco  He reports that he does not drink alcohol and does not use drugs  Current Outpatient Medications   Medication Sig Dispense Refill    albuterol (PROVENTIL HFA,VENTOLIN HFA) 90 mcg/act inhaler Inhale 2 puffs every 6 (six) hours as needed for wheezing or shortness of breath 6 7 g 2    allopurinol (ZYLOPRIM) 300 mg tablet Take 0 5 tablets (150 mg total) by mouth daily 45 tablet 3    amLODIPine (NORVASC) 5 mg tablet TAKE 1 TABLET (5 MG) BY MOUTH DAILY 90 tablet 5    aspirin (ECOTRIN LOW STRENGTH) 81 mg EC tablet Take 81 mg by mouth daily      azilsartan medoxomil (Edarbi) 40 MG tablet Take 1 tablet (40 mg total) by mouth 2 (two) times a day 180 tablet 3    B Complex-C (SUPERPLEX-T) TABS Take 1 tablet by mouth daily      Breo Ellipta 100-25 MCG/INH inhaler INHALE 1 PUFF DAILY RINSE MOUTH AFTER USE  60 each 2    chlorthalidone 25 mg tablet TAKE "ONE-HALF" TABLET (12 5 MG) BY MOUTH DAILY 45 tablet 4    Cholecalciferol (VITAMIN D3) 2000 units TABS Take 1 tablet by mouth daily      co-enzyme Q-10 50 MG capsule Take 100 mg by mouth in the morning        colchicine (COLCRYS) 0 6 mg tablet TAKE 1 TABLET (0 6 MG TOTAL) BY MOUTH DAILY 90 tablet 3    doxycycline hyclate (VIBRAMYCIN) 100 mg capsule Take 1 capsule (100 mg total) by mouth every 12 (twelve) hours for 10 days 20 capsule 0    Ferrous Sulfate (IRON) 325 (65 Fe) MG TABS Take 1 tablet by mouth daily      Incruse Ellipta 62 5 MCG/INH AEPB inhaler INHALE 1 PUFF DAILY 90 blister 1    Januvia 100 MG tablet TAKE ONE TABLET DAILY 90 tablet 0    loratadine (CLARITIN) 10 mg tablet Take 1 tablet (10 mg total) by mouth daily 10 tablet 0    magnesium chloride-calcium (SLOW-MAG) 71 5-119 mg Take 2 tablets by mouth 2 (two) times a day      metoprolol tartrate (LOPRESSOR) 50 mg tablet Take 1 tablet (50 mg total) by mouth 2 (two) times a day 50 mg in the morning and 25 mg in the evening 180 tablet 3    pantoprazole (PROTONIX) 40 mg tablet Take 1 tablet daily half hour before eating breakfast 90 tablet 3    prasugrel (EFFIENT) tablet Take 10 mg by mouth daily       sulfaSALAzine (AZULFIDINE) 500 mg tablet Take 1 5 tablets (750 mg total) by mouth daily 45 tablet 3    atorvastatin (LIPITOR) 80 mg tablet Take 1 tablet (80 mg total) by mouth daily 90 tablet 3    benzonatate (TESSALON) 200 MG capsule Take 1 capsule (200 mg total) by mouth 3 (three) times a day as needed for cough (Patient not taking: Reported on 6/8/2022) 20 capsule 0    ofloxacin (OCUFLOX) 0 3 % ophthalmic solution  (Patient not taking: Reported on 6/8/2022)      Prolensa 0 07 % SOLN  (Patient not taking: Reported on 6/8/2022)       No current facility-administered medications for this visit       Current Outpatient Medications on File Prior to Visit   Medication Sig    albuterol (PROVENTIL HFA,VENTOLIN HFA) 90 mcg/act inhaler Inhale 2 puffs every 6 (six) hours as needed for wheezing or shortness of breath    allopurinol (ZYLOPRIM) 300 mg tablet Take 0 5 tablets (150 mg total) by mouth daily    amLODIPine (NORVASC) 5 mg tablet TAKE 1 TABLET (5 MG) BY MOUTH DAILY    aspirin (ECOTRIN LOW STRENGTH) 81 mg EC tablet Take 81 mg by mouth daily    azilsartan medoxomil (Edarbi) 40 MG tablet Take 1 tablet (40 mg total) by mouth 2 (two) times a day    B Complex-C (SUPERPLEX-T) TABS Take 1 tablet by mouth daily    Breo Ellipta 100-25 MCG/INH inhaler INHALE 1 PUFF DAILY RINSE MOUTH AFTER USE   chlorthalidone 25 mg tablet TAKE "ONE-HALF" TABLET (12 5 MG) BY MOUTH DAILY    Cholecalciferol (VITAMIN D3) 2000 units TABS Take 1 tablet by mouth daily    co-enzyme Q-10 50 MG capsule Take 100 mg by mouth in the morning   colchicine (COLCRYS) 0 6 mg tablet TAKE 1 TABLET (0 6 MG TOTAL) BY MOUTH DAILY    Ferrous Sulfate (IRON) 325 (65 Fe) MG TABS Take 1 tablet by mouth daily    Incruse Ellipta 62 5 MCG/INH AEPB inhaler INHALE 1 PUFF DAILY    Januvia 100 MG tablet TAKE ONE TABLET DAILY    loratadine (CLARITIN) 10 mg tablet Take 1 tablet (10 mg total) by mouth daily    magnesium chloride-calcium (SLOW-MAG) 71 5-119 mg Take 2 tablets by mouth 2 (two) times a day    metoprolol tartrate (LOPRESSOR) 50 mg tablet Take 1 tablet (50 mg total) by mouth 2 (two) times a day 50 mg in the morning and 25 mg in the evening    pantoprazole (PROTONIX) 40 mg tablet Take 1 tablet daily half hour before eating breakfast    prasugrel (EFFIENT) tablet Take 10 mg by mouth daily     sulfaSALAzine (AZULFIDINE) 500 mg tablet Take 1 5 tablets (750 mg total) by mouth daily    atorvastatin (LIPITOR) 80 mg tablet Take 1 tablet (80 mg total) by mouth daily    benzonatate (TESSALON) 200 MG capsule Take 1 capsule (200 mg total) by mouth 3 (three) times a day as needed for cough (Patient not taking: Reported on 6/8/2022)    ofloxacin (OCUFLOX) 0 3 % ophthalmic solution  (Patient not taking: Reported on 6/8/2022)    Prolensa 0 07 % SOLN  (Patient not taking: Reported on 6/8/2022)     No current facility-administered medications on file prior to visit  He is allergic to oxycodone       Review of Systems Constitutional: Negative for activity change, appetite change, chills, diaphoresis, fatigue, fever and unexpected weight change  HENT: Negative for congestion, dental problem, drooling, ear discharge, ear pain, facial swelling, hearing loss, mouth sores, nosebleeds, postnasal drip, rhinorrhea, sinus pain, trouble swallowing and voice change  Eyes: Negative for photophobia, pain, discharge, redness, itching and visual disturbance  Respiratory: Positive for cough, choking and shortness of breath  Negative for apnea and chest tightness  Chest is congested, tight, no wheezing   Cardiovascular: Negative for chest pain, palpitations and leg swelling  Denies any and all cardiac symptoms   Gastrointestinal: Negative for abdominal distention, abdominal pain, constipation, diarrhea and nausea  Endocrine: Negative for polydipsia, polyphagia and polyuria  Genitourinary: Negative for decreased urine volume, difficulty urinating, dysuria, enuresis and hematuria  Musculoskeletal: Positive for arthralgias, back pain, gait problem and myalgias  Negative for joint swelling  Has alkalosing spondylitis, ch, burned out   Skin: Negative for color change, pallor, rash and wound  Allergic/Immunologic: Negative for immunocompromised state  Neurological: Negative for dizziness, seizures, syncope, facial asymmetry, speech difficulty, light-headedness and headaches  Hematological: Negative for adenopathy  Psychiatric/Behavioral: Negative for agitation, behavioral problems, confusion and decreased concentration  The patient is nervous/anxious  Objective:      /80 (BP Location: Left arm, Patient Position: Sitting, Cuff Size: Large)   Pulse 71   Temp (!) 96 9 °F (36 1 °C) (Temporal)   Resp 16   Ht 5' 11" (1 803 m)   Wt 82 3 kg (181 lb 6 4 oz)   SpO2 98%   BMI 25 30 kg/m²          Physical Exam  Vitals and nursing note reviewed     Constitutional:       General: He is not in acute distress  Appearance: Normal appearance  He is well-developed and normal weight  He is not ill-appearing, toxic-appearing or diaphoretic  HENT:      Head: Normocephalic and atraumatic  Right Ear: Tympanic membrane normal       Left Ear: Tympanic membrane normal       Nose: Nose normal    Eyes:      General: No scleral icterus  Pupils: Pupils are equal, round, and reactive to light  Neck:      Trachea: No tracheal deviation  Cardiovascular:      Rate and Rhythm: Normal rate and regular rhythm  Heart sounds: Normal heart sounds  No murmur heard  No gallop  Pulmonary:      Effort: Respiratory distress present  Breath sounds: No stridor  Rhonchi present  No wheezing or rales  Abdominal:      Comments: Pt thinner than I've ever seen him  Wt 166 lbs, but was reportedly 160 3-4 mos ago    Musculoskeletal:         General: No swelling, tenderness or deformity (significant scoliosis and effect upon the chest cavity)  Normal range of motion  Cervical back: Normal range of motion and neck supple  Right lower leg: No edema  Left lower leg: No edema  Lymphadenopathy:      Cervical: No cervical adenopathy  Skin:     General: Skin is warm and dry  Coloration: Skin is not jaundiced or pale  Findings: No bruising (particularly on forearms, 2o Effient)  Neurological:      General: No focal deficit present  Mental Status: He is alert and oriented to person, place, and time  Mental status is at baseline  Cranial Nerves: No cranial nerve deficit  Sensory: No sensory deficit  Motor: No weakness  Coordination: Coordination normal    Psychiatric:         Mood and Affect: Mood normal          Behavior: Behavior normal          Thought Content:  Thought content normal          Judgment: Judgment normal          30 min with pt + time "pairing" corn on L ft - see procedure    This time was spent reviewing previous records, reviewing previous laboratory and other tests, taking history from patient, examination of patient, discussion of prognosis and treatment, ordering laboratory tests, ordering medications, and completion of the medical record

## 2022-06-09 LAB
ATRIAL RATE: 64 BPM
P AXIS: 41 DEGREES
PR INTERVAL: 204 MS
QRS AXIS: -43 DEGREES
QRSD INTERVAL: 100 MS
QT INTERVAL: 412 MS
QTC INTERVAL: 425 MS
T WAVE AXIS: 70 DEGREES
VENTRICULAR RATE: 64 BPM

## 2022-06-09 PROCEDURE — 93010 ELECTROCARDIOGRAM REPORT: CPT | Performed by: INTERNAL MEDICINE

## 2022-06-10 ENCOUNTER — RA CDI HCC (OUTPATIENT)
Dept: OTHER | Facility: HOSPITAL | Age: 77
End: 2022-06-10

## 2022-06-10 LAB
ATRIAL RATE: 64 BPM
P AXIS: 39 DEGREES
PR INTERVAL: 194 MS
QRS AXIS: -38 DEGREES
QRSD INTERVAL: 106 MS
QT INTERVAL: 406 MS
QTC INTERVAL: 418 MS
T WAVE AXIS: 73 DEGREES
VENTRICULAR RATE: 64 BPM

## 2022-06-10 PROCEDURE — 93010 ELECTROCARDIOGRAM REPORT: CPT | Performed by: INTERNAL MEDICINE

## 2022-06-10 NOTE — PROGRESS NOTES
[facility]  coding opportunities     [address] [place]  sarcoidosis  No documentation supports further classification of the dx       Chart reviewed, no opportunity found: CHART REVIEWED, NO OPPORTUNITY FOUND        Patients Insurance     Medicare Insurance: Medicare

## 2022-06-21 ENCOUNTER — OFFICE VISIT (OUTPATIENT)
Dept: FAMILY MEDICINE CLINIC | Facility: CLINIC | Age: 77
End: 2022-06-21
Payer: MEDICARE

## 2022-06-21 VITALS
TEMPERATURE: 98.5 F | HEIGHT: 71 IN | HEART RATE: 78 BPM | OXYGEN SATURATION: 91 % | BODY MASS INDEX: 24.64 KG/M2 | DIASTOLIC BLOOD PRESSURE: 66 MMHG | SYSTOLIC BLOOD PRESSURE: 124 MMHG | WEIGHT: 176 LBS | RESPIRATION RATE: 22 BRPM

## 2022-06-21 DIAGNOSIS — E11.9 TYPE 2 DIABETES MELLITUS WITHOUT COMPLICATION, WITHOUT LONG-TERM CURRENT USE OF INSULIN (HCC): ICD-10-CM

## 2022-06-21 DIAGNOSIS — Z79.899 POLYPHARMACY: ICD-10-CM

## 2022-06-21 DIAGNOSIS — J40 BRONCHITIS: ICD-10-CM

## 2022-06-21 DIAGNOSIS — U07.1 COVID-19: Primary | ICD-10-CM

## 2022-06-21 DIAGNOSIS — L84 PRE-ULCERATIVE CORN OR CALLOUS: ICD-10-CM

## 2022-06-21 DIAGNOSIS — I10 HYPERTENSION, ESSENTIAL: ICD-10-CM

## 2022-06-21 PROCEDURE — 99214 OFFICE O/P EST MOD 30 MIN: CPT | Performed by: FAMILY MEDICINE

## 2022-06-21 PROCEDURE — 11055 PARING/CUTG B9 HYPRKER LES 1: CPT | Performed by: FAMILY MEDICINE

## 2022-06-21 RX ORDER — CEFUROXIME AXETIL 500 MG/1
500 TABLET ORAL EVERY 12 HOURS SCHEDULED
Qty: 20 TABLET | Refills: 0 | Status: SHIPPED | OUTPATIENT
Start: 2022-06-21 | End: 2022-07-01

## 2022-06-21 RX ORDER — DEXTROMETHORPHAN HYDROBROMIDE AND PROMETHAZINE HYDROCHLORIDE 15; 6.25 MG/5ML; MG/5ML
5 SOLUTION ORAL 4 TIMES DAILY PRN
Qty: 180 ML | Refills: 1 | Status: SHIPPED | OUTPATIENT
Start: 2022-06-21

## 2022-06-21 RX ORDER — HYDROCODONE POLISTIREX AND CHLORPHENIRAMINE POLISTIREX 10; 8 MG/5ML; MG/5ML
5 SUSPENSION, EXTENDED RELEASE ORAL EVERY 12 HOURS PRN
Qty: 60 ML | Refills: 0 | Status: SHIPPED | OUTPATIENT
Start: 2022-06-21

## 2022-06-21 NOTE — PROGRESS NOTES
Diabetic Foot Exam    Patient's shoes and socks removed  Right Foot/Ankle   Right Foot Inspection  Skin Exam: skin normal, skin intact and pre-ulcer  No dry skin, no warmth, no callus, no erythema, no maceration, no abnormal color, no ulcer and no callus  Pre-Ulcer Size (cm): 1    Toe Exam: ROM and strength within normal limits and tenderness  No swelling, erythema and  no right toe deformity    Sensory   Vibration: diminished  Proprioception: diminished  Monofilament testing: intact    Vascular  Capillary refills: elevated  The right DP pulse is 1+  The right PT pulse is 0  Left Foot/Ankle  Left Foot Inspection  Skin Exam: skin normal and skin intact  No dry skin, no warmth, no erythema, no maceration, normal color, no pre-ulcer, no ulcer and no callus  Toe Exam: ROM and strength within normal limits  No swelling, no tenderness, no erythema and no left toe deformity  Sensory   Vibration: diminished  Proprioception: diminished  Monofilament testing: intact    Vascular  The left DP pulse is 1+  The left PT pulse is 0       Assign Risk Category  No deformity present  Loss of protective sensation  Weak pulses  Risk: 2

## 2022-06-21 NOTE — PROGRESS NOTES
Assessment/Plan:  78 y o male, in [place], thought he was having a MI, walked to Dr Kay Frost office and EKG good, to [facility] and admitted overnight with Neb's and that helped a lot (only had one)  Disch one wk ago today  Doing better daily, til yesterday and reverted to where he was ne wk ago - coughing and phlegm production  Wonders if all this isn't stress! 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  Presently has to repeat deep cough productive some of the time very painful in chest area  Will begin a plan of as follows 1  Force fluids 2  Mucinex low-dose p r n  3  Rx Phenergan-DM 1 tsp q i d  4  Ceftin 500 mg b i d  times 10 days 5  Tussionex liquid 1/2 tsp b i d  p r n     Severe cough only     Ck painful R lat foot:  There is a large one cm callous on the lat aspect  I paired that back with a #15 blade and pt had great relief  Shave lesion    Date/Time: 6/21/2022 1:09 PM  Performed by: Raúl Ricks DO  Authorized by: Raúl Ricks DO   Universal Protocol:  Consent: Verbal consent obtained  Risks and benefits: risks, benefits and alternatives were discussed  Consent given by: patient  Patient understanding: patient states understanding of the procedure being performed  Patient identity confirmed: verbally with patient      Number of Lesions: 1  Lesion 1:     Body area: lower extremity    Lower extremity location: R foot    Initial size (mm): 1    Malignancy: benign lesion      Destruction method: shave removal      Comments:  Shave a hard callous on the lateral aspect of the R foot  Pt relieved great relief with this  1  COVID-19    2  Type 2 diabetes mellitus without complication, without long-term current use of insulin ([facility] [address] )    3  Hypertension, essential  Comments:  Blood pressure 124/66 taking metoprolol tartrate 50 mg a m  25 mg p m , Edarbi 40 mg b i d , chlorthalidone 12 5 mg OD and Norvasc 5 mg    4  Polypharmacy    5   Bronchitis  -     Promethazine-DM (PHENERGAN-DM) 6 25-15 mg/5 mL oral syrup; Take 5 mL by mouth 4 (four) times a day as needed for cough  -     cefuroxime (CEFTIN) 500 mg tablet; Take 1 tablet (500 mg total) by mouth every 12 (twelve) hours for 10 days  -     hydrocodone-chlorpheniramine polistirex (TUSSIONEX) 10-8 mg/5 mL ER suspension; Take 5 mL by mouth every 12 (twelve) hours as needed for cough Max Daily Amount: 10 mL    6  Pre-ulcerative corn or callous  -     Shave lesion        Subjective:      Patient ID: Giovany Mejia is a 78 y o  male  HPI    The following portions of the patient's history were reviewed and updated as appropriate: He  has a past medical history of Anemia, Arthritis, Gee's esophagus, Breast lump, Cancer ([facility] [address] ), Chronic kidney disease, Cirrhosis ([address] ), Coronary artery disease, Diabetes mellitus ([address] ), H/O heart artery stent, Hypertension, Inguinal hernia, Sarcoidosis, 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[address] ), Skin cancer, SOB (shortness of breath), and Upper respiratory infection, viral (5/31/2022)    He   Patient Active Problem List    Diagnosis Date Noted    COVID-19 06/01/2022    Coronary artery disease of native heart with stable angina pectoris, unspecified vessel or lesion type ([facility] [address] ) 05/31/2022    Type 2 diabetes mellitus with stage 3 chronic kidney disease ([address] ) 04/26/2021    Diabetic nephropathy associated with type 2 diabetes mellitus ([facility] [address] ) 01/25/2021    Other spondylosis, thoracolumbar region 11/12/2020    Type 2 diabetes mellitus without complication, without long-term current use of insulin ([address] ) 11/12/2020    COPD (chronic obstructive pulmonary disease) ([address] ) 07/29/2020    Sarcoidosis 07/29/2020    Hypertension, essential 12/04/2019    Hyperkalemia 12/18/2018    Chronic kidney disease, stage III (moderate) ([address] ) 05/16/2018    Dyslipidemia 05/16/2018    Persistent proteinuria 05/16/2018    Hypertensive chronic kidney disease with stage 1 through stage 4 chronic kidney disease, or unspecified chronic kidney disease 05/16/2018     He  has a past surgical history that includes Colonoscopy (04/11/2016); EGD AND COLONOSCOPY (06/24/2019); Cardiac catheterization; Tonsillectomy; Thumb fusion; ERCP (09/11/2014); Multiple tooth extractions; Cervical fusion; Liver biopsy; Hip surgery (Right); Kidney stone surgery; Cholecystectomy; Skin biopsy (05/2020); Esophagogastric fundoplasty; Hernia repair (Right); US guidance (10/22/2014); and Cataract extraction, bilateral   His family history includes Atrial fibrillation in his mother; COPD in his father; Heart disease in his father and mother; Hypertension in his mother; Kidney disease in his father  He  reports that he has never smoked  He has never used smokeless tobacco  He reports that he does not drink alcohol and does not use drugs    Current Outpatient Medications   Medication Sig Dispense Refill    cefuroxime (CEFTIN) 500 mg tablet Take 1 tablet (500 mg total) by mouth every 12 (twelve) hours for 10 days 20 tablet 0    hydrocodone-chlorpheniramine polistirex (TUSSIONEX) 10-8 mg/5 mL ER suspension Take 5 mL by mouth every 12 (twelve) hours as needed for cough Max Daily Amount: 10 mL 60 mL 0    Promethazine-DM (PHENERGAN-DM) 6 25-15 mg/5 mL oral syrup Take 5 mL by mouth 4 (four) times a day as needed for cough 180 mL 1    albuterol (PROVENTIL HFA,VENTOLIN HFA) 90 mcg/act inhaler Inhale 2 puffs every 6 (six) hours as needed for wheezing or shortness of breath 6 7 g 2    allopurinol (ZYLOPRIM) 300 mg tablet Take 0 5 tablets (150 mg total) by mouth daily 45 tablet 3    amLODIPine (NORVASC) 5 mg tablet TAKE 1 TABLET (5 MG) BY MOUTH DAILY 90 tablet 5    aspirin (ECOTRIN LOW STRENGTH) 81 mg EC tablet Take 81 mg by mouth daily      atorvastatin (LIPITOR) 80 mg tablet Take 1 tablet (80 mg total) by mouth daily 90 tablet 3    azilsartan medoxomil (Edarbi) 40 MG tablet Take 1 tablet (40 mg total) by mouth 2 (two) times a day 180 tablet 3    B Complex-C (SUPERPLEX-T) TABS Take 1 tablet by mouth daily      benzonatate (TESSALON) 200 MG capsule Take 1 capsule (200 mg total) by mouth 3 (three) times a day as needed for cough (Patient not taking: Reported on 6/8/2022) 20 capsule 0    Breo Ellipta 100-25 MCG/INH inhaler INHALE 1 PUFF DAILY RINSE MOUTH AFTER USE  60 each 2    chlorthalidone 25 mg tablet TAKE "ONE-HALF" TABLET (12 5 MG) BY MOUTH DAILY 45 tablet 4    Cholecalciferol (VITAMIN D3) 2000 units TABS Take 1 tablet by mouth daily      co-enzyme Q-10 50 MG capsule Take 100 mg by mouth in the morning   colchicine (COLCRYS) 0 6 mg tablet TAKE 1 TABLET (0 6 MG TOTAL) BY MOUTH DAILY 90 tablet 3    Ferrous Sulfate (IRON) 325 (65 Fe) MG TABS Take 1 tablet by mouth daily      Incruse Ellipta 62 5 MCG/INH AEPB inhaler INHALE 1 PUFF DAILY 90 blister 1    Januvia 100 MG tablet TAKE ONE TABLET DAILY 90 tablet 0    loratadine (CLARITIN) 10 mg tablet Take 1 tablet (10 mg total) by mouth daily 10 tablet 0    magnesium chloride-calcium (SLOW-MAG) 71 5-119 mg Take 2 tablets by mouth 2 (two) times a day      metoprolol tartrate (LOPRESSOR) 50 mg tablet Take 1 tablet (50 mg total) by mouth 2 (two) times a day 50 mg in the morning and 25 mg in the evening 180 tablet 3    ofloxacin (OCUFLOX) 0 3 % ophthalmic solution  (Patient not taking: Reported on 6/8/2022)      pantoprazole (PROTONIX) 40 mg tablet Take 1 tablet daily half hour before eating breakfast 90 tablet 3    prasugrel (EFFIENT) tablet Take 10 mg by mouth daily       Prolensa 0 07 % SOLN  (Patient not taking: Reported on 6/8/2022)      sulfaSALAzine (AZULFIDINE) 500 mg tablet Take 1 5 tablets (750 mg total) by mouth daily 45 tablet 3     No current facility-administered medications for this visit       Current Outpatient Medications on File Prior to Visit   Medication Sig    albuterol (PROVENTIL HFA,VENTOLIN HFA) 90 mcg/act inhaler Inhale 2 puffs every 6 (six) hours as needed for wheezing or shortness of breath    allopurinol (ZYLOPRIM) 300 mg tablet Take 0 5 tablets (150 mg total) by mouth daily    amLODIPine (NORVASC) 5 mg tablet TAKE 1 TABLET (5 MG) BY MOUTH DAILY    aspirin (ECOTRIN LOW STRENGTH) 81 mg EC tablet Take 81 mg by mouth daily    atorvastatin (LIPITOR) 80 mg tablet Take 1 tablet (80 mg total) by mouth daily    azilsartan medoxomil (Edarbi) 40 MG tablet Take 1 tablet (40 mg total) by mouth 2 (two) times a day    B Complex-C (SUPERPLEX-T) TABS Take 1 tablet by mouth daily    benzonatate (TESSALON) 200 MG capsule Take 1 capsule (200 mg total) by mouth 3 (three) times a day as needed for cough (Patient not taking: Reported on 6/8/2022)    Breo Ellipta 100-25 MCG/INH inhaler INHALE 1 PUFF DAILY RINSE MOUTH AFTER USE   chlorthalidone 25 mg tablet TAKE "ONE-HALF" TABLET (12 5 MG) BY MOUTH DAILY    Cholecalciferol (VITAMIN D3) 2000 units TABS Take 1 tablet by mouth daily    co-enzyme Q-10 50 MG capsule Take 100 mg by mouth in the morning      colchicine (COLCRYS) 0 6 mg tablet TAKE 1 TABLET (0 6 MG TOTAL) BY MOUTH DAILY    Ferrous Sulfate (IRON) 325 (65 Fe) MG TABS Take 1 tablet by mouth daily    Incruse Ellipta 62 5 MCG/INH AEPB inhaler INHALE 1 PUFF DAILY    Januvia 100 MG tablet TAKE ONE TABLET DAILY    loratadine (CLARITIN) 10 mg tablet Take 1 tablet (10 mg total) by mouth daily    magnesium chloride-calcium (SLOW-MAG) 71 5-119 mg Take 2 tablets by mouth 2 (two) times a day    metoprolol tartrate (LOPRESSOR) 50 mg tablet Take 1 tablet (50 mg total) by mouth 2 (two) times a day 50 mg in the morning and 25 mg in the evening    ofloxacin (OCUFLOX) 0 3 % ophthalmic solution  (Patient not taking: Reported on 6/8/2022)    pantoprazole (PROTONIX) 40 mg tablet Take 1 tablet daily half hour before eating breakfast    prasugrel (EFFIENT) tablet Take 10 mg by mouth daily     Prolensa 0 07 % SOLN  (Patient not taking: Reported on 6/8/2022)    sulfaSALAzine (AZULFIDINE) 500 mg tablet Take 1 5 tablets (750 mg total) by mouth daily     No current facility-administered medications on file prior to visit  He is allergic to oxycodone       Review of Systems   Constitutional: Negative for activity change, appetite change, chills, diaphoresis, fatigue, fever and unexpected weight change  HENT: Negative for congestion, dental problem, drooling, ear discharge, ear pain, facial swelling, hearing loss, mouth sores, nosebleeds, postnasal drip, rhinorrhea, sinus pain, trouble swallowing and voice change  Eyes: Negative for photophobia, pain, discharge, redness, itching and visual disturbance  Respiratory: Positive for cough, choking and shortness of breath  Negative for apnea and chest tightness  Chest is congested, tight, no wheezing   Cardiovascular: Negative for chest pain, palpitations and leg swelling  Denies any and all cardiac symptoms   Gastrointestinal: Negative for abdominal distention, abdominal pain, constipation, diarrhea and nausea  Endocrine: Negative for polydipsia, polyphagia and polyuria  Genitourinary: Negative for decreased urine volume, difficulty urinating, dysuria, enuresis and hematuria  Musculoskeletal: Positive for arthralgias, back pain, gait problem and myalgias  Negative for joint swelling  Has alkalosing spondylitis, ch, burned out   Skin: Negative for color change, pallor, rash and wound  Allergic/Immunologic: Negative for immunocompromised state  Neurological: Negative for dizziness, seizures, syncope, facial asymmetry, speech difficulty, light-headedness and headaches  Hematological: Negative for adenopathy  Psychiatric/Behavioral: Negative for agitation, behavioral problems, confusion and decreased concentration  The patient is nervous/anxious  Objective:      /66   Pulse 78   Temp 98 5 °F (36 9 °C)   Resp 22   Ht 5' 11" (1 803 m)   Wt 79 8 kg (176 lb)   SpO2 91%   BMI 24 55 kg/m²          Physical Exam  Vitals and nursing note reviewed  Constitutional:       General: He is not in acute distress  Appearance: Normal appearance  He is well-developed and normal weight  He is not ill-appearing, toxic-appearing or diaphoretic  HENT:      Head: Normocephalic and atraumatic  Right Ear: Tympanic membrane normal       Left Ear: Tympanic membrane normal       Nose: Nose normal    Eyes:      General: No scleral icterus  Pupils: Pupils are equal, round, and reactive to light  Neck:      Trachea: No tracheal deviation  Cardiovascular:      Rate and Rhythm: Normal rate and regular rhythm  Heart sounds: Normal heart sounds  No murmur heard  No gallop  Pulmonary:      Effort: Respiratory distress present  Breath sounds: No stridor  Rhonchi present  No wheezing or rales  Abdominal:      Comments: Pt thinner than I've ever seen him  Wt 166 lbs, but was reportedly 160 3-4 mos ago    Musculoskeletal:         General: No swelling, tenderness or deformity (significant scoliosis and effect upon the chest cavity)  Normal range of motion  Cervical back: Normal range of motion and neck supple  Right lower leg: No edema  Left lower leg: No edema  Lymphadenopathy:      Cervical: No cervical adenopathy  Skin:     General: Skin is warm and dry  Coloration: Skin is not jaundiced or pale  Findings: No bruising (particularly on forearms, 2o Effient)  Neurological:      General: No focal deficit present  Mental Status: He is alert and oriented to person, place, and time  Mental status is at baseline  Cranial Nerves: No cranial nerve deficit  Sensory: No sensory deficit  Motor: No weakness  Coordination: Coordination normal    Psychiatric:         Mood and Affect: Mood normal          Behavior: Behavior normal          Thought Content: Thought content normal          Judgment: Judgment normal          30 min with pt and all the above discussed and reviewed       This time was spent reviewing previous records, reviewing previous laboratory and other tests, taking history from patient, examination of patient, discussion of prognosis and treatment, ordering laboratory tests, ordering medications, and completion of the medical record

## 2022-06-22 ENCOUNTER — TELEPHONE (OUTPATIENT)
Dept: ADMINISTRATIVE | Facility: OTHER | Age: 77
End: 2022-06-22

## 2022-06-22 NOTE — TELEPHONE ENCOUNTER
Upon review of the In Basket request and the patient's chart, initial outreach has been made via telephone call, please see Contacts section for details       Thank you  Lizette Infante

## 2022-06-22 NOTE — TELEPHONE ENCOUNTER
----- Message from Rebecca Sarmiento sent at 6/21/2022 12:13 PM EDT -----  Regarding: care gap request  06/21/22 12:13 PM    Hello, our patient patient above has had Diabetic Eye Exam completed/performed   Please assist in updating the patient chart by making an External outreach to [facility] facility located in [place] on [address] The date of service is goes annually    Thank you,  Rebecca Sarmiento  [facility] [id number] [place] [address]

## 2022-06-22 NOTE — TELEPHONE ENCOUNTER
----- Message from Anita Wright sent at 6/21/2022 12:13 PM EDT -----  Regarding: care gap request  06/21/22 12:13 PM    Hello, our patient patient above has had Diabetic Eye Exam completed/performed   Please assist in updating the patient chart by making an External outreach to [facility] facility [facility] on [address] The date of service is goes annually    Thank you,  Anita Wright  [facility] [address] [place] [address]

## 2022-06-23 NOTE — TELEPHONE ENCOUNTER
Upon review of the In Basket request we were able to locate, review, and update the patient chart as requested for Diabetic Eye Exam     Any additional questions or concerns should be emailed to the Practice Liaisons via [email or web address] email, please do not reply via In Basket      Thank you  Lorenza Miles

## 2022-06-27 DIAGNOSIS — E11.9 TYPE 2 DIABETES MELLITUS WITHOUT COMPLICATION, WITHOUT LONG-TERM CURRENT USE OF INSULIN (HCC): ICD-10-CM

## 2022-06-27 RX ORDER — SITAGLIPTIN 100 MG/1
TABLET, FILM COATED ORAL
Qty: 90 TABLET | Refills: 0 | Status: SHIPPED | OUTPATIENT
Start: 2022-06-27

## 2022-07-01 ENCOUNTER — CLINICAL SUPPORT (OUTPATIENT)
Dept: FAMILY MEDICINE CLINIC | Facility: CLINIC | Age: 77
End: 2022-07-01

## 2022-07-01 DIAGNOSIS — Z11.52 ENCOUNTER FOR SCREENING FOR COVID-19: Primary | ICD-10-CM

## 2022-07-01 PROCEDURE — U0005 INFEC AGEN DETEC AMPLI PROBE: HCPCS | Performed by: FAMILY MEDICINE

## 2022-07-01 PROCEDURE — U0003 INFECTIOUS AGENT DETECTION BY NUCLEIC ACID (DNA OR RNA); SEVERE ACUTE RESPIRATORY SYNDROME CORONAVIRUS 2 (SARS-COV-2) (CORONAVIRUS DISEASE [COVID-19]), AMPLIFIED PROBE TECHNIQUE, MAKING USE OF HIGH THROUGHPUT TECHNOLOGIES AS DESCRIBED BY CMS-2020-01-R: HCPCS | Performed by: FAMILY MEDICINE

## 2022-07-02 LAB — SARS-COV-2 RNA RESP QL NAA+PROBE: NEGATIVE

## 2022-07-06 ENCOUNTER — OFFICE VISIT (OUTPATIENT)
Dept: PULMONOLOGY | Facility: CLINIC | Age: 77
End: 2022-07-06
Payer: MEDICARE

## 2022-07-06 VITALS
TEMPERATURE: 97.5 F | HEIGHT: 71 IN | SYSTOLIC BLOOD PRESSURE: 118 MMHG | OXYGEN SATURATION: 96 % | HEART RATE: 66 BPM | BODY MASS INDEX: 24.86 KG/M2 | DIASTOLIC BLOOD PRESSURE: 64 MMHG | WEIGHT: 177.6 LBS

## 2022-07-06 DIAGNOSIS — J84.10 PULMONARY FIBROSIS (HCC): ICD-10-CM

## 2022-07-06 DIAGNOSIS — J44.9 CHRONIC OBSTRUCTIVE PULMONARY DISEASE, UNSPECIFIED COPD TYPE (HCC): Primary | ICD-10-CM

## 2022-07-06 DIAGNOSIS — I10 HYPERTENSION, ESSENTIAL: ICD-10-CM

## 2022-07-06 DIAGNOSIS — D86.9 SARCOIDOSIS: ICD-10-CM

## 2022-07-06 PROCEDURE — 99214 OFFICE O/P EST MOD 30 MIN: CPT | Performed by: INTERNAL MEDICINE

## 2022-07-06 NOTE — ASSESSMENT & PLAN NOTE
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  His previous PFT from May 2015 showed severe airflow obstruction with diffusion defect  He also had evidence of air trapping  There was improvement in FEV1 with bronchodilator indicating a component of asthma  His previous CT scan from February 2019 showed scarring in the left lung  He denies any runny nose or sneezing  On clinical examination, his chest auscultation revealed bilateral inspiratory coarse crackles at lung bases  His high-resolution CT scan of the chest showed bilateral pulmonary fibrosis  I have advised him to continue breo and incruse   I had a long discussion with him and have answered all his questions

## 2022-07-06 NOTE — PROGRESS NOTES
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  His previous PFT from May 2015 showed severe airflow obstruction with diffusion defect  He also had evidence of air trapping  There was improvement in FEV1 with bronchodilator indicating a component of asthma  His previous CT scan from February 2019 showed scarring in the left lung  He denies any runny nose or sneezing  On clinical examination, his chest auscultation revealed bilateral inspiratory coarse crackles at lung bases  His high-resolution CT scan of the chest showed bilateral pulmonary fibrosis  I have advised him to continue breo and incruse  I had a long discussion with him and have answered all his questions    Pulmonary fibrosis ([facility] [address] )  His recent high-resolution CT scan of the chest showed mild diffuse bilateral reticulation and mild scattered linear scar  Mild traction bronchiolectasis in the lateral basal left lower lobe  Chronic opacity in the paraspinal right lower lobe with volume loss as evidenced by crowding of the bronchi and mild traction bronchiolectasis due to chronic scar  The etiology is not clear at this point  His previous PFT showed severe airflow obstruction with diffusion defect  Repeat the PFTs pending  Chest auscultation revealed bilateral coarse inspiratory crackles  He currently does not need any oxygen  The etiology of this pulmonary fibrosis is not clear at this point  He has previous history of sarcoidosis  He has history of beryllium exposure   I had a long discussion with him and answered all his questions    Sarcoidosis  He was diagnosed with sarcoidosis many years back after a lung biopsy and was given steroid therapy for some time  His previous PFT from May 2015 showed severe airflow obstruction with diffusion defect  He has history of working with beryllium   But the sarcoidosis happened before his exposure to beryllium  Hypertension, essential  Has history of hypertension and currently this is controlled with metoprolol and amlodipine  Diagnoses and all orders for this visit:    Chronic obstructive pulmonary disease, unspecified COPD type ([facility] [address] )    Pulmonary fibrosis ([facility] [address] )    Sarcoidosis    Hypertension, essential          Subjective:      Patient ID: Jennifer Garcia is a 78 y o  male  Jarrod Lawman came for follow-up and previous history of sarcoidosis  Currently his shortness of breath is much better after being started on Breo 100  His exercise tolerance is at least 2 blocks however he has difficulty climbing stairs or doing any unusual exertion  He denied any significant phlegm or wheeze or chest pain  No hoarseness of voice or dysphagia  No swelling of feet  No chest pain or palpitations  No fever or chills  No loss of weight or anorexia  He had a high-resolution CT scan of the chest which showed traction bronchiectasis and reticular opacities consistent with the pulmonary fibrosis  The findings did not show a sarcoidosis pattern  I reviewed his CT films  His previous PFT showed severe airflow obstruction with diffusion defect  He has previous history of exposure to beryllium  He was previously treated for sarcoidosis with steroid  We had ordered a repeat PFT and this is waiting to be done    He also had a repeat CT scan done when he presented to [facility]       The following portions of the patient's history were reviewed and updated as appropriate: allergies, current medications, past family history, past medical history, past social history, past surgical history and problem list     Review of Systems   Constitutional: Negative for appetite change, chills, fatigue and fever  HENT: Negative for hearing loss, rhinorrhea, sneezing, sore throat, trouble swallowing and voice change  Eyes: Negative for visual disturbance  Respiratory: Positive for cough, shortness of breath and wheezing  Negative for chest tightness  Cardiovascular: Negative for chest pain, palpitations and leg swelling  Gastrointestinal: Negative for abdominal pain, constipation, diarrhea, nausea and vomiting  Genitourinary: Negative for dysuria, frequency and urgency  Musculoskeletal: Negative for arthralgias and gait problem  Skin: Negative for rash  Allergic/Immunologic: Negative for environmental allergies  Neurological: Negative for dizziness, light-headedness and headaches  Psychiatric/Behavioral: Negative for agitation and sleep disturbance  The patient is not nervous/anxious  Objective:      /64 (BP Location: Right arm, Patient Position: Sitting, Cuff Size: Standard)   Pulse 66   Temp 97 5 °F (36 4 °C) (Tympanic)   Ht 5' 11" (1 803 m)   Wt 80 6 kg (177 lb 9 6 oz)   SpO2 96%   BMI 24 77 kg/m²          Physical Exam  Vitals reviewed  Constitutional:       General: He is not in acute distress  Appearance: He is obese  He is not ill-appearing, toxic-appearing or diaphoretic  Interventions: He is not intubated  HENT:      Head: Normocephalic  Mouth/Throat:      Mouth: Mucous membranes are moist    Neck:      Vascular: No JVD  Trachea: No tracheal deviation  Cardiovascular:      Rate and Rhythm: Normal rate  Heart sounds: Normal heart sounds  No murmur heard  Pulmonary:      Effort: Pulmonary effort is normal  No tachypnea, accessory muscle usage or respiratory distress  He is not intubated  Breath sounds: No stridor  Examination of the right-middle field reveals rales  Examination of the left-middle field reveals rales  Examination of the right-lower field reveals rales   Examination of the left-lower field reveals rales  Rales present  No decreased breath sounds, wheezing or rhonchi  Chest:      Chest wall: No tenderness  Abdominal:      Palpations: There is no mass  Tenderness: There is no guarding  Musculoskeletal:      Right lower leg: No edema  Left lower leg: No edema  Lymphadenopathy:      Cervical: No cervical adenopathy  Skin:     Coloration: Skin is not cyanotic or pale  Findings: No rash  Nails: There is no clubbing  Neurological:      Mental Status: He is alert and oriented to person, place, and time  Psychiatric:         Mood and Affect: Mood normal  Mood is not anxious  Behavior: Behavior is not agitated  I spent 30 minutes of time taking care of this patient with multiple complex pulmonary issues  The majority of this time was spent directly with the patient counseling as well as coordinating care

## 2022-07-06 NOTE — ASSESSMENT & PLAN NOTE
His recent high-resolution CT scan of the chest showed mild diffuse bilateral reticulation and mild scattered linear scar  Mild traction bronchiolectasis in the lateral basal left lower lobe  Chronic opacity in the paraspinal right lower lobe with volume loss as evidenced by crowding of the bronchi and mild traction bronchiolectasis due to chronic scar  The etiology is not clear at this point  His previous PFT showed severe airflow obstruction with diffusion defect  Repeat the PFTs pending  Chest auscultation revealed bilateral coarse inspiratory crackles  He currently does not need any oxygen  The etiology of this pulmonary fibrosis is not clear at this point  He has previous history of sarcoidosis  He has history of beryllium exposure   I had a long discussion with him and answered all his questions

## 2022-07-14 ENCOUNTER — RA CDI HCC (OUTPATIENT)
Dept: OTHER | Facility: HOSPITAL | Age: 77
End: 2022-07-14

## 2022-07-14 NOTE — PROGRESS NOTES
[address] [place]  Sarcoidosis   Unspecificied code on problem list/billed - no further specification noted on DOS - MEAT in note  [facility] [address]  coding opportunities       Chart reviewed, no opportunity found: CHART REVIEWED, NO OPPORTUNITY FOUND        Patients Insurance     Medicare Insurance: [employer]

## 2022-08-01 ENCOUNTER — OFFICE VISIT (OUTPATIENT)
Dept: FAMILY MEDICINE CLINIC | Facility: CLINIC | Age: 77
End: 2022-08-01
Payer: MEDICARE

## 2022-08-01 VITALS
OXYGEN SATURATION: 92 % | HEIGHT: 71 IN | WEIGHT: 177 LBS | BODY MASS INDEX: 24.78 KG/M2 | TEMPERATURE: 97.3 F | SYSTOLIC BLOOD PRESSURE: 124 MMHG | RESPIRATION RATE: 18 BRPM | HEART RATE: 52 BPM | DIASTOLIC BLOOD PRESSURE: 68 MMHG

## 2022-08-01 DIAGNOSIS — R79.9 ABNORMAL FINDING OF BLOOD CHEMISTRY, UNSPECIFIED: ICD-10-CM

## 2022-08-01 DIAGNOSIS — E11.9 TYPE 2 DIABETES MELLITUS WITHOUT COMPLICATION, WITHOUT LONG-TERM CURRENT USE OF INSULIN (HCC): Primary | ICD-10-CM

## 2022-08-01 DIAGNOSIS — Z13.820 OSTEOPOROSIS SCREENING: ICD-10-CM

## 2022-08-01 DIAGNOSIS — Z12.5 SCREENING FOR PROSTATE CANCER: ICD-10-CM

## 2022-08-01 DIAGNOSIS — Z79.899 ENCOUNTER FOR LONG-TERM (CURRENT) USE OF MEDICATIONS: ICD-10-CM

## 2022-08-01 DIAGNOSIS — I25.118 CORONARY ARTERY DISEASE OF NATIVE HEART WITH STABLE ANGINA PECTORIS, UNSPECIFIED VESSEL OR LESION TYPE (HCC): ICD-10-CM

## 2022-08-01 DIAGNOSIS — E08.22 DIABETES MELLITUS DUE TO UNDERLYING CONDITION WITH CHRONIC KIDNEY DISEASE, WITHOUT LONG-TERM CURRENT USE OF INSULIN, UNSPECIFIED CKD STAGE (HCC): ICD-10-CM

## 2022-08-01 DIAGNOSIS — N18.9 CHRONIC KIDNEY DISEASE, UNSPECIFIED CKD STAGE: ICD-10-CM

## 2022-08-01 DIAGNOSIS — N18.31 STAGE 3A CHRONIC KIDNEY DISEASE (HCC): ICD-10-CM

## 2022-08-01 DIAGNOSIS — M81.0 AGE-RELATED OSTEOPOROSIS WITHOUT CURRENT PATHOLOGICAL FRACTURE: ICD-10-CM

## 2022-08-01 DIAGNOSIS — U09.9 COVID-19 LONG HAULER: ICD-10-CM

## 2022-08-01 DIAGNOSIS — Z00.00 ENCOUNTER FOR MEDICARE ANNUAL WELLNESS EXAM: ICD-10-CM

## 2022-08-01 DIAGNOSIS — I10 HYPERTENSION, ESSENTIAL: ICD-10-CM

## 2022-08-01 DIAGNOSIS — J43.9 PULMONARY EMPHYSEMA, UNSPECIFIED EMPHYSEMA TYPE (HCC): ICD-10-CM

## 2022-08-01 DIAGNOSIS — E78.5 DYSLIPIDEMIA: ICD-10-CM

## 2022-08-01 DIAGNOSIS — Z00.00 MEDICARE ANNUAL WELLNESS VISIT, SUBSEQUENT: ICD-10-CM

## 2022-08-01 PROCEDURE — G0439 PPPS, SUBSEQ VISIT: HCPCS | Performed by: FAMILY MEDICINE

## 2022-08-01 PROCEDURE — 99214 OFFICE O/P EST MOD 30 MIN: CPT | Performed by: FAMILY MEDICINE

## 2022-08-01 NOTE — PROGRESS NOTES
Assessment and Plan:     Problem List Items Addressed This Visit        Endocrine    Type 2 diabetes mellitus without complication, without long-term current use of insulin ([facility] [address] ) - Primary    Relevant Orders    UA w Reflex to Microscopic w Reflex to Culture    Vitamin B12       Respiratory    COPD (chronic obstructive pulmonary disease) (HCC)       Cardiovascular and Mediastinum    Hypertension, essential    Relevant Orders    UA w Reflex to Microscopic w Reflex to Culture    TSH, 3rd generation    Lipid panel    Iron    Ferritin    TIBC    Iron Saturation %    Retic Count with Reticulocyte HGB    Vitamin B12    Coronary artery disease of native heart with stable angina pectoris, unspecified vessel or lesion type (HCC)       Genitourinary    Chronic kidney disease, stage III (moderate) (HCC)    Relevant Orders    UA w Reflex to Microscopic w Reflex to Culture    TSH, 3rd generation    Lipid panel    Iron    Ferritin    TIBC    Iron Saturation %    Retic Count with Reticulocyte HGB    Vitamin B12       Other    Dyslipidemia    Relevant Orders    Lipid panel      Other Visit Diagnoses     COVID-19 long hauler        occurred over May 31 weekend  Better now, but still a little AMBROCIO   Today, SaO2 92% and (later) 96%    Encounter for long-term (current) use of medications        Relevant Orders    UA w Reflex to Microscopic w Reflex to Culture    TSH, 3rd generation    Lipid panel    Iron    Ferritin    TIBC    Iron Saturation %    Retic Count with Reticulocyte HGB    Vitamin B12    Encounter for Medicare annual wellness exam        Abnormal finding of blood chemistry, unspecified         Relevant Orders    Iron    Ferritin    Diabetes mellitus due to underlying condition with chronic kidney disease, without long-term current use of insulin, unspecified CKD stage (HCC)         Relevant Orders    TIBC    Chronic kidney disease, unspecified CKD stage         Relevant Orders    Iron Saturation %    Screening for prostate cancer        Relevant Orders    PSA, Total Screen    Osteoporosis screening        Relevant Orders    DXA bone density spine hip and pelvis    Age-related osteoporosis without current pathological fracture         Relevant Orders    DXA bone density spine hip and pelvis    Medicare annual wellness visit, subsequent               Preventive health issues were discussed with patient, and age appropriate screening tests were ordered as noted in patient's After Visit Summary  Personalized health advice and appropriate referrals for health education or preventive services given if needed, as noted in patient's After Visit Summary       History of Present Illness:     Patient presents for a Medicare Wellness Visit    HPI   Patient Care Team:  Ann Marie Phillips DO as PCP - General (Family Medicine)  MD Jarred Sainz MD (Pulmonary Disease)     Review of Systems:     Review of Systems     Problem List:     Patient Active Problem List   Diagnosis    Chronic kidney disease, stage III (moderate) ([address] )    Dyslipidemia    Persistent proteinuria    Hypertensive chronic kidney disease with stage 1 through stage 4 chronic kidney disease, or unspecified chronic kidney disease    Hyperkalemia    Hypertension, essential    COPD (chronic obstructive pulmonary disease) ([address] )    Sarcoidosis    Other spondylosis, thoracolumbar region    Type 2 diabetes mellitus without complication, without long-term current use of insulin ([address] )    Diabetic nephropathy associated with type 2 diabetes mellitus ([address] )    Type 2 diabetes mellitus with stage 3 chronic kidney disease ([address] )    Coronary artery disease of native heart with stable angina pectoris, unspecified vessel or lesion type ([address] )    COVID-19    Pulmonary fibrosis ([address] )      Past Medical and Surgical History:     Past Medical History:   Diagnosis Date    Anemia     Arthritis     Gee's esophagus     Breast lump     Cancer ([address] )     Chronic kidney disease     Cirrhosis ([facility] [address] )     Coronary artery disease     cardiac cath; stents     Diabetes mellitus ([facility] [address] )     H/O heart artery stent     Hypertension     Inguinal hernia     Right    Sarcoidosis     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HCC)     Skin cancer     SOB (shortness of breath)     Upper respiratory infection, viral 5/31/2022     Past Surgical History:   Procedure Laterality Date    CARDIAC CATHETERIZATION      CATARACT EXTRACTION, BILATERAL      left eye 05/08 and right 5/22/2022    CERVICAL FUSION      CHOLECYSTECTOMY      COLONOSCOPY  04/11/2016    EGD AND COLONOSCOPY  06/24/2019    ERCP  09/11/2014    transab esop hiat griffin rpr    ESOPHAGOGASTRIC FUNDOPLASTY      HERNIA REPAIR Right     p I/griffin init reduc >5 yr    HIP SURGERY Right     Replacement    KIDNEY STONE SURGERY      Fragmenting    LIVER BIOPSY      MULTIPLE TOOTH EXTRACTIONS      "extraction of all maxillary teeth"    SKIN BIOPSY  05/2020    THUMB FUSION      with graft    TONSILLECTOMY      US GUIDANCE  10/22/2014      Family History:     Family History   Problem Relation Age of Onset    Hypertension Mother     Atrial fibrillation Mother     Heart disease Mother     Kidney disease Father     COPD Father     Heart disease Father     Asthma Neg Hx     Lung cancer Neg Hx       Social History:     Social History     Socioeconomic History    Marital status:       Spouse name: None    Number of children: 0    Years of education: None    Highest education level: Associate degree: occupational, technical, or vocational program   Occupational History    Occupation: RETIRED     Comment: jewlery repair   Tobacco Use    Smoking status: Never Smoker    Smokeless tobacco: Never Used    Tobacco comment: RETIRED   Vaping Use    Vaping Use: Never used   Substance and Sexual Activity    Alcohol use: No     Comment: Alcohol intake:   Occasional wine - As per Terrie Dad Drug use: No    Sexual activity: None   Other Topics Concern    None   Social History Narrative    · Most recent tobacco use screenin2020      · Do you currently or have you served in the [employer]:   No      · Were you activated, into active duty, as a member of the [employer] or as a Reservist:   No      · Alcohol intake:   Occasional,Wine     · Asbestos exposure:   No      · TB exposure:   No      · Animal exposure:   No      · Exercise level:   Heavy      · Caffeine intake: Moderate                 no cpap or oxygen     - As per [employer]      Social Determinants of Health     Financial Resource Strain: Not on file   Food Insecurity: Not on file   Transportation Needs: Not on file   Physical Activity: Not on file   Stress: Not on file   Social Connections: Not on file   Intimate Partner Violence: Not on file   Housing Stability: Not on file      Medications and Allergies:     Current Outpatient Medications   Medication Sig Dispense Refill    albuterol (PROVENTIL HFA,VENTOLIN HFA) 90 mcg/act inhaler Inhale 2 puffs every 6 (six) hours as needed for wheezing or shortness of breath 6 7 g 2    allopurinol (ZYLOPRIM) 300 mg tablet Take 0 5 tablets (150 mg total) by mouth daily 45 tablet 3    amLODIPine (NORVASC) 5 mg tablet TAKE 1 TABLET (5 MG) BY MOUTH DAILY 90 tablet 5    aspirin (ECOTRIN LOW STRENGTH) 81 mg EC tablet Take 81 mg by mouth daily      atorvastatin (LIPITOR) 80 mg tablet Take 1 tablet (80 mg total) by mouth daily 90 tablet 3    azilsartan medoxomil (Edarbi) 40 MG tablet Take 1 tablet (40 mg total) by mouth 2 (two) times a day 180 tablet 3    B Complex-C (SUPERPLEX-T) TABS Take 1 tablet by mouth daily      Breo Ellipta 100-25 MCG/INH inhaler INHALE 1 PUFF DAILY RINSE MOUTH AFTER USE   60 each 2    chlorthalidone 25 mg tablet TAKE "ONE-HALF" TABLET (12 5 MG) BY MOUTH DAILY 45 tablet 4    Cholecalciferol (VITAMIN D3) 2000 units TABS Take 1 tablet by mouth daily      co-enzyme Q-10 50 MG capsule Take 100 mg by mouth in the morning   colchicine (COLCRYS) 0 6 mg tablet TAKE 1 TABLET (0 6 MG TOTAL) BY MOUTH DAILY 90 tablet 3    Ferrous Sulfate (IRON) 325 (65 Fe) MG TABS Take 1 tablet by mouth daily      Incruse Ellipta 62 5 MCG/INH AEPB inhaler INHALE 1 PUFF DAILY 90 blister 1    Januvia 100 MG tablet TAKE ONE TABLET DAILY 90 tablet 0    magnesium chloride-calcium (SLOW-MAG) 71 5-119 mg Take 2 tablets by mouth 2 (two) times a day      metoprolol tartrate (LOPRESSOR) 50 mg tablet Take 1 tablet (50 mg total) by mouth 2 (two) times a day 50 mg in the morning and 25 mg in the evening 180 tablet 3    pantoprazole (PROTONIX) 40 mg tablet Take 1 tablet daily half hour before eating breakfast 90 tablet 3    sulfaSALAzine (AZULFIDINE) 500 mg tablet Take 1 5 tablets (750 mg total) by mouth daily 45 tablet 3    benzonatate (TESSALON) 200 MG capsule Take 1 capsule (200 mg total) by mouth 3 (three) times a day as needed for cough (Patient not taking: No sig reported) 20 capsule 0    hydrocodone-chlorpheniramine polistirex (TUSSIONEX) 10-8 mg/5 mL ER suspension Take 5 mL by mouth every 12 (twelve) hours as needed for cough Max Daily Amount: 10 mL (Patient not taking: No sig reported) 60 mL 0    loratadine (CLARITIN) 10 mg tablet Take 1 tablet (10 mg total) by mouth daily (Patient not taking: No sig reported) 10 tablet 0    ofloxacin (OCUFLOX) 0 3 % ophthalmic solution  (Patient not taking: No sig reported)      prasugrel (EFFIENT) tablet Take 10 mg by mouth daily  (Patient not taking: No sig reported)      Prolensa 0 07 % SOLN  (Patient not taking: No sig reported)      Promethazine-DM (PHENERGAN-DM) 6 25-15 mg/5 mL oral syrup Take 5 mL by mouth 4 (four) times a day as needed for cough (Patient not taking: No sig reported) 180 mL 1     No current facility-administered medications for this visit       Allergies   Allergen Reactions    Oxycodone GI Intolerance      Immunizations:     Immunization History   Administered Date(s) Administered    COVID-19 PFIZER VACCINE 0 3 ML IM 01/14/2021, 02/05/2021, 11/11/2021    H1N1, All Formulations 12/28/2009    INFLUENZA 09/23/2010, 09/13/2011, 08/30/2012, 10/11/2013, 10/15/2014, 10/27/2015, 09/29/2016, 10/26/2017, 11/20/2018, 11/07/2019, 10/16/2020, 10/26/2021    Pneumococcal Conjugate 13-Valent 11/17/2014    Pneumococcal Polysaccharide PPV23 10/01/1997, 10/01/2003, 04/28/2021    Tdap 11/22/2010, 04/28/2021    Zoster 01/01/2017    influenza, trivalent, adjuvanted 11/07/2019      Health Maintenance:         Topic Date Due    Colorectal Cancer Screening  06/24/2029    Hepatitis C Screening  Completed         Topic Date Due    COVID-19 Vaccine (4 - Booster for Blayne Miller series) 03/11/2022    Influenza Vaccine (1) 09/01/2022      Medicare Screening Tests and Risk Assessments:     Kwan Hale is here for his Subsequent Wellness visit  Last Medicare Wellness visit information reviewed, patient interviewed, no change since last AWV  Health Risk Assessment:   Patient rates overall health as good  Patient feels that their physical health rating is same  Patient is satisfied with their life  Eyesight was rated as same  Hearing was rated as same  Patient feels that their emotional and mental health rating is slightly better  Patients states they are never, rarely angry  Patient states they are sometimes unusually tired/fatigued  Pain experienced in the last 7 days has been none  Patient states that he has experienced no weight loss or gain in last 6 months  Pt had Covid in late May- better now    Fall Risk Screening: In the past year, patient has experienced: no history of falling in past year      Home Safety:  Patient does not have trouble with stairs inside or outside of their home  Patient has working smoke alarms and has working carbon monoxide detector  Home safety hazards include: household clutter  Nutrition:   Current diet is Regular  Eats whatever he wants      Medications:   Patient is currently taking over-the-counter supplements  OTC medications include: see medication list  Patient is able to manage medications  Vit D, Fe, SolMag , B complex, Co-Q 10    Activities of Daily Living (ADLs)/Instrumental Activities of Daily Living (IADLs):   Walk and transfer into and out of bed and chair?: Yes  Dress and groom yourself?: Yes    Bathe or shower yourself?: Yes    Feed yourself?  Yes  Do your laundry/housekeeping?: Yes  Manage your money, pay your bills and track your expenses?: Yes  Make your own meals?: Yes    Do your own shopping?: Yes    Advance Care Planning:   Living will: No    Durable POA for healthcare: No    Advanced directive: No    Advanced directive counseling given: Yes    Five wishes given: Yes    Patient declined ACP directive: No    End of Life Decisions reviewed with patient: Yes    Provider agrees with end of life decisions: Yes      Cognitive Screening:   Provider or family/friend/caregiver concerned regarding cognition?: No    PREVENTIVE SCREENINGS      Cardiovascular Screening:    General: Screening Not Indicated, History Lipid Disorder and Risks and Benefits Discussed      Diabetes Screening:     General: Screening Not Indicated, History Diabetes and Risks and Benefits Discussed      Colorectal Cancer Screening:     General: Screening Current      Prostate Cancer Screening:    General: Risks and Benefits Discussed    Due for: PSA      Osteoporosis Screening:    General: Risks and Benefits Discussed    Due for: DXA Axial      Abdominal Aortic Aneurysm (AAA) Screening:    Risk factors include: age between 73-67 yo        General: Screening Not Indicated      Lung Cancer Screening:     General: Screening Not Indicated and Risks and Benefits Discussed      Hepatitis C Screening:    General: Screening Current    Screening, Brief Intervention, and Referral to Treatment (SBIRT)    Screening  Typical number of drinks in a day: 0  Typical number of drinks in a week: 0  Interpretation: Low risk drinking behavior  Brief Intervention  Alcohol & drug use screenings were reviewed  No concerns regarding substance use disorder identified  Healthy alcohol use/limits discussed  Other Counseling Topics:   Car/seat belt/driving safety, skin self-exam, sunscreen and calcium and vitamin D intake and regular weightbearing exercise       No exam data present     Physical Exam:     /68 (BP Location: Left arm, Patient Position: Sitting, Cuff Size: Standard)   Pulse (!) 52   Temp (!) 97 3 °F (36 3 °C) (Temporal)   Resp 18   Ht 5' 11" (1 803 m)   Wt 80 3 kg (177 lb)   SpO2 92%   BMI 24 69 kg/m²     Physical Exam     Marii Grewal DO

## 2022-08-01 NOTE — PROGRESS NOTES
Assessment/Plan:   78 y o male, in NAD,  is well-known to me for many years presents for f/u and evaluation of the following medical issues:           1  Type 2 diabetes mellitus without complication, without long-term current use of insulin (HCC)  -     UA w Reflex to Microscopic w Reflex to Culture  -     Vitamin B12; Future    2  Hypertension, essential  -     UA w Reflex to Microscopic w Reflex to Culture  -     TSH, 3rd generation; Standing  -     Lipid panel; Future  -     Iron; Future  -     Ferritin; Future  -     TIBC; Future  -     Iron Saturation %; Future  -     Retic Count with Reticulocyte HGB; Future  -     Vitamin B12; Future  -     TSH, 3rd generation    3  Coronary artery disease of native heart with stable angina pectoris, unspecified vessel or lesion type ([facility])    4  Stage 3a chronic kidney disease (HCC)  -     UA w Reflex to Microscopic w Reflex to Culture  -     TSH, 3rd generation; Standing  -     Lipid panel; Future  -     Iron; Future  -     Ferritin; Future  -     TIBC; Future  -     Iron Saturation %; Future  -     Retic Count with Reticulocyte HGB; Future  -     Vitamin B12; Future  -     TSH, 3rd generation    5  Pulmonary emphysema, unspecified emphysema type ([facility] )    6  Dyslipidemia  -     Lipid panel; Future    7  COVID-19 long hauler  Comments:  occurred over May 31 weekend  Better now, but still a little AMBROCIO  Today, SaO2 92% and (later) 96%    8  Encounter for long-term (current) use of medications  -     UA w Reflex to Microscopic w Reflex to Culture  -     TSH, 3rd generation; Standing  -     Lipid panel; Future  -     Iron; Future  -     Ferritin; Future  -     TIBC; Future  -     Iron Saturation %; Future  -     Retic Count with Reticulocyte HGB; Future  -     Vitamin B12; Future  -     TSH, 3rd generation    9  Encounter for Medicare annual wellness exam    10  Abnormal finding of blood chemistry, unspecified   -     Iron; Future  -     Ferritin; Future    11   Diabetes mellitus due to underlying condition with chronic kidney disease, without long-term current use of insulin, unspecified CKD stage (HCC)   -     TIBC; Future    12  Chronic kidney disease, unspecified CKD stage   -     Iron Saturation %; Future    13  Screening for prostate cancer  -     PSA, Total Screen; Future    14  Osteoporosis screening  -     DXA bone density spine hip and pelvis; Future; Expected date: 09/01/2022    15  Age-related osteoporosis without current pathological fracture   -     DXA bone density spine hip and pelvis; Future; Expected date: 09/01/2022    16  Medicare annual wellness visit, subsequent        Subjective:      Patient ID: Richardean Dubin is a 78 y o  male  HPI    The following portions of the patient's history were reviewed and updated as appropriate: He  has a past medical history of Anemia, Arthritis, Gee's esophagus, Breast lump, Cancer ([facility] ), Chronic kidney disease, Cirrhosis ([facility] ), Coronary artery disease, Diabetes mellitus ([facility] ), H/O heart artery stent, Hypertension, Inguinal hernia, Sarcoidosis, 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[facility] ), Skin cancer, SOB (shortness of breath), and Upper respiratory infection, viral (5/31/2022)    He   Patient Active Problem List    Diagnosis Date Noted    Pulmonary fibrosis ([place] [address] ) 07/06/2022    COVID-19 06/01/2022    Coronary artery disease of native heart with stable angina pectoris, unspecified vessel or lesion type ([facility] ) 05/31/2022    Type 2 diabetes mellitus with stage 3 chronic kidney disease ([facility] ) 04/26/2021    Diabetic nephropathy associated with type 2 diabetes mellitus ([facility] ) 01/25/2021    Other spondylosis, thoracolumbar region 11/12/2020    Type 2 diabetes mellitus without complication, without long-term current use of insulin ([facility] ) 11/12/2020    COPD (chronic obstructive pulmonary disease) ([facility] ) 07/29/2020    Sarcoidosis 07/29/2020    Hypertension, essential 12/04/2019    Hyperkalemia 12/18/2018    Chronic kidney disease, stage III (moderate) ([facility] ) 05/16/2018    Dyslipidemia 05/16/2018    Persistent proteinuria 05/16/2018    Hypertensive chronic kidney disease with stage 1 through stage 4 chronic kidney disease, or unspecified chronic kidney disease 05/16/2018     He  has a past surgical history that includes Colonoscopy (04/11/2016); EGD AND COLONOSCOPY (06/24/2019); Cardiac catheterization; Tonsillectomy; Thumb fusion; ERCP (09/11/2014); Multiple tooth extractions; Cervical fusion; Liver biopsy; Hip surgery (Right); Kidney stone surgery; Cholecystectomy; Skin biopsy (05/2020); Esophagogastric fundoplasty; Hernia repair (Right); US guidance (10/22/2014); and Cataract extraction, bilateral   His family history includes Atrial fibrillation in his mother; COPD in his father; Heart disease in his father and mother; Hypertension in his mother; Kidney disease in his father  He  reports that he has never smoked  He has never used smokeless tobacco  He reports that he does not drink alcohol and does not use drugs  Current Outpatient Medications   Medication Sig Dispense Refill    albuterol (PROVENTIL HFA,VENTOLIN HFA) 90 mcg/act inhaler Inhale 2 puffs every 6 (six) hours as needed for wheezing or shortness of breath 6 7 g 2    allopurinol (ZYLOPRIM) 300 mg tablet Take 0 5 tablets (150 mg total) by mouth daily 45 tablet 3    amLODIPine (NORVASC) 5 mg tablet TAKE 1 TABLET (5 MG) BY MOUTH DAILY 90 tablet 5    aspirin (ECOTRIN LOW STRENGTH) 81 mg EC tablet Take 81 mg by mouth daily      atorvastatin (LIPITOR) 80 mg tablet Take 1 tablet (80 mg total) by mouth daily 90 tablet 3    azilsartan medoxomil (Edarbi) 40 MG tablet Take 1 tablet (40 mg total) by mouth 2 (two) times a day 180 tablet 3    B Complex-C (SUPERPLEX-T) TABS Take 1 tablet by mouth daily      Breo Ellipta 100-25 MCG/INH inhaler INHALE 1 PUFF DAILY RINSE MOUTH AFTER USE   60 each 2    chlorthalidone 25 mg tablet TAKE "ONE-HALF" TABLET (12 5 MG) BY MOUTH DAILY 45 tablet 4    Cholecalciferol (VITAMIN D3) 2000 units TABS Take 1 tablet by mouth daily      co-enzyme Q-10 50 MG capsule Take 100 mg by mouth in the morning   colchicine (COLCRYS) 0 6 mg tablet TAKE 1 TABLET (0 6 MG TOTAL) BY MOUTH DAILY 90 tablet 3    Ferrous Sulfate (IRON) 325 (65 Fe) MG TABS Take 1 tablet by mouth daily      Incruse Ellipta 62 5 MCG/INH AEPB inhaler INHALE 1 PUFF DAILY 90 blister 1    Januvia 100 MG tablet TAKE ONE TABLET DAILY 90 tablet 0    magnesium chloride-calcium (SLOW-MAG) 71 5-119 mg Take 2 tablets by mouth 2 (two) times a day      metoprolol tartrate (LOPRESSOR) 50 mg tablet Take 1 tablet (50 mg total) by mouth 2 (two) times a day 50 mg in the morning and 25 mg in the evening 180 tablet 3    pantoprazole (PROTONIX) 40 mg tablet Take 1 tablet daily half hour before eating breakfast 90 tablet 3    sulfaSALAzine (AZULFIDINE) 500 mg tablet Take 1 5 tablets (750 mg total) by mouth daily 45 tablet 3    benzonatate (TESSALON) 200 MG capsule Take 1 capsule (200 mg total) by mouth 3 (three) times a day as needed for cough (Patient not taking: No sig reported) 20 capsule 0    hydrocodone-chlorpheniramine polistirex (TUSSIONEX) 10-8 mg/5 mL ER suspension Take 5 mL by mouth every 12 (twelve) hours as needed for cough Max Daily Amount: 10 mL (Patient not taking: No sig reported) 60 mL 0    loratadine (CLARITIN) 10 mg tablet Take 1 tablet (10 mg total) by mouth daily (Patient not taking: No sig reported) 10 tablet 0    ofloxacin (OCUFLOX) 0 3 % ophthalmic solution  (Patient not taking: No sig reported)      prasugrel (EFFIENT) tablet Take 10 mg by mouth daily  (Patient not taking: No sig reported)      Prolensa 0 07 % SOLN  (Patient not taking: No sig reported)      Promethazine-DM (PHENERGAN-DM) 6 25-15 mg/5 mL oral syrup Take 5 mL by mouth 4 (four) times a day as needed for cough (Patient not taking: No sig reported) 180 mL 1     No current facility-administered medications for this visit  Current Outpatient Medications on File Prior to Visit   Medication Sig    albuterol (PROVENTIL HFA,VENTOLIN HFA) 90 mcg/act inhaler Inhale 2 puffs every 6 (six) hours as needed for wheezing or shortness of breath    allopurinol (ZYLOPRIM) 300 mg tablet Take 0 5 tablets (150 mg total) by mouth daily    amLODIPine (NORVASC) 5 mg tablet TAKE 1 TABLET (5 MG) BY MOUTH DAILY    aspirin (ECOTRIN LOW STRENGTH) 81 mg EC tablet Take 81 mg by mouth daily    atorvastatin (LIPITOR) 80 mg tablet Take 1 tablet (80 mg total) by mouth daily    azilsartan medoxomil (Edarbi) 40 MG tablet Take 1 tablet (40 mg total) by mouth 2 (two) times a day    B Complex-C (SUPERPLEX-T) TABS Take 1 tablet by mouth daily    Breo Ellipta 100-25 MCG/INH inhaler INHALE 1 PUFF DAILY RINSE MOUTH AFTER USE   chlorthalidone 25 mg tablet TAKE "ONE-HALF" TABLET (12 5 MG) BY MOUTH DAILY    Cholecalciferol (VITAMIN D3) 2000 units TABS Take 1 tablet by mouth daily    co-enzyme Q-10 50 MG capsule Take 100 mg by mouth in the morning      colchicine (COLCRYS) 0 6 mg tablet TAKE 1 TABLET (0 6 MG TOTAL) BY MOUTH DAILY    Ferrous Sulfate (IRON) 325 (65 Fe) MG TABS Take 1 tablet by mouth daily    Incruse Ellipta 62 5 MCG/INH AEPB inhaler INHALE 1 PUFF DAILY    Januvia 100 MG tablet TAKE ONE TABLET DAILY    magnesium chloride-calcium (SLOW-MAG) 71 5-119 mg Take 2 tablets by mouth 2 (two) times a day    metoprolol tartrate (LOPRESSOR) 50 mg tablet Take 1 tablet (50 mg total) by mouth 2 (two) times a day 50 mg in the morning and 25 mg in the evening    pantoprazole (PROTONIX) 40 mg tablet Take 1 tablet daily half hour before eating breakfast    sulfaSALAzine (AZULFIDINE) 500 mg tablet Take 1 5 tablets (750 mg total) by mouth daily    benzonatate (TESSALON) 200 MG capsule Take 1 capsule (200 mg total) by mouth 3 (three) times a day as needed for cough (Patient not taking: No sig reported)    hydrocodone-chlorpheniramine polistirex (TUSSIONEX) 10-8 mg/5 mL ER suspension Take 5 mL by mouth every 12 (twelve) hours as needed for cough Max Daily Amount: 10 mL (Patient not taking: No sig reported)    loratadine (CLARITIN) 10 mg tablet Take 1 tablet (10 mg total) by mouth daily (Patient not taking: No sig reported)    ofloxacin (OCUFLOX) 0 3 % ophthalmic solution  (Patient not taking: No sig reported)    prasugrel (EFFIENT) tablet Take 10 mg by mouth daily  (Patient not taking: No sig reported)    Prolensa 0 07 % SOLN  (Patient not taking: No sig reported)    Promethazine-DM (PHENERGAN-DM) 6 25-15 mg/5 mL oral syrup Take 5 mL by mouth 4 (four) times a day as needed for cough (Patient not taking: No sig reported)     No current facility-administered medications on file prior to visit  He is allergic to oxycodone       Review of Systems   Constitutional: Negative for activity change, appetite change, chills, diaphoresis, fatigue, fever and unexpected weight change  HENT: Negative for congestion, dental problem, drooling, ear discharge, ear pain, facial swelling, hearing loss, mouth sores, nosebleeds, postnasal drip, rhinorrhea, sinus pain, trouble swallowing and voice change  Eyes: Negative for photophobia, pain, discharge, redness, itching and visual disturbance  Respiratory: Positive for cough, choking and shortness of breath  Negative for apnea and chest tightness  Chest is congested, tight, no wheezing   Cardiovascular: Negative for chest pain, palpitations and leg swelling  Denies any and all cardiac symptoms   Gastrointestinal: Negative for abdominal distention, abdominal pain, constipation, diarrhea and nausea  Endocrine: Negative for polydipsia, polyphagia and polyuria  Genitourinary: Negative for decreased urine volume, difficulty urinating, dysuria, enuresis and hematuria  Musculoskeletal: Positive for arthralgias, back pain, gait problem and myalgias  Negative for joint swelling          Has alkalosing spondylitis, ch, burned out   Skin: Negative for color change, pallor, rash and wound  Allergic/Immunologic: Negative for immunocompromised state  Neurological: Negative for dizziness, seizures, syncope, facial asymmetry, speech difficulty, light-headedness and headaches  Hematological: Negative for adenopathy  Psychiatric/Behavioral: Negative for agitation, behavioral problems, confusion and decreased concentration  The patient is nervous/anxious  Objective:      /68 (BP Location: Left arm, Patient Position: Sitting, Cuff Size: Standard)   Pulse (!) 52   Temp (!) 97 3 °F (36 3 °C) (Temporal)   Resp 18   Ht 5' 11" (1 803 m)   Wt 80 3 kg (177 lb)   SpO2 92%   BMI 24 69 kg/m²          Physical Exam  Vitals and nursing note reviewed  Constitutional:       General: He is not in acute distress  Appearance: Normal appearance  He is well-developed and normal weight  He is not ill-appearing, toxic-appearing or diaphoretic  HENT:      Head: Normocephalic and atraumatic  Right Ear: Tympanic membrane normal       Left Ear: Tympanic membrane normal       Nose: Nose normal    Eyes:      General: No scleral icterus  Pupils: Pupils are equal, round, and reactive to light  Neck:      Trachea: No tracheal deviation  Cardiovascular:      Rate and Rhythm: Normal rate and regular rhythm  Heart sounds: Normal heart sounds  No murmur heard  No gallop  Pulmonary:      Effort: Respiratory distress present  Breath sounds: No stridor  Rhonchi present  No wheezing or rales  Abdominal:      Comments: Pt thinner than I've ever seen him  Wt 166 lbs, but was reportedly 160 3-4 mos ago    Musculoskeletal:         General: No swelling, tenderness or deformity (significant scoliosis and effect upon the chest cavity)  Normal range of motion  Cervical back: Normal range of motion and neck supple  Right lower leg: No edema  Left lower leg: No edema     Lymphadenopathy:      Cervical: No cervical adenopathy  Skin:     General: Skin is warm and dry  Coloration: Skin is not jaundiced or pale  Findings: No bruising (particularly on forearms, 2o Effient)  Neurological:      General: No focal deficit present  Mental Status: He is alert and oriented to person, place, and time  Mental status is at baseline  Cranial Nerves: No cranial nerve deficit  Sensory: No sensory deficit  Motor: No weakness  Coordination: Coordination normal    Psychiatric:         Mood and Affect: Mood normal          Behavior: Behavior normal          Thought Content: Thought content normal          Judgment: Judgment normal              This time was spent reviewing previous records, reviewing previous laboratory and other tests, taking history from patient, examination of patient, discussion of prognosis and treatment, ordering laboratory tests, ordering medications, and completion of the medical record

## 2022-08-01 NOTE — PATIENT INSTRUCTIONS
Medicare Preventive Visit Patient Instructions  Thank you for completing your Welcome to Medicare Visit or Medicare Annual Wellness Visit today  Your next wellness visit will be due in one year (8/2/2023)  The screening/preventive services that you may require over the next 5-10 years are detailed below  Some tests may not apply to you based off risk factors and/or age  Screening tests ordered at today's visit but not completed yet may show as past due  Also, please note that scanned in results may not display below  Preventive Screenings:  Service Recommendations Previous Testing/Comments   Colorectal Cancer Screening  · Colonoscopy    · Fecal Occult Blood Test (FOBT)/Fecal Immunochemical Test (FIT)  · Fecal DNA/Cologuard Test  · Flexible Sigmoidoscopy Age: 54-65 years old   Colonoscopy: every 10 years (May be performed more frequently if at higher risk)  OR  FOBT/FIT: every 1 year  OR  Cologuard: every 3 years  OR  Sigmoidoscopy: every 5 years  Screening may be recommended earlier than age 48 if at higher risk for colorectal cancer  Also, an individualized decision between you and your healthcare provider will decide whether screening between the ages of 74-80 would be appropriate   Colonoscopy: 06/24/2019  FOBT/FIT: Not on file  Cologuard: Not on file  Sigmoidoscopy: Not on file    Screening Current     Prostate Cancer Screening Individualized decision between patient and health care provider in men between ages of 53-78   Medicare will cover every 12 months beginning on the day after your 50th birthday PSA: No results in last 5 years     Risks and Benefits Discussed  Due for PSA     Hepatitis C Screening Once for adults born between 1945 and 1965  More frequently in patients at high risk for Hepatitis C Hep C Antibody: 09/08/2017    Screening Current   Diabetes Screening 1-2 times per year if you're at risk for diabetes or have pre-diabetes Fasting glucose: 128 mg/dL   A1C: 6 8 %    Screening Not Indicated  History Diabetes  Risks and Benefits Discussed   Cholesterol Screening Once every 5 years if you don't have a lipid disorder  May order more often based on risk factors  Lipid panel: 10/28/2021    Screening Not Indicated  History Lipid Disorder  Risks and Benefits Discussed      Other Preventive Screenings Covered by Medicare:  1  Abdominal Aortic Aneurysm (AAA) Screening: covered once if your at risk  You're considered to be at risk if you have a family history of AAA or a male between the age of 73-68 who smoking at least 100 cigarettes in your lifetime  2  Lung Cancer Screening: covers low dose CT scan once per year if you meet all of the following conditions: (1) Age 50-69; (2) No signs or symptoms of lung cancer; (3) Current smoker or have quit smoking within the last 15 years; (4) You have a tobacco smoking history of at least 30 pack years (packs per day x number of years you smoked); (5) You get a written order from a healthcare provider  3  Glaucoma Screening: covered annually if you're considered high risk: (1) You have diabetes OR (2) Family history of glaucoma OR (3)  aged 48 and older OR (3)  American aged 72 and older  3  Osteoporosis Screening: covered every 2 years if you meet one of the following conditions: (1) Have a vertebral abnormality; (2) On glucocorticoid therapy for more than 3 months; (3) Have primary hyperparathyroidism; (4) On osteoporosis medications and need to assess response to drug therapy  5  HIV Screening: covered annually if you're between the age of 12-76  Also covered annually if you are younger than 13 and older than 72 with risk factors for HIV infection  For pregnant patients, it is covered up to 3 times per pregnancy      Immunizations:  Immunization Recommendations   Influenza Vaccine Annual influenza vaccination during flu season is recommended for all persons aged >= 6 months who do not have contraindications   Pneumococcal Vaccine (Prevnar and Pneumovax)  * Prevnar = PCV13  * Pneumovax = PPSV23 Adults 25-60 years old: 1-3 doses may be recommended based on certain risk factors  Adults 72 years old: Prevnar (PCV13) vaccine recommended followed by Pneumovax (PPSV23) vaccine  If already received PPSV23 since turning 65, then PCV13 recommended at least one year after PPSV23 dose  Hepatitis B Vaccine 3 dose series if at intermediate or high risk (ex: diabetes, end stage renal disease, liver disease)   Tetanus (Td) Vaccine - COST NOT COVERED BY MEDICARE PART B Following completion of primary series, a booster dose should be given every 10 years to maintain immunity against tetanus  Td may also be given as tetanus wound prophylaxis  Tdap Vaccine - COST NOT COVERED BY MEDICARE PART B Recommended at least once for all adults  For pregnant patients, recommended with each pregnancy  Shingles Vaccine (Shingrix) - COST NOT COVERED BY MEDICARE PART B  2 shot series recommended in those aged 48 and above     Health Maintenance Due:      Topic Date Due    Colorectal Cancer Screening  06/24/2029    Hepatitis C Screening  Completed     Immunizations Due:      Topic Date Due    COVID-19 Vaccine (4 - Booster for Pfizer series) 03/11/2022    Influenza Vaccine (1) 09/01/2022     Advance Directives   What are advance directives? Advance directives are legal documents that state your wishes and plans for medical care  These plans are made ahead of time in case you lose your ability to make decisions for yourself  Advance directives can apply to any medical decision, such as the treatments you want, and if you want to donate organs  What are the types of advance directives? There are many types of advance directives, and each state has rules about how to use them  You may choose a combination of any of the following:  · Living will: This is a written record of the treatment you want   You can also choose which treatments you do not want, which to limit, and which to stop at a certain time  This includes surgery, medicine, IV fluid, and tube feedings  · Durable power of  for healthcare [place] [facility]): This is a written record that states who you want to make healthcare choices for you when you are unable to make them for yourself  This person, called a proxy, is usually a family member or a friend  You may choose more than 1 proxy  · Do not resuscitate (DNR) order:  A DNR order is used in case your heart stops beating or you stop breathing  It is a request not to have certain forms of treatment, such as CPR  A DNR order may be included in other types of advance directives  · Medical directive: This covers the care that you want if you are in a coma, near death, or unable to make decisions for yourself  You can list the treatments you want for each condition  Treatment may include pain medicine, surgery, blood transfusions, dialysis, IV or tube feedings, and a ventilator (breathing machine)  · Values history: This document has questions about your views, beliefs, and how you feel and think about life  This information can help others choose the care that you would choose  Why are advance directives important? An advance directive helps you control your care  Although spoken wishes may be used, it is better to have your wishes written down  Spoken wishes can be misunderstood, or not followed  Treatments may be given even if you do not want them  An advance directive may make it easier for your family to make difficult choices about your care  © Copyright [employer] 2018 Information is for End User's use only and may not be sold, redistributed or otherwise used for commercial purposes   All illustrations and images included in CareNotes® are the copyrighted property of [employer]  or [employer]

## 2022-08-02 ENCOUNTER — APPOINTMENT (OUTPATIENT)
Dept: PULMONOLOGY | Facility: HOSPITAL | Age: 77
End: 2022-08-02
Attending: INTERNAL MEDICINE
Payer: MEDICARE

## 2022-08-02 ENCOUNTER — APPOINTMENT (OUTPATIENT)
Dept: LAB | Facility: HOSPITAL | Age: 77
End: 2022-08-02
Payer: MEDICARE

## 2022-08-02 DIAGNOSIS — I10 HYPERTENSION, ESSENTIAL: ICD-10-CM

## 2022-08-02 DIAGNOSIS — E78.5 DYSLIPIDEMIA: ICD-10-CM

## 2022-08-02 DIAGNOSIS — Z12.5 SCREENING FOR PROSTATE CANCER: ICD-10-CM

## 2022-08-02 DIAGNOSIS — E87.5 HYPERKALEMIA: ICD-10-CM

## 2022-08-02 DIAGNOSIS — R79.9 ABNORMAL FINDING OF BLOOD CHEMISTRY, UNSPECIFIED: ICD-10-CM

## 2022-08-02 DIAGNOSIS — E78.5 HYPERLIPIDEMIA, UNSPECIFIED HYPERLIPIDEMIA TYPE: ICD-10-CM

## 2022-08-02 DIAGNOSIS — N18.9 CHRONIC KIDNEY DISEASE, UNSPECIFIED CKD STAGE: ICD-10-CM

## 2022-08-02 DIAGNOSIS — N18.32 STAGE 3B CHRONIC KIDNEY DISEASE (HCC): ICD-10-CM

## 2022-08-02 DIAGNOSIS — R80.1 PERSISTENT PROTEINURIA: ICD-10-CM

## 2022-08-02 DIAGNOSIS — N18.31 STAGE 3A CHRONIC KIDNEY DISEASE (HCC): ICD-10-CM

## 2022-08-02 DIAGNOSIS — E11.9 TYPE 2 DIABETES MELLITUS WITHOUT COMPLICATION, WITHOUT LONG-TERM CURRENT USE OF INSULIN (HCC): ICD-10-CM

## 2022-08-02 DIAGNOSIS — E11.21 DIABETIC NEPHROPATHY ASSOCIATED WITH TYPE 2 DIABETES MELLITUS (HCC): ICD-10-CM

## 2022-08-02 DIAGNOSIS — I12.9 HYPERTENSIVE CHRONIC KIDNEY DISEASE WITH STAGE 1 THROUGH STAGE 4 CHRONIC KIDNEY DISEASE, OR UNSPECIFIED CHRONIC KIDNEY DISEASE: ICD-10-CM

## 2022-08-02 DIAGNOSIS — Z79.899 ENCOUNTER FOR LONG-TERM (CURRENT) USE OF MEDICATIONS: ICD-10-CM

## 2022-08-02 DIAGNOSIS — E08.22 DIABETES MELLITUS DUE TO UNDERLYING CONDITION WITH CHRONIC KIDNEY DISEASE, WITHOUT LONG-TERM CURRENT USE OF INSULIN, UNSPECIFIED CKD STAGE (HCC): ICD-10-CM

## 2022-08-02 LAB
ALBUMIN SERPL BCP-MCNC: 3.9 G/DL (ref 3.5–5)
ALP SERPL-CCNC: 80 U/L (ref 34–104)
ALT SERPL W P-5'-P-CCNC: 20 U/L (ref 7–52)
ANION GAP SERPL CALCULATED.3IONS-SCNC: 10 MMOL/L (ref 4–13)
AST SERPL W P-5'-P-CCNC: 25 U/L (ref 13–39)
BILIRUB SERPL-MCNC: 0.54 MG/DL (ref 0.2–1)
BILIRUB UR QL STRIP: NEGATIVE
BUN SERPL-MCNC: 24 MG/DL (ref 5–25)
CALCIUM SERPL-MCNC: 9.4 MG/DL (ref 8.4–10.2)
CHLORIDE SERPL-SCNC: 101 MMOL/L (ref 96–108)
CHOLEST SERPL-MCNC: 119 MG/DL
CHOLEST SERPL-MCNC: 120 MG/DL
CK SERPL-CCNC: 99 U/L (ref 39–308)
CLARITY UR: CLEAR
CO2 SERPL-SCNC: 26 MMOL/L (ref 21–32)
COLOR UR: YELLOW
CREAT SERPL-MCNC: 1.25 MG/DL (ref 0.6–1.3)
CREAT UR-MCNC: 41.2 MG/DL
ERYTHROCYTE [DISTWIDTH] IN BLOOD BY AUTOMATED COUNT: 13.4 % (ref 11.6–15.1)
FERRITIN SERPL-MCNC: 187 NG/ML (ref 8–388)
GFR SERPL CREATININE-BSD FRML MDRD: 56 ML/MIN/1.73SQ M
GLUCOSE P FAST SERPL-MCNC: 129 MG/DL (ref 65–99)
GLUCOSE UR STRIP-MCNC: NEGATIVE MG/DL
HCT VFR BLD AUTO: 39.7 % (ref 36.5–49.3)
HDLC SERPL-MCNC: 39 MG/DL
HDLC SERPL-MCNC: 40 MG/DL
HGB BLD-MCNC: 13.5 G/DL (ref 12–17)
HGB RETIC QN AUTO: 36.3 PG (ref 30–38.3)
HGB UR QL STRIP.AUTO: NEGATIVE
IMM RETICS NFR: 8.5 % (ref 0–14)
IRON SATN MFR SERPL: 35 % (ref 20–50)
IRON SERPL-MCNC: 87 UG/DL (ref 65–175)
KETONES UR STRIP-MCNC: NEGATIVE MG/DL
LDLC SERPL CALC-MCNC: 49 MG/DL (ref 0–100)
LDLC SERPL CALC-MCNC: 51 MG/DL (ref 0–100)
LEUKOCYTE ESTERASE UR QL STRIP: NEGATIVE
MAGNESIUM SERPL-MCNC: 1.7 MG/DL (ref 1.9–2.7)
MCH RBC QN AUTO: 32 PG (ref 26.8–34.3)
MCHC RBC AUTO-ENTMCNC: 34 G/DL (ref 31.4–37.4)
MCV RBC AUTO: 94 FL (ref 82–98)
NITRITE UR QL STRIP: NEGATIVE
NONHDLC SERPL-MCNC: 79 MG/DL
PH UR STRIP.AUTO: 6 [PH]
PHOSPHATE SERPL-MCNC: 3.5 MG/DL (ref 2.3–4.1)
PLATELET # BLD AUTO: 162 THOUSANDS/UL (ref 149–390)
PMV BLD AUTO: 9.4 FL (ref 8.9–12.7)
POTASSIUM SERPL-SCNC: 3.7 MMOL/L (ref 3.5–5.3)
PROT SERPL-MCNC: 6.7 G/DL (ref 6.4–8.4)
PROT UR STRIP-MCNC: NEGATIVE MG/DL
PROT UR-MCNC: 13 MG/DL
PROT/CREAT UR: 0.32 MG/G{CREAT} (ref 0–0.1)
PSA SERPL-MCNC: 7.1 NG/ML (ref 0–4)
PTH-INTACT SERPL-MCNC: 37.4 PG/ML (ref 18.4–80.1)
RBC # BLD AUTO: 4.22 MILLION/UL (ref 3.88–5.62)
RETICS # AUTO: ABNORMAL 10*3/UL (ref 14356–105094)
RETICS # CALC: 2.18 % (ref 0.37–1.87)
SODIUM SERPL-SCNC: 137 MMOL/L (ref 135–147)
SP GR UR STRIP.AUTO: 1.01 (ref 1–1.03)
TIBC SERPL-MCNC: 252 UG/DL (ref 250–450)
TRIGL SERPL-MCNC: 148 MG/DL
TRIGL SERPL-MCNC: 150 MG/DL
TSH SERPL DL<=0.05 MIU/L-ACNC: 2.28 UIU/ML (ref 0.45–4.5)
UROBILINOGEN UR QL STRIP.AUTO: 0.2 E.U./DL
VIT B12 SERPL-MCNC: 472 PG/ML (ref 100–900)
WBC # BLD AUTO: 8.01 THOUSAND/UL (ref 4.31–10.16)

## 2022-08-02 PROCEDURE — 36415 COLL VENOUS BLD VENIPUNCTURE: CPT | Performed by: FAMILY MEDICINE

## 2022-08-02 PROCEDURE — 80061 LIPID PANEL: CPT

## 2022-08-02 PROCEDURE — 84100 ASSAY OF PHOSPHORUS: CPT

## 2022-08-02 PROCEDURE — G0103 PSA SCREENING: HCPCS

## 2022-08-02 PROCEDURE — 83970 ASSAY OF PARATHORMONE: CPT

## 2022-08-02 PROCEDURE — 83550 IRON BINDING TEST: CPT

## 2022-08-02 PROCEDURE — 85027 COMPLETE CBC AUTOMATED: CPT

## 2022-08-02 PROCEDURE — 83540 ASSAY OF IRON: CPT

## 2022-08-02 PROCEDURE — 84156 ASSAY OF PROTEIN URINE: CPT

## 2022-08-02 PROCEDURE — 80053 COMPREHEN METABOLIC PANEL: CPT

## 2022-08-02 PROCEDURE — 82570 ASSAY OF URINE CREATININE: CPT

## 2022-08-02 PROCEDURE — 83735 ASSAY OF MAGNESIUM: CPT

## 2022-08-02 PROCEDURE — 84443 ASSAY THYROID STIM HORMONE: CPT | Performed by: FAMILY MEDICINE

## 2022-08-02 PROCEDURE — 82728 ASSAY OF FERRITIN: CPT

## 2022-08-02 PROCEDURE — 82306 VITAMIN D 25 HYDROXY: CPT

## 2022-08-02 PROCEDURE — 82550 ASSAY OF CK (CPK): CPT

## 2022-08-02 PROCEDURE — 85046 RETICYTE/HGB CONCENTRATE: CPT

## 2022-08-02 PROCEDURE — 82607 VITAMIN B-12: CPT

## 2022-08-02 PROCEDURE — 81003 URINALYSIS AUTO W/O SCOPE: CPT | Performed by: FAMILY MEDICINE

## 2022-08-06 LAB
25(OH)D2 SERPL-MCNC: <1 NG/ML
25(OH)D3 SERPL-MCNC: 41 NG/ML
25(OH)D3+25(OH)D2 SERPL-MCNC: 41 NG/ML

## 2022-08-15 ENCOUNTER — TELEPHONE (OUTPATIENT)
Dept: GASTROENTEROLOGY | Facility: CLINIC | Age: 77
End: 2022-08-15

## 2022-08-15 ENCOUNTER — OFFICE VISIT (OUTPATIENT)
Dept: GASTROENTEROLOGY | Facility: CLINIC | Age: 77
End: 2022-08-15
Payer: MEDICARE

## 2022-08-15 VITALS
DIASTOLIC BLOOD PRESSURE: 70 MMHG | BODY MASS INDEX: 24.5 KG/M2 | HEART RATE: 60 BPM | HEIGHT: 71 IN | SYSTOLIC BLOOD PRESSURE: 143 MMHG | WEIGHT: 175 LBS

## 2022-08-15 DIAGNOSIS — E11.22 TYPE 2 DIABETES MELLITUS WITH STAGE 3A CHRONIC KIDNEY DISEASE, UNSPECIFIED WHETHER LONG TERM INSULIN USE (HCC): ICD-10-CM

## 2022-08-15 DIAGNOSIS — K22.70 BARRETT'S ESOPHAGUS DETERMINED BY BIOPSY: Primary | ICD-10-CM

## 2022-08-15 DIAGNOSIS — N18.31 TYPE 2 DIABETES MELLITUS WITH STAGE 3A CHRONIC KIDNEY DISEASE, UNSPECIFIED WHETHER LONG TERM INSULIN USE (HCC): ICD-10-CM

## 2022-08-15 DIAGNOSIS — J84.10 PULMONARY FIBROSIS (HCC): ICD-10-CM

## 2022-08-15 PROCEDURE — 99214 OFFICE O/P EST MOD 30 MIN: CPT | Performed by: INTERNAL MEDICINE

## 2022-08-15 NOTE — PROGRESS NOTES
[facility] - Outpatient Follow-up Note  Elin Olsen 78 y o  male MRN: [id number]  Encounter: [id number]          ASSESSMENT AND PLAN:      1  Gee's esophagus determined by biopsy  Patient has longstanding history of Gee's esophagus  He has long segment Gee's esophagus  He never had any dysplasia however  He does not have any dysphagia or heartburn at the present time  Denies any nausea or vomiting  He denies any chest pain, cough  He does have multiple issues with his lung  He has history of pulmonary fibrosis  He recently had COVID and that complicate the matter  He is currently following with his pulmonologist   He has abdominal to however work and do his day-to-day esophagus out any problem  A upper endoscopy will be done for evaluation of Barretts esophagus  I have told the patient repeatedly that he must come every two years for Gee's esophagus surveillance  He understands  However he had multiple social issues one the last 2-3 years including loss of this with his brother  I have told the patient that despite of everything he should still get endoscopies for surveillance of Barretts has high risk of esophageal cancer is present  He understands  - EGD; Future    2  Pulmonary fibrosis ([facility] )  Patient does have history of pulmonary fibrosis  Recently had COVID  Currently is following with Pulmonary Service  He has fair exercise tolerance  He tells me that he can walk one block without any difficulty a problem  3  Type 2 diabetes mellitus with stage 3a chronic kidney disease, unspecified whether long term insulin use ([facility] )  Patient is following with primary care  There is no active issues currently     ______________________________________________________________________    SUBJECTIVE:  Denies any dysphagia or odynophagia  Denies any abdominal pain  Denies any nausea or vomiting    He has come to see me because of a letter he received from recall service of the hospital   Patient history of liver disease  However liver biopsy done seven or eight years ago was completely normal       REVIEW OF SYSTEMS IS OTHERWISE NEGATIVE        Historical Information   Past Medical History:   Diagnosis Date    Anemia     Arthritis     Gee's esophagus     Breast lump     [facility]     Chronic kidney disease     Cirrhosis ([facility] )     Coronary artery disease     cardiac cath; stents     [facility]     H/O heart artery stent     Hypertension     Inguinal hernia     Right    Sarcoidosis     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HCC)     Skin cancer     SOB (shortness of breath)     Upper respiratory infection, viral 5/31/2022     Past Surgical History:   Procedure Laterality Date    CARDIAC CATHETERIZATION      CATARACT EXTRACTION, BILATERAL      left eye 05/08 and right 5/22/2022    CERVICAL FUSION      CHOLECYSTECTOMY      COLONOSCOPY  04/11/2016    EGD AND COLONOSCOPY  06/24/2019    ERCP  09/11/2014    transab esop hiat griffin rpr    ESOPHAGOGASTRIC FUNDOPLASTY      HERNIA REPAIR Right     p I/griffin init reduc >5 yr    HIP SURGERY Right     Replacement    KIDNEY STONE SURGERY      Fragmenting    LIVER BIOPSY      MULTIPLE TOOTH EXTRACTIONS      "extraction of all maxillary teeth"    SKIN BIOPSY  05/2020    THUMB FUSION      with graft    TONSILLECTOMY      US GUIDANCE  10/22/2014     Social History   Social History     Substance and Sexual Activity   Alcohol Use No    Comment: Alcohol intake:   Occasional wine - As per [place]      Social History     Substance and Sexual Activity   Drug Use No     Social History     Tobacco Use   Smoking Status Never Smoker   Smokeless Tobacco Never Used   Tobacco Comment    RETIRED     Family History   Problem Relation Age of Onset    Hypertension Mother     Atrial fibrillation Mother     Heart disease Mother     Kidney disease Father     COPD Father     Heart disease Father     Asthma Neg Hx     Lung cancer Neg Hx        Meds/Allergies       Current Outpatient Medications:     albuterol (PROVENTIL HFA,VENTOLIN HFA) 90 mcg/act inhaler    allopurinol (ZYLOPRIM) 300 mg tablet    amLODIPine (NORVASC) 5 mg tablet    aspirin (ECOTRIN LOW STRENGTH) 81 mg EC tablet    atorvastatin (LIPITOR) 80 mg tablet    azilsartan medoxomil (Edarbi) 40 MG tablet    B Complex-C (SUPERPLEX-T) TABS    benzonatate (TESSALON) 200 MG capsule    Breo Ellipta 100-25 MCG/INH inhaler    chlorthalidone 25 mg tablet    Cholecalciferol (VITAMIN D3) 2000 units TABS    co-enzyme Q-10 50 MG capsule    colchicine (COLCRYS) 0 6 mg tablet    Ferrous Sulfate (IRON) 325 (65 Fe) MG TABS    Januvia 100 MG tablet    magnesium chloride-calcium (SLOW-MAG) 71 5-119 mg    metoprolol tartrate (LOPRESSOR) 50 mg tablet    hydrocodone-chlorpheniramine polistirex (TUSSIONEX) 10-8 mg/5 mL ER suspension    Incruse Ellipta 62 5 MCG/INH AEPB inhaler    loratadine (CLARITIN) 10 mg tablet    ofloxacin (OCUFLOX) 0 3 % ophthalmic solution    pantoprazole (PROTONIX) 40 mg tablet    prasugrel (EFFIENT) tablet    Prolensa 0 07 % SOLN    Promethazine-DM (PHENERGAN-DM) 6 25-15 mg/5 mL oral syrup    sulfaSALAzine (AZULFIDINE) 500 mg tablet    Allergies   Allergen Reactions    Oxycodone GI Intolerance           Objective     Blood pressure 143/70, pulse 60, height 5' 11" (1 803 m), weight 79 4 kg (175 lb)  Body mass index is 24 41 kg/m²  PHYSICAL EXAM:      General Appearance:   Alert, cooperative, no distress   HEENT:   Normocephalic, atraumatic, anicteric      Neck:  Supple, symmetrical, trachea midline   Lungs:   Clear to auscultation bilaterally; no rales, rhonchi or wheezing; respirations unlabored    Heart[de-identified]   Regular rate and rhythm; no murmur, rub, or gallop     Abdomen:   Soft, non-tender, non-distended; normal bowel sounds; no masses, no organomegaly    Genitalia:   Deferred    Rectal:   Deferred    Extremities:  No cyanosis, clubbing or edema    Pulses:  2+ and symmetric    Skin:  No jaundice, rashes, or lesions    Lymph nodes:  No palpable cervical lymphadenopathy        Lab Results:   No visits with results within 1 Day(s) from this visit     Latest known visit with results is:   Appointment on 08/02/2022   Component Date Value    Cholesterol 08/02/2022 119     Triglycerides 08/02/2022 150 (A)    HDL, Direct 08/02/2022 40     LDL Calculated 08/02/2022 49     Non-HDL-Chol (CHOL-HDL) 08/02/2022 79     Ferritin 08/02/2022 187     Iron Saturation 08/02/2022 35     TIBC 08/02/2022 252     Iron 08/02/2022 87     Immature Retic Fract 08/02/2022 8 5     Retic Ct Pct 08/02/2022 2 18 (A)    Retic Ct Abs 08/02/2022 91,300     RETIC HGB 08/02/2022 36 3     Vitamin B-12 08/02/2022 472     PSA 08/02/2022 7 1 (A)    Sodium 08/02/2022 137     Potassium 08/02/2022 3 7     Chloride 08/02/2022 101     CO2 08/02/2022 26     ANION GAP 08/02/2022 10     BUN 08/02/2022 24     Creatinine 08/02/2022 1 25     Glucose, Fasting 08/02/2022 129 (A)    Calcium 08/02/2022 9 4     AST 08/02/2022 25     ALT 08/02/2022 20     Alkaline Phosphatase 08/02/2022 80     Total Protein 08/02/2022 6 7     Albumin 08/02/2022 3 9     Total Bilirubin 08/02/2022 0 54     eGFR 08/02/2022 56     WBC 08/02/2022 8 01     RBC 08/02/2022 4 22     Hemoglobin 08/02/2022 13 5     Hematocrit 08/02/2022 39 7     MCV 08/02/2022 94     MCH 08/02/2022 32 0     MCHC 08/02/2022 34 0     RDW 08/02/2022 13 4     Platelets 34/57/7532 162     MPV 08/02/2022 9 4     Total CK 08/02/2022 99     Cholesterol 08/02/2022 120     Triglycerides 08/02/2022 148     HDL, Direct 08/02/2022 39 (A)    LDL Calculated 08/02/2022 51     Magnesium 08/02/2022 1 7 (A)    Phosphorus 08/02/2022 3 5     Creatinine, Ur 08/02/2022 41 2     Protein Urine Random 08/02/2022 13     Prot/Creat Ratio, Ur 08/02/2022 0 32 (A)    PTH 08/02/2022 37 4     25-HYDROXY VIT D 08/02/2022 41     25-Hydroxy D2 08/02/2022 <1 0     25-HYDROXY VIT D3 08/02/2022 41          Radiology Results:   No results found

## 2022-08-15 NOTE — H&P (VIEW-ONLY)
[facility] - Outpatient Follow-up Note  Tamia Rebollar 78 y o  male MRN: [id number]  Encounter: [id number]          ASSESSMENT AND PLAN:      1  Gee's esophagus determined by biopsy  Patient has longstanding history of Gee's esophagus  He has long segment Gee's esophagus  He never had any dysplasia however  He does not have any dysphagia or heartburn at the present time  Denies any nausea or vomiting  He denies any chest pain, cough  He does have multiple issues with his lung  He has history of pulmonary fibrosis  He recently had COVID and that complicate the matter  He is currently following with his pulmonologist   He has abdominal to however work and do his day-to-day esophagus out any problem  A upper endoscopy will be done for evaluation of Barretts esophagus  I have told the patient repeatedly that he must come every two years for Gee's esophagus surveillance  He understands  However he had multiple social issues one the last 2-3 years including loss of this with his brother  I have told the patient that despite of everything he should still get endoscopies for surveillance of Barretts has high risk of esophageal cancer is present  He understands  - EGD; Future    2  Pulmonary fibrosis ([facility] )  Patient does have history of pulmonary fibrosis  Recently had COVID  Currently is following with Pulmonary Service  He has fair exercise tolerance  He tells me that he can walk one block without any difficulty a problem  3  Type 2 diabetes mellitus with stage 3a chronic kidney disease, unspecified whether long term insulin use ([facility] )  Patient is following with primary care  There is no active issues currently     ______________________________________________________________________    SUBJECTIVE:  Denies any dysphagia or odynophagia  Denies any abdominal pain  Denies any nausea or vomiting    He has come to see me because of a letter he received from recall service of the hospital   Patient history of liver disease  However liver biopsy done seven or eight years ago was completely normal       REVIEW OF SYSTEMS IS OTHERWISE NEGATIVE        Historical Information   Past Medical History:   Diagnosis Date    Anemia     Arthritis     Gee's esophagus     Breast lump     [facility]     Chronic kidney disease     [facility]osis ([facility] )     Coronary artery disease     cardiac cath; stents     Diabetes mellitus ([facility] )     H/O heart artery stent     Hypertension     Inguinal hernia     Right    Sarcoidosis     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HCC)     Skin cancer     SOB (shortness of breath)     Upper respiratory infection, viral 5/31/2022     Past Surgical History:   Procedure Laterality Date    CARDIAC CATHETERIZATION      CATARACT EXTRACTION, BILATERAL      left eye 05/08 and right 5/22/2022    CERVICAL FUSION      CHOLECYSTECTOMY      COLONOSCOPY  04/11/2016    EGD AND COLONOSCOPY  06/24/2019    ERCP  09/11/2014    transab esop hiat griffin rpr    ESOPHAGOGASTRIC FUNDOPLASTY      HERNIA REPAIR Right     p I/griffin init reduc >5 yr    HIP SURGERY Right     Replacement    KIDNEY STONE SURGERY      Fragmenting    LIVER BIOPSY      MULTIPLE TOOTH EXTRACTIONS      "extraction of all maxillary teeth"    SKIN BIOPSY  05/2020    THUMB FUSION      with graft    TONSILLECTOMY      US GUIDANCE  10/22/2014     Social History   Social History     Substance and Sexual Activity   Alcohol Use No    Comment: Alcohol intake:   Occasional wine - As per [employer] Mahmood      Social History     Substance and Sexual Activity   Drug Use No     Social History     Tobacco Use   Smoking Status Never Smoker   Smokeless Tobacco Never Used   Tobacco Comment    RETIRED     Family History   Problem Relation Age of Onset    Hypertension Mother     Atrial fibrillation Mother     Heart disease Mother     Kidney disease Father     COPD Father     Heart disease Father     Asthma Neg Hx     Lung cancer Neg Hx        Meds/Allergies       Current Outpatient Medications:     albuterol (PROVENTIL HFA,VENTOLIN HFA) 90 mcg/act inhaler    allopurinol (ZYLOPRIM) 300 mg tablet    amLODIPine (NORVASC) 5 mg tablet    aspirin (ECOTRIN LOW STRENGTH) 81 mg EC tablet    atorvastatin (LIPITOR) 80 mg tablet    azilsartan medoxomil (Edarbi) 40 MG tablet    B Complex-C (SUPERPLEX-T) TABS    benzonatate (TESSALON) 200 MG capsule    Breo Ellipta 100-25 MCG/INH inhaler    chlorthalidone 25 mg tablet    Cholecalciferol (VITAMIN D3) 2000 units TABS    co-enzyme Q-10 50 MG capsule    colchicine (COLCRYS) 0 6 mg tablet    Ferrous Sulfate (IRON) 325 (65 Fe) MG TABS    Januvia 100 MG tablet    magnesium chloride-calcium (SLOW-MAG) 71 5-119 mg    metoprolol tartrate (LOPRESSOR) 50 mg tablet    hydrocodone-chlorpheniramine polistirex (TUSSIONEX) 10-8 mg/5 mL ER suspension    Incruse Ellipta 62 5 MCG/INH AEPB inhaler    loratadine (CLARITIN) 10 mg tablet    ofloxacin (OCUFLOX) 0 3 % ophthalmic solution    pantoprazole (PROTONIX) 40 mg tablet    prasugrel (EFFIENT) tablet    Prolensa 0 07 % SOLN    Promethazine-DM (PHENERGAN-DM) 6 25-15 mg/5 mL oral syrup    sulfaSALAzine (AZULFIDINE) 500 mg tablet    Allergies   Allergen Reactions    Oxycodone GI Intolerance           Objective     Blood pressure 143/70, pulse 60, height 5' 11" (1 803 m), weight 79 4 kg (175 lb)  Body mass index is 24 41 kg/m²  PHYSICAL EXAM:      General Appearance:   Alert, cooperative, no distress   HEENT:   Normocephalic, atraumatic, anicteric      Neck:  Supple, symmetrical, trachea midline   Lungs:   Clear to auscultation bilaterally; no rales, rhonchi or wheezing; respirations unlabored    Heart[de-identified]   Regular rate and rhythm; no murmur, rub, or gallop     Abdomen:   Soft, non-tender, non-distended; normal bowel sounds; no masses, no organomegaly    Genitalia:   Deferred    Rectal:   Deferred    Extremities:  No cyanosis, clubbing or edema    Pulses:  2+ and symmetric    Skin:  No jaundice, rashes, or lesions    Lymph nodes:  No palpable cervical lymphadenopathy        Lab Results:   No visits with results within 1 Day(s) from this visit     Latest known visit with results is:   Appointment on 08/02/2022   Component Date Value    Cholesterol 08/02/2022 119     Triglycerides 08/02/2022 150 (A)    HDL, Direct 08/02/2022 40     LDL Calculated 08/02/2022 49     Non-HDL-Chol (CHOL-HDL) 08/02/2022 79     Ferritin 08/02/2022 187     Iron Saturation 08/02/2022 35     TIBC 08/02/2022 252     Iron 08/02/2022 87     Immature Retic Fract 08/02/2022 8 5     Retic Ct Pct 08/02/2022 2 18 (A)    Retic Ct Abs 08/02/2022 91,300     RETIC HGB 08/02/2022 36 3     Vitamin B-12 08/02/2022 472     PSA 08/02/2022 7 1 (A)    Sodium 08/02/2022 137     Potassium 08/02/2022 3 7     Chloride 08/02/2022 101     CO2 08/02/2022 26     ANION GAP 08/02/2022 10     BUN 08/02/2022 24     Creatinine 08/02/2022 1 25     Glucose, Fasting 08/02/2022 129 (A)    Calcium 08/02/2022 9 4     AST 08/02/2022 25     ALT 08/02/2022 20     Alkaline Phosphatase 08/02/2022 80     Total Protein 08/02/2022 6 7     Albumin 08/02/2022 3 9     Total Bilirubin 08/02/2022 0 54     eGFR 08/02/2022 56     WBC 08/02/2022 8 01     RBC 08/02/2022 4 22     Hemoglobin 08/02/2022 13 5     Hematocrit 08/02/2022 39 7     MCV 08/02/2022 94     MCH 08/02/2022 32 0     MCHC 08/02/2022 34 0     RDW 08/02/2022 13 4     Platelets 96/43/5754 162     MPV 08/02/2022 9 4     Total CK 08/02/2022 99     Cholesterol 08/02/2022 120     Triglycerides 08/02/2022 148     HDL, Direct 08/02/2022 39 (A)    LDL Calculated 08/02/2022 51     Magnesium 08/02/2022 1 7 (A)    Phosphorus 08/02/2022 3 5     Creatinine, Ur 08/02/2022 41 2     Protein Urine Random 08/02/2022 13     Prot/Creat Ratio, Ur 08/02/2022 0 32 (A)    PTH 08/02/2022 37 4     25-HYDROXY VIT D 08/02/2022 41     25-Hydroxy D2 08/02/2022 <1 0     25-HYDROXY VIT D3 08/02/2022 41          Radiology Results:   No results found

## 2022-08-15 NOTE — TELEPHONE ENCOUNTER
Scheduled date of EGD(as of today):8/30/22  Physician performing EGD:Brandon  Location of EGD:  Instructions reviewed with patient by:Samia AGOSTO  Clearances: Cardiac - Effient - [facility]

## 2022-08-16 ENCOUNTER — TELEPHONE (OUTPATIENT)
Dept: GASTROENTEROLOGY | Facility: CLINIC | Age: 77
End: 2022-08-16

## 2022-08-16 NOTE — TELEPHONE ENCOUNTER
Patient scheduled for 8/30/22 EGD with Dr Flavio Rodriguez, patient is currently on Effient  Faxed medication clearance to Dr Vinny Jimenes office  Awaiting clearance      [phone]  [phone]

## 2022-08-17 ENCOUNTER — TELEPHONE (OUTPATIENT)
Dept: FAMILY MEDICINE CLINIC | Facility: CLINIC | Age: 77
End: 2022-08-17

## 2022-08-17 DIAGNOSIS — R97.20 ELEVATED PSA: Primary | ICD-10-CM

## 2022-08-17 NOTE — TELEPHONE ENCOUNTER
Called and spoke to patient gave results and patient states does have a urologist, Dr Yeyo Valdivia  Will contact office in regards to PSA results     Karina Hermosillo

## 2022-08-17 NOTE — TELEPHONE ENCOUNTER
nephrology called and said that pt contacted them, however Dr Good Green is with nephr  And not urology  Can the referral for urology please be written for pt? Dr Landry?

## 2022-08-17 NOTE — TELEPHONE ENCOUNTER
----- Message from Eddie Gil, DO sent at 8/7/2022  3:44 PM EDT -----  Pls call pt and tell him labs are good, BUT, PSA is not  Has he seen a urologist?  Needs to do so and prob have prostate bx (or MRI'ed?) PSA is 7 1, should be <4 0    If not seeing urologist:  put in referral for Dr [facility]   Daniele

## 2022-08-18 ENCOUNTER — HOSPITAL ENCOUNTER (OUTPATIENT)
Dept: PULMONOLOGY | Facility: HOSPITAL | Age: 77
Discharge: HOME | End: 2022-08-18
Attending: INTERNAL MEDICINE
Payer: MEDICARE

## 2022-08-18 DIAGNOSIS — J44.9 CHRONIC OBSTRUCTIVE PULMONARY DISEASE, UNSPECIFIED COPD TYPE (HCC): ICD-10-CM

## 2022-08-18 PROCEDURE — 94729 DIFFUSING CAPACITY: CPT | Performed by: INTERNAL MEDICINE

## 2022-08-18 PROCEDURE — 94726 PLETHYSMOGRAPHY LUNG VOLUMES: CPT | Performed by: INTERNAL MEDICINE

## 2022-08-18 PROCEDURE — 94729 DIFFUSING CAPACITY: CPT

## 2022-08-18 PROCEDURE — 94060 EVALUATION OF WHEEZING: CPT | Performed by: INTERNAL MEDICINE

## 2022-08-18 PROCEDURE — 94761 N-INVAS EAR/PLS OXIMETRY MLT: CPT

## 2022-08-18 PROCEDURE — 94060 EVALUATION OF WHEEZING: CPT

## 2022-08-18 PROCEDURE — 94726 PLETHYSMOGRAPHY LUNG VOLUMES: CPT

## 2022-08-18 PROCEDURE — 94618 PULMONARY STRESS TESTING: CPT | Performed by: INTERNAL MEDICINE

## 2022-08-18 RX ORDER — ALBUTEROL SULFATE 2.5 MG/3ML
2.5 SOLUTION RESPIRATORY (INHALATION) ONCE
Status: COMPLETED | OUTPATIENT
Start: 2022-08-18 | End: 2022-08-18

## 2022-08-18 RX ADMIN — ALBUTEROL SULFATE 2.5 MG: 2.5 SOLUTION RESPIRATORY (INHALATION) at 15:01

## 2022-08-22 DIAGNOSIS — E78.5 DYSLIPIDEMIA: ICD-10-CM

## 2022-08-22 DIAGNOSIS — K21.00 GASTROESOPHAGEAL REFLUX DISEASE WITH ESOPHAGITIS WITHOUT HEMORRHAGE: ICD-10-CM

## 2022-08-22 DIAGNOSIS — J43.9 PULMONARY EMPHYSEMA, UNSPECIFIED EMPHYSEMA TYPE (HCC): ICD-10-CM

## 2022-08-22 DIAGNOSIS — M1A.9XX0 CHRONIC GOUT WITHOUT TOPHUS, UNSPECIFIED CAUSE, UNSPECIFIED SITE: ICD-10-CM

## 2022-08-22 RX ORDER — ATORVASTATIN CALCIUM 80 MG/1
80 TABLET, FILM COATED ORAL DAILY
Qty: 90 TABLET | Refills: 3 | Status: SHIPPED | OUTPATIENT
Start: 2022-08-22

## 2022-08-22 RX ORDER — ALLOPURINOL 300 MG/1
TABLET ORAL
Qty: 45 TABLET | Refills: 3 | Status: SHIPPED | OUTPATIENT
Start: 2022-08-22

## 2022-08-22 RX ORDER — PANTOPRAZOLE SODIUM 40 MG/1
TABLET, DELAYED RELEASE ORAL
Qty: 90 TABLET | Refills: 3 | Status: SHIPPED | OUTPATIENT
Start: 2022-08-22

## 2022-08-23 ENCOUNTER — TELEPHONE (OUTPATIENT)
Dept: GASTROENTEROLOGY | Facility: CLINIC | Age: 77
End: 2022-08-23

## 2022-08-23 NOTE — TELEPHONE ENCOUNTER
Spoke to pt confirming pt's EGD  scheduled on 8/30/22 with Dr Kaushik Batista at [facility]  Informed [facility] would be calling the day prior with the arrival time  Informed pt would need to be npo after midnight and would need a  the day of procedure due to being under sedation  Pt did not have any questions  Detail Level: Zone Detail Level: Detailed Detail Level: Simple

## 2022-08-30 ENCOUNTER — HOSPITAL ENCOUNTER (OUTPATIENT)
Dept: GASTROENTEROLOGY | Facility: HOSPITAL | Age: 77
Setting detail: OUTPATIENT SURGERY
Discharge: HOME | End: 2022-08-30
Attending: INTERNAL MEDICINE
Payer: MEDICARE

## 2022-08-30 ENCOUNTER — ANESTHESIA EVENT (OUTPATIENT)
Dept: GASTROENTEROLOGY | Facility: HOSPITAL | Age: 77
End: 2022-08-30

## 2022-08-30 ENCOUNTER — ANESTHESIA (OUTPATIENT)
Dept: GASTROENTEROLOGY | Facility: HOSPITAL | Age: 77
End: 2022-08-30

## 2022-08-30 VITALS
HEART RATE: 48 BPM | BODY MASS INDEX: 25.56 KG/M2 | SYSTOLIC BLOOD PRESSURE: 160 MMHG | OXYGEN SATURATION: 100 % | RESPIRATION RATE: 17 BRPM | TEMPERATURE: 97.6 F | WEIGHT: 178.5 LBS | DIASTOLIC BLOOD PRESSURE: 80 MMHG | HEIGHT: 70 IN

## 2022-08-30 DIAGNOSIS — K22.70 BARRETT'S ESOPHAGUS DETERMINED BY BIOPSY: ICD-10-CM

## 2022-08-30 DIAGNOSIS — B37.81 CANDIDA ESOPHAGITIS (HCC): Primary | ICD-10-CM

## 2022-08-30 LAB — GLUCOSE SERPL-MCNC: 150 MG/DL (ref 65–140)

## 2022-08-30 PROCEDURE — 43251 EGD REMOVE LESION SNARE: CPT | Performed by: INTERNAL MEDICINE

## 2022-08-30 PROCEDURE — 88342 IMHCHEM/IMCYTCHM 1ST ANTB: CPT | Performed by: PATHOLOGY

## 2022-08-30 PROCEDURE — 88312 SPECIAL STAINS GROUP 1: CPT | Performed by: PATHOLOGY

## 2022-08-30 PROCEDURE — 88305 TISSUE EXAM BY PATHOLOGIST: CPT | Performed by: PATHOLOGY

## 2022-08-30 PROCEDURE — 82948 REAGENT STRIP/BLOOD GLUCOSE: CPT

## 2022-08-30 PROCEDURE — 43239 EGD BIOPSY SINGLE/MULTIPLE: CPT | Performed by: INTERNAL MEDICINE

## 2022-08-30 RX ORDER — PROPOFOL 10 MG/ML
INJECTION, EMULSION INTRAVENOUS AS NEEDED
Status: DISCONTINUED | OUTPATIENT
Start: 2022-08-30 | End: 2022-08-30

## 2022-08-30 RX ORDER — SODIUM CHLORIDE, SODIUM LACTATE, POTASSIUM CHLORIDE, CALCIUM CHLORIDE 600; 310; 30; 20 MG/100ML; MG/100ML; MG/100ML; MG/100ML
INJECTION, SOLUTION INTRAVENOUS CONTINUOUS PRN
Status: DISCONTINUED | OUTPATIENT
Start: 2022-08-30 | End: 2022-08-30

## 2022-08-30 RX ADMIN — PROPOFOL 50 MG: 10 INJECTION, EMULSION INTRAVENOUS at 09:15

## 2022-08-30 RX ADMIN — PROPOFOL 100 MG: 10 INJECTION, EMULSION INTRAVENOUS at 09:06

## 2022-08-30 RX ADMIN — SODIUM CHLORIDE, SODIUM LACTATE, POTASSIUM CHLORIDE, AND CALCIUM CHLORIDE: .6; .31; .03; .02 INJECTION, SOLUTION INTRAVENOUS at 09:06

## 2022-08-30 RX ADMIN — SODIUM CHLORIDE, SODIUM LACTATE, POTASSIUM CHLORIDE, AND CALCIUM CHLORIDE: .6; .31; .03; .02 INJECTION, SOLUTION INTRAVENOUS at 09:00

## 2022-08-30 RX ADMIN — PROPOFOL 50 MG: 10 INJECTION, EMULSION INTRAVENOUS at 09:10

## 2022-08-30 NOTE — ANESTHESIA POSTPROCEDURE EVALUATION
Post-Op Assessment Note    CV Status:  Stable  Pain Score: 0    Pain management: adequate     Mental Status:  Alert and awake   Hydration Status:  Euvolemic   PONV Controlled:  Controlled   Airway Patency:  Patent      Post Op Vitals Reviewed: Yes      Staff: Anesthesiologist, CRNA         No complications documented      BP   128/72   Temp      Pulse 50   Resp   16   SpO2   96

## 2022-08-30 NOTE — ANESTHESIA PREPROCEDURE EVALUATION
Procedure:  EGD    Relevant Problems   CARDIO   (+) Coronary artery disease of native heart with stable angina pectoris, unspecified vessel or lesion type (HCC)   (+) Hypertension, essential      ENDO   (+) Type 2 diabetes mellitus with stage 3 chronic kidney disease (HCC)   (+) Type 2 diabetes mellitus without complication, without long-term current use of insulin (HCC)      /RENAL   (+) Chronic kidney disease, stage III (moderate) (HCC)   (+) Diabetic nephropathy associated with type 2 diabetes mellitus (HCC)   (+) Hypertensive chronic kidney disease with stage 1 through stage 4 chronic kidney disease, or unspecified chronic kidney disease      MUSCULOSKELETAL   (+) Other spondylosis, thoracolumbar region      PULMONARY   (+) COPD (chronic obstructive pulmonary disease) (HCC)      Respiratory   (+) Pulmonary fibrosis (HCC)        Physical Exam    Airway    Mallampati score: II  TM Distance: >3 FB  Neck ROM: full     Dental       Cardiovascular  Cardiovascular exam normal    Pulmonary  Pulmonary exam normal     Other Findings        Anesthesia Plan  ASA Score- 3     Anesthesia Type- IV sedation with anesthesia with ASA Monitors  Additional Monitors:   Airway Plan:           Plan Factors-Exercise tolerance (METS): >4 METS  Chart reviewed  EKG reviewed  Imaging results reviewed  Existing labs reviewed  Patient summary reviewed  Induction- intravenous  Postoperative Plan- Plan for postoperative opioid use  Planned trial extubation    Informed Consent- Anesthetic plan and risks discussed with patient  I personally reviewed this patient with the CRNA  Discussed and agreed on the Anesthesia Plan with the CRNA  Betzaida Estevez

## 2022-08-30 NOTE — INTERVAL H&P NOTE
H&P reviewed  After examining the patient I find no changes in the patients condition since the H&P had been written      Vitals:    08/30/22 0808   BP: 159/84   Pulse: (!) 51   Resp: 18   Temp: 97 7 °F (36 5 °C)   SpO2: 99%

## 2022-08-30 NOTE — DISCHARGE SUMMARY
Discharge Summary - Verita Gottron 78 y o  male MRN: [id number]    Unit/Bed#:  Encounter: [id number]    Admission Date:  08/30/2022    Admitting Diagnosis: Gee's esophagus determined by biopsy [K22 70]    HPI:  History of sarcoidosis, pulmonary fibrosis:  Barretts esophagus and intermittent dysphagia    Procedures Performed: No orders of the defined types were placed in this encounter  Summary of Hospital Course: Tolerated procedure well    Significant Findings, Care, Treatment and Services Provided:  Small hiatal hernia with reflux and Gee's esophagus noted  Evidence of Candida esophagitis also noted  Complications:  None    Discharge Diagnosis:  See above    Medical Problems             Resolved Problems  Date Reviewed: 8/1/2022   None                 Condition at Discharge: good         Discharge instructions/Information to patient and family:   See after visit summary for information provided to patient and family  Provisions for Follow-Up Care:  See after visit summary for information related to follow-up care and any pertinent home health orders        PCP: Chari Chavez DO    Disposition: Home

## 2022-09-07 PROCEDURE — 88342 IMHCHEM/IMCYTCHM 1ST ANTB: CPT | Performed by: PATHOLOGY

## 2022-09-07 PROCEDURE — 88305 TISSUE EXAM BY PATHOLOGIST: CPT | Performed by: PATHOLOGY

## 2022-09-07 PROCEDURE — 88312 SPECIAL STAINS GROUP 1: CPT | Performed by: PATHOLOGY

## 2022-09-08 ENCOUNTER — OFFICE VISIT (OUTPATIENT)
Dept: PULMONOLOGY | Facility: CLINIC | Age: 77
End: 2022-09-08
Payer: MEDICARE

## 2022-09-08 VITALS
WEIGHT: 179 LBS | OXYGEN SATURATION: 97 % | HEIGHT: 70 IN | TEMPERATURE: 97.7 F | DIASTOLIC BLOOD PRESSURE: 70 MMHG | SYSTOLIC BLOOD PRESSURE: 122 MMHG | HEART RATE: 69 BPM | BODY MASS INDEX: 25.62 KG/M2

## 2022-09-08 DIAGNOSIS — J84.10 PULMONARY FIBROSIS (HCC): ICD-10-CM

## 2022-09-08 DIAGNOSIS — D86.9 SARCOIDOSIS: ICD-10-CM

## 2022-09-08 DIAGNOSIS — J44.9 CHRONIC OBSTRUCTIVE PULMONARY DISEASE, UNSPECIFIED COPD TYPE (HCC): Primary | ICD-10-CM

## 2022-09-08 PROCEDURE — 99214 OFFICE O/P EST MOD 30 MIN: CPT | Performed by: INTERNAL MEDICINE

## 2022-09-08 NOTE — ASSESSMENT & PLAN NOTE
His recent high-resolution CT scan of the chest showed mild diffuse bilateral reticulation and mild scattered linear scar   Mild traction bronchiolectasis in the lateral basal left lower lobe   Chronic opacity in the paraspinal right lower lobe with volume loss as evidenced by crowding of the bronchi and mild traction bronchiolectasis due to chronic scar  The etiology is not clear at this point  His previous PFT showed severe airflow obstruction with diffusion defect  his repeat PFT showed moderate airflow obstruction with severe diffusion defect  Chest auscultation revealed bilateral coarse inspiratory crackles  He currently does not need any oxygen  The etiology of this pulmonary fibrosis is not clear at this point  He has emphysema  He has previous history of sarcoidosis  He has history of beryllium exposure  he also had COVID  His CT scan does not show a UIP pattern  His pulmonary function has not deteriorated  We will continue to monitor him closely  At this point he would not benefit from antifibrotic therapy    I had a long discussion with him and answered all his questions

## 2022-09-08 NOTE — ASSESSMENT & PLAN NOTE
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 [email or web address]  His previous PFT from May 2015 showed severe airflow obstruction with diffusion defect  He also had evidence of air trapping  There was improvement in FEV1 with bronchodilator indicating a component of asthma  His previous CT scan from February 2019 showed scarring in the left lung  He denies any runny nose or sneezing  On clinical examination, his chest auscultation revealed bilateral inspiratory coarse crackles at lung bases  His high-resolution CT scan of the chest showed bilateral pulmonary fibrosis  I have advised him to continue breo and incruse   I had a long discussion with him and have answered all his questions

## 2022-09-20 ENCOUNTER — TELEPHONE (OUTPATIENT)
Dept: FAMILY MEDICINE CLINIC | Facility: CLINIC | Age: 77
End: 2022-09-20

## 2022-09-20 NOTE — TELEPHONE ENCOUNTER
Called Bean to inform him medicares guidelines are to test blood sugars once a day since he is not on insulin  No answer left message asking for a call back  Per Dr Ellis based on the numbers he recorded on his latest sugar log it is okay for him to stop testing 4 times a day and only test once a day

## 2022-09-20 NOTE — TELEPHONE ENCOUNTER
Patient came into the office today looking to get a note from Dr Rodrigue Goins stating that he needs to test his blood sugars 4 times a day  I explained to patient that Dr Rodrigue Goins will not provide a note that states it is medically necessary  Patients blood sugar logs show that patient only needs to test once daily  Patient disagreed and stated that he would like to test BID at minimum  I told him that we will not be able to provide the note because there is no medical necessity for him to do it more than once a day based on recent blood sugar readings  Patient expressed understanding and left the office

## 2022-09-26 DIAGNOSIS — E11.9 TYPE 2 DIABETES MELLITUS WITHOUT COMPLICATION, WITHOUT LONG-TERM CURRENT USE OF INSULIN (HCC): ICD-10-CM

## 2022-09-26 RX ORDER — SITAGLIPTIN 100 MG/1
TABLET, FILM COATED ORAL
Qty: 90 TABLET | Refills: 0 | Status: SHIPPED | OUTPATIENT
Start: 2022-09-26

## 2022-09-28 ENCOUNTER — OFFICE VISIT (OUTPATIENT)
Dept: UROLOGY | Facility: CLINIC | Age: 77
End: 2022-09-28
Payer: MEDICARE

## 2022-09-28 VITALS
BODY MASS INDEX: 26.34 KG/M2 | WEIGHT: 184 LBS | HEIGHT: 70 IN | SYSTOLIC BLOOD PRESSURE: 158 MMHG | OXYGEN SATURATION: 97 % | HEART RATE: 58 BPM | DIASTOLIC BLOOD PRESSURE: 84 MMHG

## 2022-09-28 DIAGNOSIS — R97.20 ELEVATED PSA: Primary | ICD-10-CM

## 2022-09-28 PROCEDURE — 99203 OFFICE O/P NEW LOW 30 MIN: CPT | Performed by: PHYSICIAN ASSISTANT

## 2022-09-28 NOTE — PROGRESS NOTES
1  Elevated PSA  PSA, total and free     Assessment and plan:       1  Elevated PSA   - normal BELLA  - repeat PSA 6-8 weeks  If rising, then may need mpMRI/biopsy         Juan Alberto Norris PA-C      Chief Complaint     Chief Complaint   Patient presents with   [facility] Elevated PSA         History of Present Illness     Rama Schwartz is a 78 y o  male presenting for consultation of elevated PSA  Patient was undergoing routine screening with his primary care provider  If PSA was obtained, however no previous PSAs for comparison in his chart  PSA 7 1 (8/2/22)    Patient is comfortable with his lower urinary tract symptoms  Endorses a strong stream feeling empty after voiding  Denies any dysuria, gross hematuria, incontinence, or urinary infections  Is not on any pharmacotherapy for his prostate or bladder  Does have a past history of nephrolithiasis over 30 years ago  Has not had any symptoms of kidney stones over the past year  Medical comorbidities include type 2 diabetes, pulmonary fibrosis, COPD, hypertension, coronary artery disease, chronic kidney disease, sarcoidosis  Laboratory     Lab Results   Component Value Date    CREATININE 1 25 08/02/2022       Lab Results   Component Value Date    PSA 7 1 (H) 08/02/2022       Review of Systems     Review of Systems   Constitutional: Negative for activity change, appetite change, chills, diaphoresis, fatigue, fever and unexpected weight change  Respiratory: Negative for chest tightness and shortness of breath  Cardiovascular: Negative for chest pain, palpitations and leg swelling  Gastrointestinal: Negative for abdominal distention, abdominal pain, constipation, diarrhea, nausea and vomiting  Genitourinary: Negative for decreased urine volume, difficulty urinating, dysuria, enuresis, flank pain, frequency, genital sores, hematuria and urgency  Musculoskeletal: Negative for back pain, gait problem and myalgias     Skin: Negative for color change, pallor, rash and wound  Psychiatric/Behavioral: Negative for behavioral problems  The patient is not nervous/anxious  Allergies     Allergies   Allergen Reactions    Oxycodone GI Intolerance       Physical Exam     Physical Exam  Constitutional:       General: He is not in acute distress  Appearance: Normal appearance  He is normal weight  He is not ill-appearing, toxic-appearing or diaphoretic  HENT:      Head: Normocephalic and atraumatic  Eyes:      General:         Right eye: No discharge  Left eye: No discharge  Conjunctiva/sclera: Conjunctivae normal    Pulmonary:      Effort: Pulmonary effort is normal  No respiratory distress  Genitourinary:     Comments: Good rectal tone  Prostate 45g, smooth, symmetric, no palpable nodules  Musculoskeletal:         General: No swelling, tenderness or signs of injury  Normal range of motion  Skin:     General: Skin is warm and dry  Coloration: Skin is not jaundiced or pale  Neurological:      General: No focal deficit present  Mental Status: He is alert and oriented to person, place, and time     Psychiatric:         Mood and Affect: Mood normal          Behavior: Behavior normal            Vital Signs     Vitals:    09/28/22 0954   BP: 158/84   Pulse: 58   SpO2: 97%   Weight: 83 5 kg (184 lb)   Height: 5' 10" (1 778 m)         Current Medications       Current Outpatient Medications:     albuterol (PROVENTIL HFA,VENTOLIN HFA) 90 mcg/act inhaler, Inhale 2 puffs every 6 (six) hours as needed for wheezing or shortness of breath, Disp: 6 7 g, Rfl: 2    allopurinol (ZYLOPRIM) 300 mg tablet, "ONE-HALF" TABLET EVERY DAY, Disp: 45 tablet, Rfl: 3    amLODIPine (NORVASC) 5 mg tablet, TAKE 1 TABLET (5 MG) BY MOUTH DAILY, Disp: 90 tablet, Rfl: 5    aspirin (ECOTRIN LOW STRENGTH) 81 mg EC tablet, Take 81 mg by mouth daily, Disp: , Rfl:     atorvastatin (LIPITOR) 80 mg tablet, TAKE 1 TABLET (80 MG TOTAL) BY MOUTH DAILY, Disp: 90 tablet, Rfl: 3    azilsartan medoxomil (Edarbi) 40 MG tablet, Take 1 tablet (40 mg total) by mouth 2 (two) times a day, Disp: 180 tablet, Rfl: 3    Breo Ellipta 100-25 MCG/INH inhaler, INHALE 1 PUFF DAILY RINSE MOUTH AFTER USE , Disp: 60 each, Rfl: 2    chlorthalidone 25 mg tablet, TAKE "ONE-HALF" TABLET (12 5 MG) BY MOUTH DAILY, Disp: 45 tablet, Rfl: 4    Cholecalciferol (VITAMIN D3) 2000 units TABS, Take 1 tablet by mouth daily, Disp: , Rfl:     co-enzyme Q-10 50 MG capsule, Take 100 mg by mouth in the morning , Disp: , Rfl:     colchicine (COLCRYS) 0 6 mg tablet, TAKE 1 TABLET (0 6 MG TOTAL) BY MOUTH DAILY, Disp: 90 tablet, Rfl: 3    Ferrous Sulfate (IRON) 325 (65 Fe) MG TABS, Take 1 tablet by mouth daily, Disp: , Rfl:     Incruse Ellipta 62 5 MCG/INH AEPB inhaler, Inhale 1 puff daily, Disp: 30 blister, Rfl: 1    Januvia 100 MG tablet, TAKE ONE TABLET DAILY, Disp: 90 tablet, Rfl: 0    magnesium chloride-calcium (SLOW-MAG) 71 5-119 mg, Take 2 tablets by mouth 2 (two) times a day, Disp: , Rfl:     metoprolol tartrate (LOPRESSOR) 50 mg tablet, Take 1 tablet (50 mg total) by mouth 2 (two) times a day 50 mg in the morning and 25 mg in the evening, Disp: 180 tablet, Rfl: 3    pantoprazole (PROTONIX) 40 mg tablet, TAKE 1 TABLET DAILY HALF HOUR BEFORE EATING BREAKFAST, Disp: 90 tablet, Rfl: 3    sulfaSALAzine (AZULFIDINE) 500 mg tablet, Take 1 5 tablets (750 mg total) by mouth daily, Disp: 45 tablet, Rfl: 3    B Complex-C (SUPERPLEX-T) TABS, Take 1 tablet by mouth daily (Patient not taking: Reported on 9/28/2022), Disp: , Rfl:     benzonatate (TESSALON) 200 MG capsule, Take 1 capsule (200 mg total) by mouth 3 (three) times a day as needed for cough (Patient not taking: Reported on 9/28/2022), Disp: 20 capsule, Rfl: 0    hydrocodone-chlorpheniramine polistirex (TUSSIONEX) 10-8 mg/5 mL ER suspension, Take 5 mL by mouth every 12 (twelve) hours as needed for cough Max Daily Amount: 10 mL (Patient not taking: No sig reported), Disp: 60 mL, Rfl: 0    loratadine (CLARITIN) 10 mg tablet, Take 1 tablet (10 mg total) by mouth daily (Patient not taking: No sig reported), Disp: 10 tablet, Rfl: 0    ofloxacin (OCUFLOX) 0 3 % ophthalmic solution, , Disp: , Rfl:     prasugrel (EFFIENT) tablet, Take 10 mg by mouth daily (Patient not taking: Reported on 9/28/2022), Disp: , Rfl:     Prolensa 0 07 % SOLN, , Disp: , Rfl:     Promethazine-DM (PHENERGAN-DM) 6 25-15 mg/5 mL oral syrup, Take 5 mL by mouth 4 (four) times a day as needed for cough (Patient not taking: No sig reported), Disp: 180 mL, Rfl: 1      Active Problems     Patient Active Problem List   Diagnosis    Chronic kidney disease, stage III (moderate) ([facility])    Dyslipidemia    Persistent proteinuria    Hypertensive chronic kidney disease with stage 1 through stage 4 chronic kidney disease, or unspecified chronic kidney disease    Hyperkalemia    Hypertension, essential    COPD (chronic obstructive pulmonary disease) [facility] [address] [facility]    Sarcoidosis    Other spondylosis, thoracolumbar region    Type 2 diabetes mellitus without complication, without long-term current use of insulin ([facility] [address] )    Diabetic nephropathy associated with type 2 diabetes mellitus ([facility] [address] )    Type 2 diabetes mellitus with stage 3 chronic kidney disease ([facility] [address] )    Coronary artery disease of native heart with stable angina pectoris, unspecified vessel or lesion type ([address] )    COVID-19    Pulmonary fibrosis ([facility] [address] )         Past Medical History     Past Medical History:   Diagnosis Date    Anemia     Arthritis     Gee's esophagus     Breast lump     Cancer ([address]     Chronic kidney disease     Cirrhosis ([address]     Coronary artery disease     cardiac cath; stents     Diabetes mellitus ([address] )     H/O heart artery stent     Hypertension     Inguinal hernia     Right    Sarcoidosis     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HCC)     Skin cancer     SOB (shortness of breath)     Upper respiratory infection, viral 5/31/2022         Surgical History     Past Surgical History:   Procedure Laterality Date    CARDIAC CATHETERIZATION      CATARACT EXTRACTION, BILATERAL      left eye 05/08 and right 5/22/2022    CERVICAL FUSION      CHOLECYSTECTOMY      COLONOSCOPY  04/11/2016    EGD AND COLONOSCOPY  06/24/2019    ERCP  09/11/2014    transab esop hiat griffin rpr    ESOPHAGOGASTRIC FUNDOPLASTY      HERNIA REPAIR Right     p I/griffin init reduc >5 yr    HIP SURGERY Right     Replacement    KIDNEY STONE SURGERY      Fragmenting    LIVER BIOPSY      MULTIPLE TOOTH EXTRACTIONS      "extraction of all maxillary teeth"    SKIN BIOPSY  05/2020    THUMB FUSION      with graft    TONSILLECTOMY      US GUIDANCE  10/22/2014         Family History     Family History   Problem Relation Age of Onset    Hypertension Mother     Atrial fibrillation Mother     Heart disease Mother     Kidney disease Father     COPD Father     Heart disease Father     Asthma Neg Hx     Lung cancer Neg Hx          Social History     Social History       Radiology

## 2022-10-12 ENCOUNTER — APPOINTMENT (OUTPATIENT)
Dept: LAB | Facility: HOSPITAL | Age: 77
End: 2022-10-12
Payer: MEDICARE

## 2022-10-12 DIAGNOSIS — R97.20 ELEVATED PSA: ICD-10-CM

## 2022-10-12 PROCEDURE — 84154 ASSAY OF PSA FREE: CPT

## 2022-10-12 PROCEDURE — 84153 ASSAY OF PSA TOTAL: CPT

## 2022-10-13 LAB
PSA FREE MFR SERPL: 20.4 %
PSA FREE SERPL-MCNC: 1 NG/ML
PSA SERPL-MCNC: 4.9 NG/ML (ref 0–4)

## 2022-10-19 ENCOUNTER — TELEPHONE (OUTPATIENT)
Dept: GASTROENTEROLOGY | Facility: CLINIC | Age: 77
End: 2022-10-19

## 2022-10-19 NOTE — TELEPHONE ENCOUNTER
Spoke to pt confirming pt's egd scheduled on 10/26/22 at [facility] with Dr Ellyn Thompson  Informed [facility] would be calling the day  prior with the arrival time  Informed would need to be npo after midnight night prior and would need a  the day of the procedure due to being under sedation  Pt did not have any questions

## 2022-10-26 ENCOUNTER — ANESTHESIA EVENT (OUTPATIENT)
Dept: GASTROENTEROLOGY | Facility: HOSPITAL | Age: 77
End: 2022-10-26

## 2022-10-26 ENCOUNTER — HOSPITAL ENCOUNTER (OUTPATIENT)
Dept: GASTROENTEROLOGY | Facility: HOSPITAL | Age: 77
Setting detail: OUTPATIENT SURGERY
Discharge: HOME | End: 2022-10-26
Attending: INTERNAL MEDICINE

## 2022-10-26 ENCOUNTER — ANESTHESIA (OUTPATIENT)
Dept: GASTROENTEROLOGY | Facility: HOSPITAL | Age: 77
End: 2022-10-26

## 2022-10-26 VITALS
OXYGEN SATURATION: 98 % | DIASTOLIC BLOOD PRESSURE: 70 MMHG | SYSTOLIC BLOOD PRESSURE: 149 MMHG | TEMPERATURE: 97 F | BODY MASS INDEX: 26.05 KG/M2 | HEIGHT: 70 IN | HEART RATE: 49 BPM | RESPIRATION RATE: 13 BRPM | WEIGHT: 182 LBS

## 2022-10-26 DIAGNOSIS — K22.70 BARRETT'S ESOPHAGUS WITHOUT DYSPLASIA: ICD-10-CM

## 2022-10-26 DIAGNOSIS — B37.81 CANDIDA ESOPHAGITIS (HCC): ICD-10-CM

## 2022-10-26 PROBLEM — Z98.61 HISTORY OF PTCA: Status: ACTIVE | Noted: 2022-10-26

## 2022-10-26 LAB — GLUCOSE SERPL-MCNC: 142 MG/DL (ref 65–140)

## 2022-10-26 RX ORDER — GLYCOPYRROLATE 0.2 MG/ML
INJECTION INTRAMUSCULAR; INTRAVENOUS AS NEEDED
Status: DISCONTINUED | OUTPATIENT
Start: 2022-10-26 | End: 2022-10-26

## 2022-10-26 RX ORDER — PROPOFOL 10 MG/ML
INJECTION, EMULSION INTRAVENOUS AS NEEDED
Status: DISCONTINUED | OUTPATIENT
Start: 2022-10-26 | End: 2022-10-26

## 2022-10-26 RX ORDER — LIDOCAINE HYDROCHLORIDE 10 MG/ML
INJECTION, SOLUTION EPIDURAL; INFILTRATION; INTRACAUDAL; PERINEURAL AS NEEDED
Status: DISCONTINUED | OUTPATIENT
Start: 2022-10-26 | End: 2022-10-26

## 2022-10-26 RX ORDER — SODIUM CHLORIDE, SODIUM LACTATE, POTASSIUM CHLORIDE, CALCIUM CHLORIDE 600; 310; 30; 20 MG/100ML; MG/100ML; MG/100ML; MG/100ML
INJECTION, SOLUTION INTRAVENOUS CONTINUOUS PRN
Status: DISCONTINUED | OUTPATIENT
Start: 2022-10-26 | End: 2022-10-26

## 2022-10-26 RX ORDER — SODIUM CHLORIDE, SODIUM LACTATE, POTASSIUM CHLORIDE, CALCIUM CHLORIDE 600; 310; 30; 20 MG/100ML; MG/100ML; MG/100ML; MG/100ML
75 INJECTION, SOLUTION INTRAVENOUS CONTINUOUS
Status: DISCONTINUED | OUTPATIENT
Start: 2022-10-26 | End: 2022-10-30 | Stop reason: HOSPADM

## 2022-10-26 RX ADMIN — SODIUM CHLORIDE, SODIUM LACTATE, POTASSIUM CHLORIDE, AND CALCIUM CHLORIDE 75 ML/HR: .6; .31; .03; .02 INJECTION, SOLUTION INTRAVENOUS at 09:50

## 2022-10-26 RX ADMIN — SODIUM CHLORIDE, SODIUM LACTATE, POTASSIUM CHLORIDE, AND CALCIUM CHLORIDE: .6; .31; .03; .02 INJECTION, SOLUTION INTRAVENOUS at 10:26

## 2022-10-26 RX ADMIN — PROPOFOL 50 MG: 10 INJECTION, EMULSION INTRAVENOUS at 10:37

## 2022-10-26 RX ADMIN — PROPOFOL 100 MG: 10 INJECTION, EMULSION INTRAVENOUS at 10:31

## 2022-10-26 RX ADMIN — GLYCOPYRROLATE 0.1 MG: 0.2 INJECTION, SOLUTION INTRAMUSCULAR; INTRAVENOUS at 10:31

## 2022-10-26 RX ADMIN — LIDOCAINE HYDROCHLORIDE 100 MG: 10 INJECTION, SOLUTION EPIDURAL; INFILTRATION; INTRACAUDAL at 10:31

## 2022-10-26 RX ADMIN — PROPOFOL 50 MG: 10 INJECTION, EMULSION INTRAVENOUS at 10:34

## 2022-10-26 NOTE — ANESTHESIA POSTPROCEDURE EVALUATION
Post-Op Assessment Note    CV Status:  Stable    Pain management: adequate     Mental Status:  Sleepy   Hydration Status:  Euvolemic   PONV Controlled:  Controlled   Airway Patency:  Patent      Post Op Vitals Reviewed: Yes      Staff: CRNA         No complications documented      BP  84/48   Temp      Pulse 51   Resp   18   SpO2   96

## 2022-10-26 NOTE — ANESTHESIA PREPROCEDURE EVALUATION
Procedure:  EGD    Relevant Problems   CARDIO   (+) Coronary artery disease of native heart with stable angina pectoris, unspecified vessel or lesion type (HCC)   (+) History of PTCA   (+) Hypertension, essential      ENDO   (+) Type 2 diabetes mellitus with stage 3 chronic kidney disease (HCC)   (+) Type 2 diabetes mellitus without complication, without long-term current use of insulin (HCC)      /RENAL   (+) Chronic kidney disease, stage III (moderate) (HCC)   (+) Diabetic nephropathy associated with type 2 diabetes mellitus (HCC)   (+) Hypertensive chronic kidney disease with stage 1 through stage 4 chronic kidney disease, or unspecified chronic kidney disease      MUSCULOSKELETAL   (+) Other spondylosis, thoracolumbar region      PULMONARY   (+) COPD (chronic obstructive pulmonary disease) (HCC)   (+) Upper respiratory infection, viral (Resolved)      Respiratory   (+) Pulmonary fibrosis (HCC)      Other   (+) COVID-19   (+) Dyslipidemia   (+) Hyperkalemia   (+) Sarcoidosis      Last MI 2 years ago, stable and asymptomatic since then  Sinus rhythm with Premature atrial complexes with Aberrant conduction  Left axis deviation  Septal infarct (cited on or before 07-JUN-2022)  Abnormal ECG  When compared with ECG of 07-JUN-2022 09:58, (unconfirmed)  Aberrant conduction is now Present  Confirmed by Batsheva Beverly ([id number]) on 6/10/2022 12:06:34 PM  Physical Exam    Airway    Mallampati score: I  TM Distance: >3 FB  Neck ROM: full     Dental       Cardiovascular      Pulmonary      Other Findings        Anesthesia Plan  ASA Score- 3     Anesthesia Type- IV sedation with anesthesia with ASA Monitors  Additional Monitors:   Airway Plan:           Plan Factors-Exercise tolerance (METS): >4 METS  Chart reviewed  EKG reviewed  Patient summary reviewed  Patient is not a current smoker  Patient did not smoke on day of surgery  Induction- intravenous      Postoperative Plan-     Informed Consent- Anesthetic plan and risks discussed with patient  I personally reviewed this patient with the CRNA  Discussed and agreed on the Anesthesia Plan with the CRNA  Rick Gamboa

## 2022-10-26 NOTE — H&P
History and Physical -  [facility]  Jud Duque 78 y o  male MRN: [id number]                  HPI: Jud Duque is a 78y o  year old male who presents for EGD  History of Barretts esophagus  Also history of esophageal stricture  REVIEW OF SYSTEMS: Per the HPI, and otherwise unremarkable      Historical Information   Past Medical History:   Diagnosis Date   • Anemia    • Arthritis    • Gee's esophagus    • Breast lump    • Cancer (HCC)    • Chronic kidney disease    • Cirrhosis ([facility] [address] )    • Coronary artery disease     cardiac cath; stents    • Diabetes mellitus ([facility] )    • H/O heart artery stent    • Hypertension    • Inguinal hernia     Right   • Sarcoidosis    • 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[facility] )    • Skin cancer    • SOB (shortness of breath)    • Upper respiratory infection, viral 5/31/2022     Past Surgical History:   Procedure Laterality Date   • CARDIAC CATHETERIZATION     • CATARACT EXTRACTION, BILATERAL      left eye 05/08 and right 5/22/2022   • CERVICAL FUSION     • CHOLECYSTECTOMY     • COLONOSCOPY  04/11/2016   • EGD AND COLONOSCOPY  06/24/2019   • ERCP  09/11/2014    transab esop hiat griffin rpr   • ESOPHAGOGASTRIC FUNDOPLASTY     • HERNIA REPAIR Right     p I/griffin init reduc >5 yr   • HIP SURGERY Right     Replacement   • KIDNEY STONE SURGERY      Fragmenting   • LIVER BIOPSY     • MULTIPLE TOOTH EXTRACTIONS      "extraction of all maxillary teeth"   • SKIN BIOPSY  05/2020   • THUMB FUSION      with graft   • TONSILLECTOMY     • US GUIDANCE  10/22/2014     Social History   Social History     Substance and Sexual Activity   Alcohol Use No    Comment: Alcohol intake:   Occasional wine - As per [place]      Social History     Substance and Sexual Activity   Drug Use No     Social History     Tobacco Use   Smoking Status Never Smoker   Smokeless Tobacco Never Used   Tobacco Comment    RETIRED     Family History   Problem Relation Age of Onset   • Hypertension Mother    • Atrial fibrillation Mother    • Heart disease Mother    • Kidney disease Father    • COPD Father    • Heart disease Father    • Asthma Neg Hx    • Lung cancer Neg Hx        Meds/Allergies       Current Outpatient Medications:   •  allopurinol (ZYLOPRIM) 300 mg tablet  •  amLODIPine (NORVASC) 5 mg tablet  •  aspirin (ECOTRIN LOW STRENGTH) 81 mg EC tablet  •  atorvastatin (LIPITOR) 80 mg tablet  •  azilsartan medoxomil (Edarbi) 40 MG tablet  •  B Complex-C (SUPERPLEX-T) TABS  •  Breo Ellipta 100-25 MCG/INH inhaler  •  chlorthalidone 25 mg tablet  •  Cholecalciferol (VITAMIN D3) 2000 units TABS  •  co-enzyme Q-10 50 MG capsule  •  colchicine (COLCRYS) 0 6 mg tablet  •  Ferrous Sulfate (IRON) 325 (65 Fe) MG TABS  •  Incruse Ellipta 62 5 MCG/INH AEPB inhaler  •  Januvia 100 MG tablet  •  magnesium chloride-calcium (SLOW-MAG) 71 5-119 mg  •  metoprolol tartrate (LOPRESSOR) 50 mg tablet  •  pantoprazole (PROTONIX) 40 mg tablet  •  sulfaSALAzine (AZULFIDINE) 500 mg tablet  •  albuterol (PROVENTIL HFA,VENTOLIN HFA) 90 mcg/act inhaler  •  benzonatate (TESSALON) 200 MG capsule  •  hydrocodone-chlorpheniramine polistirex (TUSSIONEX) 10-8 mg/5 mL ER suspension  •  loratadine (CLARITIN) 10 mg tablet  •  prasugrel (EFFIENT) tablet  •  Promethazine-DM (PHENERGAN-DM) 6 25-15 mg/5 mL oral syrup    Current Facility-Administered Medications:   •  lactated ringers infusion, 75 mL/hr, Intravenous, Continuous, 75 mL/hr at 10/26/22 0950    Allergies   Allergen Reactions   • Oxycodone GI Intolerance       Objective     BP (!) 176/87   Pulse (!) 53   Temp 97 7 °F (36 5 °C) (Temporal)   Resp 12   Ht 5' 10" (1 778 m)   Wt 82 6 kg (182 lb)   SpO2 99%   BMI 26 11 kg/m²       PHYSICAL EXAM    Gen: NAD  Head: NCAT  CV: RRR  CHEST: Clear  ABD: soft, NT/ND  EXT: no edema      ASSESSMENT/PLAN:  This is a 78y o  year old male here for EGD, and he is stable and optimized for his procedure

## 2022-10-26 NOTE — DISCHARGE SUMMARY
Discharge Summary - Chai Wilks 78 y o  male MRN: [id number]    Unit/Bed#:  Encounter: [id number]    Admission Date:  10/26/2022    Admitting Diagnosis: Candida esophagitis ([facility] [address] ) [[address]]  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 [[address] 70]    HPI:  History of Barretts esophagus    Procedures Performed: No orders of the defined types were placed in this encounter  Summary of Hospital Course: Tolerated procedure well  Significant Findings, Care, Treatment and Services Provided:  Candida esophagitis noted in the mid esophagus with ulceration and nodularity  Biopsies taken  Hiatal hernia noted  Gastric polyp noted  Complications:  None    Discharge Diagnosis:  See above    Medical Problems             Resolved Problems  Date Reviewed: 9/8/2022   None                 Condition at Discharge: good         Discharge instructions/Information to patient and family:   See after visit summary for information provided to patient and family  Provisions for Follow-Up Care:  See after visit summary for information related to follow-up care and any pertinent home health orders        PCP: Vladislav Ortiz DO    Disposition: Home

## 2022-10-26 NOTE — INTERVAL H&P NOTE
H&P reviewed  After examining the patient I find no changes in the patients condition since the H&P had been written      Vitals:    10/26/22 0947   BP: (!) 176/87   Pulse: (!) 53   Resp: 12   Temp: 97 7 °F (36 5 °C)   SpO2: 99%

## 2022-11-01 ENCOUNTER — OFFICE VISIT (OUTPATIENT)
Dept: FAMILY MEDICINE CLINIC | Facility: CLINIC | Age: 77
End: 2022-11-01

## 2022-11-01 VITALS
OXYGEN SATURATION: 96 % | BODY MASS INDEX: 26.92 KG/M2 | HEIGHT: 70 IN | WEIGHT: 188 LBS | SYSTOLIC BLOOD PRESSURE: 170 MMHG | TEMPERATURE: 97.9 F | HEART RATE: 54 BPM | DIASTOLIC BLOOD PRESSURE: 80 MMHG

## 2022-11-01 DIAGNOSIS — J43.9 PULMONARY EMPHYSEMA, UNSPECIFIED EMPHYSEMA TYPE (HCC): ICD-10-CM

## 2022-11-01 DIAGNOSIS — E11.9 TYPE 2 DIABETES MELLITUS WITHOUT COMPLICATION, WITHOUT LONG-TERM CURRENT USE OF INSULIN (HCC): ICD-10-CM

## 2022-11-01 DIAGNOSIS — R97.20 ELEVATED PSA: ICD-10-CM

## 2022-11-01 DIAGNOSIS — K21.00 GASTROESOPHAGEAL REFLUX DISEASE WITH ESOPHAGITIS WITHOUT HEMORRHAGE: ICD-10-CM

## 2022-11-01 DIAGNOSIS — Z79.899 ENCOUNTER FOR LONG-TERM (CURRENT) USE OF MEDICATIONS: ICD-10-CM

## 2022-11-01 DIAGNOSIS — I10 HYPERTENSION, ESSENTIAL: ICD-10-CM

## 2022-11-01 DIAGNOSIS — Z79.899 ENCOUNTER FOR LONG-TERM CURRENT USE OF HIGH RISK MEDICATION: Primary | ICD-10-CM

## 2022-11-01 NOTE — PROGRESS NOTES
Assessment/Plan: 78 y o male, in NAD, "hanging in there", he has a new esophageal ulcer - about the 3-4th one - and Dr Dennise Weller last Wed, 10/26 and will complete the Nystatin S&S this Wed, 11/2 and will (I'll order) Carafate 1 Gm AC and HS  Will re-scope in 2 mo  (He will ck with Dr Ryan Weaver re order this)      BMI Counseling: Body mass index is 26 98 kg/m²  The BMI is above normal  Nutrition recommendations include decreasing portion sizes, encouraging healthy choices of fruits and vegetables, decreasing fast food intake, consuming healthier snacks, limiting drinks that contain sugar, moderation in carbohydrate intake, increasing intake of lean protein and reducing intake of saturated and trans fat  Exercise recommendations include moderate physical activity 150 minutes/week and exercising 3-5 times per week  No pharmacotherapy was ordered  Rationale for BMI follow-up plan is due to patient being overweight or obese  1  Encounter for long-term current use of high risk medication    2  Encounter for long-term (current) use of medications    3  Type 2 diabetes mellitus without complication, without long-term current use of insulin ([facility])  Comments:  Testing BBG QID, but Medicare only approves OD  Long discussion re this  He prefers to test BID     Ave BBG is in 150 range  Last A1c =    4  Gastroesophageal reflux disease with esophagitis without hemorrhage  Comments:  Dr Rosalba Emanuel did EGD recently and will rescope soon  5  Hypertension, essential    6  Elevated PSA  Comments:  Was 7 1, now 4 + and down  Discussed  Did see PALLAVI Rivera recently and BELLA wnl     7  Pulmonary emphysema, unspecified emphysema type ([facility] [address] )  Comments: This is pul fibrosis--follow with Dr Ingrid Juarez and stable  Advised to stay active and walk as much as can do         Subjective:      Patient ID: Richardson Hook is a 78 y o  male      HPI    The following portions of the patient's history were reviewed and updated as appropriate: He  has a past medical history of Anemia, Arthritis, Gee's esophagus, Breast lump, Cancer ([place] [address] ), Chronic kidney disease, Cirrhosis ([place] [address] ), Coronary artery disease, Diabetes mellitus ([place] [address] ), H/O heart artery stent, Hypertension, Inguinal hernia, Sarcoidosis, 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[place] [address] ), Skin cancer, SOB (shortness of breath), and Upper respiratory infection, viral (5/31/2022)  He   Patient Active Problem List    Diagnosis Date Noted   • History of PTCA 10/26/2022   • Pulmonary fibrosis ([place] [address] ) 07/06/2022   • COVID-19 06/01/2022   • Coronary artery disease of native heart with stable angina pectoris, unspecified vessel or lesion type ([place] [address] ) 05/31/2022   • Type 2 diabetes mellitus with stage 3 chronic kidney disease ([place] [address] ) 04/26/2021   • Diabetic nephropathy associated with type 2 diabetes mellitus ([place] [address] ) 01/25/2021   • Other spondylosis, thoracolumbar region 11/12/2020   • Type 2 diabetes mellitus without complication, without long-term current use of insulin ([place] [address] ) 11/12/2020   • COPD (chronic obstructive pulmonary disease) ([place] [address] ) 07/29/2020   • Sarcoidosis 07/29/2020   • Hypertension, essential 12/04/2019   • Hyperkalemia 12/18/2018   • Chronic kidney disease, stage III (moderate) ([place] [address] ) 05/16/2018   • Dyslipidemia 05/16/2018   • Persistent proteinuria 05/16/2018   • Hypertensive chronic kidney disease with stage 1 through stage 4 chronic kidney disease, or unspecified chronic kidney disease 05/16/2018     He  has a past surgical history that includes Colonoscopy (04/11/2016); EGD AND COLONOSCOPY (06/24/2019); Cardiac catheterization; Tonsillectomy; Thumb fusion; ERCP (09/11/2014); Multiple tooth extractions; Cervical fusion; Liver biopsy; Hip surgery (Right); Kidney stone surgery; Cholecystectomy; Skin biopsy (05/2020); Esophagogastric fundoplasty;  Hernia repair (Right); US guidance (10/22/2014); and Cataract extraction, bilateral   His family history includes Atrial fibrillation in his mother; COPD in his father; Heart disease in his father and mother; Hypertension in his mother; Kidney disease in his father  He  reports that he has never smoked  He has never used smokeless tobacco  He reports that he does not drink alcohol and does not use drugs  Current Outpatient Medications   Medication Sig Dispense Refill   • albuterol (PROVENTIL HFA,VENTOLIN HFA) 90 mcg/act inhaler Inhale 2 puffs every 6 (six) hours as needed for wheezing or shortness of breath 6 7 g 2   • allopurinol (ZYLOPRIM) 300 mg tablet "ONE-HALF" TABLET EVERY DAY 45 tablet 3   • amLODIPine (NORVASC) 5 mg tablet TAKE 1 TABLET (5 MG) BY MOUTH DAILY 90 tablet 5   • aspirin (ECOTRIN LOW STRENGTH) 81 mg EC tablet Take 81 mg by mouth daily     • atorvastatin (LIPITOR) 80 mg tablet TAKE 1 TABLET (80 MG TOTAL) BY MOUTH DAILY 90 tablet 3   • azilsartan medoxomil (Edarbi) 40 MG tablet Take 1 tablet (40 mg total) by mouth 2 (two) times a day 180 tablet 3   • B Complex-C (SUPERPLEX-T) TABS Take 1 tablet by mouth daily     • Breo Ellipta 100-25 MCG/INH inhaler Inhale 1 puff daily Rinse mouth after use  60 blister 2   • chlorthalidone 25 mg tablet TAKE "ONE-HALF" TABLET (12 5 MG) BY MOUTH DAILY 45 tablet 4   • Cholecalciferol (VITAMIN D3) 2000 units TABS Take 1 tablet by mouth daily     • co-enzyme Q-10 50 MG capsule Take 100 mg by mouth in the morning       • colchicine (COLCRYS) 0 6 mg tablet TAKE 1 TABLET (0 6 MG TOTAL) BY MOUTH DAILY 90 tablet 3   • Ferrous Sulfate (IRON) 325 (65 Fe) MG TABS Take 1 tablet by mouth daily     • Incruse Ellipta 62 5 MCG/INH AEPB inhaler Inhale 1 puff daily 30 blister 1   • Januvia 100 MG tablet TAKE ONE TABLET DAILY 90 tablet 0   • magnesium chloride-calcium (SLOW-MAG) 71 5-119 mg Take 2 tablets by mouth 2 (two) times a day     • metoprolol tartrate (LOPRESSOR) 50 mg tablet Take 1 tablet (50 mg total) by mouth 2 (two) times a day 50 mg in the morning and 25 mg in the evening 180 tablet 3   • pantoprazole (PROTONIX) 40 mg tablet TAKE 1 TABLET DAILY HALF HOUR BEFORE EATING BREAKFAST 90 tablet 3   • nystatin (MYCOSTATIN) 500,000 units/5 mL suspension Apply 5 mL (500,000 Units total) to the mouth or throat 4 (four) times a day for 10 days (Patient not taking: Reported on 11/1/2022) 200 mL 0   • prasugrel (EFFIENT) tablet Take 10 mg by mouth daily (Patient not taking: Reported on 11/1/2022)       No current facility-administered medications for this visit  Current Outpatient Medications on File Prior to Visit   Medication Sig   • albuterol (PROVENTIL HFA,VENTOLIN HFA) 90 mcg/act inhaler Inhale 2 puffs every 6 (six) hours as needed for wheezing or shortness of breath   • allopurinol (ZYLOPRIM) 300 mg tablet "ONE-HALF" TABLET EVERY DAY   • amLODIPine (NORVASC) 5 mg tablet TAKE 1 TABLET (5 MG) BY MOUTH DAILY   • aspirin (ECOTRIN LOW STRENGTH) 81 mg EC tablet Take 81 mg by mouth daily   • atorvastatin (LIPITOR) 80 mg tablet TAKE 1 TABLET (80 MG TOTAL) BY MOUTH DAILY   • azilsartan medoxomil (Edarbi) 40 MG tablet Take 1 tablet (40 mg total) by mouth 2 (two) times a day   • B Complex-C (SUPERPLEX-T) TABS Take 1 tablet by mouth daily   • Breo Ellipta 100-25 MCG/INH inhaler Inhale 1 puff daily Rinse mouth after use  • chlorthalidone 25 mg tablet TAKE "ONE-HALF" TABLET (12 5 MG) BY MOUTH DAILY   • Cholecalciferol (VITAMIN D3) 2000 units TABS Take 1 tablet by mouth daily   • co-enzyme Q-10 50 MG capsule Take 100 mg by mouth in the morning     • colchicine (COLCRYS) 0 6 mg tablet TAKE 1 TABLET (0 6 MG TOTAL) BY MOUTH DAILY   • Ferrous Sulfate (IRON) 325 (65 Fe) MG TABS Take 1 tablet by mouth daily   • Incruse Ellipta 62 5 MCG/INH AEPB inhaler Inhale 1 puff daily   • Januvia 100 MG tablet TAKE ONE TABLET DAILY   • magnesium chloride-calcium (SLOW-MAG) 71 5-119 mg Take 2 tablets by mouth 2 (two) times a day   • metoprolol tartrate (LOPRESSOR) 50 mg tablet Take 1 tablet (50 mg total) by mouth 2 (two) times a day 50 mg in the morning and 25 mg in the evening • pantoprazole (PROTONIX) 40 mg tablet TAKE 1 TABLET DAILY HALF HOUR BEFORE EATING BREAKFAST   • nystatin (MYCOSTATIN) 500,000 units/5 mL suspension Apply 5 mL (500,000 Units total) to the mouth or throat 4 (four) times a day for 10 days (Patient not taking: Reported on 11/1/2022)   • prasugrel (EFFIENT) tablet Take 10 mg by mouth daily (Patient not taking: Reported on 11/1/2022)   • [DISCONTINUED] loratadine (CLARITIN) 10 mg tablet Take 1 tablet (10 mg total) by mouth daily (Patient not taking: No sig reported)   • [DISCONTINUED] sulfaSALAzine (AZULFIDINE) 500 mg tablet Take 1 5 tablets (750 mg total) by mouth daily     No current facility-administered medications on file prior to visit  He is allergic to oxycodone       Review of Systems   Constitutional: Negative  HENT: Negative  Eyes: Negative  Respiratory: Negative  Cardiovascular: Negative  Gastrointestinal: Negative  Endocrine: Negative  Genitourinary: Negative  Musculoskeletal: Positive for arthralgias, back pain, myalgias, neck pain and neck stiffness  Kyphosis and scoliosis   Allergic/Immunologic: Negative  Neurological: Negative  Hematological: Negative  Psychiatric/Behavioral: Negative  Objective:      /80 (BP Location: Left arm, Patient Position: Sitting, Cuff Size: Adult)   Pulse (!) 54   Temp 97 9 °F (36 6 °C) (Tympanic)   Ht 5' 10" (1 778 m)   Wt 85 3 kg (188 lb)   SpO2 96%   BMI 26 98 kg/m²          Physical Exam  Vitals and nursing note reviewed  Constitutional:       General: He is not in acute distress  Appearance: Normal appearance  He is well-developed and normal weight  He is not ill-appearing, toxic-appearing or diaphoretic  HENT:      Head: Normocephalic and atraumatic  Right Ear: Tympanic membrane normal       Left Ear: Tympanic membrane normal       Nose: Nose normal    Eyes:      General: No scleral icterus       Pupils: Pupils are equal, round, and reactive to light    Neck:      Trachea: No tracheal deviation  Cardiovascular:      Rate and Rhythm: Normal rate and regular rhythm  Heart sounds: Normal heart sounds  No murmur heard  No gallop  Pulmonary:      Effort: Respiratory distress present  Breath sounds: No stridor  Rhonchi present  No wheezing or rales  Abdominal:      Comments: Pt thinner than I've ever seen him  Wt 166 lbs, but was reportedly 160 3-4 mos ago    Musculoskeletal:         General: No swelling, tenderness or deformity (significant scoliosis and effect upon the chest cavity)  Normal range of motion  Cervical back: Normal range of motion and neck supple  Right lower leg: No edema  Left lower leg: No edema  Lymphadenopathy:      Cervical: No cervical adenopathy  Skin:     General: Skin is warm and dry  Coloration: Skin is not jaundiced or pale  Findings: No bruising (particularly on forearms, 2o Effient)  Neurological:      General: No focal deficit present  Mental Status: He is alert and oriented to person, place, and time  Mental status is at baseline  Cranial Nerves: No cranial nerve deficit  Sensory: No sensory deficit  Motor: No weakness  Coordination: Coordination normal    Psychiatric:         Mood and Affect: Mood normal          Behavior: Behavior normal          Thought Content: Thought content normal          Judgment: Judgment normal              This time was spent reviewing previous records, reviewing previous laboratory and other tests, taking history from patient, examination of patient, discussion of prognosis and treatment, ordering laboratory tests, ordering medications, and completion of the medical record

## 2022-11-04 ENCOUNTER — OFFICE VISIT (OUTPATIENT)
Dept: GASTROENTEROLOGY | Facility: CLINIC | Age: 77
End: 2022-11-04

## 2022-11-04 VITALS
DIASTOLIC BLOOD PRESSURE: 74 MMHG | SYSTOLIC BLOOD PRESSURE: 156 MMHG | HEART RATE: 57 BPM | HEIGHT: 70 IN | BODY MASS INDEX: 27.06 KG/M2 | WEIGHT: 189 LBS

## 2022-11-04 DIAGNOSIS — K22.70 BARRETT'S ESOPHAGUS WITHOUT DYSPLASIA: ICD-10-CM

## 2022-11-04 DIAGNOSIS — K22.10 ULCER OF ESOPHAGUS WITHOUT BLEEDING: ICD-10-CM

## 2022-11-04 DIAGNOSIS — B37.81 CANDIDA ESOPHAGITIS (HCC): Primary | ICD-10-CM

## 2022-11-04 DIAGNOSIS — J84.10 PULMONARY FIBROSIS (HCC): ICD-10-CM

## 2022-11-04 NOTE — PROGRESS NOTES
[facility] - Outpatient Follow-up Note  Rex Emanuel 78 y o  male MRN: [id number]  Encounter: [id number]          ASSESSMENT AND PLAN:      1  Candida esophagitis (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  History of Gee's esophagus but there were no dysplasia  A repeat endoscopy is pending in January when biopsies of the Barretts esophagus would be taken  3  Pulmonary fibrosis ([place] [address] )  Patient does have history of idiopathic pulmonary fibrosis  He is currently doing fairly well  He is following with primary care  He is on multiple medications  4  Ulcer of esophagus without bleeding  Patient is taking pantoprazole at this time  I have advised to take the medication 1/2 hour before breakfast   He was active taking this at nighttime but  His nystatin swish in solids still in progress  I have told patient the biopsy is still pending  Repeat endoscopy in two months     ______________________________________________________________________    SUBJECTIVE:  Somewhat better  He is not taking the medication in the right manner  I have advised to take pantoprazole or Nexium half to 1 hour before breakfast   He has to chew his food well  A repeat endoscopy and possible dilatation will be performed  We have not taken biopsies      REVIEW OF SYSTEMS IS OTHERWISE NEGATIVE        Historical Information   Past Medical History:   Diagnosis Date   • Anemia    • Arthritis    • Gee's esophagus    • Breast lump    • Cancer [facility])    • Chronic kidney disease    • Cirrhosis ([place] [address] )    • Coronary artery disease     cardiac cath; stents    • Diabetes mellitus ([facility] )    • H/O heart artery stent    • Hypertension    • Inguinal hernia     Right   • Sarcoidosis    • 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[facility] )    • Skin cancer    • SOB (shortness of breath)    • Upper respiratory infection, viral 5/31/2022     Past Surgical History:   Procedure Laterality Date   • CARDIAC CATHETERIZATION     • CATARACT EXTRACTION, BILATERAL      left eye 05/08 and right 5/22/2022   • CERVICAL FUSION     • CHOLECYSTECTOMY     • COLONOSCOPY  04/11/2016   • EGD AND COLONOSCOPY  06/24/2019   • ERCP  09/11/2014    transab esop hiat griffin rpr   • ESOPHAGOGASTRIC FUNDOPLASTY     • HERNIA REPAIR Right     p I/griffin init reduc >5 yr   • HIP SURGERY Right     Replacement   • KIDNEY STONE SURGERY      Fragmenting   • LIVER BIOPSY     • MULTIPLE TOOTH EXTRACTIONS      "extraction of all maxillary teeth"   • SKIN BIOPSY  05/2020   • THUMB FUSION      with graft   • TONSILLECTOMY     • US GUIDANCE  10/22/2014     Social History   Social History     Substance and Sexual Activity   Alcohol Use No    Comment: Alcohol intake:   Occasional wine - As per [place]      Social History     Substance and Sexual Activity   Drug Use No     Social History     Tobacco Use   Smoking Status Never Smoker   Smokeless Tobacco Never Used   Tobacco Comment    RETIRED     Family History   Problem Relation Age of Onset   • Hypertension Mother    • Atrial fibrillation Mother    • Heart disease Mother    • Kidney disease Father    • COPD Father    • Heart disease Father    • Asthma Neg Hx    • Lung cancer Neg Hx        Meds/Allergies       Current Outpatient Medications:   •  albuterol (PROVENTIL HFA,VENTOLIN HFA) 90 mcg/act inhaler  •  allopurinol (ZYLOPRIM) 300 mg tablet  •  amLODIPine (NORVASC) 5 mg tablet  •  aspirin (ECOTRIN LOW STRENGTH) 81 mg EC tablet  •  atorvastatin (LIPITOR) 80 mg tablet  •  azilsartan medoxomil (Edarbi) 40 MG tablet  •  B Complex-C (SUPERPLEX-T) TABS  •  Breo Ellipta 100-25 MCG/INH inhaler  • chlorthalidone 25 mg tablet  •  Cholecalciferol (VITAMIN D3) 2000 units TABS  •  co-enzyme Q-10 50 MG capsule  •  colchicine (COLCRYS) 0 6 mg tablet  •  Ferrous Sulfate (IRON) 325 (65 Fe) MG TABS  •  Incruse Ellipta 62 5 MCG/INH AEPB inhaler  •  Januvia 100 MG tablet  •  magnesium chloride-calcium (SLOW-MAG) 71 5-119 mg  •  metoprolol tartrate (LOPRESSOR) 50 mg tablet  •  nystatin (MYCOSTATIN) 500,000 units/5 mL suspension  •  pantoprazole (PROTONIX) 40 mg tablet  •  prasugrel (EFFIENT) tablet    Allergies   Allergen Reactions   • Oxycodone GI Intolerance           Objective     Blood pressure 156/74, pulse 57, height 5' 10" (1 778 m), weight 85 7 kg (189 lb)  Body mass index is 27 12 kg/m²  PHYSICAL EXAM:      General Appearance:   Alert, cooperative, no distress   HEENT:   Normocephalic, atraumatic, anicteric      Neck:  Supple, symmetrical, trachea midline   Lungs:   Clear to auscultation bilaterally; no rales, rhonchi or wheezing; respirations unlabored    Heart[de-identified]   Regular rate and rhythm; no murmur, rub, or gallop  Abdomen:   Soft, non-tender, non-distended; normal bowel sounds; no masses, no organomegaly Barretts esophagus during the last endoscopy  Abdomen is soft and nontender  Significant kyphoscoliosis noted  Genitalia:   Deferred    Rectal:   Deferred    Extremities:  No cyanosis, clubbing or edema    Pulses:  2+ and symmetric    Skin:  No jaundice, rashes, or lesions    Lymph nodes:  No palpable cervical lymphadenopathy        Lab Results:   No visits with results within 1 Day(s) from this visit     Latest known visit with results is:   Hospital Outpatient Visit on 10/26/2022   Component Date Value   • POC Glucose 10/26/2022 142 (A)         Radiology Results:   EGD    Result Date: 10/26/2022  Narrative: [id number] [address] [place] [address] OF SERVICE: 10/26/22 PHYSICIAN(S): Attending: Ko Balbuena MD Fellow: No Staff Documented INDICATION: Candida esophagitis ([facility] [address] ), 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 POST-OP DIAGNOSIS: See the impression below  PREPROCEDURE: Informed consent was obtained for the procedure, including sedation  Risks of perforation, hemorrhage, adverse drug reaction and aspiration were discussed  The patient was placed in the left lateral decubitus position  Patient was explained about the risks and benefits of the procedure  Risks including but not limited to bleeding, infection, and perforation were explained in detail  Also explained about less than 100% sensitivity with the exam and other alternatives  DETAILS OF PROCEDURE: Patient was taken to the procedure room where a time out was performed to confirm correct patient and correct procedure  The patient underwent monitored anesthesia care, which was administered by an anesthesia professional  The patient's blood pressure, heart rate, level of consciousness, respirations and oxygen were monitored throughout the procedure  The scope was advanced to the second part of the duodenum  Retroflexion was performed in the fundus  The patient experienced no blood loss  The procedure was not difficult  The patient tolerated the procedure well  There were no apparent complications  ANESTHESIA INFORMATION: ASA: ASA status not filed in the log  Anesthesia Type: Anesthesia type not filed in the log  MEDICATIONS: No administrations occurring from 1027 to 1043 on 10/26/22 FINDINGS: Moderate, localized granular and ulcerated mucosa with exudate in the middle third of the esophagus; performed cold forceps biopsy  Exudate and inflammation noted in the mid esophagus  Cannot exclude candidiasis  There was also a ulcer noted in this area and about 29 cm from the incisors  Biopsies taken  Gee's esophagus observed where the Z-line is 30 cm from the incisors and top of gastric folds are 37 cm from the incisors with no associated nodule; no narrow band imaging (NBI) used   No biopsies were taken this time as patient has ulceration and mid esophagus  Patient will be brought back after the ulcer healing in two months and then biopsies will be taken from the esophagus as inflammation will also be gone and will not cause any confusion with atypia or dysplasia  Ten or more pedunculated polyps measuring 5-9 mm in the body of the stomach and greater curve of the stomach with no bleeding SPECIMENS: ID Type Source Tests Collected by Time Destination 1 : Cold Biopsy Esophageal Ulcer @ 30cm Tissue Esophagus TISSUE EXAM Esha Sullivan MD 10/26/2022 10:37 AM  2 : Cold biopsy Mid Esophagus R/O Candida Tissue Esophagus TISSUE EXAM Esha Sullivan MD 10/26/2022 10:40 AM      Impression: 1  There is no esophageal stricture noted this time  2  Ulceration at 30 cm from incisor  3  Hiatal hernia   4  Barretts esophagus RECOMMENDATION: Await pathology results Follow up with PCP Schedule repeat EGD in two months Nystatin swish and swallow Continue PPI   Esha Sullivan MD

## 2022-11-10 ENCOUNTER — HOSPITAL ENCOUNTER (OUTPATIENT)
Dept: RADIOLOGY | Facility: HOSPITAL | Age: 77
Discharge: HOME | End: 2022-11-10

## 2022-11-10 VITALS — HEIGHT: 70 IN | BODY MASS INDEX: 26.92 KG/M2 | WEIGHT: 188 LBS

## 2022-11-10 DIAGNOSIS — M81.0 AGE-RELATED OSTEOPOROSIS WITHOUT CURRENT PATHOLOGICAL FRACTURE: ICD-10-CM

## 2022-11-10 DIAGNOSIS — Z13.820 OSTEOPOROSIS SCREENING: ICD-10-CM

## 2022-11-15 ENCOUNTER — OFFICE VISIT (OUTPATIENT)
Dept: UROLOGY | Facility: CLINIC | Age: 77
End: 2022-11-15

## 2022-11-15 VITALS
WEIGHT: 189 LBS | DIASTOLIC BLOOD PRESSURE: 88 MMHG | SYSTOLIC BLOOD PRESSURE: 168 MMHG | HEART RATE: 56 BPM | HEIGHT: 70 IN | BODY MASS INDEX: 27.06 KG/M2 | OXYGEN SATURATION: 98 %

## 2022-11-15 DIAGNOSIS — R97.20 ELEVATED PSA: Primary | ICD-10-CM

## 2022-11-15 NOTE — PROGRESS NOTES
1  Elevated PSA  PSA Total, Diagnostic    CANCELED: Basic metabolic panel    CANCELED: MRI prostate multiparametric wo w contrast     Assessment and plan:       1  Elevated PSA   - normal BELLA  - PSA decreasing  - will continue to monitor - repeat PSA in 6 months  If rises again, then SHALONDA Wilder 48      Chief Complaint     Chief Complaint   Patient presents with   • Elevated PSA         History of Present Illness     Alejandra Prader is a 78 y o  male presenting for consultation of elevated PSA  Patient was undergoing routine screening with his primary care provider  If PSA was obtained, however no previous PSAs for comparison in his chart  PSA trend:  4 9 (10/12/22)  7 1 (8/2/22)    Patient is comfortable with his lower urinary tract symptoms  Endorses a strong stream feeling empty after voiding  Denies any dysuria, gross hematuria, incontinence, or urinary infections  Is not on any pharmacotherapy for his prostate or bladder  Does have a past history of nephrolithiasis over 30 years ago  Has not had any symptoms of kidney stones over the past year  Medical comorbidities include type 2 diabetes, pulmonary fibrosis, COPD, hypertension, coronary artery disease, chronic kidney disease, sarcoidosis  Laboratory     Lab Results   Component Value Date    CREATININE 1 25 08/02/2022       Lab Results   Component Value Date    PSA 4 9 (H) 10/12/2022    PSA 7 1 (H) 08/02/2022       Review of Systems     Review of Systems   Constitutional: Negative for activity change, appetite change, chills, diaphoresis, fatigue, fever and unexpected weight change  Respiratory: Negative for chest tightness and shortness of breath  Cardiovascular: Negative for chest pain, palpitations and leg swelling  Gastrointestinal: Negative for abdominal distention, abdominal pain, constipation, diarrhea, nausea and vomiting     Genitourinary: Negative for decreased urine volume, difficulty urinating, dysuria, enuresis, flank pain, frequency, genital sores, hematuria and urgency  Musculoskeletal: Negative for back pain, gait problem and myalgias  Skin: Negative for color change, pallor, rash and wound  Psychiatric/Behavioral: Negative for behavioral problems  The patient is not nervous/anxious  Allergies     Allergies   Allergen Reactions   • Oxycodone GI Intolerance       Physical Exam     Physical Exam  Constitutional:       General: He is not in acute distress  Appearance: Normal appearance  He is normal weight  He is not ill-appearing, toxic-appearing or diaphoretic  HENT:      Head: Normocephalic and atraumatic  Eyes:      General:         Right eye: No discharge  Left eye: No discharge  Conjunctiva/sclera: Conjunctivae normal    Pulmonary:      Effort: Pulmonary effort is normal  No respiratory distress  Genitourinary:     Comments: Good rectal tone  Prostate 45g, smooth, symmetric, no palpable nodules  Musculoskeletal:         General: No swelling, tenderness or signs of injury  Normal range of motion  Skin:     General: Skin is warm and dry  Coloration: Skin is not jaundiced or pale  Neurological:      General: No focal deficit present  Mental Status: He is alert and oriented to person, place, and time     Psychiatric:         Mood and Affect: Mood normal          Behavior: Behavior normal            Vital Signs     Vitals:    11/15/22 1110   BP: 168/88   BP Location: Left arm   Patient Position: Sitting   Cuff Size: Adult   Pulse: 56   SpO2: 98%   Weight: 85 7 kg (189 lb)   Height: 5' 10" (1 778 m)         Current Medications       Current Outpatient Medications:   •  albuterol (PROVENTIL HFA,VENTOLIN HFA) 90 mcg/act inhaler, Inhale 2 puffs every 6 (six) hours as needed for wheezing or shortness of breath, Disp: 6 7 g, Rfl: 2  •  allopurinol (ZYLOPRIM) 300 mg tablet, "ONE-HALF" TABLET EVERY DAY, Disp: 45 tablet, Rfl: 3  •  amLODIPine (NORVASC) 5 mg tablet, TAKE 1 TABLET (5 MG) BY MOUTH DAILY, Disp: 90 tablet, Rfl: 5  •  aspirin (ECOTRIN LOW STRENGTH) 81 mg EC tablet, Take 81 mg by mouth daily, Disp: , Rfl:   •  atorvastatin (LIPITOR) 80 mg tablet, TAKE 1 TABLET (80 MG TOTAL) BY MOUTH DAILY, Disp: 90 tablet, Rfl: 3  •  azilsartan medoxomil (Edarbi) 40 MG tablet, Take 1 tablet (40 mg total) by mouth 2 (two) times a day, Disp: 180 tablet, Rfl: 3  •  B Complex-C (SUPERPLEX-T) TABS, Take 1 tablet by mouth daily, Disp: , Rfl:   •  Breo Ellipta 100-25 MCG/INH inhaler, Inhale 1 puff daily Rinse mouth after use , Disp: 60 blister, Rfl: 2  •  chlorthalidone 25 mg tablet, TAKE "ONE-HALF" TABLET (12 5 MG) BY MOUTH DAILY, Disp: 45 tablet, Rfl: 4  •  Cholecalciferol (VITAMIN D3) 2000 units TABS, Take 1 tablet by mouth daily, Disp: , Rfl:   •  co-enzyme Q-10 50 MG capsule, Take 100 mg by mouth in the morning , Disp: , Rfl:   •  colchicine (COLCRYS) 0 6 mg tablet, TAKE 1 TABLET (0 6 MG TOTAL) BY MOUTH DAILY, Disp: 90 tablet, Rfl: 3  •  Ferrous Sulfate (IRON) 325 (65 Fe) MG TABS, Take 1 tablet by mouth daily, Disp: , Rfl:   •  Incruse Ellipta 62 5 MCG/INH AEPB inhaler, Inhale 1 puff daily, Disp: 30 blister, Rfl: 1  •  Januvia 100 MG tablet, TAKE ONE TABLET DAILY, Disp: 90 tablet, Rfl: 0  •  magnesium chloride-calcium (SLOW-MAG) 71 5-119 mg, Take 2 tablets by mouth 2 (two) times a day, Disp: , Rfl:   •  metoprolol tartrate (LOPRESSOR) 50 mg tablet, Take 1 tablet (50 mg total) by mouth 2 (two) times a day 50 mg in the morning and 25 mg in the evening, Disp: 180 tablet, Rfl: 3  •  pantoprazole (PROTONIX) 40 mg tablet, TAKE 1 TABLET DAILY HALF HOUR BEFORE EATING BREAKFAST, Disp: 90 tablet, Rfl: 3  •  prasugrel (EFFIENT) tablet, Take 10 mg by mouth daily, Disp: , Rfl:       Active Problems     Patient Active Problem List   Diagnosis   • Chronic kidney disease, stage III (moderate) (HCC)   • Dyslipidemia   • Persistent proteinuria   • Hypertensive chronic kidney disease with stage 1 through stage 4 chronic kidney disease, or unspecified chronic kidney disease   • Hyperkalemia   • Hypertension, essential   • COPD (chronic obstructive pulmonary disease) (HCC)   • Sarcoidosis   • Other spondylosis, thoracolumbar region   • Type 2 diabetes mellitus without complication, without long-term current use of insulin (HCC)   • Diabetic nephropathy associated with type 2 diabetes mellitus (HCC)   • Type 2 diabetes mellitus with stage 3 chronic kidney disease (HCC)   • Coronary artery disease of native heart with stable angina pectoris, unspecified vessel or lesion type (HCC)   • COVID-19   • Pulmonary fibrosis (HCC)   • History of PTCA         Past Medical History     Past Medical History:   Diagnosis Date   • Anemia    • Arthritis    • Gee's esophagus    • Breast lump    • Cancer (HCC)    • Chronic kidney disease    • Cirrhosis ([facility] [address] )    • Coronary artery disease     cardiac cath; stents    • Diabetes mellitus ([facility] [address] )    • H/O heart artery stent    • Hypertension    • Inguinal hernia     Right   • Sarcoidosis    • 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HCC)    • Skin cancer    • SOB (shortness of breath)    • Upper respiratory infection, viral 5/31/2022         Surgical History     Past Surgical History:   Procedure Laterality Date   • CARDIAC CATHETERIZATION     • CATARACT EXTRACTION, BILATERAL      left eye 05/08 and right 5/22/2022   • CERVICAL FUSION     • CHOLECYSTECTOMY     • COLONOSCOPY  04/11/2016   • EGD AND COLONOSCOPY  06/24/2019   • ERCP  09/11/2014    transab esop hiat griffin rpr   • ESOPHAGOGASTRIC FUNDOPLASTY     • HERNIA REPAIR Right     p I/griffin init reduc >5 yr   • HIP SURGERY Right     Replacement   • KIDNEY STONE SURGERY      Fragmenting   • LIVER BIOPSY     • MULTIPLE TOOTH EXTRACTIONS      "extraction of all maxillary teeth"   • SKIN BIOPSY  05/2020   • THUMB FUSION      with graft   • TONSILLECTOMY     • US GUIDANCE  10/22/2014         Family History     Family History   Problem Relation Age of Onset • Hypertension Mother    • Atrial fibrillation Mother    • Heart disease Mother    • Kidney disease Father    • COPD Father    • Heart disease Father    • Asthma Neg Hx    • Lung cancer Neg Hx          Social History     Social History       Radiology

## 2022-11-18 DIAGNOSIS — M1A.9XX0 CHRONIC GOUT WITHOUT TOPHUS, UNSPECIFIED CAUSE, UNSPECIFIED SITE: ICD-10-CM

## 2022-11-18 DIAGNOSIS — I10 HYPERTENSION, ESSENTIAL: ICD-10-CM

## 2022-11-18 RX ORDER — COLCHICINE 0.6 MG/1
TABLET ORAL
Qty: 30 TABLET | Refills: 3 | Status: SHIPPED | OUTPATIENT
Start: 2022-11-18

## 2022-11-18 RX ORDER — METOPROLOL TARTRATE 50 MG/1
TABLET, FILM COATED ORAL
Qty: 135 TABLET | Refills: 3 | Status: SHIPPED | OUTPATIENT
Start: 2022-11-18

## 2022-11-21 NOTE — PROGRESS NOTES
RENAL FOLLOW UP NOTE: td    • ASSESSMENT AND PLAN:  1  CKD stage 3    • Etiology: Diabetic nephropathy/hypertensive nephrosclerosis/arteriolar nephrosclerosis/episodes of SHAY in the past  All serologies were negative including multiple myeloma evaluation  • Baseline creatinine: 1 2-1 62  • Current creatinine: 1 45 at baseline   • Urine protein creatinine ratio: 0 40 g at goal  Recommendations:   • Treat hypertension-please see below   • Treat dyslipidemia-please see below   • Maintain proteinuria less than 1 g or as low as possible   • Avoid nephrotoxic agents such as NSAIDs, patient counseled as such  2  Volume: Euvolemic   3  Hypertension:     Current blood pressure averages:   A m :  119/68, standing 106/63  P m :  119/67, standing 105/65  Heart rate:  60-80 range    • Goal blood pressure: Less than 125/80 given CAD  Recommendations:   • Push nonmedical regimen including weight loss, isotonic exercise and avoidance of salt; patient counseled as such   • Medication changes today:   • Low normal as some mild orthostasis I will decrease a Edarbi to 40 mg just at dinner time  • Recheck blood pressures in about 4 weeks to consider decreasing amlodipine depending upon the readings  4  Electrolytes: All acceptable   5  Mineral bone disorder:   • Calcium/magnesium/phosphorus: All acceptable , magnesium 1 8 on supplement  • PTH intact: 46 which is normal, from last visit   • Vitamin-D:Currently on supplements  41 0 as of 10/28/2021 at goal  6  Dyslipidemia:   • Goal LDL: Less than 70 given CAD   • Current lipid profile: LDL 43/HDL 43/triglycerides 143  Recommendations: At goal so no changes   7  Anemia: normal hemoglobin at 13 5  8  Other problems:   • Diabetes mellitus per your discretion   • CAD followed by Dr Beto Sawyer through [facility]  Status post MI last October involving to cardiac stents  : Recent emesis of LAD lesion with repeat PTCA drug-eluting stent 2018   Circumflex artery and right coronary artery 50% stenosis  Ejection fraction 55%  • Sarcoidosis followed by Dmitry Montoya   • Kyphoscoliosis   • Cirrhosis of the liver   • Gout   • Ankylosing spondylitis        GI health maintenance: Last colonoscopy: APPROXIMATELY 3-4 YEARS AGO, 5 years follow-up so approximately 1-2 years per Dr Raul Jaquez:    Patient Instructions   1  Medication changes today:  • Please decrease Edarbi to just 40 mg after dinner stop the morning dose as your blood pressure is actually slightly low        2  Please take 1 week a blood pressure readings  4 weeks after making the above medication change     AS FOLLOWS  MORNING AND EVENING, SITTING AND STANDING as follows:  · TAKE THE MORNING READINGS BEFORE ANY MEDICATIONS AND WHEN YOU ARE RELAXED FOR SEVERAL MINUTES  · TAKE THE EVENING READINGS:  BETWEEN 7-10 P M ; PRIOR TO ANY MEDICATIONS; AT LEAST IN OUR  FROM DINNER; AND CERTAINLY AFTER RELAXING FOR A FEW MINUTES  · PLEASE INCLUDE HEART RATE WITH YOUR BLOOD PRESSURE READINGS  · When taking standing readings, keep your arm supported at heart level and not dangling  · Make sure you are sitting with your back supported and feet on the ground and do not cross your legs or feet  · Make sure you have not taken any coffee or caffeine products or exercised or smoke cigarettes at least 30 minutes before taking your blood pressure  Then please mail these readings into the office    3  In 3 months:  • Please go for nonfasting lab work but in the morning  • Please take 1 week a blood pressure readings as outlined above and mail those into the office       4  Follow-up in 6  months  • Please bring in 1 week a blood pressure readings morning evening, sitting and standing is outlined above    • Please go for fasting lab work 1-2 weeks prior to your appointment      5   General instructions:  • AVOID SALT BUT NOT ADDING AN READING LABELS TO MAKE SURE THERE IS LOW-SALT IN THE FOOD THAT YOU ARE EATING  o Goal is less than 2 g of sodium intake or less than 5 g of sodium chloride intake per day    • Avoid nonsteroidal anti-inflammatory drugs such as Naprosyn, ibuprofen, Aleve, Advil, Celebrex, Meloxicam (Mobic) etc   You can use Tylenol as needed if you do not have any liver condition to be concerned about    • Avoid medications such as Sudafed or decongestants and antihistamines that contained the D component which is the decongestant  You can take antihistamines without the decongestant or D component  • Try to avoid medications such as pantoprazole or  Protonix/Nexium or Esomeprazole)/Prilosec or omeprazole/Prevacid or lansoprazole/AcipHex or Rabeprazole  If you are able to, use Pepcid as this is safer for your kidneys  • Try to exercise at least 30 minutes 3 days a week to begin with with an ultimate goal of 5 days a week for at least 30 minutes    • Please do not drink more than 2 glasses of alcohol/wine on a daily basis as this may contribute to your high blood pressure  Subjective: There has been no hospitalizations or acute illnesses since last visit  The patient is having nausea vomiting diarrhea over the last few days with some abdominal mild cramping but no significant pain  Symptoms are improving seen by family physician placed on Zofran and Nexium  No fevers, chills, or cough or colds    Good appetite and slightly decreased energy with the GI symptoms  No hematuria, dysuria, voiding symptoms or foamy urine  No chest pain, chronic dyspnea on exertion with a diagnosis of COPD/and fibrosis and sarcoidosis patient is followed by Dr Isai Brar  No headaches, dizziness or lightheadedness  Blood pressure medications/renal pertinent medications:  • Metoprolol tartrate 50 mg the morning and 25 mg the evening  • Slow-Mag 2 twice a day  • Chlorthalidone 12 5 mg daily in the morning  • Amlodipine 5 mg daily in the morning  • Edarbi 40 mg twice a day  • Atorvastatin 80 mg daily      ROS:  See HPI, otherwise review of systems as completely reviewed with the patient are negative    Past Medical History:   Diagnosis Date   • Anemia    • Arthritis    • Gee's esophagus    • Breast lump    • Cancer (HCC)    • Chronic kidney disease    • Cirrhosis ([facility] [address] )    • Coronary artery disease     cardiac cath; stents    • Diabetes mellitus ([facility] [address] )    • H/O heart artery stent    • Hypertension    • Inguinal hernia     Right   • Sarcoidosis    • 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HCC)    • Skin cancer    • SOB (shortness of breath)    • Upper respiratory infection, viral 5/31/2022     Past Surgical History:   Procedure Laterality Date   • CARDIAC CATHETERIZATION     • CATARACT EXTRACTION, BILATERAL      left eye 05/08 and right 5/22/2022   • CERVICAL FUSION     • CHOLECYSTECTOMY     • COLONOSCOPY  04/11/2016   • EGD AND COLONOSCOPY  06/24/2019   • ERCP  09/11/2014    transab esop hiat griffin rpr   • ESOPHAGOGASTRIC FUNDOPLASTY     • HERNIA REPAIR Right     p I/griffin init reduc >5 yr   • HIP SURGERY Right     Replacement   • KIDNEY STONE SURGERY      Fragmenting   • LIVER BIOPSY     • MULTIPLE TOOTH EXTRACTIONS      "extraction of all maxillary teeth"   • SKIN BIOPSY  05/2020   • THUMB FUSION      with graft   • TONSILLECTOMY     • US GUIDANCE  10/22/2014     Family History   Problem Relation Age of Onset   • Hypertension Mother    • Atrial fibrillation Mother    • Heart disease Mother    • Kidney disease Father    • COPD Father    • Heart disease Father    • Asthma Neg Hx    • Lung cancer Neg Hx       reports that he has never smoked  He has never used smokeless tobacco  He reports that he does not drink alcohol and does not use drugs      I COMPLETELY REVIEWED THE PAST MEDICAL HISTORY/PAST SURGICAL HISTORY/SOCIAL HISTORY/FAMILY HISTORY/AND MEDICATIONS  AND UPDATED ALL    Objective:     Vitals:   BP sitting on right:  118/54 with a heart rate of 64 regular, same on left  BP standing on left:  120/68 with a heart rate of 80 and regular  Vitals:    12/01/22 1124 BP: 126/60   Pulse: 67       Weight (last 2 days)     Date/Time Weight    12/01/22 1124 85 (187 4)        Wt Readings from Last 3 Encounters:   12/01/22 85 kg (187 lb 6 4 oz)   11/30/22 84 8 kg (187 lb)   11/15/22 85 7 kg (189 lb)       Body mass index is 26 89 kg/m²      Physical Exam: General:  No acute distress  Skin:  No acute rash  Eyes:  No scleral icterus, noninjected, no discharge from eyes  ENT:  Moist mucous membranes  Neck:  Supple, no jugular venous distention, trachea is midline, no lymphadenopathy and no thyromegaly  Back   No CVAT  Chest:  Clear to auscultation and percussion, good respiratory effort  CVS:  Regular rate and rhythm without a rub, or gallops or murmurs  Abdomen:  Soft and nontender with normal bowel sounds  Extremities:  No cyanosis and no edema, no arthritic changes, normal range of motion  Neuro:  Grossly intact  Psych:  Alert, oriented x3 and appropriate      Medications:    Current Outpatient Medications:   •  albuterol (PROVENTIL HFA,VENTOLIN HFA) 90 mcg/act inhaler, Inhale 2 puffs every 6 (six) hours as needed for wheezing or shortness of breath, Disp: 6 7 g, Rfl: 2  •  allopurinol (ZYLOPRIM) 300 mg tablet, "ONE-HALF" TABLET EVERY DAY, Disp: 45 tablet, Rfl: 3  •  amLODIPine (NORVASC) 5 mg tablet, TAKE 1 TABLET (5 MG) BY MOUTH DAILY, Disp: 90 tablet, Rfl: 5  •  aspirin (ECOTRIN LOW STRENGTH) 81 mg EC tablet, Take 81 mg by mouth daily, Disp: , Rfl:   •  atorvastatin (LIPITOR) 80 mg tablet, TAKE 1 TABLET (80 MG TOTAL) BY MOUTH DAILY, Disp: 90 tablet, Rfl: 3  •  azilsartan medoxomil (Edarbi) 40 MG tablet, Take 1 tablet (40 mg total) by mouth 2 (two) times a day (Patient taking differently: Take 40 mg by mouth daily after dinner), Disp: 180 tablet, Rfl: 3  •  B Complex-C (SUPERPLEX-T) TABS, Take 1 tablet by mouth daily, Disp: , Rfl:   •  Breo Ellipta 100-25 MCG/INH inhaler, Inhale 1 puff daily Rinse mouth after use , Disp: 60 blister, Rfl: 2  •  chlorthalidone 25 mg tablet, TAKE "ONE-HALF" TABLET (12 5 MG) BY MOUTH DAILY, Disp: 45 tablet, Rfl: 4  •  Cholecalciferol (VITAMIN D3) 2000 units TABS, Take 1 tablet by mouth daily, Disp: , Rfl:   •  co-enzyme Q-10 50 MG capsule, Take 100 mg by mouth in the morning , Disp: , Rfl:   •  colchicine (COLCRYS) 0 6 mg tablet, TAKE 1 TABLET (0 6 MG TOTAL) BY MOUTH DAILY, Disp: 30 tablet, Rfl: 3  •  esomeprazole (NexIUM) 40 MG capsule, Take 40 mg by mouth every morning before breakfast, Disp: , Rfl:   •  Ferrous Sulfate (IRON) 325 (65 Fe) MG TABS, Take 1 tablet by mouth daily, Disp: , Rfl:   •  Incruse Ellipta 62 5 MCG/INH AEPB inhaler, Inhale 1 puff daily, Disp: 30 blister, Rfl: 1  •  Januvia 100 MG tablet, TAKE ONE TABLET DAILY, Disp: 90 tablet, Rfl: 0  •  loperamide (IMODIUM) 2 mg capsule, Take 1 capsule (2 mg total) by mouth 4 (four) times a day as needed for diarrhea, Disp: 30 capsule, Rfl: 0  •  magnesium chloride-calcium (SLOW-MAG) 71 5-119 mg, Take 2 tablets by mouth 2 (two) times a day, Disp: , Rfl:   •  metoprolol tartrate (LOPRESSOR) 50 mg tablet, TAKE 1 TABLET (50 MG TOTAL) BY MOUTH IN THE MORNING AND "ONE-HALF" TABLET (25MG) IN THE EVENING, Disp: 135 tablet, Rfl: 3  •  ondansetron (ZOFRAN) 4 mg tablet, Take 1 tablet (4 mg total) by mouth every 8 (eight) hours as needed for nausea or vomiting, Disp: 20 tablet, Rfl: 0  •  pantoprazole (PROTONIX) 40 mg tablet, TAKE 1 TABLET DAILY HALF HOUR BEFORE EATING BREAKFAST (Patient not taking: Reported on 12/1/2022), Disp: 90 tablet, Rfl: 3  •  prasugrel (EFFIENT) tablet, Take 10 mg by mouth daily (Patient not taking: Reported on 12/1/2022), Disp: , Rfl:     Lab, Imaging and other studies: I have personally reviewed pertinent labs    Laboratory Results:  Results for orders placed or performed in visit on 83/41/56   Basic metabolic panel   Result Value Ref Range    Sodium 139 135 - 147 mmol/L    Potassium 4 7 3 5 - 5 3 mmol/L    Chloride 104 96 - 108 mmol/L    CO2 29 21 - 32 mmol/L    ANION GAP 6 4 - 13 mmol/L    BUN 26 (H) 5 - 25 mg/dL    Creatinine 1 45 (H) 0 60 - 1 30 mg/dL    Glucose 145 (H) 65 - 140 mg/dL    Calcium 9 4 8 4 - 10 2 mg/dL    eGFR 47 ml/min/1 73sq m   CBC and differential   Result Value Ref Range    WBC 9 07 4 31 - 10 16 Thousand/uL    RBC 4 10 3 88 - 5 62 Million/uL    Hemoglobin 13 5 12 0 - 17 0 g/dL    Hematocrit 39 7 36 5 - 49 3 %    MCV 97 82 - 98 fL    MCH 32 9 26 8 - 34 3 pg    MCHC 34 0 31 4 - 37 4 g/dL    RDW 13 3 11 6 - 15 1 %    MPV 9 7 8 9 - 12 7 fL    Platelets 464 046 - 823 Thousands/uL    nRBC 0 /100 WBCs    Neutrophils Relative 67 43 - 75 %    Immat GRANS % 0 0 - 2 %    Lymphocytes Relative 19 14 - 44 %    Monocytes Relative 10 4 - 12 %    Eosinophils Relative 4 0 - 6 %    Basophils Relative 0 0 - 1 %    Neutrophils Absolute 5 97 1 85 - 7 62 Thousands/µL    Immature Grans Absolute 0 04 0 00 - 0 20 Thousand/uL    Lymphocytes Absolute 1 75 0 60 - 4 47 Thousands/µL    Monocytes Absolute 0 87 0 17 - 1 22 Thousand/µL    Eosinophils Absolute 0 40 0 00 - 0 61 Thousand/µL    Basophils Absolute 0 04 0 00 - 0 10 Thousands/µL   Magnesium   Result Value Ref Range    Magnesium 1 8 (L) 1 9 - 2 7 mg/dL   Protein / creatinine ratio, urine   Result Value Ref Range    Creatinine, Ur 127 0 mg/dL    Protein Urine Random 51 mg/dL    Prot/Creat Ratio, Ur 0 40 (H) 0 00 - 0 10             Invalid input(s): ALBUMIN      Radiology review:   chest X-ray    Ultrasound      Portions of the record may have been created with voice recognition software  Occasional wrong word or "sound a like" substitutions may have occurred due to the inherent limitations of voice recognition software  Read the chart carefully and recognize, using context, where substitutions have occurred

## 2022-11-23 ENCOUNTER — APPOINTMENT (OUTPATIENT)
Dept: LAB | Facility: HOSPITAL | Age: 77
End: 2022-11-23
Attending: INTERNAL MEDICINE

## 2022-11-23 DIAGNOSIS — R80.1 PERSISTENT PROTEINURIA: ICD-10-CM

## 2022-11-23 DIAGNOSIS — N18.32 STAGE 3B CHRONIC KIDNEY DISEASE (HCC): ICD-10-CM

## 2022-11-23 DIAGNOSIS — N18.32 STAGE 3B CHRONIC KIDNEY DISEASE (HCC): Primary | ICD-10-CM

## 2022-11-23 LAB
ANION GAP SERPL CALCULATED.3IONS-SCNC: 6 MMOL/L (ref 4–13)
BASOPHILS # BLD AUTO: 0.04 THOUSANDS/ÂΜL (ref 0–0.1)
BASOPHILS NFR BLD AUTO: 0 % (ref 0–1)
BUN SERPL-MCNC: 26 MG/DL (ref 5–25)
CALCIUM SERPL-MCNC: 9.4 MG/DL (ref 8.4–10.2)
CHLORIDE SERPL-SCNC: 104 MMOL/L (ref 96–108)
CO2 SERPL-SCNC: 29 MMOL/L (ref 21–32)
CREAT SERPL-MCNC: 1.45 MG/DL (ref 0.6–1.3)
CREAT UR-MCNC: 127 MG/DL
EOSINOPHIL # BLD AUTO: 0.4 THOUSAND/ÂΜL (ref 0–0.61)
EOSINOPHIL NFR BLD AUTO: 4 % (ref 0–6)
ERYTHROCYTE [DISTWIDTH] IN BLOOD BY AUTOMATED COUNT: 13.3 % (ref 11.6–15.1)
GFR SERPL CREATININE-BSD FRML MDRD: 47 ML/MIN/1.73SQ M
GLUCOSE SERPL-MCNC: 145 MG/DL (ref 65–140)
HCT VFR BLD AUTO: 39.7 % (ref 36.5–49.3)
HGB BLD-MCNC: 13.5 G/DL (ref 12–17)
IMM GRANULOCYTES # BLD AUTO: 0.04 THOUSAND/UL (ref 0–0.2)
IMM GRANULOCYTES NFR BLD AUTO: 0 % (ref 0–2)
LYMPHOCYTES # BLD AUTO: 1.75 THOUSANDS/ÂΜL (ref 0.6–4.47)
LYMPHOCYTES NFR BLD AUTO: 19 % (ref 14–44)
MAGNESIUM SERPL-MCNC: 1.8 MG/DL (ref 1.9–2.7)
MCH RBC QN AUTO: 32.9 PG (ref 26.8–34.3)
MCHC RBC AUTO-ENTMCNC: 34 G/DL (ref 31.4–37.4)
MCV RBC AUTO: 97 FL (ref 82–98)
MONOCYTES # BLD AUTO: 0.87 THOUSAND/ÂΜL (ref 0.17–1.22)
MONOCYTES NFR BLD AUTO: 10 % (ref 4–12)
NEUTROPHILS # BLD AUTO: 5.97 THOUSANDS/ÂΜL (ref 1.85–7.62)
NEUTS SEG NFR BLD AUTO: 67 % (ref 43–75)
NRBC BLD AUTO-RTO: 0 /100 WBCS
PLATELET # BLD AUTO: 160 THOUSANDS/UL (ref 149–390)
PMV BLD AUTO: 9.7 FL (ref 8.9–12.7)
POTASSIUM SERPL-SCNC: 4.7 MMOL/L (ref 3.5–5.3)
PROT UR-MCNC: 51 MG/DL
PROT/CREAT UR: 0.4 MG/G{CREAT} (ref 0–0.1)
RBC # BLD AUTO: 4.1 MILLION/UL (ref 3.88–5.62)
SODIUM SERPL-SCNC: 139 MMOL/L (ref 135–147)
WBC # BLD AUTO: 9.07 THOUSAND/UL (ref 4.31–10.16)

## 2022-11-30 ENCOUNTER — OFFICE VISIT (OUTPATIENT)
Dept: FAMILY MEDICINE CLINIC | Facility: CLINIC | Age: 77
End: 2022-11-30

## 2022-11-30 VITALS
BODY MASS INDEX: 26.77 KG/M2 | HEART RATE: 70 BPM | DIASTOLIC BLOOD PRESSURE: 70 MMHG | HEIGHT: 70 IN | OXYGEN SATURATION: 96 % | WEIGHT: 187 LBS | SYSTOLIC BLOOD PRESSURE: 142 MMHG

## 2022-11-30 DIAGNOSIS — R19.7 DIARRHEA, UNSPECIFIED TYPE: Primary | ICD-10-CM

## 2022-11-30 DIAGNOSIS — R11.2 NAUSEA AND VOMITING, UNSPECIFIED VOMITING TYPE: ICD-10-CM

## 2022-11-30 RX ORDER — ONDANSETRON 4 MG/1
4 TABLET, FILM COATED ORAL EVERY 8 HOURS PRN
Qty: 20 TABLET | Refills: 0 | Status: SHIPPED | OUTPATIENT
Start: 2022-11-30

## 2022-11-30 RX ORDER — LOPERAMIDE HYDROCHLORIDE 2 MG/1
2 CAPSULE ORAL 4 TIMES DAILY PRN
Qty: 30 CAPSULE | Refills: 0 | Status: SHIPPED | OUTPATIENT
Start: 2022-11-30

## 2022-11-30 NOTE — PROGRESS NOTES
Assessment/Plan:  78 y o male, sudden onset n/v and diarrhea on Sat 2 a m     Was his b'day, but nothing unusual to eat  Ever since, still diarrhea and upset stomach  Plan:  Lite diet , fluids, rest off feet  Immodium-AD Cap 2  TID  Rx odanstron 4 mg  #8,  Si q4h          1  Diarrhea, unspecified type  -     loperamide (IMODIUM) 2 mg capsule; Take 1 capsule (2 mg total) by mouth 4 (four) times a day as needed for diarrhea    2  Nausea and vomiting, unspecified vomiting type  -     ondansetron (ZOFRAN) 4 mg tablet; Take 1 tablet (4 mg total) by mouth every 8 (eight) hours as needed for nausea or vomiting        Subjective:      Patient ID: Ted Krueger is a 78 y o  male  HPI    The following portions of the patient's history were reviewed and updated as appropriate: He  has a past medical history of Anemia, Arthritis, Gee's esophagus, Breast lump, Cancer ([facility] ), Chronic kidney disease, Cirrhosis ([facility] ), Coronary artery disease, Diabetes mellitus ([facility] ), H/O heart artery stent, Hypertension, Inguinal hernia, Sarcoidosis, 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[facility] ), Skin cancer, SOB (shortness of breath), and Upper respiratory infection, viral (2022)    He   Patient Active Problem List    Diagnosis Date Noted   • History of PTCA 10/26/2022   • Pulmonary fibrosis ([facility] ) 2022   • COVID-19 2022   • Coronary artery disease of native heart with stable angina pectoris, unspecified vessel or lesion type ([facility] ) 2022   • Type 2 diabetes mellitus with stage 3 chronic kidney disease ([facility] ) 2021   • Diabetic nephropathy associated with type 2 diabetes mellitus ([facility] ) 2021   • Other spondylosis, thoracolumbar region 2020   • Type 2 diabetes mellitus without complication, without long-term current use of insulin ([facility] ) 2020   • COPD (chronic obstructive pulmonary disease) ([facility] ) 2020   • Sarcoidosis 2020   • Hypertension, essential 2019   • Hyperkalemia 2018   • Chronic kidney disease, stage III (moderate) ([facility] ) 05/16/2018   • Dyslipidemia 05/16/2018   • Persistent proteinuria 05/16/2018   • Hypertensive chronic kidney disease with stage 1 through stage 4 chronic kidney disease, or unspecified chronic kidney disease 05/16/2018     He  has a past surgical history that includes Colonoscopy (04/11/2016); EGD AND COLONOSCOPY (06/24/2019); Cardiac catheterization; Tonsillectomy; Thumb fusion; ERCP (09/11/2014); Multiple tooth extractions; Cervical fusion; Liver biopsy; Hip surgery (Right); Kidney stone surgery; Cholecystectomy; Skin biopsy (05/2020); Esophagogastric fundoplasty; Hernia repair (Right); US guidance (10/22/2014); and Cataract extraction, bilateral   His family history includes Atrial fibrillation in his mother; COPD in his father; Heart disease in his father and mother; Hypertension in his mother; Kidney disease in his father  He  reports that he has never smoked  He has never used smokeless tobacco  He reports that he does not drink alcohol and does not use drugs  Current Outpatient Medications   Medication Sig Dispense Refill   • albuterol (PROVENTIL HFA,VENTOLIN HFA) 90 mcg/act inhaler Inhale 2 puffs every 6 (six) hours as needed for wheezing or shortness of breath 6 7 g 2   • allopurinol (ZYLOPRIM) 300 mg tablet "ONE-HALF" TABLET EVERY DAY 45 tablet 3   • amLODIPine (NORVASC) 5 mg tablet TAKE 1 TABLET (5 MG) BY MOUTH DAILY 90 tablet 5   • aspirin (ECOTRIN LOW STRENGTH) 81 mg EC tablet Take 81 mg by mouth daily     • atorvastatin (LIPITOR) 80 mg tablet TAKE 1 TABLET (80 MG TOTAL) BY MOUTH DAILY 90 tablet 3   • azilsartan medoxomil (Edarbi) 40 MG tablet Take 1 tablet (40 mg total) by mouth 2 (two) times a day 180 tablet 3   • B Complex-C (SUPERPLEX-T) TABS Take 1 tablet by mouth daily     • Breo Ellipta 100-25 MCG/INH inhaler Inhale 1 puff daily Rinse mouth after use   60 blister 2   • chlorthalidone 25 mg tablet TAKE "ONE-HALF" TABLET (12 5 MG) BY MOUTH DAILY 45 tablet 4   • Cholecalciferol (VITAMIN D3) 2000 units TABS Take 1 tablet by mouth daily     • co-enzyme Q-10 50 MG capsule Take 100 mg by mouth in the morning  • colchicine (COLCRYS) 0 6 mg tablet TAKE 1 TABLET (0 6 MG TOTAL) BY MOUTH DAILY 30 tablet 3   • Ferrous Sulfate (IRON) 325 (65 Fe) MG TABS Take 1 tablet by mouth daily     • Incruse Ellipta 62 5 MCG/INH AEPB inhaler Inhale 1 puff daily 30 blister 1   • Januvia 100 MG tablet TAKE ONE TABLET DAILY 90 tablet 0   • loperamide (IMODIUM) 2 mg capsule Take 1 capsule (2 mg total) by mouth 4 (four) times a day as needed for diarrhea 30 capsule 0   • magnesium chloride-calcium (SLOW-MAG) 71 5-119 mg Take 2 tablets by mouth 2 (two) times a day     • metoprolol tartrate (LOPRESSOR) 50 mg tablet TAKE 1 TABLET (50 MG TOTAL) BY MOUTH IN THE MORNING AND "ONE-HALF" TABLET (25MG) IN THE EVENING 135 tablet 3   • ondansetron (ZOFRAN) 4 mg tablet Take 1 tablet (4 mg total) by mouth every 8 (eight) hours as needed for nausea or vomiting 20 tablet 0   • pantoprazole (PROTONIX) 40 mg tablet TAKE 1 TABLET DAILY HALF HOUR BEFORE EATING BREAKFAST 90 tablet 3   • prasugrel (EFFIENT) tablet Take 10 mg by mouth daily       No current facility-administered medications for this visit       Current Outpatient Medications on File Prior to Visit   Medication Sig   • albuterol (PROVENTIL HFA,VENTOLIN HFA) 90 mcg/act inhaler Inhale 2 puffs every 6 (six) hours as needed for wheezing or shortness of breath   • allopurinol (ZYLOPRIM) 300 mg tablet "ONE-HALF" TABLET EVERY DAY   • amLODIPine (NORVASC) 5 mg tablet TAKE 1 TABLET (5 MG) BY MOUTH DAILY   • aspirin (ECOTRIN LOW STRENGTH) 81 mg EC tablet Take 81 mg by mouth daily   • atorvastatin (LIPITOR) 80 mg tablet TAKE 1 TABLET (80 MG TOTAL) BY MOUTH DAILY   • azilsartan medoxomil (Edarbi) 40 MG tablet Take 1 tablet (40 mg total) by mouth 2 (two) times a day   • B Complex-C (SUPERPLEX-T) TABS Take 1 tablet by mouth daily   • Breo Ellipta 100-25 MCG/INH inhaler Inhale 1 puff daily Rinse mouth after use  • chlorthalidone 25 mg tablet TAKE "ONE-HALF" TABLET (12 5 MG) BY MOUTH DAILY   • Cholecalciferol (VITAMIN D3) 2000 units TABS Take 1 tablet by mouth daily   • co-enzyme Q-10 50 MG capsule Take 100 mg by mouth in the morning  • colchicine (COLCRYS) 0 6 mg tablet TAKE 1 TABLET (0 6 MG TOTAL) BY MOUTH DAILY   • Ferrous Sulfate (IRON) 325 (65 Fe) MG TABS Take 1 tablet by mouth daily   • Incruse Ellipta 62 5 MCG/INH AEPB inhaler Inhale 1 puff daily   • Januvia 100 MG tablet TAKE ONE TABLET DAILY   • magnesium chloride-calcium (SLOW-MAG) 71 5-119 mg Take 2 tablets by mouth 2 (two) times a day   • metoprolol tartrate (LOPRESSOR) 50 mg tablet TAKE 1 TABLET (50 MG TOTAL) BY MOUTH IN THE MORNING AND "ONE-HALF" TABLET (25MG) IN THE EVENING   • pantoprazole (PROTONIX) 40 mg tablet TAKE 1 TABLET DAILY HALF HOUR BEFORE EATING BREAKFAST   • prasugrel (EFFIENT) tablet Take 10 mg by mouth daily   • [DISCONTINUED] loratadine (CLARITIN) 10 mg tablet Take 1 tablet (10 mg total) by mouth daily (Patient not taking: No sig reported)   • [DISCONTINUED] sulfaSALAzine (AZULFIDINE) 500 mg tablet Take 1 5 tablets (750 mg total) by mouth daily     No current facility-administered medications on file prior to visit  He is allergic to oxycodone       Review of Systems   Constitutional: Negative  HENT: Negative  Eyes: Negative  Respiratory: Negative  Cardiovascular: Negative  Gastrointestinal: Positive for diarrhea, nausea and vomiting  Genitourinary: Negative  Musculoskeletal: Positive for arthralgias and myalgias  Psychiatric/Behavioral: Negative  Objective:      /70 (BP Location: Left arm, Patient Position: Sitting, Cuff Size: Adult)   Pulse 70   Ht 5' 10" (1 778 m)   Wt 84 8 kg (187 lb)   SpO2 96%   BMI 26 83 kg/m²          Physical Exam  Constitutional:       General: He is not in acute distress  Appearance: Normal appearance   He is not ill-appearing or diaphoretic  HENT:      Nose: No congestion or rhinorrhea  Eyes:      General:         Right eye: No discharge  Left eye: No discharge  Cardiovascular:      Rate and Rhythm: Normal rate and regular rhythm  Pulses: Normal pulses  Heart sounds: Normal heart sounds  Pulmonary:      Breath sounds: Normal breath sounds  Musculoskeletal:      Right lower leg: No edema  Left lower leg: No edema  Skin:     General: Skin is warm and dry  Neurological:      General: No focal deficit present  Mental Status: He is alert and oriented to person, place, and time  Psychiatric:         Mood and Affect: Mood normal          Behavior: Behavior normal          Thought Content: Thought content normal          Judgment: Judgment normal              This time was spent reviewing previous records, reviewing previous laboratory and other tests, taking history from patient, examination of patient, discussion of prognosis and treatment, ordering laboratory tests, ordering medications, and completion of the medical record

## 2022-12-01 ENCOUNTER — OFFICE VISIT (OUTPATIENT)
Dept: NEPHROLOGY | Facility: CLINIC | Age: 77
End: 2022-12-01

## 2022-12-01 VITALS
DIASTOLIC BLOOD PRESSURE: 60 MMHG | BODY MASS INDEX: 26.83 KG/M2 | HEIGHT: 70 IN | HEART RATE: 67 BPM | SYSTOLIC BLOOD PRESSURE: 126 MMHG | WEIGHT: 187.4 LBS

## 2022-12-01 DIAGNOSIS — E87.5 HYPERKALEMIA: ICD-10-CM

## 2022-12-01 DIAGNOSIS — N18.32 STAGE 3B CHRONIC KIDNEY DISEASE (HCC): ICD-10-CM

## 2022-12-01 DIAGNOSIS — I12.9 HYPERTENSIVE CHRONIC KIDNEY DISEASE WITH STAGE 1 THROUGH STAGE 4 CHRONIC KIDNEY DISEASE, OR UNSPECIFIED CHRONIC KIDNEY DISEASE: Primary | ICD-10-CM

## 2022-12-01 DIAGNOSIS — R80.1 PERSISTENT PROTEINURIA: ICD-10-CM

## 2022-12-01 DIAGNOSIS — E11.21 DIABETIC NEPHROPATHY ASSOCIATED WITH TYPE 2 DIABETES MELLITUS (HCC): ICD-10-CM

## 2022-12-01 DIAGNOSIS — E78.5 DYSLIPIDEMIA: ICD-10-CM

## 2022-12-01 RX ORDER — ESOMEPRAZOLE MAGNESIUM 40 MG/1
40 CAPSULE, DELAYED RELEASE ORAL
COMMUNITY

## 2022-12-01 NOTE — PATIENT INSTRUCTIONS
1  Medication changes today:  Please decrease Edarbi to just 40 mg after dinner stop the morning dose as your blood pressure is actually slightly low        2  Please take 1 week a blood pressure readings  4 weeks after making the above medication change     AS FOLLOWS  MORNING AND EVENING, SITTING AND STANDING as follows:  TAKE THE MORNING READINGS BEFORE ANY MEDICATIONS AND WHEN YOU ARE RELAXED FOR SEVERAL MINUTES  TAKE THE EVENING READINGS:  BETWEEN 7-10 P M ; PRIOR TO ANY MEDICATIONS; AT LEAST IN OUR  FROM DINNER; AND CERTAINLY AFTER RELAXING FOR A FEW MINUTES  PLEASE INCLUDE HEART RATE WITH YOUR BLOOD PRESSURE READINGS  When taking standing readings, keep your arm supported at heart level and not dangling  Make sure you are sitting with your back supported and feet on the ground and do not cross your legs or feet  Make sure you have not taken any coffee or caffeine products or exercised or smoke cigarettes at least 30 minutes before taking your blood pressure  Then please mail these readings into the office    3  In 3 months:  Please go for nonfasting lab work but in the morning  Please take 1 week a blood pressure readings as outlined above and mail those into the office       4  Follow-up in 6  months  Please bring in 1 week a blood pressure readings morning evening, sitting and standing is outlined above    Please go for fasting lab work 1-2 weeks prior to your appointment      5   General instructions:  AVOID SALT BUT NOT ADDING AN READING LABELS TO MAKE SURE THERE IS LOW-SALT IN THE FOOD THAT YOU ARE EATING  Goal is less than 2 g of sodium intake or less than 5 g of sodium chloride intake per day    Avoid nonsteroidal anti-inflammatory drugs such as Naprosyn, ibuprofen, Aleve, Advil, Celebrex, Meloxicam (Mobic) etc   You can use Tylenol as needed if you do not have any liver condition to be concerned about    Avoid medications such as Sudafed or decongestants and antihistamines that contained the D component which is the decongestant  You can take antihistamines without the decongestant or D component  Try to avoid medications such as pantoprazole or  Protonix/Nexium or Esomeprazole)/Prilosec or omeprazole/Prevacid or lansoprazole/AcipHex or Rabeprazole  If you are able to, use Pepcid as this is safer for your kidneys  Try to exercise at least 30 minutes 3 days a week to begin with with an ultimate goal of 5 days a week for at least 30 minutes    Please do not drink more than 2 glasses of alcohol/wine on a daily basis as this may contribute to your high blood pressure

## 2022-12-22 DIAGNOSIS — I12.9 PARENCHYMAL RENAL HYPERTENSION, STAGE 1 THROUGH STAGE 4 OR UNSPECIFIED CHRONIC KIDNEY DISEASE: ICD-10-CM

## 2022-12-22 DIAGNOSIS — E11.9 TYPE 2 DIABETES MELLITUS WITHOUT COMPLICATION, WITHOUT LONG-TERM CURRENT USE OF INSULIN (HCC): ICD-10-CM

## 2022-12-22 DIAGNOSIS — J43.9 PULMONARY EMPHYSEMA, UNSPECIFIED EMPHYSEMA TYPE (HCC): ICD-10-CM

## 2022-12-22 RX ORDER — UMECLIDINIUM 62.5 UG/1
AEROSOL, POWDER ORAL
Qty: 30 EACH | Refills: 1 | Status: SHIPPED | OUTPATIENT
Start: 2022-12-22

## 2022-12-23 RX ORDER — SITAGLIPTIN 100 MG/1
TABLET, FILM COATED ORAL
Qty: 30 TABLET | Refills: 0 | Status: SHIPPED | OUTPATIENT
Start: 2022-12-23

## 2022-12-23 RX ORDER — AZILSARTAN KAMEDOXOMIL 40 MG/1
40 TABLET ORAL
Qty: 60 TABLET | Refills: 3 | Status: SHIPPED | OUTPATIENT
Start: 2022-12-23

## 2023-01-03 ENCOUNTER — TELEPHONE (OUTPATIENT)
Dept: GASTROENTEROLOGY | Facility: CLINIC | Age: 78
End: 2023-01-03

## 2023-01-03 NOTE — TELEPHONE ENCOUNTER
Spoke to pt confirming pt's egd scheduled on 1/10/23 at [facility] with Dr Michelle Solorio  Informed [facility] would be calling the day prior with the arrival time  Informed would need to be npo after midnight and would need a  the day of the procedure due to being under sedation  Pt has instructions and did not have any questions

## 2023-01-10 ENCOUNTER — HOSPITAL ENCOUNTER (OUTPATIENT)
Dept: GASTROENTEROLOGY | Facility: HOSPITAL | Age: 78
Setting detail: OUTPATIENT SURGERY
Discharge: HOME | End: 2023-01-10
Attending: INTERNAL MEDICINE

## 2023-01-10 ENCOUNTER — ANESTHESIA EVENT (OUTPATIENT)
Dept: GASTROENTEROLOGY | Facility: HOSPITAL | Age: 78
End: 2023-01-10

## 2023-01-10 ENCOUNTER — ANESTHESIA (OUTPATIENT)
Dept: GASTROENTEROLOGY | Facility: HOSPITAL | Age: 78
End: 2023-01-10

## 2023-01-10 VITALS
DIASTOLIC BLOOD PRESSURE: 65 MMHG | RESPIRATION RATE: 13 BRPM | WEIGHT: 182 LBS | TEMPERATURE: 98.2 F | HEIGHT: 70 IN | BODY MASS INDEX: 26.05 KG/M2 | OXYGEN SATURATION: 98 % | HEART RATE: 48 BPM | SYSTOLIC BLOOD PRESSURE: 135 MMHG

## 2023-01-10 DIAGNOSIS — K22.70 BARRETT'S ESOPHAGUS WITHOUT DYSPLASIA: ICD-10-CM

## 2023-01-10 DIAGNOSIS — B37.81 CANDIDA ESOPHAGITIS (HCC): ICD-10-CM

## 2023-01-10 LAB — GLUCOSE SERPL-MCNC: 149 MG/DL (ref 65–140)

## 2023-01-10 RX ORDER — PROPOFOL 10 MG/ML
INJECTION, EMULSION INTRAVENOUS AS NEEDED
Status: DISCONTINUED | OUTPATIENT
Start: 2023-01-10 | End: 2023-01-10

## 2023-01-10 RX ORDER — LIDOCAINE HYDROCHLORIDE 10 MG/ML
INJECTION, SOLUTION EPIDURAL; INFILTRATION; INTRACAUDAL; PERINEURAL AS NEEDED
Status: DISCONTINUED | OUTPATIENT
Start: 2023-01-10 | End: 2023-01-10

## 2023-01-10 RX ORDER — SODIUM CHLORIDE, SODIUM LACTATE, POTASSIUM CHLORIDE, CALCIUM CHLORIDE 600; 310; 30; 20 MG/100ML; MG/100ML; MG/100ML; MG/100ML
INJECTION, SOLUTION INTRAVENOUS CONTINUOUS PRN
Status: DISCONTINUED | OUTPATIENT
Start: 2023-01-10 | End: 2023-01-10

## 2023-01-10 RX ADMIN — PROPOFOL 40 MG: 10 INJECTION, EMULSION INTRAVENOUS at 08:31

## 2023-01-10 RX ADMIN — LIDOCAINE HYDROCHLORIDE 50 MG: 10 INJECTION, SOLUTION EPIDURAL; INFILTRATION; INTRACAUDAL at 08:24

## 2023-01-10 RX ADMIN — SODIUM CHLORIDE, SODIUM LACTATE, POTASSIUM CHLORIDE, AND CALCIUM CHLORIDE: .6; .31; .03; .02 INJECTION, SOLUTION INTRAVENOUS at 08:21

## 2023-01-10 RX ADMIN — PROPOFOL 50 MG: 10 INJECTION, EMULSION INTRAVENOUS at 08:28

## 2023-01-10 RX ADMIN — PROPOFOL 150 MG: 10 INJECTION, EMULSION INTRAVENOUS at 08:24

## 2023-01-10 NOTE — INTERVAL H&P NOTE
H&P reviewed  After examining the patient I find no changes in the patients condition since the H&P had been written      Vitals:    01/10/23 0808   BP: (!) 192/87   Pulse: (!) 54   Resp: 19   Temp: 98 °F (36 7 °C)   SpO2: 98%

## 2023-01-10 NOTE — ANESTHESIA POSTPROCEDURE EVALUATION
Post-Op Assessment Note    CV Status:  Stable  Pain Score: 0    Pain management: adequate     Mental Status:  Sleepy   Hydration Status:  Euvolemic   PONV Controlled:  Controlled   Airway Patency:  Patent      Post Op Vitals Reviewed: Yes      Staff: CRNA         No notable events documented      BP  97/50   Temp      Pulse 58   Resp 12   SpO2 96

## 2023-01-10 NOTE — ANESTHESIA PREPROCEDURE EVALUATION
Procedure:  EGD    Relevant Problems   ANESTHESIA (within normal limits)   (-) History of anesthesia complications      CARDIO   (+) Coronary artery disease of native heart with stable angina pectoris, unspecified vessel or lesion type (HCC)   (+) History of PTCA   (+) Hypertension, essential   (-) Chest pain   (-) AMBROCIO (dyspnea on exertion)      ENDO   (+) Type 2 diabetes mellitus without complication, without long-term current use of insulin (HCC)      /RENAL   (+) Chronic kidney disease, stage III (moderate) (HCC)   (+) Diabetic nephropathy associated with type 2 diabetes mellitus (HCC)   (+) Hypertensive chronic kidney disease with stage 1 through stage 4 chronic kidney disease, or unspecified chronic kidney disease      PULMONARY   (+) COPD (chronic obstructive pulmonary disease) ([facility]) (well controlled, no recent exacerbations)   (-) Shortness of breath   (-) URI (upper respiratory infection)      Respiratory   (+) Pulmonary fibrosis (HCC)      Other   (+) Dyslipidemia   (+) Sarcoidosis      •  Left Ventricle: There is mild hypertrophy  Systolic function is normal   with an ejection fraction of 60-65%  There is grade I (mild) diastolic   dysfunction and normal left atrial pressure  •  Right Ventricle: Systolic function is normal    •  Aortic Valve: The aortic valve is trileaflet  There is mild sclerosis  There is mild regurgitation  •  Mitral Valve: There is trace regurgitation  •  Tricuspid Valve: There is trace regurgitation  •  Normal pulmonary artery pressure  Physical Exam    Airway    Mallampati score: II  TM Distance: >3 FB  Neck ROM: limited     Dental   upper dentures and lower dentures,     Cardiovascular      Pulmonary      Other Findings        Anesthesia Plan  ASA Score- 3     Anesthesia Type- IV sedation with anesthesia with ASA Monitors  Additional Monitors:   Airway Plan:           Plan Factors-Exercise tolerance (METS): >4 METS  Chart reviewed  EKG reviewed   Existing labs reviewed  Patient summary reviewed  Induction- intravenous  Postoperative Plan-     Informed Consent- Anesthetic plan and risks discussed with patient  I personally reviewed this patient with the CRNA  Discussed and agreed on the Anesthesia Plan with the CRNA  Peewee Dominguez

## 2023-01-10 NOTE — H&P
History and Physical -  [facility]  Tamia Rebollar 78 y o  male MRN: [id number]                  HPI: Tamia Rebollar is a 78y o  year old male who presents for upper endoscopy  He has history of esophageal ulcer and gastroesophageal reflux disease  Previous endoscopy was done for possible dilatation however patient was noted to have multiple esophageal ulcers and dilatation was postponed  Currently he is doing better  REVIEW OF SYSTEMS: Per the HPI, and otherwise unremarkable      Historical Information   Past Medical History:   Diagnosis Date   • Anemia    • Arthritis    • Gee's esophagus    • Breast lump    • Cancer (HCC)    • Chronic kidney disease    • Cirrhosis ([facility] )    • Coronary artery disease     cardiac cath; stents    • Diabetes mellitus ([facility] )    • H/O heart artery stent    • Hypertension    • Inguinal hernia     Right   • Sarcoidosis    • 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[facility] )    • Skin cancer    • SOB (shortness of breath)    • Upper respiratory infection, viral 5/31/2022     Past Surgical History:   Procedure Laterality Date   • CARDIAC CATHETERIZATION     • CATARACT EXTRACTION, BILATERAL      left eye 05/08 and right 5/22/2022   • CERVICAL FUSION     • CHOLECYSTECTOMY     • COLONOSCOPY  04/11/2016   • EGD AND COLONOSCOPY  06/24/2019   • ERCP  09/11/2014    transab esop hiat griffin rpr   • ESOPHAGOGASTRIC FUNDOPLASTY     • HERNIA REPAIR Right     p I/griffin init reduc >5 yr   • HIP SURGERY Right     Replacement   • KIDNEY STONE SURGERY      Fragmenting   • LIVER BIOPSY     • MULTIPLE TOOTH EXTRACTIONS      "extraction of all maxillary teeth"   • SKIN BIOPSY  05/2020   • THUMB FUSION      with graft   • TONSILLECTOMY     • US GUIDANCE  10/22/2014     Social History   Social History     Substance and Sexual Activity   Alcohol Use No    Comment: Alcohol intake:   Occasional wine - As per [place]      Social History     Substance and Sexual Activity   Drug Use No     Social History Tobacco Use   Smoking Status Never   Smokeless Tobacco Never   Tobacco Comments    RETIRED     Family History   Problem Relation Age of Onset   • Hypertension Mother    • Atrial fibrillation Mother    • Heart disease Mother    • Kidney disease Father    • COPD Father    • Heart disease Father    • Asthma Neg Hx    • Lung cancer Neg Hx        Meds/Allergies       Current Outpatient Medications:   •  albuterol (PROVENTIL HFA,VENTOLIN HFA) 90 mcg/act inhaler  •  allopurinol (ZYLOPRIM) 300 mg tablet  •  amLODIPine (NORVASC) 5 mg tablet  •  aspirin (ECOTRIN LOW STRENGTH) 81 mg EC tablet  •  atorvastatin (LIPITOR) 80 mg tablet  •  azilsartan medoxomil (Edarbi) 40 MG tablet  •  B Complex-C (SUPERPLEX-T) TABS  •  Breo Ellipta 100-25 MCG/INH inhaler  •  chlorthalidone 25 mg tablet  •  Cholecalciferol (VITAMIN D3) 2000 units TABS  •  co-enzyme Q-10 50 MG capsule  •  colchicine (COLCRYS) 0 6 mg tablet  •  esomeprazole (NexIUM) 40 MG capsule  •  Ferrous Sulfate (IRON) 325 (65 Fe) MG TABS  •  Incruse Ellipta 62 5 MCG/ACT AEPB inhaler  •  Januvia 100 MG tablet  •  magnesium chloride-calcium (SLOW-MAG) 71 5-119 mg  •  metoprolol tartrate (LOPRESSOR) 50 mg tablet  •  ondansetron (ZOFRAN) 4 mg tablet  •  loperamide (IMODIUM) 2 mg capsule  •  pantoprazole (PROTONIX) 40 mg tablet  •  prasugrel (EFFIENT) tablet    Allergies   Allergen Reactions   • Oxycodone GI Intolerance       Objective     BP (!) 192/87   Pulse (!) 54   Temp 98 °F (36 7 °C) (Temporal)   Resp 19   Ht 5' 10" (1 778 m)   Wt 82 6 kg (182 lb)   SpO2 98%   BMI 26 11 kg/m²       PHYSICAL EXAM    Gen: NAD  Head: NCAT  CV: RRR  CHEST: Clear  ABD: soft, NT/ND  EXT: no edema      ASSESSMENT/PLAN:  This is a 78y o  year old male here for EGD to document ulcer healing in the esophagus, and he is stable and optimized for his procedure

## 2023-01-12 ENCOUNTER — OFFICE VISIT (OUTPATIENT)
Dept: FAMILY MEDICINE CLINIC | Facility: CLINIC | Age: 78
End: 2023-01-12

## 2023-01-12 ENCOUNTER — HOSPITAL ENCOUNTER (OUTPATIENT)
Dept: RADIOLOGY | Facility: HOSPITAL | Age: 78
End: 2023-01-12

## 2023-01-12 VITALS
DIASTOLIC BLOOD PRESSURE: 70 MMHG | TEMPERATURE: 97.8 F | WEIGHT: 188 LBS | BODY MASS INDEX: 26.92 KG/M2 | HEIGHT: 70 IN | SYSTOLIC BLOOD PRESSURE: 132 MMHG | HEART RATE: 67 BPM | OXYGEN SATURATION: 97 %

## 2023-01-12 DIAGNOSIS — J40 BRONCHITIS AFTER SURGERY: ICD-10-CM

## 2023-01-12 DIAGNOSIS — N18.32 STAGE 3B CHRONIC KIDNEY DISEASE (HCC): ICD-10-CM

## 2023-01-12 DIAGNOSIS — J95.89 BRONCHITIS AFTER SURGERY: ICD-10-CM

## 2023-01-12 DIAGNOSIS — J43.9 PULMONARY EMPHYSEMA, UNSPECIFIED EMPHYSEMA TYPE (HCC): ICD-10-CM

## 2023-01-12 DIAGNOSIS — I25.118 CORONARY ARTERY DISEASE OF NATIVE HEART WITH STABLE ANGINA PECTORIS, UNSPECIFIED VESSEL OR LESION TYPE (HCC): ICD-10-CM

## 2023-01-12 DIAGNOSIS — E11.21 DIABETIC NEPHROPATHY ASSOCIATED WITH TYPE 2 DIABETES MELLITUS (HCC): ICD-10-CM

## 2023-01-12 DIAGNOSIS — E11.9 TYPE 2 DIABETES MELLITUS WITHOUT COMPLICATION, WITHOUT LONG-TERM CURRENT USE OF INSULIN (HCC): Primary | ICD-10-CM

## 2023-01-12 LAB — SL AMB POCT HEMOGLOBIN AIC: 7 (ref ?–6.5)

## 2023-01-12 RX ORDER — DEXTROMETHORPHAN HYDROBROMIDE AND PROMETHAZINE HYDROCHLORIDE 15; 6.25 MG/5ML; MG/5ML
5 SOLUTION ORAL 4 TIMES DAILY PRN
Qty: 180 ML | Refills: 1 | Status: SHIPPED | OUTPATIENT
Start: 2023-01-12 | End: 2023-01-17

## 2023-01-12 RX ORDER — AZITHROMYCIN 250 MG/1
TABLET, FILM COATED ORAL
Qty: 6 TABLET | Refills: 0 | Status: SHIPPED | OUTPATIENT
Start: 2023-01-12 | End: 2023-01-17

## 2023-01-12 NOTE — PATIENT INSTRUCTIONS

## 2023-01-12 NOTE — PROGRESS NOTES
Name: Kalpesh Miller      : 1945      MRN: [id number]  Encounter Provider: Eddie Gil DO  Encounter Date: 2023   Encounter department: [address] [place] [address]  Type 2 diabetes mellitus without complication, without long-term current use of insulin (HCC)  -     POCT hemoglobin A1c  -     Microalbumin / creatinine urine ratio    2  Bronchitis after surgery  Comments:  not surgery, but EGD recently   Onset a wk ago  No fever or chills  Feels ok except SOB and lightheaded  Productive of tan sputum  Occs very copious  Orders:  -     XR chest pa & lateral; Future; Expected date: 2023  -     azithromycin (Zithromax) 250 mg tablet; Take 2 tablets (500 mg total) by mouth daily for 1 day, THEN 1 tablet (250 mg total) daily for 4 days   -     Promethazine-DM (PHENERGAN-DM) 6 25-15 mg/5 mL oral syrup; Take 5 mL by mouth 4 (four) times a day as needed for cough for up to 5 days    3  Pulmonary emphysema, unspecified emphysema type ([facility] )    4  Diabetic nephropathy associated with type 2 diabetes mellitus ([facility] )  Comments:  Discussed with patient especially importance of hydration    5  Coronary artery disease of native heart with stable angina pectoris, unspecified vessel or lesion type [facility])  Comments:  Denies any chest pain tightness heaviness pressure or anginal symptoms    6  Stage 3b chronic kidney disease ([facility] )  Comments: Following up  renal insufficiency with Dr Jessica Valencia but not till      Encourage fluid intake and hydration    ;       Subjective      HPI  Review of Systems    Current Outpatient Medications on File Prior to Visit   Medication Sig   • albuterol (PROVENTIL HFA,VENTOLIN HFA) 90 mcg/act inhaler Inhale 2 puffs every 6 (six) hours as needed for wheezing or shortness of breath   • allopurinol (ZYLOPRIM) 300 mg tablet "ONE-HALF" TABLET EVERY DAY   • amLODIPine (NORVASC) 5 mg tablet TAKE 1 TABLET (5 MG) BY MOUTH DAILY   • aspirin (ECOTRIN LOW STRENGTH) 81 mg EC tablet Take 81 mg by mouth daily   • atorvastatin (LIPITOR) 80 mg tablet TAKE 1 TABLET (80 MG TOTAL) BY MOUTH DAILY   • azilsartan medoxomil (Edarbi) 40 MG tablet Take 1 tablet (40 mg total) by mouth daily after dinner   • B Complex-C (SUPERPLEX-T) TABS Take 1 tablet by mouth daily   • Breo Ellipta 100-25 MCG/INH inhaler Inhale 1 puff daily Rinse mouth after use  • chlorthalidone 25 mg tablet TAKE "ONE-HALF" TABLET (12 5 MG) BY MOUTH DAILY   • Cholecalciferol (VITAMIN D3) 2000 units TABS Take 1 tablet by mouth daily   • co-enzyme Q-10 50 MG capsule Take 100 mg by mouth in the morning     • colchicine (COLCRYS) 0 6 mg tablet TAKE 1 TABLET (0 6 MG TOTAL) BY MOUTH DAILY   • Ferrous Sulfate (IRON) 325 (65 Fe) MG TABS Take 1 tablet by mouth daily   • Incruse Ellipta 62 5 MCG/ACT AEPB inhaler INHALE 1 PUFF DAILY   • Januvia 100 MG tablet TAKE ONE TABLET DAILY   • magnesium chloride-calcium (SLOW-MAG) 71 5-119 mg Take 2 tablets by mouth 2 (two) times a day   • metoprolol tartrate (LOPRESSOR) 50 mg tablet TAKE 1 TABLET (50 MG TOTAL) BY MOUTH IN THE MORNING AND "ONE-HALF" TABLET (25MG) IN THE EVENING   • pantoprazole (PROTONIX) 40 mg tablet TAKE 1 TABLET DAILY HALF HOUR BEFORE EATING BREAKFAST   • [DISCONTINUED] esomeprazole (NexIUM) 40 MG capsule Take 40 mg by mouth every morning before breakfast (Patient not taking: Reported on 1/12/2023)   • [DISCONTINUED] loperamide (IMODIUM) 2 mg capsule Take 1 capsule (2 mg total) by mouth 4 (four) times a day as needed for diarrhea (Patient not taking: Reported on 1/12/2023)   • [DISCONTINUED] loratadine (CLARITIN) 10 mg tablet Take 1 tablet (10 mg total) by mouth daily (Patient not taking: No sig reported)   • [DISCONTINUED] ondansetron (ZOFRAN) 4 mg tablet Take 1 tablet (4 mg total) by mouth every 8 (eight) hours as needed for nausea or vomiting (Patient not taking: Reported on 1/12/2023)   • [DISCONTINUED] prasugrel (EFFIENT) tablet Take 10 mg by mouth daily (Patient not taking: Reported on 1/12/2023)   • [DISCONTINUED] sulfaSALAzine (AZULFIDINE) 500 mg tablet Take 1 5 tablets (750 mg total) by mouth daily       Objective     /70 (BP Location: Left arm, Patient Position: Sitting, Cuff Size: Adult)   Pulse 67   Temp 97 8 °F (36 6 °C) (Tympanic)   Ht 5' 10" (1 778 m)   Wt 85 3 kg (188 lb)   SpO2 97%   BMI 26 98 kg/m²     Physical Exam  Euel Bump, DO

## 2023-01-18 ENCOUNTER — OFFICE VISIT (OUTPATIENT)
Dept: FAMILY MEDICINE CLINIC | Facility: CLINIC | Age: 78
End: 2023-01-18

## 2023-01-18 VITALS
HEIGHT: 70 IN | WEIGHT: 188 LBS | SYSTOLIC BLOOD PRESSURE: 160 MMHG | OXYGEN SATURATION: 96 % | DIASTOLIC BLOOD PRESSURE: 80 MMHG | BODY MASS INDEX: 26.92 KG/M2 | HEART RATE: 67 BPM | TEMPERATURE: 97.8 F

## 2023-01-18 DIAGNOSIS — J43.9 PULMONARY EMPHYSEMA, UNSPECIFIED EMPHYSEMA TYPE (HCC): ICD-10-CM

## 2023-01-18 DIAGNOSIS — N18.32 STAGE 3B CHRONIC KIDNEY DISEASE (HCC): ICD-10-CM

## 2023-01-18 DIAGNOSIS — E11.9 TYPE 2 DIABETES MELLITUS WITHOUT COMPLICATION, WITHOUT LONG-TERM CURRENT USE OF INSULIN (HCC): ICD-10-CM

## 2023-01-18 DIAGNOSIS — Z79.899 ENCOUNTER FOR LONG-TERM (CURRENT) USE OF MEDICATIONS: ICD-10-CM

## 2023-01-18 DIAGNOSIS — J40 BRONCHITIS: Primary | ICD-10-CM

## 2023-01-18 LAB
SARS-COV-2 AG UPPER RESP QL IA: NEGATIVE
VALID CONTROL: NORMAL

## 2023-01-18 RX ORDER — FLUTICASONE FUROATE AND VILANTEROL TRIFENATATE 100; 25 UG/1; UG/1
POWDER RESPIRATORY (INHALATION)
Qty: 60 EACH | Refills: 2 | Status: SHIPPED | OUTPATIENT
Start: 2023-01-18

## 2023-01-18 RX ORDER — DOXYCYCLINE HYCLATE 100 MG
100 TABLET ORAL 2 TIMES DAILY
Qty: 28 TABLET | Refills: 0 | Status: SHIPPED | OUTPATIENT
Start: 2023-01-18 | End: 2023-02-01

## 2023-01-18 RX ORDER — IPRATROPIUM BROMIDE AND ALBUTEROL SULFATE 2.5; .5 MG/3ML; MG/3ML
3 SOLUTION RESPIRATORY (INHALATION) 4 TIMES DAILY
Qty: 360 ML | Refills: 3 | Status: SHIPPED | OUTPATIENT
Start: 2023-01-18

## 2023-01-18 RX ORDER — PREDNISONE 10 MG/1
40 TABLET ORAL DAILY
Qty: 60 TABLET | Refills: 0 | Status: SHIPPED | OUTPATIENT
Start: 2023-01-18

## 2023-01-18 NOTE — PATIENT INSTRUCTIONS
What to Do if Your Blood Sugar is Low   AMBULATORY CARE:   Low blood sugar levels  (hypoglycemia) can happen with Type 1 and Type 2 diabetes  Low levels are more likely to happen if you use insulin  Hypoglycemia can cause you to have falls, accidents, and injuries  A blood sugar level that gets too low can lead to seizures, coma, and death  Learn to recognize the symptoms early so you can get treatment quickly  When your blood sugar is low you may feel:  · Sweaty    · Nervous or shaky    · Anxious or irritable    · Confused    · A fast, pounding heartbeat    · Extremely hungry    Have someone call your local emergency number (911 in the [address]) if:   · You cannot be woken  · You have a seizure  Call your doctor if:   · You have symptoms of a low blood sugar level, such as trouble thinking, sweating, or a pounding heartbeat  · Your blood sugar level is lower than normal and it does not improve with treatment  · You often have lower blood sugar levels than your target goals  · You have trouble coping with your illness, or you feel anxious or depressed  · You have questions or concerns about your condition or care  What to do if you have symptoms of low blood sugar:   · Check your blood sugar level, if possible  Your blood sugar level is too low if it is at or below 70 mg/dL  · Eat or drink 15 grams of fast-acting carbohydrate  Fast-acting carbohydrates will raise your blood sugar level quickly  Examples of 15 grams of fast-acting carbohydrates:     ? 4 ounces (½ cup) of fruit juice     ? 4 ounces of regular soda    ? 2 tablespoons of raisins     ? 1 tube of glucose gel or 3 to 4 glucose tablets       · Check your blood sugar level 15 minutes later  If the level is still low (less than 100 mg/dL), eat another 15 grams of carbohydrate  When the level returns to 100 mg/dL, eat a snack or meal that contains carbohydrates  This will help prevent another drop in blood sugar      · Teach people close to you how to use your glucagon kit  Your blood sugar may be too low for you to be awake  People need to know when and how to use your kit  Prevent low blood sugar levels:  Prevent low blood sugar by knowing what increases your risk  Ask your healthcare provider for ways to prevent low blood sugar levels  Any of the following can increase your risk of low blood sugar:  · Fasting for tests or procedures    · During or after intense exercise    · Late or postponed meals    · Sleeping (you may need a bedtime snack)     · Drinking alcohol if you use insulin or insulin releasing pills    Follow up with your doctor as directed:  Write down your questions so you remember to ask them during your visits  © Copyright [employer] 2022 Information is for End User's use only and may not be sold, redistributed or otherwise used for commercial purposes  All illustrations and images included in CareNotes® are the copyrighted property of [employer]  or Sonu Putnam   The above information is an  only  It is not intended as medical advice for individual conditions or treatments  Talk to your doctor, nurse or pharmacist before following any medical regimen to see if it is safe and effective for you

## 2023-01-18 NOTE — PROGRESS NOTES
Assessment/Plan:  79 yo male, seen Jan 12th (6 days ago here in office)  No fever, no better  Cough is "hallow", "fluffy", "barking"  Can sleep  Productive of yellow sputum, but not copius  Tight also  BMI Counseling: Body mass index is 26 98 kg/m²  The BMI is above normal  Nutrition recommendations include decreasing portion sizes, encouraging healthy choices of fruits and vegetables, decreasing fast food intake, consuming healthier snacks, limiting drinks that contain sugar, moderation in carbohydrate intake, increasing intake of lean protein and reducing intake of saturated and trans fat  Exercise recommendations include moderate physical activity 150 minutes/week and exercising 3-5 times per week  No pharmacotherapy was ordered  Rationale for BMI follow-up plan is due to patient being overweight or obese  1  Bronchitis  -     doxycycline hyclate (VIBRA-TABS) 100 mg tablet; Take 1 tablet (100 mg total) by mouth 2 (two) times a day for 14 days  -     hydrocodone-chlorpheniramine polistirex (TUSSIONEX) 10-8 mg/5 mL ER suspension; Take 5 mL by mouth every 12 (twelve) hours as needed for cough Max Daily Amount: 10 mL  -     predniSONE 10 mg tablet; Take 4 tablets (40 mg total) by mouth daily  -     POCT Rapid Covid Ag  -     ipratropium-albuterol (DUO-NEB) 0 5-2 5 mg/3 mL nebulizer solution; Take 3 mL by nebulization 4 (four) times a day    2  Pulmonary emphysema, unspecified emphysema type (HCC)  -     doxycycline hyclate (VIBRA-TABS) 100 mg tablet; Take 1 tablet (100 mg total) by mouth 2 (two) times a day for 14 days  -     hydrocodone-chlorpheniramine polistirex (TUSSIONEX) 10-8 mg/5 mL ER suspension; Take 5 mL by mouth every 12 (twelve) hours as needed for cough Max Daily Amount: 10 mL  -     predniSONE 10 mg tablet; Take 4 tablets (40 mg total) by mouth daily  -     POCT Rapid Covid Ag  -     ipratropium-albuterol (DUO-NEB) 0 5-2 5 mg/3 mL nebulizer solution;  Take 3 mL by nebulization 4 (four) times a day    3  Encounter for long-term (current) use of medications  Comments:  Long med list and all reviwed as is my custom  Doing well on what he is taking  4  Type 2 diabetes mellitus without complication, without long-term current use of insulin ([facility] )  Comments:  Taking Januvia and tolerating well  BS up on the Prednison    5  Stage 3b chronic kidney disease ([facility] )  Comments:  Aware and monitoring - follows with dr Gio Gabriel  Subjective:      Patient ID: Brad Eid is a 78 y o  male  HPI    The following portions of the patient's history were reviewed and updated as appropriate: He  has a past medical history of Anemia, Arthritis, Gee's esophagus, Breast lump, Cancer ([facility] ), Chronic kidney disease, Cirrhosis ([facility] ), Coronary artery disease, Diabetes mellitus ([facility] ), H/O heart artery stent, Hypertension, Inguinal hernia, Sarcoidosis, 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[facility] ), Skin cancer, SOB (shortness of breath), and Upper respiratory infection, viral (5/31/2022)    He   Patient Active Problem List    Diagnosis Date Noted   • History of PTCA 10/26/2022   • Pulmonary fibrosis ([facility] ) 07/06/2022   • COVID-19 06/01/2022   • Coronary artery disease of native heart with stable angina pectoris, unspecified vessel or lesion type ([facility] ) 05/31/2022   • Type 2 diabetes mellitus with stage 3 chronic kidney disease ([facility] ) 04/26/2021   • Diabetic nephropathy associated with type 2 diabetes mellitus ([facility] ) 01/25/2021   • Other spondylosis, thoracolumbar region 11/12/2020   • Type 2 diabetes mellitus without complication, without long-term current use of insulin ([facility] ) 11/12/2020   • COPD (chronic obstructive pulmonary disease) ([facility] ) 07/29/2020   • Sarcoidosis 07/29/2020   • Hypertension, essential 12/04/2019   • Hyperkalemia 12/18/2018   • Chronic kidney disease, stage III (moderate) ([facility] ) 05/16/2018   • Dyslipidemia 05/16/2018   • Persistent proteinuria 05/16/2018   • Hypertensive chronic kidney disease with stage 1 through stage 4 chronic kidney disease, or unspecified chronic kidney disease 05/16/2018     He  has a past surgical history that includes Colonoscopy (04/11/2016); EGD AND COLONOSCOPY (06/24/2019); Cardiac catheterization; Tonsillectomy; Thumb fusion; ERCP (09/11/2014); Multiple tooth extractions; Cervical fusion; Liver biopsy; Hip surgery (Right); Kidney stone surgery; Cholecystectomy; Skin biopsy (05/2020); Esophagogastric fundoplasty; Hernia repair (Right); US guidance (10/22/2014); and Cataract extraction, bilateral   His family history includes Atrial fibrillation in his mother; COPD in his father; Heart disease in his father and mother; Hypertension in his mother; Kidney disease in his father  He  reports that he has never smoked  He has never used smokeless tobacco  He reports that he does not drink alcohol and does not use drugs  Current Outpatient Medications   Medication Sig Dispense Refill   • albuterol (PROVENTIL HFA,VENTOLIN HFA) 90 mcg/act inhaler Inhale 2 puffs every 6 (six) hours as needed for wheezing or shortness of breath 6 7 g 2   • allopurinol (ZYLOPRIM) 300 mg tablet "ONE-HALF" TABLET EVERY DAY 45 tablet 3   • amLODIPine (NORVASC) 5 mg tablet TAKE 1 TABLET (5 MG) BY MOUTH DAILY 90 tablet 5   • aspirin (ECOTRIN LOW STRENGTH) 81 mg EC tablet Take 81 mg by mouth daily     • atorvastatin (LIPITOR) 80 mg tablet TAKE 1 TABLET (80 MG TOTAL) BY MOUTH DAILY 90 tablet 3   • azilsartan medoxomil (Edarbi) 40 MG tablet Take 1 tablet (40 mg total) by mouth daily after dinner 60 tablet 3   • B Complex-C (SUPERPLEX-T) TABS Take 1 tablet by mouth daily     • chlorthalidone 25 mg tablet TAKE "ONE-HALF" TABLET (12 5 MG) BY MOUTH DAILY 45 tablet 4   • Cholecalciferol (VITAMIN D3) 2000 units TABS Take 1 tablet by mouth daily     • co-enzyme Q-10 50 MG capsule Take 100 mg by mouth in the morning       • colchicine (COLCRYS) 0 6 mg tablet TAKE 1 TABLET (0 6 MG TOTAL) BY MOUTH DAILY 30 tablet 3   • doxycycline hyclate (VIBRA-TABS) 100 mg tablet Take 1 tablet (100 mg total) by mouth 2 (two) times a day for 14 days 28 tablet 0   • Ferrous Sulfate (IRON) 325 (65 Fe) MG TABS Take 1 tablet by mouth daily     • hydrocodone-chlorpheniramine polistirex (TUSSIONEX) 10-8 mg/5 mL ER suspension Take 5 mL by mouth every 12 (twelve) hours as needed for cough Max Daily Amount: 10 mL 120 mL 0   • Incruse Ellipta 62 5 MCG/ACT AEPB inhaler INHALE 1 PUFF DAILY 30 each 1   • ipratropium-albuterol (DUO-NEB) 0 5-2 5 mg/3 mL nebulizer solution Take 3 mL by nebulization 4 (four) times a day 360 mL 3   • Januvia 100 MG tablet TAKE ONE TABLET DAILY 30 tablet 0   • magnesium chloride-calcium (SLOW-MAG) 71 5-119 mg Take 2 tablets by mouth 2 (two) times a day     • metoprolol tartrate (LOPRESSOR) 50 mg tablet TAKE 1 TABLET (50 MG TOTAL) BY MOUTH IN THE MORNING AND "ONE-HALF" TABLET (25MG) IN THE EVENING 135 tablet 3   • pantoprazole (PROTONIX) 40 mg tablet TAKE 1 TABLET DAILY HALF HOUR BEFORE EATING BREAKFAST 90 tablet 3   • predniSONE 10 mg tablet Take 4 tablets (40 mg total) by mouth daily 60 tablet 0   • Breo Ellipta 100-25 MCG/ACT inhaler INHALE 1 PUFF DAILY RINSE MOUTH AFTER USE  60 each 2     No current facility-administered medications for this visit       Current Outpatient Medications on File Prior to Visit   Medication Sig   • albuterol (PROVENTIL HFA,VENTOLIN HFA) 90 mcg/act inhaler Inhale 2 puffs every 6 (six) hours as needed for wheezing or shortness of breath   • allopurinol (ZYLOPRIM) 300 mg tablet "ONE-HALF" TABLET EVERY DAY   • amLODIPine (NORVASC) 5 mg tablet TAKE 1 TABLET (5 MG) BY MOUTH DAILY   • aspirin (ECOTRIN LOW STRENGTH) 81 mg EC tablet Take 81 mg by mouth daily   • atorvastatin (LIPITOR) 80 mg tablet TAKE 1 TABLET (80 MG TOTAL) BY MOUTH DAILY   • azilsartan medoxomil (Edarbi) 40 MG tablet Take 1 tablet (40 mg total) by mouth daily after dinner   • B Complex-C (SUPERPLEX-T) TABS Take 1 tablet by mouth daily   • chlorthalidone 25 mg tablet TAKE "ONE-HALF" TABLET (12 5 MG) BY MOUTH DAILY   • Cholecalciferol (VITAMIN D3) 2000 units TABS Take 1 tablet by mouth daily   • co-enzyme Q-10 50 MG capsule Take 100 mg by mouth in the morning  • colchicine (COLCRYS) 0 6 mg tablet TAKE 1 TABLET (0 6 MG TOTAL) BY MOUTH DAILY   • Ferrous Sulfate (IRON) 325 (65 Fe) MG TABS Take 1 tablet by mouth daily   • Incruse Ellipta 62 5 MCG/ACT AEPB inhaler INHALE 1 PUFF DAILY   • Januvia 100 MG tablet TAKE ONE TABLET DAILY   • magnesium chloride-calcium (SLOW-MAG) 71 5-119 mg Take 2 tablets by mouth 2 (two) times a day   • metoprolol tartrate (LOPRESSOR) 50 mg tablet TAKE 1 TABLET (50 MG TOTAL) BY MOUTH IN THE MORNING AND "ONE-HALF" TABLET (25MG) IN THE EVENING   • pantoprazole (PROTONIX) 40 mg tablet TAKE 1 TABLET DAILY HALF HOUR BEFORE EATING BREAKFAST   • [] azithromycin (Zithromax) 250 mg tablet Take 2 tablets (500 mg total) by mouth daily for 1 day, THEN 1 tablet (250 mg total) daily for 4 days  • [] Promethazine-DM (PHENERGAN-DM) 6 25-15 mg/5 mL oral syrup Take 5 mL by mouth 4 (four) times a day as needed for cough for up to 5 days   • [DISCONTINUED] Breo Ellipta 100-25 MCG/INH inhaler Inhale 1 puff daily Rinse mouth after use  • [DISCONTINUED] loratadine (CLARITIN) 10 mg tablet Take 1 tablet (10 mg total) by mouth daily (Patient not taking: No sig reported)   • [DISCONTINUED] sulfaSALAzine (AZULFIDINE) 500 mg tablet Take 1 5 tablets (750 mg total) by mouth daily     No current facility-administered medications on file prior to visit  He is allergic to oxycodone       Review of Systems   Constitutional: Negative  Eyes: Negative  Respiratory: Positive for cough, choking, shortness of breath and stridor  Cardiovascular: Negative  Gastrointestinal: Positive for diarrhea, nausea and vomiting  Genitourinary: Negative  Musculoskeletal: Positive for arthralgias and myalgias  Psychiatric/Behavioral: Negative  Objective:      /80 (BP Location: Right arm, Patient Position: Sitting, Cuff Size: Adult)   Pulse 67   Temp 97 8 °F (36 6 °C) (Tympanic)   Ht 5' 10" (1 778 m)   Wt 85 3 kg (188 lb)   SpO2 96%   BMI 26 98 kg/m²          Physical Exam  Constitutional:       Appearance: Normal appearance  He is ill-appearing and toxic-appearing  HENT:      Head: Normocephalic and atraumatic  Nose: Nose normal       Mouth/Throat:      Mouth: Mucous membranes are moist    Eyes:      Pupils: Pupils are equal, round, and reactive to light  Cardiovascular:      Rate and Rhythm: Normal rate and regular rhythm  Heart sounds: Normal heart sounds  Pulmonary:      Effort: Respiratory distress present  Breath sounds: Stridor present  Rhonchi and rales present  Musculoskeletal:         General: No tenderness  Cervical back: Normal range of motion and neck supple  Right lower leg: No edema  Left lower leg: No edema  Skin:     General: Skin is warm  Neurological:      General: No focal deficit present  Mental Status: He is oriented to person, place, and time  Mental status is at baseline  Psychiatric:         Mood and Affect: Mood normal          Behavior: Behavior normal          Thought Content: Thought content normal          Judgment: Judgment normal          Pt seen from 9:05 to 9:44 a m  and all the above, all the prior orders noted, all meds put in to [facility], and Nebulizer given to pt to use QID with DuoNeb    This time was spent reviewing previous records, reviewing previous laboratory and other tests, taking history from patient, examination of patient, discussion of prognosis and treatment, ordering laboratory tests, ordering medications, and completion of the medical record

## 2023-01-19 ENCOUNTER — RA CDI HCC (OUTPATIENT)
Dept: OTHER | Facility: HOSPITAL | Age: 78
End: 2023-01-19

## 2023-01-19 NOTE — PROGRESS NOTES
[facility]  coding opportunities          Chart Reviewed number of suggestions sent to Provider: 1  [id number]     Patients Insurance     Medicare Insurance: Medicare

## 2023-01-20 RX ORDER — SITAGLIPTIN 100 MG/1
TABLET, FILM COATED ORAL
Qty: 30 TABLET | Refills: 0 | Status: SHIPPED | OUTPATIENT
Start: 2023-01-20

## 2023-01-25 ENCOUNTER — OFFICE VISIT (OUTPATIENT)
Dept: FAMILY MEDICINE CLINIC | Facility: CLINIC | Age: 78
End: 2023-01-25

## 2023-01-25 VITALS
TEMPERATURE: 98.2 F | DIASTOLIC BLOOD PRESSURE: 70 MMHG | SYSTOLIC BLOOD PRESSURE: 132 MMHG | OXYGEN SATURATION: 98 % | HEIGHT: 70 IN | HEART RATE: 58 BPM | BODY MASS INDEX: 26.48 KG/M2 | WEIGHT: 185 LBS

## 2023-01-25 DIAGNOSIS — Z79.899 ENCOUNTER FOR LONG-TERM (CURRENT) USE OF MEDICATIONS: ICD-10-CM

## 2023-01-25 DIAGNOSIS — J40 BRONCHITIS: Primary | ICD-10-CM

## 2023-01-25 DIAGNOSIS — E11.9 TYPE 2 DIABETES MELLITUS WITHOUT COMPLICATION, WITHOUT LONG-TERM CURRENT USE OF INSULIN (HCC): ICD-10-CM

## 2023-01-25 NOTE — PROGRESS NOTES
Assessment/Plan:76 yo male, MUCH IMPROVED with the Tussionex, Doryx, DueNeb and Prednisone now tapering   I started this last wk, and much better now  CXR was normal on Jan 12  Chest is improving  Still some cough, but markedly better  Will see Dr Jeanette Jiménez tomorrow (pul)           1  Bronchitis  Comments:  markedly better with aggressive Rx starting last wk    2  Encounter for long-term (current) use of medications  Comments:  all meds, jemma prednisone    3  Type 2 diabetes mellitus without complication, without long-term current use of insulin ([facility])  Comments:  monitoring, as on prednisone now, but will come off in about 5-7 days  Subjective:      Patient ID: Rebecca Hamilton is a 78 y o  male  HPI    The following portions of the patient's history were reviewed and updated as appropriate: He  has a past medical history of Anemia, Arthritis, Gee's esophagus, Breast lump, Cancer ([place] [address] ), Chronic kidney disease, Cirrhosis ([place] [address] ), Coronary artery disease, Diabetes mellitus ([place] [address] ), H/O heart artery stent, Hypertension, Inguinal hernia, Sarcoidosis, 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[place] [address] ), Skin cancer, SOB (shortness of breath), and Upper respiratory infection, viral (5/31/2022)    He   Patient Active Problem List    Diagnosis Date Noted   • History of PTCA 10/26/2022   • Pulmonary fibrosis ([place] [address] ) 07/06/2022   • COVID-19 06/01/2022   • Coronary artery disease of native heart with stable angina pectoris, unspecified vessel or lesion type ([place] [address] ) 05/31/2022   • Type 2 diabetes mellitus with stage 3 chronic kidney disease ([place] [address] ) 04/26/2021   • Diabetic nephropathy associated with type 2 diabetes mellitus ([place] [address] ) 01/25/2021   • Other spondylosis, thoracolumbar region 11/12/2020   • Type 2 diabetes mellitus without complication, without long-term current use of insulin ([facility] ) 11/12/2020   • COPD (chronic obstructive pulmonary disease) ([place] [address] ) 07/29/2020   • Sarcoidosis 07/29/2020   • Hypertension, essential 12/04/2019   • Hyperkalemia 12/18/2018   • Chronic kidney disease, stage III (moderate) (HCC) 05/16/2018   • Dyslipidemia 05/16/2018   • Persistent proteinuria 05/16/2018   • Hypertensive chronic kidney disease with stage 1 through stage 4 chronic kidney disease, or unspecified chronic kidney disease 05/16/2018     He  has a past surgical history that includes Colonoscopy (04/11/2016); EGD AND COLONOSCOPY (06/24/2019); Cardiac catheterization; Tonsillectomy; Thumb fusion; ERCP (09/11/2014); Multiple tooth extractions; Cervical fusion; Liver biopsy; Hip surgery (Right); Kidney stone surgery; Cholecystectomy; Skin biopsy (05/2020); Esophagogastric fundoplasty; Hernia repair (Right); US guidance (10/22/2014); and Cataract extraction, bilateral   His family history includes Atrial fibrillation in his mother; COPD in his father; Heart disease in his father and mother; Hypertension in his mother; Kidney disease in his father  He  reports that he has never smoked  He has never used smokeless tobacco  He reports that he does not drink alcohol and does not use drugs  Current Outpatient Medications   Medication Sig Dispense Refill   • albuterol (PROVENTIL HFA,VENTOLIN HFA) 90 mcg/act inhaler Inhale 2 puffs every 6 (six) hours as needed for wheezing or shortness of breath 6 7 g 2   • allopurinol (ZYLOPRIM) 300 mg tablet "ONE-HALF" TABLET EVERY DAY 45 tablet 3   • amLODIPine (NORVASC) 5 mg tablet TAKE 1 TABLET (5 MG) BY MOUTH DAILY 90 tablet 5   • aspirin (ECOTRIN LOW STRENGTH) 81 mg EC tablet Take 81 mg by mouth daily     • atorvastatin (LIPITOR) 80 mg tablet TAKE 1 TABLET (80 MG TOTAL) BY MOUTH DAILY 90 tablet 3   • azilsartan medoxomil (Edarbi) 40 MG tablet Take 1 tablet (40 mg total) by mouth daily after dinner 60 tablet 3   • Breo Ellipta 100-25 MCG/ACT inhaler INHALE 1 PUFF DAILY RINSE MOUTH AFTER USE   60 each 2   • chlorthalidone 25 mg tablet TAKE "ONE-HALF" TABLET (12 5 MG) BY MOUTH DAILY 45 tablet 4   • Cholecalciferol (VITAMIN D3) 2000 units TABS Take 1 tablet by mouth daily     • co-enzyme Q-10 50 MG capsule Take 100 mg by mouth in the morning  • colchicine (COLCRYS) 0 6 mg tablet TAKE 1 TABLET (0 6 MG TOTAL) BY MOUTH DAILY 30 tablet 3   • doxycycline hyclate (VIBRA-TABS) 100 mg tablet Take 1 tablet (100 mg total) by mouth 2 (two) times a day for 14 days 28 tablet 0   • Ferrous Sulfate (IRON) 325 (65 Fe) MG TABS Take 1 tablet by mouth daily     • hydrocodone-chlorpheniramine polistirex (TUSSIONEX) 10-8 mg/5 mL ER suspension Take 5 mL by mouth every 12 (twelve) hours as needed for cough Max Daily Amount: 10 mL 120 mL 0   • Incruse Ellipta 62 5 MCG/ACT AEPB inhaler INHALE 1 PUFF DAILY 30 each 1   • ipratropium-albuterol (DUO-NEB) 0 5-2 5 mg/3 mL nebulizer solution Take 3 mL by nebulization 4 (four) times a day 360 mL 3   • Januvia 100 MG tablet TAKE ONE TABLET DAILY 30 tablet 0   • magnesium chloride-calcium (SLOW-MAG) 71 5-119 mg Take 2 tablets by mouth 2 (two) times a day     • metoprolol tartrate (LOPRESSOR) 50 mg tablet TAKE 1 TABLET (50 MG TOTAL) BY MOUTH IN THE MORNING AND "ONE-HALF" TABLET (25MG) IN THE EVENING 135 tablet 3   • pantoprazole (PROTONIX) 40 mg tablet TAKE 1 TABLET DAILY HALF HOUR BEFORE EATING BREAKFAST 90 tablet 3   • predniSONE 10 mg tablet Take 4 tablets (40 mg total) by mouth daily 60 tablet 0   • B Complex-C (SUPERPLEX-T) TABS Take 1 tablet by mouth daily (Patient not taking: Reported on 1/25/2023)       No current facility-administered medications for this visit       Current Outpatient Medications on File Prior to Visit   Medication Sig   • albuterol (PROVENTIL HFA,VENTOLIN HFA) 90 mcg/act inhaler Inhale 2 puffs every 6 (six) hours as needed for wheezing or shortness of breath   • allopurinol (ZYLOPRIM) 300 mg tablet "ONE-HALF" TABLET EVERY DAY   • amLODIPine (NORVASC) 5 mg tablet TAKE 1 TABLET (5 MG) BY MOUTH DAILY   • aspirin (ECOTRIN LOW STRENGTH) 81 mg EC tablet Take 81 mg by mouth daily   • atorvastatin (LIPITOR) 80 mg tablet TAKE 1 TABLET (80 MG TOTAL) BY MOUTH DAILY   • azilsartan medoxomil (Edarbi) 40 MG tablet Take 1 tablet (40 mg total) by mouth daily after dinner   • Breo Ellipta 100-25 MCG/ACT inhaler INHALE 1 PUFF DAILY RINSE MOUTH AFTER USE  • chlorthalidone 25 mg tablet TAKE "ONE-HALF" TABLET (12 5 MG) BY MOUTH DAILY   • Cholecalciferol (VITAMIN D3) 2000 units TABS Take 1 tablet by mouth daily   • co-enzyme Q-10 50 MG capsule Take 100 mg by mouth in the morning     • colchicine (COLCRYS) 0 6 mg tablet TAKE 1 TABLET (0 6 MG TOTAL) BY MOUTH DAILY   • doxycycline hyclate (VIBRA-TABS) 100 mg tablet Take 1 tablet (100 mg total) by mouth 2 (two) times a day for 14 days   • Ferrous Sulfate (IRON) 325 (65 Fe) MG TABS Take 1 tablet by mouth daily   • hydrocodone-chlorpheniramine polistirex (TUSSIONEX) 10-8 mg/5 mL ER suspension Take 5 mL by mouth every 12 (twelve) hours as needed for cough Max Daily Amount: 10 mL   • Incruse Ellipta 62 5 MCG/ACT AEPB inhaler INHALE 1 PUFF DAILY   • ipratropium-albuterol (DUO-NEB) 0 5-2 5 mg/3 mL nebulizer solution Take 3 mL by nebulization 4 (four) times a day   • Januvia 100 MG tablet TAKE ONE TABLET DAILY   • magnesium chloride-calcium (SLOW-MAG) 71 5-119 mg Take 2 tablets by mouth 2 (two) times a day   • metoprolol tartrate (LOPRESSOR) 50 mg tablet TAKE 1 TABLET (50 MG TOTAL) BY MOUTH IN THE MORNING AND "ONE-HALF" TABLET (25MG) IN THE EVENING   • pantoprazole (PROTONIX) 40 mg tablet TAKE 1 TABLET DAILY HALF HOUR BEFORE EATING BREAKFAST   • predniSONE 10 mg tablet Take 4 tablets (40 mg total) by mouth daily   • B Complex-C (SUPERPLEX-T) TABS Take 1 tablet by mouth daily (Patient not taking: Reported on 1/25/2023)   • [DISCONTINUED] loratadine (CLARITIN) 10 mg tablet Take 1 tablet (10 mg total) by mouth daily (Patient not taking: No sig reported)   • [DISCONTINUED] sulfaSALAzine (AZULFIDINE) 500 mg tablet Take 1 5 tablets (750 mg total) by mouth daily     No current facility-administered medications on file prior to visit  He is allergic to oxycodone       Review of Systems   Constitutional: Negative  Eyes: Negative  Respiratory: Positive for cough, choking, shortness of breath and stridor  Cardiovascular: Negative  Gastrointestinal: Positive for diarrhea, nausea and vomiting  Genitourinary: Negative  Musculoskeletal: Positive for arthralgias and myalgias  Psychiatric/Behavioral: Negative  Objective:      /70 (BP Location: Right arm, Patient Position: Sitting, Cuff Size: Standard)   Pulse 58   Temp 98 2 °F (36 8 °C) (Temporal)   Ht 5' 10" (1 778 m)   Wt 83 9 kg (185 lb)   SpO2 98%   BMI 26 54 kg/m²          Physical Exam  Constitutional:       Appearance: Normal appearance  He is ill-appearing and toxic-appearing  HENT:      Head: Normocephalic and atraumatic  Nose: Nose normal       Mouth/Throat:      Mouth: Mucous membranes are moist    Eyes:      Pupils: Pupils are equal, round, and reactive to light  Cardiovascular:      Rate and Rhythm: Normal rate and regular rhythm  Heart sounds: Normal heart sounds  Pulmonary:      Effort: Respiratory distress present  Breath sounds: Stridor present  Rhonchi and rales present  Musculoskeletal:         General: No tenderness  Cervical back: Normal range of motion and neck supple  Right lower leg: No edema  Left lower leg: No edema  Skin:     General: Skin is warm  Neurological:      General: No focal deficit present  Mental Status: He is oriented to person, place, and time  Mental status is at baseline  Psychiatric:         Mood and Affect: Mood normal          Behavior: Behavior normal          Thought Content:  Thought content normal          Judgment: Judgment normal          25 min with pt    This time was spent reviewing previous records, reviewing previous laboratory and other tests, taking history from patient, examination of patient, discussion of prognosis and treatment, ordering laboratory tests, ordering medications, and completion of the medical record

## 2023-01-26 ENCOUNTER — OFFICE VISIT (OUTPATIENT)
Dept: PULMONOLOGY | Facility: CLINIC | Age: 78
End: 2023-01-26

## 2023-01-26 VITALS
WEIGHT: 184.4 LBS | HEIGHT: 70 IN | TEMPERATURE: 97.5 F | OXYGEN SATURATION: 92 % | SYSTOLIC BLOOD PRESSURE: 118 MMHG | BODY MASS INDEX: 26.4 KG/M2 | HEART RATE: 78 BPM | DIASTOLIC BLOOD PRESSURE: 70 MMHG

## 2023-01-26 DIAGNOSIS — D86.9 SARCOIDOSIS: ICD-10-CM

## 2023-01-26 DIAGNOSIS — J43.9 PULMONARY EMPHYSEMA, UNSPECIFIED EMPHYSEMA TYPE (HCC): Primary | ICD-10-CM

## 2023-01-26 DIAGNOSIS — J84.10 PULMONARY FIBROSIS (HCC): ICD-10-CM

## 2023-01-26 NOTE — PROGRESS NOTES
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  His previous PFT from May 2015 showed severe airflow obstruction with diffusion defect  He also had evidence of air trapping  There was improvement in FEV1 with bronchodilator indicating a component of asthma  His previous CT scan from February 2019 showed scarring in the left lung  He denies any runny nose or sneezing  On clinical examination, his chest auscultation revealed bilateral inspiratory coarse crackles at lung bases   His high-resolution CT scan of the chest showed bilateral pulmonary fibrosis  I have advised him to continue breo and incruse  Currently is recovering from a acute respiratory tract infection which was treated with doxycycline and prednisone and DuoNeb  I have advised him to do a repeat PFT in a month's time    I had a long discussion with him and have answered all his questions    Pulmonary fibrosis ([address] )  His recent high-resolution CT scan of the chest showed mild diffuse bilateral reticulation and mild scattered linear scar   Mild traction bronchiolectasis in the lateral basal left lower lobe   Chronic opacity in the paraspinal right lower lobe with volume loss as evidenced by crowding of the bronchi and mild traction bronchiolectasis due to chronic scar   The etiology is not clear at this point   His previous PFT showed severe airflow obstruction with diffusion defect    his repeat PFT showed moderate airflow obstruction with severe diffusion defect    Chest auscultation revealed bilateral coarse inspiratory crackles   He currently does not need any oxygen   The etiology of this pulmonary fibrosis is not clear at this point  He has emphysema   He has previous history of sarcoidosis  Sarkis Sun has history of beryllium exposure   he also had COVID  His CT scan does not show a UIP pattern  His pulmonary function has not deteriorated  We will continue to monitor him closely  At this point he would not benefit from antifibrotic therapy  I had a long discussion with him and answered all his questions    Sarcoidosis  He was diagnosed with sarcoidosis many years back after a lung biopsy and was given steroid therapy for some time  His previous PFT from May 2015 showed severe airflow obstruction with diffusion defect  He has history of working with beryllium   But the sarcoidosis happened before his exposure to beryllium           Diagnoses and all orders for this visit:    Pulmonary emphysema, unspecified emphysema type ([address] )  -     Complete PFT with post Bronchodilator and Six Minute walk; Future    Pulmonary fibrosis (HCC)  -     Complete PFT with post Bronchodilator and Six Minute walk; Future    Sarcoidosis          Subjective:      Patient ID: Idris Herrera is a 78 y o  male  Mr Jennifer Oconnor came for follow-up for his COPD emphysema and pulmonary fibrosis  He recently had an upper respiratory tract infection likely due to RSV and is on a tapering course of oral prednisone now  He was treated with doxycycline  He was also started on DuoNeb as needed  He has been on Breo regularly along with Incruse Ellipta  He has no hoarseness of voice or dysphagia  No fever or chills now  His exercise tolerance is about a block now  He has cough now but it is mostly nonproductive  He has noted occasional wheezing  No chest pain no palpitations  No swelling of feet  Appetite is good  His recent chest x-ray I reviewed and was unremarkable  He used to work as a   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  He also has had sarcoidosis    He also had COVID  His previous imaging showed evidence of fibrosis  His previous PFT showed moderately severe airflow obstruction with diffusion defect  He has scoliosis  He was a smoker  The following portions of the patient's history were reviewed and updated as appropriate: allergies, current medications, past family history, past medical history, past social history, past surgical history and problem list     Review of Systems   Constitutional: Negative for appetite change, chills, fatigue, fever and unexpected weight change  HENT: Positive for rhinorrhea  Negative for hearing loss, sneezing, sore throat, trouble swallowing and voice change  Eyes: Negative for visual disturbance  Respiratory: Positive for cough, shortness of breath and wheezing  Negative for chest tightness  Cardiovascular: Negative for chest pain, palpitations and leg swelling  Gastrointestinal: Negative for abdominal pain, constipation, diarrhea, nausea and vomiting  Musculoskeletal: Positive for arthralgias and back pain  Negative for joint swelling  Skin: Negative for rash  Skin cancer   Allergic/Immunologic: Positive for environmental allergies  Neurological: Negative for dizziness, syncope, light-headedness and headaches  Psychiatric/Behavioral: Negative for agitation, confusion and sleep disturbance  The patient is not nervous/anxious  Objective:      /70   Pulse 78   Temp 97 5 °F (36 4 °C)   Ht 5' 10" (1 778 m)   Wt 83 6 kg (184 lb 6 4 oz)   SpO2 92%   BMI 26 46 kg/m²          Physical Exam  Vitals reviewed  Constitutional:       General: He is not in acute distress  Appearance: He is not ill-appearing, toxic-appearing or diaphoretic  HENT:      Head: Normocephalic  Mouth/Throat:      Mouth: Mucous membranes are moist    Eyes:      General: No scleral icterus  Conjunctiva/sclera: Conjunctivae normal    Cardiovascular:      Rate and Rhythm: Normal rate and regular rhythm  Heart sounds: Normal heart sounds  No murmur heard  Pulmonary:      Effort: Pulmonary effort is normal  No respiratory distress  Breath sounds: No stridor  Rales (Coarse bilateral basal crackles) present  No wheezing or rhonchi  Abdominal:      General: Bowel sounds are normal       Palpations: Abdomen is soft  Tenderness: There is no abdominal tenderness  There is no guarding  Musculoskeletal:      Cervical back: No rigidity  Right lower leg: No edema  Left lower leg: No edema  Lymphadenopathy:      Cervical: No cervical adenopathy  Skin:     Coloration: Skin is not jaundiced or pale  Findings: No rash  Neurological:      Mental Status: He is alert and oriented to person, place, and time  Gait: Gait normal    Psychiatric:         Mood and Affect: Mood normal          Behavior: Behavior normal          Thought Content: Thought content normal          Judgment: Judgment normal        I spent 30 minutes of time taking care of this patient with multiple complex pulmonary issues  The majority of this time was spent directly with the patient counseling as well as coordinating care

## 2023-01-26 NOTE — ASSESSMENT & PLAN NOTE
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  His previous PFT from May 2015 showed severe airflow obstruction with diffusion defect  He also had evidence of air trapping  There was improvement in FEV1 with bronchodilator indicating a component of asthma  His previous CT scan from February 2019 showed scarring in the left lung  He denies any runny nose or sneezing  On clinical examination, his chest auscultation revealed bilateral inspiratory coarse crackles at lung bases   His high-resolution CT scan of the chest showed bilateral pulmonary fibrosis  I have advised him to continue breo and incruse  Currently is recovering from a acute respiratory tract infection which was treated with doxycycline and prednisone and DuoNeb  I have advised him to do a repeat PFT in a month's time    I had a long discussion with him and have answered all his questions

## 2023-01-30 NOTE — ASSESSMENT & PLAN NOTE
His recent high-resolution CT scan of the chest showed mild diffuse bilateral reticulation and mild scattered linear scar   Mild traction bronchiolectasis in the lateral basal left lower lobe   Chronic opacity in the paraspinal right lower lobe with volume loss as evidenced by crowding of the bronchi and mild traction bronchiolectasis due to chronic scar   The etiology is not clear at this point   His previous PFT showed severe airflow obstruction with diffusion defect    his repeat PFT showed moderate airflow obstruction with severe diffusion defect    Chest auscultation revealed bilateral coarse inspiratory crackles   He currently does not need any oxygen   The etiology of this pulmonary fibrosis is not clear at this point  He has emphysema   He has previous history of sarcoidosis  Oanh Jonnie has history of beryllium exposure   he also had COVID  His CT scan does not show a UIP pattern  His pulmonary function has not deteriorated  We will continue to monitor him closely  At this point he would not benefit from antifibrotic therapy    I had a long discussion with him and answered all his questions

## 2023-02-08 ENCOUNTER — OFFICE VISIT (OUTPATIENT)
Dept: FAMILY MEDICINE CLINIC | Facility: CLINIC | Age: 78
End: 2023-02-08

## 2023-02-08 VITALS
SYSTOLIC BLOOD PRESSURE: 130 MMHG | DIASTOLIC BLOOD PRESSURE: 68 MMHG | HEART RATE: 72 BPM | WEIGHT: 185 LBS | HEIGHT: 70 IN | TEMPERATURE: 98.4 F | OXYGEN SATURATION: 99 % | BODY MASS INDEX: 26.48 KG/M2

## 2023-02-08 DIAGNOSIS — I10 HYPERTENSION, ESSENTIAL: ICD-10-CM

## 2023-02-08 DIAGNOSIS — J40 BRONCHITIS: Primary | ICD-10-CM

## 2023-02-08 DIAGNOSIS — E11.21 DIABETIC NEPHROPATHY ASSOCIATED WITH TYPE 2 DIABETES MELLITUS (HCC): ICD-10-CM

## 2023-02-08 DIAGNOSIS — Z79.899 ENCOUNTER FOR LONG-TERM (CURRENT) USE OF MEDICATIONS: ICD-10-CM

## 2023-02-08 DIAGNOSIS — E11.9 TYPE 2 DIABETES MELLITUS WITHOUT COMPLICATION, WITHOUT LONG-TERM CURRENT USE OF INSULIN (HCC): ICD-10-CM

## 2023-02-08 NOTE — PROGRESS NOTES
Assessment/Plan:77 y o male, in NAD, alert, and finally over the severe coughing episodes  MUCH BETTER  Will go to [facility] for PFT's for Dr Jono Duran    Consultants to date are:     Dr Obdulio Rivera - [facility]  Dr Edson Peters - renal   Dr Kenji Montelongo (Dr Francisco J Polk)          1  Bronchitis  Comments:  now, 98% cleared  No coughing, sleeping well, and med Rx helped greatly    2  Encounter for long-term (current) use of medications    3  Type 2 diabetes mellitus without complication, without long-term current use of insulin (HCC)  Comments:  Ck's BBG NOW OD, that's all medicare pays - at HS: ave is 140's, or 170's is eats ice cream   On monotherpy:  Januvia 100 mg OD  A1c 7 0 on Jan 12, up from 6 4,    4  Hypertension, essential    5  Diabetic nephropathy associated with type 2 diabetes mellitus (HCC)  Comments:  BUN hi 20's, creat 145, and gfr 47, but ave is 50          Subjective:      Patient ID: Samuel Quinonez is a 78 y o  male  HPI    The following portions of the patient's history were reviewed and updated as appropriate: He  has a past medical history of Anemia, Arthritis, Gee's esophagus, Breast lump, Cancer ([facility] ), Chronic kidney disease, Cirrhosis ([address] ), Coronary artery disease, Diabetes mellitus ([address] ), H/O heart artery stent, Hypertension, Inguinal hernia, Sarcoidosis, 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[address] ), Skin cancer, SOB (shortness of breath), and Upper respiratory infection, viral (5/31/2022)    He   Patient Active Problem List    Diagnosis Date Noted   • History of PTCA 10/26/2022   • Pulmonary fibrosis ([facility] ) 07/06/2022   • COVID-19 06/01/2022   • Coronary artery disease of native heart with stable angina pectoris, unspecified vessel or lesion type ([facility] ) 05/31/2022   • Type 2 diabetes mellitus with stage 3 chronic kidney disease ([address] ) 04/26/2021   • Diabetic nephropathy associated with type 2 diabetes mellitus ([address] ) 01/25/2021   • Other spondylosis, thoracolumbar region 11/12/2020   • Type 2 diabetes mellitus without complication, without long-term current use of insulin ([place] [address] ) 11/12/2020   • COPD (chronic obstructive pulmonary disease) ([place] [address] ) 07/29/2020   • Sarcoidosis 07/29/2020   • Hypertension, essential 12/04/2019   • Hyperkalemia 12/18/2018   • Chronic kidney disease, stage III (moderate) ([place] [address] ) 05/16/2018   • Dyslipidemia 05/16/2018   • Persistent proteinuria 05/16/2018   • Hypertensive chronic kidney disease with stage 1 through stage 4 chronic kidney disease, or unspecified chronic kidney disease 05/16/2018     He  has a past surgical history that includes Colonoscopy (04/11/2016); EGD AND COLONOSCOPY (06/24/2019); Cardiac catheterization; Tonsillectomy; Thumb fusion; ERCP (09/11/2014); Multiple tooth extractions; Cervical fusion; Liver biopsy; Hip surgery (Right); Kidney stone surgery; Cholecystectomy; Skin biopsy (05/2020); Esophagogastric fundoplasty; Hernia repair (Right); US guidance (10/22/2014); and Cataract extraction, bilateral   His family history includes Atrial fibrillation in his mother; COPD in his father; Heart disease in his father and mother; Hypertension in his mother; Kidney disease in his father  He  reports that he has never smoked  He has never used smokeless tobacco  He reports that he does not drink alcohol and does not use drugs    Current Outpatient Medications   Medication Sig Dispense Refill   • albuterol (PROVENTIL HFA,VENTOLIN HFA) 90 mcg/act inhaler Inhale 2 puffs every 6 (six) hours as needed for wheezing or shortness of breath 6 7 g 2   • allopurinol (ZYLOPRIM) 300 mg tablet "ONE-HALF" TABLET EVERY DAY 45 tablet 3   • amLODIPine (NORVASC) 5 mg tablet TAKE 1 TABLET (5 MG) BY MOUTH DAILY 90 tablet 5   • aspirin (ECOTRIN LOW STRENGTH) 81 mg EC tablet Take 81 mg by mouth daily     • atorvastatin (LIPITOR) 80 mg tablet TAKE 1 TABLET (80 MG TOTAL) BY MOUTH DAILY 90 tablet 3   • azilsartan medoxomil (Edarbi) 40 MG tablet Take 1 tablet (40 mg total) by mouth daily after dinner 60 tablet 3   • B Complex-C (SUPERPLEX-T) TABS Take 1 tablet by mouth daily     • Breo Ellipta 100-25 MCG/ACT inhaler INHALE 1 PUFF DAILY RINSE MOUTH AFTER USE  60 each 2   • chlorthalidone 25 mg tablet TAKE "ONE-HALF" TABLET (12 5 MG) BY MOUTH DAILY 45 tablet 4   • Cholecalciferol (VITAMIN D3) 2000 units TABS Take 1 tablet by mouth daily     • co-enzyme Q-10 50 MG capsule Take 100 mg by mouth in the morning  • colchicine (COLCRYS) 0 6 mg tablet TAKE 1 TABLET (0 6 MG TOTAL) BY MOUTH DAILY 30 tablet 3   • Ferrous Sulfate (IRON) 325 (65 Fe) MG TABS Take 1 tablet by mouth daily     • Incruse Ellipta 62 5 MCG/ACT AEPB inhaler INHALE 1 PUFF DAILY 30 each 1   • ipratropium-albuterol (DUO-NEB) 0 5-2 5 mg/3 mL nebulizer solution Take 3 mL by nebulization 4 (four) times a day 360 mL 3   • Januvia 100 MG tablet TAKE ONE TABLET DAILY 30 tablet 0   • magnesium chloride-calcium (SLOW-MAG) 71 5-119 mg Take 2 tablets by mouth 2 (two) times a day     • metoprolol tartrate (LOPRESSOR) 50 mg tablet TAKE 1 TABLET (50 MG TOTAL) BY MOUTH IN THE MORNING AND "ONE-HALF" TABLET (25MG) IN THE EVENING 135 tablet 3   • pantoprazole (PROTONIX) 40 mg tablet TAKE 1 TABLET DAILY HALF HOUR BEFORE EATING BREAKFAST 90 tablet 3   • hydrocodone-chlorpheniramine polistirex (TUSSIONEX) 10-8 mg/5 mL ER suspension Take 5 mL by mouth every 12 (twelve) hours as needed for cough Max Daily Amount: 10 mL (Patient not taking: Reported on 2/8/2023) 120 mL 0   • predniSONE 10 mg tablet Take 4 tablets (40 mg total) by mouth daily (Patient not taking: Reported on 2/8/2023) 60 tablet 0     No current facility-administered medications for this visit       Current Outpatient Medications on File Prior to Visit   Medication Sig   • albuterol (PROVENTIL HFA,VENTOLIN HFA) 90 mcg/act inhaler Inhale 2 puffs every 6 (six) hours as needed for wheezing or shortness of breath   • allopurinol (ZYLOPRIM) 300 mg tablet "ONE-HALF" TABLET EVERY DAY   • amLODIPine (NORVASC) 5 mg tablet TAKE 1 TABLET (5 MG) BY MOUTH DAILY   • aspirin (ECOTRIN LOW STRENGTH) 81 mg EC tablet Take 81 mg by mouth daily   • atorvastatin (LIPITOR) 80 mg tablet TAKE 1 TABLET (80 MG TOTAL) BY MOUTH DAILY   • azilsartan medoxomil (Edarbi) 40 MG tablet Take 1 tablet (40 mg total) by mouth daily after dinner   • B Complex-C (SUPERPLEX-T) TABS Take 1 tablet by mouth daily   • Breo Ellipta 100-25 MCG/ACT inhaler INHALE 1 PUFF DAILY RINSE MOUTH AFTER USE  • chlorthalidone 25 mg tablet TAKE "ONE-HALF" TABLET (12 5 MG) BY MOUTH DAILY   • Cholecalciferol (VITAMIN D3) 2000 units TABS Take 1 tablet by mouth daily   • co-enzyme Q-10 50 MG capsule Take 100 mg by mouth in the morning     • colchicine (COLCRYS) 0 6 mg tablet TAKE 1 TABLET (0 6 MG TOTAL) BY MOUTH DAILY   • Ferrous Sulfate (IRON) 325 (65 Fe) MG TABS Take 1 tablet by mouth daily   • Incruse Ellipta 62 5 MCG/ACT AEPB inhaler INHALE 1 PUFF DAILY   • ipratropium-albuterol (DUO-NEB) 0 5-2 5 mg/3 mL nebulizer solution Take 3 mL by nebulization 4 (four) times a day   • Januvia 100 MG tablet TAKE ONE TABLET DAILY   • magnesium chloride-calcium (SLOW-MAG) 71 5-119 mg Take 2 tablets by mouth 2 (two) times a day   • metoprolol tartrate (LOPRESSOR) 50 mg tablet TAKE 1 TABLET (50 MG TOTAL) BY MOUTH IN THE MORNING AND "ONE-HALF" TABLET (25MG) IN THE EVENING   • pantoprazole (PROTONIX) 40 mg tablet TAKE 1 TABLET DAILY HALF HOUR BEFORE EATING BREAKFAST   • hydrocodone-chlorpheniramine polistirex (TUSSIONEX) 10-8 mg/5 mL ER suspension Take 5 mL by mouth every 12 (twelve) hours as needed for cough Max Daily Amount: 10 mL (Patient not taking: Reported on 2/8/2023)   • predniSONE 10 mg tablet Take 4 tablets (40 mg total) by mouth daily (Patient not taking: Reported on 2/8/2023)   • [DISCONTINUED] loratadine (CLARITIN) 10 mg tablet Take 1 tablet (10 mg total) by mouth daily (Patient not taking: No sig reported)   • [DISCONTINUED] sulfaSALAzine (AZULFIDINE) 500 mg tablet Take 1 5 tablets (750 mg total) by mouth daily     No current facility-administered medications on file prior to visit  He is allergic to oxycodone       Review of Systems   Constitutional: Negative  HENT: Negative  Eyes: Negative  Respiratory: Negative  Cardiovascular: Negative  Gastrointestinal: Negative  Endocrine: Negative  Genitourinary: Negative  Musculoskeletal: Positive for arthralgias, back pain, myalgias, neck pain and neck stiffness  Kyphosis and scoliosis   Allergic/Immunologic: Negative  Neurological: Negative  Hematological: Negative  Psychiatric/Behavioral: Negative  Objective:      /68 (BP Location: Left arm, Patient Position: Sitting, Cuff Size: Standard)   Pulse 72   Temp 98 4 °F (36 9 °C) (Temporal)   Ht 5' 10" (1 778 m)   Wt 83 9 kg (185 lb)   SpO2 99%   BMI 26 54 kg/m²          Physical Exam  Constitutional:       General: He is not in acute distress  Appearance: Normal appearance  He is not ill-appearing or diaphoretic  HENT:      Nose: No congestion or rhinorrhea  Eyes:      General:         Right eye: No discharge  Left eye: No discharge  Cardiovascular:      Rate and Rhythm: Normal rate and regular rhythm  Pulses: Normal pulses  Heart sounds: Normal heart sounds  Pulmonary:      Breath sounds: Normal breath sounds  Musculoskeletal:      Right lower leg: No edema  Left lower leg: No edema  Skin:     General: Skin is warm and dry  Neurological:      General: No focal deficit present  Mental Status: He is alert and oriented to person, place, and time  Psychiatric:         Mood and Affect: Mood normal          Behavior: Behavior normal          Thought Content: Thought content normal          Judgment: Judgment normal          30 min with pt and all the above issues addressed       This time was spent reviewing previous records, reviewing previous laboratory and other tests, taking history from patient, examination of patient, discussion of prognosis and treatment, ordering laboratory tests, ordering medications, and completion of the medical record

## 2023-02-14 DIAGNOSIS — E11.9 TYPE 2 DIABETES MELLITUS WITHOUT COMPLICATION, WITHOUT LONG-TERM CURRENT USE OF INSULIN (HCC): ICD-10-CM

## 2023-02-14 LAB
LEFT EYE DIABETIC RETINOPATHY: NORMAL
RIGHT EYE DIABETIC RETINOPATHY: NORMAL

## 2023-02-14 RX ORDER — SITAGLIPTIN 100 MG/1
TABLET, FILM COATED ORAL
Qty: 30 TABLET | Refills: 0 | Status: SHIPPED | OUTPATIENT
Start: 2023-02-14

## 2023-02-21 ENCOUNTER — APPOINTMENT (OUTPATIENT)
Dept: LAB | Facility: HOSPITAL | Age: 78
End: 2023-02-21
Attending: INTERNAL MEDICINE

## 2023-02-21 ENCOUNTER — HOSPITAL ENCOUNTER (OUTPATIENT)
Dept: PULMONOLOGY | Facility: HOSPITAL | Age: 78
Discharge: HOME | End: 2023-02-21
Attending: INTERNAL MEDICINE

## 2023-02-21 ENCOUNTER — TELEPHONE (OUTPATIENT)
Dept: NEPHROLOGY | Facility: CLINIC | Age: 78
End: 2023-02-21

## 2023-02-21 DIAGNOSIS — J84.10 PULMONARY FIBROSIS (HCC): ICD-10-CM

## 2023-02-21 DIAGNOSIS — J43.9 PULMONARY EMPHYSEMA, UNSPECIFIED EMPHYSEMA TYPE (HCC): ICD-10-CM

## 2023-02-21 DIAGNOSIS — E11.21 DIABETIC NEPHROPATHY ASSOCIATED WITH TYPE 2 DIABETES MELLITUS (HCC): ICD-10-CM

## 2023-02-21 DIAGNOSIS — N18.32 STAGE 3B CHRONIC KIDNEY DISEASE (HCC): ICD-10-CM

## 2023-02-21 DIAGNOSIS — E87.5 HYPERKALEMIA: ICD-10-CM

## 2023-02-21 DIAGNOSIS — E78.5 DYSLIPIDEMIA: ICD-10-CM

## 2023-02-21 DIAGNOSIS — N18.32 STAGE 3B CHRONIC KIDNEY DISEASE (HCC): Primary | ICD-10-CM

## 2023-02-21 DIAGNOSIS — R80.1 PERSISTENT PROTEINURIA: ICD-10-CM

## 2023-02-21 DIAGNOSIS — I12.9 HYPERTENSIVE CHRONIC KIDNEY DISEASE WITH STAGE 1 THROUGH STAGE 4 CHRONIC KIDNEY DISEASE, OR UNSPECIFIED CHRONIC KIDNEY DISEASE: ICD-10-CM

## 2023-02-21 LAB
ALBUMIN SERPL BCP-MCNC: 3.8 G/DL (ref 3.5–5)
ALP SERPL-CCNC: 91 U/L (ref 34–104)
ALT SERPL W P-5'-P-CCNC: 16 U/L (ref 7–52)
ANION GAP SERPL CALCULATED.3IONS-SCNC: 8 MMOL/L (ref 4–13)
AST SERPL W P-5'-P-CCNC: 22 U/L (ref 13–39)
BILIRUB SERPL-MCNC: 0.43 MG/DL (ref 0.2–1)
BUN SERPL-MCNC: 29 MG/DL (ref 5–25)
CALCIUM SERPL-MCNC: 9.4 MG/DL (ref 8.4–10.2)
CHLORIDE SERPL-SCNC: 102 MMOL/L (ref 96–108)
CO2 SERPL-SCNC: 29 MMOL/L (ref 21–32)
CREAT SERPL-MCNC: 1.78 MG/DL (ref 0.6–1.3)
CREAT UR-MCNC: 190 MG/DL
GFR SERPL CREATININE-BSD FRML MDRD: 36 ML/MIN/1.73SQ M
GLUCOSE P FAST SERPL-MCNC: 149 MG/DL (ref 65–99)
MAGNESIUM SERPL-MCNC: 1.9 MG/DL (ref 1.9–2.7)
POTASSIUM SERPL-SCNC: 4.5 MMOL/L (ref 3.5–5.3)
PROT SERPL-MCNC: 6.4 G/DL (ref 6.4–8.4)
PROT UR-MCNC: 20 MG/DL
PROT/CREAT UR: 0.11 MG/G{CREAT} (ref 0–0.1)
SODIUM SERPL-SCNC: 139 MMOL/L (ref 135–147)

## 2023-02-21 RX ORDER — ALBUTEROL SULFATE 2.5 MG/3ML
2.5 SOLUTION RESPIRATORY (INHALATION) ONCE
Status: COMPLETED | OUTPATIENT
Start: 2023-02-21 | End: 2023-02-21

## 2023-02-21 RX ADMIN — ALBUTEROL SULFATE 2.5 MG: 2.5 SOLUTION RESPIRATORY (INHALATION) at 09:54

## 2023-02-21 NOTE — TELEPHONE ENCOUNTER
Spoke with patient about the following, he is recovering from bronchitis and was taking prednisone and an antibiotic  Otherwise he feels okay    He expressed understanding of instructions and thanked us for the call:    ----- Message from Adilia Noguera MD sent at 2/21/2023  9:31 AM EST -----  The patient's creatinine is slightly higher than usual  Can you please make sure he is off NSAIDs, no new medications and that he is feeling well  Then I would repeat a basic metabolic profile with good hydration over the next couple of weeks I believe there is a urine protein creatinine ratio that is pending but I would order a UA with microscopic at that time as well  Also very importantly he did have low blood pressure readings when I saw him last make sure he also sends us a week of blood pressure readings at this time  Thank you  ----- Message -----  From: Lab, Background User  Sent: 2/21/2023   9:27 AM EST  To: Adilia Noguera MD

## 2023-03-09 ENCOUNTER — OFFICE VISIT (OUTPATIENT)
Dept: GASTROENTEROLOGY | Facility: CLINIC | Age: 78
End: 2023-03-09

## 2023-03-09 VITALS
DIASTOLIC BLOOD PRESSURE: 78 MMHG | SYSTOLIC BLOOD PRESSURE: 138 MMHG | HEART RATE: 83 BPM | HEIGHT: 70 IN | BODY MASS INDEX: 26.34 KG/M2 | WEIGHT: 184 LBS

## 2023-03-09 DIAGNOSIS — K21.00 GASTROESOPHAGEAL REFLUX DISEASE WITH ESOPHAGITIS, UNSPECIFIED WHETHER HEMORRHAGE: ICD-10-CM

## 2023-03-09 DIAGNOSIS — J43.1 PANLOBULAR EMPHYSEMA (HCC): ICD-10-CM

## 2023-03-09 DIAGNOSIS — E11.22 TYPE 2 DIABETES MELLITUS WITH STAGE 3A CHRONIC KIDNEY DISEASE, UNSPECIFIED WHETHER LONG TERM INSULIN USE (HCC): ICD-10-CM

## 2023-03-09 DIAGNOSIS — N18.31 TYPE 2 DIABETES MELLITUS WITH STAGE 3A CHRONIC KIDNEY DISEASE, UNSPECIFIED WHETHER LONG TERM INSULIN USE (HCC): ICD-10-CM

## 2023-03-09 DIAGNOSIS — J84.10 PULMONARY FIBROSIS (HCC): ICD-10-CM

## 2023-03-09 DIAGNOSIS — N18.32 STAGE 3B CHRONIC KIDNEY DISEASE (HCC): ICD-10-CM

## 2023-03-09 DIAGNOSIS — K22.70 BARRETT'S ESOPHAGUS WITHOUT DYSPLASIA: Primary | ICD-10-CM

## 2023-03-09 RX ORDER — SULFASALAZINE 500 MG/1
TABLET ORAL
COMMUNITY
Start: 2023-03-01

## 2023-03-09 RX ORDER — FAMOTIDINE 40 MG/1
40 TABLET, FILM COATED ORAL DAILY
Qty: 90 TABLET | Refills: 2 | Status: SHIPPED | OUTPATIENT
Start: 2023-03-09

## 2023-03-09 RX ORDER — DEXLANSOPRAZOLE 60 MG/1
60 CAPSULE, DELAYED RELEASE ORAL DAILY
Qty: 90 CAPSULE | Refills: 3 | Status: SHIPPED | OUTPATIENT
Start: 2023-03-09

## 2023-03-09 NOTE — PROGRESS NOTES
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  Patient has history of long segment Gee's esophagus  I have reviewed the biopsies with the patient  He did not have any dysplasia in the esophageal biopsies  Patient currently does not have any chest pain or discomfort  He does not have difficulty swallowing  He has intense heartburn at nighttime  He is on pantoprazole 40 mg a day  That does not seem to be working for him  I will prescribe Dexilant 60 mg in the morning and Pepcid 40 mg with dinner  He will need repeat endoscopy in December  His BUN/creatinine is high  He is following with nephrology  - dexlansoprazole (DEXILANT) 60 MG capsule; Take 1 capsule (60 mg total) by mouth daily  Dispense: 90 capsule; Refill: 3  - famotidine (PEPCID) 40 MG tablet; Take 1 tablet (40 mg total) by mouth daily Take 1 tablet with dinner  Dispense: 90 tablet; Refill: 2    2  Pulmonary fibrosis ([facility] )  Patient's pulmonary fibrosis this may have been exacerbated by reflux  3  Type 2 diabetes mellitus with stage 3a chronic kidney disease, unspecified whether long term insulin use ([facility] )  Patient history of diabetes which is currently being controlled  4  Stage 3b chronic kidney disease ([facility] )  Patient does have history of kidney disease  This appears to be stable at this time  Patient is following with nephrology  5  Panlobular emphysema ([facility] )  Patient has emphysema  6  Gastroesophageal reflux disease with esophagitis, unspecified whether hemorrhage  Reflux disease has been established  Patient has Gee's esophagus for about 8 cm  So far there has been no dysplasia  He will be on a yearly endoscopy scheduled  - famotidine (PEPCID) 40 MG tablet;  Take 1 tablet (40 mg total) by mouth daily Take 1 tablet with dinner  Dispense: 90 tablet; Refill: 2    ______________________________________________________________________    SUBJECTIVE: Has heartburn and indigestion  He feels intense heartburn at night  This happens usually in the evenings  He denies dysphagia  He has history of Candida esophagitis also  I have reviewed the findings of Gee's esophagus with him  Repeat endoscopy will be scheduled in 1 year  REVIEW OF SYSTEMS IS OTHERWISE NEGATIVE        Historical Information   Past Medical History:   Diagnosis Date   • Anemia    • Arthritis    • Gee's esophagus    • Breast lump    • Cancer (HCC)    • Chronic kidney disease    • Cirrhosis ([place] [address] )    • Coronary artery disease     cardiac cath; stents    • Diabetes mellitus ([place] [address] )    • H/O heart artery stent    • Hypertension    • Inguinal hernia     Right   • Sarcoidosis    • 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[place] [address] )    • Skin cancer    • SOB (shortness of breath)    • Upper respiratory infection, viral 5/31/2022     Past Surgical History:   Procedure Laterality Date   • CARDIAC CATHETERIZATION     • CATARACT EXTRACTION, BILATERAL      left eye 05/08 and right 5/22/2022   • CERVICAL FUSION     • CHOLECYSTECTOMY     • COLONOSCOPY  04/11/2016   • EGD AND COLONOSCOPY  06/24/2019   • ERCP  09/11/2014    transab esop hiat griffin rpr   • ESOPHAGOGASTRIC FUNDOPLASTY     • HERNIA REPAIR Right     p I/griffin init reduc >5 yr   • HIP SURGERY Right     Replacement   • KIDNEY STONE SURGERY      Fragmenting   • LIVER BIOPSY     • MULTIPLE TOOTH EXTRACTIONS      "extraction of all maxillary teeth"   • SKIN BIOPSY  05/2020   • THUMB FUSION      with graft   • TONSILLECTOMY     • US GUIDANCE  10/22/2014     Social History   Social History     Substance and Sexual Activity   Alcohol Use No    Comment: Alcohol intake:   Occasional wine - As per [place]      Social History     Substance and Sexual Activity   Drug Use No     Social History     Tobacco Use   Smoking Status Never   Smokeless Tobacco Never   Tobacco Comments RETIRED     Family History   Problem Relation Age of Onset   • Hypertension Mother    • Atrial fibrillation Mother    • Heart disease Mother    • Kidney disease Father    • COPD Father    • Heart disease Father    • Asthma Neg Hx    • Lung cancer Neg Hx        Meds/Allergies       Current Outpatient Medications:   •  albuterol (PROVENTIL HFA,VENTOLIN HFA) 90 mcg/act inhaler  •  allopurinol (ZYLOPRIM) 300 mg tablet  •  amLODIPine (NORVASC) 5 mg tablet  •  aspirin (ECOTRIN LOW STRENGTH) 81 mg EC tablet  •  atorvastatin (LIPITOR) 80 mg tablet  •  azilsartan medoxomil (Edarbi) 40 MG tablet  •  B Complex-C (SUPERPLEX-T) TABS  •  Breo Ellipta 100-25 MCG/ACT inhaler  •  chlorthalidone 25 mg tablet  •  Cholecalciferol (VITAMIN D3) 2000 units TABS  •  co-enzyme Q-10 50 MG capsule  •  colchicine (COLCRYS) 0 6 mg tablet  •  dexlansoprazole (DEXILANT) 60 MG capsule  •  famotidine (PEPCID) 40 MG tablet  •  Ferrous Sulfate (IRON) 325 (65 Fe) MG TABS  •  Incruse Ellipta 62 5 MCG/ACT AEPB inhaler  •  ipratropium-albuterol (DUO-NEB) 0 5-2 5 mg/3 mL nebulizer solution  •  Januvia 100 MG tablet  •  magnesium chloride-calcium (SLOW-MAG) 71 5-119 mg  •  metoprolol tartrate (LOPRESSOR) 50 mg tablet  •  pantoprazole (PROTONIX) 40 mg tablet  •  sulfaSALAzine (AZULFIDINE) 500 mg tablet  •  hydrocodone-chlorpheniramine polistirex (TUSSIONEX) 10-8 mg/5 mL ER suspension  •  predniSONE 10 mg tablet    Allergies   Allergen Reactions   • Oxycodone GI Intolerance           Objective     Blood pressure 138/78, pulse 83, height 5' 10" (1 778 m), weight 83 5 kg (184 lb)  Body mass index is 26 4 kg/m²  PHYSICAL EXAM:      General Appearance:   Alert, cooperative, no distress   HEENT:   Normocephalic, atraumatic, anicteric      Neck:  Supple, symmetrical, trachea midline   Lungs:   Clear to auscultation bilaterally; no rales, rhonchi or wheezing; respirations unlabored    Heart[de-identified]   Regular rate and rhythm; no murmur, rub, or gallop     Abdomen:   Soft, non-tender, non-distended; normal bowel sounds; no masses, no organomegaly    Genitalia:   Deferred    Rectal:   Deferred    Extremities:  No cyanosis, clubbing or edema    Pulses:  2+ and symmetric    Skin:  No jaundice, rashes, or lesions    Lymph nodes:  No palpable cervical lymphadenopathy        Lab Results:   No visits with results within 1 Day(s) from this visit  Latest known visit with results is:   Appointment on 02/21/2023   Component Date Value   • Sodium 02/21/2023 139    • Potassium 02/21/2023 4 5    • Chloride 02/21/2023 102    • CO2 02/21/2023 29    • ANION GAP 02/21/2023 8    • BUN 02/21/2023 29 (H)    • Creatinine 02/21/2023 1 78 (H)    • Glucose, Fasting 02/21/2023 149 (H)    • Calcium 02/21/2023 9 4    • AST 02/21/2023 22    • ALT 02/21/2023 16    • Alkaline Phosphatase 02/21/2023 91    • Total Protein 02/21/2023 6 4    • Albumin 02/21/2023 3 8    • Total Bilirubin 02/21/2023 0 43    • eGFR 02/21/2023 36    • Creatinine, Ur 02/21/2023 190 0    • Protein Urine Random 02/21/2023 20    • Prot/Creat Ratio, Ur 02/21/2023 0 11 (H)    • Magnesium 02/21/2023 1 9          Radiology Results:   Complete PFT with post Bronchodilator and Six Minute walk    Result Date: 2/22/2023  Narrative: PULMONARY FUNCTION TEST Date of service: 2/21/2023 Physician requesting PFT: Aline Maya MD Reason for PFT: Shortness of breath; emphysema; pulmonary fibrosis INTERPRETATION:  Good patient effort and cooperation  The oxygen saturation 100% on room air at rest  The results of this test meet the ATS standards for acceptability and repeatability  The spirometry showed decreased FEV1 (57% of the predicted), increased FVC (69% of the predicted) and decreased FEV1/FVC ratio (62)  The FEF 25-75 was reduced  There was no significant response to inhaled bronchodilator    The lung volumes were normal   The diffusion capacity was reduced (45% of the predicted) indicating a severe loss of functional alveolar capillary surface  The reduction in diffusion capacity persisted even after volume correction  The increased airway resistance and decreased specific conductance indicated a central airway disease  The flow-volume loop showed an obstructive pattern  Impression: Moderate airflow obstruction with severe diffusion defect indicative of emphysema  However there is no hyperinflation and a concurrent pulmonary vascular interstitial process could be present  Clinical correlation is advised  6 MINUTE walk TEST: Patient subsequently underwent a 6-minute walk test   His baseline blood pressure was 104/68 heart rate 62 bpm   His baseline oxygen saturation was 96% on room air at rest   His preexercise Radha scale was 5  He walked 214 m during the 6 minutes of the test with 2 stops due to shortness of breath  The lowest oxygen saturation was 92%  His post exercise Radha scale was 9  SURENDRA Alvarenga

## 2023-03-10 DIAGNOSIS — M1A.9XX0 CHRONIC GOUT WITHOUT TOPHUS, UNSPECIFIED CAUSE, UNSPECIFIED SITE: ICD-10-CM

## 2023-03-10 DIAGNOSIS — E11.9 TYPE 2 DIABETES MELLITUS WITHOUT COMPLICATION, WITHOUT LONG-TERM CURRENT USE OF INSULIN (HCC): ICD-10-CM

## 2023-03-10 RX ORDER — COLCHICINE 0.6 MG/1
TABLET ORAL
Qty: 30 TABLET | Refills: 3 | Status: SHIPPED | OUTPATIENT
Start: 2023-03-10

## 2023-03-10 RX ORDER — SITAGLIPTIN 100 MG/1
TABLET, FILM COATED ORAL
Qty: 30 TABLET | Refills: 0 | Status: SHIPPED | OUTPATIENT
Start: 2023-03-10

## 2023-03-16 ENCOUNTER — APPOINTMENT (OUTPATIENT)
Dept: LAB | Facility: HOSPITAL | Age: 78
End: 2023-03-16
Attending: INTERNAL MEDICINE

## 2023-03-16 DIAGNOSIS — N18.32 STAGE 3B CHRONIC KIDNEY DISEASE (HCC): ICD-10-CM

## 2023-03-16 DIAGNOSIS — R80.1 PERSISTENT PROTEINURIA: ICD-10-CM

## 2023-03-16 LAB
ANION GAP SERPL CALCULATED.3IONS-SCNC: 7 MMOL/L (ref 4–13)
BACTERIA UR QL AUTO: ABNORMAL /HPF
BILIRUB UR QL STRIP: NEGATIVE
BUN SERPL-MCNC: 30 MG/DL (ref 5–25)
CALCIUM SERPL-MCNC: 9.7 MG/DL (ref 8.4–10.2)
CHLORIDE SERPL-SCNC: 101 MMOL/L (ref 96–108)
CLARITY UR: CLEAR
CO2 SERPL-SCNC: 29 MMOL/L (ref 21–32)
COLOR UR: YELLOW
CREAT SERPL-MCNC: 1.43 MG/DL (ref 0.6–1.3)
CREAT UR-MCNC: 92.6 MG/DL
GFR SERPL CREATININE-BSD FRML MDRD: 47 ML/MIN/1.73SQ M
GLUCOSE P FAST SERPL-MCNC: 165 MG/DL (ref 65–99)
GLUCOSE UR STRIP-MCNC: NEGATIVE MG/DL
HGB UR QL STRIP.AUTO: NEGATIVE
KETONES UR STRIP-MCNC: NEGATIVE MG/DL
LEUKOCYTE ESTERASE UR QL STRIP: NEGATIVE
MICROALBUMIN UR-MCNC: 116 MG/L (ref 0–20)
MICROALBUMIN/CREAT 24H UR: 125 MG/G CREATININE (ref 0–30)
MUCOUS THREADS UR QL AUTO: ABNORMAL
NITRITE UR QL STRIP: NEGATIVE
NON-SQ EPI CELLS URNS QL MICRO: ABNORMAL /HPF
PH UR STRIP.AUTO: 5.5 [PH]
POTASSIUM SERPL-SCNC: 4.6 MMOL/L (ref 3.5–5.3)
PROT UR STRIP-MCNC: NEGATIVE MG/DL
RBC #/AREA URNS AUTO: ABNORMAL /HPF
SODIUM SERPL-SCNC: 137 MMOL/L (ref 135–147)
SP GR UR STRIP.AUTO: 1.02 (ref 1–1.03)
UROBILINOGEN UR QL STRIP.AUTO: 0.2 E.U./DL
WBC #/AREA URNS AUTO: ABNORMAL /HPF

## 2023-03-17 ENCOUNTER — TELEPHONE (OUTPATIENT)
Dept: NEPHROLOGY | Facility: CLINIC | Age: 78
End: 2023-03-17

## 2023-03-17 NOTE — TELEPHONE ENCOUNTER
----- Message from [place], [place] sent at 3/16/2023  5:01 PM EDT -----  Creatinine and electrolytes stable  UA bland  No changes needed at this time

## 2023-03-17 NOTE — TELEPHONE ENCOUNTER
Lisa Shi and went over results and recommendations  Fred Trinidad verbalized understanding and had no questions

## 2023-03-21 ENCOUNTER — TELEPHONE (OUTPATIENT)
Dept: FAMILY MEDICINE CLINIC | Facility: CLINIC | Age: 78
End: 2023-03-21

## 2023-03-21 NOTE — TELEPHONE ENCOUNTER
----- Message from Darrin Escobar DO sent at 3/20/2023 11:07 PM EDT -----  Tell pt, pls, TSH is good and cont as he is!  ----- Message -----  From: Lab, Background User  Sent: 3/16/2023  11:31 AM EDT  To: Darrin Escobar DO

## 2023-03-22 ENCOUNTER — DOCUMENTATION (OUTPATIENT)
Dept: NEPHROLOGY | Facility: CLINIC | Age: 78
End: 2023-03-22

## 2023-03-22 NOTE — PROGRESS NOTES
Home blood pressure readings:  AM: 115/65, standing 106/62  PM: 127/71, standing 115/67  Heart rate 60-70 range    Recommend:    1  Decrease Edarbi to 20 mg or one half of the 40 mg pill at nighttime because of slightly low morning readings    2    Repeat blood pressures in 4 to 6 weeks    No other changes

## 2023-03-23 NOTE — PROGRESS NOTES
LM for patient about the following, asked him to call back if he has any questions:    Recommend:  1  Decrease Edarbi to 20 mg or one half of the 40 mg pill at nighttime because of slightly low morning readings  2    Repeat blood pressures in 4 to 6 weeks

## 2023-04-04 DIAGNOSIS — M45.9 ANKYLOSING SPONDYLITIS OF SITE IN SPINE (HCC): Primary | ICD-10-CM

## 2023-04-04 RX ORDER — SULFASALAZINE 500 MG/1
TABLET ORAL
Qty: 45 TABLET | Refills: 3 | Status: SHIPPED | OUTPATIENT
Start: 2023-04-04

## 2023-04-25 DIAGNOSIS — E11.9 TYPE 2 DIABETES MELLITUS WITHOUT COMPLICATION, WITHOUT LONG-TERM CURRENT USE OF INSULIN (HCC): ICD-10-CM

## 2023-04-25 DIAGNOSIS — J43.9 PULMONARY EMPHYSEMA, UNSPECIFIED EMPHYSEMA TYPE (HCC): ICD-10-CM

## 2023-04-25 RX ORDER — SITAGLIPTIN 100 MG/1
TABLET, FILM COATED ORAL
Qty: 30 TABLET | Refills: 0 | Status: SHIPPED | OUTPATIENT
Start: 2023-04-25

## 2023-04-27 RX ORDER — FLUTICASONE FUROATE AND VILANTEROL TRIFENATATE 100; 25 UG/1; UG/1
POWDER RESPIRATORY (INHALATION)
Qty: 60 EACH | Refills: 2 | Status: SHIPPED | OUTPATIENT
Start: 2023-04-27

## 2023-05-09 ENCOUNTER — OFFICE VISIT (OUTPATIENT)
Dept: FAMILY MEDICINE CLINIC | Facility: CLINIC | Age: 78
End: 2023-05-09

## 2023-05-09 VITALS
HEART RATE: 52 BPM | WEIGHT: 184.4 LBS | OXYGEN SATURATION: 97 % | TEMPERATURE: 98 F | BODY MASS INDEX: 26.4 KG/M2 | DIASTOLIC BLOOD PRESSURE: 76 MMHG | HEIGHT: 70 IN | SYSTOLIC BLOOD PRESSURE: 132 MMHG

## 2023-05-09 DIAGNOSIS — I10 HYPERTENSION, ESSENTIAL: ICD-10-CM

## 2023-05-09 DIAGNOSIS — Z79.899 ENCOUNTER FOR LONG-TERM (CURRENT) USE OF MEDICATIONS: ICD-10-CM

## 2023-05-09 DIAGNOSIS — J43.9 PULMONARY EMPHYSEMA, UNSPECIFIED EMPHYSEMA TYPE (HCC): ICD-10-CM

## 2023-05-09 DIAGNOSIS — N18.31 TYPE 2 DIABETES MELLITUS WITH STAGE 3A CHRONIC KIDNEY DISEASE, UNSPECIFIED WHETHER LONG TERM INSULIN USE (HCC): Primary | ICD-10-CM

## 2023-05-09 DIAGNOSIS — E11.9 TYPE 2 DIABETES MELLITUS WITHOUT COMPLICATION, WITHOUT LONG-TERM CURRENT USE OF INSULIN (HCC): ICD-10-CM

## 2023-05-09 DIAGNOSIS — E11.22 TYPE 2 DIABETES MELLITUS WITH STAGE 3A CHRONIC KIDNEY DISEASE, UNSPECIFIED WHETHER LONG TERM INSULIN USE (HCC): Primary | ICD-10-CM

## 2023-05-09 DIAGNOSIS — M45.9 ANKYLOSING SPONDYLITIS OF SITE IN SPINE (HCC): ICD-10-CM

## 2023-05-09 DIAGNOSIS — D86.9 SARCOIDOSIS: ICD-10-CM

## 2023-05-09 LAB — SL AMB POCT HEMOGLOBIN AIC: 7.2 (ref ?–6.5)

## 2023-05-09 NOTE — PATIENT INSTRUCTIONS
Type 2 Diabetes Management for Adults   AMBULATORY CARE:   Type 2 diabetes  is a disease that affects how your body uses glucose (sugar)  Either your body cannot make enough insulin, or it cannot use the insulin correctly  It is important to keep diabetes controlled to prevent damage to your heart, blood vessels, and other organs  Management will help you feel well and enjoy your daily activities  Your diabetes care team providers can help you make a plan to fit diabetes care into your schedule  Your plan can change over time to fit your needs and your family's needs  Have someone call your local emergency number (911 in the [address]) if:   • You cannot be woken  • You have signs of diabetic ketoacidosis:     ? confusion, fatigue    ? vomiting    ? rapid heartbeat    ? fruity smelling breath    ? extreme thirst    ? dry mouth and skin    • You have any of the following signs of a heart attack:      ? Squeezing, pressure, or pain in your chest    ? You may  also have any of the following:     - Discomfort or pain in your back, neck, jaw, stomach, or arm    - Shortness of breath    - Nausea or vomiting    - Lightheadedness or a sudden cold sweat    • You have any of the following signs of a stroke:      ? Numbness or drooping on one side of your face     ? Weakness in an arm or leg    ? Confusion or difficulty speaking    ? Dizziness, a severe headache, or vision loss    Call your doctor or diabetes care team provider if:   • You have a sore or wound that will not heal     • You have a change in the amount you urinate  • Your blood sugar levels are higher than your target goals  • You often have lower blood sugar levels than your target goals  • Your skin is red, dry, warm, or swollen  • You have trouble coping with diabetes, or you feel anxious or depressed  • You have questions or concerns about your condition or care      What you need to know about high blood sugar levels:  High blood sugar levels may not cause any symptoms  You may feel more thirsty or urinate more often than usual  Over time, high blood sugar levels can damage your nerves, blood vessels, tissues, and organs  The following can increase your blood sugar levels:  • Large meals or large amounts of carbohydrates at one time    • Less physical activity    • Stress    • Illness    • A lower dose of diabetes medicine or insulin, or a late dose    What you need to know about low blood sugar levels:  Symptoms include feeling shaky, dizzy, irritable, or confused  You can prevent symptoms by keeping your blood sugar levels from going too low  • Treat a low blood sugar level right away:      ? Drink 4 ounces of juice or have 1 tube of glucose gel  ? Check your blood sugar level again 10 to 15 minutes later  ? When the level goes back to normal, eat a meal or snack to prevent another decrease  • Keep glucose gel, raisins, or hard candy with you at all times to treat a low blood sugar level  • Your blood sugar level can get too low if you take diabetes medicine or insulin and do not eat enough food  • If you use insulin, check your blood sugar level before you exercise  ? If your blood sugar level is below 100 mg/dL, eat 4 crackers or 2 ounces of raisins, or drink 4 ounces of juice  ? Check your level every 30 minutes if you exercise longer than 1 hour  ? You may need a snack during or after exercise  What you can do to manage your blood sugar levels:   • Check your blood sugar levels as directed and as needed  Several items are available to use to check your levels  You may need to check by testing a drop of blood in a glucose monitor  You may instead be given a continuous glucose monitoring (CGM) device  The device is worn at all times  The CGM checks your blood sugar level every 5 minutes  It sends results to an electronic device such as a smart phone  A CGM can be used with or without an insulin pump   You and your diabetes care team providers will decide on the best method for you  The goal for blood sugar levels before meals  is between 80 and 130 mg/dL and 2 hours after eating  is lower than 180 mg/dL  • Make healthy food choices  Work with a dietitian to develop a meal plan that works for you and your schedule  A dietitian can help you learn how to eat the right amount of carbohydrates during your meals and snacks  Carbohydrates can raise your blood sugar level if you eat too many at one time  Examples of foods that contain carbohydrates are breads, cereals, rice, pasta, and sweets  • Eat high-fiber foods as directed  Fiber helps improve blood sugar levels  Fiber also lowers your risk for heart disease and other problems diabetes can cause  Examples of high-fiber foods include vegetables, whole-grain bread, and beans such as pretty beans  Your dietitian can tell you how much fiber to have each day  • Get regular physical activity  Physical activity can help you get to your target blood sugar level goal and manage your weight  Get at least 150 minutes of moderate to vigorous aerobic physical activity each week  Do not miss more than 2 days in a row  Do not sit longer than 30 minutes at a time  Your healthcare provider can help you create an activity plan  The plan can include the best activities for you and can help you build your strength and endurance  • Maintain a healthy weight  Ask your team what a healthy weight is for you  A healthy weight can help you control diabetes and prevent heart disease  Ask your team to help you create a weight loss plan, if needed  Weight loss can help make a difference in managing diabetes  Your team will help you set a weight-loss goal, such as 10 to 15 pounds, or 5% of your extra weight  Together you and your team can set manageable weight loss goals  • Take your diabetes medicine or insulin as directed    You may need diabetes medicine, insulin, or both to help control your blood sugar levels  Your healthcare provider will teach you how and when to take your diabetes medicine or insulin  You will also be taught about side effects oral diabetes medicine can cause  Insulin may be injected or given through a pump or pen  You and your providers will decide on the best method for you:    ? An insulin pump  is an implanted device that gives your insulin 24 hours a day  An insulin pump prevents the need for multiple insulin injections in a day  ? An insulin pen  is a device prefilled with the right amount of insulin  ? You and your family members will be taught how to draw up and give insulin  if this is the best method for you  Your providers will also teach you how to dispose of needles and syringes  ? You will learn how much insulin you need  and when to give it  You will be taught when not to give insulin  You will also be taught what to do if your blood sugar level drops too low  This may happen if you take insulin and do not eat the right amount of carbohydrates  More ways to manage type 2 diabetes:   • Wear medical alert identification  Wear medical alert jewelry or carry a card that says you have diabetes  Ask your provider where to get these items  • Do not smoke  Nicotine and other chemicals in cigarettes and cigars can cause lung and blood vessel damage  It also makes it more difficult to manage your diabetes  Ask your provider for information if you currently smoke and need help to quit  Do not use e-cigarettes or smokeless tobacco in place of cigarettes or to help you quit  They still contain nicotine  • Check your feet each day for cuts, scratches, calluses, or other wounds  Look for redness and swelling, and feel for warmth  Wear shoes that fit well  Check your shoes for rocks or other objects that can hurt your feet  Do not walk barefoot or wear shoes without socks   Wear cotton socks to help keep your feet dry  • Ask about vaccines you may need  You have a higher risk for serious illness if you get the flu, pneumonia, COVID-19, or hepatitis  Ask your provider if you should get vaccines to prevent these or other diseases, and when to get the vaccines  • Talk to your provider if you become stressed about diabetes care  Sometimes being able to fit diabetes care into your life can cause increased stress  The stress can cause you not to take care of yourself properly  Your care team providers can help by offering tips about self-care  Your providers may suggest you talk to a mental health provider who can listen and offer help with self-care issues  • Have your A1c checked as directed  Your provider may check your A1c every 3 months, or 2 times each year if your diabetes is controlled  An A1c test shows the average amount of sugar in your blood over the past 2 to 3 months  Your provider will tell you what your A1c level should be  • Have screening tests as directed  Your provider may recommend screening for complications of diabetes and other conditions that may develop  Some screenings may begin right away and some may happen within the first 5 years of diagnosis:    ? Examples of diabetes complications  include kidney problems, high cholesterol, high blood pressure, blood vessel problems, eye problems, and sleep apnea  ? You may be screened for a low vitamin B level  if you take oral diabetes medicine for a long time  ? Women of childbearing years may be screened  for polycystic ovarian syndrome (PCOS)  Follow up with your doctor or diabetes care team providers as directed: You may need to have blood tests done before your follow-up visit  The test results will show if changes need to be made in your treatment or self-care  Talk to your provider if you cannot afford your medicine  Write down your questions so you remember to ask them during your visits    © Copyright [employer] 2022 Information is for End User's use only and may not be sold, redistributed or otherwise used for commercial purposes  The above information is an  only  It is not intended as medical advice for individual conditions or treatments  Talk to your doctor, nurse or pharmacist before following any medical regimen to see if it is safe and effective for you  What to Do if Your Blood Sugar is Low   AMBULATORY CARE:   Low blood sugar levels  (hypoglycemia) can happen with Type 1 and Type 2 diabetes  Low levels are more likely to happen if you use insulin  Hypoglycemia can cause you to have falls, accidents, and injuries  A blood sugar level that gets too low can lead to seizures, coma, and death  Learn to recognize the symptoms early so you can get treatment quickly  When your blood sugar is low you may feel:  • Sweaty    • Nervous or shaky    • Anxious or irritable    • Confused    • A fast, pounding heartbeat    • Extremely hungry    Have someone call your local emergency number (911 in the [address] [facility]) if:   • You cannot be woken  • You have a seizure  Call your doctor if:   • You have symptoms of a low blood sugar level, such as trouble thinking, sweating, or a pounding heartbeat  • Your blood sugar level is lower than normal and it does not improve with treatment  • You often have lower blood sugar levels than your target goals  • You have trouble coping with your illness, or you feel anxious or depressed  • You have questions or concerns about your condition or care  What to do if you have symptoms of low blood sugar:   • Check your blood sugar level, if possible  Your blood sugar level is too low if it is at or below 70 mg/dL  • Eat or drink 15 grams of fast-acting carbohydrate  Fast-acting carbohydrates will raise your blood sugar level quickly  Examples of 15 grams of fast-acting carbohydrates:     ? 4 ounces (½ cup) of fruit juice     ? 4 ounces of regular soda    ?  2 tablespoons of raisins     ? 1 tube of glucose gel or 3 to 4 glucose tablets       • Check your blood sugar level 15 minutes later  If the level is still low (less than 100 mg/dL), eat another 15 grams of carbohydrate  When the level returns to 100 mg/dL, eat a snack or meal that contains carbohydrates  This will help prevent another drop in blood sugar  • Teach people close to you how to use your glucagon kit  Your blood sugar may be too low for you to be awake  People need to know when and how to use your kit  Prevent low blood sugar levels:  Prevent low blood sugar by knowing what increases your risk  Ask your healthcare provider for ways to prevent low blood sugar levels  Any of the following can increase your risk of low blood sugar:  • Fasting for tests or procedures    • During or after intense exercise    • Late or postponed meals    • Sleeping (you may need a bedtime snack)     • Drinking alcohol if you use insulin or insulin releasing pills    Follow up with your doctor as directed:  Write down your questions so you remember to ask them during your visits  © Copyright Vicky Fajardo 2022 Information is for End User's use only and may not be sold, redistributed or otherwise used for commercial purposes  The above information is an  only  It is not intended as medical advice for individual conditions or treatments  Talk to your doctor, nurse or pharmacist before following any medical regimen to see if it is safe and effective for you

## 2023-05-09 NOTE — PROGRESS NOTES
Name: Reyes Grim      : 1945      MRN: [id number]  Encounter Provider: Derrell Goldberg, DO  Encounter Date: 2023   Encounter department: [address] [place] [address]     1  Type 2 diabetes mellitus with stage 3a chronic kidney disease, unspecified whether long term insulin use (HCC)  -     POCT hemoglobin A1c    2  Type 2 diabetes mellitus without complication, without long-term current use of insulin ([facility] )    3  Ankylosing spondylitis of site in spine [facility])  Comments:  Meds are stable     4  Encounter for long-term (current) use of medications  Comments: All meds reviewed      5  Hypertension, essential  Comments:  BP stable    6  Pulmonary emphysema, unspecified emphysema type ([facility] )  Comments:  Stable and comfortable, but some inc SOB, jemma with exertion  7  Sarcoidosis  Comments:  Dx from lung bx many yrs ago   Stable        Subjective      HPI 72-year-old white male well-known to me for many many years--almost 52--presents with ongoing medical issues as described under the visit diagnosis list   Each and every 1 of these were reviewed and discussed  He is doing well and managing his hypertension, med management, activity level, sleep stress and diet  He is a big fan of ice cream but has not had any recently  Blood sugars fairly well controlled and acceptable for age 78    Review of Systems   Constitutional: Negative  HENT: Negative  Eyes: Negative  Respiratory: Negative  Sees Dr Woods Destrehan - not terribly happy with him, but ok    Sob with stairs  No CP's  Main issue is the lungs and SOB  Cardiovascular: Negative  Gastrointestinal: Negative  Sees Dr Braden Humphrey and he just changed the PPI to    Endocrine: Negative  Genitourinary: Negative  Musculoskeletal: Positive for arthralgias, back pain, myalgias, neck pain and neck stiffness  Kyphosis and scoliosis   Allergic/Immunologic: Negative  Neurological: Negative  "  Hematological: Negative  Psychiatric/Behavioral: Negative  Current Outpatient Medications on File Prior to Visit   Medication Sig   • albuterol (PROVENTIL HFA,VENTOLIN HFA) 90 mcg/act inhaler Inhale 2 puffs every 6 (six) hours as needed for wheezing or shortness of breath   • allopurinol (ZYLOPRIM) 300 mg tablet \"ONE-HALF\" TABLET EVERY DAY   • amLODIPine (NORVASC) 5 mg tablet TAKE 1 TABLET (5 MG) BY MOUTH DAILY   • aspirin (ECOTRIN LOW STRENGTH) 81 mg EC tablet Take 81 mg by mouth daily   • atorvastatin (LIPITOR) 80 mg tablet TAKE 1 TABLET (80 MG TOTAL) BY MOUTH DAILY   • azilsartan medoxomil (Edarbi) 40 MG tablet Take 1 tablet (40 mg total) by mouth daily after dinner (Patient taking differently: Take 20 mg by mouth daily at bedtime)   • B Complex-C (SUPERPLEX-T) TABS Take 1 tablet by mouth daily   • Breo Ellipta 100-25 MCG/ACT inhaler INHALE 1 PUFF DAILY RINSE MOUTH AFTER USE  • chlorthalidone 25 mg tablet TAKE \"ONE-HALF\" TABLET (12 5 MG) BY MOUTH DAILY   • Cholecalciferol (VITAMIN D3) 2000 units TABS Take 1 tablet by mouth daily   • co-enzyme Q-10 50 MG capsule Take 100 mg by mouth in the morning     • colchicine (COLCRYS) 0 6 mg tablet TAKE 1 TABLET (0 6 MG TOTAL) BY MOUTH DAILY   • dexlansoprazole (DEXILANT) 60 MG capsule Take 1 capsule (60 mg total) by mouth daily   • famotidine (PEPCID) 40 MG tablet Take 1 tablet (40 mg total) by mouth daily Take 1 tablet with dinner   • Ferrous Sulfate (IRON) 325 (65 Fe) MG TABS Take 1 tablet by mouth daily   • Incruse Ellipta 62 5 MCG/ACT AEPB inhaler INHALE 1 PUFF DAILY   • ipratropium-albuterol (DUO-NEB) 0 5-2 5 mg/3 mL nebulizer solution Take 3 mL by nebulization 4 (four) times a day   • Januvia 100 MG tablet TAKE ONE TABLET DAILY   • magnesium chloride-calcium (SLOW-MAG) 71 5-119 mg Take 2 tablets by mouth 2 (two) times a day   • metoprolol tartrate (LOPRESSOR) 50 mg tablet TAKE 1 TABLET (50 MG TOTAL) BY MOUTH IN THE MORNING AND \"ONE-HALF\" TABLET (25MG) IN " "THE EVENING   • sulfaSALAzine (AZULFIDINE) 500 mg tablet ONE AND \"ONE-HALF\" TABLETS (75OMG) EVERY DAY   • [DISCONTINUED] pantoprazole (PROTONIX) 40 mg tablet TAKE 1 TABLET DAILY HALF HOUR BEFORE EATING BREAKFAST   • [DISCONTINUED] hydrocodone-chlorpheniramine polistirex (TUSSIONEX) 10-8 mg/5 mL ER suspension Take 5 mL by mouth every 12 (twelve) hours as needed for cough Max Daily Amount: 10 mL (Patient not taking: Reported on 5/9/2023)   • [DISCONTINUED] loratadine (CLARITIN) 10 mg tablet Take 1 tablet (10 mg total) by mouth daily (Patient not taking: No sig reported)   • [DISCONTINUED] predniSONE 10 mg tablet Take 4 tablets (40 mg total) by mouth daily (Patient not taking: Reported on 2/8/2023)       Objective     /76 (BP Location: Left arm, Patient Position: Sitting, Cuff Size: Standard)   Pulse (!) 52   Temp 98 °F (36 7 °C) (Temporal)   Ht 5' 10\" (1 778 m)   Wt 83 6 kg (184 lb 6 4 oz)   SpO2 97%   BMI 26 46 kg/m²     Physical Exam  Constitutional:       General: He is not in acute distress  Appearance: Normal appearance  He is not ill-appearing or diaphoretic  HENT:      Nose: No congestion or rhinorrhea  Eyes:      General:         Right eye: No discharge  Left eye: No discharge  Cardiovascular:      Rate and Rhythm: Normal rate and regular rhythm  Pulses: Normal pulses  Heart sounds: Normal heart sounds  Pulmonary:      Breath sounds: Normal breath sounds  Musculoskeletal:      Right lower leg: No edema  Left lower leg: No edema  Skin:     General: Skin is warm and dry  Neurological:      General: No focal deficit present  Mental Status: He is alert and oriented to person, place, and time  Psychiatric:         Mood and Affect: Mood normal          Behavior: Behavior normal          Thought Content:  Thought content normal          Judgment: Judgment normal               I personally reviewed the recent (and prior)  lab results, the image studies, "

## 2023-05-15 ENCOUNTER — APPOINTMENT (OUTPATIENT)
Dept: LAB | Facility: HOSPITAL | Age: 78
End: 2023-05-15

## 2023-05-15 DIAGNOSIS — R97.20 ELEVATED PSA: ICD-10-CM

## 2023-05-15 LAB — PSA SERPL-MCNC: 8.3 NG/ML (ref 0–4)

## 2023-05-16 ENCOUNTER — OFFICE VISIT (OUTPATIENT)
Dept: UROLOGY | Facility: CLINIC | Age: 78
End: 2023-05-16

## 2023-05-16 VITALS
DIASTOLIC BLOOD PRESSURE: 76 MMHG | HEIGHT: 70 IN | WEIGHT: 184 LBS | BODY MASS INDEX: 26.34 KG/M2 | OXYGEN SATURATION: 98 % | SYSTOLIC BLOOD PRESSURE: 150 MMHG | HEART RATE: 70 BPM

## 2023-05-16 DIAGNOSIS — R97.20 ELEVATED PSA: Primary | ICD-10-CM

## 2023-05-16 NOTE — PROGRESS NOTES
1  Elevated PSA  MRI prostate multiparametric wo w contrast    Basic metabolic panel    PSA Total, Diagnostic           Assessment and plan:       1  Elevated PSA   - normal BELLA  -Oscillating PSA, now up to 8 3  -We will proceed with a multiparametric prostate MRI for further evaluation  Return to clinic thereafter  Dorys Godfrey      Chief Complaint     Elevated PSA    History of Present Illness     Jayson Figueroa is a 78 y o  male presenting for follow-up of elevated PSA  Patient was undergoing routine screening with his primary care provider  If PSA was obtained, however no previous PSAs for comparison in his chart  PSA trend:  7 1 (8/2/22)  4 9 (10/12/22)  8 3 (6/15/23)    Patient is comfortable with his lower urinary tract symptoms  Endorses a strong stream feeling empty after voiding  Denies any dysuria, gross hematuria, incontinence, or urinary infections  Is not on any pharmacotherapy for his prostate or bladder  Does have a past history of nephrolithiasis over 30 years ago  Has not had any symptoms of kidney stones over the past year  Medical comorbidities include type 2 diabetes, pulmonary fibrosis, COPD, hypertension, coronary artery disease, chronic kidney disease, sarcoidosis  Laboratory     Lab Results   Component Value Date    CREATININE 1 43 (H) 03/16/2023       Lab Results   Component Value Date    PSA 8 3 (H) 05/15/2023    PSA 4 9 (H) 10/12/2022    PSA 7 1 (H) 08/02/2022       Review of Systems     Review of Systems   Constitutional: Negative for activity change, appetite change, chills, diaphoresis, fatigue, fever and unexpected weight change  Respiratory: Negative for chest tightness and shortness of breath  Cardiovascular: Negative for chest pain, palpitations and leg swelling  Gastrointestinal: Negative for abdominal distention, abdominal pain, constipation, diarrhea, nausea and vomiting     Genitourinary: Negative for decreased urine volume, difficulty "urinating, dysuria, enuresis, flank pain, frequency, genital sores, hematuria and urgency  Musculoskeletal: Negative for back pain, gait problem and myalgias  Skin: Negative for color change, pallor, rash and wound  Psychiatric/Behavioral: Negative for behavioral problems  The patient is not nervous/anxious  Allergies     Allergies   Allergen Reactions   • Oxycodone GI Intolerance       Physical Exam     Physical Exam  Constitutional:       General: He is not in acute distress  Appearance: Normal appearance  He is normal weight  He is not ill-appearing, toxic-appearing or diaphoretic  HENT:      Head: Normocephalic and atraumatic  Eyes:      General:         Right eye: No discharge  Left eye: No discharge  Conjunctiva/sclera: Conjunctivae normal    Pulmonary:      Effort: Pulmonary effort is normal  No respiratory distress  Genitourinary:     Comments: Good rectal tone  Prostate 45g, smooth, symmetric, no palpable nodules  Musculoskeletal:         General: No swelling, tenderness or signs of injury  Normal range of motion  Skin:     General: Skin is warm and dry  Coloration: Skin is not jaundiced or pale  Neurological:      General: No focal deficit present  Mental Status: He is alert and oriented to person, place, and time     Psychiatric:         Mood and Affect: Mood normal          Behavior: Behavior normal            Vital Signs     Vitals:    05/16/23 1014   BP: 150/76   BP Location: Left arm   Patient Position: Sitting   Cuff Size: Standard   Pulse: 70   SpO2: 98%   Weight: 83 5 kg (184 lb)   Height: 5' 10\" (1 778 m)         Current Medications       Current Outpatient Medications:   •  albuterol (PROVENTIL HFA,VENTOLIN HFA) 90 mcg/act inhaler, Inhale 2 puffs every 6 (six) hours as needed for wheezing or shortness of breath, Disp: 6 7 g, Rfl: 2  •  allopurinol (ZYLOPRIM) 300 mg tablet, \"ONE-HALF\" TABLET EVERY DAY, Disp: 45 tablet, Rfl: 3  •  amLODIPine " "(NORVASC) 5 mg tablet, TAKE 1 TABLET (5 MG) BY MOUTH DAILY, Disp: 90 tablet, Rfl: 5  •  aspirin (ECOTRIN LOW STRENGTH) 81 mg EC tablet, Take 81 mg by mouth daily, Disp: , Rfl:   •  atorvastatin (LIPITOR) 80 mg tablet, TAKE 1 TABLET (80 MG TOTAL) BY MOUTH DAILY, Disp: 90 tablet, Rfl: 3  •  azilsartan medoxomil (Edarbi) 40 MG tablet, Take 1 tablet (40 mg total) by mouth daily after dinner (Patient taking differently: Take 20 mg by mouth daily at bedtime), Disp: 60 tablet, Rfl: 3  •  B Complex-C (SUPERPLEX-T) TABS, Take 1 tablet by mouth daily, Disp: , Rfl:   •  Breo Ellipta 100-25 MCG/ACT inhaler, INHALE 1 PUFF DAILY RINSE MOUTH AFTER USE , Disp: 60 each, Rfl: 2  •  chlorthalidone 25 mg tablet, TAKE \"ONE-HALF\" TABLET (12 5 MG) BY MOUTH DAILY, Disp: 45 tablet, Rfl: 4  •  Cholecalciferol (VITAMIN D3) 2000 units TABS, Take 1 tablet by mouth daily, Disp: , Rfl:   •  co-enzyme Q-10 50 MG capsule, Take 100 mg by mouth in the morning , Disp: , Rfl:   •  colchicine (COLCRYS) 0 6 mg tablet, TAKE 1 TABLET (0 6 MG TOTAL) BY MOUTH DAILY, Disp: 30 tablet, Rfl: 3  •  dexlansoprazole (DEXILANT) 60 MG capsule, Take 1 capsule (60 mg total) by mouth daily, Disp: 90 capsule, Rfl: 3  •  famotidine (PEPCID) 40 MG tablet, Take 1 tablet (40 mg total) by mouth daily Take 1 tablet with dinner, Disp: 90 tablet, Rfl: 2  •  Ferrous Sulfate (IRON) 325 (65 Fe) MG TABS, Take 1 tablet by mouth daily, Disp: , Rfl:   •  ipratropium-albuterol (DUO-NEB) 0 5-2 5 mg/3 mL nebulizer solution, Take 3 mL by nebulization 4 (four) times a day, Disp: 360 mL, Rfl: 3  •  Januvia 100 MG tablet, TAKE ONE TABLET DAILY, Disp: 30 tablet, Rfl: 0  •  magnesium chloride-calcium (SLOW-MAG) 71 5-119 mg, Take 2 tablets by mouth 2 (two) times a day, Disp: , Rfl:   •  metoprolol tartrate (LOPRESSOR) 50 mg tablet, TAKE 1 TABLET (50 MG TOTAL) BY MOUTH IN THE MORNING AND \"ONE-HALF\" TABLET (25MG) IN THE EVENING, Disp: 135 tablet, Rfl: 3  •  sulfaSALAzine (AZULFIDINE) 500 mg " "tablet, ONE AND \"ONE-HALF\" TABLETS (75OMG) EVERY DAY, Disp: 45 tablet, Rfl: 3  •  Incruse Ellipta 62 5 MCG/ACT AEPB inhaler, INHALE 1 PUFF DAILY, Disp: 3 each, Rfl: 1      Active Problems     Patient Active Problem List   Diagnosis   • Chronic kidney disease, stage III (moderate) (HCC)   • Dyslipidemia   • Persistent proteinuria   • Hypertensive chronic kidney disease with stage 1 through stage 4 chronic kidney disease, or unspecified chronic kidney disease   • Hyperkalemia   • Hypertension, essential   • COPD (chronic obstructive pulmonary disease) (HCC)   • Sarcoidosis   • Other spondylosis, thoracolumbar region   • Type 2 diabetes mellitus without complication, without long-term current use of insulin (HCC)   • Diabetic nephropathy associated with type 2 diabetes mellitus (HCC)   • Type 2 diabetes mellitus with stage 3 chronic kidney disease (HCC)   • Coronary artery disease of native heart with stable angina pectoris, unspecified vessel or lesion type (HCC)   • COVID-19   • Pulmonary fibrosis (HCC)   • History of PTCA         Past Medical History     Past Medical History:   Diagnosis Date   • Anemia    • Arthritis    • Gee's esophagus    • Breast lump    • Cancer (HCC)    • Chronic kidney disease    • Cirrhosis (HCC)    • Coronary artery disease     cardiac cath; stents    • Diabetes mellitus (HCC)    • H/O heart artery stent    • Hypertension    • Inguinal hernia     Right   • Sarcoidosis    • 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HCC)    • Skin cancer    • SOB (shortness of breath)    • Upper respiratory infection, viral 5/31/2022         Surgical History     Past Surgical History:   Procedure Laterality Date   • CARDIAC CATHETERIZATION     • CATARACT EXTRACTION, BILATERAL      left eye 05/08 and right 5/22/2022   • CERVICAL FUSION     • CHOLECYSTECTOMY     • COLONOSCOPY  04/11/2016   • EGD AND COLONOSCOPY  06/24/2019   • ERCP  09/11/2014    transab esop gayathri moya rpr   • ESOPHAGOGASTRIC FUNDOPLASTY     • HERNIA REPAIR " "Right     p I/griffin init reduc >5 yr   • HIP SURGERY Right     Replacement   • KIDNEY STONE SURGERY      Fragmenting   • LIVER BIOPSY     • MULTIPLE TOOTH EXTRACTIONS      \"extraction of all maxillary teeth\"   • SKIN BIOPSY  05/2020   • THUMB FUSION      with graft   • TONSILLECTOMY     • US GUIDANCE  10/22/2014         Family History     Family History   Problem Relation Age of Onset   • Hypertension Mother    • Atrial fibrillation Mother    • Heart disease Mother    • Kidney disease Father    • COPD Father    • Heart disease Father    • Asthma Neg Hx    • Lung cancer Neg Hx          Social History     Social History       Radiology       "

## 2023-05-18 ENCOUNTER — TELEPHONE (OUTPATIENT)
Dept: NEPHROLOGY | Facility: CLINIC | Age: 78
End: 2023-05-18

## 2023-05-18 DIAGNOSIS — K22.70 BARRETT'S ESOPHAGUS WITHOUT DYSPLASIA: ICD-10-CM

## 2023-05-18 DIAGNOSIS — K21.00 GASTROESOPHAGEAL REFLUX DISEASE WITH ESOPHAGITIS, UNSPECIFIED WHETHER HEMORRHAGE: ICD-10-CM

## 2023-05-18 RX ORDER — DEXLANSOPRAZOLE 60 MG/1
60 CAPSULE, DELAYED RELEASE ORAL DAILY
Qty: 90 CAPSULE | Refills: 3 | Status: SHIPPED | OUTPATIENT
Start: 2023-05-18

## 2023-05-18 RX ORDER — FAMOTIDINE 40 MG/1
40 TABLET, FILM COATED ORAL DAILY
Qty: 90 TABLET | Refills: 2 | Status: SHIPPED | OUTPATIENT
Start: 2023-05-18

## 2023-05-18 NOTE — TELEPHONE ENCOUNTER
Patient came into the office because he is scheduled for an MRI w/ & w/o contrast and he wants to make sure it is safe for him to have done  I explained that the contrast dye used in MRI is different from that in a CT scan and does not effect the kidneys the same way  I told him I will reach out to the doctor just to be sure we don't want to order anything else  He expressed understanding and thanked us for the help

## 2023-05-18 NOTE — TELEPHONE ENCOUNTER
Received fax refill request for Dexilant 60 mg daily, and Famotidine 40 mg daily from [facility] mail order pharmacy   90 day supply    Patient was last seen in office with Dr Rochelle Jett on 3/9/23
43.1

## 2023-05-19 NOTE — TELEPHONE ENCOUNTER
Spoke with patient about the following, he expressed understanding and thanked us for the call back:    Gadolinium dye [place]) used for MRI at [address] is safe at patient's level of kidney function

## 2023-05-22 NOTE — PROGRESS NOTES
RENAL FOLLOW UP NOTE: td    ASSESSMENT AND PLAN:  1   CKD stage 3  · Etiology:  Diabetic nephropathy/hypertensive nephrosclerosis/arteriolar nephrosclerosis/episodes of SHAY in the past   All serologies were negative including multiple myeloma evaluation  · Baseline creatinine:  1 2-1 62  · Current creatinine:  1 36 at baseline  · Urine protein creatinine ratio:  0 87 g at goal  Recommendations:  · Treat hypertension-please see below  · Treat dyslipidemia-please see below  · Maintain proteinuria less than 1 g or as low as possible  · Avoid nephrotoxic agents such as NSAIDs, patient counseled as such     2   Volume:  Euvolemic     3   Hypertension:       Current blood pressure averages:   A m :  119/63 without orthostatic changes  P m :  124/65 without orthostatic changes  Heart rate:  50-70 range     · Goal blood pressure:  Less than  125/80 given CAD     Recommendations:  ·  Push nonmedical regimen including weight loss, isotonic exercise and avoidance of salt; patient counseled as such  · Medication changes today:    · No changes as patient is at goal     4   Electrolytes:  All acceptable including a potassium of 4 8     5   Mineral bone disorder:   · Calcium/magnesium/phosphorus:  All acceptable including calcium 9 5  · PTH intact:  37 2 which is normal, from last visit  · Vitamin-D:Currently on supplements   49 9 at goal, from last visit     6   Dyslipidemia:  · Goal LDL:  Less than 70 given CAD  · Current lipid profile:  LDL 79/HDL 48/triglycerides 106  Recommendations:  Above goal so adjust statin :  Atorvastatin just increased to 40 mg twice a day per Cardiology      7   Anemia:  normal hemoglobin at 13 8     8   Other problems:  · Diabetes mellitus per your discretion  · CAD followed by Dr Naif Montenegro through [facility] [place]   Status post MI last October involving to cardiac stents  :  Recent emesis of LAD lesion with repeat PTCA drug-eluting stent 2018  Circumflex artery and right coronary artery 50% stenosis  [Medication Risks Reviewed] : Medication risks reviewed [Surgical risks reviewed] : Surgical risks reviewed  Ejection fraction 55%  · Sarcoidosis followed by Dr Street  · Kyphoscoliosis  · Cirrhosis of the liver  · Gout  · Ankylosing spondylitis      PATIENT INSTRUCTIONS:    Patient Instructions   1  Medication changes today:   No medication changes today    2  In 3 months:  · Please go for non fasting  lab work   · Please also send me 1 week a blood pressure readings as outlined:  TAKE THE MORNING READINGS BEFORE ANY MEDICATIONS AND WHEN YOU ARE RELAXED FOR SEVERAL MINUTES  TAKE THE EVENING READINGS BETWEEN 7PM AND 10PM AT LEAST 30 MINUTES BEFORE OR AFTER DINNER/EATING/COFFEE INTAKE OR ALCOHOL INTAKE, AND CERTAINLY BEFORE ANY BLOOD PRESSURE MEDICATIONS  AGAIN, PLEASE MAKE SURE YOU ARE RELAXED FOR SEVERAL MINUTES BEFORE TAKING HER BLOOD PRESSURE READINGS  PLEASE INCLUDE HEART RATE WITH YOUR BLOOD PRESSURE READINGS  When taking standing readings, keep your arm supported at heart level and not dangling  Make sure you are sitting with your back supported and feet on the ground and do not cross your legs or feet  Make sure you have not taken any coffee or caffeine products or exercised or smoke cigarettes at least 30 minutes before taking your blood pressure        3  Follow-up in 6  months   Please bring in 1 week a blood pressure readings morning evening, sitting and standing is outlined above   PLEASE BRING AN YOUR BLOOD PRESSURE MACHINE TO CORRELATE WITH THE OFFICE MACHINE AT THIS NEXT SCHEDULED VISIT   Please go for fasting lab work 1-2 weeks prior to your appointment      4   General instructions:   AVOID SALT BUT NOT ADDING AN READING LABELS TO MAKE SURE THERE IS LOW-SALT IN THE FOOD THAT YOU ARE EATING  o Goal is less than 2 g of sodium intake or less than 5 g of sodium chloride intake per day     Avoid nonsteroidal anti-inflammatory drugs such as Naprosyn, ibuprofen, Aleve, Advil, Celebrex, Meloxicam (Mobic) etc   You can use Tylenol as needed if you do not have any liver condition to be concerned [de-identified] : \par Reviewed X-Ray right elbow revealing evidence of interval healing of non-displaced medial epicondyle growth plate fracture. \par The patients fracture is not yet healed to the point where he may discontinue use of brace. \par Remain in beldsoe brace unlocked for another 2 weeks \par Continue physical therapy. \par The patent remains shut down from gym and sports until further notice. \par Follow up in 2 weeks for repeat x-ray.  about     Avoid medications such as Sudafed or decongestants and antihistamines that contained the D component which is the decongestant  You can take antihistamines without the decongestant or D component   Try to avoid medications such as pantoprazole or  Protonix/Nexium or Esomeprazole)/Prilosec or omeprazole/Prevacid or lansoprazole/AcipHex or Rabeprazole  If you are able to, use Pepcid as this is safer for your kidneys   Try to exercise at least 30 minutes 3 days a week to begin with with an ultimate goal of 5 days a week for at least 30 minutes     Please do not drink more than 2 glasses of alcohol/wine on a daily basis as this may contribute to your high blood pressure  Subjective: There has been no hospitalizations or acute illnesses since last visit  The patient overall is feeling well  No fevers, chills, or cough or colds    Good appetite and good energy  No hematuria, dysuria, voiding symptoms or foamy urine  No gastrointestinal symptoms  The patient denies any chest pain, does have very mild shortness of breath with exertion secondary to sarcoidosis; only intermittent occasional lower extremity swelling that is not persistent  No headaches, dizziness or lightheadedness  Blood pressure medications:  · Metoprolol 50 mg in the a m, 25 mg in the evening  · Edarbi 40 mg twice a day  · Chlorthalidone 12 5 mg daily in the morning  · Amlodipine 5 mg daily in the morning    ROS:  See HPI, otherwise review of systems as completely reviewed with the patient are negative    Past Medical History:   Diagnosis Date    Anemia     Arthritis     Gee's esophagus     Breast lump     [facility] [address] [facility]     Chronic kidney disease     [facility]     Coronary artery disease     cardiac cath; stents     [facility]     H/O heart artery stent     Hypertension     Inguinal hernia     Right    Sarcoidosis     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HCC)     Skin cancer     SOB (shortness of breath)      Past Surgical History:   Procedure Laterality Date    CARDIAC CATHETERIZATION      CERVICAL FUSION      CHOLECYSTECTOMY      COLONOSCOPY  04/11/2016    EGD AND COLONOSCOPY  06/24/2019    ERCP  09/11/2014    transab esop hiat griffin rpr    ESOPHAGOGASTRIC FUNDOPLASTY      HERNIA REPAIR Right     p I/griffin init reduc >5 yr    HIP SURGERY Right     Replacement    KIDNEY STONE SURGERY      Fragmenting    LIVER BIOPSY      MULTIPLE TOOTH EXTRACTIONS      "extraction of all maxillary teeth"    SKIN BIOPSY  05/2020    THUMB FUSION      with graft    TONSILLECTOMY       Family History   Problem Relation Age of Onset    Hypertension Mother     Atrial fibrillation Mother     Heart disease Mother     Kidney disease Father     COPD Father     Heart disease Father     Asthma Neg Hx     Lung cancer Neg Hx       reports that he has never smoked  He has never used smokeless tobacco  He reports that he does not drink alcohol or use drugs  I COMPLETELY REVIEWED THE PAST MEDICAL HISTORY/PAST SURGICAL HISTORY/SOCIAL HISTORY/FAMILY HISTORY/AND MEDICATIONS  AND UPDATED ALL    Objective:     Vitals:   BP sitting on right:146/66 with a heart rate of 56 and regular  BP standing on right:  128/68 with a heart rate of 56 regular      Weight (last 2 days)     Date/Time   Weight    01/28/21 1134   76 7 (169)            Wt Readings from Last 3 Encounters:   01/28/21 76 7 kg (169 lb)   01/28/21 76 9 kg (169 lb 8 oz)   11/12/20 75 3 kg (166 lb)       Body mass index is 23 57 kg/m²      Physical Exam: General:  No acute distress  Skin:  No acute rash  Eyes:  No scleral icterus, noninjected, no discharge from eyes  ENT:  Moist mucous membranes  Neck:  Supple, no jugular venous distention, trachea is midline, no lymphadenopathy and no thyromegaly  Back   No CVAT  Chest:  Clear to auscultation and percussion, good respiratory effort  CVS:  Regular rate and rhythm without a rub, or gallops or murmurs  Abdomen:  Soft and nontender with normal bowel sounds  Extremities:  No cyanosis and no edema, no arthritic changes, normal range of motion  Neuro:  Grossly intact  Psych:  Alert, oriented x3 and appropriate      Medications:    Current Outpatient Medications:     allopurinol (ZYLOPRIM) 300 mg tablet, Take 0 5 tablets (150 mg total) by mouth daily, Disp: 45 tablet, Rfl: 6    amLODIPine (NORVASC) 5 mg tablet, TAKE 1 TABLET (5 MG) BY MOUTH DAILY, Disp: 30 tablet, Rfl: 5    aspirin (ECOTRIN LOW STRENGTH) 81 mg EC tablet, Take 81 mg by mouth daily, Disp: , Rfl:     atorvastatin (LIPITOR) 20 mg tablet, Take 2 tablets (40 mg total) by mouth daily (Patient taking differently: Take 40 mg by mouth 2 (two) times a day ), Disp: 60 tablet, Rfl: 5    B Complex-C (SUPERPLEX-T) TABS, Take 1 tablet by mouth daily, Disp: , Rfl:     chlorthalidone 25 mg tablet, TAKE "ONE-HALF" TABLET (12 5 MG) BY MOUTH DAILY, Disp: 15 tablet, Rfl: 4    Cholecalciferol (VITAMIN D3) 2000 units TABS, Take 1 tablet by mouth daily, Disp: , Rfl:     co-enzyme Q-10 50 MG capsule, Take by mouth daily, Disp: , Rfl:     Colcrys 0 6 MG tablet, Take 1 tablet (0 6 mg total) by mouth daily, Disp: 30 tablet, Rfl: 6    Edarbi 40 MG tablet, TAKE 1 TABLET TWICE A DAY, Disp: 60 tablet, Rfl: 5    esomeprazole (NexIUM) 40 MG capsule, TAKE 1 CAPSULE BY MOUTH EVERY DAY, Disp: 90 capsule, Rfl: 3    Ferrous Sulfate (IRON) 325 (65 Fe) MG TABS, Take 1 tablet by mouth daily, Disp: , Rfl:     fluticasone-vilanterol (Breo Ellipta) 100-25 mcg/inh inhaler, Inhale 1 puff daily, Disp: 1 Inhaler, Rfl: 3    INCRUSE ELLIPTA 62 5 MCG/INH AEPB, Take 1 puff by mouth daily, Disp: , Rfl: 5    magnesium chloride-calcium (SLOW-MAG) 71 5-119 mg, Take 2 tablets by mouth 2 (two) times a day, Disp: , Rfl:     metoprolol tartrate (LOPRESSOR) 50 mg tablet, Take 1 tablet (50 mg total) by mouth 2 (two) times a day Take 50mg in the Am and 25 mg in the evening, Disp: 60 tablet, Rfl: 6    prasugrel (EFFIENT) tablet, Take 10 mg by mouth daily , Disp: , Rfl:     sitaGLIPtin (JANUVIA) 100 mg tablet, Take 1 tablet by mouth daily, Disp: , Rfl:     sulfaSALAzine (AZULFIDINE) 500 mg tablet, TAKE 1 AND 1/2 TABLETS BY MOUTH ONE TIME DAILY, Disp: 135 tablet, Rfl: 3    Lab, Imaging and other studies: I have personally reviewed pertinent labs  Laboratory Results:  Results for orders placed or performed in visit on 01/25/21   LDL cholesterol, direct   Result Value Ref Range    LDL Direct 73 3 0 - 129 mg/dl   ALT   Result Value Ref Range    ALT 27 5 - 63 U/L   AST   Result Value Ref Range    AST 34 15 - 41 U/L   CK (with reflex to MB)   Result Value Ref Range    Total  6 49 - 397 U/L   Glucose, fasting   Result Value Ref Range    Glucose, Fasting 126 (H) 70 - 105 mg/dL   CKMB   Result Value Ref Range    CK-MB Index 2 4 0 0 - 2 5 %    CK-MB 4 4 0 1 - 5 9 ng/mL             Invalid input(s): ALBUMIN      Radiology review:   chest X-ray    Ultrasound      Portions of the record may have been created with voice recognition software  Occasional wrong word or "sound a like" substitutions may have occurred due to the inherent limitations of voice recognition software  Read the chart carefully and recognize, using context, where substitutions have occurred

## 2023-05-24 DIAGNOSIS — E11.9 TYPE 2 DIABETES MELLITUS WITHOUT COMPLICATION, WITHOUT LONG-TERM CURRENT USE OF INSULIN (HCC): ICD-10-CM

## 2023-05-24 RX ORDER — SITAGLIPTIN 100 MG/1
TABLET, FILM COATED ORAL
Qty: 30 TABLET | Refills: 0 | Status: SHIPPED | OUTPATIENT
Start: 2023-05-24

## 2023-05-26 ENCOUNTER — APPOINTMENT (OUTPATIENT)
Dept: LAB | Facility: HOSPITAL | Age: 78
End: 2023-05-26
Attending: INTERNAL MEDICINE

## 2023-05-26 DIAGNOSIS — E78.5 DYSLIPIDEMIA: ICD-10-CM

## 2023-05-26 DIAGNOSIS — N18.32 STAGE 3B CHRONIC KIDNEY DISEASE (HCC): ICD-10-CM

## 2023-05-26 DIAGNOSIS — R80.1 PERSISTENT PROTEINURIA: ICD-10-CM

## 2023-05-26 DIAGNOSIS — E11.21 DIABETIC NEPHROPATHY ASSOCIATED WITH TYPE 2 DIABETES MELLITUS (HCC): ICD-10-CM

## 2023-05-26 DIAGNOSIS — E87.5 HYPERKALEMIA: ICD-10-CM

## 2023-05-26 DIAGNOSIS — R97.20 ELEVATED PSA: ICD-10-CM

## 2023-05-26 DIAGNOSIS — I12.9 HYPERTENSIVE CHRONIC KIDNEY DISEASE WITH STAGE 1 THROUGH STAGE 4 CHRONIC KIDNEY DISEASE, OR UNSPECIFIED CHRONIC KIDNEY DISEASE: ICD-10-CM

## 2023-05-26 LAB
ALBUMIN SERPL BCP-MCNC: 3.9 G/DL (ref 3.5–5)
ALP SERPL-CCNC: 88 U/L (ref 34–104)
ALT SERPL W P-5'-P-CCNC: 17 U/L (ref 7–52)
ANION GAP SERPL CALCULATED.3IONS-SCNC: 7 MMOL/L (ref 4–13)
AST SERPL W P-5'-P-CCNC: 25 U/L (ref 13–39)
BILIRUB SERPL-MCNC: 0.53 MG/DL (ref 0.2–1)
BUN SERPL-MCNC: 27 MG/DL (ref 5–25)
CALCIUM SERPL-MCNC: 9.7 MG/DL (ref 8.4–10.2)
CHLORIDE SERPL-SCNC: 102 MMOL/L (ref 96–108)
CK SERPL-CCNC: 122 U/L (ref 39–308)
CO2 SERPL-SCNC: 30 MMOL/L (ref 21–32)
CREAT SERPL-MCNC: 1.42 MG/DL (ref 0.6–1.3)
CREAT UR-MCNC: 57.7 MG/DL
ERYTHROCYTE [DISTWIDTH] IN BLOOD BY AUTOMATED COUNT: 13.5 % (ref 11.6–15.1)
GFR SERPL CREATININE-BSD FRML MDRD: 47 ML/MIN/1.73SQ M
GLUCOSE P FAST SERPL-MCNC: 152 MG/DL (ref 65–99)
HCT VFR BLD AUTO: 41.5 % (ref 36.5–49.3)
HGB BLD-MCNC: 13.9 G/DL (ref 12–17)
MAGNESIUM SERPL-MCNC: 1.9 MG/DL (ref 1.9–2.7)
MCH RBC QN AUTO: 31.3 PG (ref 26.8–34.3)
MCHC RBC AUTO-ENTMCNC: 33.5 G/DL (ref 31.4–37.4)
MCV RBC AUTO: 94 FL (ref 82–98)
PHOSPHATE SERPL-MCNC: 3.7 MG/DL (ref 2.3–4.1)
PLATELET # BLD AUTO: 197 THOUSANDS/UL (ref 149–390)
PMV BLD AUTO: 9.7 FL (ref 8.9–12.7)
POTASSIUM SERPL-SCNC: 4.2 MMOL/L (ref 3.5–5.3)
PROT SERPL-MCNC: 7 G/DL (ref 6.4–8.4)
PROT UR-MCNC: 22 MG/DL
PROT/CREAT UR: 0.38 MG/G{CREAT} (ref 0–0.1)
PSA SERPL-MCNC: 6.83 NG/ML (ref 0–4)
PTH-INTACT SERPL-MCNC: 31 PG/ML (ref 12–88)
RBC # BLD AUTO: 4.44 MILLION/UL (ref 3.88–5.62)
SODIUM SERPL-SCNC: 139 MMOL/L (ref 135–147)
WBC # BLD AUTO: 7.96 THOUSAND/UL (ref 4.31–10.16)

## 2023-06-01 DIAGNOSIS — K22.70 BARRETT'S ESOPHAGUS WITHOUT DYSPLASIA: Primary | ICD-10-CM

## 2023-06-01 DIAGNOSIS — K21.00 GASTROESOPHAGEAL REFLUX DISEASE WITH ESOPHAGITIS, UNSPECIFIED WHETHER HEMORRHAGE: ICD-10-CM

## 2023-06-02 LAB
25(OH)D2 SERPL-MCNC: <1 NG/ML
25(OH)D3 SERPL-MCNC: 39 NG/ML
25(OH)D3+25(OH)D2 SERPL-MCNC: 39 NG/ML

## 2023-06-02 RX ORDER — FAMOTIDINE 40 MG/1
40 TABLET, FILM COATED ORAL DAILY
Qty: 90 TABLET | Refills: 2 | Status: SHIPPED | OUTPATIENT
Start: 2023-06-02

## 2023-06-02 RX ORDER — DEXLANSOPRAZOLE 60 MG/1
60 CAPSULE, DELAYED RELEASE ORAL DAILY
Qty: 90 CAPSULE | Refills: 3 | Status: SHIPPED | OUTPATIENT
Start: 2023-06-02

## 2023-06-05 ENCOUNTER — HOSPITAL ENCOUNTER (OUTPATIENT)
Dept: RADIOLOGY | Age: 78
Discharge: HOME | End: 2023-06-05
Payer: MEDICARE

## 2023-06-05 DIAGNOSIS — R97.20 ELEVATED PSA: ICD-10-CM

## 2023-06-05 PROCEDURE — G1004 CDSM NDSC: HCPCS

## 2023-06-05 PROCEDURE — 72197 MRI PELVIS W/O & W/DYE: CPT

## 2023-06-05 PROCEDURE — A9585 GADOBUTROL INJECTION: HCPCS | Performed by: PHYSICIAN ASSISTANT

## 2023-06-05 PROCEDURE — 76377 3D RENDER W/INTRP POSTPROCES: CPT

## 2023-06-05 RX ADMIN — GADOBUTROL 8 ML: 604.72 INJECTION INTRAVENOUS at 09:33

## 2023-06-08 ENCOUNTER — TELEPHONE (OUTPATIENT)
Dept: UROLOGY | Facility: CLINIC | Age: 78
End: 2023-06-08

## 2023-06-19 ENCOUNTER — OFFICE VISIT (OUTPATIENT)
Dept: UROLOGY | Facility: CLINIC | Age: 78
End: 2023-06-19
Payer: MEDICARE

## 2023-06-19 VITALS
DIASTOLIC BLOOD PRESSURE: 78 MMHG | BODY MASS INDEX: 26.48 KG/M2 | RESPIRATION RATE: 16 BRPM | OXYGEN SATURATION: 97 % | HEART RATE: 85 BPM | SYSTOLIC BLOOD PRESSURE: 142 MMHG | WEIGHT: 185 LBS | HEIGHT: 70 IN

## 2023-06-19 DIAGNOSIS — R97.20 ELEVATED PSA: Primary | ICD-10-CM

## 2023-06-19 PROCEDURE — 99213 OFFICE O/P EST LOW 20 MIN: CPT | Performed by: PHYSICIAN ASSISTANT

## 2023-06-19 NOTE — PROGRESS NOTES
1  Elevated PSA  Case request operating room: TRANSPERINEAL MRI FUSION BIOPSY PROSTATE    Case request operating room: TRANSPERINEAL MRI FUSION BIOPSY PROSTATE           Assessment and plan:       1  Elevated PSA   - normal BELLA  -Oscillating PSA   - mpMRI (6/5/23) PIRADS 4, 38g   -Discussed options for observation versus proceeding with a transperineal MRI fusion guided biopsy  Risks and benefits of each option reviewed  Patient elected to proceed with a fusion guided biopsy  All questions answered  [id number]      Chief Complaint     Elevated PSA    History of Present Illness     José Balderas is a 78 y o  male presenting for follow-up of elevated PSA  PSA trend:  7 1 (8/2/22)  4 9 (10/12/22)  8 3 (6/15/23)  6 83 (5/26/23)    mpMRI (6/5/23) PIRADS 4, 38g  Patient is comfortable with his lower urinary tract symptoms  Endorses a strong stream feeling empty after voiding  Denies any dysuria, gross hematuria, incontinence, or urinary infections  Is not on any pharmacotherapy for his prostate or bladder  Does have a past history of nephrolithiasis over 30 years ago  Has not had any symptoms of kidney stones over the past year  Medical comorbidities include type 2 diabetes, pulmonary fibrosis, COPD, hypertension, coronary artery disease, chronic kidney disease, sarcoidosis  Laboratory     Lab Results   Component Value Date    CREATININE 1 42 (H) 05/26/2023       Lab Results   Component Value Date    PSA 6 83 (H) 05/26/2023    PSA 8 3 (H) 05/15/2023    PSA 4 9 (H) 10/12/2022       Review of Systems     Review of Systems   Constitutional: Negative for activity change, appetite change, chills, diaphoresis, fatigue, fever and unexpected weight change  Respiratory: Negative for chest tightness and shortness of breath  Cardiovascular: Negative for chest pain, palpitations and leg swelling     Gastrointestinal: Negative for abdominal distention, abdominal pain, constipation, "diarrhea, nausea and vomiting  Genitourinary: Negative for decreased urine volume, difficulty urinating, dysuria, enuresis, flank pain, frequency, genital sores, hematuria and urgency  Musculoskeletal: Negative for back pain, gait problem and myalgias  Skin: Negative for color change, pallor, rash and wound  Psychiatric/Behavioral: Negative for behavioral problems  The patient is not nervous/anxious  Allergies     Allergies   Allergen Reactions   • Oxycodone GI Intolerance       Physical Exam     Physical Exam  Constitutional:       General: He is not in acute distress  Appearance: Normal appearance  He is normal weight  He is not ill-appearing, toxic-appearing or diaphoretic  HENT:      Head: Normocephalic and atraumatic  Eyes:      General:         Right eye: No discharge  Left eye: No discharge  Conjunctiva/sclera: Conjunctivae normal    Pulmonary:      Effort: Pulmonary effort is normal  No respiratory distress  Genitourinary:     Comments: Good rectal tone  Prostate 45g, smooth, symmetric, no palpable nodules  Musculoskeletal:         General: No swelling, tenderness or signs of injury  Normal range of motion  Skin:     General: Skin is warm and dry  Coloration: Skin is not jaundiced or pale  Neurological:      General: No focal deficit present  Mental Status: He is alert and oriented to person, place, and time     Psychiatric:         Mood and Affect: Mood normal          Behavior: Behavior normal            Vital Signs     Vitals:    06/19/23 0808   BP: 142/78   BP Location: Left arm   Patient Position: Sitting   Cuff Size: Adult   Pulse: 85   Resp: 16   SpO2: 97%   Weight: 83 9 kg (185 lb)   Height: 5' 10\" (1 778 m)         Current Medications       Current Outpatient Medications:   •  albuterol (PROVENTIL HFA,VENTOLIN HFA) 90 mcg/act inhaler, Inhale 2 puffs every 6 (six) hours as needed for wheezing or shortness of breath, Disp: 6 7 g, Rfl: 2  •  " "allopurinol (ZYLOPRIM) 300 mg tablet, \"ONE-HALF\" TABLET EVERY DAY, Disp: 45 tablet, Rfl: 3  •  amLODIPine (NORVASC) 5 mg tablet, TAKE 1 TABLET (5 MG) BY MOUTH DAILY, Disp: 90 tablet, Rfl: 5  •  aspirin (ECOTRIN LOW STRENGTH) 81 mg EC tablet, Take 81 mg by mouth daily, Disp: , Rfl:   •  atorvastatin (LIPITOR) 80 mg tablet, TAKE 1 TABLET (80 MG TOTAL) BY MOUTH DAILY, Disp: 90 tablet, Rfl: 3  •  azilsartan medoxomil (Edarbi) 40 MG tablet, Take 1 tablet (40 mg total) by mouth daily after dinner (Patient taking differently: Take 20 mg by mouth daily at bedtime), Disp: 60 tablet, Rfl: 3  •  B Complex-C (SUPERPLEX-T) TABS, Take 1 tablet by mouth daily, Disp: , Rfl:   •  Breo Ellipta 100-25 MCG/ACT inhaler, INHALE 1 PUFF DAILY RINSE MOUTH AFTER USE , Disp: 60 each, Rfl: 2  •  chlorthalidone 25 mg tablet, TAKE \"ONE-HALF\" TABLET (12 5 MG) BY MOUTH DAILY, Disp: 45 tablet, Rfl: 4  •  Cholecalciferol (VITAMIN D3) 2000 units TABS, Take 1 tablet by mouth daily, Disp: , Rfl:   •  co-enzyme Q-10 50 MG capsule, Take 100 mg by mouth in the morning , Disp: , Rfl:   •  colchicine (COLCRYS) 0 6 mg tablet, TAKE 1 TABLET (0 6 MG TOTAL) BY MOUTH DAILY, Disp: 30 tablet, Rfl: 3  •  dexlansoprazole (DEXILANT) 60 MG capsule, Take 1 capsule (60 mg total) by mouth daily, Disp: 90 capsule, Rfl: 3  •  famotidine (PEPCID) 40 MG tablet, Take 1 tablet (40 mg total) by mouth daily Take 1 tablet with dinner, Disp: 90 tablet, Rfl: 2  •  Ferrous Sulfate (IRON) 325 (65 Fe) MG TABS, Take 1 tablet by mouth daily, Disp: , Rfl:   •  ipratropium-albuterol (DUO-NEB) 0 5-2 5 mg/3 mL nebulizer solution, Take 3 mL by nebulization 4 (four) times a day, Disp: 360 mL, Rfl: 3  •  Januvia 100 MG tablet, TAKE ONE TABLET DAILY, Disp: 30 tablet, Rfl: 0  •  magnesium chloride-calcium (SLOW-MAG) 71 5-119 mg, Take 2 tablets by mouth 2 (two) times a day, Disp: , Rfl:   •  metoprolol tartrate (LOPRESSOR) 50 mg tablet, TAKE 1 TABLET (50 MG TOTAL) BY MOUTH IN THE MORNING AND " "\"ONE-HALF\" TABLET (25MG) IN THE EVENING, Disp: 135 tablet, Rfl: 3  •  sulfaSALAzine (AZULFIDINE) 500 mg tablet, ONE AND \"ONE-HALF\" TABLETS (75OMG) EVERY DAY, Disp: 45 tablet, Rfl: 3  •  Incruse Ellipta 62 5 MCG/ACT AEPB inhaler, INHALE 1 PUFF DAILY, Disp: 3 each, Rfl: 1      Active Problems     Patient Active Problem List   Diagnosis   • Chronic kidney disease, stage III (moderate) (HCC)   • Dyslipidemia   • Persistent proteinuria   • Hypertensive chronic kidney disease with stage 1 through stage 4 chronic kidney disease, or unspecified chronic kidney disease   • Hyperkalemia   • Hypertension, essential   • COPD (chronic obstructive pulmonary disease) (HCC)   • Sarcoidosis   • Other spondylosis, thoracolumbar region   • Type 2 diabetes mellitus without complication, without long-term current use of insulin (HCC)   • Diabetic nephropathy associated with type 2 diabetes mellitus (HCC)   • Type 2 diabetes mellitus with stage 3 chronic kidney disease (HCC)   • Coronary artery disease of native heart with stable angina pectoris, unspecified vessel or lesion type (HCC)   • COVID-19   • Pulmonary fibrosis (HCC)   • History of PTCA         Past Medical History     Past Medical History:   Diagnosis Date   • Anemia    • Arthritis    • Gee's esophagus    • Breast lump    • Cancer (HCC)    • Chronic kidney disease    • Cirrhosis (HCC)    • Coronary artery disease     cardiac cath; stents    • Diabetes mellitus (HCC)    • H/O heart artery stent    • Hypertension    • Inguinal hernia     Right   • Sarcoidosis    • 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HCC)    • Skin cancer    • SOB (shortness of breath)    • Upper respiratory infection, viral 5/31/2022         Surgical History     Past Surgical History:   Procedure Laterality Date   • CARDIAC CATHETERIZATION     • CATARACT EXTRACTION, BILATERAL      left eye 05/08 and right 5/22/2022   • CERVICAL FUSION     • CHOLECYSTECTOMY     • COLONOSCOPY  04/11/2016   • EGD AND COLONOSCOPY  06/24/2019 " "  • ERCP  09/11/2014    transab esop hiat griffin rpr   • ESOPHAGOGASTRIC FUNDOPLASTY     • HERNIA REPAIR Right     p I/griffin init reduc >5 yr   • HIP SURGERY Right     Replacement   • KIDNEY STONE SURGERY      Fragmenting   • LIVER BIOPSY     • MULTIPLE TOOTH EXTRACTIONS      \"extraction of all maxillary teeth\"   • SKIN BIOPSY  05/2020   • THUMB FUSION      with graft   • TONSILLECTOMY     • US GUIDANCE  10/22/2014         Family History     Family History   Problem Relation Age of Onset   • Hypertension Mother    • Atrial fibrillation Mother    • Heart disease Mother    • Kidney disease Father    • COPD Father    • Heart disease Father    • Asthma Neg Hx    • Lung cancer Neg Hx          Social History     Social History       Radiology       "

## 2023-06-19 NOTE — PROGRESS NOTES
RENAL FOLLOW UP NOTE: td    ASSESSMENT AND PLAN:  1  CKD stage 3  Etiology: Diabetic nephropathy/hypertensive nephrosclerosis/arteriolar nephrosclerosis/episodes of SHAY in the past  All serologies were negative including multiple myeloma evaluation  Baseline creatinine: 1 2-1 62  Current creatinine: 1 42 at baseline   Urine protein creatinine ratio: 0 38 g at goal  Recommendations:   Treat hypertension-please see below   Treat dyslipidemia-please see below   Maintain proteinuria less than 1 g or as low as possible   Avoid nephrotoxic agents such as NSAIDs, patient counseled as such  2  Volume: Euvolemic   3  Hypertension:      Current blood pressure averages:   A m : 126/71, standing 121/68  P m :   132/72, standing 118/65  Heart rate: 50-60 range     Goal blood pressure: Less than 125/80 given CAD  Recommendations:   Push nonmedical regimen including weight loss, isotonic exercise and avoidance of salt; patient counseled as such   Medication changes today:   No changes as patient's blood pressure essentially at goal some mild orthostasis and relatively low diastolic readings  I do not want to decrease his systolic much more as this may cause hypoperfusion  4  Electrolytes: All acceptable   5  Mineral bone disorder:   Calcium/magnesium/phosphorus: All acceptable , magnesium 1 8 on supplement  PTH intact: 31 which is normal  Vitamin-D:Currently on supplements  39 at goal  6  Dyslipidemia:   Goal LDL: Less than 70 given CAD   Current lipid profile: LDL 43/HDL 43/triglycerides 143  Recommendations: At goal so no changes   7  Anemia: normal hemoglobin at 13 9  8  Other problems:   Diabetes mellitus per your discretion I will attempt Jardiance 10 mg daily for cardiac/renal protection if not cost prohibitive  CAD followed by Dr Keshia Israel [facility]  Status post MI last October involving to cardiac stents  : Recent emesis of LAD lesion with repeat PTCA drug-eluting stent 2018   Circumflex artery and right coronary artery 50% stenosis  Ejection fraction 55%  Sarcoidosis followed by Dr Street   Kyphoscoliosis   Cirrhosis of the liver   Gout   Ankylosing spondylitis  Elevated PSA being followed by Dr Pattie Avendano going for prostate biopsy will need follow-up        GI health maintenance: Last colonoscopy: APPROXIMATELY 3-4 YEARS AGO, 5 years follow-up so approximately 1-2 years per Dr Seema Calvillo:    Patient Instructions   Overall your kidney function is doing quite well! Your blood pressure is also doing well  I did message Dr Avril Mathews and Dr Ward Burroughs to make sure they follow-up with you after the prostate biopsy        1  Medication changes today:  No medication changes today  2   Please take 1 week a blood pressure readings prior to next visit in 6 months    AS FOLLOWS  MORNING AND EVENING, SITTING AND STANDING as follows:  TAKE THE MORNING READINGS BEFORE ANY MEDICATIONS AND WHEN YOU ARE RELAXED FOR SEVERAL MINUTES  TAKE THE EVENING READINGS:  BETWEEN 7-10 P M ; PRIOR TO ANY MEDICATIONS; AT LEAST IN OUR  FROM DINNER; AND CERTAINLY AFTER RELAXING FOR A FEW MINUTES  PLEASE INCLUDE HEART RATE WITH YOUR BLOOD PRESSURE READINGS  When taking standing readings, keep your arm supported at heart level and not dangling  Make sure you are sitting with your back supported and feet on the ground and do not cross your legs or feet  Make sure you have not taken any coffee or caffeine products or exercised or smoke cigarettes at least 30 minutes before taking your blood pressure  Then please mail these readings into the office    3  Follow-up in 6-7 months  Please bring in 1 week a blood pressure readings morning evening, sitting and standing is outlined above  PLEASE BRING AN YOUR BLOOD PRESSURE MACHINE TO CORRELATE WITH THE OFFICE MACHINE AT THIS NEXT SCHEDULED VISIT  Please go for fasting lab work 1-2 weeks prior to your appointment      4    General instructions:  AVOID SALT BUT NOT ADDING AN READING LABELS TO MAKE SURE THERE IS LOW-SALT IN THE FOOD THAT YOU ARE EATING  Goal is less than 2 g of sodium intake or less than 5 g of sodium chloride intake per day    Avoid nonsteroidal anti-inflammatory drugs such as Naprosyn, ibuprofen, Aleve, Advil, Celebrex, Meloxicam (Mobic) etc   You can use Tylenol as needed if you do not have any liver condition to be concerned about    Avoid medications such as Sudafed or decongestants and antihistamines that contained the D component which is the decongestant  You can take antihistamines without the decongestant or D component  Try to avoid medications such as pantoprazole or  Protonix/Nexium or Esomeprazole)/Prilosec or omeprazole/Prevacid or lansoprazole/AcipHex or Rabeprazole  If you are able to, use Pepcid as this is safer for your kidneys  Try to exercise at least 30 minutes 3 days a week to begin with with an ultimate goal of 5 days a week for at least 30 minutes    Try to lose 5-10 lb by your next visit    Please do not drink more than 2 glasses of alcohol/wine on a daily basis as this may contribute to your high blood pressure  Subjective: There has been no hospitalizations or acute illnesses since last visit  The patient overall is feeling well    No fevers, chills, or colds, chronic minimally productive cough of white phlegm  Good appetite and good energy  No hematuria, dysuria, voiding symptoms few scattered bubbles  No gastrointestinal symptoms  No chest pain, chronic dyspnea on exertion unchanged, no leg swelling  No headaches, rare dizziness or lightheadedness  Blood pressure medications:  Metoprolol tartrate 50 mg in the morning 25 mg in the evening  Chlorthalidone 12 5 mg daily in the morning  Edarbi 20 mg at dinnertime  Amlodipine 5 mg daily in the morning      ROS:  See HPI, otherwise review of systems as completely reviewed with the patient are negative    Past Medical History:   Diagnosis Date   • Anemia    • "Arthritis    • Gee's esophagus    • Breast lump    • Cancer (HCC)    • Chronic kidney disease    • Cirrhosis ([facility] [address] )    • Coronary artery disease     cardiac cath; stents    • [facility]    • H/O heart artery stent    • Hypertension    • Inguinal hernia     Right   • Sarcoidosis    • 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HCC)    • Skin cancer    • SOB (shortness of breath)    • Upper respiratory infection, viral 5/31/2022     Past Surgical History:   Procedure Laterality Date   • CARDIAC CATHETERIZATION     • CATARACT EXTRACTION, BILATERAL      left eye 05/08 and right 5/22/2022   • CERVICAL FUSION     • CHOLECYSTECTOMY     • COLONOSCOPY  04/11/2016   • EGD AND COLONOSCOPY  06/24/2019   • ERCP  09/11/2014    transab esop hiat griffin rpr   • ESOPHAGOGASTRIC FUNDOPLASTY     • HERNIA REPAIR Right     p I/griffin init reduc >5 yr   • HIP SURGERY Right     Replacement   • KIDNEY STONE SURGERY      Fragmenting   • LIVER BIOPSY     • MULTIPLE TOOTH EXTRACTIONS      \"extraction of all maxillary teeth\"   • SKIN BIOPSY  05/2020   • THUMB FUSION      with graft   • TONSILLECTOMY     • US GUIDANCE  10/22/2014     Family History   Problem Relation Age of Onset   • Hypertension Mother    • Atrial fibrillation Mother    • Heart disease Mother    • Kidney disease Father    • COPD Father    • Heart disease Father    • Asthma Neg Hx    • Lung cancer Neg Hx       reports that he has never smoked  He has never used smokeless tobacco  He reports that he does not drink alcohol and does not use drugs      I COMPLETELY REVIEWED THE PAST MEDICAL HISTORY/PAST SURGICAL HISTORY/SOCIAL HISTORY/FAMILY HISTORY/AND MEDICATIONS  AND UPDATED ALL    Objective:     Vitals:   BP sitting on right: 160/70 with a heart rate of 68 and regular  BP standing on right: 138/70 with a heart rate of 64 and regular      Weight (last 2 days)     Date/Time Weight    06/29/23 1057 83 9 (185)        Wt Readings from Last 3 Encounters:   06/29/23 83 9 kg (185 lb)   06/19/23 " "83 9 kg (185 lb)   05/16/23 83 5 kg (184 lb)       Body mass index is 26 54 kg/m²      Physical Exam: General:  No acute distress  Skin:  No acute rash  Eyes:  No scleral icterus, noninjected, no discharge from eyes  ENT:  Moist mucous membranes  Neck:  Supple, no jugular venous distention, trachea is midline, no lymphadenopathy and no thyromegaly  Back   No CVAT/kyphotic  Chest:  Clear to auscultation and percussion, good respiratory effort  CVS:  Regular rate and rhythm without a rub, or gallops or murmurs  Abdomen:  Soft and nontender with normal bowel sounds  Extremities:  No cyanosis and no edema, no arthritic changes, normal range of motion  Neuro:  Grossly intact  Psych:  Alert, oriented x3 and appropriate      Medications:    Current Outpatient Medications:   •  albuterol (PROVENTIL HFA,VENTOLIN HFA) 90 mcg/act inhaler, Inhale 2 puffs every 6 (six) hours as needed for wheezing or shortness of breath, Disp: 6 7 g, Rfl: 2  •  allopurinol (ZYLOPRIM) 300 mg tablet, \"ONE-HALF\" TABLET EVERY DAY, Disp: 45 tablet, Rfl: 3  •  amLODIPine (NORVASC) 5 mg tablet, TAKE 1 TABLET (5 MG) BY MOUTH DAILY, Disp: 90 tablet, Rfl: 5  •  aspirin (ECOTRIN LOW STRENGTH) 81 mg EC tablet, Take 81 mg by mouth daily, Disp: , Rfl:   •  atorvastatin (LIPITOR) 80 mg tablet, TAKE 1 TABLET (80 MG TOTAL) BY MOUTH DAILY, Disp: 90 tablet, Rfl: 3  •  azilsartan medoxomil (Edarbi) 40 MG tablet, Take 1 tablet (40 mg total) by mouth daily after dinner (Patient taking differently: Take 20 mg by mouth daily at bedtime), Disp: 60 tablet, Rfl: 3  •  b complex vitamins capsule, Take 1 capsule by mouth daily, Disp: , Rfl:   •  B Complex-C (SUPERPLEX-T) TABS, Take 1 tablet by mouth daily, Disp: , Rfl:   •  Breo Ellipta 100-25 MCG/ACT inhaler, INHALE 1 PUFF DAILY RINSE MOUTH AFTER USE , Disp: 60 each, Rfl: 2  •  chlorthalidone 25 mg tablet, TAKE \"ONE-HALF\" TABLET (12 5 MG) BY MOUTH DAILY, Disp: 45 tablet, Rfl: 4  •  Cholecalciferol (VITAMIN D3) 2000 units " "TABS, Take 1 tablet by mouth daily, Disp: , Rfl:   •  co-enzyme Q-10 50 MG capsule, Take 100 mg by mouth in the morning , Disp: , Rfl:   •  colchicine (COLCRYS) 0 6 mg tablet, TAKE 1 TABLET (0 6 MG TOTAL) BY MOUTH DAILY, Disp: 30 tablet, Rfl: 3  •  dexlansoprazole (DEXILANT) 60 MG capsule, Take 1 capsule (60 mg total) by mouth daily, Disp: 90 capsule, Rfl: 3  •  Empagliflozin (Jardiance) 10 MG TABS tablet, Take 1 tablet (10 mg total) by mouth every morning, Disp: 30 tablet, Rfl: 5  •  famotidine (PEPCID) 40 MG tablet, Take 1 tablet (40 mg total) by mouth daily Take 1 tablet with dinner, Disp: 90 tablet, Rfl: 2  •  Ferrous Sulfate (IRON) 325 (65 Fe) MG TABS, Take 1 tablet by mouth daily, Disp: , Rfl:   •  ipratropium-albuterol (DUO-NEB) 0 5-2 5 mg/3 mL nebulizer solution, Take 3 mL by nebulization 4 (four) times a day, Disp: 360 mL, Rfl: 3  •  Januvia 100 MG tablet, TAKE ONE TABLET DAILY, Disp: 30 tablet, Rfl: 0  •  magnesium chloride-calcium (SLOW-MAG) 71 5-119 mg, Take 2 tablets by mouth 2 (two) times a day, Disp: , Rfl:   •  metoprolol tartrate (LOPRESSOR) 50 mg tablet, TAKE 1 TABLET (50 MG TOTAL) BY MOUTH IN THE MORNING AND \"ONE-HALF\" TABLET (25MG) IN THE EVENING, Disp: 135 tablet, Rfl: 3  •  sulfaSALAzine (AZULFIDINE) 500 mg tablet, ONE AND \"ONE-HALF\" TABLETS (75OMG) EVERY DAY, Disp: 45 tablet, Rfl: 3  •  Incruse Ellipta 62 5 MCG/ACT AEPB inhaler, INHALE 1 PUFF DAILY, Disp: 3 each, Rfl: 1    Lab, Imaging and other studies: I have personally reviewed pertinent labs    Laboratory Results:  Results for orders placed or performed in visit on 05/26/23   Comprehensive metabolic panel   Result Value Ref Range    Sodium 139 135 - 147 mmol/L    Potassium 4 2 3 5 - 5 3 mmol/L    Chloride 102 96 - 108 mmol/L    CO2 30 21 - 32 mmol/L    ANION GAP 7 4 - 13 mmol/L    BUN 27 (H) 5 - 25 mg/dL    Creatinine 1 42 (H) 0 60 - 1 30 mg/dL    Glucose, Fasting 152 (H) 65 - 99 mg/dL    Calcium 9 7 8 4 - 10 2 mg/dL    AST 25 13 - 39 U/L " "   ALT 17 7 - 52 U/L    Alkaline Phosphatase 88 34 - 104 U/L    Total Protein 7 0 6 4 - 8 4 g/dL    Albumin 3 9 3 5 - 5 0 g/dL    Total Bilirubin 0 53 0 20 - 1 00 mg/dL    eGFR 47 ml/min/1 73sq m   CBC   Result Value Ref Range    WBC 7 96 4 31 - 10 16 Thousand/uL    RBC 4 44 3 88 - 5 62 Million/uL    Hemoglobin 13 9 12 0 - 17 0 g/dL    Hematocrit 41 5 36 5 - 49 3 %    MCV 94 82 - 98 fL    MCH 31 3 26 8 - 34 3 pg    MCHC 33 5 31 4 - 37 4 g/dL    RDW 13 5 11 6 - 15 1 %    Platelets 877 979 - 766 Thousands/uL    MPV 9 7 8 9 - 12 7 fL   CK   Result Value Ref Range    Total  39 - 308 U/L   Phosphorus   Result Value Ref Range    Phosphorus 3 7 2 3 - 4 1 mg/dL   Magnesium   Result Value Ref Range    Magnesium 1 9 1 9 - 2 7 mg/dL   Protein / creatinine ratio, urine   Result Value Ref Range    Creatinine, Ur 57 7 mg/dL    Protein Urine Random 22 mg/dL    Prot/Creat Ratio, Ur 0 38 (H) 0 00 - 0 10   PTH, intact   Result Value Ref Range    PTH 31 0 12 0 - 88 0 pg/mL   Vitamin D Panel   Result Value Ref Range    25-HYDROXY VIT D 39 ng/mL    25-Hydroxy D2 <1 0 ng/mL    25-HYDROXY VIT D3 39 ng/mL   PSA Total, Diagnostic   Result Value Ref Range    PSA, Diagnostic 6 83 (H) 0 00 - 4 00 ng/mL             Invalid input(s): \"ALBUMIN\"      Radiology review:   chest X-ray    Ultrasound      Portions of the record may have been created with voice recognition software  Occasional wrong word or \"sound a like\" substitutions may have occurred due to the inherent limitations of voice recognition software  Read the chart carefully and recognize, using context, where substitutions have occurred                      "

## 2023-06-22 DIAGNOSIS — E11.9 TYPE 2 DIABETES MELLITUS WITHOUT COMPLICATION, WITHOUT LONG-TERM CURRENT USE OF INSULIN (HCC): ICD-10-CM

## 2023-06-23 RX ORDER — SITAGLIPTIN 100 MG/1
TABLET, FILM COATED ORAL
Qty: 30 TABLET | Refills: 0 | Status: SHIPPED | OUTPATIENT
Start: 2023-06-23

## 2023-06-26 ENCOUNTER — TELEPHONE (OUTPATIENT)
Dept: UROLOGY | Facility: AMBULATORY SURGERY CENTER | Age: 78
End: 2023-06-26

## 2023-06-26 NOTE — TELEPHONE ENCOUNTER
I spoke to the patient and scheduled his procedure for 8/15/2023 at [facility] with Lynnette Briones       -instructions given verbally and mailed  -patient aware to be NPO, needs a  and use an enema 1 hour prior to leaving the house morning of procedure  -CBC, CMP, Urine C&S and EKG 2 weeks prior  -PO - 9/5/2023    **patient seemed confused, stated [facility] did not review any information regarding the procedure being done  He stated [facility] told him I would review all information with him  However, while speaking to the patient he did have the information needed from [facility] during his 6/19/2023 visit  I did offer to have someone call to go over everything again which he declined

## 2023-06-26 NOTE — TELEPHONE ENCOUNTER
Patient calling back SS  Encounter sent to [facility]   Patient wants status of surgery ordered on 6/19/23

## 2023-06-29 ENCOUNTER — OFFICE VISIT (OUTPATIENT)
Dept: NEPHROLOGY | Facility: CLINIC | Age: 78
End: 2023-06-29
Payer: MEDICARE

## 2023-06-29 VITALS — HEIGHT: 70 IN | BODY MASS INDEX: 26.48 KG/M2 | WEIGHT: 185 LBS

## 2023-06-29 DIAGNOSIS — N18.31 STAGE 3A CHRONIC KIDNEY DISEASE (HCC): ICD-10-CM

## 2023-06-29 DIAGNOSIS — I12.9 HYPERTENSIVE CHRONIC KIDNEY DISEASE WITH STAGE 1 THROUGH STAGE 4 CHRONIC KIDNEY DISEASE, OR UNSPECIFIED CHRONIC KIDNEY DISEASE: Primary | ICD-10-CM

## 2023-06-29 DIAGNOSIS — E78.5 DYSLIPIDEMIA: ICD-10-CM

## 2023-06-29 DIAGNOSIS — E11.21 DIABETIC NEPHROPATHY ASSOCIATED WITH TYPE 2 DIABETES MELLITUS (HCC): ICD-10-CM

## 2023-06-29 DIAGNOSIS — R80.1 PERSISTENT PROTEINURIA: ICD-10-CM

## 2023-06-29 RX ORDER — VITAMIN B COMPLEX
1 CAPSULE ORAL DAILY
COMMUNITY

## 2023-06-29 NOTE — LETTER
June 29, 2023     Harleen Corbett, DO  [address] [facility]  [address] [facility]  Professor Liliana Cisneros [address]    Patient: Gabriel Hamilton   YOB: 1945   Date of Visit: 6/29/2023       Dear Dr Medina Rebolledo: Thank you for referring Jeanette Tobias to me for evaluation  Below are my notes for this consultation  If you have questions, please do not hesitate to call me  I look forward to following your patient along with you  Sincerely,        Ramiro Mcfadden MD        CC: MD Pietro Romeo MD Apolinar Limes, MD  6/29/2023 11:28 AM  Sign when Signing Visit  RENAL FOLLOW UP NOTE: td    ASSESSMENT AND PLAN:  1  CKD stage 3  Etiology: Diabetic nephropathy/hypertensive nephrosclerosis/arteriolar nephrosclerosis/episodes of SHAY in the past  All serologies were negative including multiple myeloma evaluation  Baseline creatinine: 1 2-1 62  Current creatinine: 1 42 at baseline   Urine protein creatinine ratio: 0 38 g at goal  Recommendations:   Treat hypertension-please see below   Treat dyslipidemia-please see below   Maintain proteinuria less than 1 g or as low as possible   Avoid nephrotoxic agents such as NSAIDs, patient counseled as such  2  Volume: Euvolemic   3  Hypertension:      Current blood pressure averages:   A m : 126/71, standing 121/68  P m :   132/72, standing 118/65  Heart rate: 50-60 range     Goal blood pressure: Less than 125/80 given CAD  Recommendations:   Push nonmedical regimen including weight loss, isotonic exercise and avoidance of salt; patient counseled as such   Medication changes today:   No changes as patient's blood pressure essentially at goal some mild orthostasis and relatively low diastolic readings  I do not want to decrease his systolic much more as this may cause hypoperfusion  4  Electrolytes: All acceptable   5   Mineral bone disorder:   Calcium/magnesium/phosphorus: All acceptable , magnesium 1 8 on supplement  PTH intact: 31 which is normal  Vitamin-D:Currently on supplements  39 at goal  6  Dyslipidemia:   Goal LDL: Less than 70 given CAD   Current lipid profile: LDL 43/HDL 43/triglycerides 143  Recommendations: At goal so no changes   7  Anemia: normal hemoglobin at 13 9  8  Other problems:   Diabetes mellitus per your discretion I will attempt Jardiance 10 mg daily for cardiac/renal protection if not cost prohibitive  CAD followed by Dr Nidia Rebolledo through [facility]  Status post MI last October involving to cardiac stents  : Recent emesis of LAD lesion with repeat PTCA drug-eluting stent 2018  Circumflex artery and right coronary artery 50% stenosis  Ejection fraction 55%  Sarcoidosis followed by Dr Street   Kyphoscoliosis   Cirrhosis of the liver   Gout   Ankylosing spondylitis  Elevated PSA being followed by Dr Richard Rachel going for prostate biopsy will need follow-up        GI health maintenance: Last colonoscopy: APPROXIMATELY 3-4 YEARS AGO, 5 years follow-up so approximately 1-2 years per Dr Lugo Call:    Patient Instructions   Overall your kidney function is doing quite well! Your blood pressure is also doing well  I did message Dr Nancy Bkaer and Dr Sarah Garner to make sure they follow-up with you after the prostate biopsy        1  Medication changes today:  No medication changes today          2   Please take 1 week a blood pressure readings prior to next visit in 6 months    AS FOLLOWS  MORNING AND EVENING, SITTING AND STANDING as follows:  TAKE THE MORNING READINGS BEFORE ANY MEDICATIONS AND WHEN YOU ARE RELAXED FOR SEVERAL MINUTES  TAKE THE EVENING READINGS:  BETWEEN 7-10 P M ; PRIOR TO ANY MEDICATIONS; AT LEAST IN OUR  FROM DINNER; AND CERTAINLY AFTER RELAXING FOR A FEW MINUTES  PLEASE INCLUDE HEART RATE WITH YOUR BLOOD PRESSURE READINGS  When taking standing readings, keep your arm supported at heart level and not dangling  Make sure you are sitting with your back supported and feet on the ground and do not cross your legs or feet  Make sure you have not taken any coffee or caffeine products or exercised or smoke cigarettes at least 30 minutes before taking your blood pressure  Then please mail these readings into the office    3  Follow-up in 6-7 months  Please bring in 1 week a blood pressure readings morning evening, sitting and standing is outlined above  PLEASE BRING AN YOUR BLOOD PRESSURE MACHINE TO CORRELATE WITH THE OFFICE MACHINE AT THIS NEXT SCHEDULED VISIT  Please go for fasting lab work 1-2 weeks prior to your appointment      4  General instructions:  AVOID SALT BUT NOT ADDING AN READING LABELS TO MAKE SURE THERE IS LOW-SALT IN THE FOOD THAT YOU ARE EATING  Goal is less than 2 g of sodium intake or less than 5 g of sodium chloride intake per day    Avoid nonsteroidal anti-inflammatory drugs such as Naprosyn, ibuprofen, Aleve, Advil, Celebrex, Meloxicam (Mobic) etc   You can use Tylenol as needed if you do not have any liver condition to be concerned about    Avoid medications such as Sudafed or decongestants and antihistamines that contained the D component which is the decongestant  You can take antihistamines without the decongestant or D component  Try to avoid medications such as pantoprazole or  Protonix/Nexium or Esomeprazole)/Prilosec or omeprazole/Prevacid or lansoprazole/AcipHex or Rabeprazole  If you are able to, use Pepcid as this is safer for your kidneys  Try to exercise at least 30 minutes 3 days a week to begin with with an ultimate goal of 5 days a week for at least 30 minutes    Try to lose 5-10 lb by your next visit    Please do not drink more than 2 glasses of alcohol/wine on a daily basis as this may contribute to your high blood pressure  Subjective: There has been no hospitalizations or acute illnesses since last visit  The patient overall is feeling well    No fevers, chills, or colds, chronic minimally productive cough of white phlegm  Good appetite and good "energy  No hematuria, dysuria, voiding symptoms few scattered bubbles  No gastrointestinal symptoms  No chest pain, chronic dyspnea on exertion unchanged, no leg swelling  No headaches, rare dizziness or lightheadedness  Blood pressure medications:  Metoprolol tartrate 50 mg in the morning 25 mg in the evening  Chlorthalidone 12 5 mg daily in the morning  Edarbi 20 mg at dinnertime  Amlodipine 5 mg daily in the morning      ROS:  See HPI, otherwise review of systems as completely reviewed with the patient are negative    Past Medical History:   Diagnosis Date   • Anemia    • Arthritis    • Gee's esophagus    • Breast lump    • Cancer ([facility] )    • Chronic kidney disease    • Cirrhosis ([facility] )    • Coronary artery disease     cardiac cath; stents    • Diabetes mellitus ([facility] )    • H/O heart artery stent    • Hypertension    • Inguinal hernia     Right   • Sarcoidosis    • 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[facility] )    • Skin cancer    • SOB (shortness of breath)    • Upper respiratory infection, viral 5/31/2022     Past Surgical History:   Procedure Laterality Date   • CARDIAC CATHETERIZATION     • CATARACT EXTRACTION, BILATERAL      left eye 05/08 and right 5/22/2022   • CERVICAL FUSION     • CHOLECYSTECTOMY     • COLONOSCOPY  04/11/2016   • EGD AND COLONOSCOPY  06/24/2019   • ERCP  09/11/2014    transab esop hiat griffin rpr   • ESOPHAGOGASTRIC FUNDOPLASTY     • HERNIA REPAIR Right     p I/griffin init reduc >5 yr   • HIP SURGERY Right     Replacement   • KIDNEY STONE SURGERY      Fragmenting   • LIVER BIOPSY     • MULTIPLE TOOTH EXTRACTIONS      \"extraction of all maxillary teeth\"   • SKIN BIOPSY  05/2020   • THUMB FUSION      with graft   • TONSILLECTOMY     • US GUIDANCE  10/22/2014     Family History   Problem Relation Age of Onset   • Hypertension Mother    • Atrial fibrillation Mother    • Heart disease Mother    • Kidney disease Father    • COPD Father    • Heart disease Father    • Asthma Neg Hx    • Lung cancer Neg Hx       reports " "that he has never smoked  He has never used smokeless tobacco  He reports that he does not drink alcohol and does not use drugs  I COMPLETELY REVIEWED THE PAST MEDICAL HISTORY/PAST SURGICAL HISTORY/SOCIAL HISTORY/FAMILY HISTORY/AND MEDICATIONS  AND UPDATED ALL    Objective:     Vitals:   BP sitting on right: 160/70 with a heart rate of 68 and regular  BP standing on right: 138/70 with a heart rate of 64 and regular      Weight (last 2 days)       Date/Time Weight    06/29/23 1057 83 9 (185)          Wt Readings from Last 3 Encounters:   06/29/23 83 9 kg (185 lb)   06/19/23 83 9 kg (185 lb)   05/16/23 83 5 kg (184 lb)       Body mass index is 26 54 kg/m²      Physical Exam: General:  No acute distress  Skin:  No acute rash  Eyes:  No scleral icterus, noninjected, no discharge from eyes  ENT:  Moist mucous membranes  Neck:  Supple, no jugular venous distention, trachea is midline, no lymphadenopathy and no thyromegaly  Back   No CVAT/kyphotic  Chest:  Clear to auscultation and percussion, good respiratory effort  CVS:  Regular rate and rhythm without a rub, or gallops or murmurs  Abdomen:  Soft and nontender with normal bowel sounds  Extremities:  No cyanosis and no edema, no arthritic changes, normal range of motion  Neuro:  Grossly intact  Psych:  Alert, oriented x3 and appropriate      Medications:    Current Outpatient Medications:   •  albuterol (PROVENTIL HFA,VENTOLIN HFA) 90 mcg/act inhaler, Inhale 2 puffs every 6 (six) hours as needed for wheezing or shortness of breath, Disp: 6 7 g, Rfl: 2  •  allopurinol (ZYLOPRIM) 300 mg tablet, \"ONE-HALF\" TABLET EVERY DAY, Disp: 45 tablet, Rfl: 3  •  amLODIPine (NORVASC) 5 mg tablet, TAKE 1 TABLET (5 MG) BY MOUTH DAILY, Disp: 90 tablet, Rfl: 5  •  aspirin (ECOTRIN LOW STRENGTH) 81 mg EC tablet, Take 81 mg by mouth daily, Disp: , Rfl:   •  atorvastatin (LIPITOR) 80 mg tablet, TAKE 1 TABLET (80 MG TOTAL) BY MOUTH DAILY, Disp: 90 tablet, Rfl: 3  •  azilsartan medoxomil " "(Edarbi) 40 MG tablet, Take 1 tablet (40 mg total) by mouth daily after dinner (Patient taking differently: Take 20 mg by mouth daily at bedtime), Disp: 60 tablet, Rfl: 3  •  b complex vitamins capsule, Take 1 capsule by mouth daily, Disp: , Rfl:   •  B Complex-C (SUPERPLEX-T) TABS, Take 1 tablet by mouth daily, Disp: , Rfl:   •  Breo Ellipta 100-25 MCG/ACT inhaler, INHALE 1 PUFF DAILY RINSE MOUTH AFTER USE , Disp: 60 each, Rfl: 2  •  chlorthalidone 25 mg tablet, TAKE \"ONE-HALF\" TABLET (12 5 MG) BY MOUTH DAILY, Disp: 45 tablet, Rfl: 4  •  Cholecalciferol (VITAMIN D3) 2000 units TABS, Take 1 tablet by mouth daily, Disp: , Rfl:   •  co-enzyme Q-10 50 MG capsule, Take 100 mg by mouth in the morning , Disp: , Rfl:   •  colchicine (COLCRYS) 0 6 mg tablet, TAKE 1 TABLET (0 6 MG TOTAL) BY MOUTH DAILY, Disp: 30 tablet, Rfl: 3  •  dexlansoprazole (DEXILANT) 60 MG capsule, Take 1 capsule (60 mg total) by mouth daily, Disp: 90 capsule, Rfl: 3  •  Empagliflozin (Jardiance) 10 MG TABS tablet, Take 1 tablet (10 mg total) by mouth every morning, Disp: 30 tablet, Rfl: 5  •  famotidine (PEPCID) 40 MG tablet, Take 1 tablet (40 mg total) by mouth daily Take 1 tablet with dinner, Disp: 90 tablet, Rfl: 2  •  Ferrous Sulfate (IRON) 325 (65 Fe) MG TABS, Take 1 tablet by mouth daily, Disp: , Rfl:   •  ipratropium-albuterol (DUO-NEB) 0 5-2 5 mg/3 mL nebulizer solution, Take 3 mL by nebulization 4 (four) times a day, Disp: 360 mL, Rfl: 3  •  Januvia 100 MG tablet, TAKE ONE TABLET DAILY, Disp: 30 tablet, Rfl: 0  •  magnesium chloride-calcium (SLOW-MAG) 71 5-119 mg, Take 2 tablets by mouth 2 (two) times a day, Disp: , Rfl:   •  metoprolol tartrate (LOPRESSOR) 50 mg tablet, TAKE 1 TABLET (50 MG TOTAL) BY MOUTH IN THE MORNING AND \"ONE-HALF\" TABLET (25MG) IN THE EVENING, Disp: 135 tablet, Rfl: 3  •  sulfaSALAzine (AZULFIDINE) 500 mg tablet, ONE AND \"ONE-HALF\" TABLETS (75OMG) EVERY DAY, Disp: 45 tablet, Rfl: 3  •  Incruse Ellipta 62 5 MCG/ACT AEPB " "inhaler, INHALE 1 PUFF DAILY, Disp: 3 each, Rfl: 1    Lab, Imaging and other studies: I have personally reviewed pertinent labs    Laboratory Results:  Results for orders placed or performed in visit on 05/26/23   Comprehensive metabolic panel   Result Value Ref Range    Sodium 139 135 - 147 mmol/L    Potassium 4 2 3 5 - 5 3 mmol/L    Chloride 102 96 - 108 mmol/L    CO2 30 21 - 32 mmol/L    ANION GAP 7 4 - 13 mmol/L    BUN 27 (H) 5 - 25 mg/dL    Creatinine 1 42 (H) 0 60 - 1 30 mg/dL    Glucose, Fasting 152 (H) 65 - 99 mg/dL    Calcium 9 7 8 4 - 10 2 mg/dL    AST 25 13 - 39 U/L    ALT 17 7 - 52 U/L    Alkaline Phosphatase 88 34 - 104 U/L    Total Protein 7 0 6 4 - 8 4 g/dL    Albumin 3 9 3 5 - 5 0 g/dL    Total Bilirubin 0 53 0 20 - 1 00 mg/dL    eGFR 47 ml/min/1 73sq m   CBC   Result Value Ref Range    WBC 7 96 4 31 - 10 16 Thousand/uL    RBC 4 44 3 88 - 5 62 Million/uL    Hemoglobin 13 9 12 0 - 17 0 g/dL    Hematocrit 41 5 36 5 - 49 3 %    MCV 94 82 - 98 fL    MCH 31 3 26 8 - 34 3 pg    MCHC 33 5 31 4 - 37 4 g/dL    RDW 13 5 11 6 - 15 1 %    Platelets 830 821 - 420 Thousands/uL    MPV 9 7 8 9 - 12 7 fL   CK   Result Value Ref Range    Total  39 - 308 U/L   Phosphorus   Result Value Ref Range    Phosphorus 3 7 2 3 - 4 1 mg/dL   Magnesium   Result Value Ref Range    Magnesium 1 9 1 9 - 2 7 mg/dL   Protein / creatinine ratio, urine   Result Value Ref Range    Creatinine, Ur 57 7 mg/dL    Protein Urine Random 22 mg/dL    Prot/Creat Ratio, Ur 0 38 (H) 0 00 - 0 10   PTH, intact   Result Value Ref Range    PTH 31 0 12 0 - 88 0 pg/mL   Vitamin D Panel   Result Value Ref Range    25-HYDROXY VIT D 39 ng/mL    25-Hydroxy D2 <1 0 ng/mL    25-HYDROXY VIT D3 39 ng/mL   PSA Total, Diagnostic   Result Value Ref Range    PSA, Diagnostic 6 83 (H) 0 00 - 4 00 ng/mL             Invalid input(s): \"ALBUMIN\"      Radiology review:   chest X-ray    Ultrasound      Portions of the record may have been created with voice recognition " "software  Occasional wrong word or \"sound a like\" substitutions may have occurred due to the inherent limitations of voice recognition software  Read the chart carefully and recognize, using context, where substitutions have occurred                      "

## 2023-06-29 NOTE — PATIENT INSTRUCTIONS
Overall your kidney function is doing quite well! Your blood pressure is also doing well  I did message Dr Edy Caruso and Dr Edy Ying to make sure they follow-up with you after the prostate biopsy        1  Medication changes today:  No medication changes today  2   Please take 1 week a blood pressure readings prior to next visit in 6 months    AS FOLLOWS  MORNING AND EVENING, SITTING AND STANDING as follows:  TAKE THE MORNING READINGS BEFORE ANY MEDICATIONS AND WHEN YOU ARE RELAXED FOR SEVERAL MINUTES  TAKE THE EVENING READINGS:  BETWEEN 7-10 P M ; PRIOR TO ANY MEDICATIONS; AT LEAST IN OUR  FROM DINNER; AND CERTAINLY AFTER RELAXING FOR A FEW MINUTES  PLEASE INCLUDE HEART RATE WITH YOUR BLOOD PRESSURE READINGS  When taking standing readings, keep your arm supported at heart level and not dangling  Make sure you are sitting with your back supported and feet on the ground and do not cross your legs or feet  Make sure you have not taken any coffee or caffeine products or exercised or smoke cigarettes at least 30 minutes before taking your blood pressure  Then please mail these readings into the office    3  Follow-up in 6-7 months  Please bring in 1 week a blood pressure readings morning evening, sitting and standing is outlined above  PLEASE BRING AN YOUR BLOOD PRESSURE MACHINE TO CORRELATE WITH THE OFFICE MACHINE AT THIS NEXT SCHEDULED VISIT  Please go for fasting lab work 1-2 weeks prior to your appointment      4    General instructions:  AVOID SALT BUT NOT ADDING AN READING LABELS TO MAKE SURE THERE IS LOW-SALT IN THE FOOD THAT YOU ARE EATING  Goal is less than 2 g of sodium intake or less than 5 g of sodium chloride intake per day    Avoid nonsteroidal anti-inflammatory drugs such as Naprosyn, ibuprofen, Aleve, Advil, Celebrex, Meloxicam (Mobic) etc   You can use Tylenol as needed if you do not have any liver condition to be concerned about    Avoid medications such as Sudafed or decongestants and antihistamines that contained the D component which is the decongestant  You can take antihistamines without the decongestant or D component  Try to avoid medications such as pantoprazole or  Protonix/Nexium or Esomeprazole)/Prilosec or omeprazole/Prevacid or lansoprazole/AcipHex or Rabeprazole  If you are able to, use Pepcid as this is safer for your kidneys  Try to exercise at least 30 minutes 3 days a week to begin with with an ultimate goal of 5 days a week for at least 30 minutes    Try to lose 5-10 lb by your next visit    Please do not drink more than 2 glasses of alcohol/wine on a daily basis as this may contribute to your high blood pressure

## 2023-07-24 DIAGNOSIS — E78.5 DYSLIPIDEMIA: ICD-10-CM

## 2023-07-24 DIAGNOSIS — I12.9 HYPERTENSIVE CHRONIC KIDNEY DISEASE WITH STAGE 1 THROUGH STAGE 4 CHRONIC KIDNEY DISEASE, OR UNSPECIFIED CHRONIC KIDNEY DISEASE: ICD-10-CM

## 2023-07-24 DIAGNOSIS — N18.30 CHRONIC KIDNEY DISEASE, STAGE III (MODERATE) (HCC): ICD-10-CM

## 2023-07-24 DIAGNOSIS — J43.9 PULMONARY EMPHYSEMA, UNSPECIFIED EMPHYSEMA TYPE (HCC): ICD-10-CM

## 2023-07-24 DIAGNOSIS — R80.1 PERSISTENT PROTEINURIA: ICD-10-CM

## 2023-07-24 DIAGNOSIS — M45.9 ANKYLOSING SPONDYLITIS OF SITE IN SPINE (HCC): ICD-10-CM

## 2023-07-24 DIAGNOSIS — M1A.9XX0 CHRONIC GOUT WITHOUT TOPHUS, UNSPECIFIED CAUSE, UNSPECIFIED SITE: ICD-10-CM

## 2023-07-24 RX ORDER — ATORVASTATIN CALCIUM 80 MG/1
80 TABLET, FILM COATED ORAL DAILY
Qty: 90 TABLET | Refills: 3 | Status: SHIPPED | OUTPATIENT
Start: 2023-07-24

## 2023-07-24 RX ORDER — COLCHICINE 0.6 MG/1
TABLET ORAL
Qty: 30 TABLET | Refills: 3 | Status: SHIPPED | OUTPATIENT
Start: 2023-07-24

## 2023-07-24 RX ORDER — CHLORTHALIDONE 25 MG/1
TABLET ORAL
Qty: 45 TABLET | Refills: 4 | Status: SHIPPED | OUTPATIENT
Start: 2023-07-24

## 2023-07-24 RX ORDER — ALLOPURINOL 300 MG/1
TABLET ORAL
Qty: 45 TABLET | Refills: 3 | Status: SHIPPED | OUTPATIENT
Start: 2023-07-24

## 2023-07-24 RX ORDER — AMLODIPINE BESYLATE 5 MG/1
5 TABLET ORAL DAILY
Qty: 90 TABLET | Refills: 3 | Status: SHIPPED | OUTPATIENT
Start: 2023-07-24

## 2023-07-24 RX ORDER — FLUTICASONE FUROATE AND VILANTEROL TRIFENATATE 100; 25 UG/1; UG/1
POWDER RESPIRATORY (INHALATION)
Qty: 180 BLISTER | Refills: 1 | Status: SHIPPED | OUTPATIENT
Start: 2023-07-24 | End: 2023-10-22

## 2023-07-24 RX ORDER — SULFASALAZINE 500 MG/1
TABLET ORAL
Qty: 45 TABLET | Refills: 3 | Status: SHIPPED | OUTPATIENT
Start: 2023-07-24

## 2023-08-01 ENCOUNTER — RA CDI HCC (OUTPATIENT)
Dept: OTHER | Facility: HOSPITAL | Age: 78
End: 2023-08-01

## 2023-08-01 NOTE — PROGRESS NOTES
[address] [facility] coding opportunities          Chart Reviewed number of suggestions sent to Provider: 1     Patients Insurance     Medicare Insurance: Medicare        E11.65

## 2023-08-02 ENCOUNTER — APPOINTMENT (OUTPATIENT)
Dept: LAB | Facility: HOSPITAL | Age: 78
End: 2023-08-02
Attending: UROLOGY
Payer: MEDICARE

## 2023-08-02 DIAGNOSIS — R97.20 ELEVATED PSA: ICD-10-CM

## 2023-08-02 DIAGNOSIS — Z01.812 PRE-OPERATIVE LABORATORY EXAMINATION: ICD-10-CM

## 2023-08-02 DIAGNOSIS — R39.89 SUSPECTED UTI: ICD-10-CM

## 2023-08-02 DIAGNOSIS — Z01.810 PRE-OPERATIVE CARDIOVASCULAR EXAMINATION: ICD-10-CM

## 2023-08-02 LAB
ALBUMIN SERPL BCP-MCNC: 4.3 G/DL (ref 3.5–5)
ALP SERPL-CCNC: 91 U/L (ref 34–104)
ALT SERPL W P-5'-P-CCNC: 20 U/L (ref 7–52)
ANION GAP SERPL CALCULATED.3IONS-SCNC: 9 MMOL/L
AST SERPL W P-5'-P-CCNC: 25 U/L (ref 13–39)
BASOPHILS # BLD AUTO: 0.05 THOUSANDS/ÂΜL (ref 0–0.1)
BASOPHILS NFR BLD AUTO: 1 % (ref 0–1)
BILIRUB SERPL-MCNC: 0.6 MG/DL (ref 0.2–1)
BUN SERPL-MCNC: 36 MG/DL (ref 5–25)
CALCIUM SERPL-MCNC: 10 MG/DL (ref 8.4–10.2)
CHLORIDE SERPL-SCNC: 103 MMOL/L (ref 96–108)
CO2 SERPL-SCNC: 28 MMOL/L (ref 21–32)
CREAT SERPL-MCNC: 1.74 MG/DL (ref 0.6–1.3)
EOSINOPHIL # BLD AUTO: 0.46 THOUSAND/ÂΜL (ref 0–0.61)
EOSINOPHIL NFR BLD AUTO: 6 % (ref 0–6)
ERYTHROCYTE [DISTWIDTH] IN BLOOD BY AUTOMATED COUNT: 13.5 % (ref 11.6–15.1)
GFR SERPL CREATININE-BSD FRML MDRD: 37 ML/MIN/1.73SQ M
GLUCOSE P FAST SERPL-MCNC: 157 MG/DL (ref 65–99)
HCT VFR BLD AUTO: 41.4 % (ref 36.5–49.3)
HGB BLD-MCNC: 13.4 G/DL (ref 12–17)
IMM GRANULOCYTES # BLD AUTO: 0.04 THOUSAND/UL (ref 0–0.2)
IMM GRANULOCYTES NFR BLD AUTO: 1 % (ref 0–2)
LYMPHOCYTES # BLD AUTO: 2.25 THOUSANDS/ÂΜL (ref 0.6–4.47)
LYMPHOCYTES NFR BLD AUTO: 28 % (ref 14–44)
MCH RBC QN AUTO: 31.6 PG (ref 26.8–34.3)
MCHC RBC AUTO-ENTMCNC: 32.4 G/DL (ref 31.4–37.4)
MCV RBC AUTO: 98 FL (ref 82–98)
MONOCYTES # BLD AUTO: 0.73 THOUSAND/ÂΜL (ref 0.17–1.22)
MONOCYTES NFR BLD AUTO: 9 % (ref 4–12)
NEUTROPHILS # BLD AUTO: 4.64 THOUSANDS/ÂΜL (ref 1.85–7.62)
NEUTS SEG NFR BLD AUTO: 55 % (ref 43–75)
NRBC BLD AUTO-RTO: 0 /100 WBCS
PLATELET # BLD AUTO: 178 THOUSANDS/UL (ref 149–390)
PMV BLD AUTO: 9.2 FL (ref 8.9–12.7)
POTASSIUM SERPL-SCNC: 4.6 MMOL/L (ref 3.5–5.3)
PROT SERPL-MCNC: 7.3 G/DL (ref 6.4–8.4)
RBC # BLD AUTO: 4.24 MILLION/UL (ref 3.88–5.62)
SODIUM SERPL-SCNC: 140 MMOL/L (ref 135–147)
WBC # BLD AUTO: 8.17 THOUSAND/UL (ref 4.31–10.16)

## 2023-08-02 PROCEDURE — 80053 COMPREHEN METABOLIC PANEL: CPT

## 2023-08-02 PROCEDURE — 36415 COLL VENOUS BLD VENIPUNCTURE: CPT

## 2023-08-02 PROCEDURE — 87086 URINE CULTURE/COLONY COUNT: CPT

## 2023-08-02 PROCEDURE — 85025 COMPLETE CBC W/AUTO DIFF WBC: CPT

## 2023-08-03 LAB
ATRIAL RATE: 69 BPM
P AXIS: 11 DEGREES
PR INTERVAL: 186 MS
QRS AXIS: -37 DEGREES
QRSD INTERVAL: 98 MS
QT INTERVAL: 408 MS
QTC INTERVAL: 437 MS
T WAVE AXIS: 32 DEGREES
VENTRICULAR RATE: 69 BPM

## 2023-08-03 PROCEDURE — 93010 ELECTROCARDIOGRAM REPORT: CPT | Performed by: INTERNAL MEDICINE

## 2023-08-04 LAB — BACTERIA UR CULT: NORMAL

## 2023-08-09 ENCOUNTER — OFFICE VISIT (OUTPATIENT)
Dept: FAMILY MEDICINE CLINIC | Facility: CLINIC | Age: 78
End: 2023-08-09
Payer: MEDICARE

## 2023-08-09 VITALS
HEART RATE: 55 BPM | BODY MASS INDEX: 25.57 KG/M2 | DIASTOLIC BLOOD PRESSURE: 66 MMHG | TEMPERATURE: 99 F | OXYGEN SATURATION: 95 % | WEIGHT: 178.6 LBS | SYSTOLIC BLOOD PRESSURE: 118 MMHG | HEIGHT: 70 IN

## 2023-08-09 DIAGNOSIS — N18.32 STAGE 3B CHRONIC KIDNEY DISEASE (HCC): ICD-10-CM

## 2023-08-09 DIAGNOSIS — M45.9 ANKYLOSING SPONDYLITIS OF SITE IN SPINE (HCC): ICD-10-CM

## 2023-08-09 DIAGNOSIS — Z79.899 MEDICATION MANAGEMENT: ICD-10-CM

## 2023-08-09 DIAGNOSIS — Z79.899 ENCOUNTER FOR LONG-TERM (CURRENT) USE OF MEDICATIONS: ICD-10-CM

## 2023-08-09 DIAGNOSIS — Z71.2 ENCOUNTER TO DISCUSS TEST RESULTS: ICD-10-CM

## 2023-08-09 DIAGNOSIS — E11.9 TYPE 2 DIABETES MELLITUS WITHOUT COMPLICATION, WITHOUT LONG-TERM CURRENT USE OF INSULIN (HCC): ICD-10-CM

## 2023-08-09 DIAGNOSIS — I12.9 HYPERTENSIVE CHRONIC KIDNEY DISEASE WITH STAGE 1 THROUGH STAGE 4 CHRONIC KIDNEY DISEASE, OR UNSPECIFIED CHRONIC KIDNEY DISEASE: ICD-10-CM

## 2023-08-09 DIAGNOSIS — Z00.00 MEDICARE ANNUAL WELLNESS VISIT, SUBSEQUENT: Primary | ICD-10-CM

## 2023-08-09 DIAGNOSIS — R97.20 RISING PSA LEVEL: ICD-10-CM

## 2023-08-09 DIAGNOSIS — Z79.899 POLYPHARMACY: ICD-10-CM

## 2023-08-09 PROCEDURE — 99215 OFFICE O/P EST HI 40 MIN: CPT | Performed by: FAMILY MEDICINE

## 2023-08-09 PROCEDURE — G0439 PPPS, SUBSEQ VISIT: HCPCS | Performed by: FAMILY MEDICINE

## 2023-08-09 NOTE — PATIENT INSTRUCTIONS
Medicare Preventive Visit Patient Instructions  Thank you for completing your Welcome to Medicare Visit or Medicare Annual Wellness Visit today. Your next wellness visit will be due in one year (8/9/2024). The screening/preventive services that you may require over the next 5-10 years are detailed below. Some tests may not apply to you based off risk factors and/or age. Screening tests ordered at today's visit but not completed yet may show as past due. Also, please note that scanned in results may not display below. Preventive Screenings:  Service Recommendations Previous Testing/Comments   Colorectal Cancer Screening  · Colonoscopy    · Fecal Occult Blood Test (FOBT)/Fecal Immunochemical Test (FIT)  · Fecal DNA/Cologuard Test  · Flexible Sigmoidoscopy Age: 43-73 years old   Colonoscopy: every 10 years (May be performed more frequently if at higher risk)  OR  FOBT/FIT: every 1 year  OR  Cologuard: every 3 years  OR  Sigmoidoscopy: every 5 years  Screening may be recommended earlier than age 39 if at higher risk for colorectal cancer. Also, an individualized decision between you and your healthcare provider will decide whether screening between the ages of 77-80 would be appropriate.  Colonoscopy: 06/24/2019  FOBT/FIT: Not on file  Cologuard: Not on file  Sigmoidoscopy: Not on file    Screening Current     Prostate Cancer Screening Individualized decision between patient and health care provider in men between ages of 53-66   Medicare will cover every 12 months beginning on the day after your 50th birthday PSA: 6.83 ng/mL     Screening Not Indicated     Hepatitis C Screening Once for adults born between 1945 and 1965  More frequently in patients at high risk for Hepatitis C Hep C Antibody: 09/08/2017    Screening Current   Diabetes Screening 1-2 times per year if you're at risk for diabetes or have pre-diabetes Fasting glucose: 157 mg/dL (8/2/2023)  A1C: 7.2 (5/9/2023)  Screening Not Indicated  History Diabetes Cholesterol Screening Once every 5 years if you don't have a lipid disorder. May order more often based on risk factors. Lipid panel: 08/02/2022  Screening Current      Other Preventive Screenings Covered by Medicare:  1. Abdominal Aortic Aneurysm (AAA) Screening: covered once if your at risk. You're considered to be at risk if you have a family history of AAA or a male between the age of 70-76 who smoking at least 100 cigarettes in your lifetime. 2. Lung Cancer Screening: covers low dose CT scan once per year if you meet all of the following conditions: (1) Age 48-67; (2) No signs or symptoms of lung cancer; (3) Current smoker or have quit smoking within the last 15 years; (4) You have a tobacco smoking history of at least 20 pack years (packs per day x number of years you smoked); (5) You get a written order from a healthcare provider. 3. Glaucoma Screening: covered annually if you're considered high risk: (1) You have diabetes OR (2) Family history of glaucoma OR (3)  aged 48 and older OR (3)  American aged 72 and older  3. Osteoporosis Screening: covered every 2 years if you meet one of the following conditions: (1) Have a vertebral abnormality; (2) On glucocorticoid therapy for more than 3 months; (3) Have primary hyperparathyroidism; (4) On osteoporosis medications and need to assess response to drug therapy. 5. HIV Screening: covered annually if you're between the age of 14-79. Also covered annually if you are younger than 13 and older than 72 with risk factors for HIV infection. For pregnant patients, it is covered up to 3 times per pregnancy.     Immunizations:  Immunization Recommendations   Influenza Vaccine Annual influenza vaccination during flu season is recommended for all persons aged >= 6 months who do not have contraindications   Pneumococcal Vaccine   * Pneumococcal conjugate vaccine = PCV13 (Prevnar 13), PCV15 (Vaxneuvance), PCV20 (Prevnar 20)  * Pneumococcal polysaccharide vaccine = PPSV23 (Pneumovax) Adults 2364 years old: 1-3 doses may be recommended based on certain risk factors  Adults 72 years old: 1-2 doses may be recommended based off what pneumonia vaccine you previously received   Hepatitis B Vaccine 3 dose series if at intermediate or high risk (ex: diabetes, end stage renal disease, liver disease)   Tetanus (Td) Vaccine - COST NOT COVERED BY MEDICARE PART B Following completion of primary series, a booster dose should be given every 10 years to maintain immunity against tetanus. Td may also be given as tetanus wound prophylaxis. Tdap Vaccine - COST NOT COVERED BY MEDICARE PART B Recommended at least once for all adults. For pregnant patients, recommended with each pregnancy. Shingles Vaccine (Shingrix) - COST NOT COVERED BY MEDICARE PART B  2 shot series recommended in those aged 48 and above     Health Maintenance Due:      Topic Date Due   • Colorectal Cancer Screening  06/24/2029   • Hepatitis C Screening  Completed     Immunizations Due:      Topic Date Due   • COVID-19 Vaccine (4 - Pfizer series) 01/06/2022   • Influenza Vaccine (1) 09/01/2023     Advance Directives   What are advance directives? Advance directives are legal documents that state your wishes and plans for medical care. These plans are made ahead of time in case you lose your ability to make decisions for yourself. Advance directives can apply to any medical decision, such as the treatments you want, and if you want to donate organs. What are the types of advance directives? There are many types of advance directives, and each state has rules about how to use them. You may choose a combination of any of the following:  · Living will: This is a written record of the treatment you want. You can also choose which treatments you do not want, which to limit, and which to stop at a certain time. This includes surgery, medicine, IV fluid, and tube feedings.    · Durable power of  for [facility]): This is a written record that states who you want to make healthcare choices for you when you are unable to make them for yourself. This person, called a proxy, is usually a family member or a friend. You may choose more than 1 proxy. · Do not resuscitate (DNR) order:  A DNR order is used in case your heart stops beating or you stop breathing. It is a request not to have certain forms of treatment, such as CPR. A DNR order may be included in other types of advance directives. · Medical directive: This covers the care that you want if you are in a coma, near death, or unable to make decisions for yourself. You can list the treatments you want for each condition. Treatment may include pain medicine, surgery, blood transfusions, dialysis, IV or tube feedings, and a ventilator (breathing machine). · Values history: This document has questions about your views, beliefs, and how you feel and think about life. This information can help others choose the care that you would choose. Why are advance directives important? An advance directive helps you control your care. Although spoken wishes may be used, it is better to have your wishes written down. Spoken wishes can be misunderstood, or not followed. Treatments may be given even if you do not want them. An advance directive may make it easier for your family to make difficult choices about your care. Fall Prevention    Fall prevention  includes ways to make your home and other areas safer. It also includes ways you can move more carefully to prevent a fall. Health conditions that cause changes in your blood pressure, vision, or muscle strength and coordination may increase your risk for falls. Medicines may also increase your risk for falls if they make you dizzy, weak, or sleepy. Fall prevention tips:   · Stand or sit up slowly. · Use assistive devices as directed. · Wear shoes that fit well and have soles that . · Wear a personal alarm. · Stay active. · Manage your medical conditions. Home Safety Tips:  · Add items to prevent falls in the bathroom. · Keep paths clear. · Install bright lights in your home. · Keep items you use often on shelves within reach. · Paint or place reflective tape on the edges of your stairs. Weight Management   Why it is important to manage your weight:  Being overweight increases your risk of health conditions such as heart disease, high blood pressure, type 2 diabetes, and certain types of cancer. It can also increase your risk for osteoarthritis, sleep apnea, and other respiratory problems. Aim for a slow, steady weight loss. Even a small amount of weight loss can lower your risk of health problems. How to lose weight safely:  A safe and healthy way to lose weight is to eat fewer calories and get regular exercise. You can lose up about 1 pound a week by decreasing the number of calories you eat by 500 calories each day. Healthy meal plan for weight management:  A healthy meal plan includes a variety of foods, contains fewer calories, and helps you stay healthy. A healthy meal plan includes the following:  · Eat whole-grain foods more often. A healthy meal plan should contain fiber. Fiber is the part of grains, fruits, and vegetables that is not broken down by your body. Whole-grain foods are healthy and provide extra fiber in your diet. Some examples of whole-grain foods are whole-wheat breads and pastas, oatmeal, brown rice, and bulgur. · Eat a variety of vegetables every day. Include dark, leafy greens such as spinach, kale, shantel greens, and mustard greens. Eat yellow and orange vegetables such as carrots, sweet potatoes, and winter squash. · Eat a variety of fruits every day. Choose fresh or canned fruit (canned in its own juice or light syrup) instead of juice. Fruit juice has very little or no fiber. · Eat low-fat dairy foods.   Drink fat-free (skim) milk or 1% milk. Eat fat-free yogurt and low-fat cottage cheese. Try low-fat cheeses such as mozzarella and other reduced-fat cheeses. · Choose meat and other protein foods that are low in fat. Choose beans or other legumes such as split peas or lentils. Choose fish, skinless poultry (chicken or turkey), or lean cuts of red meat (beef or pork). Before you cook meat or poultry, cut off any visible fat. · Use less fat and oil. Try baking foods instead of frying them. Add less fat, such as margarine, sour cream, regular salad dressing and mayonnaise to foods. Eat fewer high-fat foods. Some examples of high-fat foods include french fries, doughnuts, ice cream, and cakes. · Eat fewer sweets. Limit foods and drinks that are high in sugar. This includes candy, cookies, regular soda, and sweetened drinks. Exercise:  Exercise at least 30 minutes per day on most days of the week. Some examples of exercise include walking, biking, dancing, and swimming. You can also fit in more physical activity by taking the stairs instead of the elevator or parking farther away from stores. Ask your healthcare provider about the best exercise plan for you. © Copyright [employer] 2018 Information is for End User's use only and may not be sold, redistributed or otherwise used for commercial purposes.  All illustrations and images included in CareNotes® are the copyrighted property of [employer] or [address] [place] [address]

## 2023-08-09 NOTE — PROGRESS NOTES
Diabetic Foot Exam    Patient's shoes and socks removed. Right Foot/Ankle   Right Foot Inspection  Skin Exam: skin normal and skin intact. No dry skin, no warmth, no callus, no erythema, no maceration, no abnormal color, no pre-ulcer, no ulcer and no callus. Toe Exam: ROM and strength within normal limits. Sensory   Vibration: intact  Proprioception: intact  Monofilament testing: intact    Vascular  Capillary refills: < 3 seconds  The right DP pulse is 2+. The right PT pulse is 2+. Left Foot/Ankle  Left Foot Inspection  Skin Exam: skin normal and skin intact. No dry skin, no warmth, no erythema, no maceration, normal color, no pre-ulcer, no ulcer and no callus. Toe Exam: ROM and strength within normal limits. Sensory   Vibration: intact  Proprioception: intact  Monofilament testing: intact    Vascular  Capillary refills: < 3 seconds  The left DP pulse is 2+. The left PT pulse is 2+. Assign Risk Category  Deformity present  No loss of protective sensation  No weak pulses  Risk: 0     Assessment and Plan:     Problem List Items Addressed This Visit    None       Preventive health issues were discussed with patient, and age appropriate screening tests were ordered as noted in patient's After Visit Summary. Personalized health advice and appropriate referrals for health education or preventive services given if needed, as noted in patient's After Visit Summary.      History of Present Illness:     Patient presents for a Medicare Wellness Visit    HPI   Patient Care Team:  Francisco Locke DO as PCP - General (Family Medicine)  MD Trever Engle MD (Pulmonary Disease)     Review of Systems:     Review of Systems     Problem List:     Patient Active Problem List   Diagnosis   • Chronic kidney disease, stage III (moderate) ([address])   • Dyslipidemia   • Persistent proteinuria   • Hypertensive chronic kidney disease with stage 1 through stage 4 chronic kidney disease, or unspecified chronic kidney disease   • Hyperkalemia   • Hypertension, essential   • COPD (chronic obstructive pulmonary disease) (HCC)   • Sarcoidosis   • Other spondylosis, thoracolumbar region   • Type 2 diabetes mellitus without complication, without long-term current use of insulin (HCC)   • Diabetic nephropathy associated with type 2 diabetes mellitus (HCC)   • Type 2 diabetes mellitus with stage 3 chronic kidney disease (HCC)   • Coronary artery disease of native heart with stable angina pectoris, unspecified vessel or lesion type (HCC)   • COVID-19   • Pulmonary fibrosis (HCC)   • History of PTCA      Past Medical and Surgical History:     Past Medical History:   Diagnosis Date   • Anemia    • Arthritis    • Gee's esophagus    • Breast lump    • Cancer (HCC)    • Chronic kidney disease    • Cirrhosis ([address])    • Coronary artery disease     cardiac cath; stents    • Diabetes mellitus ([address])    • H/O heart artery stent    • Hypertension    • Inguinal hernia     Right   • Sarcoidosis    • 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HCC)    • Skin cancer    • SOB (shortness of breath)    • Upper respiratory infection, viral 5/31/2022     Past Surgical History:   Procedure Laterality Date   • CARDIAC CATHETERIZATION     • CATARACT EXTRACTION, BILATERAL      left eye 05/08 and right 5/22/2022   • CERVICAL FUSION     • CHOLECYSTECTOMY     • COLONOSCOPY  04/11/2016   • EGD AND COLONOSCOPY  06/24/2019   • ERCP  09/11/2014    transab esop hiat griffin rpr   • ESOPHAGOGASTRIC FUNDOPLASTY     • HERNIA REPAIR Right     p I/griffin init reduc >5 yr   • HIP SURGERY Right     Replacement   • KIDNEY STONE SURGERY      Fragmenting   • LIVER BIOPSY     • MULTIPLE TOOTH EXTRACTIONS      "extraction of all maxillary teeth"   • SKIN BIOPSY  05/2020   • THUMB FUSION      with graft   • TONSILLECTOMY     • US GUIDANCE  10/22/2014      Family History:     Family History   Problem Relation Age of Onset   • Hypertension Mother    • Atrial fibrillation Mother • Heart disease Mother    • Kidney disease Father    • COPD Father    • Heart disease Father    • Asthma Neg Hx    • Lung cancer Neg Hx       Social History:     Social History     Socioeconomic History   • Marital status:      Spouse name: None   • Number of children: 0   • Years of education: None   • Highest education level: Associate degree: occupational, technical, or vocational program   Occupational History   • Occupation: RETIRED     Comment: jewlery repair   Tobacco Use   • Smoking status: Never   • Smokeless tobacco: Never   • Tobacco comments:     RETIRED   Vaping Use   • Vaping Use: Never used   Substance and Sexual Activity   • Alcohol use: No     Comment: Alcohol intake:   Occasional wine - As per Judi Medico    • Drug use: No   • Sexual activity: None   Other Topics Concern   • None   Social History Narrative    · Most recent tobacco use screenin2020      · Do you currently or have you served in the [address] [place] [address] [employer]:   No      · Were you activated, into active duty, as a member of the [employer] or as a Reservist:   No      · Alcohol intake:   Occasional,Wine     · Asbestos exposure:   No      · TB exposure:   No      · Animal exposure:   No      · Exercise level:   Heavy      · Caffeine intake: Moderate                 no cpap or oxygen     - As per Judi Medico      Social Determinants of Health     Financial Resource Strain: Low Risk  (2020)    Overall Financial Resource Strain (CARDIA)    • Difficulty of Paying Living Expenses: Not very hard   Food Insecurity: No Food Insecurity (2020)    Hunger Vital Sign    • Worried About Running Out of Food in the Last Year: Never true    • Ran Out of Food in the Last Year: Never true   Transportation Needs: No Transportation Needs (2020)    PRAPARE - Transportation    • Lack of Transportation (Medical): No    • Lack of Transportation (Non-Medical):  No   Physical Activity: Sufficiently Active (6/10/2020)    Exercise Vital Sign    • Days of Exercise per Week: 7 days    • Minutes of Exercise per Session: 30 min   Stress: No Stress Concern Present (6/10/2020)    [address]    • Feeling of Stress : Not at all   Social Connections: Moderately Isolated (6/10/2020)    Social Connection and Isolation Panel [NHANES]    • Frequency of Communication with Friends and Family: More than three times a week    • Frequency of Social Gatherings with Friends and Family: More than three times a week    • Attends Sikhism Services: Never    • Active Member of Clubs or Organizations: Yes    • Attends Club or Organization Meetings: 1 to 4 times per year    • Marital Status:    Intimate Partner Violence: Not on file   Housing Stability: Not on file      Medications and Allergies:     Current Outpatient Medications   Medication Sig Dispense Refill   • albuterol (PROVENTIL HFA,VENTOLIN HFA) 90 mcg/act inhaler Inhale 2 puffs every 6 (six) hours as needed for wheezing or shortness of breath 6.7 g 2   • allopurinol (ZYLOPRIM) 300 mg tablet "ONE-HALF" TABLET EVERY DAY 45 tablet 3   • amLODIPine (NORVASC) 5 mg tablet TAKE 1 TABLET (5 MG TOTAL) BY MOUTH DAILY.  90 tablet 3   • aspirin (ECOTRIN LOW STRENGTH) 81 mg EC tablet Take 81 mg by mouth daily     • atorvastatin (LIPITOR) 80 mg tablet TAKE 1 TABLET (80 MG TOTAL) BY MOUTH DAILY 90 tablet 3   • azilsartan medoxomil (Edarbi) 40 MG tablet Take 1 tablet (40 mg total) by mouth daily after dinner (Patient taking differently: Take 20 mg by mouth daily at bedtime) 60 tablet 3   • b complex vitamins capsule Take 1 capsule by mouth daily     • B Complex-C (SUPERPLEX-T) TABS Take 1 tablet by mouth daily     • Breo Ellipta 100-25 MCG/ACT inhaler INHALE 1 PUFF DAILY RINSE MOUTH AFTER USE. 180 blister 1   • chlorthalidone 25 mg tablet TAKE "ONE-HALF" TABLET (12.5 MG) BY MOUTH DAILY 45 tablet 4   • Cholecalciferol (VITAMIN D3) 2000 units TABS Take 1 tablet by mouth daily     • co-enzyme Q-10 50 MG capsule Take 100 mg by mouth in the morning. • colchicine (COLCRYS) 0.6 mg tablet TAKE 1 TABLET (0.6 MG TOTAL) BY MOUTH DAILY 30 tablet 3   • dexlansoprazole (DEXILANT) 60 MG capsule Take 1 capsule (60 mg total) by mouth daily 90 capsule 3   • Empagliflozin (Jardiance) 10 MG TABS tablet Take 1 tablet (10 mg total) by mouth every morning 30 tablet 5   • famotidine (PEPCID) 40 MG tablet Take 1 tablet (40 mg total) by mouth daily Take 1 tablet with dinner 90 tablet 2   • Ferrous Sulfate (IRON) 325 (65 Fe) MG TABS Take 1 tablet by mouth daily     • Incruse Ellipta 62.5 MCG/ACT AEPB inhaler INHALE 1 PUFF DAILY 3 each 1   • magnesium chloride-calcium (SLOW-MAG) 71.5-119 mg Take 2 tablets by mouth 2 (two) times a day     • metoprolol tartrate (LOPRESSOR) 50 mg tablet TAKE 1 TABLET (50 MG TOTAL) BY MOUTH IN THE MORNING AND "ONE-HALF" TABLET (25MG) IN THE EVENING 135 tablet 3   • sulfaSALAzine (AZULFIDINE) 500 mg tablet ONE AND "ONE-HALF" TABLETS (75OMG) EVERY DAY 45 tablet 3   • ipratropium-albuterol (DUO-NEB) 0.5-2.5 mg/3 mL nebulizer solution Take 3 mL by nebulization 4 (four) times a day (Patient not taking: Reported on 8/9/2023) 360 mL 3   • Januvia 100 MG tablet TAKE ONE TABLET DAILY (Patient not taking: Reported on 8/9/2023) 30 tablet 0     No current facility-administered medications for this visit.      Allergies   Allergen Reactions   • Oxycodone GI Intolerance      Immunizations:     Immunization History   Administered Date(s) Administered   • COVID-19 PFIZER VACCINE 0.3 ML IM 01/14/2021, 02/05/2021, 11/11/2021   • H1N1, All Formulations 12/28/2009   • INFLUENZA 09/23/2010, 09/13/2011, 08/30/2012, 10/11/2013, 10/15/2014, 10/27/2015, 09/29/2016, 10/26/2017, 11/20/2018, 11/07/2019, 10/16/2020, 10/26/2021, 10/30/2022   • Pneumococcal Conjugate 13-Valent 11/17/2014   • Pneumococcal Polysaccharide PPV23 10/01/1997, 10/01/2003, 04/28/2021   • Tdap 11/22/2010, 04/28/2021   • Zoster 01/01/2017   • influenza, trivalent, adjuvanted 11/07/2019      Health Maintenance:         Topic Date Due   • Colorectal Cancer Screening  06/24/2029   • Hepatitis C Screening  Completed         Topic Date Due   • COVID-19 Vaccine (4 - Pfizer series) 01/06/2022   • Influenza Vaccine (1) 09/01/2023      Medicare Screening Tests and Risk Assessments:     Ann Marie Orozco is here for his Subsequent Wellness visit. Last Medicare Wellness visit information reviewed, patient interviewed and updates made to the record today. Health Risk Assessment:   Patient rates overall health as good. Patient feels that their physical health rating is slightly worse. Patient is very satisfied with their life. Eyesight was rated as same. Hearing was rated as same. Patient feels that their emotional and mental health rating is same. Patients states they are never, rarely angry. Patient states they are never, rarely unusually tired/fatigued. Pain experienced in the last 7 days has been none. Patient states that he has experienced no weight loss or gain in last 6 months. Depression Screening:   PHQ-2 Score: 0      Fall Risk Screening: In the past year, patient has experienced: history of falling in past year    Number of falls: 1  Injured during fall?: No    Feels unsteady when standing or walking?: No    Worried about falling?: No      Home Safety:  Patient does not have trouble with stairs inside or outside of their home. Patient has working smoke alarms and has working carbon monoxide detector. Home safety hazards include: none. Nutrition:   Current diet is Regular, Diabetic and Limited junk food. Pt has regular intake of protein, vegetables, and limited fruits     Medications:   Patient is currently taking over-the-counter supplements. OTC medications include: see medication list. Patient is able to manage medications.      Activities of Daily Living (ADLs)/Instrumental Activities of Daily Living (IADLs):   Walk and transfer into and out of bed and chair?: Yes  Dress and groom yourself?: Yes    Bathe or shower yourself?: Yes    Feed yourself?  Yes  Do your laundry/housekeeping?: Yes  Manage your money, pay your bills and track your expenses?: Yes  Make your own meals?: Yes    Do your own shopping?: Yes    Previous Hospitalizations:   Any hospitalizations or ED visits within the last 12 months?: Yes    How many hospitalizations have you had in the last year?: 1-2    Hospitalization Comments: [facility] around [address] day -- [facility] (+), found out he had fibrosis    Advance Care Planning:   Living will: No    Durable POA for healthcare: No    Advanced directive: No    Advanced directive counseling given: Yes    Five wishes given: Yes    Patient declined ACP directive: No    End of Life Decisions reviewed with patient: Yes    Provider agrees with end of life decisions: Yes      Comments: Pt was given 5 wishes document and discussed     Cognitive Screening:   Provider or family/friend/caregiver concerned regarding cognition?: No    PREVENTIVE SCREENINGS      Cardiovascular Screening:    General: Screening Current and Risks and Benefits Discussed      Diabetes Screening:     General: Screening Not Indicated, History Diabetes and Risks and Benefits Discussed      Colorectal Cancer Screening:     General: Screening Current      Prostate Cancer Screening:    General: Screening Not Indicated      Osteoporosis Screening:    General: Screening Not Indicated, History Osteoporosis and Risks and Benefits Discussed      Abdominal Aortic Aneurysm (AAA) Screening:    Risk factors include: age between 70-77 yo        General: Risks and Benefits Discussed      Lung Cancer Screening:     General: Screening Not Indicated and Risks and Benefits Discussed      Hepatitis C Screening:    General: Screening Current and Risks and Benefits Discussed    Screening, Brief Intervention, and Referral to Treatment (SBIRT)    Screening  Typical number of drinks in a day: 0  Typical number of drinks in a week: 0  Interpretation: Low risk drinking behavior. Single Item Drug Screening:  How often have you used an illegal drug (including marijuana) or a prescription medication for non-medical reasons in the past year? never    Single Item Drug Screen Score: 0  Interpretation: Negative screen for possible drug use disorder    Brief Intervention  Alcohol & drug use screenings were reviewed. No concerns regarding substance use disorder identified. Healthy alcohol use/limits discussed. Other Counseling Topics:   Car/seat belt/driving safety, skin self-exam, sunscreen and calcium and vitamin D intake and regular weightbearing exercise. No results found. Physical Exam:     /66 (BP Location: Left arm, Patient Position: Sitting, Cuff Size: Standard)   Pulse 55   Temp 99 °F (37.2 °C) (Temporal)   Ht 5' 10" (1.778 m)   Wt 81 kg (178 lb 9.6 oz)   SpO2 95%   BMI 25.63 kg/m²     Physical Exam  Cardiovascular:      Pulses: no weak pulses          Dorsalis pedis pulses are 2+ on the right side and 2+ on the left side. Posterior tibial pulses are 2+ on the right side and 2+ on the left side. Feet:      Right foot:      Skin integrity: No ulcer, skin breakdown, erythema, warmth, callus or dry skin. Left foot:      Skin integrity: No ulcer, skin breakdown, erythema, warmth, callus or dry skin.           Fortino Rice,

## 2023-08-09 NOTE — PROGRESS NOTES
Name: Evelin Wiseman      : 1945      MRN: [id number]  Encounter Provider: Betsey Perez DO  Encounter Date: 2023   Encounter department: [address]     1. Medicare annual wellness visit, subsequent    2. Ankylosing spondylitis of site in spine ([address])    3. Hypertensive chronic kidney disease with stage 1 through stage 4 chronic kidney disease, or unspecified chronic kidney disease    4. Encounter to discuss test results    5. Medication management    6. Polypharmacy    7. Encounter for long-term (current) use of medications    8. Stage 3b chronic kidney disease ([address])    9. Rising PSA level  Comments:  PSA fluctuatin.8, 8.3, 4.9, 7.1 , hence Prostate Bx to be done 8/15/23        Subjective      HPI 72-year-old Franki Vidales comes with note saying he is having prostate biopsy on August 15. This was not known by me before but he has had fluctuating PSAs most recent 6.8 down from 8.3 which was up from 4.9 which was down from 7.11-year ago hence MRI of the prostate was done and shows a 1.4 cm lesion and hence set up for prostate biopsy on 815. He is well-known to me for many years presents for f/u and evaluation of the following medical issues:       Review of Systems   Constitutional: Negative. HENT: Negative. Eyes: Negative. Respiratory: Negative. Sees Dr. Ilana Ramirez - not terribly happy with him, but ok. . Sob with stairs. No CP's. Main issue is the lungs and SOB. Cardiovascular: Negative. Gastrointestinal: Negative. Sees Dr. Jai Irby and he just changed the PPI to    Endocrine: Negative. Genitourinary: Negative. Musculoskeletal: Positive for arthralgias, back pain, myalgias, neck pain and neck stiffness. Kyphosis and scoliosis   Allergic/Immunologic: Negative. Neurological: Negative. Hematological: Negative. Psychiatric/Behavioral: Negative.         Current Outpatient Medications on File Prior to Visit Medication Sig   • albuterol (PROVENTIL HFA,VENTOLIN HFA) 90 mcg/act inhaler Inhale 2 puffs every 6 (six) hours as needed for wheezing or shortness of breath   • allopurinol (ZYLOPRIM) 300 mg tablet "ONE-HALF" TABLET EVERY DAY   • amLODIPine (NORVASC) 5 mg tablet TAKE 1 TABLET (5 MG TOTAL) BY MOUTH DAILY. • aspirin (ECOTRIN LOW STRENGTH) 81 mg EC tablet Take 81 mg by mouth daily   • atorvastatin (LIPITOR) 80 mg tablet TAKE 1 TABLET (80 MG TOTAL) BY MOUTH DAILY   • azilsartan medoxomil (Edarbi) 40 MG tablet Take 1 tablet (40 mg total) by mouth daily after dinner (Patient taking differently: Take 20 mg by mouth daily at bedtime)   • b complex vitamins capsule Take 1 capsule by mouth daily   • B Complex-C (SUPERPLEX-T) TABS Take 1 tablet by mouth daily   • Breo Ellipta 100-25 MCG/ACT inhaler INHALE 1 PUFF DAILY RINSE MOUTH AFTER USE. • chlorthalidone 25 mg tablet TAKE "ONE-HALF" TABLET (12.5 MG) BY MOUTH DAILY   • Cholecalciferol (VITAMIN D3) 2000 units TABS Take 1 tablet by mouth daily   • co-enzyme Q-10 50 MG capsule Take 100 mg by mouth in the morning.    • colchicine (COLCRYS) 0.6 mg tablet TAKE 1 TABLET (0.6 MG TOTAL) BY MOUTH DAILY   • dexlansoprazole (DEXILANT) 60 MG capsule Take 1 capsule (60 mg total) by mouth daily   • Empagliflozin (Jardiance) 10 MG TABS tablet Take 1 tablet (10 mg total) by mouth every morning   • famotidine (PEPCID) 40 MG tablet Take 1 tablet (40 mg total) by mouth daily Take 1 tablet with dinner   • Ferrous Sulfate (IRON) 325 (65 Fe) MG TABS Take 1 tablet by mouth daily   • Incruse Ellipta 62.5 MCG/ACT AEPB inhaler INHALE 1 PUFF DAILY   • magnesium chloride-calcium (SLOW-MAG) 71.5-119 mg Take 2 tablets by mouth 2 (two) times a day   • metoprolol tartrate (LOPRESSOR) 50 mg tablet TAKE 1 TABLET (50 MG TOTAL) BY MOUTH IN THE MORNING AND "ONE-HALF" TABLET (25MG) IN THE EVENING   • sulfaSALAzine (AZULFIDINE) 500 mg tablet ONE AND "ONE-HALF" TABLETS (75OMG) EVERY DAY   • ipratropium-albuterol (DUO-NEB) 0.5-2.5 mg/3 mL nebulizer solution Take 3 mL by nebulization 4 (four) times a day (Patient not taking: Reported on 8/9/2023)   • Januvia 100 MG tablet TAKE ONE TABLET DAILY (Patient not taking: Reported on 8/9/2023)   • [DISCONTINUED] loratadine (CLARITIN) 10 mg tablet Take 1 tablet (10 mg total) by mouth daily (Patient not taking: No sig reported)       Objective     /66 (BP Location: Left arm, Patient Position: Sitting, Cuff Size: Standard)   Pulse 55   Temp 99 °F (37.2 °C) (Temporal)   Ht 5' 10" (1.778 m)   Wt 81 kg (178 lb 9.6 oz)   SpO2 95%   BMI 25.63 kg/m²     Physical Exam  Constitutional:       General: He is not in acute distress. Appearance: Normal appearance. He is not ill-appearing or diaphoretic. HENT:      Nose: No congestion or rhinorrhea. Eyes:      General:         Right eye: No discharge. Left eye: No discharge. Cardiovascular:      Rate and Rhythm: Normal rate and regular rhythm. Pulses: Normal pulses. Heart sounds: Normal heart sounds. Pulmonary:      Breath sounds: Normal breath sounds. Musculoskeletal:      Right lower leg: No edema. Left lower leg: No edema. Skin:     General: Skin is warm and dry. Neurological:      General: No focal deficit present. Mental Status: He is alert and oriented to person, place, and time. Psychiatric:         Mood and Affect: Mood normal.         Behavior: Behavior normal.         Thought Content: Thought content normal.         Judgment: Judgment normal.           Depression Screening and Follow-up Plan: Patient was screened for depression during today's encounter. They screened negative with a PHQ-2 score of 0. I personally reviewed the recent (and prior)  lab results, the image studies, pathology, other specialty/physicians consult notes and recommendations, and outside medical records from other institutions, as appropriate.  I had a lengthy discussion with the patient and shared the work-up findings. We discussed the diagnosis and management plan. I spent  50  minutes reviewing the records (labs, clinician notes, outside records, medical history, ordering medicine/tests/procedures, interpreting the imaging/labs previously done) and coordination of care as well as direct time with the patient today, of which greater than 50% of the time was spent in counseling and coordination of care with the patient/family.   He was seen from 3:15 PM until 4:05 PM additional time spent on epic documentation      Emelia Jones DO

## 2023-08-14 RX ORDER — CEFAZOLIN SODIUM 2 G/50ML
2000 SOLUTION INTRAVENOUS ONCE
Status: CANCELLED | OUTPATIENT
Start: 2023-08-14 | End: 2023-08-14

## 2023-08-14 NOTE — H&P
Mili Jimenez PA-C  Physician Assistant  Specialty:  Urology  Progress Notes     H/P updated by me 8/15/2023- no change  Attested  Encounter Date:  6/19/2023     Attested            Attestation signed by Vinh Chakraborty MD at 6/26/2023  7:31 AM     This patient was managed independently by the advanced practitioner. I did not examine the patient or review the AP's assessment and plan. This note is being co-signed for administrative purposes.               Expand All Collapse All    1. Elevated PSA  Case request operating room: TRANSPERINEAL MRI FUSION BIOPSY PROSTATE     Case request operating room: TRANSPERINEAL MRI FUSION BIOPSY PROSTATE             Assessment and plan:         1. Elevated PSA   - normal BELLA  -Oscillating PSA   - mpMRI (6/5/23) PIRADS 4, 38g.  -Discussed options for observation versus proceeding with a transperineal MRI fusion guided biopsy. Risks and benefits of each option reviewed. Patient elected to proceed with a fusion guided biopsy. All questions answered.        Roselyn Issa        Chief Complaint      Elevated PSA     History of Present Illness      Damien Escobar is a 78 y.o. male presenting for follow-up of elevated PSA.     PSA trend:  7.1 (8/2/22)  4.9 (10/12/22)  8.3 (6/15/23)  6.83 (5/26/23)     mpMRI (6/5/23) PIRADS 4, 38g.      Patient is comfortable with his lower urinary tract symptoms. Endorses a strong stream feeling empty after voiding. Denies any dysuria, gross hematuria, incontinence, or urinary infections. Is not on any pharmacotherapy for his prostate or bladder.     Does have a past history of nephrolithiasis over 30 years ago.   Has not had any symptoms of kidney stones over the past year.     Medical comorbidities include type 2 diabetes, pulmonary fibrosis, COPD, hypertension, coronary artery disease, chronic kidney disease, sarcoidosis.     Laboratory            Lab Results   Component Value Date     CREATININE 1.42 (H) 05/26/2023             Lab Results   Component Value Date     PSA 6.83 (H) 05/26/2023     PSA 8.3 (H) 05/15/2023     PSA 4.9 (H) 10/12/2022         Review of Systems      Review of Systems   Constitutional: Negative for activity change, appetite change, chills, diaphoresis, fatigue, fever and unexpected weight change. Respiratory: Negative for chest tightness and shortness of breath. Cardiovascular: Negative for chest pain, palpitations and leg swelling. Gastrointestinal: Negative for abdominal distention, abdominal pain, constipation, diarrhea, nausea and vomiting. Genitourinary: Negative for decreased urine volume, difficulty urinating, dysuria, enuresis, flank pain, frequency, genital sores, hematuria and urgency. Musculoskeletal: Negative for back pain, gait problem and myalgias. Skin: Negative for color change, pallor, rash and wound. Psychiatric/Behavioral: Negative for behavioral problems. The patient is not nervous/anxious.          Allergies      Allergies   Allergen Reactions   • Oxycodone GI Intolerance         Physical Exam      Physical Exam  Constitutional:       General: He is not in acute distress. Appearance: Normal appearance. He is normal weight. He is not ill-appearing, toxic-appearing or diaphoretic. HENT:      Head: Normocephalic and atraumatic. Eyes:      General:         Right eye: No discharge. Left eye: No discharge. Conjunctiva/sclera: Conjunctivae normal.   Pulmonary:      Effort: Pulmonary effort is normal. No respiratory distress. Genitourinary:     Comments: Good rectal tone. Prostate 45g, smooth, symmetric, no palpable nodules  Musculoskeletal:         General: No swelling, tenderness or signs of injury. Normal range of motion. Skin:     General: Skin is warm and dry. Coloration: Skin is not jaundiced or pale. Neurological:      General: No focal deficit present. Mental Status: He is alert and oriented to person, place, and time. Psychiatric:         Mood and Affect: Mood normal.         Behavior: Behavior normal.               Vital Signs      Vitals       Vitals:     06/19/23 0808   BP: 142/78   BP Location: Left arm   Patient Position: Sitting   Cuff Size: Adult   Pulse: 85   Resp: 16   SpO2: 97%   Weight: 83.9 kg (185 lb)   Height: 5' 10" (1.778 m)               Current Medications         Current Outpatient Medications:   •  albuterol (PROVENTIL HFA,VENTOLIN HFA) 90 mcg/act inhaler, Inhale 2 puffs every 6 (six) hours as needed for wheezing or shortness of breath, Disp: 6.7 g, Rfl: 2  •  allopurinol (ZYLOPRIM) 300 mg tablet, "ONE-HALF" TABLET EVERY DAY, Disp: 45 tablet, Rfl: 3  •  amLODIPine (NORVASC) 5 mg tablet, TAKE 1 TABLET (5 MG) BY MOUTH DAILY, Disp: 90 tablet, Rfl: 5  •  aspirin (ECOTRIN LOW STRENGTH) 81 mg EC tablet, Take 81 mg by mouth daily, Disp: , Rfl:   •  atorvastatin (LIPITOR) 80 mg tablet, TAKE 1 TABLET (80 MG TOTAL) BY MOUTH DAILY, Disp: 90 tablet, Rfl: 3  •  azilsartan medoxomil (Edarbi) 40 MG tablet, Take 1 tablet (40 mg total) by mouth daily after dinner (Patient taking differently: Take 20 mg by mouth daily at bedtime), Disp: 60 tablet, Rfl: 3  •  B Complex-C (SUPERPLEX-T) TABS, Take 1 tablet by mouth daily, Disp: , Rfl:   •  Breo Ellipta 100-25 MCG/ACT inhaler, INHALE 1 PUFF DAILY RINSE MOUTH AFTER USE., Disp: 60 each, Rfl: 2  •  chlorthalidone 25 mg tablet, TAKE "ONE-HALF" TABLET (12.5 MG) BY MOUTH DAILY, Disp: 45 tablet, Rfl: 4  •  Cholecalciferol (VITAMIN D3) 2000 units TABS, Take 1 tablet by mouth daily, Disp: , Rfl:   •  co-enzyme Q-10 50 MG capsule, Take 100 mg by mouth in the morning., Disp: , Rfl:   •  colchicine (COLCRYS) 0.6 mg tablet, TAKE 1 TABLET (0.6 MG TOTAL) BY MOUTH DAILY, Disp: 30 tablet, Rfl: 3  •  dexlansoprazole (DEXILANT) 60 MG capsule, Take 1 capsule (60 mg total) by mouth daily, Disp: 90 capsule, Rfl: 3  •  famotidine (PEPCID) 40 MG tablet, Take 1 tablet (40 mg total) by mouth daily Take 1 tablet with dinner, Disp: 90 tablet, Rfl: 2  •  Ferrous Sulfate (IRON) 325 (65 Fe) MG TABS, Take 1 tablet by mouth daily, Disp: , Rfl:   •  ipratropium-albuterol (DUO-NEB) 0.5-2.5 mg/3 mL nebulizer solution, Take 3 mL by nebulization 4 (four) times a day, Disp: 360 mL, Rfl: 3  •  Januvia 100 MG tablet, TAKE ONE TABLET DAILY, Disp: 30 tablet, Rfl: 0  •  magnesium chloride-calcium (SLOW-MAG) 71.5-119 mg, Take 2 tablets by mouth 2 (two) times a day, Disp: , Rfl:   •  metoprolol tartrate (LOPRESSOR) 50 mg tablet, TAKE 1 TABLET (50 MG TOTAL) BY MOUTH IN THE MORNING AND "ONE-HALF" TABLET (25MG) IN THE EVENING, Disp: 135 tablet, Rfl: 3  •  sulfaSALAzine (AZULFIDINE) 500 mg tablet, ONE AND "ONE-HALF" TABLETS (75OMG) EVERY DAY, Disp: 45 tablet, Rfl: 3  •  Incruse Ellipta 62.5 MCG/ACT AEPB inhaler, INHALE 1 PUFF DAILY, Disp: 3 each, Rfl: 1        Active Problems          Patient Active Problem List   Diagnosis   • Chronic kidney disease, stage III (moderate) (HCC)   • Dyslipidemia   • Persistent proteinuria   • Hypertensive chronic kidney disease with stage 1 through stage 4 chronic kidney disease, or unspecified chronic kidney disease   • Hyperkalemia   • Hypertension, essential   • COPD (chronic obstructive pulmonary disease) (HCC)   • Sarcoidosis   • Other spondylosis, thoracolumbar region   • Type 2 diabetes mellitus without complication, without long-term current use of insulin (HCC)   • Diabetic nephropathy associated with type 2 diabetes mellitus (HCC)   • Type 2 diabetes mellitus with stage 3 chronic kidney disease (HCC)   • Coronary artery disease of native heart with stable angina pectoris, unspecified vessel or lesion type (HCC)   • COVID-19   • Pulmonary fibrosis (HCC)   • History of PTCA            Past Medical History      Medical History        Past Medical History:   Diagnosis Date   • Anemia     • Arthritis     • Gee's esophagus     • Breast lump     • Cancer [facility])     • Chronic kidney disease     • Cirrhosis ([address])     • Coronary artery disease       cardiac cath; stents    • Diabetes mellitus ([address])     • H/O heart artery stent     • Hypertension     • Inguinal hernia       Right   • Sarcoidosis     • 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HCC)     • Skin cancer     • SOB (shortness of breath)     • Upper respiratory infection, viral 5/31/2022               Surgical History      Surgical History         Past Surgical History:   Procedure Laterality Date   • CARDIAC CATHETERIZATION       • CATARACT EXTRACTION, BILATERAL         left eye 05/08 and right 5/22/2022   • CERVICAL FUSION       • CHOLECYSTECTOMY       • COLONOSCOPY   04/11/2016   • EGD AND COLONOSCOPY   06/24/2019   • ERCP   09/11/2014     transab esop hiat griffin rpr   • ESOPHAGOGASTRIC FUNDOPLASTY       • HERNIA REPAIR Right       p I/griffin init reduc >5 yr   • HIP SURGERY Right       Replacement   • KIDNEY STONE SURGERY         Fragmenting   • LIVER BIOPSY       • MULTIPLE TOOTH EXTRACTIONS         "extraction of all maxillary teeth"   • SKIN BIOPSY   05/2020   • THUMB FUSION         with graft   • TONSILLECTOMY       • US GUIDANCE   10/22/2014               Family History      Family History         Family History   Problem Relation Age of Onset   • Hypertension Mother     • Atrial fibrillation Mother     • Heart disease Mother     • Kidney disease Father     • COPD Father     • Heart disease Father     • Asthma Neg Hx     • Lung cancer Neg Hx                 Social History      Social History               Radiology                   Cosigned by: Kelly Mann MD at 6/26/2023  7:31 AM   Electronically signed by Yuni Sheridan PA-C at 6/19/2023  8:38 AM   Electronically signed by Kelly Mann MD at 6/26/2023  7:31 AM   Office Visit on 6/19/2023     Office Visit on 6/19/2023        Revision History      Note shared with patient  Additional Documentation    Vitals:   /78 (BP Location: Left arm, Patient Position: Sitting, Cuff Size: Adult)   Pulse 85   Resp 16   Ht 5' 10" (1.778 m)   Wt 83.9 kg (185 lb)   SpO2 97%   BMI 26.54 kg/m²   BSA 2.02 m²   Flowsheets:   Vitals Reassessment      SmartForms:    [facility] PRE-CHARTING      Encounter Info:   Billing Info,   History,   Allergies,   Detailed Report        Orders Placed     Case request operating room: TRANSPERINEAL MRI FUSION BIOPSY PROSTATE Once  Medication Changes       None  Medication List  Visit Diagnoses       Elevated PSA  Problem List

## 2023-08-15 ENCOUNTER — HOSPITAL ENCOUNTER (OUTPATIENT)
Facility: HOSPITAL | Age: 78
Setting detail: OUTPATIENT SURGERY
Discharge: HOME | End: 2023-08-15
Attending: UROLOGY | Admitting: UROLOGY
Payer: MEDICARE

## 2023-08-15 ENCOUNTER — ANESTHESIA (OUTPATIENT)
Dept: GASTROENTEROLOGY | Facility: HOSPITAL | Age: 78
End: 2023-08-15
Payer: MEDICARE

## 2023-08-15 ENCOUNTER — ANESTHESIA EVENT (OUTPATIENT)
Dept: GASTROENTEROLOGY | Facility: HOSPITAL | Age: 78
End: 2023-08-15
Payer: MEDICARE

## 2023-08-15 VITALS
HEIGHT: 71 IN | OXYGEN SATURATION: 97 % | TEMPERATURE: 96.7 F | WEIGHT: 178 LBS | BODY MASS INDEX: 24.92 KG/M2 | SYSTOLIC BLOOD PRESSURE: 105 MMHG | HEART RATE: 53 BPM | DIASTOLIC BLOOD PRESSURE: 55 MMHG | RESPIRATION RATE: 18 BRPM

## 2023-08-15 DIAGNOSIS — R97.20 ELEVATED PSA: ICD-10-CM

## 2023-08-15 LAB — GLUCOSE SERPL-MCNC: 159 MG/DL (ref 65–140)

## 2023-08-15 PROCEDURE — NC001 PR NO CHARGE: Performed by: UROLOGY

## 2023-08-15 PROCEDURE — G0416 PROSTATE BIOPSY, ANY MTHD: HCPCS | Performed by: PATHOLOGY

## 2023-08-15 PROCEDURE — 88344 IMHCHEM/IMCYTCHM EA MLT ANTB: CPT | Performed by: PATHOLOGY

## 2023-08-15 PROCEDURE — 55700 PR PROSTATE NEEDLE BIOPSY ANY APPROACH: CPT | Performed by: UROLOGY

## 2023-08-15 PROCEDURE — 82948 REAGENT STRIP/BLOOD GLUCOSE: CPT

## 2023-08-15 PROCEDURE — 76942 ECHO GUIDE FOR BIOPSY: CPT | Performed by: UROLOGY

## 2023-08-15 RX ORDER — PROPOFOL 10 MG/ML
INJECTION, EMULSION INTRAVENOUS AS NEEDED
Status: DISCONTINUED | OUTPATIENT
Start: 2023-08-15 | End: 2023-08-15

## 2023-08-15 RX ORDER — SODIUM CHLORIDE 9 MG/ML
INJECTION, SOLUTION INTRAVENOUS CONTINUOUS PRN
Status: DISCONTINUED | OUTPATIENT
Start: 2023-08-15 | End: 2023-08-15

## 2023-08-15 RX ORDER — CEFAZOLIN SODIUM 2 G/50ML
SOLUTION INTRAVENOUS AS NEEDED
Status: DISCONTINUED | OUTPATIENT
Start: 2023-08-15 | End: 2023-08-15

## 2023-08-15 RX ORDER — FENTANYL CITRATE 50 UG/ML
INJECTION, SOLUTION INTRAMUSCULAR; INTRAVENOUS AS NEEDED
Status: DISCONTINUED | OUTPATIENT
Start: 2023-08-15 | End: 2023-08-15

## 2023-08-15 RX ORDER — LIDOCAINE HYDROCHLORIDE 20 MG/ML
INJECTION, SOLUTION EPIDURAL; INFILTRATION; INTRACAUDAL; PERINEURAL AS NEEDED
Status: DISCONTINUED | OUTPATIENT
Start: 2023-08-15 | End: 2023-08-15 | Stop reason: HOSPADM

## 2023-08-15 RX ORDER — HYDROCODONE BITARTRATE AND ACETAMINOPHEN 5; 325 MG/1; MG/1
1 TABLET ORAL EVERY 6 HOURS PRN
Status: DISCONTINUED | OUTPATIENT
Start: 2023-08-15 | End: 2023-08-15 | Stop reason: HOSPADM

## 2023-08-15 RX ORDER — CEFAZOLIN SODIUM 2 G/50ML
2000 SOLUTION INTRAVENOUS ONCE
Status: COMPLETED | OUTPATIENT
Start: 2023-08-15 | End: 2023-08-15

## 2023-08-15 RX ADMIN — SODIUM CHLORIDE: 0.9 INJECTION, SOLUTION INTRAVENOUS at 08:42

## 2023-08-15 RX ADMIN — PROPOFOL 60 MG: 10 INJECTION, EMULSION INTRAVENOUS at 08:50

## 2023-08-15 RX ADMIN — CEFAZOLIN SODIUM 2000 MG: 2 SOLUTION INTRAVENOUS at 08:43

## 2023-08-15 RX ADMIN — CEFAZOLIN SODIUM 2000 MG: 2 SOLUTION INTRAVENOUS at 07:52

## 2023-08-15 RX ADMIN — PROPOFOL 100 MCG/KG/MIN: 10 INJECTION, EMULSION INTRAVENOUS at 08:51

## 2023-08-15 RX ADMIN — FENTANYL CITRATE 50 MCG: 50 INJECTION INTRAMUSCULAR; INTRAVENOUS at 08:50

## 2023-08-15 NOTE — ANESTHESIA POSTPROCEDURE EVALUATION
Post-Op Assessment Note    CV Status:  Stable  Pain Score: 0    Pain management: adequate     Mental Status:  Sleepy and awake   Hydration Status:  Stable   PONV Controlled:  None   Airway Patency:  Patent      Post Op Vitals Reviewed: Yes      Staff: CRNA         No notable events documented.     BP  98/54    Temp 97   Pulse 53   Resp 16   SpO2 97 RA

## 2023-08-15 NOTE — DISCHARGE INSTR - AVS FIRST PAGE
Call for fever greater than 101.5°, severe pain not relieved by pain medication, or inability to urinate. Expect to see blood in the urine, blood in the stool and blood in the semen . You may see swelling and bruising of the testicles and the space between the legs. Tomorrow you may resume all usual activities. No driving or operating machinery today. Take Tylenol or ibuprofen for pain/discomfort. .  Drink plenty of fluids.

## 2023-08-15 NOTE — ANESTHESIA PREPROCEDURE EVALUATION
Procedure:  TRANSPERINEAL MRI FUSION BIOPSY PROSTATE (Perineum)    Relevant Problems   CARDIO   (+) Coronary artery disease of native heart with stable angina pectoris, unspecified vessel or lesion type (HCC)   (+) History of PTCA   (+) Hypertension, essential      ENDO   (+) Type 2 diabetes mellitus with stage 3 chronic kidney disease (HCC)   (+) Type 2 diabetes mellitus without complication, without long-term current use of insulin (HCC)      /RENAL   (+) Chronic kidney disease, stage III (moderate) (HCC)   (+) Diabetic nephropathy associated with type 2 diabetes mellitus (HCC)   (+) Hypertensive chronic kidney disease with stage 1 through stage 4 chronic kidney disease, or unspecified chronic kidney disease      MUSCULOSKELETAL   (+) Other spondylosis, thoracolumbar region      PULMONARY   (+) COPD (chronic obstructive pulmonary disease) ([address])      TTE 2022:  •  Left Ventricle: There is mild hypertrophy. Systolic function is normal   with an ejection fraction of 60-65%. There is grade I (mild) diastolic   dysfunction and normal left atrial pressure. •  Right Ventricle: Systolic function is normal.   •  Aortic Valve: The aortic valve is trileaflet. There is mild sclerosis. There is mild regurgitation. •  Mitral Valve: There is trace regurgitation. •  Tricuspid Valve: There is trace regurgitation. •  Normal pulmonary artery pressure. Physical Exam    Airway    Mallampati score: II  TM Distance: >3 FB  Neck ROM: full     Dental   upper dentures and lower dentures,     Cardiovascular  Cardiovascular exam normal    Pulmonary  Pulmonary exam normal     Other Findings        Anesthesia Plan  ASA Score- 3     Anesthesia Type- IV sedation with anesthesia with ASA Monitors. Additional Monitors:   Airway Plan:           Plan Factors-    Chart reviewed. EKG reviewed. Existing labs reviewed. Patient summary reviewed. Induction- intravenous.     Postoperative Plan-     Informed Consent- Anesthetic plan and risks discussed with patient. I personally reviewed this patient with the CRNA. Discussed and agreed on the Anesthesia Plan with the CRNA. Alvin Enriuqez

## 2023-08-15 NOTE — OP NOTE
OPERATIVE REPORT  PATIENT NAME: Edmund Jones    :  1945  MRN: [id number]  Pt Location: [facility] ROOM 01    SURGERY DATE: 8/15/2023    Surgeon(s) and Role:     * Spencer Perry MD - Primary    Preop Diagnosis:  Elevated PSA [R97.20], abnormal MRI    Post-Op Diagnosis Codes:     * Elevated PSA [R97.20], abnormal MRI    Procedure(s):  TRANSPERINEAL MRI FUSION BIOPSY PROSTATE-saturation biopsy 24 cores    Specimen(s):  * No specimens in log *    Estimated Blood Loss:   Minimal    Drains:  * No LDAs found *    Anesthesia Type:   IV Sedation with Anesthesia    Operative Indications:  Elevated PSA [R97.20]  Elevated PSA 6.83, multiparametric MRI of the prostate 2023 shows category 4 lesion anterior right transition zone at the base. 38 cc gland. No sign of extension or metastases. Operative Findings:  Hypoechoic regions left posterior lateral.  24 cores taken total.  6 taken from region of interest which was combined right anterior medial and right anterior lateral.  Special attention was made to get basic specimens of the right anterior medial and right anterior lateral.    Complications:   None    Procedure and Technique:  The patient is  is here for transperineal prostate biopsy guided by biplanar transrectal ultrasound using the uroNav device for MR fusion. The procedure, as well as the risks of bleeding, infection, and urinary retention have been explained he gives informed consent. The patient was brought to the operating room and identified properly. A time-out was performed. IV sedation was induced, and he was placed in the dorsal lithotomy position. The perineum shaved, and IO band was used to lift the scrotum away from the perineum. ChloraPrep was used to prep the perineum. Care was taken when placing the patient in the dorsal lithotomy position to make sure all pressure points were protected.   . The transrectal ultrasound probe had the precision Point kit placed on it, and this was placed into the rectum. Transverse and sagittal views were obtained. Using the needle guide for the device, I anesthetized both sides of the perineum with 2% xylocaine 5 cc each. .  Transverse view was obtained, Volumetric sweep was performed for the purpose of fusing the MRI images with the ultrasound images. I then performed biopsies of the region of interest and took multiple biopsies with MR fusion guidance using the guide needle in the appropriate aperture and using an 18 gauge biopsy needle. .  I then performed standard transperineal prostate biopsy without MR guidance, taking the biopsies from the peripheral zone in the standard fashion of anterior medial anterior lateral and posterior medial posterior lateral and base region bilaterally. These were all peripheral zone biopsies. Extra biopsies were taken in zones where the initial biopsy was suboptimal tissue. Care was taken to try to include the entire length of the prostate as much as possible for complete coverage. Excellent prostate tissue cores were obtained, and specimens were sent to pathology. I withdrew the guide needle and I held pressure on the perineum for 1 minutes using gauze sponges. The perineum was then cleansed with sterile water. I was present for the entire procedure. and A qualified resident physician was not available.     Patient Disposition:  APU        SIGNATURE: Dayday Benítez MD  DATE: August 15, 2023  TIME: 8:50 AM

## 2023-08-16 ENCOUNTER — TELEPHONE (OUTPATIENT)
Dept: UROLOGY | Facility: CLINIC | Age: 78
End: 2023-08-16

## 2023-08-16 NOTE — TELEPHONE ENCOUNTER
Called and [facility] for patient that I was calling to follow up with him s/p transperineal MRI fusion biopsy with Dr. Jen Araujo yesterday. Asked patient to call back with questions or concerns- if no issues, will plan on seeing patient as scheduled with Dr. Brittany Christine on 9/5/23.

## 2023-08-17 PROCEDURE — G0416 PROSTATE BIOPSY, ANY MTHD: HCPCS | Performed by: PATHOLOGY

## 2023-08-17 PROCEDURE — 88344 IMHCHEM/IMCYTCHM EA MLT ANTB: CPT | Performed by: PATHOLOGY

## 2023-08-30 DIAGNOSIS — J43.9 PULMONARY EMPHYSEMA, UNSPECIFIED EMPHYSEMA TYPE (HCC): ICD-10-CM

## 2023-08-30 RX ORDER — UMECLIDINIUM 62.5 UG/1
AEROSOL, POWDER ORAL
Qty: 3 EACH | Refills: 1 | Status: SHIPPED | OUTPATIENT
Start: 2023-08-30 | End: 2023-09-29

## 2023-09-05 ENCOUNTER — OFFICE VISIT (OUTPATIENT)
Dept: UROLOGY | Facility: CLINIC | Age: 78
End: 2023-09-05
Payer: MEDICARE

## 2023-09-05 VITALS
HEIGHT: 71 IN | BODY MASS INDEX: 25.62 KG/M2 | DIASTOLIC BLOOD PRESSURE: 72 MMHG | HEART RATE: 58 BPM | WEIGHT: 183 LBS | OXYGEN SATURATION: 98 % | SYSTOLIC BLOOD PRESSURE: 164 MMHG

## 2023-09-05 DIAGNOSIS — C61 PROSTATE CANCER (HCC): Primary | ICD-10-CM

## 2023-09-05 PROCEDURE — 99214 OFFICE O/P EST MOD 30 MIN: CPT | Performed by: UROLOGY

## 2023-09-05 NOTE — PROGRESS NOTES
Referring Physician: Ami Contreras DO  A copy of this note was sent to the referring physician. There are no diagnoses linked to this encounter. Assessment and plan:       1. Newly diagnosed low risk prostate cancer  -Diagnosis: August 15, 2023 (transperineal MR fusion biopsy)  -Ferris 3+3 equal 6 disease, 1 of 6 cores from solitary right anterior lesion, all cores on systematic biopsy were negative  - PSA at the time of diagnosis: 6.8-8.3  -Multiparametric MRI: (June 5, 2023): 38 g gland, solitary 1.4 cm right base lesion    2. Medical comorbidities: Type 2 diabetes, COPD, pulmonary fibrosis, coronary artery disease, stage III chronic kidney disease    Overall is very happy to review the biopsy report which is quite favorable in this particular patient. I think this cancer can safely be characterized as not clinically significant and I have recommended watchful waiting. We had a productive discussion today regarding the patient's biopsy results which are positive for NCCN criteria very low risk prostate cancer. We reviewed management options which include:  -active surveillance which is the recommended modality for low risk disease  -definitive management in the form of a robotic prostatectomy  -or external beam radiation treatment. Pros and cons of each modality were discussed. We discussed active surveillance extensively as this is the preferred modality for low risk Ferris 6 disease. We discussed that this is standard of care, as the patient's prostate cancer currently presents no clinical risk to his health or wellness or longevity and that it can be safely monitored with appropriate precautions. We did discuss the standard active surveillance protocol. We discussed the role of serial PSA testing, further imaging with a multiparametric MRI.  We discussed that the MRI will both help completely stage the extent of his cancer, provide a noninvasive tool to follow for potential progression over time, and allow for targeted biopsies to be obtained in the future. Finally we discussed that a repeat biopsy 1 year from the time of his diagnosis is a crucial part of the protocol. If these cysts forthcoming MRI demonstrates a detectable lesion we will plan for a fusion biopsy going forward. Finally we discussed the option of definitive upfront management which would have an excellent long-term success rate and cancer control but may subject the patient to over treatment. After informed discussion the patient and his family did elect to participate in active surveillance as the standard of care. I think this low-grade disease can safely be followed with PSA only follow-up. He will follow-up annually with our transportation team.    Neto Griffith MD      Chief Complaint     Biopsy discussion      History of Present Illness     Tiffanie Alfred is a 78 y.o. now returns in follow-up for elevated PSA status post prostate biopsy    Detailed Urologic History     - please refer to HPI    Review of Systems     Review of Systems   Constitutional: Negative for activity change and fatigue. HENT: Negative for congestion. Eyes: Negative for visual disturbance. Respiratory: Negative for shortness of breath and wheezing. Cardiovascular: Negative for chest pain and leg swelling. Gastrointestinal: Negative for abdominal pain. Endocrine: Negative for polyuria. Genitourinary: Negative for dysuria, flank pain, hematuria and urgency. Musculoskeletal: Negative for back pain. Allergic/Immunologic: Negative for immunocompromised state. Neurological: Negative for dizziness and numbness. Psychiatric/Behavioral: Negative for dysphoric mood. All other systems reviewed and are negative. Allergies     Allergies   Allergen Reactions   • Oxycodone GI Intolerance       Physical Exam       Physical Exam  Constitutional:       General: He is not in acute distress.      Appearance: He is well-developed. HENT:      Head: Normocephalic and atraumatic. Cardiovascular:      Comments: Negative lower extremity edema  Pulmonary:      Effort: Pulmonary effort is normal.      Breath sounds: Normal breath sounds. Abdominal:      Palpations: Abdomen is soft. Musculoskeletal:         General: Normal range of motion. Cervical back: Normal range of motion. Skin:     General: Skin is warm. Neurological:      Mental Status: He is alert and oriented to person, place, and time. Psychiatric:         Behavior: Behavior normal.           Vital Signs  There were no vitals filed for this visit. Current Medications       Current Outpatient Medications:   •  albuterol (PROVENTIL HFA,VENTOLIN HFA) 90 mcg/act inhaler, Inhale 2 puffs every 6 (six) hours as needed for wheezing or shortness of breath, Disp: 6.7 g, Rfl: 2  •  allopurinol (ZYLOPRIM) 300 mg tablet, "ONE-HALF" TABLET EVERY DAY, Disp: 45 tablet, Rfl: 3  •  amLODIPine (NORVASC) 5 mg tablet, TAKE 1 TABLET (5 MG TOTAL) BY MOUTH DAILY. , Disp: 90 tablet, Rfl: 3  •  aspirin (ECOTRIN LOW STRENGTH) 81 mg EC tablet, Take 81 mg by mouth daily, Disp: , Rfl:   •  atorvastatin (LIPITOR) 80 mg tablet, TAKE 1 TABLET (80 MG TOTAL) BY MOUTH DAILY, Disp: 90 tablet, Rfl: 3  •  azilsartan medoxomil (Edarbi) 40 MG tablet, Take 1 tablet (40 mg total) by mouth daily after dinner (Patient taking differently: Take 20 mg by mouth daily at bedtime), Disp: 60 tablet, Rfl: 3  •  b complex vitamins capsule, Take 1 capsule by mouth daily, Disp: , Rfl:   •  B Complex-C (SUPERPLEX-T) TABS, Take 1 tablet by mouth daily, Disp: , Rfl:   •  Breo Ellipta 100-25 MCG/ACT inhaler, INHALE 1 PUFF DAILY RINSE MOUTH AFTER USE., Disp: 180 blister, Rfl: 1  •  chlorthalidone 25 mg tablet, TAKE "ONE-HALF" TABLET (12.5 MG) BY MOUTH DAILY, Disp: 45 tablet, Rfl: 4  •  Cholecalciferol (VITAMIN D3) 2000 units TABS, Take 1 tablet by mouth daily, Disp: , Rfl:   •  co-enzyme Q-10 50 MG capsule, Take 100 mg by mouth in the morning., Disp: , Rfl:   •  colchicine (COLCRYS) 0.6 mg tablet, TAKE 1 TABLET (0.6 MG TOTAL) BY MOUTH DAILY, Disp: 30 tablet, Rfl: 3  •  dexlansoprazole (DEXILANT) 60 MG capsule, Take 1 capsule (60 mg total) by mouth daily, Disp: 90 capsule, Rfl: 3  •  Empagliflozin (Jardiance) 10 MG TABS tablet, Take 1 tablet (10 mg total) by mouth every morning, Disp: 30 tablet, Rfl: 5  •  famotidine (PEPCID) 40 MG tablet, Take 1 tablet (40 mg total) by mouth daily Take 1 tablet with dinner, Disp: 90 tablet, Rfl: 2  •  Ferrous Sulfate (IRON) 325 (65 Fe) MG TABS, Take 1 tablet by mouth daily, Disp: , Rfl:   •  Incruse Ellipta 62.5 MCG/ACT AEPB inhaler, INHALE 1 PUFF DAILY, Disp: 3 each, Rfl: 1  •  ipratropium-albuterol (DUO-NEB) 0.5-2.5 mg/3 mL nebulizer solution, Take 3 mL by nebulization 4 (four) times a day (Patient not taking: Reported on 8/9/2023), Disp: 360 mL, Rfl: 3  •  magnesium chloride-calcium (SLOW-MAG) 71.5-119 mg, Take 2 tablets by mouth 2 (two) times a day, Disp: , Rfl:   •  metoprolol tartrate (LOPRESSOR) 50 mg tablet, TAKE 1 TABLET (50 MG TOTAL) BY MOUTH IN THE MORNING AND "ONE-HALF" TABLET (25MG) IN THE EVENING, Disp: 135 tablet, Rfl: 3  •  sitaGLIPtin (Januvia) 100 mg tablet, Take 1 tablet (100 mg total) by mouth daily, Disp: 30 tablet, Rfl: 6  •  sulfaSALAzine (AZULFIDINE) 500 mg tablet, ONE AND "ONE-HALF" TABLETS (75OMG) EVERY DAY, Disp: 45 tablet, Rfl: 3      Active Problems     Patient Active Problem List   Diagnosis   • Chronic kidney disease, stage III (moderate) ([facility])   • Dyslipidemia   • Persistent proteinuria   • Hypertensive chronic kidney disease with stage 1 through stage 4 chronic kidney disease, or unspecified chronic kidney disease   • Hyperkalemia   • Hypertension, essential   • COPD (chronic obstructive pulmonary disease) (HCC)   • Sarcoidosis   • Other spondylosis, thoracolumbar region   • Type 2 diabetes mellitus without complication, without long-term current use of insulin ([address])   • Diabetic nephropathy associated with type 2 diabetes mellitus ([address])   • Type 2 diabetes mellitus with stage 3 chronic kidney disease ([address])   • Coronary artery disease of native heart with stable angina pectoris, unspecified vessel or lesion type [facility])   • COVID-19   • Pulmonary fibrosis (HCC)   • History of PTCA         Past Medical History     Past Medical History:   Diagnosis Date   • Anemia    • Arthritis    • Gee's esophagus    • Breast lump    • Cancer (HCC)    • Chronic kidney disease    • Cirrhosis ([address])    • Coronary artery disease     cardiac cath; stents    • Diabetes mellitus ([address])    • H/O heart artery stent    • Hypertension    • Inguinal hernia     Right   • Sarcoidosis    • 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HCC)    • Skin cancer    • SOB (shortness of breath)    • Upper respiratory infection, viral 5/31/2022         Surgical History     Past Surgical History:   Procedure Laterality Date   • CARDIAC CATHETERIZATION     • CATARACT EXTRACTION, BILATERAL      left eye 05/08 and right 5/22/2022   • CERVICAL FUSION     • CHOLECYSTECTOMY     • COLONOSCOPY  04/11/2016   • EGD AND COLONOSCOPY  06/24/2019   • ERCP  09/11/2014    transab esop hiat griffin rpr   • ESOPHAGOGASTRIC FUNDOPLASTY     • HERNIA REPAIR Right     p I/griffin init reduc >5 yr   • HIP SURGERY Right     Replacement   • KIDNEY STONE SURGERY      Fragmenting   • LIVER BIOPSY     • MULTIPLE TOOTH EXTRACTIONS      "extraction of all maxillary teeth"   • [place] BX PROSTATE STRTCTC SATURATION SAMPLING IMG GID N/A 8/15/2023    Procedure: TRANSPERINEAL MRI FUSION BIOPSY PROSTATE;  Surgeon: Wade David MD;  Location: [facility];  Service: Urology   • SKIN BIOPSY  05/2020   • THUMB FUSION      with graft   • TONSILLECTOMY     • US GUIDANCE  10/22/2014         Family History     Family History   Problem Relation Age of Onset   • Hypertension Mother    • Atrial fibrillation Mother    • Heart disease Mother    • Kidney disease Father    • COPD Father    • Heart disease Father    • Asthma Neg Hx    • Lung cancer Neg Hx          Social History     Social History     Social History     Tobacco Use   Smoking Status Never   Smokeless Tobacco Never   Tobacco Comments    RETIRED         Pertinent Lab Values     Lab Results   Component Value Date    CREATININE 1.74 (H) 08/02/2023       Lab Results   Component Value Date    PSA 6.83 (H) 05/26/2023    PSA 8.3 (H) 05/15/2023    PSA 4.9 (H) 10/12/2022       @RESULTRCNT(1H])@      Pertinent Imaging       No results found. Portions of the record may have been created with voice recognition software. Occasional wrong word or "sound a like" substitutions may have occurred due to the inherent limitations of voice recognition software. In addition some of the content generated from this outpatient encounter includes information designed for patient education and/or communication back to the referring provider. Read the chart carefully and recognize, using context, where substitutions have occurred.

## 2023-10-19 DIAGNOSIS — K22.70 BARRETT'S ESOPHAGUS WITHOUT DYSPLASIA: ICD-10-CM

## 2023-10-19 DIAGNOSIS — K21.00 GASTROESOPHAGEAL REFLUX DISEASE WITH ESOPHAGITIS, UNSPECIFIED WHETHER HEMORRHAGE: ICD-10-CM

## 2023-10-19 DIAGNOSIS — I10 HYPERTENSION, ESSENTIAL: ICD-10-CM

## 2023-10-19 DIAGNOSIS — M45.9 ANKYLOSING SPONDYLITIS OF SITE IN SPINE (HCC): ICD-10-CM

## 2023-10-19 RX ORDER — FAMOTIDINE 40 MG/1
40 TABLET, FILM COATED ORAL DAILY
Qty: 30 TABLET | Refills: 2 | Status: SHIPPED | OUTPATIENT
Start: 2023-10-19

## 2023-10-19 RX ORDER — METOPROLOL TARTRATE 50 MG/1
TABLET, FILM COATED ORAL
Qty: 135 TABLET | Refills: 1 | Status: SHIPPED | OUTPATIENT
Start: 2023-10-19

## 2023-10-19 RX ORDER — SULFASALAZINE 500 MG/1
TABLET ORAL
Qty: 45 TABLET | Refills: 3 | Status: SHIPPED | OUTPATIENT
Start: 2023-10-19

## 2023-11-01 ENCOUNTER — TELEPHONE (OUTPATIENT)
Dept: PULMONOLOGY | Facility: CLINIC | Age: 78
End: 2023-11-01

## 2023-11-01 NOTE — TELEPHONE ENCOUNTER
Pt came into the office to schedule overdue f/u with Dr. Suzzanna Holstein. Wants to know if he would like him to complete a chest xray before he comes back to see him. Last one was completed in January of 2023. If so, please let me know so that I can contact pt and let him know order was placed.

## 2023-11-03 ENCOUNTER — OFFICE VISIT (OUTPATIENT)
Dept: PULMONOLOGY | Facility: CLINIC | Age: 78
End: 2023-11-03
Payer: MEDICARE

## 2023-11-03 VITALS
DIASTOLIC BLOOD PRESSURE: 78 MMHG | WEIGHT: 190 LBS | TEMPERATURE: 98.4 F | SYSTOLIC BLOOD PRESSURE: 104 MMHG | OXYGEN SATURATION: 96 % | HEIGHT: 71 IN | BODY MASS INDEX: 26.6 KG/M2

## 2023-11-03 DIAGNOSIS — J84.10 PULMONARY FIBROSIS (HCC): ICD-10-CM

## 2023-11-03 DIAGNOSIS — J43.9 PULMONARY EMPHYSEMA, UNSPECIFIED EMPHYSEMA TYPE (HCC): Primary | ICD-10-CM

## 2023-11-03 DIAGNOSIS — D86.9 SARCOIDOSIS: ICD-10-CM

## 2023-11-03 PROCEDURE — 99214 OFFICE O/P EST MOD 30 MIN: CPT | Performed by: INTERNAL MEDICINE

## 2023-11-03 NOTE — PROGRESS NOTES
Assessment/Plan:    COPD (chronic obstructive pulmonary disease) [facility])  Mr. Tristen Rosario was diagnosed with COPD many years back and is currently on treatment with Breo 100, Incruse and Ventolin. His usual exercise tolerance is more than 2 blocks and he has occasional cough and no wheezing. Luisito Spine He is a never smoker but admits to secondhand smoke exposure. 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 His previous PFT from May 2015 showed severe airflow obstruction with diffusion defect. He also had evidence of air trapping. There was improvement in FEV1 with bronchodilator indicating a component of asthma. His previous CT scan from February 2019 showed scarring in the left lung. On clinical examination, his chest auscultation revealed bilateral inspiratory coarse crackles at lung bases. His high-resolution CT scan of the chest showed bilateral pulmonary fibrosis. I have advised him to continue breo and incruse. Currently his shortness of breath is getting worse and his exercise tolerance is less than block. He has history of congestive heart failure and currently is not on any diuretic therapy. I spoke to Dr. Penny Lockhart, the cardiologist he knows. He was previously with Dr. Elver Waters. He needs a cardiac evaluation and I discussed this with Dr. Penny Lockhart. He would see him in his office as soon as possible. I had a long discussion with the patient and have answered all his questions. Pulmonary fibrosis ([address])  His recent high-resolution CT scan of the chest showed mild diffuse bilateral reticulation and mild scattered linear scar. Mild traction bronchiolectasis in the lateral basal left lower lobe. Chronic opacity in the paraspinal right lower lobe with volume loss as evidenced by crowding of the bronchi and mild traction bronchiolectasis due to chronic scar. The etiology is not clear at this point. His previous PFT showed severe airflow obstruction with diffusion defect.    his repeat PFT showed moderate airflow obstruction with severe diffusion defect. Chest auscultation revealed bilateral coarse inspiratory crackles. He currently does not need any oxygen. The etiology of this pulmonary fibrosis is not clear at this point. He has emphysema. He has previous history of sarcoidosis. He has history of beryllium exposure. he also had COVID. His CT scan does not show a UIP pattern. His pulmonary function has not deteriorated. We will continue to monitor him closely. At this point he would not benefit from antifibrotic therapy. I had a long discussion with him and answered all his questions       Sarcoidosis  He was diagnosed with sarcoidosis many years back after a lung biopsy and was given steroid therapy for some time. His previous PFT from May 2015 showed severe airflow obstruction with diffusion defect. He has history of working with beryllium. But the sarcoidosis happened before his exposure to beryllium. Diagnoses and all orders for this visit:    Pulmonary emphysema, unspecified emphysema type ([address])    Pulmonary fibrosis ([address])    Sarcoidosis          Subjective:      Patient ID: Erlin Kelley is a 78 y.o. male. Mr. Marcie Fulton came for follow-up for his COPD pulmonary fibrosis. He stated that he has been feeling increasingly short of breath over him for the past couple of months and currently his exercise tolerance is about 200 feet. He has also felt burning pain in the chest during exertion. He has previous history of coronary artery disease and used to see Dr. Osman Mendiola. He denied any swelling of feet. He has occasional cough no significant phlegm no significant wheezing. He has been on treatment with Breo 100, Incruse and albuterol. He has no fever or chills. His appetite has been good. His previous PFT showed moderate airflow obstruction. He is not on any oxygen.   He has bilateral pulmonary fibrosis and this is secondary to a combination of previous COVID, COPD.  We have been observing him. The following portions of the patient's history were reviewed and updated as appropriate: allergies, current medications, past family history, past medical history, past social history, past surgical history, and problem list.    Review of Systems   Constitutional:  Negative for appetite change, chills, fatigue and fever. HENT:  Negative for hearing loss, rhinorrhea, sneezing, sore throat and trouble swallowing. Eyes:  Negative for visual disturbance. Respiratory:  Positive for cough and shortness of breath. Negative for chest tightness. Cardiovascular:  Positive for chest pain. Negative for palpitations and leg swelling. Genitourinary:  Negative for dysuria, frequency and urgency. Prostate cancer   Musculoskeletal:  Positive for back pain. Negative for arthralgias. Skin:  Negative for rash. Skin cancer   Allergic/Immunologic: Positive for environmental allergies. Neurological:  Positive for dizziness. Negative for syncope, light-headedness and headaches. Psychiatric/Behavioral:  Negative for agitation, confusion and sleep disturbance. The patient is not nervous/anxious. Objective:      /78 (BP Location: Left arm, Patient Position: Sitting)   Temp 98.4 °F (36.9 °C)   Ht 5' 11" (1.803 m)   Wt 86.2 kg (190 lb)   SpO2 96%   BMI 26.50 kg/m²          Physical Exam  Vitals reviewed. Constitutional:       General: He is not in acute distress. Appearance: He is obese. He is not ill-appearing, toxic-appearing or diaphoretic. HENT:      Head: Normocephalic. Mouth/Throat:      Mouth: Mucous membranes are moist.   Eyes:      General: No scleral icterus. Conjunctiva/sclera: Conjunctivae normal.   Cardiovascular:      Rate and Rhythm: Normal rate and regular rhythm. Heart sounds: Normal heart sounds. Pulmonary:      Effort: Pulmonary effort is normal. No respiratory distress. Breath sounds: No stridor.  No wheezing or rhonchi. Rales: Coarse basal crackles bilaterally. Chest:      Chest wall: No tenderness. Abdominal:      General: Bowel sounds are normal.      Palpations: Abdomen is soft. Tenderness: There is no abdominal tenderness. There is no guarding. Musculoskeletal:      Cervical back: Neck supple. No rigidity. Right lower leg: No edema. Left lower leg: No edema. Lymphadenopathy:      Cervical: No cervical adenopathy. Skin:     Coloration: Skin is not jaundiced. Findings: No rash. Neurological:      Mental Status: He is alert and oriented to person, place, and time. Gait: Gait normal.   Psychiatric:         Mood and Affect: Mood normal.         Behavior: Behavior normal.         Thought Content: Thought content normal.         Judgment: Judgment normal.      I spent 30 minutes of time taking care of this patient with complex medical issues. The majority of this time was spent directly with the patient counseling as well as coordinating care.

## 2023-11-03 NOTE — ASSESSMENT & PLAN NOTE
Mr. Elif Sol was diagnosed with COPD many years back and is currently on treatment with Breo 100, Incruse and Ventolin. His usual exercise tolerance is more than 2 blocks and he has occasional cough and no wheezing. Damon Marion He is a never smoker but admits to secondhand smoke exposure. 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 His previous PFT from May 2015 showed severe airflow obstruction with diffusion defect. He also had evidence of air trapping. There was improvement in FEV1 with bronchodilator indicating a component of asthma. His previous CT scan from February 2019 showed scarring in the left lung. On clinical examination, his chest auscultation revealed bilateral inspiratory coarse crackles at lung bases. His high-resolution CT scan of the chest showed bilateral pulmonary fibrosis. I have advised him to continue breo and incruse. Currently his shortness of breath is getting worse and his exercise tolerance is less than block. He has history of congestive heart failure and currently is not on any diuretic therapy. I spoke to Dr. Nataly Hood, the cardiologist he knows. He was previously with Dr. Morena Ya. He needs a cardiac evaluation and I discussed this with Dr. Nataly Hood. He would see him in his office as soon as possible. I had a long discussion with the patient and have answered all his questions.

## 2023-11-04 NOTE — ASSESSMENT & PLAN NOTE
His recent high-resolution CT scan of the chest showed mild diffuse bilateral reticulation and mild scattered linear scar. Mild traction bronchiolectasis in the lateral basal left lower lobe. Chronic opacity in the paraspinal right lower lobe with volume loss as evidenced by crowding of the bronchi and mild traction bronchiolectasis due to chronic scar. The etiology is not clear at this point. His previous PFT showed severe airflow obstruction with diffusion defect. his repeat PFT showed moderate airflow obstruction with severe diffusion defect. Chest auscultation revealed bilateral coarse inspiratory crackles. He currently does not need any oxygen. The etiology of this pulmonary fibrosis is not clear at this point. He has emphysema. He has previous history of sarcoidosis. He has history of beryllium exposure. he also had COVID. His CT scan does not show a UIP pattern. His pulmonary function has not deteriorated. We will continue to monitor him closely. At this point he would not benefit from antifibrotic therapy.   I had a long discussion with him and answered all his questions

## 2023-11-04 NOTE — ASSESSMENT & PLAN NOTE
He was diagnosed with sarcoidosis many years back after a lung biopsy and was given steroid therapy for some time. His previous PFT from May 2015 showed severe airflow obstruction with diffusion defect. He has history of working with beryllium. But the sarcoidosis happened before his exposure to beryllium.

## 2023-11-13 ENCOUNTER — VBI (OUTPATIENT)
Dept: ADMINISTRATIVE | Facility: OTHER | Age: 78
End: 2023-11-13

## 2023-11-13 NOTE — TELEPHONE ENCOUNTER
11/13/23 3:18 PM    Patient contacted (spoke with patient) to bring Advance Directive, POLST, or Living Will document to next scheduled pcp visit. Thank you.   Buddy Estrada MA  [facility]

## 2023-11-14 ENCOUNTER — OFFICE VISIT (OUTPATIENT)
Dept: FAMILY MEDICINE CLINIC | Facility: CLINIC | Age: 78
End: 2023-11-14
Payer: MEDICARE

## 2023-11-14 VITALS
DIASTOLIC BLOOD PRESSURE: 68 MMHG | HEIGHT: 71 IN | SYSTOLIC BLOOD PRESSURE: 136 MMHG | OXYGEN SATURATION: 97 % | BODY MASS INDEX: 26.43 KG/M2 | HEART RATE: 67 BPM | TEMPERATURE: 99.1 F | WEIGHT: 188.8 LBS

## 2023-11-14 DIAGNOSIS — E11.9 TYPE 2 DIABETES MELLITUS WITHOUT COMPLICATION, WITHOUT LONG-TERM CURRENT USE OF INSULIN (HCC): Primary | ICD-10-CM

## 2023-11-14 DIAGNOSIS — I10 HYPERTENSION, ESSENTIAL: ICD-10-CM

## 2023-11-14 DIAGNOSIS — I12.9 HYPERTENSIVE CHRONIC KIDNEY DISEASE WITH STAGE 1 THROUGH STAGE 4 CHRONIC KIDNEY DISEASE, OR UNSPECIFIED CHRONIC KIDNEY DISEASE: ICD-10-CM

## 2023-11-14 DIAGNOSIS — Z79.899 ENCOUNTER FOR LONG-TERM (CURRENT) USE OF MEDICATIONS: ICD-10-CM

## 2023-11-14 DIAGNOSIS — Z71.2 ENCOUNTER TO DISCUSS TEST RESULTS: ICD-10-CM

## 2023-11-14 DIAGNOSIS — Z79.899 MEDICATION MANAGEMENT: ICD-10-CM

## 2023-11-14 DIAGNOSIS — I25.118 CORONARY ARTERY DISEASE OF NATIVE HEART WITH STABLE ANGINA PECTORIS, UNSPECIFIED VESSEL OR LESION TYPE (HCC): ICD-10-CM

## 2023-11-14 LAB — SL AMB POCT HEMOGLOBIN AIC: 6.5 (ref ?–6.5)

## 2023-11-14 PROCEDURE — 99214 OFFICE O/P EST MOD 30 MIN: CPT | Performed by: FAMILY MEDICINE

## 2023-11-14 PROCEDURE — 83036 HEMOGLOBIN GLYCOSYLATED A1C: CPT | Performed by: FAMILY MEDICINE

## 2023-11-14 NOTE — ASSESSMENT & PLAN NOTE
Hemoglobin A1c today was 6.5 percent and was fully discussed. Only taking 100 mg of Januvia daily. Extensive talk about Jardiance. Do blood work before a follow-up consultation with Dr. Destinee Trent.

## 2023-11-14 NOTE — PROGRESS NOTES
Assessment/Plan:       1. Type 2 diabetes mellitus without complication, without long-term current use of insulin ([facility])  Assessment & Plan:  Hemoglobin A1c today was 6.5 percent and was fully discussed. Only taking 100 mg of Januvia daily. Extensive talk about Jardiance. Do blood work before a follow-up consultation with Dr. Ana Grant. Orders:  -     POCT hemoglobin A1c  -     Albumin / creatinine urine ratio; Future; Expected date: 11/14/2023  -     Comprehensive metabolic panel; Future; Expected date: 11/14/2023  -     Hemoglobin A1C; Future; Expected date: 11/14/2023    2. Encounter to discuss test results    3. Encounter for long-term (current) use of medications  Assessment & Plan:  All medications were reviewed for safety, efficacy, intolerance, and hypertensive chronic kidney disease, followed by Dr. Isai Mejias and the renal department, who also advised taking Jardiance 10 mg once daily. 4. Hypertensive chronic kidney disease with stage 1 through stage 4 chronic kidney disease, or unspecified chronic kidney disease    5. Hypertension, essential  Assessment & Plan:  Under good control at 136/68 mmHg. But I note on Dr. Isai Mejias after visit summary that blood pressure was 486 mmHg systolic blood pressure, which is unusual. I advised the patient to continue to control blood pressure and cholesterol levels. 6. Coronary artery disease of native heart with stable angina pectoris, unspecified vessel or lesion type [facility])  Assessment & Plan:  Patient undoubtedly has CAD. He does have at least two splint 2 stents now, possibly 3. He is going for cardiac catheterization on 11/20/2023 and further treatment depends on that. Discontinue aspirin only for the rest of the week before the surgical procedure. 7. Medication management  Assessment & Plan:  All medications were reviewed for safety, efficacy, and tolerance. Immunizations. Due for zoster vaccine. Up-to-date on other vaccines.       Return in 6 or 8 weeks.    BMI Counseling: Body mass index is 26.33 kg/m². The BMI is above normal. Nutrition recommendations include decreasing portion sizes, encouraging healthy choices of fruits and vegetables, decreasing fast food intake, consuming healthier snacks, limiting drinks that contain sugar, moderation in carbohydrate intake, increasing intake of lean protein and reducing intake of saturated and trans fat. Exercise recommendations include moderate physical activity 150 minutes/week and exercising 3-5 times per week. No pharmacotherapy was ordered. Rationale for BMI follow-up plan is due to patient being overweight or obese. Subjective:      Patient ID: Nelly Solomon is a 78 y.o. male. Aletha Covington is a 72-year-old male who presents for an office visit. The patient has been experiencing chest pain, and it was initially severe. He thinks it is due to angina. When he relaxes, the pain completely improves. He has also been experiencing dyspnea. He consulted Dr. Tresa Perea. He underwent a procedure with Dr. Brian Bañuelos, who referred him to Dr. Camille Carrillo to consult for prostate cancer. Only has 5% of just one core +! Catheterization from 5 or 6 years ago was reopened. He will have a consultation with Dr. Kalyn Diamond on 11/22/2023. They are considering EGD. Patient presented a living will document. The patient was prescribed nitroglycerin from his catheterization. A 10 mg dose of Jardiance was prescribed by Dr. Jane Ho, but he discontinued it due to chest tightness and elevated glucose levels. He reinitiated Januvia. He did his influenza vaccine. The following portions of the patient's history were reviewed and updated as appropriate: allergies, current medications, past family history, past medical history, past social history, past surgical history, and problem list.    Review of Systems   Constitutional: Negative.   Negative for activity change, appetite change, chills, diaphoresis, fatigue, fever and unexpected weight change. HENT: Negative. Negative for congestion, dental problem, drooling, ear discharge, ear pain, facial swelling, mouth sores, nosebleeds, postnasal drip, rhinorrhea, trouble swallowing and voice change. Eyes: Negative. Negative for photophobia, pain, discharge, redness, itching and visual disturbance. Respiratory:  Positive for shortness of breath. Negative for apnea, cough, choking and chest tightness. Sees Dr. Bob Bella - not terribly happy with him, but ok. . Sob with stairs. No CP's. Main issue is the lungs and SOB. Cardiovascular:  Positive for chest pain. Negative for leg swelling. Gastrointestinal: Negative. Negative for abdominal distention, abdominal pain, constipation, diarrhea and nausea. Sees Dr. Juan Anne and he just changed the PPI to    Endocrine: Negative. Negative for polydipsia, polyphagia and polyuria. Genitourinary: Negative. Negative for decreased urine volume, difficulty urinating, dysuria, enuresis and hematuria. Musculoskeletal:  Positive for myalgias, neck pain and neck stiffness. Negative for arthralgias, back pain, gait problem and joint swelling. Kyphosis and scoliosis   Skin:  Negative for color change, pallor, rash and wound. Allergic/Immunologic: Negative. Negative for immunocompromised state. Neurological: Negative. Negative for dizziness, seizures, syncope, facial asymmetry, speech difficulty, light-headedness and headaches. Hematological: Negative. Negative for adenopathy. Psychiatric/Behavioral: Negative. Negative for agitation, behavioral problems, confusion and decreased concentration.             Objective:  /68 (BP Location: Left arm, Patient Position: Sitting, Cuff Size: Standard)   Pulse 67   Temp 99.1 °F (37.3 °C) (Temporal)   Ht 5' 11" (1.803 m)   Wt 85.6 kg (188 lb 12.8 oz)   SpO2 97%   BMI 26.33 kg/m²          Physical Exam  Constitutional:       General: He is not in acute distress. Appearance: Normal appearance. He is not ill-appearing or diaphoretic. HENT:      Right Ear: Tympanic membrane normal.      Left Ear: Tympanic membrane normal.      Nose: Nose normal. No congestion or rhinorrhea. Eyes:      General:         Right eye: No discharge. Left eye: No discharge. Cardiovascular:      Rate and Rhythm: Normal rate and regular rhythm. Pulses: Normal pulses. Heart sounds: Normal heart sounds. Pulmonary:      Breath sounds: Normal breath sounds. Musculoskeletal:      Right lower leg: No edema. Left lower leg: No edema. Skin:     General: Skin is warm and dry. Neurological:      General: No focal deficit present. Mental Status: He is alert and oriented to person, place, and time. Psychiatric:         Mood and Affect: Mood normal.         Behavior: Behavior normal.         Thought Content: Thought content normal.         Judgment: Judgment normal.         PSA slowly increases, but when it goes up too fast, he gets excited. Labs on 11/10/2023 showed a blood glucose of 143 mg/dL. Kidney function is off. GFR is 47 mL/min/1.73 m2. The CBC revealed 13.5 g/dL of hemoglobin and a white cell count of 9000 units. The platelet count is good. Transcribed for Trudy Sahu DO, by Hunter Ward on 11/14/23 at 11:43 PM. Powered by [employer].

## 2023-11-14 NOTE — ASSESSMENT & PLAN NOTE
Under good control at 136/68 mmHg. But I note on Dr. Rohan Phillips after visit summary that blood pressure was 135 mmHg systolic blood pressure, which is unusual. I advised the patient to continue to control blood pressure and cholesterol levels.

## 2023-11-14 NOTE — ASSESSMENT & PLAN NOTE
Patient undoubtedly has CAD. He does have at least two splint 2 stents now, possibly 3. He is going for cardiac catheterization on 11/20/2023 and further treatment depends on that. Discontinue aspirin only for the rest of the week before the surgical procedure.

## 2023-11-14 NOTE — ASSESSMENT & PLAN NOTE
All medications were reviewed for safety, efficacy, intolerance, and hypertensive chronic kidney disease, followed by Dr. Amado Ceballos and the renal department, who also advised taking Jardiance 10 mg once daily.

## 2023-11-22 ENCOUNTER — OFFICE VISIT (OUTPATIENT)
Dept: GASTROENTEROLOGY | Facility: CLINIC | Age: 78
End: 2023-11-22
Payer: MEDICARE

## 2023-11-22 VITALS
SYSTOLIC BLOOD PRESSURE: 138 MMHG | HEIGHT: 71 IN | HEART RATE: 65 BPM | DIASTOLIC BLOOD PRESSURE: 70 MMHG | WEIGHT: 185 LBS | BODY MASS INDEX: 25.9 KG/M2

## 2023-11-22 DIAGNOSIS — J43.1 PANLOBULAR EMPHYSEMA (HCC): ICD-10-CM

## 2023-11-22 DIAGNOSIS — I25.10 CORONARY ARTERY DISEASE INVOLVING NATIVE CORONARY ARTERY OF NATIVE HEART WITHOUT ANGINA PECTORIS: ICD-10-CM

## 2023-11-22 DIAGNOSIS — K22.70 BARRETT'S ESOPHAGUS WITHOUT DYSPLASIA: Primary | ICD-10-CM

## 2023-11-22 DIAGNOSIS — R97.20 ELEVATED PSA: ICD-10-CM

## 2023-11-22 DIAGNOSIS — C61 PROSTATE CANCER (HCC): ICD-10-CM

## 2023-11-22 DIAGNOSIS — K22.10 ULCER OF ESOPHAGUS WITHOUT BLEEDING: ICD-10-CM

## 2023-11-22 PROCEDURE — 99214 OFFICE O/P EST MOD 30 MIN: CPT | Performed by: INTERNAL MEDICINE

## 2023-11-22 RX ORDER — METOPROLOL SUCCINATE 50 MG/1
TABLET, EXTENDED RELEASE ORAL
COMMUNITY
Start: 2023-11-20

## 2023-11-22 NOTE — PROGRESS NOTES
[facility] - Outpatient Follow-up Note  Fransisco Hollins 78 y.o. male MRN: [id number]  Encounter: [id number]          ASSESSMENT AND PLAN:      1. 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  Patient history of Gee's esophagus. He has long segment Gee's esophagus but there was no dysplasia. Patient was endoscoped January this year there was also a yeast infection which was treated with nystatin. Currently he is doing well. He does not have any dysphagia or odynophagia. There is no nausea or vomiting. Patient has history of ankylosing spondylitis and severe kyphoscoliosis which may be a contributing factor for his difficulty in swallowing on occasions. Patient had a cardiac catheterization done last week. Patient advised to get the endoscopy sometimes neck spring. He will need cardiac clearance of course. He will possibly see us before that endoscopy next year. 2. Panlobular emphysema ([address])  History of emphysema which is stable. Patient has significant shortness of breath on exertion. He has very limited capacity. 3. Ulcer of esophagus without bleeding  Ulcer had healed. 4. Elevated PSA  History of prostate cancer and elevated PSA. 5. Prostate cancer ([address])  History of prostate cancer being managed by urology. 6.  Coronary artery disease. Had cardiac stenting done recently. He is on Brilinta. 7.  Emphysema and COPD. Possible sarcoidosis. ______________________________________________________________________    SUBJECTIVE: Denies any abdominal pain or discomfort. There is no nausea or vomiting. He is currently being treated for COPD and pulmonary fibrosis by pulmonary. He also has history of sarcoidosis. Recently had cardiac cath and stenting. He is on Brilinta. REVIEW OF SYSTEMS IS OTHERWISE NEGATIVE.       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[facility])     Skin cancer     SOB (shortness of breath)     Upper respiratory infection, viral 5/31/2022     Past Surgical History:   Procedure Laterality Date    CARDIAC CATHETERIZATION      CATARACT EXTRACTION, BILATERAL      left eye 05/08 and right 5/22/2022    CERVICAL FUSION      CHOLECYSTECTOMY      COLONOSCOPY  04/11/2016    EGD AND COLONOSCOPY  06/24/2019    ERCP  09/11/2014    transab esop hiat griffin rpr    ESOPHAGOGASTRIC FUNDOPLASTY      HERNIA REPAIR Right     p I/griffin init reduc >5 yr    HIP SURGERY Right     Replacement    KIDNEY STONE SURGERY      Fragmenting    LIVER BIOPSY      MULTIPLE TOOTH EXTRACTIONS      "extraction of all maxillary teeth"    OR BX PROSTATE STRTCTC SATURATION SAMPLING IMG GID N/A 8/15/2023    Procedure: TRANSPERINEAL MRI FUSION BIOPSY PROSTATE;  Surgeon: Wade David MD;  Location: [facility];  Service: Urology    SKIN BIOPSY  05/2020    THUMB FUSION      with graft    TONSILLECTOMY      US GUIDANCE  10/22/2014     Social History   Social History     Substance and Sexual Activity   Alcohol Use No    Comment: Alcohol intake:   Occasional wine - As per Baljinder      Social History     Substance and Sexual Activity   Drug Use No     Social History     Tobacco Use   Smoking Status Never   Smokeless Tobacco Never   Tobacco Comments    RETIRED     Family History   Problem Relation Age of Onset    Hypertension Mother     Atrial fibrillation Mother     Heart disease Mother     Kidney disease Father     COPD Father     Heart disease Father     Asthma Neg Hx     Lung cancer Neg Hx        Meds/Allergies       Current Outpatient Medications:     albuterol (PROVENTIL HFA,VENTOLIN HFA) 90 mcg/act inhaler    allopurinol (ZYLOPRIM) 300 mg tablet    amLODIPine (NORVASC) 5 mg tablet    aspirin (ECOTRIN LOW STRENGTH) 81 mg EC tablet    atorvastatin (LIPITOR) 80 mg tablet    azilsartan medoxomil (Edarbi) 40 MG tablet    b complex vitamins capsule    Breo Ellipta 100-25 MCG/ACT inhaler    chlorthalidone 25 mg tablet    Cholecalciferol (VITAMIN D3) 2000 units TABS    co-enzyme Q-10 50 MG capsule    colchicine (COLCRYS) 0.6 mg tablet    dexlansoprazole (DEXILANT) 60 MG capsule    Empagliflozin (Jardiance) 10 MG TABS tablet    famotidine (PEPCID) 40 MG tablet    Ferrous Sulfate (IRON) 325 (65 Fe) MG TABS    Incruse Ellipta 62.5 MCG/ACT AEPB inhaler    ipratropium-albuterol (DUO-NEB) 0.5-2.5 mg/3 mL nebulizer solution    magnesium chloride-calcium (SLOW-MAG) 71.5-119 mg    metoprolol tartrate (LOPRESSOR) 50 mg tablet    sitaGLIPtin (Januvia) 100 mg tablet    sulfaSALAzine (AZULFIDINE) 500 mg tablet    ticagrelor (BRILINTA) 90 MG    metoprolol succinate (TOPROL-XL) 50 mg 24 hr tablet    Allergies   Allergen Reactions    Oxycodone GI Intolerance           Objective     Blood pressure 138/70, pulse 65, height 5' 11" (1.803 m), weight 83.9 kg (185 lb). Body mass index is 25.8 kg/m². PHYSICAL EXAM:      General Appearance:   Alert, cooperative, no distress   HEENT:   Normocephalic, atraumatic, anicteric. Neck:  Supple, symmetrical, trachea midline   Lungs:   Clear to auscultation bilaterally; no rales, rhonchi or wheezing; respirations unlabored    Heart[de-identified]   Regular rate and rhythm; no murmur, rub, or gallop. Abdomen:   Soft, non-tender, non-distended; normal bowel sounds; no masses, no organomegaly    Genitalia:   Deferred    Rectal:   Deferred    Extremities:  No cyanosis, clubbing or edema    Pulses:  2+ and symmetric    Skin:  No jaundice, rashes, or lesions    Lymph nodes:  No palpable cervical lymphadenopathy        Lab Results:   No visits with results within 1 Day(s) from this visit.    Latest known visit with results is:   Office Visit on 11/14/2023   Component Date Value    Hemoglobin A1C 11/14/2023 6.5          Radiology Results:   ECHO 2D COMPLETE    Result Date: 11/19/2023  Narrative: This result has an attachment that is not available. Left Ventricle: Left ventricle is normal in size. There is mild concentric hypertrophy. Systolic function is low normal with an ejection fraction of 50-55%. There is severe hypokinesis of the inferior wall from base to mid wall. There is grade I (mild) diastolic dysfunction. Left Atrium: Left atrium volume index is mildly increased. Right Ventricle: Right ventricle cavity is normal. Systolic function is normal.   Aortic Valve: The aortic valve is trileaflet. The leaflets are mildly thickened. The leaflets are mildly calcified. There is mild regurgitation. Mitral Valve: There is mild regurgitation. Tricuspid Valve: There is mild regurgitation. Pulmonic Valve: The estimated pulmonary artery systolic pressure is 60.5 mmHg. There is mild pulmonary hypertension. Left Ventricle Left ventricle is normal in size. There is mild concentric hypertrophy. Systolic function is low normal with an ejection fraction of 50-55%. There is severe hypokinesis of the inferior wall from base to mid wall. There is grade I (mild) diastolic dysfunction. Right Ventricle Right ventricle cavity is normal. Systolic function is normal. Left Atrium Left atrium volume index is mildly increased. Right Atrium Right atrium cavity is normal. IVC/SVC The inferior vena cava demonstrates a diameter of <=21 mm and collapses >50%; therefore, the right atrial pressure is estimated at 0-5 mmHg. Mitral Valve Mitral valve structure is normal. There is mild regurgitation. Tricuspid Valve Tricuspid valve structure is normal. There is mild regurgitation. Aortic Valve The aortic valve is trileaflet. The leaflets are mildly thickened. The leaflets are mildly calcified. There is mild regurgitation. Pulmonic Valve Pulmonic valve structure is normal. There is trace regurgitation.  The estimated pulmonary artery systolic pressure is 03.2 mmHg. There is mild pulmonary hypertension. Ascending Aorta The aorta appears normal in size. Pericardium There is no pericardial effusion. Noncardiac Significant Findings: [place] Act 112. Study Details A complete 2D echocardiogram was performed. Color flow, Pulse Wave and Continuous Wave Doppler was performed and analyzed. XR chest portable    Result Date: 11/18/2023  Narrative: Study: Single view AP erect chest. COMPARISON: June 13, 2022. HISTORY: Chest pain. Heart and vessels normal. Lung fields well aerated. No interval infiltrates, edema, or effusions. Linear scarring left mid and basilar lung field similar to earlier exam. Mild atelectasis versus scarring right midlung field. No gross pleural effusions. Ectatic descending thoracic aorta. Hiatal hernia. Impression: IMPRESSION: No interval infiltrates, edema, or effusions.  Workstation:[id number]

## 2023-12-04 DIAGNOSIS — I12.9 PARENCHYMAL RENAL HYPERTENSION, STAGE 1 THROUGH STAGE 4 OR UNSPECIFIED CHRONIC KIDNEY DISEASE: ICD-10-CM

## 2023-12-04 DIAGNOSIS — M1A.9XX0 CHRONIC GOUT WITHOUT TOPHUS, UNSPECIFIED CAUSE, UNSPECIFIED SITE: ICD-10-CM

## 2023-12-04 RX ORDER — COLCHICINE 0.6 MG/1
TABLET ORAL
Qty: 30 TABLET | Refills: 3 | Status: SHIPPED | OUTPATIENT
Start: 2023-12-04

## 2023-12-04 RX ORDER — AZILSARTAN KAMEDOXOMIL 40 MG/1
40 TABLET ORAL
Qty: 30 TABLET | Refills: 3 | Status: SHIPPED | OUTPATIENT
Start: 2023-12-04

## 2023-12-27 DIAGNOSIS — K21.00 GASTROESOPHAGEAL REFLUX DISEASE WITH ESOPHAGITIS, UNSPECIFIED WHETHER HEMORRHAGE: ICD-10-CM

## 2023-12-27 DIAGNOSIS — K22.70 BARRETT'S ESOPHAGUS WITHOUT DYSPLASIA: Primary | ICD-10-CM

## 2023-12-27 RX ORDER — FAMOTIDINE 40 MG/1
40 TABLET, FILM COATED ORAL DAILY
Qty: 90 TABLET | Refills: 2 | Status: SHIPPED | OUTPATIENT
Start: 2023-12-27 | End: 2024-01-03 | Stop reason: SDUPTHER

## 2023-12-27 RX ORDER — DEXLANSOPRAZOLE 60 MG/1
60 CAPSULE, DELAYED RELEASE ORAL DAILY
Qty: 90 CAPSULE | Refills: 3 | Status: SHIPPED | OUTPATIENT
Start: 2023-12-27 | End: 2024-01-03 | Stop reason: SDUPTHER

## 2023-12-28 ENCOUNTER — APPOINTMENT (OUTPATIENT)
Dept: LAB | Facility: HOSPITAL | Age: 78
End: 2023-12-28
Attending: INTERNAL MEDICINE
Payer: MEDICARE

## 2023-12-28 DIAGNOSIS — I12.9 HYPERTENSIVE CHRONIC KIDNEY DISEASE WITH STAGE 1 THROUGH STAGE 4 CHRONIC KIDNEY DISEASE, OR UNSPECIFIED CHRONIC KIDNEY DISEASE: ICD-10-CM

## 2023-12-28 DIAGNOSIS — N18.31 STAGE 3A CHRONIC KIDNEY DISEASE (HCC): ICD-10-CM

## 2023-12-28 DIAGNOSIS — E11.9 TYPE 2 DIABETES MELLITUS WITHOUT COMPLICATION, WITHOUT LONG-TERM CURRENT USE OF INSULIN (HCC): ICD-10-CM

## 2023-12-28 DIAGNOSIS — E78.5 DYSLIPIDEMIA: ICD-10-CM

## 2023-12-28 DIAGNOSIS — E11.21 DIABETIC NEPHROPATHY ASSOCIATED WITH TYPE 2 DIABETES MELLITUS (HCC): ICD-10-CM

## 2023-12-28 DIAGNOSIS — R80.1 PERSISTENT PROTEINURIA: ICD-10-CM

## 2023-12-28 LAB
ALBUMIN SERPL BCP-MCNC: 4.2 G/DL (ref 3.5–5)
ALP SERPL-CCNC: 92 U/L (ref 34–104)
ALT SERPL W P-5'-P-CCNC: 26 U/L (ref 7–52)
ANION GAP SERPL CALCULATED.3IONS-SCNC: 6 MMOL/L
AST SERPL W P-5'-P-CCNC: 29 U/L (ref 13–39)
BILIRUB SERPL-MCNC: 0.69 MG/DL (ref 0.2–1)
BUN SERPL-MCNC: 24 MG/DL (ref 5–25)
CALCIUM SERPL-MCNC: 10 MG/DL (ref 8.4–10.2)
CHLORIDE SERPL-SCNC: 106 MMOL/L (ref 96–108)
CHOLEST SERPL-MCNC: 115 MG/DL
CO2 SERPL-SCNC: 29 MMOL/L (ref 21–32)
CREAT SERPL-MCNC: 1.41 MG/DL (ref 0.6–1.3)
CREAT UR-MCNC: 105 MG/DL
CREAT UR-MCNC: 105 MG/DL
ERYTHROCYTE [DISTWIDTH] IN BLOOD BY AUTOMATED COUNT: 14 % (ref 11.6–15.1)
EST. AVERAGE GLUCOSE BLD GHB EST-MCNC: 157 MG/DL
GFR SERPL CREATININE-BSD FRML MDRD: 48 ML/MIN/1.73SQ M
GLUCOSE P FAST SERPL-MCNC: 148 MG/DL (ref 65–99)
HBA1C MFR BLD: 7.1 %
HCT VFR BLD AUTO: 38.5 % (ref 36.5–49.3)
HDLC SERPL-MCNC: 43 MG/DL
HGB BLD-MCNC: 12.7 G/DL (ref 12–17)
LDLC SERPL CALC-MCNC: 42 MG/DL (ref 0–100)
MAGNESIUM SERPL-MCNC: 1.8 MG/DL (ref 1.9–2.7)
MCH RBC QN AUTO: 32.6 PG (ref 26.8–34.3)
MCHC RBC AUTO-ENTMCNC: 33 G/DL (ref 31.4–37.4)
MCV RBC AUTO: 99 FL (ref 82–98)
MICROALBUMIN UR-MCNC: 213.1 MG/L
MICROALBUMIN/CREAT 24H UR: 203 MG/G CREATININE (ref 0–30)
PHOSPHATE SERPL-MCNC: 3.3 MG/DL (ref 2.3–4.1)
PLATELET # BLD AUTO: 188 THOUSANDS/UL (ref 149–390)
PMV BLD AUTO: 9.2 FL (ref 8.9–12.7)
POTASSIUM SERPL-SCNC: 4.9 MMOL/L (ref 3.5–5.3)
PROT SERPL-MCNC: 6.9 G/DL (ref 6.4–8.4)
PROT UR-MCNC: 37 MG/DL
PROT/CREAT UR: 0.35 MG/G{CREAT} (ref 0–0.1)
PTH-INTACT SERPL-MCNC: 43 PG/ML (ref 12–88)
RBC # BLD AUTO: 3.89 MILLION/UL (ref 3.88–5.62)
SODIUM SERPL-SCNC: 141 MMOL/L (ref 135–147)
TRIGL SERPL-MCNC: 150 MG/DL
WBC # BLD AUTO: 9.74 THOUSAND/UL (ref 4.31–10.16)

## 2023-12-28 PROCEDURE — 83735 ASSAY OF MAGNESIUM: CPT

## 2023-12-28 PROCEDURE — 80061 LIPID PANEL: CPT

## 2023-12-28 PROCEDURE — 82043 UR ALBUMIN QUANTITATIVE: CPT

## 2023-12-28 PROCEDURE — 84100 ASSAY OF PHOSPHORUS: CPT

## 2023-12-28 PROCEDURE — 83036 HEMOGLOBIN GLYCOSYLATED A1C: CPT

## 2023-12-28 PROCEDURE — 84156 ASSAY OF PROTEIN URINE: CPT

## 2023-12-28 PROCEDURE — 82570 ASSAY OF URINE CREATININE: CPT

## 2023-12-28 PROCEDURE — 80053 COMPREHEN METABOLIC PANEL: CPT

## 2023-12-28 PROCEDURE — 83970 ASSAY OF PARATHORMONE: CPT

## 2023-12-28 PROCEDURE — 36415 COLL VENOUS BLD VENIPUNCTURE: CPT

## 2023-12-28 PROCEDURE — 82306 VITAMIN D 25 HYDROXY: CPT

## 2023-12-28 PROCEDURE — 85027 COMPLETE CBC AUTOMATED: CPT

## 2023-12-29 ENCOUNTER — TELEPHONE (OUTPATIENT)
Dept: NEPHROLOGY | Facility: CLINIC | Age: 78
End: 2023-12-29

## 2023-12-29 NOTE — TELEPHONE ENCOUNTER
Called and spoke to the patient to relay the message above. Patient expressed understanding and had no further questions or concerns at this time.

## 2024-01-01 LAB
25(OH)D2 SERPL-MCNC: <1 NG/ML
25(OH)D3 SERPL-MCNC: 33 NG/ML
25(OH)D3+25(OH)D2 SERPL-MCNC: 33 NG/ML

## 2024-01-03 ENCOUNTER — TELEPHONE (OUTPATIENT)
Dept: GASTROENTEROLOGY | Facility: CLINIC | Age: 79
End: 2024-01-03

## 2024-01-03 DIAGNOSIS — K21.00 GASTROESOPHAGEAL REFLUX DISEASE WITH ESOPHAGITIS, UNSPECIFIED WHETHER HEMORRHAGE: ICD-10-CM

## 2024-01-03 DIAGNOSIS — K22.70 BARRETT'S ESOPHAGUS WITHOUT DYSPLASIA: ICD-10-CM

## 2024-01-03 RX ORDER — FAMOTIDINE 40 MG/1
40 TABLET, FILM COATED ORAL DAILY
Qty: 90 TABLET | Refills: 2 | Status: SHIPPED | OUTPATIENT
Start: 2024-01-03

## 2024-01-03 RX ORDER — DEXLANSOPRAZOLE 60 MG/1
60 CAPSULE, DELAYED RELEASE ORAL DAILY
Qty: 90 CAPSULE | Refills: 3 | Status: SHIPPED | OUTPATIENT
Start: 2024-01-03

## 2024-01-03 NOTE — TELEPHONE ENCOUNTER
Called and spoke to pt and informed that scripts were sent into his [facility].

## 2024-01-08 NOTE — PROGRESS NOTES
RENAL FOLLOW UP NOTE:.td    ASSESSMENT AND PLAN:  1. CKD stage 3.   Etiology: Diabetic nephropathy/hypertensive nephrosclerosis/arteriolar nephrosclerosis/episodes of SHAY in the past. All serologies were negative including multiple myeloma evaluation.  Baseline creatinine: 1.2-1.62  Current creatinine: 1.41 at baseline   Urine protein creatinine ratio: 0.35 g at goal  Recommendations:   Treat hypertension-please see below   Treat dyslipidemia-please see below   Maintain proteinuria less than 1 g or as low as possible   Avoid nephrotoxic agents such as NSAIDs, patient counseled as such  2. Volume: Euvolemic   3. Hypertension:      Current blood pressure averages:   None today     Goal blood pressure: Less than 125/80 given CAD  Recommendations:   Push nonmedical regimen including weight loss, isotonic exercise and avoidance of salt; patient counseled as such   Medication changes today:   AOBP: 131/55 with a heart rate of 52 we would not want to overtreat with a low diastolic reading to avoid hypoperfusion/hypotension  4. Electrolytes: All acceptable   5. Mineral bone disorder:   Calcium/magnesium/phosphorus: All acceptable , magnesium 1.8 on supplement   PTH intact: 43.0 which is normal  Vitamin-D:Currently on supplements.  33 at goal from 12/28/2023  6. Dyslipidemia:   Goal LDL: Less than 70 given CAD   Current lipid profile: LDL 42/HDL 43/triglycerides 150 from 12/28/2023  Recommendations: At goal so no changes   7. Anemia: normal hemoglobin at 12.7  8. Other problems:   Diabetes mellitus per your discretion patient was on Jardiance but apparently he got symptomatic with it he will discuss this with Dr. Ellis: I spoke with Dr. Bee who recommended Jardiance I will pass that on to the patient  CAD followed by Dr. Hess through [facility]. Status post MI last October involving to cardiac stents. : Recent emesis of LAD lesion with repeat PTCA drug-eluting stent 2018. Circumflex artery and right coronary artery  50% stenosis. Ejection fraction 55%.   MRI 11/18/2023 [facility] status post cardiac catheterization with PTCA and stent of distal RCA.  Patent stents in the mid LAD continue medical therapy    Sarcoidosis followed by    Kyphoscoliosis   Cirrhosis of the liver   Gout   Ankylosing spondylitis  Elevated PSA being followed by Dr. Stallworth-Dr. Carreon/Dr. Stallworth: Positive cancer will be followed at this juncture        GI health maintenance: Last colonoscopy: 2019, 5 years follow-up s so June 2024    PATIENT INSTRUCTIONS:    Patient Instructions   Visit summary:  - Overall kidney function stable  - I believe blood pressure is stable as well when you get an opportunity please purchase new blood pressure machine at your convenience I will give you a list of the acceptable models        1. Medication changes today:  No medication changes today however please discuss going back on Jardiance with Dr. Bee and Dr. Ellis    2.  General instructions:  Continue to avoid salt exercise as you are able to  If you do go back on Jardiance please contact us we will order some lab work to make sure kidney function remained stable        3.  Please take 1 week a blood pressure readings in 3 months    AS FOLLOWS  MORNING AND EVENING, SITTING AND STANDING as follows:  TAKE THE MORNING READINGS BEFORE ANY MEDICATIONS AND WHEN YOU ARE RELAXED FOR SEVERAL MINUTES  TAKE THE EVENING READINGS:  BETWEEN 7-10 P.M.; PRIOR TO ANY MEDICATIONS; AT LEAST IN OUR  FROM DINNER; AND CERTAINLY AFTER RELAXING FOR A FEW MINUTES  PLEASE INCLUDE HEART RATE WITH YOUR BLOOD PRESSURE READINGS  When taking standing readings, keep your arm supported at heart level and not dangling  Make sure you are sitting with your back supported and feet on the ground and do not cross your legs or feet  Make sure you have not taken any coffee or caffeine products or exercised or smoke cigarettes at least 30 minutes before taking your blood  pressure  Then please mail these readings into the office    4.In 3 months:  Please go for nonfasting lab work but in the morning  Please take 1 week a blood pressure readings as outlined above and mail those into the office      5.  Follow-up in 6 months  Please bring in 1 week a blood pressure readings morning evening, sitting and standing is outlined above  PLEASE BRING AN YOUR BLOOD PRESSURE MACHINE TO CORRELATE WITH THE OFFICE MACHINE AT THIS NEXT SCHEDULED VISIT  Please go for fasting lab work 1-2 weeks prior to your appointment      6.  General non medical recommendations:  AVOID SALT BUT NOT ADDING AN READING LABELS TO MAKE SURE THERE IS LOW-SALT IN THE FOOD THAT YOU ARE EATING  Goal is less than 2 g of sodium intake or less than 5 g of sodium chloride intake per day    Avoid nonsteroidal anti-inflammatory drugs such as Naprosyn, ibuprofen, Aleve, Advil, Celebrex, Meloxicam (Mobic) etc.  You can use Tylenol as needed if you do not have any liver condition to be concerned about    Avoid medications such as Sudafed or decongestants and antihistamines that contained the D component which is the decongestant.  You can take antihistamines without the decongestant or D component.    Try to avoid medications such as pantoprazole or  Protonix/Nexium or Esomeprazole)/Prilosec or omeprazole/Prevacid or lansoprazole/AcipHex or Rabeprazole.  If you are able to, use Pepcid as this is safer for your kidneys.    Try to exercise at least 30 minutes 3 days a week to begin with with an ultimate goal of 5 days a week for at least 30 minutes    Please do not drink more than 2 glasses of alcohol/wine on a daily basis as this may contribute to your high blood pressure.            Subjective:   Admission please see above  The patient overall is feeling well.  No fevers, chills, or cough or colds.  Good appetite and good energy  No hematuria, dysuria, voiding symptoms or foamy urine  No gastrointestinal symptoms  No chest pain,  "chronic dyspnea on exertion unchanged, slight leg swelling  No headaches, dizziness or lightheadedness  Blood pressure medications:  Toprol-XL 50 mg daily in the morning  Edarbi 20 mg daily in the am  Chlorthalidone 12.5 mg daily  Amlodipine 5 mg daily in the morning    Renal pertinent medications:  Jardiance 10 mg daily temporarily stopped in the hospital but knows to restart  Slow-Mag twice a day  Baby aspirin 81 mg daily  Atorvastatin 80 mg daily  Colchicine 0.6 mg daily/allopurinol 300 mg daily  Iron daily  Vitamin D 2000 units daily      ROS:  See HPI, otherwise review of systems as completely reviewed with the patient are negative    Past Medical History:   Diagnosis Date    Anemia     Arthritis     Gee's esophagus     Breast lump     Cancer (HCC)     Chronic kidney disease     Cirrhosis (HCC)     Coronary artery disease     cardiac cath; stents     Diabetes mellitus (HCC)     H/O heart artery stent     Hypertension     Inguinal hernia     Right    Sarcoidosis     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HCC)     Skin cancer     SOB (shortness of breath)     Upper respiratory infection, viral 5/31/2022     Past Surgical History:   Procedure Laterality Date    CARDIAC CATHETERIZATION      CATARACT EXTRACTION, BILATERAL      left eye 05/08 and right 5/22/2022    CERVICAL FUSION      CHOLECYSTECTOMY      COLONOSCOPY  04/11/2016    EGD AND COLONOSCOPY  06/24/2019    ERCP  09/11/2014    transab esop hiat griffin rpr    ESOPHAGOGASTRIC FUNDOPLASTY      HERNIA REPAIR Right     p I/griffin init reduc >5 yr    HIP SURGERY Right     Replacement    KIDNEY STONE SURGERY      Fragmenting    LIVER BIOPSY      MULTIPLE TOOTH EXTRACTIONS      \"extraction of all maxillary teeth\"    AL BX PROSTATE STRTCTC SATURATION SAMPLING IMG GID N/A 8/15/2023    Procedure: TRANSPERINEAL MRI FUSION BIOPSY PROSTATE;  Surgeon: Cory Carreon MD;  Location: [facility];  Service: Urology    SKIN BIOPSY  05/2020    THUMB FUSION      with graft    TONSILLECTOMY      US " "GUIDANCE  10/22/2014     Family History   Problem Relation Age of Onset    Hypertension Mother     Atrial fibrillation Mother     Heart disease Mother     Kidney disease Father     COPD Father     Heart disease Father     Asthma Neg Hx     Lung cancer Neg Hx       reports that he has never smoked. He has never used smokeless tobacco. He reports that he does not drink alcohol and does not use drugs.    I COMPLETELY REVIEWED THE PAST MEDICAL HISTORY/PAST SURGICAL HISTORY/SOCIAL HISTORY/FAMILY HISTORY/AND MEDICATIONS  AND UPDATED ALL    Objective:     Vitals:   BP sitting on left: 138/58 with a heart rate of 60 and regular  BP standing on left: 128/60 with a heart rate of 64 and regular  Vitals:    01/16/24 1234   BP: 132/76   Pulse: 58   SpO2: 99%       Weight (last 2 days)       Date/Time Weight    01/16/24 1234 82.1 (181)          Wt Readings from Last 3 Encounters:   01/16/24 82.1 kg (181 lb)   01/09/24 82.8 kg (182 lb 9.6 oz)   11/22/23 83.9 kg (185 lb)       Body mass index is 25.24 kg/m².    Physical Exam: General:  No acute distress  Skin:  No acute rash  Eyes:  No scleral icterus, noninjected, no discharge from eyes  ENT:  Moist mucous membranes  Neck:  Supple, no jugular venous distention, trachea is midline, no lymphadenopathy and no thyromegaly  Back   No CVAT  Chest:  Clear to auscultation and percussion, good respiratory effort  CVS:  Regular rate and rhythm without a rub, or gallops or murmurs  Abdomen:  Soft and nontender with normal bowel sounds  Extremities:  No cyanosis and no edema, no arthritic changes, normal range of motion  Neuro:  Grossly intact  Psych:  Alert, oriented x3 and appropriate      Medications:    Current Outpatient Medications:     allopurinol (ZYLOPRIM) 300 mg tablet, \"ONE-HALF\" TABLET EVERY DAY, Disp: 45 tablet, Rfl: 3    amLODIPine (NORVASC) 5 mg tablet, TAKE 1 TABLET (5 MG TOTAL) BY MOUTH DAILY., Disp: 90 tablet, Rfl: 3    aspirin (ECOTRIN LOW STRENGTH) 81 mg EC tablet, Take 81 " "mg by mouth daily, Disp: , Rfl:     atorvastatin (LIPITOR) 80 mg tablet, TAKE 1 TABLET (80 MG TOTAL) BY MOUTH DAILY, Disp: 90 tablet, Rfl: 3    b complex vitamins capsule, Take 1 capsule by mouth daily, Disp: , Rfl:     chlorthalidone 25 mg tablet, TAKE \"ONE-HALF\" TABLET (12.5 MG) BY MOUTH DAILY, Disp: 45 tablet, Rfl: 4    Cholecalciferol (VITAMIN D3) 2000 units TABS, Take 1 tablet by mouth daily, Disp: , Rfl:     co-enzyme Q-10 50 MG capsule, Take 100 mg by mouth in the morning., Disp: , Rfl:     colchicine (COLCRYS) 0.6 mg tablet, TAKE 1 TABLET (0.6 MG TOTAL) BY MOUTH DAILY, Disp: 30 tablet, Rfl: 3    dexlansoprazole (DEXILANT) 60 MG capsule, Take 1 capsule (60 mg total) by mouth daily, Disp: 90 capsule, Rfl: 3    Edarbi 40 MG tablet, TAKE 1 TABLET (40 MG TOTAL) BY MOUTH DAILY AFTER DINNER (Patient taking differently: Take 40 mg by mouth daily after dinner Taking half a pill daily), Disp: 30 tablet, Rfl: 3    Empagliflozin (Jardiance) 10 MG TABS tablet, Take 10 mg by mouth every morning, Disp: , Rfl:     famotidine (PEPCID) 40 MG tablet, Take 1 tablet (40 mg total) by mouth daily Take 1 tablet with dinner, Disp: 90 tablet, Rfl: 2    Ferrous Sulfate (IRON) 325 (65 Fe) MG TABS, Take 1 tablet by mouth daily, Disp: , Rfl:     ipratropium-albuterol (DUO-NEB) 0.5-2.5 mg/3 mL nebulizer solution, Take 3 mL by nebulization 4 (four) times a day, Disp: 360 mL, Rfl: 3    magnesium chloride-calcium (SLOW-MAG) 71.5-119 mg, Take 2 tablets by mouth 2 (two) times a day, Disp: , Rfl:     metoprolol succinate (TOPROL-XL) 50 mg 24 hr tablet, 50 mg daily, Disp: , Rfl:     sitaGLIPtin (Januvia) 100 mg tablet, Take 1 tablet (100 mg total) by mouth daily, Disp: 30 tablet, Rfl: 6    sulfaSALAzine (AZULFIDINE) 500 mg tablet, ONE AND \"ONE-HALF\" TABLETS (75OMG) EVERY DAY, Disp: 45 tablet, Rfl: 3    ticagrelor (BRILINTA) 90 MG, Take 90 mg by mouth every 12 (twelve) hours, Disp: , Rfl:     albuterol (PROVENTIL HFA,VENTOLIN HFA) 90 mcg/act " "inhaler, Inhale 2 puffs every 6 (six) hours as needed for wheezing or shortness of breath, Disp: 6.7 g, Rfl: 2    Breo Ellipta 100-25 MCG/ACT inhaler, INHALE 1 PUFF DAILY RINSE MOUTH AFTER USE., Disp: 180 blister, Rfl: 1    Incruse Ellipta 62.5 MCG/ACT AEPB inhaler, INHALE 1 PUFF DAILY, Disp: 3 each, Rfl: 1    metoprolol tartrate (LOPRESSOR) 50 mg tablet, TAKE 1 TABLET (50 MG TOTAL) BY MOUTH IN THE MORNING AND \"ONE-HALF\" TABLET (25MG) IN THE EVENING (Patient not taking: Reported on 1/9/2024), Disp: 135 tablet, Rfl: 1    Lab, Imaging and other studies: I have personally reviewed pertinent labs.  Laboratory Results:  Results for orders placed or performed in visit on 12/28/23   Comprehensive metabolic panel   Result Value Ref Range    Sodium 141 135 - 147 mmol/L    Potassium 4.9 3.5 - 5.3 mmol/L    Chloride 106 96 - 108 mmol/L    CO2 29 21 - 32 mmol/L    ANION GAP 6 mmol/L    BUN 24 5 - 25 mg/dL    Creatinine 1.41 (H) 0.60 - 1.30 mg/dL    Glucose, Fasting 148 (H) 65 - 99 mg/dL    Calcium 10.0 8.4 - 10.2 mg/dL    AST 29 13 - 39 U/L    ALT 26 7 - 52 U/L    Alkaline Phosphatase 92 34 - 104 U/L    Total Protein 6.9 6.4 - 8.4 g/dL    Albumin 4.2 3.5 - 5.0 g/dL    Total Bilirubin 0.69 0.20 - 1.00 mg/dL    eGFR 48 ml/min/1.73sq m   CBC   Result Value Ref Range    WBC 9.74 4.31 - 10.16 Thousand/uL    RBC 3.89 3.88 - 5.62 Million/uL    Hemoglobin 12.7 12.0 - 17.0 g/dL    Hematocrit 38.5 36.5 - 49.3 %    MCV 99 (H) 82 - 98 fL    MCH 32.6 26.8 - 34.3 pg    MCHC 33.0 31.4 - 37.4 g/dL    RDW 14.0 11.6 - 15.1 %    Platelets 188 149 - 390 Thousands/uL    MPV 9.2 8.9 - 12.7 fL   Lipid Panel with Direct LDL reflex   Result Value Ref Range    Cholesterol 115 See Comment mg/dL    Triglycerides 150 (H) See Comment mg/dL    HDL, Direct 43 >=40 mg/dL    LDL Calculated 42 0 - 100 mg/dL   Magnesium   Result Value Ref Range    Magnesium 1.8 (L) 1.9 - 2.7 mg/dL   Phosphorus   Result Value Ref Range    Phosphorus 3.3 2.3 - 4.1 mg/dL   Protein / " "creatinine ratio, urine   Result Value Ref Range    Creatinine, Ur 105.0 Reference range not established. mg/dL    Protein Urine Random 37 Reference range not established. mg/dL    Prot/Creat Ratio, Ur 0.35 (H) 0.00 - 0.10   PTH, intact   Result Value Ref Range    PTH 43.0 12.0 - 88.0 pg/mL   Vitamin D Panel   Result Value Ref Range    25-HYDROXY VIT D 33 ng/mL    25-Hydroxy D2 <1.0 ng/mL    25-HYDROXY VIT D3 33 ng/mL   Albumin / creatinine urine ratio   Result Value Ref Range    Creatinine, Ur 105.0 Reference range not established. mg/dL    Albumin,U,Random 213.1 (H) <20.0 mg/L    Albumin Creat Ratio 203 (H) 0 - 30 mg/g creatinine   Hemoglobin A1C   Result Value Ref Range    Hemoglobin A1C 7.1 (H) Normal 4.0-5.6%; PreDiabetic 5.7-6.4%; Diabetic >=6.5%; Glycemic control for adults with diabetes <7.0% %     mg/dl             Invalid input(s): \"ALBUMIN\"      Radiology review:   chest X-ray    Ultrasound      Portions of the record may have been created with voice recognition software.  Occasional wrong word or \"sound a like\" substitutions may have occurred due to the inherent limitations of voice recognition software.  Read the chart carefully and recognize, using context, where substitutions have occurred.                    "

## 2024-01-09 ENCOUNTER — OFFICE VISIT (OUTPATIENT)
Dept: PULMONOLOGY | Facility: CLINIC | Age: 79
End: 2024-01-09
Payer: MEDICARE

## 2024-01-09 VITALS
DIASTOLIC BLOOD PRESSURE: 60 MMHG | SYSTOLIC BLOOD PRESSURE: 134 MMHG | TEMPERATURE: 97.2 F | HEART RATE: 55 BPM | HEIGHT: 71 IN | BODY MASS INDEX: 25.56 KG/M2 | WEIGHT: 182.6 LBS | OXYGEN SATURATION: 94 %

## 2024-01-09 DIAGNOSIS — J84.10 PULMONARY FIBROSIS (HCC): ICD-10-CM

## 2024-01-09 DIAGNOSIS — J43.9 PULMONARY EMPHYSEMA, UNSPECIFIED EMPHYSEMA TYPE (HCC): Primary | ICD-10-CM

## 2024-01-09 DIAGNOSIS — D86.9 SARCOIDOSIS: ICD-10-CM

## 2024-01-09 PROCEDURE — 99214 OFFICE O/P EST MOD 30 MIN: CPT | Performed by: INTERNAL MEDICINE

## 2024-01-09 NOTE — PROGRESS NOTES
Assessment/Plan:    COPD (chronic obstructive pulmonary disease) ([facility])  Mr. Bean Lopez was diagnosed with COPD many years back and is currently on treatment with Breo 100, Incruse and Ventolin.  His usual exercise tolerance is more than 2 blocks and he has occasional cough and no wheezing.. He is a never smoker but admits to secondhand smoke exposure. 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 His previous PFT from May 2015 showed severe airflow obstruction with diffusion defect. He also had evidence of air trapping. There was improvement in FEV1 with bronchodilator indicating a component of asthma. His previous CT scan from February 2019 showed scarring in the left lung. On clinical examination, his chest auscultation revealed bilateral inspiratory coarse crackles at lung bases.  His high-resolution CT scan of the chest showed bilateral pulmonary fibrosis. I have advised him to continue breo and incruse.  He had coronary artery disease and heart attack recently and has coronary stent.  His shortness of breath is much improved now.  I had a long discussion with him and answered all his questions.    Pulmonary fibrosis ([facility])  His recent high-resolution CT scan of the chest showed mild diffuse bilateral reticulation and mild scattered linear scar.  Mild traction bronchiolectasis in the lateral basal left lower lobe.  Chronic opacity in the paraspinal right lower lobe with volume loss as evidenced by crowding of the bronchi and mild traction bronchiolectasis due to chronic scar.  The etiology is not clear at this point.  His previous PFT showed severe airflow obstruction with diffusion defect.   his repeat PFT showed moderate airflow obstruction with severe diffusion defect.   Chest auscultation revealed bilateral coarse inspiratory crackles.  He currently does not need any oxygen.  The etiology of this pulmonary fibrosis is not clear at this point.  He has emphysema.  He has previous history of  sarcoidosis.  He has history of beryllium exposure.  he also had COVID.  His CT scan does not show a UIP pattern.  His pulmonary function has not deteriorated.  We will continue to monitor him closely.  At this point he would not benefit from antifibrotic therapy.  I had a long discussion with him and answered all his questions     Sarcoidosis  He was diagnosed with sarcoidosis many years back after a lung biopsy and was given steroid therapy for some time. His previous PFT from May 2015 showed severe airflow obstruction with diffusion defect. He has history of working with beryllium.  But the sarcoidosis happened before his exposure to beryllium.           Diagnoses and all orders for this visit:    Pulmonary emphysema, unspecified emphysema type (HCC)    Pulmonary fibrosis (HCC)    Sarcoidosis          Subjective:      Patient ID: Bean Lopez is a 78 y.o. male.    Mr. Bean Lopez came for follow-up for his COPD and pulmonary fibrosis.  He was previously admitted to hospital with acute MI and had cardiac stent placement.  He follows with Dr. Garibay.  Currently his shortness of breath is better.  He has some left leg edema.  He has occasional cough and no significant wheezing.  No chest pain no palpitations.  He has been on treatment with Breo regularly and albuterol as needed.  He has no hoarseness of voice or dysphagia.  He gargles throat after using Breo.  He also had COVID before.  He has pulmonary fibrosis.  He has previous history of sarcoidosis.  He has no fever or chills.  He has chronic congestive heart failure and has been on diuretic therapy.  Currently his shortness of breath is much improved.  He can walk more than 2 blocks.        The following portions of the patient's history were reviewed and updated as appropriate: allergies, current medications, past family history, past medical history, past social history, past surgical history, and problem list.    Review of Systems   Constitutional:   "Negative for appetite change, chills, fatigue and fever.   HENT:  Negative for hearing loss, rhinorrhea, sneezing, sore throat and trouble swallowing.    Eyes:  Positive for visual disturbance.   Respiratory:  Positive for shortness of breath. Negative for cough, chest tightness, wheezing and stridor.    Cardiovascular:  Positive for leg swelling. Negative for chest pain and palpitations.   Gastrointestinal:  Negative for abdominal pain, constipation, diarrhea, nausea and vomiting.   Genitourinary:  Negative for dysuria, frequency and urgency.   Musculoskeletal:  Positive for back pain. Negative for arthralgias.   Skin:  Negative for rash.   Allergic/Immunologic: Negative for environmental allergies.   Neurological:  Negative for dizziness, syncope, light-headedness and headaches.   Psychiatric/Behavioral:  Negative for agitation, confusion and sleep disturbance. The patient is not nervous/anxious.          Objective:      /60 (BP Location: Left arm, Patient Position: Sitting, Cuff Size: Standard)   Pulse 55   Temp (!) 97.2 °F (36.2 °C) (Tympanic)   Ht 5' 11\" (1.803 m)   Wt 82.8 kg (182 lb 9.6 oz)   SpO2 94%   BMI 25.47 kg/m²          Physical Exam  Vitals reviewed.   Constitutional:       General: He is not in acute distress.     Appearance: He is not ill-appearing, toxic-appearing or diaphoretic.   HENT:      Head: Normocephalic.      Mouth/Throat:      Mouth: Mucous membranes are moist.      Pharynx: Oropharynx is clear.   Eyes:      General: No scleral icterus.     Conjunctiva/sclera: Conjunctivae normal.   Cardiovascular:      Rate and Rhythm: Normal rate and regular rhythm.      Heart sounds: Normal heart sounds. No murmur heard.  Pulmonary:      Effort: No respiratory distress.      Breath sounds: No stridor. Rales (Bilateral basal caorse crackles) present. No wheezing or rhonchi.   Chest:      Chest wall: No tenderness.   Abdominal:      General: Bowel sounds are normal.      Palpations: Abdomen " is soft.      Tenderness: There is no abdominal tenderness. There is no guarding.   Musculoskeletal:      Cervical back: Neck supple. No rigidity.      Right lower leg: Edema present.      Left lower leg: Edema present.   Lymphadenopathy:      Cervical: No cervical adenopathy.   Skin:     Coloration: Skin is not jaundiced or pale.      Findings: No rash.   Neurological:      Mental Status: He is alert and oriented to person, place, and time.      Gait: Gait normal.   Psychiatric:         Mood and Affect: Mood normal.         Behavior: Behavior normal.         Thought Content: Thought content normal.         Judgment: Judgment normal.      I spent 30 minutes of time take care of this patient with complex medical issues.  The majority of this time was spent directly with the patient counseling as well as coordinating care.

## 2024-01-09 NOTE — ASSESSMENT & PLAN NOTE
Mr. Bean Lopez was diagnosed with COPD many years back and is currently on treatment with Breo 100, Incruse and Ventolin.  His usual exercise tolerance is more than 2 blocks and he has occasional cough and no wheezing.. He is a never smoker but admits to secondhand smoke exposure. 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 His previous PFT from May 2015 showed severe airflow obstruction with diffusion defect. He also had evidence of air trapping. There was improvement in FEV1 with bronchodilator indicating a component of asthma. His previous CT scan from February 2019 showed scarring in the left lung. On clinical examination, his chest auscultation revealed bilateral inspiratory coarse crackles at lung bases.  His high-resolution CT scan of the chest showed bilateral pulmonary fibrosis. I have advised him to continue breo and incruse.  He had coronary artery disease and heart attack recently and has coronary stent.  His shortness of breath is much improved now.  I had a long discussion with him and answered all his questions.

## 2024-01-16 ENCOUNTER — OFFICE VISIT (OUTPATIENT)
Dept: NEPHROLOGY | Facility: CLINIC | Age: 79
End: 2024-01-16
Payer: MEDICARE

## 2024-01-16 VITALS
DIASTOLIC BLOOD PRESSURE: 76 MMHG | OXYGEN SATURATION: 99 % | SYSTOLIC BLOOD PRESSURE: 132 MMHG | WEIGHT: 181 LBS | HEART RATE: 58 BPM | HEIGHT: 71 IN | BODY MASS INDEX: 25.34 KG/M2

## 2024-01-16 DIAGNOSIS — E11.21 DIABETIC NEPHROPATHY ASSOCIATED WITH TYPE 2 DIABETES MELLITUS (HCC): ICD-10-CM

## 2024-01-16 DIAGNOSIS — E78.5 DYSLIPIDEMIA: ICD-10-CM

## 2024-01-16 DIAGNOSIS — E87.5 HYPERKALEMIA: ICD-10-CM

## 2024-01-16 DIAGNOSIS — Z12.11 COLON CANCER SCREENING: ICD-10-CM

## 2024-01-16 DIAGNOSIS — N18.32 STAGE 3B CHRONIC KIDNEY DISEASE (HCC): ICD-10-CM

## 2024-01-16 DIAGNOSIS — R80.1 PERSISTENT PROTEINURIA: ICD-10-CM

## 2024-01-16 DIAGNOSIS — I12.9 HYPERTENSIVE CHRONIC KIDNEY DISEASE WITH STAGE 1 THROUGH STAGE 4 CHRONIC KIDNEY DISEASE, OR UNSPECIFIED CHRONIC KIDNEY DISEASE: Primary | ICD-10-CM

## 2024-01-16 PROCEDURE — 99214 OFFICE O/P EST MOD 30 MIN: CPT | Performed by: INTERNAL MEDICINE

## 2024-01-16 NOTE — PATIENT INSTRUCTIONS
Visit summary:  - Overall kidney function stable  - I believe blood pressure is stable as well when you get an opportunity please purchase new blood pressure machine at your convenience I will give you a list of the acceptable models        1. Medication changes today:  No medication changes today however please discuss going back on Jardiance with Dr. Bee and Dr. Ellis    2.  General instructions:  Continue to avoid salt exercise as you are able to  If you do go back on Jardiance please contact us we will order some lab work to make sure kidney function remained stable        3.  Please take 1 week a blood pressure readings in 3 months    AS FOLLOWS  MORNING AND EVENING, SITTING AND STANDING as follows:  TAKE THE MORNING READINGS BEFORE ANY MEDICATIONS AND WHEN YOU ARE RELAXED FOR SEVERAL MINUTES  TAKE THE EVENING READINGS:  BETWEEN 7-10 P.M.; PRIOR TO ANY MEDICATIONS; AT LEAST IN OUR  FROM DINNER; AND CERTAINLY AFTER RELAXING FOR A FEW MINUTES  PLEASE INCLUDE HEART RATE WITH YOUR BLOOD PRESSURE READINGS  When taking standing readings, keep your arm supported at heart level and not dangling  Make sure you are sitting with your back supported and feet on the ground and do not cross your legs or feet  Make sure you have not taken any coffee or caffeine products or exercised or smoke cigarettes at least 30 minutes before taking your blood pressure  Then please mail these readings into the office    4.In 3 months:  Please go for nonfasting lab work but in the morning  Please take 1 week a blood pressure readings as outlined above and mail those into the office      5.  Follow-up in 6 months  Please bring in 1 week a blood pressure readings morning evening, sitting and standing is outlined above  PLEASE BRING AN YOUR BLOOD PRESSURE MACHINE TO CORRELATE WITH THE OFFICE MACHINE AT THIS NEXT SCHEDULED VISIT  Please go for fasting lab work 1-2 weeks prior to your appointment      6.  General non medical  recommendations:  AVOID SALT BUT NOT ADDING AN READING LABELS TO MAKE SURE THERE IS LOW-SALT IN THE FOOD THAT YOU ARE EATING  Goal is less than 2 g of sodium intake or less than 5 g of sodium chloride intake per day    Avoid nonsteroidal anti-inflammatory drugs such as Naprosyn, ibuprofen, Aleve, Advil, Celebrex, Meloxicam (Mobic) etc.  You can use Tylenol as needed if you do not have any liver condition to be concerned about    Avoid medications such as Sudafed or decongestants and antihistamines that contained the D component which is the decongestant.  You can take antihistamines without the decongestant or D component.    Try to avoid medications such as pantoprazole or  Protonix/Nexium or Esomeprazole)/Prilosec or omeprazole/Prevacid or lansoprazole/AcipHex or Rabeprazole.  If you are able to, use Pepcid as this is safer for your kidneys.    Try to exercise at least 30 minutes 3 days a week to begin with with an ultimate goal of 5 days a week for at least 30 minutes    Please do not drink more than 2 glasses of alcohol/wine on a daily basis as this may contribute to your high blood pressure.

## 2024-01-16 NOTE — LETTER
January 16, 2024     MARGE Ellis DO  [address]  [address]  [place] [address]    Patient: Bean Lopez   YOB: 1945   Date of Visit: 1/16/2024       Dear Dr. Ellis:    Thank you for referring Bean Lopez to me for evaluation. Below are my notes for this consultation.    If you have questions, please do not hesitate to call me. I look forward to following your patient along with you.         Sincerely,        Misael Yun MD        CC: MD Misael Beard MD  1/16/2024  1:11 PM  Sign when Signing Visit  RENAL FOLLOW UP NOTE:.td    ASSESSMENT AND PLAN:  1. CKD stage 3.   Etiology: Diabetic nephropathy/hypertensive nephrosclerosis/arteriolar nephrosclerosis/episodes of SHAY in the past. All serologies were negative including multiple myeloma evaluation.  Baseline creatinine: 1.2-1.62  Current creatinine: 1.41 at baseline   Urine protein creatinine ratio: 0.35 g at goal  Recommendations:   Treat hypertension-please see below   Treat dyslipidemia-please see below   Maintain proteinuria less than 1 g or as low as possible   Avoid nephrotoxic agents such as NSAIDs, patient counseled as such  2. Volume: Euvolemic   3. Hypertension:      Current blood pressure averages:   None today     Goal blood pressure: Less than 125/80 given CAD  Recommendations:   Push nonmedical regimen including weight loss, isotonic exercise and avoidance of salt; patient counseled as such   Medication changes today:   Patient appears to be at goal he will eventually purchase a new machine and send in readings  4. Electrolytes: All acceptable   5. Mineral bone disorder:   Calcium/magnesium/phosphorus: All acceptable , magnesium 1.8 on supplement   PTH intact: 43.0 which is normal  Vitamin-D:Currently on supplements.  33 at goal from 12/28/2023  6. Dyslipidemia:   Goal LDL: Less than 70 given CAD   Current lipid profile: LDL 42/HDL 43/triglycerides 150 from 12/28/2023  Recommendations: At goal so no  changes   7. Anemia: normal hemoglobin at 12.7  8. Other problems:   Diabetes mellitus per your discretion patient was on Jardiance but apparently he got symptomatic with it he will discuss this with Dr. Ellis  CAD followed by Dr. Hess through [facility]. Status post MI last October involving to cardiac stents. : Recent emesis of LAD lesion with repeat PTCA drug-eluting stent 2018. Circumflex artery and right coronary artery 50% stenosis. Ejection fraction 55%.   MRI 11/18/2023 [facility] status post cardiac catheterization with PTCA and stent of distal RCA.  Patent stents in the mid LAD continue medical therapy    Sarcoidosis followed by    Kyphoscoliosis   Cirrhosis of the liver   Gout   Ankylosing spondylitis  Elevated PSA being followed by Dr. Stallworth-Dr. Carreon/Dr. Stallworth: Positive cancer will be followed at this juncture        GI health maintenance: Last colonoscopy: 2019, 5 years follow-up s so June 2024    PATIENT INSTRUCTIONS:    Patient Instructions   Visit summary:  - Overall kidney function stable  - I believe blood pressure is stable as well when you get an opportunity please purchase new blood pressure machine at your convenience I will give you a list of the acceptable models        1. Medication changes today:  No medication changes today however please discuss going back on Jardiance with Dr. Bee and Dr. Ellis    2.  General instructions:  Continue to avoid salt exercise as you are able to  If you do go back on Jardiance please contact us we will order some lab work to make sure kidney function remained stable        3.  Please take 1 week a blood pressure readings in 3 months    AS FOLLOWS  MORNING AND EVENING, SITTING AND STANDING as follows:  TAKE THE MORNING READINGS BEFORE ANY MEDICATIONS AND WHEN YOU ARE RELAXED FOR SEVERAL MINUTES  TAKE THE EVENING READINGS:  BETWEEN 7-10 P.M.; PRIOR TO ANY MEDICATIONS; AT LEAST IN OUR  FROM DINNER; AND  CERTAINLY AFTER RELAXING FOR A FEW MINUTES  PLEASE INCLUDE HEART RATE WITH YOUR BLOOD PRESSURE READINGS  When taking standing readings, keep your arm supported at heart level and not dangling  Make sure you are sitting with your back supported and feet on the ground and do not cross your legs or feet  Make sure you have not taken any coffee or caffeine products or exercised or smoke cigarettes at least 30 minutes before taking your blood pressure  Then please mail these readings into the office    4.In 3 months:  Please go for nonfasting lab work but in the morning  Please take 1 week a blood pressure readings as outlined above and mail those into the office      5.  Follow-up in 6 months  Please bring in 1 week a blood pressure readings morning evening, sitting and standing is outlined above  PLEASE BRING AN YOUR BLOOD PRESSURE MACHINE TO CORRELATE WITH THE OFFICE MACHINE AT THIS NEXT SCHEDULED VISIT  Please go for fasting lab work 1-2 weeks prior to your appointment      6.  General non medical recommendations:  AVOID SALT BUT NOT ADDING AN READING LABELS TO MAKE SURE THERE IS LOW-SALT IN THE FOOD THAT YOU ARE EATING  Goal is less than 2 g of sodium intake or less than 5 g of sodium chloride intake per day    Avoid nonsteroidal anti-inflammatory drugs such as Naprosyn, ibuprofen, Aleve, Advil, Celebrex, Meloxicam (Mobic) etc.  You can use Tylenol as needed if you do not have any liver condition to be concerned about    Avoid medications such as Sudafed or decongestants and antihistamines that contained the D component which is the decongestant.  You can take antihistamines without the decongestant or D component.    Try to avoid medications such as pantoprazole or  Protonix/Nexium or Esomeprazole)/Prilosec or omeprazole/Prevacid or lansoprazole/AcipHex or Rabeprazole.  If you are able to, use Pepcid as this is safer for your kidneys.    Try to exercise at least 30 minutes 3 days a week to begin with with an  ultimate goal of 5 days a week for at least 30 minutes    Please do not drink more than 2 glasses of alcohol/wine on a daily basis as this may contribute to your high blood pressure.            Subjective:   Admission please see above  The patient overall is feeling well.  No fevers, chills, or cough or colds.  Good appetite and good energy  No hematuria, dysuria, voiding symptoms or foamy urine  No gastrointestinal symptoms  No chest pain, chronic dyspnea on exertion unchanged, slight leg swelling  No headaches, dizziness or lightheadedness  Blood pressure medications:  Toprol-XL 50 mg daily in the morning  Edarbi 20 mg daily in the am  Chlorthalidone 12.5 mg daily  Amlodipine 5 mg daily in the morning    Renal pertinent medications:  Jardiance 10 mg daily temporarily stopped in the hospital but knows to restart  Slow-Mag twice a day  Baby aspirin 81 mg daily  Atorvastatin 80 mg daily  Colchicine 0.6 mg daily/allopurinol 300 mg daily  Iron daily  Vitamin D 2000 units daily      ROS:  See HPI, otherwise review of systems as completely reviewed with the patient are negative    Past Medical History:   Diagnosis Date   • Anemia    • Arthritis    • Gee's esophagus    • Breast lump    • Cancer (HCC)    • Chronic kidney disease    • Cirrhosis (HCC)    • Coronary artery disease     cardiac cath; stents    • Diabetes mellitus (HCC)    • H/O heart artery stent    • Hypertension    • Inguinal hernia     Right   • Sarcoidosis    • 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HCC)    • Skin cancer    • SOB (shortness of breath)    • Upper respiratory infection, viral 5/31/2022     Past Surgical History:   Procedure Laterality Date   • CARDIAC CATHETERIZATION     • CATARACT EXTRACTION, BILATERAL      left eye 05/08 and right 5/22/2022   • CERVICAL FUSION     • CHOLECYSTECTOMY     • COLONOSCOPY  04/11/2016   • EGD AND COLONOSCOPY  06/24/2019   • ERCP  09/11/2014    transab esop hiat griffin rpr   • ESOPHAGOGASTRIC FUNDOPLASTY     • HERNIA REPAIR Right   "   p I/griffin init reduc >5 yr   • HIP SURGERY Right     Replacement   • KIDNEY STONE SURGERY      Fragmenting   • LIVER BIOPSY     • MULTIPLE TOOTH EXTRACTIONS      \"extraction of all maxillary teeth\"   • MS BX PROSTATE STRTCTC SATURATION SAMPLING IMG GID N/A 8/15/2023    Procedure: TRANSPERINEAL MRI FUSION BIOPSY PROSTATE;  Surgeon: Cory Carreon MD;  Location: [facility];  Service: Urology   • SKIN BIOPSY  05/2020   • THUMB FUSION      with graft   • TONSILLECTOMY     • US GUIDANCE  10/22/2014     Family History   Problem Relation Age of Onset   • Hypertension Mother    • Atrial fibrillation Mother    • Heart disease Mother    • Kidney disease Father    • COPD Father    • Heart disease Father    • Asthma Neg Hx    • Lung cancer Neg Hx       reports that he has never smoked. He has never used smokeless tobacco. He reports that he does not drink alcohol and does not use drugs.    I COMPLETELY REVIEWED THE PAST MEDICAL HISTORY/PAST SURGICAL HISTORY/SOCIAL HISTORY/FAMILY HISTORY/AND MEDICATIONS  AND UPDATED ALL    Objective:     Vitals:   BP sitting on left: 138/58 with a heart rate of 60 and regular  BP standing on left: 128/60 with a heart rate of 64 and regular  Vitals:    01/16/24 1234   BP: 132/76   Pulse: 58   SpO2: 99%       Weight (last 2 days)       Date/Time Weight    01/16/24 1234 82.1 (181)          Wt Readings from Last 3 Encounters:   01/16/24 82.1 kg (181 lb)   01/09/24 82.8 kg (182 lb 9.6 oz)   11/22/23 83.9 kg (185 lb)       Body mass index is 25.24 kg/m².    Physical Exam: General:  No acute distress  Skin:  No acute rash  Eyes:  No scleral icterus, noninjected, no discharge from eyes  ENT:  Moist mucous membranes  Neck:  Supple, no jugular venous distention, trachea is midline, no lymphadenopathy and no thyromegaly  Back   No CVAT  Chest:  Clear to auscultation and percussion, good respiratory effort  CVS:  Regular rate and rhythm without a rub, or gallops or murmurs  Abdomen:  Soft and nontender with " "normal bowel sounds  Extremities:  No cyanosis and no edema, no arthritic changes, normal range of motion  Neuro:  Grossly intact  Psych:  Alert, oriented x3 and appropriate      Medications:    Current Outpatient Medications:   •  allopurinol (ZYLOPRIM) 300 mg tablet, \"ONE-HALF\" TABLET EVERY DAY, Disp: 45 tablet, Rfl: 3  •  amLODIPine (NORVASC) 5 mg tablet, TAKE 1 TABLET (5 MG TOTAL) BY MOUTH DAILY., Disp: 90 tablet, Rfl: 3  •  aspirin (ECOTRIN LOW STRENGTH) 81 mg EC tablet, Take 81 mg by mouth daily, Disp: , Rfl:   •  atorvastatin (LIPITOR) 80 mg tablet, TAKE 1 TABLET (80 MG TOTAL) BY MOUTH DAILY, Disp: 90 tablet, Rfl: 3  •  b complex vitamins capsule, Take 1 capsule by mouth daily, Disp: , Rfl:   •  chlorthalidone 25 mg tablet, TAKE \"ONE-HALF\" TABLET (12.5 MG) BY MOUTH DAILY, Disp: 45 tablet, Rfl: 4  •  Cholecalciferol (VITAMIN D3) 2000 units TABS, Take 1 tablet by mouth daily, Disp: , Rfl:   •  co-enzyme Q-10 50 MG capsule, Take 100 mg by mouth in the morning., Disp: , Rfl:   •  colchicine (COLCRYS) 0.6 mg tablet, TAKE 1 TABLET (0.6 MG TOTAL) BY MOUTH DAILY, Disp: 30 tablet, Rfl: 3  •  dexlansoprazole (DEXILANT) 60 MG capsule, Take 1 capsule (60 mg total) by mouth daily, Disp: 90 capsule, Rfl: 3  •  Edarbi 40 MG tablet, TAKE 1 TABLET (40 MG TOTAL) BY MOUTH DAILY AFTER DINNER (Patient taking differently: Take 40 mg by mouth daily after dinner Taking half a pill daily), Disp: 30 tablet, Rfl: 3  •  Empagliflozin (Jardiance) 10 MG TABS tablet, Take 10 mg by mouth every morning, Disp: , Rfl:   •  famotidine (PEPCID) 40 MG tablet, Take 1 tablet (40 mg total) by mouth daily Take 1 tablet with dinner, Disp: 90 tablet, Rfl: 2  •  Ferrous Sulfate (IRON) 325 (65 Fe) MG TABS, Take 1 tablet by mouth daily, Disp: , Rfl:   •  ipratropium-albuterol (DUO-NEB) 0.5-2.5 mg/3 mL nebulizer solution, Take 3 mL by nebulization 4 (four) times a day, Disp: 360 mL, Rfl: 3  •  magnesium chloride-calcium (SLOW-MAG) 71.5-119 mg, Take 2 " "tablets by mouth 2 (two) times a day, Disp: , Rfl:   •  metoprolol succinate (TOPROL-XL) 50 mg 24 hr tablet, 50 mg daily, Disp: , Rfl:   •  sitaGLIPtin (Januvia) 100 mg tablet, Take 1 tablet (100 mg total) by mouth daily, Disp: 30 tablet, Rfl: 6  •  sulfaSALAzine (AZULFIDINE) 500 mg tablet, ONE AND \"ONE-HALF\" TABLETS (75OMG) EVERY DAY, Disp: 45 tablet, Rfl: 3  •  ticagrelor (BRILINTA) 90 MG, Take 90 mg by mouth every 12 (twelve) hours, Disp: , Rfl:   •  albuterol (PROVENTIL HFA,VENTOLIN HFA) 90 mcg/act inhaler, Inhale 2 puffs every 6 (six) hours as needed for wheezing or shortness of breath, Disp: 6.7 g, Rfl: 2  •  Breo Ellipta 100-25 MCG/ACT inhaler, INHALE 1 PUFF DAILY RINSE MOUTH AFTER USE., Disp: 180 blister, Rfl: 1  •  Incruse Ellipta 62.5 MCG/ACT AEPB inhaler, INHALE 1 PUFF DAILY, Disp: 3 each, Rfl: 1  •  metoprolol tartrate (LOPRESSOR) 50 mg tablet, TAKE 1 TABLET (50 MG TOTAL) BY MOUTH IN THE MORNING AND \"ONE-HALF\" TABLET (25MG) IN THE EVENING (Patient not taking: Reported on 1/9/2024), Disp: 135 tablet, Rfl: 1    Lab, Imaging and other studies: I have personally reviewed pertinent labs.  Laboratory Results:  Results for orders placed or performed in visit on 12/28/23   Comprehensive metabolic panel   Result Value Ref Range    Sodium 141 135 - 147 mmol/L    Potassium 4.9 3.5 - 5.3 mmol/L    Chloride 106 96 - 108 mmol/L    CO2 29 21 - 32 mmol/L    ANION GAP 6 mmol/L    BUN 24 5 - 25 mg/dL    Creatinine 1.41 (H) 0.60 - 1.30 mg/dL    Glucose, Fasting 148 (H) 65 - 99 mg/dL    Calcium 10.0 8.4 - 10.2 mg/dL    AST 29 13 - 39 U/L    ALT 26 7 - 52 U/L    Alkaline Phosphatase 92 34 - 104 U/L    Total Protein 6.9 6.4 - 8.4 g/dL    Albumin 4.2 3.5 - 5.0 g/dL    Total Bilirubin 0.69 0.20 - 1.00 mg/dL    eGFR 48 ml/min/1.73sq m   CBC   Result Value Ref Range    WBC 9.74 4.31 - 10.16 Thousand/uL    RBC 3.89 3.88 - 5.62 Million/uL    Hemoglobin 12.7 12.0 - 17.0 g/dL    Hematocrit 38.5 36.5 - 49.3 %    MCV 99 (H) 82 - 98 fL " "   MCH 32.6 26.8 - 34.3 pg    MCHC 33.0 31.4 - 37.4 g/dL    RDW 14.0 11.6 - 15.1 %    Platelets 188 149 - 390 Thousands/uL    MPV 9.2 8.9 - 12.7 fL   Lipid Panel with Direct LDL reflex   Result Value Ref Range    Cholesterol 115 See Comment mg/dL    Triglycerides 150 (H) See Comment mg/dL    HDL, Direct 43 >=40 mg/dL    LDL Calculated 42 0 - 100 mg/dL   Magnesium   Result Value Ref Range    Magnesium 1.8 (L) 1.9 - 2.7 mg/dL   Phosphorus   Result Value Ref Range    Phosphorus 3.3 2.3 - 4.1 mg/dL   Protein / creatinine ratio, urine   Result Value Ref Range    Creatinine, Ur 105.0 Reference range not established. mg/dL    Protein Urine Random 37 Reference range not established. mg/dL    Prot/Creat Ratio, Ur 0.35 (H) 0.00 - 0.10   PTH, intact   Result Value Ref Range    PTH 43.0 12.0 - 88.0 pg/mL   Vitamin D Panel   Result Value Ref Range    25-HYDROXY VIT D 33 ng/mL    25-Hydroxy D2 <1.0 ng/mL    25-HYDROXY VIT D3 33 ng/mL   Albumin / creatinine urine ratio   Result Value Ref Range    Creatinine, Ur 105.0 Reference range not established. mg/dL    Albumin,U,Random 213.1 (H) <20.0 mg/L    Albumin Creat Ratio 203 (H) 0 - 30 mg/g creatinine   Hemoglobin A1C   Result Value Ref Range    Hemoglobin A1C 7.1 (H) Normal 4.0-5.6%; PreDiabetic 5.7-6.4%; Diabetic >=6.5%; Glycemic control for adults with diabetes <7.0% %     mg/dl             Invalid input(s): \"ALBUMIN\"      Radiology review:   chest X-ray    Ultrasound      Portions of the record may have been created with voice recognition software.  Occasional wrong word or \"sound a like\" substitutions may have occurred due to the inherent limitations of voice recognition software.  Read the chart carefully and recognize, using context, where substitutions have occurred.                    "

## 2024-01-17 ENCOUNTER — OFFICE VISIT (OUTPATIENT)
Dept: FAMILY MEDICINE CLINIC | Facility: CLINIC | Age: 79
End: 2024-01-17
Payer: MEDICARE

## 2024-01-17 VITALS
SYSTOLIC BLOOD PRESSURE: 156 MMHG | OXYGEN SATURATION: 99 % | HEART RATE: 77 BPM | TEMPERATURE: 98.2 F | BODY MASS INDEX: 25.76 KG/M2 | WEIGHT: 184 LBS | DIASTOLIC BLOOD PRESSURE: 74 MMHG | HEIGHT: 71 IN

## 2024-01-17 DIAGNOSIS — Z23 NEED FOR VACCINATION: ICD-10-CM

## 2024-01-17 DIAGNOSIS — I12.9 PARENCHYMAL RENAL HYPERTENSION, STAGE 1 THROUGH STAGE 4 OR UNSPECIFIED CHRONIC KIDNEY DISEASE: ICD-10-CM

## 2024-01-17 DIAGNOSIS — R69 MULTIPLE MEDICAL PROBLEMS: ICD-10-CM

## 2024-01-17 DIAGNOSIS — Z09 HOSPITAL DISCHARGE FOLLOW-UP: ICD-10-CM

## 2024-01-17 DIAGNOSIS — I25.118 CORONARY ARTERY DISEASE OF NATIVE HEART WITH STABLE ANGINA PECTORIS, UNSPECIFIED VESSEL OR LESION TYPE (HCC): ICD-10-CM

## 2024-01-17 DIAGNOSIS — Z71.2 ENCOUNTER TO DISCUSS TEST RESULTS: ICD-10-CM

## 2024-01-17 DIAGNOSIS — J44.9 CHRONIC OBSTRUCTIVE PULMONARY DISEASE, UNSPECIFIED COPD TYPE (HCC): ICD-10-CM

## 2024-01-17 DIAGNOSIS — E11.9 TYPE 2 DIABETES MELLITUS WITHOUT COMPLICATION, WITHOUT LONG-TERM CURRENT USE OF INSULIN (HCC): Primary | ICD-10-CM

## 2024-01-17 DIAGNOSIS — Z79.899 ENCOUNTER FOR LONG-TERM (CURRENT) USE OF MEDICATIONS: ICD-10-CM

## 2024-01-17 PROCEDURE — 99214 OFFICE O/P EST MOD 30 MIN: CPT | Performed by: FAMILY MEDICINE

## 2024-01-17 NOTE — ASSESSMENT & PLAN NOTE
On 12/28/2023, GFR 48 ml/min/1.73sq m with creatinine of 1.41 mg/dL, and BUN of 24 mg/dL with blood glucose level 148 mg/dL.

## 2024-01-17 NOTE — ASSESSMENT & PLAN NOTE
I reviewed pulmonary consultation with Dr. Mary who evaluated the patient on 01/09/2024 and his succinct interpretation of the situation at hand regarding COPD, pulmonary fibrosis, and sarcoid is as follows. He will find that in the 01/09/2025 office visit of Dr. Mary, pulmonologist.

## 2024-01-17 NOTE — PROGRESS NOTES
Assessment/Plan:          1. Type 2 diabetes mellitus without complication, without long-term current use of insulin ([facility])  Assessment & Plan:  Blood glucose levels are in the 120 mg/dL to 150 mg/dL range depending on what he eats. He is only taking Januvia 100 mg once daily, which is great. I will start him on empagliflozin at the suggestion of nephrologist and cardiologist. If he has a problem, he will inform me, and I will send him a prescription for Farxiga 10 mg.      2. Parenchymal renal hypertension, stage 1 through stage 4 or unspecified chronic kidney disease  Assessment & Plan:  On 12/28/2023, GFR 48 ml/min/1.73sq m with creatinine of 1.41 mg/dL, and BUN of 24 mg/dL with blood glucose level 148 mg/dL.      3. Encounter to discuss test results    4. Hospital discharge follow-up    5. Multiple medical problems    6. Coronary artery disease of native heart with stable angina pectoris, unspecified vessel or lesion type ([facility])  Assessment & Plan:  The notes from admission on 11/18/2023 were reviewed and discussed with the patient. The cardiac catheterization report from [facility] and multiple medical problems were discussed. He was previously admitted to [facility] on 11/18/2023 for 2 to 3 days.      7. Chronic obstructive pulmonary disease, unspecified COPD type ([facility])  Assessment & Plan:  I reviewed pulmonary consultation with Dr. Mary who evaluated the patient on 01/09/2024 and his succinct interpretation of the situation at hand regarding COPD, pulmonary fibrosis, and sarcoid is as follows. He will find that in the 01/09/2025 office visit of Dr. Mary, pulmonologist.      8. Encounter for long-term (current) use of medications    9. Need for vaccination  Assessment & Plan:  I informed him to get the new shingles vaccination and will go back in 4 to 6 months for the second shot.           Dry skin.  He was advised to use CeraVe or Aveeno.    Wellness.  Prescription of RSV Rx given  for immediate use.                    Subjective:       Patient ID: Bean Lopez is a 78 y.o. male who presents to the clinic today for a followup.    The patient discontinued taking Jardiance because he believed he experienced angina which persisted about 1 to 1.5 hours due to this medication. He describes this as an unusual sensation where he feels chest tightness that gradually eases and eventually stops. He took the medication daily in the morning. He also drank a lot of water that helps to alleviate his symptoms. However, both Dr. Yun and Dr. Bee believed it would be beneficial for him to resume the medication. He normally has shortness of breath. He had been prescribed Jardiance 10 mg by Dr. Yun, who had issued this prescription on multiple occasions, each time with a 30-day supply. On 11/18/2023, he was hospitalized due to a complete blockage. The physician informed him about it and further explained that if he had delayed another hour, he would not have been able to perform the necessary procedure. He had a catherization and cardiac stent placement. He was provided with a card for the MRI. He is scheduled to have another catherization procedure on 01/22/2024. He was informed he had no heart damage. He is no longer able to walk long distances due to his lung condition.    He was previously on metformin, but it resulted in kidney issues, so he was hospitalized, and a contrast dye was employed during medical procedure which helps. He is currently taking Januvia. He visited Dr. Yun on 01/16/2024 and was happy with his blood pressure. He also got a blood infection and was hospitalized for 10 days. He has been monitoring his blood glucose level which he reports having a fluctuate depending on his dietary intake. He indicates that his glucose level readings are typically below 150 mg/dL, occasionally dropping to the 120s, and at times, even lower than that.    He had his last kidney test on 12/28/2023.  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 Despite this, he does not exhibit any issues with coughing. However, his ability to walk long distances has diminished.    He has dry skin and uses Aveeno.    He experiences mild edema on his feet.    He is up to date on his influenza vaccine. He has not received the RSV vaccine and COVID-19 vaccine. He has received 1 shot of the old shingles vaccine.    His home insurance was canceled    On 12/28/2023, GFR 48 ml/min/1.73sq m with creatinine of 1.41 mg/dL, and BUN of 24 mg/dL with blood glucose level 148 mg/dL.      The following portions of the patient's history were reviewed and updated as appropriate: allergies, current medications, past family history, past medical history, past social history, past surgical history, and problem list.        Extremities: Positive for mild edema on foot.  Cardiovascular: Positive for angina.  Respiratory: Negative for cough.  Integumentary: Positive for dry skin.        Objective:       /74 (BP Location: Left arm, Patient Position: Sitting, Cuff Size: Standard)   Pulse 77   Temp 98.2 °F (36.8 °C) (Temporal)   Ht 5' 11\" (1.803 m)   Wt 83.5 kg (184 lb)   SpO2 99%   BMI 25.66 kg/m²          Physical Exam  Vitals and nursing note reviewed.   Constitutional:       General: He is not in acute distress.     Appearance: Normal appearance. He is well-developed.   HENT:      Head: Normocephalic and atraumatic.   Eyes:      General:         Right eye: No discharge.         Left eye: No discharge.   Neck:      Thyroid: No thyromegaly.   Cardiovascular:      Rate and Rhythm: Normal rate and regular rhythm.      Pulses: Normal pulses.      Heart sounds: Normal heart sounds. No murmur heard.  Pulmonary:      Effort: Pulmonary effort is normal.      Breath sounds: Normal breath sounds. No wheezing or rhonchi.   Musculoskeletal:      Cervical back: Neck supple.      Right " lower leg: No edema.      Left lower leg: No edema.      Foot: Mild edema.  Lymphadenopathy:      Cervical: No cervical adenopathy.   Skin:     General: Skin is warm.      Capillary Refill: Capillary refill takes less than 2 seconds.   Neurological:      General: No focal deficit present.      Mental Status: He is alert and oriented to person, place, and time.   Psychiatric:         Mood and Affect: Mood normal.         Behavior: Behavior normal.         Thought Content: Thought content normal.          I personally reviewed the recent (and prior)  lab results, the image studies, pathology, other specialty/physicians consult notes and recommendations, and outside medical records from other institutions, as appropriate. I had a lengthy discussion with the patient and shared the work-up findings. We discussed the diagnosis and management plan.  I spent  30  minutes reviewing the records (labs, clinician notes, outside records, medical history, ordering medicine/tests/procedures, interpreting the imaging/labs previously done) and coordination of care as well as direct time with the patient today, of which greater than 50% of the time was spent in counseling and coordination of care with the patient/family.    Transcribed for MARGE Ellis DO, by Naina Ocasio on 01/21/24 at 5:50 PM. Powered by Dragon [employer].

## 2024-01-17 NOTE — ASSESSMENT & PLAN NOTE
I informed him to get the new shingles vaccination and will go back in 4 to 6 months for the second shot.

## 2024-01-17 NOTE — ASSESSMENT & PLAN NOTE
The notes from admission on 11/18/2023 were reviewed and discussed with the patient. The cardiac catheterization report from [facility] and multiple medical problems were discussed. He was previously admitted to [facility] on 11/18/2023 for 2 to 3 days.

## 2024-01-17 NOTE — ASSESSMENT & PLAN NOTE
Blood glucose levels are in the 120 mg/dL to 150 mg/dL range depending on what he eats. He is only taking Januvia 100 mg once daily, which is great. I will start him on empagliflozin at the suggestion of nephrologist and cardiologist. If he has a problem, he will inform me, and I will send him a prescription for Farxiga 10 mg.

## 2024-01-24 ENCOUNTER — TELEPHONE (OUTPATIENT)
Dept: FAMILY MEDICINE CLINIC | Facility: CLINIC | Age: 79
End: 2024-01-24

## 2024-01-24 NOTE — TELEPHONE ENCOUNTER
Called pt as per PCP to discuss pt starting Ace inhibitor or Arb given Hx of coronary artery disease. Pt states he would like a call from PCP to clarify why exactly he should be taking it/benefits of beginning it. Pt was also told that cardiologist would like to d/c tatianata and start him on Plavix OD. Please advise.

## 2024-01-31 ENCOUNTER — OFFICE VISIT (OUTPATIENT)
Dept: FAMILY MEDICINE CLINIC | Facility: CLINIC | Age: 79
End: 2024-01-31
Payer: MEDICARE

## 2024-01-31 VITALS
OXYGEN SATURATION: 98 % | DIASTOLIC BLOOD PRESSURE: 70 MMHG | BODY MASS INDEX: 25.66 KG/M2 | SYSTOLIC BLOOD PRESSURE: 146 MMHG | TEMPERATURE: 98.2 F | HEART RATE: 75 BPM | HEIGHT: 71 IN

## 2024-01-31 DIAGNOSIS — C61 PROSTATE CANCER (HCC): ICD-10-CM

## 2024-01-31 DIAGNOSIS — J43.9 PULMONARY EMPHYSEMA, UNSPECIFIED EMPHYSEMA TYPE (HCC): ICD-10-CM

## 2024-01-31 DIAGNOSIS — R04.2 COUGH WITH HEMOPTYSIS: Primary | ICD-10-CM

## 2024-01-31 DIAGNOSIS — Z79.899 ENCOUNTER FOR LONG-TERM (CURRENT) USE OF MEDICATIONS: ICD-10-CM

## 2024-01-31 DIAGNOSIS — I10 HYPERTENSION, ESSENTIAL: ICD-10-CM

## 2024-01-31 DIAGNOSIS — R69 MULTIPLE MEDICAL PROBLEMS: ICD-10-CM

## 2024-01-31 DIAGNOSIS — Z71.2 ENCOUNTER TO DISCUSS TEST RESULTS: ICD-10-CM

## 2024-01-31 DIAGNOSIS — E11.9 TYPE 2 DIABETES MELLITUS WITHOUT COMPLICATION, WITHOUT LONG-TERM CURRENT USE OF INSULIN (HCC): ICD-10-CM

## 2024-01-31 DIAGNOSIS — I25.10 CORONARY ARTERY DISEASE INVOLVING NATIVE CORONARY ARTERY OF NATIVE HEART WITHOUT ANGINA PECTORIS: ICD-10-CM

## 2024-01-31 DIAGNOSIS — M45.9 ANKYLOSING SPONDYLITIS OF SITE IN SPINE (HCC): ICD-10-CM

## 2024-01-31 PROCEDURE — 99214 OFFICE O/P EST MOD 30 MIN: CPT | Performed by: FAMILY MEDICINE

## 2024-01-31 RX ORDER — CLOPIDOGREL BISULFATE 75 MG/1
75 TABLET ORAL DAILY
Qty: 30 TABLET | Refills: 6 | Status: SHIPPED | OUTPATIENT
Start: 2024-01-31

## 2024-01-31 RX ORDER — FLUTICASONE FUROATE AND VILANTEROL TRIFENATATE 100; 25 UG/1; UG/1
POWDER RESPIRATORY (INHALATION)
Qty: 60 EACH | Refills: 3 | Status: SHIPPED | OUTPATIENT
Start: 2024-01-31 | End: 2024-04-30

## 2024-01-31 RX ORDER — DEXTROMETHORPHAN HYDROBROMIDE AND PROMETHAZINE HYDROCHLORIDE 15; 6.25 MG/5ML; MG/5ML
5 SYRUP ORAL 4 TIMES DAILY PRN
Qty: 180 ML | Refills: 0 | Status: SHIPPED | OUTPATIENT
Start: 2024-01-31

## 2024-01-31 NOTE — ASSESSMENT & PLAN NOTE
We will discontinue Brilinta now, as the cardiology team was already considering that and planning to switch to Plavix 75 mg. However, the key here is to avoid coughing harshly.

## 2024-01-31 NOTE — ASSESSMENT & PLAN NOTE
His blood pressure today is 146/70 mmHg, which is generally less than 140 mmHg. If his blood pressure consistently stays above 140 mmHg, the dosage of amlodipine will be increased from 5 mg to 7.5 mg. He was also advised to drink plenty of water and keep his salt intake low.

## 2024-01-31 NOTE — ASSESSMENT & PLAN NOTE
A trial of Promethazine DM has been prescribed to be taken as needed. The patient was advised to avoid harsh, strong coughing that could cause tearing in the back of the throat. Plavix 75 mg has been prescribed to be taken orally once daily. If hemoptysis persists, a chest x-ray will be ordered.

## 2024-01-31 NOTE — PROGRESS NOTES
Assessment/Plan:          1. Cough with hemoptysis  Comments:  Brilinta started in Nov when had stents placed in [facility]--never on that before. NOW, hemoptysis and possible rectal bleeding too.  Assessment & Plan:  A trial of Promethazine DM has been prescribed to be taken as needed. The patient was advised to avoid harsh, strong coughing that could cause tearing in the back of the throat. Plavix 75 mg has been prescribed to be taken orally once daily. If hemoptysis persists, a chest x-ray will be ordered.    Orders:  -     promethazine-dextromethorphan (PHENERGAN-DM) 6.25-15 mg/5 mL oral syrup; Take 5 mL by mouth 4 (four) times a day as needed for cough    2. Encounter to discuss test results    3. Encounter for long-term (current) use of medications  Assessment & Plan:  We will discontinue Brilinta now, as the cardiology team was already considering that and planning to switch to Plavix 75 mg. However, the key here is to avoid coughing harshly.      4. Coronary artery disease involving native coronary artery of native heart without angina pectoris  -     clopidogrel (Plavix) 75 mg tablet; Take 1 tablet (75 mg total) by mouth daily    5. Ankylosing spondylitis of site in spine (HCC)    6. Prostate cancer (HCC)    7. Type 2 diabetes mellitus without complication, without long-term current use of insulin (HCC)    8. Multiple medical problems  Assessment & Plan:  All discussed as mentioned.      9. Hypertension, essential  Assessment & Plan:  His blood pressure today is 146/70 mmHg, which is generally less than 140 mmHg. If his blood pressure consistently stays above 140 mmHg, the dosage of amlodipine will be increased from 5 mg to 7.5 mg. He was also advised to drink plenty of water and keep his salt intake low.          Possible rectal bleeding.  I will switch him to Plavix 75 mg orally once daily. Pt stated cardiologist thinking of doing that after 3 mo.    The patient will follow up in 3 months.                 Subjective:       Patient ID: Bean Lopez is a 78 y.o. male presents for evaluation of multiple medical concerns.    The patient reported waking up with a persistent cough, which is more pronounced at night when in a supine position. He reported an episode of hemoptysis and suspected hematochezia. He questioned if these symptoms could be a side effect of his current medication, either Brilinta or aspirin. He was previously on Plavix for a long time but was discontinued and switched to Brilinta. His cardiologist, Dr. Bee advised the discontinuation of Brilinta, which was being taken at a dosage of 90 mg twice a day, and suggested Plavix as a potential alternative. However, the patient declined because he wanted to finish the Brilinta first. In 11/2023, he had stents placed at [facility] due to a 100% arterial blockage. He initiated Brilinta therapy on either 11/18/2023, or 11/19/2023, to avoid clotting the stents. He also noted some hematochezia last week.    He reported no respiratory distress and mentioned having a lung flap and having undergone surgery, which he suspects is obstructed by mucus.    He monitors his blood pressure at home, consistently reading in the 140-mmHg range. He is on amlodipine 5 mg twice daily, chlorthalidone, and metoprolol. He has a history of taking Accupril. His blood pressure was 132/76 mmHg 1 week ago. He was advised by Dr. Yun to monitor his blood pressure both while sitting and standing.    He is allergic to OXYCODONE.    He is on atorvastatin 80 mg and Edarbi 40 mg daily.    The following portions of the patient's history were reviewed and updated as appropriate: allergies, current medications, past family history, past medical history, past social history, past surgical history, and problem list.          Objective:       /70 (BP Location: Left arm, Patient Position: Sitting, Cuff Size: Standard)   Pulse 75   Temp 98.2 °F (36.8 °C)  "(Temporal)   Ht 5' 11\" (1.803 m)   SpO2 98%   BMI 25.66 kg/m²          Physical Exam  Vitals and nursing note reviewed.  Constitutional:       General: He is not in acute distress.     Appearance: Normal appearance. He is well-developed.   HENT:      Head: Normocephalic and atraumatic.   Eyes:      General:         Right eye: No discharge.         Left eye: No discharge.   Neck:      Thyroid: No thyromegaly.   Cardiovascular:      Rate and Rhythm: Normal rate and regular rhythm.      Pulses: Normal pulses.      Heart sounds: Normal heart sounds. No murmur heard.  Pulmonary:      Effort: Pulmonary effort is normal.      Breath sounds: Normal breath sounds. No wheezing or rhonchi.   Musculoskeletal:      Cervical back: Neck supple.      Right lower leg: No edema.      Left lower leg: No edema.   Lymphadenopathy:      Cervical: No cervical adenopathy.   Skin:     General: Skin is warm.      Capillary Refill: Capillary refill takes less than 2 seconds.   Neurological:      General: No focal deficit present.      Mental Status: He is alert and oriented to person, place, and time.   Psychiatric:         Mood and Affect: Mood normal.         Behavior: Behavior normal.         Thought Content: Thought content normal.        I personally reviewed the recent (and prior)  lab results, the image studies, pathology, other specialty/physicians consult notes and recommendations, and outside medical records from other institutions, as appropriate. I had a lengthy discussion with the patient and shared the work-up findings. We discussed the diagnosis and management plan.  I spent  32  minutes reviewing the records (labs, clinician notes, outside records, medical history, ordering medicine/tests/procedures, interpreting the imaging/labs previously done) and coordination of care as well as direct time with the patient today, of which greater than 50% of the time was spent in counseling and coordination of care with the " patient/family.    Transcribed for MARGE Ellis DO, by Aminta Hinson on 01/31/24 at 10:34 PM. Powered by Dragon [employer].

## 2024-02-15 DIAGNOSIS — E11.9 TYPE 2 DIABETES MELLITUS WITHOUT COMPLICATION, WITHOUT LONG-TERM CURRENT USE OF INSULIN (HCC): ICD-10-CM

## 2024-02-15 DIAGNOSIS — I10 HYPERTENSION, ESSENTIAL: Primary | ICD-10-CM

## 2024-02-15 RX ORDER — SITAGLIPTIN 100 MG/1
100 TABLET, FILM COATED ORAL DAILY
Qty: 30 TABLET | Refills: 5 | Status: SHIPPED | OUTPATIENT
Start: 2024-02-15

## 2024-02-15 RX ORDER — METOPROLOL SUCCINATE 50 MG/1
50 TABLET, EXTENDED RELEASE ORAL DAILY
Qty: 90 TABLET | Refills: 1 | Status: SHIPPED | OUTPATIENT
Start: 2024-02-15

## 2024-02-15 NOTE — TELEPHONE ENCOUNTER
Reason for call:   [x] Refill   [] Prior Auth  [] Other:     Office:   [x] PCP/Provider - [facility]  [] Specialty/Provider -     Medication: Metoprolol 50 mg    Quantity: 90    Pharmacy: [facility]    Does the patient have enough for 3 days?   [x] Yes   [] No - Send as HP to POD

## 2024-02-28 DIAGNOSIS — J43.9 PULMONARY EMPHYSEMA, UNSPECIFIED EMPHYSEMA TYPE (HCC): ICD-10-CM

## 2024-02-28 RX ORDER — UMECLIDINIUM 62.5 UG/1
AEROSOL, POWDER ORAL
Qty: 30 BLISTER | Refills: 2 | Status: SHIPPED | OUTPATIENT
Start: 2024-02-28 | End: 2024-03-29

## 2024-03-05 DIAGNOSIS — M45.9 ANKYLOSING SPONDYLITIS OF SITE IN SPINE (HCC): ICD-10-CM

## 2024-03-05 RX ORDER — SULFASALAZINE 500 MG/1
TABLET ORAL
Qty: 45 TABLET | Refills: 3 | Status: SHIPPED | OUTPATIENT
Start: 2024-03-05

## 2024-03-08 ENCOUNTER — OFFICE VISIT (OUTPATIENT)
Dept: GASTROENTEROLOGY | Facility: CLINIC | Age: 79
End: 2024-03-08
Payer: MEDICARE

## 2024-03-08 ENCOUNTER — TELEPHONE (OUTPATIENT)
Dept: GASTROENTEROLOGY | Facility: CLINIC | Age: 79
End: 2024-03-08

## 2024-03-08 VITALS
WEIGHT: 181 LBS | DIASTOLIC BLOOD PRESSURE: 66 MMHG | HEART RATE: 77 BPM | HEIGHT: 71 IN | SYSTOLIC BLOOD PRESSURE: 139 MMHG | BODY MASS INDEX: 25.34 KG/M2

## 2024-03-08 DIAGNOSIS — K22.70 BARRETT'S ESOPHAGUS WITHOUT DYSPLASIA: Primary | ICD-10-CM

## 2024-03-08 DIAGNOSIS — K22.2 ESOPHAGEAL STRICTURE: ICD-10-CM

## 2024-03-08 DIAGNOSIS — R13.12 OROPHARYNGEAL DYSPHAGIA: ICD-10-CM

## 2024-03-08 DIAGNOSIS — Z12.11 COLON CANCER SCREENING: ICD-10-CM

## 2024-03-08 PROCEDURE — 99213 OFFICE O/P EST LOW 20 MIN: CPT | Performed by: INTERNAL MEDICINE

## 2024-03-08 RX ORDER — POLYETHYLENE GLYCOL 3350, SODIUM CHLORIDE, SODIUM BICARBONATE, POTASSIUM CHLORIDE 420; 11.2; 5.72; 1.48 G/4L; G/4L; G/4L; G/4L
4000 POWDER, FOR SOLUTION ORAL ONCE
Qty: 4000 ML | Refills: 0 | Status: SHIPPED | OUTPATIENT
Start: 2024-03-08 | End: 2024-03-08

## 2024-03-08 NOTE — TELEPHONE ENCOUNTER
Scheduled date of EGD/colonoscopy (as of today):05/21/24  Physician performing EGD/colonoscopy:Dr. Espinosa  Location of EGD/colonoscopy:[place]  Desired bowel prep reviewed with patient:Ken  Instructions reviewed with patient by:monty  Clearances:  Dr. Monge hold 5 days    Will send clearance 1 month prior

## 2024-03-08 NOTE — PROGRESS NOTES
[facility] - Outpatient Follow-up Note  Bean Lopez 78 y.o. male MRN: [id number]  Encounter: [id number]          ASSESSMENT AND PLAN:      1. 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 Oropharyngeal dysphagia  3. Esophageal stricture  Will evaluate further with EGD for surveillance biopsies.  Will consider repeat dilation at the time of EGD    - EGD; Future    4. Colon cancer screening    - Colonoscopy; Future  - polyethylene glycol-electrolytes (TriLyte) 4000 mL solution; Take 4,000 mL by mouth once for 1 dose Take 4000 mL by mouth once for 1 dose. Use as directed  Dispense: 4000 mL; Refill: 0  ______________________________________________________________________    SUBJECTIVE: History of long segment, nondysplastic Gee's esophagus.  Last EGD 2023 with recommendation at that time to repeat in 1 year.  Does report history of dysphagia with prior esophageal bougie dilation.  Symptoms fairly well-controlled at present on Dexilant and famotidine      REVIEW OF SYSTEMS:    ROS       Historical Information   Past Medical History:   Diagnosis Date    Anemia     Arthritis     Gee's esophagus     Breast lump     Cancer (HCC)     Chronic kidney disease     Cirrhosis (HCC)     Coronary artery disease     cardiac cath; stents     Diabetes mellitus (HCC)     H/O heart artery stent     Hypertension     Inguinal hernia     Right    Sarcoidosis     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HCC)     Skin cancer     SOB (shortness of breath)     Upper respiratory infection, viral 5/31/2022     Past Surgical History:   Procedure Laterality Date    CARDIAC CATHETERIZATION      CATARACT EXTRACTION, BILATERAL      left eye 05/08 and right 5/22/2022    CERVICAL FUSION      CHOLECYSTECTOMY      COLONOSCOPY  04/11/2016    COLONOSCOPY      EGD AND COLONOSCOPY  06/24/2019    ERCP  09/11/2014    transab esop hiat griffin rpr    ESOPHAGOGASTRIC FUNDOPLASTY      HERNIA REPAIR Right     p I/griffin init reduc >5 yr    HIP SURGERY  "Right     Replacement    KIDNEY STONE SURGERY      Fragmenting    LIVER BIOPSY      MULTIPLE TOOTH EXTRACTIONS      \"extraction of all maxillary teeth\"    AR BX PROSTATE STRTCTC SATURATION SAMPLING IMG GID N/A 08/15/2023    Procedure: TRANSPERINEAL MRI FUSION BIOPSY PROSTATE;  Surgeon: Cory Carreon MD;  Location: [facility];  Service: Urology    SKIN BIOPSY  05/2020    THUMB FUSION      with graft    TONSILLECTOMY      UPPER GASTROINTESTINAL ENDOSCOPY      US GUIDANCE  10/22/2014     Social History   Social History     Substance and Sexual Activity   Alcohol Use No    Comment: Alcohol intake:   Occasional wine - As per [place]      Social History     Substance and Sexual Activity   Drug Use No     Social History     Tobacco Use   Smoking Status Never   Smokeless Tobacco Never   Tobacco Comments    RETIRED     Family History   Problem Relation Age of Onset    Hypertension Mother     Atrial fibrillation Mother     Heart disease Mother     Kidney disease Father     COPD Father     Heart disease Father     Asthma Neg Hx     Lung cancer Neg Hx        Meds/Allergies       Current Outpatient Medications:     albuterol (PROVENTIL HFA,VENTOLIN HFA) 90 mcg/act inhaler    allopurinol (ZYLOPRIM) 300 mg tablet    amLODIPine (NORVASC) 5 mg tablet    Aspirin 81 MG CAPS    atorvastatin (LIPITOR) 80 mg tablet    b complex vitamins capsule    Breo Ellipta 100-25 MCG/ACT inhaler    chlorthalidone 25 mg tablet    Cholecalciferol (VITAMIN D3) 2000 units TABS    co-enzyme Q-10 50 MG capsule    colchicine (COLCRYS) 0.6 mg tablet    dexlansoprazole (DEXILANT) 60 MG capsule    Edarbi 40 MG tablet    famotidine (PEPCID) 40 MG tablet    Ferrous Sulfate (IRON) 325 (65 Fe) MG TABS    Incruse Ellipta 62.5 MCG/ACT AEPB inhaler    ipratropium-albuterol (DUO-NEB) 0.5-2.5 mg/3 mL nebulizer solution    magnesium chloride-calcium (SLOW-MAG) 71.5-119 mg    metoprolol succinate (TOPROL-XL) 50 mg 24 hr tablet    polyethylene glycol-electrolytes (TriLyte) " "4000 mL solution    promethazine-dextromethorphan (PHENERGAN-DM) 6.25-15 mg/5 mL oral syrup    sitaGLIPtin (Januvia) 100 mg tablet    sulfaSALAzine (AZULFIDINE) 500 mg tablet    ticagrelor (Brilinta) 90 MG    clopidogrel (Plavix) 75 mg tablet    Empagliflozin (Jardiance) 10 MG TABS tablet    metoprolol tartrate (LOPRESSOR) 50 mg tablet    Allergies   Allergen Reactions    Oxycodone GI Intolerance           Objective     Blood pressure 139/66, pulse 77, height 5' 11\" (1.803 m), weight 82.1 kg (181 lb). Body mass index is 25.24 kg/m².      PHYSICAL EXAM:      Physical Exam     Lab Results:   No visits with results within 1 Day(s) from this visit.   Latest known visit with results is:   Appointment on 12/28/2023   Component Date Value    Sodium 12/28/2023 141     Potassium 12/28/2023 4.9     Chloride 12/28/2023 106     CO2 12/28/2023 29     ANION GAP 12/28/2023 6     BUN 12/28/2023 24     Creatinine 12/28/2023 1.41 (H)     Glucose, Fasting 12/28/2023 148 (H)     Calcium 12/28/2023 10.0     AST 12/28/2023 29     ALT 12/28/2023 26     Alkaline Phosphatase 12/28/2023 92     Total Protein 12/28/2023 6.9     Albumin 12/28/2023 4.2     Total Bilirubin 12/28/2023 0.69     eGFR 12/28/2023 48     WBC 12/28/2023 9.74     RBC 12/28/2023 3.89     Hemoglobin 12/28/2023 12.7     Hematocrit 12/28/2023 38.5     MCV 12/28/2023 99 (H)     MCH 12/28/2023 32.6     MCHC 12/28/2023 33.0     RDW 12/28/2023 14.0     Platelets 12/28/2023 188     MPV 12/28/2023 9.2     Cholesterol 12/28/2023 115     Triglycerides 12/28/2023 150 (H)     HDL, Direct 12/28/2023 43     LDL Calculated 12/28/2023 42     Magnesium 12/28/2023 1.8 (L)     Phosphorus 12/28/2023 3.3     Creatinine, Ur 12/28/2023 105.0     Protein Urine Random 12/28/2023 37     Prot/Creat Ratio, Ur 12/28/2023 0.35 (H)     PTH 12/28/2023 43.0     25-HYDROXY VIT D 12/28/2023 33     25-Hydroxy D2 12/28/2023 <1.0     25-HYDROXY VIT D3 12/28/2023 33     Creatinine, Ur 12/28/2023 105.0     " Albumin,U,Random 12/28/2023 213.1 (H)     Albumin Creat Ratio 12/28/2023 203 (H)     Hemoglobin A1C 12/28/2023 7.1 (H)     EAG 12/28/2023 157          Radiology Results:   No results found.

## 2024-03-25 DIAGNOSIS — M1A.9XX0 CHRONIC GOUT WITHOUT TOPHUS, UNSPECIFIED CAUSE, UNSPECIFIED SITE: ICD-10-CM

## 2024-03-25 RX ORDER — COLCHICINE 0.6 MG/1
TABLET ORAL
Qty: 30 TABLET | Refills: 3 | Status: SHIPPED | OUTPATIENT
Start: 2024-03-25

## 2024-04-09 ENCOUNTER — OFFICE VISIT (OUTPATIENT)
Dept: PULMONOLOGY | Facility: CLINIC | Age: 79
End: 2024-04-09
Payer: MEDICARE

## 2024-04-09 VITALS
TEMPERATURE: 97.5 F | BODY MASS INDEX: 25.48 KG/M2 | WEIGHT: 182 LBS | HEIGHT: 71 IN | HEART RATE: 56 BPM | OXYGEN SATURATION: 96 % | DIASTOLIC BLOOD PRESSURE: 84 MMHG | SYSTOLIC BLOOD PRESSURE: 150 MMHG

## 2024-04-09 DIAGNOSIS — D86.9 SARCOIDOSIS: ICD-10-CM

## 2024-04-09 DIAGNOSIS — J84.10 PULMONARY FIBROSIS (HCC): ICD-10-CM

## 2024-04-09 DIAGNOSIS — J43.9 PULMONARY EMPHYSEMA, UNSPECIFIED EMPHYSEMA TYPE (HCC): Primary | ICD-10-CM

## 2024-04-09 PROCEDURE — 99214 OFFICE O/P EST MOD 30 MIN: CPT | Performed by: INTERNAL MEDICINE

## 2024-04-09 NOTE — ASSESSMENT & PLAN NOTE
His recent high-resolution CT scan of the chest showed mild diffuse bilateral reticulation and mild scattered linear scar.  Mild traction bronchiolectasis in the lateral basal left lower lobe.  Chronic opacity in the paraspinal right lower lobe with volume loss as evidenced by crowding of the bronchi and mild traction bronchiolectasis due to chronic scar.  The etiology is not clear at this point.  His previous PFT showed severe airflow obstruction with diffusion defect.   his repeat PFT showed moderate airflow obstruction with severe diffusion defect.   Chest auscultation revealed bilateral coarse inspiratory crackles.  He currently does not need any oxygen.  The etiology of this pulmonary fibrosis is not clear at this point.  He has emphysema.  He has previous history of sarcoidosis.  He has history of beryllium exposure.  he also had COVID.  His CT scan did not show a UIP pattern.  His pulmonary function has not deteriorated.  We will continue to monitor him closely.  At this point he would not benefit from antifibrotic therapy.  I had a long discussion with him and answered all his questions

## 2024-04-09 NOTE — ASSESSMENT & PLAN NOTE
Mr. Bean Lopez was diagnosed with COPD many years back and is currently on treatment with Breo 100, Incruse and Ventolin.  His usual exercise tolerance is more than 2 blocks and he has occasional cough and no wheezing.. He is a never smoker but admits to secondhand smoke exposure. 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 His previous PFT from May 2015 showed severe airflow obstruction with diffusion defect. He also had evidence of air trapping. There was improvement in FEV1 with bronchodilator indicating a component of asthma. His previous CT scan from February 2019 showed scarring in the left lung. On clinical examination, his chest auscultation revealed bilateral inspiratory coarse crackles at lung bases.  His high-resolution CT scan of the chest showed bilateral pulmonary fibrosis. I have advised him to continue breo and incruse.  He had coronary artery disease and heart attack recently and has coronary stent.  His shortness of breath is much improved now.  I had a long discussion with him and answered all his questions.

## 2024-04-09 NOTE — PROGRESS NOTES
Assessment/Plan:    COPD (chronic obstructive pulmonary disease) (HCC)  Mr. Bean Lopez was diagnosed with COPD many years back and is currently on treatment with Breo 100, Incruse and Ventolin.  His usual exercise tolerance is more than 2 blocks and he has occasional cough and no wheezing.. He is a never smoker but admits to secondhand smoke exposure. 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 His previous PFT from May 2015 showed severe airflow obstruction with diffusion defect. He also had evidence of air trapping. There was improvement in FEV1 with bronchodilator indicating a component of asthma. His previous CT scan from February 2019 showed scarring in the left lung. On clinical examination, his chest auscultation revealed bilateral inspiratory coarse crackles at lung bases.  His high-resolution CT scan of the chest showed bilateral pulmonary fibrosis. I have advised him to continue breo and incruse.  He had coronary artery disease and heart attack recently and has coronary stent.  His shortness of breath is much improved now.  I had a long discussion with him and answered all his questions.     Sarcoidosis  He was diagnosed with sarcoidosis many years back after a lung biopsy and was given steroid therapy for some time. His previous PFT from May 2015 showed severe airflow obstruction with diffusion defect. He has history of working with beryllium.  But the sarcoidosis happened before his exposure to beryllium.        Pulmonary fibrosis (HCC)  His recent high-resolution CT scan of the chest showed mild diffuse bilateral reticulation and mild scattered linear scar.  Mild traction bronchiolectasis in the lateral basal left lower lobe.  Chronic opacity in the paraspinal right lower lobe with volume loss as evidenced by crowding of the bronchi and mild traction bronchiolectasis due to chronic scar.  The etiology is not clear at this point.  His previous PFT showed severe airflow obstruction with  diffusion defect.   his repeat PFT showed moderate airflow obstruction with severe diffusion defect.   Chest auscultation revealed bilateral coarse inspiratory crackles.  He currently does not need any oxygen.  The etiology of this pulmonary fibrosis is not clear at this point.  He has emphysema.  He has previous history of sarcoidosis.  He has history of beryllium exposure.  he also had COVID.  His CT scan did not show a UIP pattern.  His pulmonary function has not deteriorated.  We will continue to monitor him closely.  At this point he would not benefit from antifibrotic therapy.  I had a long discussion with him and answered all his questions         Diagnoses and all orders for this visit:    Pulmonary emphysema, unspecified emphysema type (HCC)    Pulmonary fibrosis (HCC)  -     CT chest without contrast; Future    Sarcoidosis          Subjective:      Patient ID: Bean Lopez is a 78 y.o. male.    Mr. Bean Lopez came for follow-up for his COPD pulmonary fibrosis.  He has previous history of sarcoidosis and COVID.  He has been doing well and his exercise tolerance is at least 2 blocks.  He has occasional cough and no wheeze.  No chest pain no palpitation no swelling of feet.  He has been on treatment with Breo 100 and Incruse.  He rarely uses albuterol rescue inhaler.  His previous PFT showed moderate airflow obstruction with severe diffusion defect.  He has previous history of beryllium exposure.  He has no hoarseness of voice or dysphagia.  His previous CT scan showed evidence of pulmonary fibrosis not in a UIP pattern.        The following portions of the patient's history were reviewed and updated as appropriate: allergies, current medications, past family history, past medical history, past social history, past surgical history, and problem list.    Review of Systems   Constitutional:  Negative for appetite change, chills, fatigue and fever.   HENT:  Negative for hearing loss, rhinorrhea, sneezing,  "sore throat and trouble swallowing.    Eyes:  Negative for visual disturbance.   Respiratory:  Positive for cough and shortness of breath (more than 2 blocks). Negative for chest tightness and wheezing.    Cardiovascular:  Negative for chest pain, palpitations and leg swelling.   Gastrointestinal:  Negative for abdominal pain, constipation, diarrhea, nausea and vomiting.   Genitourinary:  Negative for dysuria, frequency and urgency.   Musculoskeletal:  Positive for arthralgias and back pain. Negative for gait problem.   Skin:  Negative for rash.   Allergic/Immunologic: Negative for environmental allergies.   Neurological:  Negative for dizziness, light-headedness and headaches.   Psychiatric/Behavioral:  Negative for agitation, confusion and sleep disturbance. The patient is not nervous/anxious.          Objective:      /84 (BP Location: Left arm, Patient Position: Sitting, Cuff Size: Standard)   Pulse 56   Temp 97.5 °F (36.4 °C) (Tympanic)   Ht 5' 11\" (1.803 m)   Wt 82.6 kg (182 lb)   SpO2 96%   BMI 25.38 kg/m²          Physical Exam  Vitals reviewed.   Constitutional:       General: He is not in acute distress.     Appearance: He is not ill-appearing, toxic-appearing or diaphoretic.   HENT:      Head: Normocephalic.      Mouth/Throat:      Mouth: Mucous membranes are moist.   Eyes:      General: No scleral icterus.     Conjunctiva/sclera: Conjunctivae normal.   Cardiovascular:      Rate and Rhythm: Normal rate.      Heart sounds: Normal heart sounds.   Pulmonary:      Effort: Pulmonary effort is normal. No respiratory distress.      Breath sounds: Normal breath sounds. No stridor. No wheezing, rhonchi or rales.   Chest:      Chest wall: No tenderness.   Abdominal:      General: Bowel sounds are normal.      Palpations: Abdomen is soft.      Tenderness: There is no abdominal tenderness. There is no guarding.   Musculoskeletal:      Cervical back: No rigidity.      Right lower leg: No edema.      Left " lower leg: No edema.   Lymphadenopathy:      Cervical: No cervical adenopathy.   Skin:     Coloration: Skin is not jaundiced or pale.      Findings: No rash.   Neurological:      Mental Status: He is alert and oriented to person, place, and time.      Gait: Gait normal.   Psychiatric:         Mood and Affect: Mood normal.         Behavior: Behavior normal.         Thought Content: Thought content normal.         Judgment: Judgment normal.      I spent 30 minutes of time taking care of this patient with complex medical issues.  The majority of this time was spent directly with the patient counseling as well as coordinating care.

## 2024-04-17 ENCOUNTER — TELEPHONE (OUTPATIENT)
Dept: NEPHROLOGY | Facility: CLINIC | Age: 79
End: 2024-04-17

## 2024-04-17 ENCOUNTER — TELEPHONE (OUTPATIENT)
Dept: GASTROENTEROLOGY | Facility: CLINIC | Age: 79
End: 2024-04-17

## 2024-04-17 ENCOUNTER — APPOINTMENT (OUTPATIENT)
Dept: LAB | Facility: HOSPITAL | Age: 79
End: 2024-04-17
Payer: MEDICARE

## 2024-04-17 DIAGNOSIS — E87.5 HYPERKALEMIA: ICD-10-CM

## 2024-04-17 DIAGNOSIS — E78.5 DYSLIPIDEMIA: ICD-10-CM

## 2024-04-17 DIAGNOSIS — I12.9 HYPERTENSIVE CHRONIC KIDNEY DISEASE WITH STAGE 1 THROUGH STAGE 4 CHRONIC KIDNEY DISEASE, OR UNSPECIFIED CHRONIC KIDNEY DISEASE: ICD-10-CM

## 2024-04-17 DIAGNOSIS — N18.32 STAGE 3B CHRONIC KIDNEY DISEASE (HCC): ICD-10-CM

## 2024-04-17 DIAGNOSIS — R80.1 PERSISTENT PROTEINURIA: ICD-10-CM

## 2024-04-17 DIAGNOSIS — E11.21 DIABETIC NEPHROPATHY ASSOCIATED WITH TYPE 2 DIABETES MELLITUS (HCC): ICD-10-CM

## 2024-04-17 LAB
ANION GAP SERPL CALCULATED.3IONS-SCNC: 10 MMOL/L (ref 4–13)
BUN SERPL-MCNC: 34 MG/DL (ref 5–25)
CALCIUM SERPL-MCNC: 9.4 MG/DL (ref 8.4–10.2)
CHLORIDE SERPL-SCNC: 106 MMOL/L (ref 96–108)
CO2 SERPL-SCNC: 27 MMOL/L (ref 21–32)
CREAT SERPL-MCNC: 1.58 MG/DL (ref 0.6–1.3)
CREAT UR-MCNC: 91.5 MG/DL
GFR SERPL CREATININE-BSD FRML MDRD: 41 ML/MIN/1.73SQ M
GLUCOSE P FAST SERPL-MCNC: 128 MG/DL (ref 65–99)
MAGNESIUM SERPL-MCNC: 2 MG/DL (ref 1.9–2.7)
POTASSIUM SERPL-SCNC: 4.5 MMOL/L (ref 3.5–5.3)
PROT UR-MCNC: 29 MG/DL
PROT/CREAT UR: 0.32 MG/G{CREAT} (ref 0–0.1)
SODIUM SERPL-SCNC: 143 MMOL/L (ref 135–147)

## 2024-04-17 PROCEDURE — 84156 ASSAY OF PROTEIN URINE: CPT

## 2024-04-17 PROCEDURE — 83735 ASSAY OF MAGNESIUM: CPT

## 2024-04-17 PROCEDURE — 82570 ASSAY OF URINE CREATININE: CPT

## 2024-04-17 PROCEDURE — 80048 BASIC METABOLIC PNL TOTAL CA: CPT

## 2024-04-17 PROCEDURE — 36415 COLL VENOUS BLD VENIPUNCTURE: CPT

## 2024-04-17 NOTE — TELEPHONE ENCOUNTER
Spoke with patient about the following, he expressed understanding and thanked us for the call:    ----- Message from Misael Yun MD sent at 4/17/2024 10:43 AM EDT -----  Labs in general look good  I would repeat a basic metabolic profile in 6 to 8 weeks to demonstrate stability  ----- Message -----  From: Lab, Background User  Sent: 4/17/2024  10:30 AM EDT  To: Misael Yun MD

## 2024-04-17 NOTE — TELEPHONE ENCOUNTER
Pt's cardiac clearance needs to be sent to Dr Bee in early September. Sb         Scheduled date of EGD/colonoscopy (as of today):10/18/24  Physician performing EGD/colonoscopy:Dr Espinosa   Location of EGD/colonoscopy:   Desired bowel prep reviewed with patient:mike   Instructions reviewed with patient by:sb  Clearances:  Dr Bee-baby aspirin, Brillinta

## 2024-04-19 ENCOUNTER — HOSPITAL ENCOUNTER (OUTPATIENT)
Dept: CT IMAGING | Facility: HOSPITAL | Age: 79
Discharge: HOME | End: 2024-04-19
Attending: INTERNAL MEDICINE
Payer: MEDICARE

## 2024-04-19 DIAGNOSIS — J84.10 PULMONARY FIBROSIS (HCC): ICD-10-CM

## 2024-04-19 PROCEDURE — 71250 CT THORAX DX C-: CPT

## 2024-04-26 ENCOUNTER — TELEPHONE (OUTPATIENT)
Age: 79
End: 2024-04-26

## 2024-04-29 DIAGNOSIS — I10 HYPERTENSION, ESSENTIAL: ICD-10-CM

## 2024-04-30 ENCOUNTER — OFFICE VISIT (OUTPATIENT)
Dept: FAMILY MEDICINE CLINIC | Facility: CLINIC | Age: 79
End: 2024-04-30
Payer: MEDICARE

## 2024-04-30 VITALS
SYSTOLIC BLOOD PRESSURE: 140 MMHG | WEIGHT: 175 LBS | TEMPERATURE: 98.2 F | BODY MASS INDEX: 24.5 KG/M2 | HEART RATE: 60 BPM | RESPIRATION RATE: 16 BRPM | DIASTOLIC BLOOD PRESSURE: 60 MMHG | HEIGHT: 71 IN | OXYGEN SATURATION: 99 %

## 2024-04-30 DIAGNOSIS — E53.8 VITAMIN B12 DEFICIENCY: ICD-10-CM

## 2024-04-30 DIAGNOSIS — E78.2 MIXED HYPERLIPIDEMIA: ICD-10-CM

## 2024-04-30 DIAGNOSIS — E55.9 VITAMIN D INSUFFICIENCY: ICD-10-CM

## 2024-04-30 DIAGNOSIS — I10 HYPERTENSION, ESSENTIAL: Primary | ICD-10-CM

## 2024-04-30 DIAGNOSIS — I25.118 CORONARY ARTERY DISEASE OF NATIVE HEART WITH STABLE ANGINA PECTORIS, UNSPECIFIED VESSEL OR LESION TYPE (HCC): ICD-10-CM

## 2024-04-30 DIAGNOSIS — R69 MULTIPLE MEDICAL PROBLEMS: ICD-10-CM

## 2024-04-30 DIAGNOSIS — N18.32 STAGE 3B CHRONIC KIDNEY DISEASE (HCC): ICD-10-CM

## 2024-04-30 DIAGNOSIS — Z79.899 MEDICATION MANAGEMENT: ICD-10-CM

## 2024-04-30 DIAGNOSIS — M45.9 ANKYLOSING SPONDYLITIS OF SITE IN SPINE (HCC): ICD-10-CM

## 2024-04-30 DIAGNOSIS — Z71.2 ENCOUNTER TO DISCUSS TEST RESULTS: ICD-10-CM

## 2024-04-30 DIAGNOSIS — I25.10 CORONARY ARTERY DISEASE INVOLVING NATIVE CORONARY ARTERY OF NATIVE HEART WITHOUT ANGINA PECTORIS: ICD-10-CM

## 2024-04-30 DIAGNOSIS — R73.9 ELEVATED BLOOD SUGAR: ICD-10-CM

## 2024-04-30 DIAGNOSIS — J44.9 CHRONIC OBSTRUCTIVE PULMONARY DISEASE, UNSPECIFIED COPD TYPE (HCC): ICD-10-CM

## 2024-04-30 DIAGNOSIS — C61 PROSTATE CANCER (HCC): ICD-10-CM

## 2024-04-30 PROCEDURE — G2211 COMPLEX E/M VISIT ADD ON: HCPCS | Performed by: FAMILY MEDICINE

## 2024-04-30 PROCEDURE — 99214 OFFICE O/P EST MOD 30 MIN: CPT | Performed by: FAMILY MEDICINE

## 2024-04-30 RX ORDER — METOPROLOL SUCCINATE 50 MG/1
50 TABLET, EXTENDED RELEASE ORAL DAILY
Qty: 90 TABLET | Refills: 1 | Status: SHIPPED | OUTPATIENT
Start: 2024-04-30

## 2024-04-30 NOTE — PROGRESS NOTES
Name: Bean Lopez      : 1945      MRN: [id number]  Encounter Provider: MARGE Ellis DO  Encounter Date: 2024   Encounter department: [facility] [place]    Assessment & Plan     Assessment & Plan       Orders Placed This Encounter   Procedures   • PSA Total, Diagnostic   • CBC   • UA w Reflex to Microscopic w Reflex to Culture   • Comprehensive metabolic panel   • Lipid panel   • TSH, 3rd generation   • Vitamin D 25 hydroxy   • Hemoglobin A1C   • Albumin / creatinine urine ratio   • Magnesium   • Uric acid   • Vitamin B12       Subjective    Assessment/Plan:     COPD (chronic obstructive pulmonary disease) (HCC)  Mr. Bean Lopez was diagnosed with COPD many years back and is currently on treatment with Breo 100, Incruse and Ventolin.  His usual exercise tolerance is more than 2 blocks and he has occasional cough and no wheezing.. He is a never smoker but admits to secondhand smoke exposure. 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 His previous PFT from May 2015 showed severe airflow obstruction with diffusion defect. He also had evidence of air trapping. There was improvement in FEV1 with bronchodilator indicating a component of asthma. His previous CT scan from 2019 showed scarring in the left lung. On clinical examination, his chest auscultation revealed bilateral inspiratory coarse crackles at lung bases.  His high-resolution CT scan of the chest showed bilateral pulmonary fibrosis. I have advised him to continue breo and incruse.  He had coronary artery disease and heart attack recently and has coronary stent.  His shortness of breath is much improved now.  I had a long discussion with him and answered all his questions.      Sarcoidosis  He was diagnosed with sarcoidosis many years back after a lung biopsy and was given steroid therapy for some time. His previous PFT from May 2015 showed severe airflow obstruction with diffusion defect. He has  history of working with beryllium.  But the sarcoidosis happened before his exposure to beryllium.         Pulmonary fibrosis (HCC)  His recent high-resolution CT scan of the chest showed mild diffuse bilateral reticulation and mild scattered linear scar.  Mild traction bronchiolectasis in the lateral basal left lower lobe.  Chronic opacity in the paraspinal right lower lobe with volume loss as evidenced by crowding of the bronchi and mild traction bronchiolectasis due to chronic scar.  The etiology is not clear at this point.  His previous PFT showed severe airflow obstruction with diffusion defect.   his repeat PFT showed moderate airflow obstruction with severe diffusion defect.   Chest auscultation revealed bilateral coarse inspiratory crackles.  He currently does not need any oxygen.  The etiology of this pulmonary fibrosis is not clear at this point.  He has emphysema.  He has previous history of sarcoidosis.  He has history of beryllium exposure.  he also had COVID.  His CT scan did not show a UIP pattern.  His pulmonary function has not deteriorated.  We will continue to monitor him closely.  At this point he would not benefit from antifibrotic therapy.  I had a long discussion with him and answered all his questions  History of Present Illness  The patient presents for evaluation of multiple medical concerns.  Review of Systems   Constitutional: Negative.  Negative for activity change, appetite change, chills, diaphoresis, fatigue, fever and unexpected weight change.   HENT: Negative.  Negative for congestion, dental problem, drooling, ear discharge, ear pain, facial swelling, mouth sores, nosebleeds, postnasal drip, rhinorrhea, trouble swallowing and voice change.    Eyes: Negative.  Negative for photophobia, pain, discharge, redness, itching and visual disturbance.   Respiratory:  Positive for shortness of breath. Negative for apnea, cough, choking and chest tightness.         Sees Dr. Mary - not  "terribly happy with him, but ok.. Sob with stairs. No CP's. Main issue is the lungs and SOB.   Cardiovascular:  Negative for chest pain and leg swelling.   Gastrointestinal: Negative.  Negative for abdominal distention, abdominal pain, constipation, diarrhea and nausea.        Sees Dr. Taylor and he just changed the PPI to    Endocrine: Negative.  Negative for polydipsia, polyphagia and polyuria.   Genitourinary: Negative.  Negative for decreased urine volume, difficulty urinating, dysuria, enuresis and hematuria.   Musculoskeletal:  Positive for myalgias, neck pain and neck stiffness. Negative for arthralgias, back pain, gait problem and joint swelling.        Kyphosis and scoliosis   Skin:  Negative for color change, pallor, rash and wound.   Allergic/Immunologic: Negative.  Negative for immunocompromised state.   Neurological: Negative.  Negative for dizziness, seizures, syncope, facial asymmetry, speech difficulty, light-headedness and headaches.   Hematological: Negative.  Negative for adenopathy.   Psychiatric/Behavioral: Negative.  Negative for agitation, behavioral problems, confusion and decreased concentration.          Objective     /60 (BP Location: Left arm, Patient Position: Sitting, Cuff Size: Standard)   Pulse 60   Temp 98.2 °F (36.8 °C) (Temporal)   Resp 16   Ht 5' 11\" (1.803 m)   Wt 79.4 kg (175 lb)   SpO2 99%   BMI 24.41 kg/m²     Physical Exam    Physical Exam  Constitutional:       General: He is not in acute distress.     Appearance: Normal appearance. He is not ill-appearing or diaphoretic.   HENT:      Right Ear: Tympanic membrane normal.      Left Ear: Tympanic membrane normal.      Nose: Nose normal. No congestion or rhinorrhea.   Eyes:      General:         Right eye: No discharge.         Left eye: No discharge.   Cardiovascular:      Rate and Rhythm: Normal rate and regular rhythm.      Pulses: Normal pulses.      Heart sounds: Normal heart sounds.   Pulmonary:      " Breath sounds: Normal breath sounds.   Musculoskeletal:      Right lower leg: No edema.      Left lower leg: No edema.   Skin:     General: Skin is warm and dry.   Neurological:      General: No focal deficit present.      Mental Status: He is alert and oriented to person, place, and time.   Psychiatric:         Mood and Affect: Mood normal.         Behavior: Behavior normal.         Thought Content: Thought content normal.         Judgment: Judgment normal.     I personally reviewed the recent (and prior)  lab results, the image studies, pathology, other specialty/physicians consult notes and recommendations, and outside medical records from other institutions, as appropriate. I had a lengthy discussion with the patient and shared the work-up findings. We discussed the diagnosis and management plan.  I spent appropriate time with the medical record and patient,  commensurate with the level of service reviewing the records (labs, clinician notes, outside records, medical history, ordering medicine/tests/procedures, interpreting the imaging/labs previously done) and coordination of care as well as direct time with the patient today, of which greater than 50% of the time was spent in counseling and coordination of care with the patient/family.

## 2024-04-30 NOTE — PATIENT INSTRUCTIONS
Better healthcare is in your genes. Learn more about a community health research program at [employer]     Who is eligible to join?  You are eligible to participate if you are 18 years or older at the time of enrollment. You are not eligible to participate if you are a recipient of a donor bone marrow or a stem cell transplant.     Is there a cost to participate?  There is no cost to participate and health insurance is not required.    What can I learn about in this study?  You can learn about inherited risks for:                                              Common cancers: hereditary breast and ovarian cancer, and                                colorectal cancer related to Mina syndrome                              Heart disease: hereditary high cholesterol (also known as                                familial hypercholesterolemia)                             * You also have the opportunity to learn about your ancestry and                                other traits like, caffeine sensitivity, sleep patterns and more    How can I sign up? Go to [email or web address] or scan the QR Code to sign up.

## 2024-05-01 ENCOUNTER — APPOINTMENT (OUTPATIENT)
Dept: LAB | Facility: HOSPITAL | Age: 79
End: 2024-05-01
Payer: MEDICARE

## 2024-05-01 DIAGNOSIS — E53.8 VITAMIN B12 DEFICIENCY: ICD-10-CM

## 2024-05-01 DIAGNOSIS — Z79.899 MEDICATION MANAGEMENT: ICD-10-CM

## 2024-05-01 DIAGNOSIS — N18.32 STAGE 3B CHRONIC KIDNEY DISEASE (HCC): ICD-10-CM

## 2024-05-01 DIAGNOSIS — R73.9 ELEVATED BLOOD SUGAR: ICD-10-CM

## 2024-05-01 DIAGNOSIS — C61 PROSTATE CANCER (HCC): ICD-10-CM

## 2024-05-01 DIAGNOSIS — E55.9 VITAMIN D INSUFFICIENCY: ICD-10-CM

## 2024-05-01 DIAGNOSIS — R69 MULTIPLE MEDICAL PROBLEMS: ICD-10-CM

## 2024-05-01 DIAGNOSIS — I10 HYPERTENSION, ESSENTIAL: ICD-10-CM

## 2024-05-01 DIAGNOSIS — E78.2 MIXED HYPERLIPIDEMIA: ICD-10-CM

## 2024-05-01 LAB
25(OH)D3 SERPL-MCNC: 46.4 NG/ML (ref 30–100)
BACTERIA UR QL AUTO: ABNORMAL /HPF
BILIRUB UR QL STRIP: NEGATIVE
CHOLEST SERPL-MCNC: 114 MG/DL
CLARITY UR: CLEAR
COLOR UR: YELLOW
CREAT UR-MCNC: 127.5 MG/DL
ERYTHROCYTE [DISTWIDTH] IN BLOOD BY AUTOMATED COUNT: 13 % (ref 11.6–15.1)
EST. AVERAGE GLUCOSE BLD GHB EST-MCNC: 151 MG/DL
GLUCOSE UR STRIP-MCNC: ABNORMAL MG/DL
HBA1C MFR BLD: 6.9 %
HCT VFR BLD AUTO: 38.6 % (ref 36.5–49.3)
HDLC SERPL-MCNC: 42 MG/DL
HGB BLD-MCNC: 12.8 G/DL (ref 12–17)
HGB UR QL STRIP.AUTO: NEGATIVE
HYALINE CASTS #/AREA URNS LPF: ABNORMAL /LPF
KETONES UR STRIP-MCNC: NEGATIVE MG/DL
LDLC SERPL CALC-MCNC: 48 MG/DL (ref 0–100)
LEUKOCYTE ESTERASE UR QL STRIP: NEGATIVE
MAGNESIUM SERPL-MCNC: 2 MG/DL (ref 1.9–2.7)
MCH RBC QN AUTO: 32.5 PG (ref 26.8–34.3)
MCHC RBC AUTO-ENTMCNC: 33.2 G/DL (ref 31.4–37.4)
MCV RBC AUTO: 98 FL (ref 82–98)
MICROALBUMIN UR-MCNC: 121 MG/L
MICROALBUMIN/CREAT 24H UR: 95 MG/G CREATININE (ref 0–30)
MUCOUS THREADS UR QL AUTO: ABNORMAL
NITRITE UR QL STRIP: NEGATIVE
NON-SQ EPI CELLS URNS QL MICRO: ABNORMAL /HPF
NONHDLC SERPL-MCNC: 72 MG/DL
PH UR STRIP.AUTO: 6 [PH]
PLATELET # BLD AUTO: 167 THOUSANDS/UL (ref 149–390)
PMV BLD AUTO: 9.1 FL (ref 8.9–12.7)
PROT UR STRIP-MCNC: ABNORMAL MG/DL
PSA SERPL-MCNC: 8.5 NG/ML (ref 0–4)
RBC # BLD AUTO: 3.94 MILLION/UL (ref 3.88–5.62)
RBC #/AREA URNS AUTO: ABNORMAL /HPF
SP GR UR STRIP.AUTO: 1.02 (ref 1–1.03)
TRIGL SERPL-MCNC: 118 MG/DL
TSH SERPL DL<=0.05 MIU/L-ACNC: 2.44 UIU/ML (ref 0.45–4.5)
URATE SERPL-MCNC: 7.7 MG/DL (ref 3.5–8.5)
UROBILINOGEN UR QL STRIP.AUTO: 0.2 E.U./DL
VIT B12 SERPL-MCNC: 272 PG/ML (ref 180–914)
WBC # BLD AUTO: 8.06 THOUSAND/UL (ref 4.31–10.16)
WBC #/AREA URNS AUTO: ABNORMAL /HPF

## 2024-05-01 PROCEDURE — 84550 ASSAY OF BLOOD/URIC ACID: CPT

## 2024-05-01 PROCEDURE — 84153 ASSAY OF PSA TOTAL: CPT

## 2024-05-01 PROCEDURE — 84443 ASSAY THYROID STIM HORMONE: CPT

## 2024-05-01 PROCEDURE — 81001 URINALYSIS AUTO W/SCOPE: CPT | Performed by: FAMILY MEDICINE

## 2024-05-01 PROCEDURE — 82570 ASSAY OF URINE CREATININE: CPT | Performed by: FAMILY MEDICINE

## 2024-05-01 PROCEDURE — 80061 LIPID PANEL: CPT

## 2024-05-01 PROCEDURE — 80053 COMPREHEN METABOLIC PANEL: CPT

## 2024-05-01 PROCEDURE — 83735 ASSAY OF MAGNESIUM: CPT

## 2024-05-01 PROCEDURE — 36415 COLL VENOUS BLD VENIPUNCTURE: CPT

## 2024-05-01 PROCEDURE — 82607 VITAMIN B-12: CPT

## 2024-05-01 PROCEDURE — 85027 COMPLETE CBC AUTOMATED: CPT

## 2024-05-01 PROCEDURE — 82043 UR ALBUMIN QUANTITATIVE: CPT | Performed by: FAMILY MEDICINE

## 2024-05-01 PROCEDURE — 83036 HEMOGLOBIN GLYCOSYLATED A1C: CPT

## 2024-05-01 PROCEDURE — 82306 VITAMIN D 25 HYDROXY: CPT

## 2024-05-08 ENCOUNTER — TELEPHONE (OUTPATIENT)
Age: 79
End: 2024-05-08

## 2024-05-08 ENCOUNTER — TELEPHONE (OUTPATIENT)
Dept: FAMILY MEDICINE CLINIC | Facility: CLINIC | Age: 79
End: 2024-05-08

## 2024-05-08 DIAGNOSIS — J84.10 PULMONARY FIBROSIS (HCC): Primary | ICD-10-CM

## 2024-05-08 LAB
ALBUMIN SERPL BCP-MCNC: 4.1 G/DL (ref 3.5–5)
ALP SERPL-CCNC: 76 U/L (ref 34–104)
ALT SERPL W P-5'-P-CCNC: 19 U/L (ref 7–52)
ANION GAP SERPL CALCULATED.3IONS-SCNC: 9 MMOL/L (ref 4–13)
AST SERPL W P-5'-P-CCNC: 29 U/L (ref 13–39)
BILIRUB SERPL-MCNC: 0.57 MG/DL (ref 0.2–1)
BUN SERPL-MCNC: 34 MG/DL (ref 5–25)
CALCIUM SERPL-MCNC: 9.5 MG/DL (ref 8.4–10.2)
CHLORIDE SERPL-SCNC: 108 MMOL/L (ref 96–108)
CO2 SERPL-SCNC: 25 MMOL/L (ref 21–32)
CREAT SERPL-MCNC: 1.61 MG/DL (ref 0.6–1.3)
GFR SERPL CREATININE-BSD FRML MDRD: 40 ML/MIN/1.73SQ M
GLUCOSE P FAST SERPL-MCNC: 124 MG/DL (ref 65–99)
POTASSIUM SERPL-SCNC: 4.2 MMOL/L (ref 3.5–5.3)
PROT SERPL-MCNC: 6.6 G/DL (ref 6.4–8.4)
SODIUM SERPL-SCNC: 142 MMOL/L (ref 135–147)

## 2024-05-08 NOTE — TELEPHONE ENCOUNTER
Pt managed by Dr. Stallworth    PT calling to scheduled with provider asap due to PSA per PCP.    Please review recent PSA and advise when pt needs to follow up.  PT due for yearly in Sept.  Not scheduled at this time.    Please call pt back at [phone]

## 2024-05-08 NOTE — TELEPHONE ENCOUNTER
Patient last seen by Dr. Stallworth in September 2023 for prostate cancer -    1.  Newly diagnosed low risk prostate cancer  -Diagnosis: August 15, 2023 (transperineal MR fusion biopsy)  -Cristobal 3+3 equal 6 disease, 1 of 6 cores from solitary right anterior lesion, all cores on systematic biopsy were negative  - PSA at the time of diagnosis: 6.8-8.3  -Multiparametric MRI: (June 5, 2023): 38 g gland, solitary 1.4 cm right base lesion     2.  Medical comorbidities: Type 2 diabetes, COPD, pulmonary fibrosis, coronary artery disease, stage III chronic kidney disease     Overall is very happy to review the biopsy report which is quite favorable in this particular patient.  I think this cancer can safely be characterized as not clinically significant and I have recommended watchful waiting.        Most recent PSA's-    Component  Ref Range & Units 5/1/24  9:31 AM 5/26/23 10:25 AM 5/15/23 10:46 AM 10/12/22 10:35 AM 8/2/22  8:43 AM   PSA, Diagnostic  0.00 - 4.00 ng/mL 8.50 High  6.83 High  CM 8.3 High  R, CM 4.9 High  R, CM 7.1 High          Patient due for follow up in September     Please review and advise if follow up needs to be moved up

## 2024-05-08 NOTE — TELEPHONE ENCOUNTER
Pt doctor's office calling; pt is confused abt testing  Ordered - however this call was meant for pulmonary so I transferred.

## 2024-05-08 NOTE — TELEPHONE ENCOUNTER
----- Message from MARGE Ellis, DO sent at 5/5/2024 12:34 PM EDT -----  Is he following with urology?  PSA going up more now. If not, put referral in to Dr. Stallworth.  ----- Message -----  From: Lab, Background User  Sent: 5/1/2024   9:52 AM EDT  To: MARGE Ellis, DO

## 2024-05-08 NOTE — TELEPHONE ENCOUNTER
Spoke with patient and he does have a urologist. Advised to follow up with them regarding elevated PSA. Patient had no further questions at this time.

## 2024-05-15 ENCOUNTER — TELEPHONE (OUTPATIENT)
Dept: FAMILY MEDICINE CLINIC | Facility: CLINIC | Age: 79
End: 2024-05-15

## 2024-05-15 DIAGNOSIS — E53.8 B12 DEFICIENCY: Primary | ICD-10-CM

## 2024-05-15 RX ORDER — CYANOCOBALAMIN 1000 UG/ML
1000 INJECTION, SOLUTION INTRAMUSCULAR; SUBCUTANEOUS WEEKLY
Status: SHIPPED | OUTPATIENT
Start: 2024-05-20 | End: 2024-07-01

## 2024-05-15 NOTE — TELEPHONE ENCOUNTER
----- Message from MARGE Ellis DO sent at 5/12/2024  8:49 PM EDT -----  Pls tell Berhane Vit B12 is too low:  take Vit B12 1,000 mcg (otc) SUBLING (if possible) daily. AND, if he wants to come out we'ly for 4-6 wks, MA can give him a B12 shot weekly (tell him I favor that , as well as taking PO).  Also, PSA still up, but same as one yr ago (8.5) -- he has urology appt in Sept  ----- Message -----  From: Lab, Background User  Sent: 5/1/2024   9:52 AM EDT  To: MARGE Ellis DO

## 2024-05-15 NOTE — TELEPHONE ENCOUNTER
Called and discussed PCP message w pt -- he is agreeable to receiving weekly B12 injections for the next 6 wks -- just needs orders placed.

## 2024-05-28 DIAGNOSIS — J43.9 PULMONARY EMPHYSEMA, UNSPECIFIED EMPHYSEMA TYPE (HCC): ICD-10-CM

## 2024-05-29 DIAGNOSIS — I12.9 HYPERTENSIVE CHRONIC KIDNEY DISEASE WITH STAGE 1 THROUGH STAGE 4 CHRONIC KIDNEY DISEASE, OR UNSPECIFIED CHRONIC KIDNEY DISEASE: ICD-10-CM

## 2024-05-29 DIAGNOSIS — R80.1 PERSISTENT PROTEINURIA: ICD-10-CM

## 2024-05-29 DIAGNOSIS — N18.30 CHRONIC KIDNEY DISEASE, STAGE III (MODERATE) (HCC): ICD-10-CM

## 2024-05-29 DIAGNOSIS — E78.5 DYSLIPIDEMIA: ICD-10-CM

## 2024-05-29 RX ORDER — UMECLIDINIUM 62.5 UG/1
AEROSOL, POWDER ORAL
Qty: 30 EACH | Refills: 5 | Status: SHIPPED | OUTPATIENT
Start: 2024-05-29 | End: 2024-06-28

## 2024-05-29 RX ORDER — FLUTICASONE FUROATE AND VILANTEROL TRIFENATATE 100; 25 UG/1; UG/1
POWDER RESPIRATORY (INHALATION)
Qty: 60 EACH | Refills: 5 | Status: SHIPPED | OUTPATIENT
Start: 2024-05-29 | End: 2024-08-27

## 2024-05-30 RX ORDER — AMLODIPINE BESYLATE 5 MG/1
5 TABLET ORAL DAILY
Qty: 90 TABLET | Refills: 1 | Status: SHIPPED | OUTPATIENT
Start: 2024-05-30

## 2024-06-24 DIAGNOSIS — R80.1 PERSISTENT PROTEINURIA: ICD-10-CM

## 2024-06-24 DIAGNOSIS — M45.9 ANKYLOSING SPONDYLITIS OF SITE IN SPINE (HCC): ICD-10-CM

## 2024-06-24 DIAGNOSIS — E78.5 DYSLIPIDEMIA: ICD-10-CM

## 2024-06-24 DIAGNOSIS — N18.30 CHRONIC KIDNEY DISEASE, STAGE III (MODERATE) (HCC): ICD-10-CM

## 2024-06-24 DIAGNOSIS — I12.9 PARENCHYMAL RENAL HYPERTENSION, STAGE 1 THROUGH STAGE 4 OR UNSPECIFIED CHRONIC KIDNEY DISEASE: ICD-10-CM

## 2024-06-24 DIAGNOSIS — I10 HYPERTENSION, ESSENTIAL: ICD-10-CM

## 2024-06-24 DIAGNOSIS — I12.9 HYPERTENSIVE CHRONIC KIDNEY DISEASE WITH STAGE 1 THROUGH STAGE 4 CHRONIC KIDNEY DISEASE, OR UNSPECIFIED CHRONIC KIDNEY DISEASE: ICD-10-CM

## 2024-06-24 RX ORDER — AZILSARTAN KAMEDOXOMIL 40 MG/1
40 TABLET ORAL
Qty: 30 TABLET | Refills: 5 | Status: SHIPPED | OUTPATIENT
Start: 2024-06-24

## 2024-06-24 RX ORDER — SULFASALAZINE 500 MG/1
TABLET ORAL
Qty: 45 TABLET | Refills: 3 | Status: SHIPPED | OUTPATIENT
Start: 2024-06-24

## 2024-06-24 RX ORDER — AMLODIPINE BESYLATE 5 MG/1
5 TABLET ORAL DAILY
Qty: 90 TABLET | Refills: 1 | Status: SHIPPED | OUTPATIENT
Start: 2024-06-24

## 2024-06-24 RX ORDER — METOPROLOL SUCCINATE 50 MG/1
50 TABLET, EXTENDED RELEASE ORAL DAILY
Qty: 90 TABLET | Refills: 1 | Status: SHIPPED | OUTPATIENT
Start: 2024-06-24

## 2024-07-10 ENCOUNTER — TELEPHONE (OUTPATIENT)
Dept: NEPHROLOGY | Facility: CLINIC | Age: 79
End: 2024-07-10

## 2024-07-10 NOTE — PROGRESS NOTES
RENAL FOLLOW UP NOTE:.td    ASSESSMENT AND PLAN:  1. CKD stage 3.   Etiology: Diabetic nephropathy/hypertensive nephrosclerosis/arteriolar nephrosclerosis/episodes of SHAY in the past. All serologies were negative including multiple myeloma evaluation.  Baseline creatinine: 1.2-1.62  Current creatinine: 1.63 at baseline (they were higher in June 1.75)  Urine protein creatinine ratio: 0.23 g at goal  UA: Trace proteinuria, 0-1 RBCs as of 5/1/2024  Recommendations:   Treat hypertension-please see below   Treat dyslipidemia-please see below   Maintain proteinuria less than 1 g or as low as possible   Avoid nephrotoxic agents such as NSAIDs, patient counseled as such  2. Volume: Euvolemic     3. Hypertension:      Current blood pressure averages: These were taken on the right arm which are lower than the left  AM: 116/64 on the right, standing 105/61  PM: 112/59, standing 104/60  Heart rate: 50-60 range     Goal blood pressure: Less than 125/80 given CAD  Recommendations:   Push nonmedical regimen including weight loss, isotonic exercise and avoidance of salt; patient counseled as such   Medication changes today:   No changes patient's blood pressure is probably at goal given the difference in his arm in the future he will take blood pressures in the left arm  4. Electrolytes: All acceptable including potassium of 4.6  5. Mineral bone disorder:   Calcium/magnesium/phosphorus: All acceptable including magnesium of 2.4 potentially reduce supplement: 2 a day  PTH intact: 43.7 which is normal  Vitamin-D:Currently 46.4 from 5/1/2024  6. Dyslipidemia:   Goal LDL: Less than 70 given CAD   Current lipid profile: LDL 52/HDL 44/triglycerides 143  Recommendations: At goal so no changes   7. Anemia: normal hemoglobin at 13.5  8. Other problems:   Diabetes mellitus per your discretion patient was on Jardiance but apparently he got symptomatic with it he will discuss this with Dr. Ellis: I spoke with Dr. Bee who recommended Jardiance  I will pass that on to the patient  CAD followed by Dr. Hess through [facility]. Status post MI last October involving to cardiac stents. : Recent emesis of LAD lesion with repeat PTCA drug-eluting stent 2018. Circumflex artery and right coronary artery 50% stenosis. Ejection fraction 55%.   MRI 11/18/2023 [facility] status post cardiac catheterization with PTCA and stent of distal RCA.  Patent stents in the mid LAD continue medical therapy  Chest pain 6/11/2024 was musculoskeletal followed up by Dr. Bee     Sarcoidosis followed by    Kyphoscoliosis   Cirrhosis of the liver   Gout   Ankylosing spondylitis  Elevated PSA being followed by Dr. Stallworth-Dr. Carreon/Dr. Stallworth: Positive cancer will be followed at this juncture        GI health maintenance: Last colonoscopy: 2019, 5 years follow-up due June 2024    PATIENT INSTRUCTIONS:    Patient Instructions   Visit summary:  - Overall labs are stable!    - I believe your blood pressure is doing well, please make sure to remember to use your left arm for blood pressure readings only as they are the higher readings        1. Medication changes today:  None today    2.  General instructions:  Avoid salt  Try to be as active as possible given limitations of your breathing.  The more active you are at the better!        3.  Please take 1 week a blood pressure readings in 3 months please see below    AS FOLLOWS  MORNING AND EVENING, SITTING AND STANDING as follows:  TAKE THE MORNING READINGS BEFORE ANY MEDICATIONS AND WHEN YOU ARE RELAXED FOR SEVERAL MINUTES  TAKE THE EVENING READINGS:  BETWEEN 7-10 P.M.; PRIOR TO ANY MEDICATIONS; AT LEAST IN OUR  FROM DINNER; AND CERTAINLY AFTER RELAXING FOR A FEW MINUTES  PLEASE INCLUDE HEART RATE WITH YOUR BLOOD PRESSURE READINGS  When taking standing readings, keep your arm supported at heart level and not dangling  Make sure you are sitting with your back supported and feet on the ground  and do not cross your legs or feet  Make sure you have not taken any coffee or caffeine products or exercised or smoke cigarettes at least 30 minutes before taking your blood pressure  Then please mail these readings into the office      4.  In 3 months:  Please go for nonfasting lab work but in the morning  Please take 1 week a blood pressure readings as outlined above and mail those into the office      5.  Follow-up in 6 months  Please bring in 1 week a blood pressure readings morning evening, sitting and standing is outlined above  PLEASE BRING AN YOUR BLOOD PRESSURE MACHINE TO CORRELATE WITH THE OFFICE MACHINE AT THIS NEXT SCHEDULED VISIT  Please go for fasting lab work 1-2 weeks prior to your appointment      6.  General non medical recommendations:  AVOID SALT BUT NOT ADDING AN READING LABELS TO MAKE SURE THERE IS LOW-SALT IN THE FOOD THAT YOU ARE EATING  Goal is less than 2 g of sodium intake or less than 5 g of sodium chloride intake per day    Avoid nonsteroidal anti-inflammatory drugs such as Naprosyn, ibuprofen, Aleve, Advil, Celebrex, Meloxicam (Mobic) etc.  You can use Tylenol as needed if you do not have any liver condition to be concerned about    Avoid medications such as Sudafed or decongestants and antihistamines that contained the D component which is the decongestant.  You can take antihistamines without the decongestant or D component.    Try to avoid medications such as pantoprazole or  Protonix/Nexium or Esomeprazole)/Prilosec or omeprazole/Prevacid or lansoprazole/AcipHex or Rabeprazole.  If you are able to, use Pepcid as this is safer for your kidneys.    Try to exercise at least 30 minutes 3 days a week to begin with with an ultimate goal of 5 days a week for at least 30 minutes    Please do not drink more than 2 glasses of alcohol/wine on a daily basis as this may contribute to your high blood pressure.            Subjective:   Musculoskeletal chest pain 6/11/2024  The patient overall is  feeling well.  No fevers, chills, or cough or colds.  Good appetite and good energy  No hematuria, dysuria, voiding symptoms; minor bubbles  No gastrointestinal symptoms  No chest pain, dyspnea on exertion unchanged, very minimal swelling  No headaches, occasional dizziness or lightheadedness upon standing at first but he gets himself steady first (unchanged)  Blood pressure medications:  Edarbi 20 mg daily at 5 PM  Chlorthalidone 12.5 mg daily in the morning  Toprol-XL 50 mg daily in the morning  Amlodipine 5 mg daily in the morning  Isosorbide 30 mg daily in the morning    Renal pertinent medications:  Sulfasalazine 750 mg daily  Aspirin 81 mg daily/Plavix 75 mg daily  Atorvastatin 80 mg daily  Colchicine 0.6 mg daily  Slow-Mag 2 tablets twice a day  Iron daily in the morning  Vitamin D 2000 units daily  Allopurinol 150 mg daily  Jardiance 10 mg daily    ROS:  See HPI, otherwise review of systems as completely reviewed with the patient are negative    Past Medical History:   Diagnosis Date    Anemia     Arthritis     Gee's esophagus     Breast lump     Cancer (HCC)     Chronic kidney disease     Cirrhosis (HCC)     Coronary artery disease     cardiac cath; stents     Diabetes mellitus (HCC)     H/O heart artery stent     Hypertension     Inguinal hernia     Right    Sarcoidosis     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HCC)     Skin cancer     SOB (shortness of breath)     Upper respiratory infection, viral 5/31/2022     Past Surgical History:   Procedure Laterality Date    CARDIAC CATHETERIZATION      CATARACT EXTRACTION, BILATERAL      left eye 05/08 and right 5/22/2022    CERVICAL FUSION      CHOLECYSTECTOMY      COLONOSCOPY  04/11/2016    COLONOSCOPY      EGD AND COLONOSCOPY  06/24/2019    ERCP  09/11/2014    transab esop hiat griffin rpr    ESOPHAGOGASTRIC FUNDOPLASTY      HERNIA REPAIR Right     p I/griffin init reduc >5 yr    HIP SURGERY Right     Replacement    KIDNEY STONE SURGERY      Fragmenting    LIVER BIOPSY       "MULTIPLE TOOTH EXTRACTIONS      \"extraction of all maxillary teeth\"    AR BX PROSTATE STRTCTC SATURATION SAMPLING IMG GID N/A 08/15/2023    Procedure: TRANSPERINEAL MRI FUSION BIOPSY PROSTATE;  Surgeon: Cory Carreon MD;  Location: [facility];  Service: Urology    SKIN BIOPSY  05/2020    THUMB FUSION      with graft    TONSILLECTOMY      UPPER GASTROINTESTINAL ENDOSCOPY      US GUIDANCE  10/22/2014     Family History   Problem Relation Age of Onset    Hypertension Mother     Atrial fibrillation Mother     Heart disease Mother     Kidney disease Father     COPD Father     Heart disease Father     Asthma Neg Hx     Lung cancer Neg Hx       reports that he has never smoked. He has never used smokeless tobacco. He reports that he does not drink alcohol and does not use drugs.    I COMPLETELY REVIEWED THE PAST MEDICAL HISTORY/PAST SURGICAL HISTORY/SOCIAL HISTORY/FAMILY HISTORY/AND MEDICATIONS  AND UPDATED ALL    Objective:     Vitals:   BP sitting on right: 112/50 versus the left which is 124/58  BP standing on left: 122/68 with a heart rate of 64 and regular  Vitals:    07/16/24 0918   BP: (!) 110/49   Pulse: 60       Weight (last 2 days)       Date/Time Weight    07/16/24 0918 78 (172)          Wt Readings from Last 3 Encounters:   07/16/24 78 kg (172 lb)   04/30/24 79.4 kg (175 lb)   04/09/24 82.6 kg (182 lb)       Body mass index is 23.99 kg/m².    Physical Exam: General:  No acute distress  Skin:  No acute rash  Eyes:  No scleral icterus, noninjected, no discharge from eyes  ENT:  Moist mucous membranes  Neck:  Supple, no jugular venous distention, trachea is midline, no lymphadenopathy and no thyromegaly  Back   No CVAT/scoliosis  Chest:  Clear to auscultation and percussion, good respiratory effort  CVS:  Regular rate and rhythm without a rub, or gallops or murmurs  Abdomen:  Soft and nontender with normal bowel sounds  Extremities:  No cyanosis and no edema, no arthritic changes, normal range of motion  Neuro:  " "Grossly intact  Psych:  Alert, oriented x3 and appropriate      Medications:    Current Outpatient Medications:     allopurinol (ZYLOPRIM) 300 mg tablet, \"ONE-HALF\" TABLET EVERY DAY, Disp: 45 tablet, Rfl: 3    amLODIPine (NORVASC) 5 mg tablet, TAKE 1 TABLET (5 MG TOTAL) BY MOUTH DAILY., Disp: 90 tablet, Rfl: 1    Aspirin 81 MG CAPS, Take 81 mg by mouth in the morning, Disp: , Rfl:     atorvastatin (LIPITOR) 80 mg tablet, TAKE 1 TABLET (80 MG TOTAL) BY MOUTH DAILY, Disp: 90 tablet, Rfl: 3    azilsartan medoxomil (Edarbi) 40 MG tablet, TAKE 1 TABLET (40 MG TOTAL) BY MOUTH DAILY AFTER DINNER (Patient taking differently: Take 20 mg by mouth daily after dinner), Disp: 30 tablet, Rfl: 5    b complex vitamins capsule, Take 1 capsule by mouth daily, Disp: , Rfl:     Breo Ellipta 100-25 MCG/ACT inhaler, INHALE 1 PUFF DAILY RINSE MOUTH AFTER USE., Disp: 60 each, Rfl: 5    chlorthalidone 25 mg tablet, TAKE \"ONE-HALF\" TABLET (12.5 MG) BY MOUTH DAILY, Disp: 45 tablet, Rfl: 4    Cholecalciferol (VITAMIN D3) 2000 units TABS, Take 1 tablet by mouth daily, Disp: , Rfl:     clopidogrel (Plavix) 75 mg tablet, Take 1 tablet (75 mg total) by mouth daily, Disp: 30 tablet, Rfl: 6    co-enzyme Q-10 50 MG capsule, Take 100 mg by mouth in the morning., Disp: , Rfl:     colchicine (COLCRYS) 0.6 mg tablet, TAKE 1 TABLET (0.6 MG TOTAL) BY MOUTH DAILY, Disp: 30 tablet, Rfl: 3    dexlansoprazole (DEXILANT) 60 MG capsule, Take 1 capsule (60 mg total) by mouth daily, Disp: 90 capsule, Rfl: 3    Empagliflozin (Jardiance) 10 MG TABS tablet, Take 10 mg by mouth every morning, Disp: , Rfl:     famotidine (PEPCID) 40 MG tablet, Take 1 tablet (40 mg total) by mouth daily Take 1 tablet with dinner, Disp: 90 tablet, Rfl: 2    Ferrous Sulfate (IRON) 325 (65 Fe) MG TABS, Take 1 tablet by mouth daily, Disp: , Rfl:     Incruse Ellipta 62.5 MCG/ACT AEPB inhaler, INHALE 1 PUFF DAILY, Disp: 30 each, Rfl: 5    isosorbide mononitrate (IMDUR) 30 mg 24 hr tablet, " "Take 30 mg by mouth daily, Disp: , Rfl:     magnesium chloride-calcium (SLOW-MAG) 71.5-119 mg, Take 2 tablets by mouth 2 (two) times a day, Disp: , Rfl:     metoprolol succinate (TOPROL-XL) 50 mg 24 hr tablet, TAKE 1 TABLET (50 MG TOTAL) BY MOUTH DAILY, Disp: 90 tablet, Rfl: 1    sitaGLIPtin (Januvia) 100 mg tablet, TAKE 1 TABLET (100 MG TOTAL) BY MOUTH DAILY, Disp: 30 tablet, Rfl: 5    sulfaSALAzine (AZULFIDINE) 500 mg tablet, ONE AND \"ONE-HALF\" TABLETS (75OMG) EVERY DAY, Disp: 45 tablet, Rfl: 3    vitamin B-12 (VITAMIN B-12) 1,000 mcg tablet, Take 2,500 mcg by mouth daily, Disp: , Rfl:     albuterol (PROVENTIL HFA,VENTOLIN HFA) 90 mcg/act inhaler, Inhale 2 puffs every 6 (six) hours as needed for wheezing or shortness of breath (Patient not taking: Reported on 7/16/2024), Disp: 6.7 g, Rfl: 2    ipratropium-albuterol (DUO-NEB) 0.5-2.5 mg/3 mL nebulizer solution, Take 3 mL by nebulization 4 (four) times a day (Patient not taking: Reported on 4/9/2024), Disp: 360 mL, Rfl: 3    ticagrelor (Brilinta) 90 MG, Take by mouth every 12 (twelve) hours (Patient not taking: Reported on 7/16/2024), Disp: , Rfl:     Lab, Imaging and other studies: I have personally reviewed pertinent labs.  Laboratory Results:  Results for orders placed or performed in visit on 07/12/24   Comprehensive metabolic panel   Result Value Ref Range    Sodium 139 135 - 147 mmol/L    Potassium 4.6 3.5 - 5.3 mmol/L    Chloride 104 96 - 108 mmol/L    CO2 26 21 - 32 mmol/L    ANION GAP 9 4 - 13 mmol/L    BUN 32 (H) 5 - 25 mg/dL    Creatinine 1.63 (H) 0.60 - 1.30 mg/dL    Glucose, Fasting 121 (H) 65 - 99 mg/dL    Calcium 9.7 8.4 - 10.2 mg/dL    AST 38 13 - 39 U/L    ALT 21 7 - 52 U/L    Alkaline Phosphatase 75 34 - 104 U/L    Total Protein 7.2 6.4 - 8.4 g/dL    Albumin 4.3 3.5 - 5.0 g/dL    Total Bilirubin 0.63 0.20 - 1.00 mg/dL    eGFR 40 ml/min/1.73sq m   CBC   Result Value Ref Range    WBC 8.19 4.31 - 10.16 Thousand/uL    RBC 4.08 3.88 - 5.62 Million/uL    " "Hemoglobin 13.5 12.0 - 17.0 g/dL    Hematocrit 40.6 36.5 - 49.3 %     (H) 82 - 98 fL    MCH 33.1 26.8 - 34.3 pg    MCHC 33.3 31.4 - 37.4 g/dL    RDW 13.6 11.6 - 15.1 %    Platelets 168 149 - 390 Thousands/uL    MPV 9.6 8.9 - 12.7 fL   Lipid Panel with Direct LDL reflex   Result Value Ref Range    Cholesterol 125 See Comment mg/dL    Triglycerides 143 See Comment mg/dL    HDL, Direct 44 >=40 mg/dL    LDL Calculated 52 0 - 100 mg/dL   Magnesium   Result Value Ref Range    Magnesium 2.4 1.9 - 2.7 mg/dL   Protein / creatinine ratio, urine   Result Value Ref Range    Creatinine, Ur 56.7 Reference range not established. mg/dL    Protein Urine Random 13 Reference range not established. mg/dL    Prot/Creat Ratio, Ur 0.23 (H) 0.00 - 0.10   PTH, intact   Result Value Ref Range    PTH 43.7 12.0 - 88.0 pg/mL       Results from last 7 days   Lab Units 07/12/24  0932   WBC Thousand/uL 8.19   HEMOGLOBIN g/dL 13.5   HEMATOCRIT % 40.6   PLATELETS Thousands/uL 168   POTASSIUM mmol/L 4.6   CHLORIDE mmol/L 104   CO2 mmol/L 26   BUN mg/dL 32*   CREATININE mg/dL 1.63*   CALCIUM mg/dL 9.7   MAGNESIUM mg/dL 2.4         Radiology review:   chest X-ray    Ultrasound      Portions of the record may have been created with voice recognition software.  Occasional wrong word or \"sound a like\" substitutions may have occurred due to the inherent limitations of voice recognition software.  Read the chart carefully and recognize, using context, where substitutions have occurred.                    "

## 2024-07-10 NOTE — TELEPHONE ENCOUNTER
Spoke with patient reminding him to go for lab work and bring BP readings to his appt on 7/16.  He expressed understanding and thanked us for the reminder call.

## 2024-07-12 ENCOUNTER — APPOINTMENT (OUTPATIENT)
Dept: LAB | Facility: HOSPITAL | Age: 79
End: 2024-07-12
Payer: MEDICARE

## 2024-07-12 DIAGNOSIS — R80.1 PERSISTENT PROTEINURIA: ICD-10-CM

## 2024-07-12 DIAGNOSIS — N18.32 STAGE 3B CHRONIC KIDNEY DISEASE (HCC): ICD-10-CM

## 2024-07-12 DIAGNOSIS — E87.5 HYPERKALEMIA: ICD-10-CM

## 2024-07-12 DIAGNOSIS — E11.21 DIABETIC NEPHROPATHY ASSOCIATED WITH TYPE 2 DIABETES MELLITUS (HCC): ICD-10-CM

## 2024-07-12 DIAGNOSIS — E78.5 DYSLIPIDEMIA: ICD-10-CM

## 2024-07-12 DIAGNOSIS — I12.9 HYPERTENSIVE CHRONIC KIDNEY DISEASE WITH STAGE 1 THROUGH STAGE 4 CHRONIC KIDNEY DISEASE, OR UNSPECIFIED CHRONIC KIDNEY DISEASE: ICD-10-CM

## 2024-07-12 LAB
ALBUMIN SERPL BCG-MCNC: 4.3 G/DL (ref 3.5–5)
ALP SERPL-CCNC: 75 U/L (ref 34–104)
ALT SERPL W P-5'-P-CCNC: 21 U/L (ref 7–52)
ANION GAP SERPL CALCULATED.3IONS-SCNC: 9 MMOL/L (ref 4–13)
AST SERPL W P-5'-P-CCNC: 38 U/L (ref 13–39)
BILIRUB SERPL-MCNC: 0.63 MG/DL (ref 0.2–1)
BUN SERPL-MCNC: 32 MG/DL (ref 5–25)
CALCIUM SERPL-MCNC: 9.7 MG/DL (ref 8.4–10.2)
CHLORIDE SERPL-SCNC: 104 MMOL/L (ref 96–108)
CHOLEST SERPL-MCNC: 125 MG/DL
CO2 SERPL-SCNC: 26 MMOL/L (ref 21–32)
CREAT SERPL-MCNC: 1.63 MG/DL (ref 0.6–1.3)
CREAT UR-MCNC: 56.7 MG/DL
ERYTHROCYTE [DISTWIDTH] IN BLOOD BY AUTOMATED COUNT: 13.6 % (ref 11.6–15.1)
GFR SERPL CREATININE-BSD FRML MDRD: 40 ML/MIN/1.73SQ M
GLUCOSE P FAST SERPL-MCNC: 121 MG/DL (ref 65–99)
HCT VFR BLD AUTO: 40.6 % (ref 36.5–49.3)
HDLC SERPL-MCNC: 44 MG/DL
HGB BLD-MCNC: 13.5 G/DL (ref 12–17)
LDLC SERPL CALC-MCNC: 52 MG/DL (ref 0–100)
MAGNESIUM SERPL-MCNC: 2.4 MG/DL (ref 1.9–2.7)
MCH RBC QN AUTO: 33.1 PG (ref 26.8–34.3)
MCHC RBC AUTO-ENTMCNC: 33.3 G/DL (ref 31.4–37.4)
MCV RBC AUTO: 100 FL (ref 82–98)
PLATELET # BLD AUTO: 168 THOUSANDS/UL (ref 149–390)
PMV BLD AUTO: 9.6 FL (ref 8.9–12.7)
POTASSIUM SERPL-SCNC: 4.6 MMOL/L (ref 3.5–5.3)
PROT SERPL-MCNC: 7.2 G/DL (ref 6.4–8.4)
PROT UR-MCNC: 13 MG/DL
PROT/CREAT UR: 0.23 MG/G{CREAT} (ref 0–0.1)
PTH-INTACT SERPL-MCNC: 43.7 PG/ML (ref 12–88)
RBC # BLD AUTO: 4.08 MILLION/UL (ref 3.88–5.62)
SODIUM SERPL-SCNC: 139 MMOL/L (ref 135–147)
TRIGL SERPL-MCNC: 143 MG/DL
WBC # BLD AUTO: 8.19 THOUSAND/UL (ref 4.31–10.16)

## 2024-07-12 PROCEDURE — 85027 COMPLETE CBC AUTOMATED: CPT

## 2024-07-12 PROCEDURE — 83970 ASSAY OF PARATHORMONE: CPT

## 2024-07-12 PROCEDURE — 80061 LIPID PANEL: CPT

## 2024-07-12 PROCEDURE — 36415 COLL VENOUS BLD VENIPUNCTURE: CPT

## 2024-07-12 PROCEDURE — 83735 ASSAY OF MAGNESIUM: CPT

## 2024-07-12 PROCEDURE — 80053 COMPREHEN METABOLIC PANEL: CPT

## 2024-07-12 PROCEDURE — 82570 ASSAY OF URINE CREATININE: CPT

## 2024-07-12 PROCEDURE — 84156 ASSAY OF PROTEIN URINE: CPT

## 2024-07-16 ENCOUNTER — OFFICE VISIT (OUTPATIENT)
Dept: NEPHROLOGY | Facility: CLINIC | Age: 79
End: 2024-07-16
Payer: MEDICARE

## 2024-07-16 VITALS
WEIGHT: 172 LBS | SYSTOLIC BLOOD PRESSURE: 110 MMHG | DIASTOLIC BLOOD PRESSURE: 49 MMHG | HEART RATE: 60 BPM | BODY MASS INDEX: 24.08 KG/M2 | HEIGHT: 71 IN

## 2024-07-16 DIAGNOSIS — R80.1 PERSISTENT PROTEINURIA: ICD-10-CM

## 2024-07-16 DIAGNOSIS — I12.9 PARENCHYMAL RENAL HYPERTENSION, STAGE 1 THROUGH STAGE 4 OR UNSPECIFIED CHRONIC KIDNEY DISEASE: ICD-10-CM

## 2024-07-16 DIAGNOSIS — I12.9 HYPERTENSIVE CHRONIC KIDNEY DISEASE WITH STAGE 1 THROUGH STAGE 4 CHRONIC KIDNEY DISEASE, OR UNSPECIFIED CHRONIC KIDNEY DISEASE: Primary | ICD-10-CM

## 2024-07-16 DIAGNOSIS — E11.21 DIABETIC NEPHROPATHY ASSOCIATED WITH TYPE 2 DIABETES MELLITUS (HCC): ICD-10-CM

## 2024-07-16 DIAGNOSIS — N18.31 STAGE 3A CHRONIC KIDNEY DISEASE (HCC): ICD-10-CM

## 2024-07-16 DIAGNOSIS — E78.5 DYSLIPIDEMIA: ICD-10-CM

## 2024-07-16 PROCEDURE — G2211 COMPLEX E/M VISIT ADD ON: HCPCS | Performed by: INTERNAL MEDICINE

## 2024-07-16 PROCEDURE — 99214 OFFICE O/P EST MOD 30 MIN: CPT | Performed by: INTERNAL MEDICINE

## 2024-07-16 RX ORDER — ISOSORBIDE MONONITRATE 30 MG/1
30 TABLET, EXTENDED RELEASE ORAL DAILY
COMMUNITY

## 2024-07-16 RX ORDER — LANOLIN ALCOHOL/MO/W.PET/CERES
2500 CREAM (GRAM) TOPICAL DAILY
COMMUNITY

## 2024-07-16 NOTE — LETTER
July 16, 2024     MARGE Ellis DO  [address]  [address]  [facility] [address]    Patient: Bean Lopez   YOB: 1945   Date of Visit: 7/16/2024       Dear Dr. Ellis:    Thank you for referring Bean Lopez to me for evaluation. Below are my notes for this consultation.    If you have questions, please do not hesitate to call me. I look forward to following your patient along with you.         Sincerely,        Misael Yun MD        CC: MD Misael Beard MD  7/16/2024  9:52 AM  Sign when Signing Visit  RENAL FOLLOW UP NOTE:.td    ASSESSMENT AND PLAN:  1. CKD stage 3.   Etiology: Diabetic nephropathy/hypertensive nephrosclerosis/arteriolar nephrosclerosis/episodes of SHAY in the past. All serologies were negative including multiple myeloma evaluation.  Baseline creatinine: 1.2-1.62  Current creatinine: 1.63 at baseline (they were higher in June 1.75)  Urine protein creatinine ratio: 0.23 g at goal  UA: Trace proteinuria, 0-1 RBCs as of 5/1/2024  Recommendations:   Treat hypertension-please see below   Treat dyslipidemia-please see below   Maintain proteinuria less than 1 g or as low as possible   Avoid nephrotoxic agents such as NSAIDs, patient counseled as such  2. Volume: Euvolemic     3. Hypertension:      Current blood pressure averages: These were taken on the right arm which are lower than the left  AM: 116/64 on the right, standing 105/61  PM: 112/59, standing 104/60  Heart rate: 50-60 range     Goal blood pressure: Less than 125/80 given CAD  Recommendations:   Push nonmedical regimen including weight loss, isotonic exercise and avoidance of salt; patient counseled as such   Medication changes today:   No changes patient's blood pressure is probably at goal given the difference in his arm in the future he will take blood pressures in the left arm  4. Electrolytes: All acceptable including potassium of 4.6  5. Mineral bone disorder:   Calcium/magnesium/phosphorus: All  acceptable including magnesium of 2.4 potentially reduce supplement: 2 a day  PTH intact: 43.7 which is normal  Vitamin-D:Currently 46.4 from 5/1/2024  6. Dyslipidemia:   Goal LDL: Less than 70 given CAD   Current lipid profile: LDL 52/HDL 44/triglycerides 143  Recommendations: At goal so no changes   7. Anemia: normal hemoglobin at 13.5  8. Other problems:   Diabetes mellitus per your discretion patient was on Jardiance but apparently he got symptomatic with it he will discuss this with Dr. Ellis: I spoke with Dr. Bee who recommended Jardiance I will pass that on to the patient  CAD followed by Dr. Hess through [facility]. Status post MI last October involving to cardiac stents. : Recent emesis of LAD lesion with repeat PTCA drug-eluting stent 2018. Circumflex artery and right coronary artery 50% stenosis. Ejection fraction 55%.   MRI 11/18/2023 [facility] status post cardiac catheterization with PTCA and stent of distal RCA.  Patent stents in the mid LAD continue medical therapy  Chest pain 6/11/2024 was musculoskeletal followed up by Dr. Bee     Sarcoidosis followed by    Kyphoscoliosis   Cirrhosis of the liver   Gout   Ankylosing spondylitis  Elevated PSA being followed by Dr. Stallworth-Dr. Carreon/Dr. Stallworth: Positive cancer will be followed at this juncture        GI health maintenance: Last colonoscopy: 2019, 5 years follow-up due June 2024    PATIENT INSTRUCTIONS:    Patient Instructions   Visit summary:  - Overall labs are stable!    - I believe your blood pressure is doing well, please make sure to remember to use your left arm for blood pressure readings only as they are the higher readings        1. Medication changes today:  None today    2.  General instructions:  Avoid salt  Try to be as active as possible given limitations of your breathing.  The more active you are at the better!        3.  Please take 1 week a blood pressure readings in 3 months please see  below    AS FOLLOWS  MORNING AND EVENING, SITTING AND STANDING as follows:  TAKE THE MORNING READINGS BEFORE ANY MEDICATIONS AND WHEN YOU ARE RELAXED FOR SEVERAL MINUTES  TAKE THE EVENING READINGS:  BETWEEN 7-10 P.M.; PRIOR TO ANY MEDICATIONS; AT LEAST IN OUR  FROM DINNER; AND CERTAINLY AFTER RELAXING FOR A FEW MINUTES  PLEASE INCLUDE HEART RATE WITH YOUR BLOOD PRESSURE READINGS  When taking standing readings, keep your arm supported at heart level and not dangling  Make sure you are sitting with your back supported and feet on the ground and do not cross your legs or feet  Make sure you have not taken any coffee or caffeine products or exercised or smoke cigarettes at least 30 minutes before taking your blood pressure  Then please mail these readings into the office      4.  In 3 months:  Please go for nonfasting lab work but in the morning  Please take 1 week a blood pressure readings as outlined above and mail those into the office      5.  Follow-up in 6 months  Please bring in 1 week a blood pressure readings morning evening, sitting and standing is outlined above  PLEASE BRING AN YOUR BLOOD PRESSURE MACHINE TO CORRELATE WITH THE OFFICE MACHINE AT THIS NEXT SCHEDULED VISIT  Please go for fasting lab work 1-2 weeks prior to your appointment      6.  General non medical recommendations:  AVOID SALT BUT NOT ADDING AN READING LABELS TO MAKE SURE THERE IS LOW-SALT IN THE FOOD THAT YOU ARE EATING  Goal is less than 2 g of sodium intake or less than 5 g of sodium chloride intake per day    Avoid nonsteroidal anti-inflammatory drugs such as Naprosyn, ibuprofen, Aleve, Advil, Celebrex, Meloxicam (Mobic) etc.  You can use Tylenol as needed if you do not have any liver condition to be concerned about    Avoid medications such as Sudafed or decongestants and antihistamines that contained the D component which is the decongestant.  You can take antihistamines without the decongestant or D component.    Try to avoid  medications such as pantoprazole or  Protonix/Nexium or Esomeprazole)/Prilosec or omeprazole/Prevacid or lansoprazole/AcipHex or Rabeprazole.  If you are able to, use Pepcid as this is safer for your kidneys.    Try to exercise at least 30 minutes 3 days a week to begin with with an ultimate goal of 5 days a week for at least 30 minutes    Please do not drink more than 2 glasses of alcohol/wine on a daily basis as this may contribute to your high blood pressure.            Subjective:   Musculoskeletal chest pain 6/11/2024  The patient overall is feeling well.  No fevers, chills, or cough or colds.  Good appetite and good energy  No hematuria, dysuria, voiding symptoms; minor bubbles  No gastrointestinal symptoms  No chest pain, dyspnea on exertion unchanged, very minimal swelling  No headaches, occasional dizziness or lightheadedness upon standing at first but he gets himself steady first (unchanged)  Blood pressure medications:  Edarbi 20 mg daily at 5 PM  Chlorthalidone 12.5 mg daily in the morning  Toprol-XL 50 mg daily in the morning  Amlodipine 5 mg daily in the morning  Isosorbide 30 mg daily in the morning    Renal pertinent medications:  Sulfasalazine 750 mg daily  Aspirin 81 mg daily/Plavix 75 mg daily  Atorvastatin 80 mg daily  Colchicine 0.6 mg daily  Slow-Mag 2 tablets twice a day  Iron daily in the morning  Vitamin D 2000 units daily  Allopurinol 150 mg daily  Jardiance 10 mg daily    ROS:  See HPI, otherwise review of systems as completely reviewed with the patient are negative    Past Medical History:   Diagnosis Date   • Anemia    • Arthritis    • Gee's esophagus    • Breast lump    • Cancer (HCC)    • Chronic kidney disease    • Cirrhosis (HCC)    • Coronary artery disease     cardiac cath; stents    • Diabetes mellitus (HCC)    • H/O heart artery stent    • Hypertension    • Inguinal hernia     Right   • Sarcoidosis    • 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HCC)    • Skin cancer    • SOB (shortness of breath)  "   • Upper respiratory infection, viral 5/31/2022     Past Surgical History:   Procedure Laterality Date   • CARDIAC CATHETERIZATION     • CATARACT EXTRACTION, BILATERAL      left eye 05/08 and right 5/22/2022   • CERVICAL FUSION     • CHOLECYSTECTOMY     • COLONOSCOPY  04/11/2016   • COLONOSCOPY     • EGD AND COLONOSCOPY  06/24/2019   • ERCP  09/11/2014    transab esop hiat griffin rpr   • ESOPHAGOGASTRIC FUNDOPLASTY     • HERNIA REPAIR Right     p I/griffin init reduc >5 yr   • HIP SURGERY Right     Replacement   • KIDNEY STONE SURGERY      Fragmenting   • LIVER BIOPSY     • MULTIPLE TOOTH EXTRACTIONS      \"extraction of all maxillary teeth\"   • AR BX PROSTATE STRTCTC SATURATION SAMPLING IMG GID N/A 08/15/2023    Procedure: TRANSPERINEAL MRI FUSION BIOPSY PROSTATE;  Surgeon: Cory Carreon MD;  Location: [facility];  Service: Urology   • SKIN BIOPSY  05/2020   • THUMB FUSION      with graft   • TONSILLECTOMY     • UPPER GASTROINTESTINAL ENDOSCOPY     • US GUIDANCE  10/22/2014     Family History   Problem Relation Age of Onset   • Hypertension Mother    • Atrial fibrillation Mother    • Heart disease Mother    • Kidney disease Father    • COPD Father    • Heart disease Father    • Asthma Neg Hx    • Lung cancer Neg Hx       reports that he has never smoked. He has never used smokeless tobacco. He reports that he does not drink alcohol and does not use drugs.    I COMPLETELY REVIEWED THE PAST MEDICAL HISTORY/PAST SURGICAL HISTORY/SOCIAL HISTORY/FAMILY HISTORY/AND MEDICATIONS  AND UPDATED ALL    Objective:     Vitals:   BP sitting on right: 112/50 versus the left which is 124/58  BP standing on left: 122/68 with a heart rate of 64 and regular  Vitals:    07/16/24 0918   BP: (!) 110/49   Pulse: 60       Weight (last 2 days)       Date/Time Weight    07/16/24 0918 78 (172)          Wt Readings from Last 3 Encounters:   07/16/24 78 kg (172 lb)   04/30/24 79.4 kg (175 lb)   04/09/24 82.6 kg (182 lb)       Body mass index is 23.99 " "kg/m².    Physical Exam: General:  No acute distress  Skin:  No acute rash  Eyes:  No scleral icterus, noninjected, no discharge from eyes  ENT:  Moist mucous membranes  Neck:  Supple, no jugular venous distention, trachea is midline, no lymphadenopathy and no thyromegaly  Back   No CVAT/scoliosis  Chest:  Clear to auscultation and percussion, good respiratory effort  CVS:  Regular rate and rhythm without a rub, or gallops or murmurs  Abdomen:  Soft and nontender with normal bowel sounds  Extremities:  No cyanosis and no edema, no arthritic changes, normal range of motion  Neuro:  Grossly intact  Psych:  Alert, oriented x3 and appropriate      Medications:    Current Outpatient Medications:   •  allopurinol (ZYLOPRIM) 300 mg tablet, \"ONE-HALF\" TABLET EVERY DAY, Disp: 45 tablet, Rfl: 3  •  amLODIPine (NORVASC) 5 mg tablet, TAKE 1 TABLET (5 MG TOTAL) BY MOUTH DAILY., Disp: 90 tablet, Rfl: 1  •  Aspirin 81 MG CAPS, Take 81 mg by mouth in the morning, Disp: , Rfl:   •  atorvastatin (LIPITOR) 80 mg tablet, TAKE 1 TABLET (80 MG TOTAL) BY MOUTH DAILY, Disp: 90 tablet, Rfl: 3  •  azilsartan medoxomil (Edarbi) 40 MG tablet, TAKE 1 TABLET (40 MG TOTAL) BY MOUTH DAILY AFTER DINNER (Patient taking differently: Take 20 mg by mouth daily after dinner), Disp: 30 tablet, Rfl: 5  •  b complex vitamins capsule, Take 1 capsule by mouth daily, Disp: , Rfl:   •  Breo Ellipta 100-25 MCG/ACT inhaler, INHALE 1 PUFF DAILY RINSE MOUTH AFTER USE., Disp: 60 each, Rfl: 5  •  chlorthalidone 25 mg tablet, TAKE \"ONE-HALF\" TABLET (12.5 MG) BY MOUTH DAILY, Disp: 45 tablet, Rfl: 4  •  Cholecalciferol (VITAMIN D3) 2000 units TABS, Take 1 tablet by mouth daily, Disp: , Rfl:   •  clopidogrel (Plavix) 75 mg tablet, Take 1 tablet (75 mg total) by mouth daily, Disp: 30 tablet, Rfl: 6  •  co-enzyme Q-10 50 MG capsule, Take 100 mg by mouth in the morning., Disp: , Rfl:   •  colchicine (COLCRYS) 0.6 mg tablet, TAKE 1 TABLET (0.6 MG TOTAL) BY MOUTH DAILY, " "Disp: 30 tablet, Rfl: 3  •  dexlansoprazole (DEXILANT) 60 MG capsule, Take 1 capsule (60 mg total) by mouth daily, Disp: 90 capsule, Rfl: 3  •  Empagliflozin (Jardiance) 10 MG TABS tablet, Take 10 mg by mouth every morning, Disp: , Rfl:   •  famotidine (PEPCID) 40 MG tablet, Take 1 tablet (40 mg total) by mouth daily Take 1 tablet with dinner, Disp: 90 tablet, Rfl: 2  •  Ferrous Sulfate (IRON) 325 (65 Fe) MG TABS, Take 1 tablet by mouth daily, Disp: , Rfl:   •  Incruse Ellipta 62.5 MCG/ACT AEPB inhaler, INHALE 1 PUFF DAILY, Disp: 30 each, Rfl: 5  •  isosorbide mononitrate (IMDUR) 30 mg 24 hr tablet, Take 30 mg by mouth daily, Disp: , Rfl:   •  magnesium chloride-calcium (SLOW-MAG) 71.5-119 mg, Take 2 tablets by mouth 2 (two) times a day, Disp: , Rfl:   •  metoprolol succinate (TOPROL-XL) 50 mg 24 hr tablet, TAKE 1 TABLET (50 MG TOTAL) BY MOUTH DAILY, Disp: 90 tablet, Rfl: 1  •  sitaGLIPtin (Januvia) 100 mg tablet, TAKE 1 TABLET (100 MG TOTAL) BY MOUTH DAILY, Disp: 30 tablet, Rfl: 5  •  sulfaSALAzine (AZULFIDINE) 500 mg tablet, ONE AND \"ONE-HALF\" TABLETS (75OMG) EVERY DAY, Disp: 45 tablet, Rfl: 3  •  vitamin B-12 (VITAMIN B-12) 1,000 mcg tablet, Take 2,500 mcg by mouth daily, Disp: , Rfl:   •  albuterol (PROVENTIL HFA,VENTOLIN HFA) 90 mcg/act inhaler, Inhale 2 puffs every 6 (six) hours as needed for wheezing or shortness of breath (Patient not taking: Reported on 7/16/2024), Disp: 6.7 g, Rfl: 2  •  ipratropium-albuterol (DUO-NEB) 0.5-2.5 mg/3 mL nebulizer solution, Take 3 mL by nebulization 4 (four) times a day (Patient not taking: Reported on 4/9/2024), Disp: 360 mL, Rfl: 3  •  ticagrelor (Brilinta) 90 MG, Take by mouth every 12 (twelve) hours (Patient not taking: Reported on 7/16/2024), Disp: , Rfl:     Lab, Imaging and other studies: I have personally reviewed pertinent labs.  Laboratory Results:  Results for orders placed or performed in visit on 07/12/24   Comprehensive metabolic panel   Result Value Ref Range " "   Sodium 139 135 - 147 mmol/L    Potassium 4.6 3.5 - 5.3 mmol/L    Chloride 104 96 - 108 mmol/L    CO2 26 21 - 32 mmol/L    ANION GAP 9 4 - 13 mmol/L    BUN 32 (H) 5 - 25 mg/dL    Creatinine 1.63 (H) 0.60 - 1.30 mg/dL    Glucose, Fasting 121 (H) 65 - 99 mg/dL    Calcium 9.7 8.4 - 10.2 mg/dL    AST 38 13 - 39 U/L    ALT 21 7 - 52 U/L    Alkaline Phosphatase 75 34 - 104 U/L    Total Protein 7.2 6.4 - 8.4 g/dL    Albumin 4.3 3.5 - 5.0 g/dL    Total Bilirubin 0.63 0.20 - 1.00 mg/dL    eGFR 40 ml/min/1.73sq m   CBC   Result Value Ref Range    WBC 8.19 4.31 - 10.16 Thousand/uL    RBC 4.08 3.88 - 5.62 Million/uL    Hemoglobin 13.5 12.0 - 17.0 g/dL    Hematocrit 40.6 36.5 - 49.3 %     (H) 82 - 98 fL    MCH 33.1 26.8 - 34.3 pg    MCHC 33.3 31.4 - 37.4 g/dL    RDW 13.6 11.6 - 15.1 %    Platelets 168 149 - 390 Thousands/uL    MPV 9.6 8.9 - 12.7 fL   Lipid Panel with Direct LDL reflex   Result Value Ref Range    Cholesterol 125 See Comment mg/dL    Triglycerides 143 See Comment mg/dL    HDL, Direct 44 >=40 mg/dL    LDL Calculated 52 0 - 100 mg/dL   Magnesium   Result Value Ref Range    Magnesium 2.4 1.9 - 2.7 mg/dL   Protein / creatinine ratio, urine   Result Value Ref Range    Creatinine, Ur 56.7 Reference range not established. mg/dL    Protein Urine Random 13 Reference range not established. mg/dL    Prot/Creat Ratio, Ur 0.23 (H) 0.00 - 0.10   PTH, intact   Result Value Ref Range    PTH 43.7 12.0 - 88.0 pg/mL       Results from last 7 days   Lab Units 07/12/24  0932   WBC Thousand/uL 8.19   HEMOGLOBIN g/dL 13.5   HEMATOCRIT % 40.6   PLATELETS Thousands/uL 168   POTASSIUM mmol/L 4.6   CHLORIDE mmol/L 104   CO2 mmol/L 26   BUN mg/dL 32*   CREATININE mg/dL 1.63*   CALCIUM mg/dL 9.7   MAGNESIUM mg/dL 2.4         Radiology review:   chest X-ray    Ultrasound      Portions of the record may have been created with voice recognition software.  Occasional wrong word or \"sound a like\" substitutions may have occurred due to the " inherent limitations of voice recognition software.  Read the chart carefully and recognize, using context, where substitutions have occurred.

## 2024-07-16 NOTE — PATIENT INSTRUCTIONS
Visit summary:  - Overall labs are stable!    - I believe your blood pressure is doing well, please make sure to remember to use your left arm for blood pressure readings only as they are the higher readings        1. Medication changes today:  None today    2.  General instructions:  Avoid salt  Try to be as active as possible given limitations of your breathing.  The more active you are at the better!        3.  Please take 1 week a blood pressure readings in 3 months please see below    AS FOLLOWS  MORNING AND EVENING, SITTING AND STANDING as follows:  TAKE THE MORNING READINGS BEFORE ANY MEDICATIONS AND WHEN YOU ARE RELAXED FOR SEVERAL MINUTES  TAKE THE EVENING READINGS:  BETWEEN 7-10 P.M.; PRIOR TO ANY MEDICATIONS; AT LEAST IN OUR  FROM DINNER; AND CERTAINLY AFTER RELAXING FOR A FEW MINUTES  PLEASE INCLUDE HEART RATE WITH YOUR BLOOD PRESSURE READINGS  When taking standing readings, keep your arm supported at heart level and not dangling  Make sure you are sitting with your back supported and feet on the ground and do not cross your legs or feet  Make sure you have not taken any coffee or caffeine products or exercised or smoke cigarettes at least 30 minutes before taking your blood pressure  Then please mail these readings into the office      4.  In 3 months:  Please go for nonfasting lab work but in the morning  Please take 1 week a blood pressure readings as outlined above and mail those into the office      5.  Follow-up in 6 months  Please bring in 1 week a blood pressure readings morning evening, sitting and standing is outlined above  PLEASE BRING AN YOUR BLOOD PRESSURE MACHINE TO CORRELATE WITH THE OFFICE MACHINE AT THIS NEXT SCHEDULED VISIT  Please go for fasting lab work 1-2 weeks prior to your appointment      6.  General non medical recommendations:  AVOID SALT BUT NOT ADDING AN READING LABELS TO MAKE SURE THERE IS LOW-SALT IN THE FOOD THAT YOU ARE EATING  Goal is less than 2 g of sodium  intake or less than 5 g of sodium chloride intake per day    Avoid nonsteroidal anti-inflammatory drugs such as Naprosyn, ibuprofen, Aleve, Advil, Celebrex, Meloxicam (Mobic) etc.  You can use Tylenol as needed if you do not have any liver condition to be concerned about    Avoid medications such as Sudafed or decongestants and antihistamines that contained the D component which is the decongestant.  You can take antihistamines without the decongestant or D component.    Try to avoid medications such as pantoprazole or  Protonix/Nexium or Esomeprazole)/Prilosec or omeprazole/Prevacid or lansoprazole/AcipHex or Rabeprazole.  If you are able to, use Pepcid as this is safer for your kidneys.    Try to exercise at least 30 minutes 3 days a week to begin with with an ultimate goal of 5 days a week for at least 30 minutes    Please do not drink more than 2 glasses of alcohol/wine on a daily basis as this may contribute to your high blood pressure.

## 2024-07-19 ENCOUNTER — HOSPITAL ENCOUNTER (OUTPATIENT)
Dept: CT IMAGING | Facility: HOSPITAL | Age: 79
End: 2024-07-19
Attending: INTERNAL MEDICINE
Payer: MEDICARE

## 2024-07-19 DIAGNOSIS — J84.10 PULMONARY FIBROSIS (HCC): ICD-10-CM

## 2024-07-19 PROCEDURE — 71250 CT THORAX DX C-: CPT

## 2024-07-24 ENCOUNTER — OFFICE VISIT (OUTPATIENT)
Dept: PULMONOLOGY | Facility: CLINIC | Age: 79
End: 2024-07-24
Payer: MEDICARE

## 2024-07-24 VITALS
BODY MASS INDEX: 24.08 KG/M2 | SYSTOLIC BLOOD PRESSURE: 120 MMHG | DIASTOLIC BLOOD PRESSURE: 70 MMHG | OXYGEN SATURATION: 97 % | WEIGHT: 172 LBS | TEMPERATURE: 97.8 F | HEART RATE: 62 BPM | HEIGHT: 71 IN

## 2024-07-24 DIAGNOSIS — J43.9 PULMONARY EMPHYSEMA, UNSPECIFIED EMPHYSEMA TYPE (HCC): Primary | ICD-10-CM

## 2024-07-24 DIAGNOSIS — J84.10 PULMONARY FIBROSIS (HCC): ICD-10-CM

## 2024-07-24 PROCEDURE — 99214 OFFICE O/P EST MOD 30 MIN: CPT | Performed by: INTERNAL MEDICINE

## 2024-07-24 RX ORDER — NITROGLYCERIN 0.4 MG/1
0.4 TABLET SUBLINGUAL
COMMUNITY
Start: 2024-06-12

## 2024-07-24 NOTE — ASSESSMENT & PLAN NOTE
His recent high-resolution CT scan of the chest showed mild diffuse bilateral reticulation and mild scattered linear scar.  Mild traction bronchiolectasis in the lateral basal left lower lobe.  Chronic opacity in the paraspinal right lower lobe with volume loss as evidenced by crowding of the bronchi and mild traction bronchiolectasis due to chronic scar.  The etiology is not clear at this point.  His previous PFT showed severe airflow obstruction with diffusion defect.   his repeat PFT showed moderate airflow obstruction with severe diffusion defect.   The chest auscultation revealed bilateral coarse inspiratory crackles.  He currently does not need any oxygen.  The etiology of this pulmonary fibrosis is not clear at this point.  He has emphysema.  He has previous history of sarcoidosis.  He has history of beryllium exposure.  he also had COVID.  His CT scan did not show a UIP pattern.  His pulmonary function has not deteriorated.  We will continue to monitor him closely.  At this point he would not benefit from antifibrotic therapy.  I had a long discussion with him and answered all his questions

## 2024-07-24 NOTE — ASSESSMENT & PLAN NOTE
Mr. Bean Lopez was diagnosed with COPD many years back and is currently on treatment with Breo 100, Incruse and Ventolin.  His usual exercise tolerance is more than 2 blocks and he has occasional cough and no wheezing.. He is a never smoker but admits to secondhand smoke exposure. 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 His previous PFT from May 2015 showed severe airflow obstruction with diffusion defect. He also had evidence of air trapping. There was improvement in FEV1 with bronchodilator indicating a component of asthma. His previous CT scan from February 2019 showed scarring in the left lung. On clinical examination, his chest auscultation revealed bilateral inspiratory coarse crackles at lung bases.  His high-resolution CT scan of the chest showed bilateral pulmonary fibrosis. I have advised him to continue breo and incruse.  He had coronary artery disease and heart attack recently and has coronary stent.  His shortness of breath is much improved now.  Have advised him to continue with Breo Incruse and Ventolin.  I had a long discussion with him and answered all his questions.

## 2024-07-24 NOTE — PROGRESS NOTES
Ambulatory Visit  Name: Bean Lopez      : 1945      MRN: [id number]  Encounter Provider: Silvino Mary MD  Encounter Date: 2024   Encounter department: [facility] PULMONARY & [facility]    Assessment & Plan   1. Pulmonary emphysema, unspecified emphysema type (HCC)  Assessment & Plan:  Mr. Bean Lopez was diagnosed with COPD many years back and is currently on treatment with Breo 100, Incruse and Ventolin.  His usual exercise tolerance is more than 2 blocks and he has occasional cough and no wheezing.. He is a never smoker but admits to secondhand smoke exposure. 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 His previous PFT from May 2015 showed severe airflow obstruction with diffusion defect. He also had evidence of air trapping. There was improvement in FEV1 with bronchodilator indicating a component of asthma. His previous CT scan from 2019 showed scarring in the left lung. On clinical examination, his chest auscultation revealed bilateral inspiratory coarse crackles at lung bases.  His high-resolution CT scan of the chest showed bilateral pulmonary fibrosis. I have advised him to continue breo and incruse.  He had coronary artery disease and heart attack recently and has coronary stent.  His shortness of breath is much improved now.  Have advised him to continue with Breo Incruse and Ventolin.  I had a long discussion with him and answered all his questions.      Orders:  -     Complete PFT with post Bronchodilator and Six Minute walk; Future  2. Pulmonary fibrosis (HCC)  Assessment & Plan:  His recent high-resolution CT scan of the chest showed mild diffuse bilateral reticulation and mild scattered linear scar.  Mild traction bronchiolectasis in the lateral basal left lower lobe.  Chronic opacity in the paraspinal right lower lobe with volume loss as evidenced by crowding of the bronchi and mild traction bronchiolectasis due to chronic scar.   The etiology is not clear at this point.  His previous PFT showed severe airflow obstruction with diffusion defect.   his repeat PFT showed moderate airflow obstruction with severe diffusion defect.   The chest auscultation revealed bilateral coarse inspiratory crackles.  He currently does not need any oxygen.  The etiology of this pulmonary fibrosis is not clear at this point.  He has emphysema.  He has previous history of sarcoidosis.  He has history of beryllium exposure.  he also had COVID.  His CT scan did not show a UIP pattern.  His pulmonary function has not deteriorated.  We will continue to monitor him closely.  At this point he would not benefit from antifibrotic therapy.  I had a long discussion with him and answered all his questions       Orders:  -     Complete PFT with post Bronchodilator and Six Minute walk; Future      History of Present Illness     Bean Lopez is a 78 y.o. male with past medical history of COPD emphysema and pulmonary fibrosis who has come for follow-up.  His most recent CT scan of the chest showed that the groundglass infiltrates on the left side has improved while there were new on the right side at the lung base.  He has shortness of breath on exertion which is not worse.  He is exercise tolerance about a block on level ground.  He has no significant cough or phlegm or wheeze or chest pain.  He has previous history of sarcoidosis and beryllium exposure.  He also had COVID infection.  His previous PFT showed airflow obstruction with decreased diffusion capacity.  He has been on treatment with Breo regularly and albuterol as needed.  He also has Incruse.  He has no dysphagia.  No chest pain no palpitations.  No swelling of feet.  No fever or chills.  His appetite has been good.  No diarrhea or colitis.  He has been on treatment is a Azulfidine.  Denied any significant joint swelling or arthritis    Review of Systems   Constitutional:  Negative for appetite change, chills,  "fatigue and fever.   HENT:  Negative for hearing loss, rhinorrhea, sneezing and trouble swallowing.    Respiratory:  Positive for shortness of breath (ET one block). Negative for cough and wheezing.    Cardiovascular:  Negative for chest pain, palpitations and leg swelling.   Gastrointestinal:  Negative for abdominal pain, constipation, diarrhea, nausea and vomiting.   Genitourinary:  Negative for dysuria, frequency and urgency.   Musculoskeletal:  Positive for arthralgias. Negative for back pain and gait problem.   Skin:  Negative for rash.        Squamous cell  carcinoma   Allergic/Immunologic: Negative for environmental allergies.   Neurological:  Negative for dizziness, syncope, light-headedness and headaches.   Psychiatric/Behavioral:  Negative for agitation, confusion and sleep disturbance. The patient is not nervous/anxious.        Objective     /70 (BP Location: Left arm, Patient Position: Sitting, Cuff Size: Standard)   Pulse 62   Temp 97.8 °F (36.6 °C) (Tympanic)   Ht 5' 11\" (1.803 m)   Wt 78 kg (172 lb)   SpO2 97%   BMI 23.99 kg/m²     Physical Exam  Vitals reviewed.   Constitutional:       General: He is not in acute distress.     Appearance: He is not ill-appearing, toxic-appearing or diaphoretic.   HENT:      Head: Normocephalic.      Mouth/Throat:      Mouth: Mucous membranes are moist.   Eyes:      General: No scleral icterus.     Conjunctiva/sclera: Conjunctivae normal.   Cardiovascular:      Rate and Rhythm: Normal rate and regular rhythm.      Heart sounds: Normal heart sounds. No murmur heard.  Pulmonary:      Effort: Pulmonary effort is normal. No respiratory distress.      Breath sounds: No stridor. Rales (left posteriorly coarse) present. No wheezing or rhonchi.   Chest:      Chest wall: No tenderness.   Abdominal:      General: Bowel sounds are normal.      Palpations: Abdomen is soft.      Tenderness: There is no abdominal tenderness. There is no guarding.   Musculoskeletal:      " Cervical back: Neck supple. No rigidity.      Right lower leg: No edema.      Left lower leg: No edema.   Lymphadenopathy:      Cervical: No cervical adenopathy.   Skin:     Coloration: Skin is not jaundiced or pale.      Findings: No rash.   Neurological:      Mental Status: He is alert and oriented to person, place, and time.      Gait: Gait normal.   Psychiatric:         Mood and Affect: Mood normal.         Behavior: Behavior normal.         Thought Content: Thought content normal.         Judgment: Judgment normal.       Administrative Statements       I spent 30 minutes of time taking care of this patient with complex medical issues.  The majority of this time was spent directly with the patient counseling as well as coordinating care.

## 2024-07-25 DIAGNOSIS — M1A.9XX0 CHRONIC GOUT WITHOUT TOPHUS, UNSPECIFIED CAUSE, UNSPECIFIED SITE: ICD-10-CM

## 2024-07-25 DIAGNOSIS — I25.10 CORONARY ARTERY DISEASE INVOLVING NATIVE CORONARY ARTERY OF NATIVE HEART WITHOUT ANGINA PECTORIS: ICD-10-CM

## 2024-07-25 RX ORDER — CLOPIDOGREL BISULFATE 75 MG/1
75 TABLET ORAL DAILY
Qty: 30 TABLET | Refills: 5 | Status: SHIPPED | OUTPATIENT
Start: 2024-07-25

## 2024-07-25 RX ORDER — COLCHICINE 0.6 MG/1
TABLET ORAL
Qty: 30 TABLET | Refills: 5 | Status: SHIPPED | OUTPATIENT
Start: 2024-07-25

## 2024-08-01 DIAGNOSIS — N18.31 STAGE 3A CHRONIC KIDNEY DISEASE (HCC): Primary | ICD-10-CM

## 2024-08-01 DIAGNOSIS — E11.21 DIABETIC NEPHROPATHY ASSOCIATED WITH TYPE 2 DIABETES MELLITUS (HCC): ICD-10-CM

## 2024-08-04 ENCOUNTER — RA CDI HCC (OUTPATIENT)
Dept: OTHER | Facility: HOSPITAL | Age: 79
End: 2024-08-04

## 2024-08-04 NOTE — PROGRESS NOTES
HCC coding opportunities          Chart Reviewed number of suggestions sent to Provider: 1     Patients Insurance     Medicare Insurance: Medicare        E11.22

## 2024-08-09 ENCOUNTER — OFFICE VISIT (OUTPATIENT)
Dept: FAMILY MEDICINE CLINIC | Facility: CLINIC | Age: 79
End: 2024-08-09
Payer: MEDICARE

## 2024-08-09 VITALS
BODY MASS INDEX: 24.25 KG/M2 | DIASTOLIC BLOOD PRESSURE: 60 MMHG | OXYGEN SATURATION: 98 % | HEIGHT: 71 IN | WEIGHT: 173.2 LBS | TEMPERATURE: 98 F | HEART RATE: 57 BPM | SYSTOLIC BLOOD PRESSURE: 114 MMHG

## 2024-08-09 DIAGNOSIS — Z79.899 MEDICATION MANAGEMENT: ICD-10-CM

## 2024-08-09 DIAGNOSIS — Z98.61 HISTORY OF PTCA: ICD-10-CM

## 2024-08-09 DIAGNOSIS — J43.1 PANLOBULAR EMPHYSEMA (HCC): ICD-10-CM

## 2024-08-09 DIAGNOSIS — C61 PROSTATE CANCER (HCC): ICD-10-CM

## 2024-08-09 DIAGNOSIS — M45.9 ANKYLOSING SPONDYLITIS OF SITE IN SPINE (HCC): ICD-10-CM

## 2024-08-09 DIAGNOSIS — I25.118 CORONARY ARTERY DISEASE OF NATIVE HEART WITH STABLE ANGINA PECTORIS, UNSPECIFIED VESSEL OR LESION TYPE (HCC): ICD-10-CM

## 2024-08-09 DIAGNOSIS — N18.31 STAGE 3A CHRONIC KIDNEY DISEASE (HCC): ICD-10-CM

## 2024-08-09 DIAGNOSIS — J84.10 PULMONARY FIBROSIS (HCC): ICD-10-CM

## 2024-08-09 DIAGNOSIS — I10 HYPERTENSION, ESSENTIAL: Primary | ICD-10-CM

## 2024-08-09 DIAGNOSIS — E11.9 TYPE 2 DIABETES MELLITUS WITHOUT COMPLICATION, WITHOUT LONG-TERM CURRENT USE OF INSULIN (HCC): ICD-10-CM

## 2024-08-09 DIAGNOSIS — R69 MULTIPLE MEDICAL PROBLEMS: ICD-10-CM

## 2024-08-09 LAB — SL AMB POCT HEMOGLOBIN AIC: 6.5 (ref ?–6.5)

## 2024-08-09 PROCEDURE — G0439 PPPS, SUBSEQ VISIT: HCPCS | Performed by: FAMILY MEDICINE

## 2024-08-09 PROCEDURE — 83036 HEMOGLOBIN GLYCOSYLATED A1C: CPT | Performed by: FAMILY MEDICINE

## 2024-08-09 PROCEDURE — 99214 OFFICE O/P EST MOD 30 MIN: CPT | Performed by: FAMILY MEDICINE

## 2024-08-09 NOTE — PROGRESS NOTES
Ambulatory Visit  Name: Bean Lopez      : 1945      MRN: [id number]  Encounter Provider: MARGE Ellis DO  Encounter Date: 2024   Encounter department: [facility] PRIMARY [facility]    Assessment & Plan       1. Hypertension, essential  Comments:  Blood pressure controlled 114/60 taking amlodipine Edarbi chlorthalidone metoprolol  2. Coronary artery disease of native heart with stable angina pectoris, unspecified vessel or lesion type ([facility])  Comments:  sees Dr. Bee and stent is holding up-no chest pain pressure tightness heaviness shortness of breath doing well  3. Pulmonary fibrosis (HCC)  Comments:  follows with pul - Dr. Mary. Scheduling PFT  4. History of PTCA  Comments:  Just off the Brulenta  5. Prostate cancer ([facility])  Comments:  Under watch of urology-from what I understand, cautious observation and play  6. Type 2 diabetes mellitus without complication, without long-term current use of insulin ([facility])  Comments:  A1c 6.9 oral take Januvia and Jardiance  Orders:  -     POCT hemoglobin A1c  -     Albumin / creatinine urine ratio; Future; Expected date: 2024  -     Comprehensive metabolic panel; Future; Expected date: 2024  -     Hemoglobin A1C; Future; Expected date: 2024  7. Panlobular emphysema ([facility])  8. Ankylosing spondylitis of site in spine ([facility])  Comments:  Continues to do well and has for many years on sulfasalazine 500 mg 1-1/2 tablets daily  9. Medication management  Comments:  All medications reviewed for safety efficacy and tolerance  10. Multiple medical problems  Comments:  All medical problems listed here discussed in detail during this 40-minute visit  Many questions answered  Plan of care reviewed and has ACP on file  11. Stage 3a chronic kidney disease (HCC)  Comments:  GFR 40 and creatinine 1.6  Fairly consistent but down from several years ago      History of Present Illness     History of Present Illness  The patient is a 78-year-old male who  "presents for evaluation of multiple medical concerns.    During his last visit, blood work revealed a low B12 level. He was advised to take B12 2500 mcg tablets or receive an injection. Despite receiving a message, he did not receive a callback. His current medications include allopurinol, amlodipine, atorvastatin, Edarbi, chlorthalidone, Plavix, colchicine, dexlansoprostol, Dexilant, and famotidine.    He has a history of Gee's esophagus.    His current medications include metoprolol 50 mg once daily, nitroglycerin, Januvia, and sulfasalazine. He has discontinued Brilinta. He was scheduled for an EGD and colonoscopy, but due to his heart condition and the use of blood thinners, the procedure was postponed until 11/2024.    He reports reddish, dry, and crusty spots on his feet.     Review of Systems  Objective     /60 (BP Location: Left arm, Patient Position: Sitting, Cuff Size: Standard)   Pulse 57   Temp 98 °F (36.7 °C) (Temporal)   Ht 5' 11\" (1.803 m)   Wt 78.6 kg (173 lb 3.2 oz)   SpO2 98%   BMI 24.16 kg/m²     Physical Exam    Physical Exam  Administrative Statements   I have spent a total time of 30 minutes in caring for this patient on the day of the visit/encounter including Diagnostic results, Prognosis, Risks and benefits of tx options, Instructions for management, Patient and family education, Importance of tx compliance, Risk factor reductions, Impressions, Counseling / Coordination of care, Documenting in the medical record, Reviewing / ordering tests, medicine, procedures  , and Obtaining or reviewing history  .  "

## 2024-08-09 NOTE — PROGRESS NOTES
Diabetic Foot Exam    Patient's shoes and socks removed.    Right Foot/Ankle   Right Foot Inspection  Skin Exam: skin normal and skin intact. No dry skin, no warmth, no callus, no erythema, no maceration, no abnormal color, no pre-ulcer, no ulcer and no callus.     Toe Exam: ROM and strength within normal limits.     Sensory   Vibration: intact  Proprioception: intact  Monofilament testing: intact    Vascular  Capillary refills: < 3 seconds  The right DP pulse is 1+. The right PT pulse is 1+.     Left Foot/Ankle  Left Foot Inspection  Skin Exam: skin normal and skin intact. No dry skin, no warmth, no erythema, no maceration, normal color, no pre-ulcer, no ulcer and no callus.     Toe Exam: ROM and strength within normal limits.     Sensory   Vibration: intact  Proprioception: intact  Monofilament testing: intact    Vascular  Capillary refills: < 3 seconds  The left DP pulse is 1+. The left PT pulse is 1+.     Assign Risk Category  No deformity present  No loss of protective sensation  No weak pulses  Risk: 0

## 2024-08-09 NOTE — PROGRESS NOTES
Ambulatory Visit  Name: Bean Lopez      : 1945      MRN: [id number]  Encounter Provider: MARGE Elils DO  Encounter Date: 2024   Encounter department: [facility] PRIMARY [facility]    Assessment & Plan   1. Hypertension, essential  Comments:  Blood pressure controlled 114/60 taking amlodipine Edarbi chlorthalidone metoprolol  2. Coronary artery disease of native heart with stable angina pectoris, unspecified vessel or lesion type (HCC)  Comments:  sees Dr. Bee and stent is holding up-no chest pain pressure tightness heaviness shortness of breath doing well  3. Pulmonary fibrosis (HCC)  Comments:  follows with pul - Dr. Mary. Scheduling PFT  4. History of PTCA  Comments:  Just off the Brulenta  5. Prostate cancer ([facility])  Comments:  Under watch of urology-from what I understand, cautious observation and play  6. Type 2 diabetes mellitus without complication, without long-term current use of insulin ([facility])  Comments:  A1c 6.9 oral take Januvia and Jardiance  Orders:  -     POCT hemoglobin A1c  -     Albumin / creatinine urine ratio; Future; Expected date: 2024  -     Comprehensive metabolic panel; Future; Expected date: 2024  -     Hemoglobin A1C; Future; Expected date: 2024  7. Panlobular emphysema (HCC)  8. Ankylosing spondylitis of site in spine ([facility])  Comments:  Continues to do well and has for many years on sulfasalazine 500 mg 1-1/2 tablets daily  9. Medication management  Comments:  All medications reviewed for safety efficacy and tolerance  10. Multiple medical problems  Comments:  All medical problems listed here discussed in detail during this 40-minute visit  Many questions answered  Plan of care reviewed and has ACP on file  11. Stage 3a chronic kidney disease (HCC)  Comments:  GFR 40 and creatinine 1.6  Fairly consistent but down from several years ago         Preventive health issues were discussed with patient, and age appropriate screening tests were ordered as  noted in patient's After Visit Summary. Personalized health advice and appropriate referrals for health education or preventive services given if needed, as noted in patient's After Visit Summary.    History of Present Illness     HPI   Patient Care Team:  MARGE Ellis DO as PCP - General (Family Medicine)  MD Mario Valiente MD (Pulmonary Disease)  Misael Yun MD (Nephrology)    Review of Systems  Medical History Reviewed by provider this encounter:       Annual Wellness Visit Questionnaire   Bean is here for his Subsequent Wellness visit. Last Medicare Wellness visit information reviewed, patient interviewed and updates made to the record today.      Health Risk Assessment:   Patient rates overall health as good. Patient feels that their physical health rating is slightly worse. Patient is satisfied with their life. Eyesight was rated as same. Hearing was rated as same. Patient feels that their emotional and mental health rating is same. Patients states they are never, rarely angry. Patient states they are never, rarely unusually tired/fatigued. Pain experienced in the last 7 days has been none. Patient states that he has experienced no weight loss or gain in last 6 months.     Depression Screening:   PHQ-2 Score: 0      Fall Risk Screening:   In the past year, patient has experienced: history of falling in past year    Number of falls: 1  Injured during fall?: No    Feels unsteady when standing or walking?: No    Worried about falling?: No      Home Safety:  Patient does not have trouble with stairs inside or outside of their home. Patient has working smoke alarms and has working carbon monoxide detector. Home safety hazards include: none.     Nutrition:   Current diet is Regular, Limited junk food and Other (please comment). Pt has regular protein intake, as well as some vegetables and some fruits     Medications:   Patient is currently taking over-the-counter supplements. OTC  medications include: see medication list. Patient is able to manage medications.     Activities of Daily Living (ADLs)/Instrumental Activities of Daily Living (IADLs):   Walk and transfer into and out of bed and chair?: Yes  Dress and groom yourself?: Yes    Bathe or shower yourself?: Yes    Feed yourself? Yes  Do your laundry/housekeeping?: Yes  Manage your money, pay your bills and track your expenses?: Yes  Make your own meals?: Yes    Do your own shopping?: Yes    Previous Hospitalizations:   Any hospitalizations or ED visits within the last 12 months?: Yes    How many hospitalizations have you had in the last year?: 3-4    Advance Care Planning:   Living will: Yes    Durable POA for healthcare: Yes    Advanced directive: Yes    Advanced directive counseling given: Yes    ACP document given: Yes    Patient declined ACP directive: No    End of Life Decisions reviewed with patient: Yes    Provider agrees with end of life decisions: Yes      Cognitive Screening:   Provider or family/friend/caregiver concerned regarding cognition?: No    PREVENTIVE SCREENINGS      Cardiovascular Screening:    General: Screening Not Indicated, History Lipid Disorder and Risks and Benefits Discussed      Diabetes Screening:     General: Screening Not Indicated, History Diabetes and Risks and Benefits Discussed      Colorectal Cancer Screening:     General: Risks and Benefits Discussed      Prostate Cancer Screening:    General: History Prostate Cancer, Screening Not Indicated and Risks and Benefits Discussed      Osteoporosis Screening:    General: Risks and Benefits Discussed      Abdominal Aortic Aneurysm (AAA) Screening:        General: Risks and Benefits Discussed      Lung Cancer Screening:     General: Screening Not Indicated and Risks and Benefits Discussed      Hepatitis C Screening:    General: Screening Current and Risks and Benefits Discussed    Hep C Screening Accepted: No       Preventive Screening Comments: A1c today in  the office 6.5%--down from 6.9 which is wonderful  .  Patient going for colonoscopy and EGD probably in October-November    Screening, Brief Intervention, and Referral to Treatment (SBIRT)    Screening  Typical number of drinks in a day: 0  Typical number of drinks in a week: 0  Interpretation: Low risk drinking behavior.    Single Item Drug Screening:  How often have you used an illegal drug (including marijuana) or a prescription medication for non-medical reasons in the past year? never    Single Item Drug Screen Score: 0  Interpretation: Negative screen for possible drug use disorder    Brief Intervention  Alcohol & drug use screenings were reviewed. No concerns regarding substance use disorder identified. Healthy alcohol use/limits discussed.     Other Counseling Topics:   Car/seat belt/driving safety, skin self-exam, sunscreen and calcium and vitamin D intake and regular weightbearing exercise.     Social Determinants of Health     Financial Resource Strain: Low Risk  (6/11/2024)    Received from [facility]    Overall Financial Resource Strain (CARDIA)     Difficulty of Paying Living Expenses: Not hard at all   Food Insecurity: No Food Insecurity (6/11/2024)    Received from [facility]    Hunger Vital Sign     Worried About Running Out of Food in the Last Year: Never true     Ran Out of Food in the Last Year: Never true   Transportation Needs: No Transportation Needs (6/11/2024)    Received from [facility]    PRAPARE - Transportation     Lack of Transportation (Medical): No     Lack of Transportation (Non-Medical): No   Housing Stability: Low Risk  (6/11/2024)    Received from [facility]    Housing Stability Vital Sign     Unable to Pay for Housing in the Last Year: No     Number of Times Moved in the Last Year: 0     Homeless in the Last Year: No   Utilities: Not At Risk (6/11/2024)    Received from [facility]  Utilities     Threatened with loss of utilities: No     No results found.    Objective     There were no vitals taken for this visit.    Physical Exam  Administrative Statements   I have spent a total time of 35 minutes in caring for this patient on the day of the visit/encounter including Diagnostic results, Prognosis, Risks and benefits of tx options, Instructions for management, Patient and family education, Importance of tx compliance, Risk factor reductions, Impressions, Counseling / Coordination of care, Documenting in the medical record, Reviewing / ordering tests, medicine, procedures  , and Obtaining or reviewing history  .

## 2024-08-16 DIAGNOSIS — E11.9 TYPE 2 DIABETES MELLITUS WITHOUT COMPLICATION, WITHOUT LONG-TERM CURRENT USE OF INSULIN (HCC): ICD-10-CM

## 2024-08-16 RX ORDER — SITAGLIPTIN 100 MG/1
100 TABLET, FILM COATED ORAL DAILY
Qty: 30 TABLET | Refills: 5 | Status: SHIPPED | OUTPATIENT
Start: 2024-08-16

## 2024-09-03 NOTE — PROGRESS NOTES
9/4/2024      No chief complaint on file.        Assessment and Plan    78 y.o. male managed by Dr. Stallworth    1.  Newly diagnosed low risk prostate cancer  -Diagnosis: August 15, 2023 (transperineal MR fusion biopsy)  -Cristobal 3+3 equal 6 disease, 1 of 6 cores from solitary right anterior lesion, all cores on systematic biopsy were negative  -PSA at the time of diagnosis: 6.8-8.3  -Most recent PSA 8.50 (5/1/24)  -Defers BELLA.  -Multiparametric MRI: (June 5, 2023): 38 g gland, solitary 1.4 cm right base lesion  -We will repeat a multiparametric prostate MRI now, to determine if he will require another transperineal fusion biopsy. I will be in contact with his results.          History of Present Illness  Bean Lopez is a 78 y.o. male here for follow-up of Rarden 3+3=6 prostate cancer. He has a past medical history of Type II DM, COPD, pulmonary fibrosis, CAD, and Stage III CKD. His PSA is now 8.5. His PSA at time of diagnosis ranged from 6.8-8.3. He defers BELLA today. He has no urinary complaints.         PSA Lab trends:  8.50 (5/1/24)  6.83 (5/26/23)  8.3 (5/15/23)  4.9 (10/12/22)  7.1 (8/2/22)        Review of Systems   Constitutional:  Negative for activity change, fatigue and fever.   HENT:  Negative for congestion, rhinorrhea and sore throat.    Eyes:  Negative for photophobia, redness and visual disturbance.   Respiratory:  Negative for cough, shortness of breath and wheezing.    Cardiovascular:  Negative for chest pain, palpitations and leg swelling.   Gastrointestinal:  Negative for abdominal pain, diarrhea, nausea and vomiting.   Genitourinary:  Negative for dysuria, flank pain, frequency, hematuria and urgency.   Neurological:  Negative for weakness, light-headedness and headaches.                Vitals  There were no vitals filed for this visit.    Physical Exam  Constitutional:       Appearance: Normal appearance. He is not toxic-appearing.   HENT:      Head: Normocephalic.      Mouth/Throat:       Pharynx: Oropharynx is clear.   Eyes:      Extraocular Movements: Extraocular movements intact.      Pupils: Pupils are equal, round, and reactive to light.   Pulmonary:      Effort: Pulmonary effort is normal. No respiratory distress.   Musculoskeletal:         General: Normal range of motion.      Cervical back: Normal range of motion.   Neurological:      Mental Status: He is alert and oriented to person, place, and time. Mental status is at baseline.      Gait: Gait normal.   Psychiatric:         Mood and Affect: Mood normal.         Behavior: Behavior normal.         Thought Content: Thought content normal.         Judgment: Judgment normal.           Past History  Past Medical History:   Diagnosis Date    Anemia     Arthritis     Gee's esophagus     Breast lump     Cancer (HCC)     Chronic kidney disease     Cirrhosis (HCC)     Coronary artery disease     cardiac cath; stents     Diabetes mellitus (HCC)     H/O heart artery stent     Hypertension     Inguinal hernia     Right    Sarcoidosis     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HCC)     Skin cancer     SOB (shortness of breath)     Upper respiratory infection, viral 2022     Social History     Socioeconomic History    Marital status:      Spouse name: Not on file    Number of children: 0    Years of education: Not on file    Highest education level: Associate degree: occupational, technical, or vocational program   Occupational History    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   Tobacco Use    Smoking status: Never    Smokeless tobacco: Never    Tobacco comments:     RETIRED   Vaping Use    Vaping status: Never Used   Substance and Sexual Activity    Alcohol use: No     Comment: Alcohol intake:   Occasional wine - As per [place]     Drug use: No    Sexual activity: Not on file   Other Topics Concern    Not on file   Social History Narrative    · Most recent tobacco use screenin2020      · Do you currently or have you served in the [place]  [employer] Forces:   No      · Were you activated, into active duty, as a member of the [employer] or as a Reservist:   No      · Alcohol intake:   Occasional,Wine     · Asbestos exposure:   No      · TB exposure:   No      · Animal exposure:   No      · Exercise level:   Heavy      · Caffeine intake:   Moderate                 no cpap or oxygen     - As per [place]      Social Determinants of Health     Financial Resource Strain: Low Risk  (6/11/2024)    Received from [facility]    Overall Financial Resource Strain (CARDIA)     Difficulty of Paying Living Expenses: Not hard at all   Food Insecurity: No Food Insecurity (8/9/2024)    Hunger Vital Sign     Worried About Running Out of Food in the Last Year: Never true     Ran Out of Food in the Last Year: Never true   Transportation Needs: No Transportation Needs (8/9/2024)    PRAPARE - Transportation     Lack of Transportation (Medical): No     Lack of Transportation (Non-Medical): No   Physical Activity: Sufficiently Active (6/10/2020)    Exercise Vital Sign     Days of Exercise per Week: 7 days     Minutes of Exercise per Session: 30 min   Stress: No Stress Concern Present (6/10/2020)    [employer] [place] [employer] - Occupational Stress Questionnaire     Feeling of Stress : Not at all   Social Connections: Moderately Isolated (6/10/2020)    Social Connection and Isolation Panel [NHANES]     Frequency of Communication with Friends and Family: More than three times a week     Frequency of Social Gatherings with Friends and Family: More than three times a week     Attends Restoration Services: Never     Active Member of Clubs or Organizations: Yes     Attends Club or Organization Meetings: 1 to 4 times per year     Marital Status:    Intimate Partner Violence: Not At Risk (6/11/2024)    Received from [facility]    Humiliation, Afraid, Rape, and Kick questionnaire     Fear of Current or Ex-Partner: No     Emotionally  Abused: No     Physically Abused: No     Sexually Abused: No   Housing Stability: Low Risk  (8/9/2024)    Housing Stability Vital Sign     Unable to Pay for Housing in the Last Year: No     Number of Times Moved in the Last Year: 1     Homeless in the Last Year: No     Social History     Tobacco Use   Smoking Status Never   Smokeless Tobacco Never   Tobacco Comments    RETIRED     Family History   Problem Relation Age of Onset    Hypertension Mother     Atrial fibrillation Mother     Heart disease Mother     Kidney disease Father     COPD Father     Heart disease Father     Asthma Neg Hx     Lung cancer Neg Hx        The following portions of the patient's history were reviewed and updated as appropriate: allergies, current medications, past medical history, past social history, past surgical history and problem list.    Results  No results found for this or any previous visit (from the past 1 hour(s)).]  Lab Results   Component Value Date    PSA 8.50 (H) 05/01/2024    PSA 6.83 (H) 05/26/2023    PSA 8.3 (H) 05/15/2023    PSA 4.9 (H) 10/12/2022     Lab Results   Component Value Date    CALCIUM 9.7 07/12/2024    K 4.6 07/12/2024    CO2 26 07/12/2024     07/12/2024    BUN 32 (H) 07/12/2024    CREATININE 1.63 (H) 07/12/2024     Lab Results   Component Value Date    WBC 8.19 07/12/2024    HGB 13.5 07/12/2024    HCT 40.6 07/12/2024     (H) 07/12/2024     07/12/2024       GUILLERMINA Camilo

## 2024-09-04 ENCOUNTER — OFFICE VISIT (OUTPATIENT)
Dept: UROLOGY | Facility: CLINIC | Age: 79
End: 2024-09-04
Payer: MEDICARE

## 2024-09-04 VITALS
OXYGEN SATURATION: 97 % | BODY MASS INDEX: 24.36 KG/M2 | HEART RATE: 68 BPM | WEIGHT: 174 LBS | SYSTOLIC BLOOD PRESSURE: 144 MMHG | HEIGHT: 71 IN | DIASTOLIC BLOOD PRESSURE: 72 MMHG

## 2024-09-04 DIAGNOSIS — C61 PROSTATE CANCER (HCC): Primary | ICD-10-CM

## 2024-09-04 PROCEDURE — 99213 OFFICE O/P EST LOW 20 MIN: CPT

## 2024-09-12 DIAGNOSIS — R80.1 PERSISTENT PROTEINURIA: ICD-10-CM

## 2024-09-12 DIAGNOSIS — E78.5 DYSLIPIDEMIA: ICD-10-CM

## 2024-09-12 DIAGNOSIS — N18.30 CHRONIC KIDNEY DISEASE, STAGE III (MODERATE) (HCC): ICD-10-CM

## 2024-09-12 DIAGNOSIS — I12.9 HYPERTENSIVE CHRONIC KIDNEY DISEASE WITH STAGE 1 THROUGH STAGE 4 CHRONIC KIDNEY DISEASE, OR UNSPECIFIED CHRONIC KIDNEY DISEASE: ICD-10-CM

## 2024-09-12 RX ORDER — CHLORTHALIDONE 25 MG/1
TABLET ORAL
Qty: 45 TABLET | Refills: 4 | Status: SHIPPED | OUTPATIENT
Start: 2024-09-12

## 2024-09-16 DIAGNOSIS — K22.70 BARRETT'S ESOPHAGUS WITHOUT DYSPLASIA: ICD-10-CM

## 2024-09-16 DIAGNOSIS — K21.00 GASTROESOPHAGEAL REFLUX DISEASE WITH ESOPHAGITIS, UNSPECIFIED WHETHER HEMORRHAGE: ICD-10-CM

## 2024-09-17 RX ORDER — DEXLANSOPRAZOLE 60 MG/1
60 CAPSULE, DELAYED RELEASE ORAL DAILY
Qty: 90 CAPSULE | Refills: 1 | Status: SHIPPED | OUTPATIENT
Start: 2024-09-17

## 2024-09-25 DIAGNOSIS — E78.5 DYSLIPIDEMIA: ICD-10-CM

## 2024-09-25 RX ORDER — ATORVASTATIN CALCIUM 80 MG/1
80 TABLET, FILM COATED ORAL DAILY
Qty: 90 TABLET | Refills: 1 | Status: SHIPPED | OUTPATIENT
Start: 2024-09-25

## 2024-10-01 ENCOUNTER — HOSPITAL ENCOUNTER (OUTPATIENT)
Dept: RADIOLOGY | Age: 79
Discharge: HOME | End: 2024-10-01
Payer: MEDICARE

## 2024-10-01 DIAGNOSIS — C61 PROSTATE CANCER (HCC): ICD-10-CM

## 2024-10-01 PROCEDURE — 76377 3D RENDER W/INTRP POSTPROCES: CPT

## 2024-10-01 PROCEDURE — A9585 GADOBUTROL INJECTION: HCPCS

## 2024-10-01 PROCEDURE — 72197 MRI PELVIS W/O & W/DYE: CPT

## 2024-10-01 RX ORDER — GADOBUTROL 604.72 MG/ML
8 INJECTION INTRAVENOUS
Status: COMPLETED | OUTPATIENT
Start: 2024-10-01 | End: 2024-10-01

## 2024-10-01 RX ADMIN — GADOBUTROL 8 ML: 604.72 INJECTION INTRAVENOUS at 11:04

## 2024-10-07 ENCOUNTER — TELEPHONE (OUTPATIENT)
Dept: UROLOGY | Facility: CLINIC | Age: 79
End: 2024-10-07

## 2024-10-07 ENCOUNTER — OFFICE VISIT (OUTPATIENT)
Dept: FAMILY MEDICINE CLINIC | Facility: CLINIC | Age: 79
End: 2024-10-07
Payer: MEDICARE

## 2024-10-07 VITALS
WEIGHT: 177.2 LBS | DIASTOLIC BLOOD PRESSURE: 76 MMHG | SYSTOLIC BLOOD PRESSURE: 158 MMHG | HEART RATE: 88 BPM | OXYGEN SATURATION: 96 % | HEIGHT: 71 IN | TEMPERATURE: 98.7 F | BODY MASS INDEX: 24.81 KG/M2

## 2024-10-07 DIAGNOSIS — H93.13 TINNITUS OF BOTH EARS: Primary | ICD-10-CM

## 2024-10-07 DIAGNOSIS — C61 PROSTATE CANCER (HCC): Primary | ICD-10-CM

## 2024-10-07 DIAGNOSIS — C61 PROSTATE CANCER (HCC): ICD-10-CM

## 2024-10-07 DIAGNOSIS — H61.23 CERUMEN DEBRIS ON TYMPANIC MEMBRANE OF BOTH EARS: ICD-10-CM

## 2024-10-07 PROCEDURE — 69210 REMOVE IMPACTED EAR WAX UNI: CPT | Performed by: FAMILY MEDICINE

## 2024-10-07 PROCEDURE — 99213 OFFICE O/P EST LOW 20 MIN: CPT | Performed by: FAMILY MEDICINE
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
## 2024-10-07 NOTE — TELEPHONE ENCOUNTER
I spoke with patient on the phone regarding his prostate MRI results. His prostate MRI is stable in comparison to his MRI in June 2023. I have ordered a repeat PSA for him to get now, since his last PSA was in May. We will be in contact with his PSA results. I did discuss with patient that if his PSA continues to increase we should repeat a transperineal fusion biopsy. All questions addressed. Patient understands and agrees with plan.

## 2024-10-07 NOTE — ASSESSMENT & PLAN NOTE
Had MRI October 1 it shows 1 point by 1.3 Centimeter enclosed lesion likely cancer in the prostate not penetrating the wall.  Will follow-up with Dr. Stallworth in near future  Orders:    Ambulatory referral to Urology; Future     n/a

## 2024-10-08 DIAGNOSIS — K21.00 GASTROESOPHAGEAL REFLUX DISEASE WITH ESOPHAGITIS, UNSPECIFIED WHETHER HEMORRHAGE: ICD-10-CM

## 2024-10-08 DIAGNOSIS — K22.70 BARRETT'S ESOPHAGUS WITHOUT DYSPLASIA: ICD-10-CM

## 2024-10-08 RX ORDER — FAMOTIDINE 40 MG/1
40 TABLET, FILM COATED ORAL DAILY
Qty: 90 TABLET | Refills: 1 | Status: SHIPPED | OUTPATIENT
Start: 2024-10-08

## 2024-10-08 NOTE — TELEPHONE ENCOUNTER
Reason for call:   [x] Refill   [] Prior Auth  [] Other:     Office:   [] PCP/Provider -   [x] Specialty/Provider - [facility]STR[facility]     Medication: famotidine (PEPCID) 40 MG tablet     Dose/Frequency: 40 mg, Daily     Quantity: 90     Pharmacy: [facility]     Does the patient have enough for 3 days?   [] Yes   [x] No - Send as HP to POD

## 2024-10-14 DIAGNOSIS — M45.9 ANKYLOSING SPONDYLITIS OF SITE IN SPINE (HCC): ICD-10-CM

## 2024-10-15 ENCOUNTER — HOSPITAL ENCOUNTER (OUTPATIENT)
Dept: PULMONOLOGY | Facility: HOSPITAL | Age: 79
Discharge: HOME | End: 2024-10-15
Attending: INTERNAL MEDICINE
Payer: MEDICARE

## 2024-10-15 ENCOUNTER — TELEPHONE (OUTPATIENT)
Dept: NEPHROLOGY | Facility: CLINIC | Age: 79
End: 2024-10-15

## 2024-10-15 ENCOUNTER — APPOINTMENT (OUTPATIENT)
Dept: LAB | Facility: HOSPITAL | Age: 79
End: 2024-10-15
Payer: MEDICARE

## 2024-10-15 DIAGNOSIS — I12.9 PARENCHYMAL RENAL HYPERTENSION, STAGE 1 THROUGH STAGE 4 OR UNSPECIFIED CHRONIC KIDNEY DISEASE: ICD-10-CM

## 2024-10-15 DIAGNOSIS — E11.9 TYPE 2 DIABETES MELLITUS WITHOUT COMPLICATION, WITHOUT LONG-TERM CURRENT USE OF INSULIN (HCC): ICD-10-CM

## 2024-10-15 DIAGNOSIS — N18.31 STAGE 3A CHRONIC KIDNEY DISEASE (HCC): ICD-10-CM

## 2024-10-15 DIAGNOSIS — J43.9 PULMONARY EMPHYSEMA, UNSPECIFIED EMPHYSEMA TYPE (HCC): ICD-10-CM

## 2024-10-15 DIAGNOSIS — N18.31 STAGE 3A CHRONIC KIDNEY DISEASE (HCC): Primary | ICD-10-CM

## 2024-10-15 DIAGNOSIS — J84.10 PULMONARY FIBROSIS (HCC): ICD-10-CM

## 2024-10-15 DIAGNOSIS — I12.9 HYPERTENSIVE CHRONIC KIDNEY DISEASE WITH STAGE 1 THROUGH STAGE 4 CHRONIC KIDNEY DISEASE, OR UNSPECIFIED CHRONIC KIDNEY DISEASE: ICD-10-CM

## 2024-10-15 DIAGNOSIS — R80.1 PERSISTENT PROTEINURIA: ICD-10-CM

## 2024-10-15 DIAGNOSIS — E78.5 DYSLIPIDEMIA: ICD-10-CM

## 2024-10-15 DIAGNOSIS — E11.21 DIABETIC NEPHROPATHY ASSOCIATED WITH TYPE 2 DIABETES MELLITUS (HCC): ICD-10-CM

## 2024-10-15 DIAGNOSIS — C61 PROSTATE CANCER (HCC): ICD-10-CM

## 2024-10-15 LAB
ALBUMIN SERPL BCG-MCNC: 4.1 G/DL (ref 3.5–5)
ALP SERPL-CCNC: 85 U/L (ref 34–104)
ALT SERPL W P-5'-P-CCNC: 18 U/L (ref 7–52)
ANION GAP SERPL CALCULATED.3IONS-SCNC: 9 MMOL/L (ref 4–13)
AST SERPL W P-5'-P-CCNC: 23 U/L (ref 13–39)
BILIRUB SERPL-MCNC: 0.42 MG/DL (ref 0.2–1)
BUN SERPL-MCNC: 37 MG/DL (ref 5–25)
CALCIUM SERPL-MCNC: 10 MG/DL (ref 8.4–10.2)
CHLORIDE SERPL-SCNC: 100 MMOL/L (ref 96–108)
CO2 SERPL-SCNC: 29 MMOL/L (ref 21–32)
CREAT SERPL-MCNC: 1.89 MG/DL (ref 0.6–1.3)
CREAT UR-MCNC: 106.2 MG/DL
ERYTHROCYTE [DISTWIDTH] IN BLOOD BY AUTOMATED COUNT: 13.1 % (ref 11.6–15.1)
EST. AVERAGE GLUCOSE BLD GHB EST-MCNC: 163 MG/DL
GFR SERPL CREATININE-BSD FRML MDRD: 33 ML/MIN/1.73SQ M
GLUCOSE SERPL-MCNC: 250 MG/DL (ref 65–140)
HBA1C MFR BLD: 7.3 %
HCT VFR BLD AUTO: 43.4 % (ref 36.5–49.3)
HGB BLD-MCNC: 13.8 G/DL (ref 12–17)
MAGNESIUM SERPL-MCNC: 2.2 MG/DL (ref 1.9–2.7)
MCH RBC QN AUTO: 32.2 PG (ref 26.8–34.3)
MCHC RBC AUTO-ENTMCNC: 31.8 G/DL (ref 31.4–37.4)
MCV RBC AUTO: 101 FL (ref 82–98)
MICROALBUMIN UR-MCNC: 46.9 MG/L
MICROALBUMIN/CREAT 24H UR: 44 MG/G CREATININE (ref 0–30)
PLATELET # BLD AUTO: 260 THOUSANDS/UL (ref 149–390)
PMV BLD AUTO: 8.5 FL (ref 8.9–12.7)
POTASSIUM SERPL-SCNC: 4.7 MMOL/L (ref 3.5–5.3)
PROT SERPL-MCNC: 7.4 G/DL (ref 6.4–8.4)
PSA SERPL-MCNC: 15.71 NG/ML (ref 0–4)
RBC # BLD AUTO: 4.28 MILLION/UL (ref 3.88–5.62)
SODIUM SERPL-SCNC: 138 MMOL/L (ref 135–147)
WBC # BLD AUTO: 11.1 THOUSAND/UL (ref 4.31–10.16)

## 2024-10-15 PROCEDURE — 94729 DIFFUSING CAPACITY: CPT

## 2024-10-15 PROCEDURE — 94729 DIFFUSING CAPACITY: CPT | Performed by: INTERNAL MEDICINE

## 2024-10-15 PROCEDURE — 83036 HEMOGLOBIN GLYCOSYLATED A1C: CPT

## 2024-10-15 PROCEDURE — 84153 ASSAY OF PSA TOTAL: CPT

## 2024-10-15 PROCEDURE — 85027 COMPLETE CBC AUTOMATED: CPT

## 2024-10-15 PROCEDURE — 36415 COLL VENOUS BLD VENIPUNCTURE: CPT

## 2024-10-15 PROCEDURE — 94060 EVALUATION OF WHEEZING: CPT | Performed by: INTERNAL MEDICINE

## 2024-10-15 PROCEDURE — 80053 COMPREHEN METABOLIC PANEL: CPT

## 2024-10-15 PROCEDURE — 94726 PLETHYSMOGRAPHY LUNG VOLUMES: CPT | Performed by: INTERNAL MEDICINE

## 2024-10-15 PROCEDURE — 82043 UR ALBUMIN QUANTITATIVE: CPT

## 2024-10-15 PROCEDURE — 94760 N-INVAS EAR/PLS OXIMETRY 1: CPT

## 2024-10-15 PROCEDURE — 83735 ASSAY OF MAGNESIUM: CPT

## 2024-10-15 PROCEDURE — 94060 EVALUATION OF WHEEZING: CPT

## 2024-10-15 PROCEDURE — 94726 PLETHYSMOGRAPHY LUNG VOLUMES: CPT

## 2024-10-15 PROCEDURE — 94618 PULMONARY STRESS TESTING: CPT | Performed by: INTERNAL MEDICINE

## 2024-10-15 PROCEDURE — 82570 ASSAY OF URINE CREATININE: CPT

## 2024-10-15 RX ORDER — ALBUTEROL SULFATE 0.83 MG/ML
2.5 SOLUTION RESPIRATORY (INHALATION) ONCE
Status: COMPLETED | OUTPATIENT
Start: 2024-10-15 | End: 2024-10-15

## 2024-10-15 RX ORDER — SULFASALAZINE 500 MG/1
TABLET ORAL
Qty: 45 TABLET | Refills: 3 | Status: SHIPPED | OUTPATIENT
Start: 2024-10-15

## 2024-10-15 RX ADMIN — ALBUTEROL SULFATE 2.5 MG: 2.5 SOLUTION RESPIRATORY (INHALATION) at 09:34

## 2024-10-15 NOTE — TELEPHONE ENCOUNTER
----- Message from Misael Yun MD sent at 10/15/2024 10:35 AM EDT -----  Creatinine just slightly above baseline I would recommend repeating a basic metabolic profile with good hydration and about 4 to 6 weeks  ----- Message -----  From: Lab, Background User  Sent: 10/15/2024   9:58 AM EDT  To: Misael Yun MD

## 2024-10-15 NOTE — TELEPHONE ENCOUNTER
----- Message from Misael Yun MD sent at 10/15/2024 10:08 AM EDT -----  WBC count slightly high please check with the patient if he has any signs or symptoms of an infection if he does let me know right away otherwise is he on steroids?  If not just repeat a CBC with differential anuric next few weeks  ----- Message -----  From: Lab, Background User  Sent: 10/15/2024   9:58 AM EDT  To: Misael Yun MD

## 2024-10-16 NOTE — TELEPHONE ENCOUNTER
Spoke with patient about the following, he is feeling well.  No symptoms of infection.  He expressed understanding of all instructions and thanked us for the call:    Creatinine just slightly above baseline I would recommend repeating a basic metabolic profile with good hydration and about 4 to 6 weeks   WBC count slightly high please check with the patient if he has any signs or symptoms of an infection if he does let me know right away otherwise is he on steroids?  If not just repeat a CBC with differential anuric next few weeks

## 2024-10-17 ENCOUNTER — TELEPHONE (OUTPATIENT)
Dept: UROLOGY | Facility: CLINIC | Age: 79
End: 2024-10-17

## 2024-10-17 NOTE — TELEPHONE ENCOUNTER
I spoke with patient on the phone today. Patient with Cristobal 3+3=6 prostate cancer. MRI showing PIRADS 4, PSA is now 15.7. Patient will need repeat fusion biopsy d/t increasing PSA.

## 2024-11-06 PROBLEM — H61.23 CERUMEN DEBRIS ON TYMPANIC MEMBRANE OF BOTH EARS: Status: RESOLVED | Noted: 2024-10-07 | Resolved: 2024-11-06

## 2024-11-11 ENCOUNTER — OFFICE VISIT (OUTPATIENT)
Dept: FAMILY MEDICINE CLINIC | Facility: CLINIC | Age: 79
End: 2024-11-11
Payer: MEDICARE

## 2024-11-11 VITALS
WEIGHT: 174 LBS | TEMPERATURE: 98.5 F | BODY MASS INDEX: 24.36 KG/M2 | DIASTOLIC BLOOD PRESSURE: 80 MMHG | OXYGEN SATURATION: 97 % | HEIGHT: 71 IN | SYSTOLIC BLOOD PRESSURE: 138 MMHG | HEART RATE: 73 BPM

## 2024-11-11 DIAGNOSIS — J43.1 PANLOBULAR EMPHYSEMA (HCC): ICD-10-CM

## 2024-11-11 DIAGNOSIS — Z79.899 MEDICATION MANAGEMENT: ICD-10-CM

## 2024-11-11 DIAGNOSIS — Z23 NEED FOR VACCINATION: ICD-10-CM

## 2024-11-11 DIAGNOSIS — I25.118 CORONARY ARTERY DISEASE OF NATIVE HEART WITH STABLE ANGINA PECTORIS, UNSPECIFIED VESSEL OR LESION TYPE (HCC): ICD-10-CM

## 2024-11-11 DIAGNOSIS — I10 HYPERTENSION, ESSENTIAL: Primary | ICD-10-CM

## 2024-11-11 DIAGNOSIS — N18.31 TYPE 2 DIABETES MELLITUS WITH STAGE 3A CHRONIC KIDNEY DISEASE, UNSPECIFIED WHETHER LONG TERM INSULIN USE (HCC): ICD-10-CM

## 2024-11-11 DIAGNOSIS — E11.22 TYPE 2 DIABETES MELLITUS WITH STAGE 3A CHRONIC KIDNEY DISEASE, UNSPECIFIED WHETHER LONG TERM INSULIN USE (HCC): ICD-10-CM

## 2024-11-11 PROCEDURE — 99214 OFFICE O/P EST MOD 30 MIN: CPT | Performed by: FAMILY MEDICINE

## 2024-11-11 PROCEDURE — G2211 COMPLEX E/M VISIT ADD ON: HCPCS | Performed by: FAMILY MEDICINE

## 2024-11-11 NOTE — ASSESSMENT & PLAN NOTE
Lab Results   Component Value Date    HGBA1C 7.3 (H) 10/15/2024   Stressed getting A1c less than 7.0 and best less than 6.5.  Long discussion re diabetes, Rx, etc.  Will inc the Jardiance to 25 mg OD. BS was 282 he states a day after eating b'day cake and ice cream    Orders:    Empagliflozin 25 MG TABS; Take 1 tablet (25 mg total) by mouth daily
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
## 2024-11-14 DIAGNOSIS — Z12.11 SCREENING FOR COLON CANCER: Primary | ICD-10-CM

## 2024-11-15 DIAGNOSIS — J43.9 PULMONARY EMPHYSEMA, UNSPECIFIED EMPHYSEMA TYPE (HCC): ICD-10-CM

## 2024-11-15 RX ORDER — FLUTICASONE FUROATE AND VILANTEROL 100; 25 UG/1; UG/1
POWDER RESPIRATORY (INHALATION)
Qty: 60 EACH | Refills: 5 | Status: SHIPPED | OUTPATIENT
Start: 2024-11-15

## 2024-11-19 ENCOUNTER — OFFICE VISIT (OUTPATIENT)
Dept: PULMONOLOGY | Facility: CLINIC | Age: 79
End: 2024-11-19
Payer: MEDICARE

## 2024-11-19 VITALS
BODY MASS INDEX: 24.19 KG/M2 | HEART RATE: 66 BPM | DIASTOLIC BLOOD PRESSURE: 68 MMHG | TEMPERATURE: 97.6 F | WEIGHT: 172.8 LBS | SYSTOLIC BLOOD PRESSURE: 130 MMHG | HEIGHT: 71 IN | OXYGEN SATURATION: 96 %

## 2024-11-19 DIAGNOSIS — J43.9 PULMONARY EMPHYSEMA, UNSPECIFIED EMPHYSEMA TYPE (HCC): Primary | ICD-10-CM

## 2024-11-19 DIAGNOSIS — J84.10 PULMONARY FIBROSIS (HCC): ICD-10-CM

## 2024-11-19 PROCEDURE — 99214 OFFICE O/P EST MOD 30 MIN: CPT | Performed by: INTERNAL MEDICINE

## 2024-11-19 NOTE — ASSESSMENT & PLAN NOTE
His  high-resolution CT scan of the chest showed mild diffuse bilateral reticulation and mild scattered linear scar.  Mild traction bronchiolectasis in the lateral basal left lower lobe.  Chronic opacity in the paraspinal right lower lobe with volume loss as evidenced by crowding of the bronchi and mild traction bronchiolectasis due to chronic scar.  The etiology is not clear at this point.  His previous PFT showed severe airflow obstruction with diffusion defect.   his repeat PFT showed moderate airflow obstruction with severe diffusion defect.   The chest auscultation revealed bilateral coarse inspiratory crackles.  He currently does not need any oxygen.  The etiology of this pulmonary fibrosis is not clear at this point.  He has emphysema.  He had previous history of sarcoidosis.  He had history of beryllium exposure.  he also had COVID.  He has a history of ankylosing spondylitis.  His CT scan did not show a UIP pattern.  His pulmonary function has not deteriorated, and has shown improvement..  We will continue to monitor him closely.  At this point he would not benefit from antifibrotic therapy.  I had a long discussion with him and answered all his questions

## 2024-11-19 NOTE — PROGRESS NOTES
Name: Bean Lopez      : 1945      MRN: [id number]  Encounter Provider: Silvino Mary MD  Encounter Date: 2024   Encounter department: [facility] PULMON[facility] & [facility]  :  Assessment & Plan  Pulmonary emphysema, unspecified emphysema type (HCC)  Mr. Bean Lopez was diagnosed with COPD many years back and is currently on treatment with Breo 100, Incruse and Ventolin.  His usual exercise tolerance is more than 1 block and he has occasional cough and no wheezing.. He is a never smoker but admits to secondhand smoke exposure. 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 His previous PFT from May 2015 showed severe airflow obstruction with diffusion defect. He also had evidence of air trapping. There was improvement in FEV1 with bronchodilator indicating a component of asthma. His previous CT scan from 2019 showed scarring in the left lung. On clinical examination, his chest auscultation revealed bilateral inspiratory coarse crackles at lung bases.  His high-resolution CT scan of the chest showed bilateral pulmonary fibrosis. I have advised him to continue breo and incruse.  He had coronary artery disease and heart attack recently and has coronary stent.  His shortness of breath is much improved now.  Have advised him to continue with Breo Incruse and Ventolin.  His recent PFT from 10/15/2024 showed moderate airflow obstruction with moderate diffusion defect.  The FEV1, FVC and DLCO were improved compared to previous study.  I had a long discussion with him and answered all his questions.             Pulmonary fibrosis (HCC)  His  high-resolution CT scan of the chest showed mild diffuse bilateral reticulation and mild scattered linear scar.  Mild traction bronchiolectasis in the lateral basal left lower lobe.  Chronic opacity in the paraspinal right lower lobe with volume loss as evidenced by crowding of the bronchi and mild traction bronchiolectasis  due to chronic scar.  The etiology is not clear at this point.  His previous PFT showed severe airflow obstruction with diffusion defect.   his repeat PFT showed moderate airflow obstruction with severe diffusion defect.   The chest auscultation revealed bilateral coarse inspiratory crackles.  He currently does not need any oxygen.  The etiology of this pulmonary fibrosis is not clear at this point.  He has emphysema.  He had previous history of sarcoidosis.  He had history of beryllium exposure.  he also had COVID.  He has a history of ankylosing spondylitis.  His CT scan did not show a UIP pattern.  His pulmonary function has not deteriorated, and has shown improvement..  We will continue to monitor him closely.  At this point he would not benefit from antifibrotic therapy.  I had a long discussion with him and answered all his questions                History of Present Illness     HPI  Bean Lopez is a 78 y.o. male past medical history of COPD emphysema pulmonary fibrosis, COVID, sarcoidosis, beryllium exposure and ankylosing spondylitis who has come for follow-up for his COPD and pulmonary fibrosis.  He has been on treatment with Breo and Incruse regularly and albuterol as needed.  His exercise tolerance is about a block on level ground.  He does not have any cough or phlegm or wheeze or chest pain.  No swelling of feet.  He had no hoarseness of voice or dysphagia.  No chest pain no palpitations.  His appetite has been good.  He had a pulmonary function test done and it showed improved FEV1 and FVC and DLCO.  His pulmonary fibrosis does not conform to UIP pattern.  He denied any anorexia or weight loss.      Review of Systems   Constitutional:  Negative for appetite change, chills, fatigue and fever.   HENT:  Positive for hearing loss. Negative for rhinorrhea, sneezing, sore throat, trouble swallowing and voice change.    Respiratory:  Positive for shortness of breath. Negative for cough, chest tightness  "and wheezing.    Cardiovascular:  Negative for chest pain, palpitations and leg swelling.   Gastrointestinal:  Negative for abdominal pain, constipation, diarrhea, nausea and vomiting.   Genitourinary:  Negative for dysuria, frequency and urgency.   Musculoskeletal:  Positive for arthralgias and back pain. Negative for gait problem.        Ankylosing spondylitis   Skin:  Negative for rash.   Allergic/Immunologic: Negative for environmental allergies.   Neurological:  Negative for dizziness, syncope, light-headedness and headaches.   Psychiatric/Behavioral:  Negative for agitation, confusion and sleep disturbance. The patient is not nervous/anxious.           Objective   /68 (BP Location: Left arm, Patient Position: Sitting, Cuff Size: Standard)   Pulse 66   Temp 97.6 °F (36.4 °C) (Tympanic)   Ht 5' 11\" (1.803 m)   Wt 78.4 kg (172 lb 12.8 oz)   SpO2 96%   BMI 24.10 kg/m²      Physical Exam  Vitals reviewed.   Constitutional:       General: He is not in acute distress.     Appearance: He is not ill-appearing, toxic-appearing or diaphoretic.   HENT:      Head: Normocephalic.      Mouth/Throat:      Mouth: Mucous membranes are moist.      Pharynx: Oropharynx is clear.   Eyes:      General: No scleral icterus.     Conjunctiva/sclera: Conjunctivae normal.   Cardiovascular:      Rate and Rhythm: Normal rate and regular rhythm.      Heart sounds: Normal heart sounds. No murmur heard.  Pulmonary:      Effort: Pulmonary effort is normal. No respiratory distress.      Breath sounds: No stridor. Rales (occasional basal inspiratory crackles) present. No wheezing or rhonchi.      Comments: Kyphosis  Chest:      Chest wall: No tenderness.   Abdominal:      General: Bowel sounds are normal.      Palpations: Abdomen is soft.      Tenderness: There is no abdominal tenderness. There is no guarding.   Musculoskeletal:      Cervical back: Neck supple. No rigidity.      Right lower leg: No edema.      Left lower leg: No " edema.   Lymphadenopathy:      Cervical: No cervical adenopathy.   Skin:     Coloration: Skin is not jaundiced or pale.      Findings: No rash.   Neurological:      Mental Status: He is alert and oriented to person, place, and time.      Gait: Gait normal.   Psychiatric:         Mood and Affect: Mood normal.         Behavior: Behavior normal.         Thought Content: Thought content normal.         Judgment: Judgment normal.     I spent 30 minutes of time take care of this patient with complex medical issues.  The majority of this time was spent directly with the patient counseling as well as coordinating care.

## 2024-11-19 NOTE — ASSESSMENT & PLAN NOTE
Mr. Bean Lopez was diagnosed with COPD many years back and is currently on treatment with Breo 100, Incruse and Ventolin.  His usual exercise tolerance is more than 1 block and he has occasional cough and no wheezing.. He is a never smoker but admits to secondhand smoke exposure. 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 His previous PFT from May 2015 showed severe airflow obstruction with diffusion defect. He also had evidence of air trapping. There was improvement in FEV1 with bronchodilator indicating a component of asthma. His previous CT scan from February 2019 showed scarring in the left lung. On clinical examination, his chest auscultation revealed bilateral inspiratory coarse crackles at lung bases.  His high-resolution CT scan of the chest showed bilateral pulmonary fibrosis. I have advised him to continue breo and incruse.  He had coronary artery disease and heart attack recently and has coronary stent.  His shortness of breath is much improved now.  Have advised him to continue with Breo Incruse and Ventolin.  His recent PFT from 10/15/2024 showed moderate airflow obstruction with moderate diffusion defect.  The FEV1, FVC and DLCO were improved compared to previous study.  I had a long discussion with him and answered all his questions.

## 2024-11-29 ENCOUNTER — APPOINTMENT (OUTPATIENT)
Dept: LAB | Facility: HOSPITAL | Age: 79
End: 2024-11-29
Attending: INTERNAL MEDICINE
Payer: MEDICARE

## 2024-11-29 ENCOUNTER — RESULTS FOLLOW-UP (OUTPATIENT)
Dept: OTHER | Facility: HOSPITAL | Age: 79
End: 2024-11-29

## 2024-11-29 DIAGNOSIS — N18.31 STAGE 3A CHRONIC KIDNEY DISEASE (HCC): ICD-10-CM

## 2024-11-29 DIAGNOSIS — E78.5 HYPERLIPIDEMIA, UNSPECIFIED HYPERLIPIDEMIA TYPE: ICD-10-CM

## 2024-11-29 DIAGNOSIS — N18.31 STAGE 3A CHRONIC KIDNEY DISEASE (HCC): Primary | ICD-10-CM

## 2024-11-29 LAB
ANION GAP SERPL CALCULATED.3IONS-SCNC: 12 MMOL/L (ref 4–13)
BASOPHILS # BLD AUTO: 0.04 THOUSANDS/ΜL (ref 0–0.1)
BASOPHILS NFR BLD AUTO: 0 % (ref 0–1)
BUN SERPL-MCNC: 47 MG/DL (ref 5–25)
CALCIUM SERPL-MCNC: 9.9 MG/DL (ref 8.4–10.2)
CHLORIDE SERPL-SCNC: 102 MMOL/L (ref 96–108)
CHOLEST SERPL-MCNC: 120 MG/DL (ref ?–200)
CO2 SERPL-SCNC: 26 MMOL/L (ref 21–32)
CREAT SERPL-MCNC: 1.93 MG/DL (ref 0.6–1.3)
EOSINOPHIL # BLD AUTO: 0.43 THOUSAND/ΜL (ref 0–0.61)
EOSINOPHIL NFR BLD AUTO: 5 % (ref 0–6)
ERYTHROCYTE [DISTWIDTH] IN BLOOD BY AUTOMATED COUNT: 13.6 % (ref 11.6–15.1)
GFR SERPL CREATININE-BSD FRML MDRD: 32 ML/MIN/1.73SQ M
GLUCOSE P FAST SERPL-MCNC: 139 MG/DL (ref 65–99)
HCT VFR BLD AUTO: 42.6 % (ref 36.5–49.3)
HDLC SERPL-MCNC: 43 MG/DL
HGB BLD-MCNC: 14 G/DL (ref 12–17)
IMM GRANULOCYTES # BLD AUTO: 0.02 THOUSAND/UL (ref 0–0.2)
IMM GRANULOCYTES NFR BLD AUTO: 0 % (ref 0–2)
LDLC SERPL CALC-MCNC: 46 MG/DL (ref 0–100)
LYMPHOCYTES # BLD AUTO: 2.73 THOUSANDS/ΜL (ref 0.6–4.47)
LYMPHOCYTES NFR BLD AUTO: 29 % (ref 14–44)
MCH RBC QN AUTO: 32.7 PG (ref 26.8–34.3)
MCHC RBC AUTO-ENTMCNC: 32.9 G/DL (ref 31.4–37.4)
MCV RBC AUTO: 100 FL (ref 82–98)
MONOCYTES # BLD AUTO: 0.92 THOUSAND/ΜL (ref 0.17–1.22)
MONOCYTES NFR BLD AUTO: 10 % (ref 4–12)
NEUTROPHILS # BLD AUTO: 5.16 THOUSANDS/ΜL (ref 1.85–7.62)
NEUTS SEG NFR BLD AUTO: 56 % (ref 43–75)
NONHDLC SERPL-MCNC: 77 MG/DL
NRBC BLD AUTO-RTO: 0 /100 WBCS
PLATELET # BLD AUTO: 178 THOUSANDS/UL (ref 149–390)
PMV BLD AUTO: 9.4 FL (ref 8.9–12.7)
POTASSIUM SERPL-SCNC: 4.3 MMOL/L (ref 3.5–5.3)
RBC # BLD AUTO: 4.28 MILLION/UL (ref 3.88–5.62)
SODIUM SERPL-SCNC: 140 MMOL/L (ref 135–147)
TRIGL SERPL-MCNC: 156 MG/DL (ref ?–150)
WBC # BLD AUTO: 9.3 THOUSAND/UL (ref 4.31–10.16)

## 2024-11-29 PROCEDURE — 85025 COMPLETE CBC W/AUTO DIFF WBC: CPT

## 2024-11-29 PROCEDURE — 36415 COLL VENOUS BLD VENIPUNCTURE: CPT

## 2024-11-29 PROCEDURE — 80061 LIPID PANEL: CPT

## 2024-11-29 PROCEDURE — 80048 BASIC METABOLIC PNL TOTAL CA: CPT

## 2024-11-29 NOTE — TELEPHONE ENCOUNTER
Talked with pt.  Advised that Cr trending up.  He confirmed he is on Jardiance.  Feeling well.  Per Hyacinth, repeat BMP in one week with good hydration.  Order placed.  (Pt also states he is scheduled for prostate biopsy transperineal MRI fusion biopsy on  1/30/25)    ----- Message from Hyacinth Johns PA-C sent at 11/29/2024 12:07 PM EST -----  Covering Dr. Yun in basket.  Labs reviewed.  Creatinine trending up and above baseline.  Patient possibly recently started on Jardiance on 11/11/2024 which may contribute.  Please inquire regarding any symptoms.  Encourage adequate hydration and have patient repeat BMP next week with adequate hydration.  Labs in epic.

## 2024-11-29 NOTE — RESULT ENCOUNTER NOTE
Covering Dr. Yun in basket.  Labs reviewed.  Creatinine trending up and above baseline.  Patient possibly recently started on Jardiance on 11/11/2024 which may contribute.  Please inquire regarding any symptoms.  Encourage adequate hydration and have patient repeat BMP next week with adequate hydration.  Labs in epic.

## 2024-12-06 ENCOUNTER — APPOINTMENT (OUTPATIENT)
Dept: LAB | Facility: HOSPITAL | Age: 79
End: 2024-12-06
Attending: UROLOGY
Payer: MEDICARE

## 2024-12-06 ENCOUNTER — RESULTS FOLLOW-UP (OUTPATIENT)
Dept: OTHER | Facility: HOSPITAL | Age: 79
End: 2024-12-06

## 2024-12-06 DIAGNOSIS — Z01.812 PRE-OPERATIVE LABORATORY EXAMINATION: ICD-10-CM

## 2024-12-06 DIAGNOSIS — N18.31 STAGE 3A CHRONIC KIDNEY DISEASE (HCC): ICD-10-CM

## 2024-12-06 DIAGNOSIS — R39.89 SUSPECTED UTI: ICD-10-CM

## 2024-12-06 DIAGNOSIS — C61 MALIGNANT NEOPLASM OF PROSTATE (HCC): ICD-10-CM

## 2024-12-06 DIAGNOSIS — R97.20 ELEVATED PROSTATE SPECIFIC ANTIGEN (PSA): ICD-10-CM

## 2024-12-06 LAB
ALBUMIN SERPL BCG-MCNC: 4.2 G/DL (ref 3.5–5)
ALP SERPL-CCNC: 82 U/L (ref 34–104)
ALT SERPL W P-5'-P-CCNC: 22 U/L (ref 7–52)
ANION GAP SERPL CALCULATED.3IONS-SCNC: 10 MMOL/L (ref 4–13)
AST SERPL W P-5'-P-CCNC: 28 U/L (ref 13–39)
BASOPHILS # BLD AUTO: 0.04 THOUSANDS/ÂΜL (ref 0–0.1)
BASOPHILS NFR BLD AUTO: 1 % (ref 0–1)
BILIRUB SERPL-MCNC: 0.68 MG/DL (ref 0.2–1)
BUN SERPL-MCNC: 34 MG/DL (ref 5–25)
CALCIUM SERPL-MCNC: 9.4 MG/DL (ref 8.4–10.2)
CHLORIDE SERPL-SCNC: 102 MMOL/L (ref 96–108)
CO2 SERPL-SCNC: 26 MMOL/L (ref 21–32)
CREAT SERPL-MCNC: 1.71 MG/DL (ref 0.6–1.3)
EOSINOPHIL # BLD AUTO: 0.4 THOUSAND/ÂΜL (ref 0–0.61)
EOSINOPHIL NFR BLD AUTO: 5 % (ref 0–6)
ERYTHROCYTE [DISTWIDTH] IN BLOOD BY AUTOMATED COUNT: 13.5 % (ref 11.6–15.1)
GFR SERPL CREATININE-BSD FRML MDRD: 37 ML/MIN/1.73SQ M
GLUCOSE P FAST SERPL-MCNC: 133 MG/DL (ref 65–99)
HCT VFR BLD AUTO: 41.7 % (ref 36.5–49.3)
HGB BLD-MCNC: 13.5 G/DL (ref 12–17)
IMM GRANULOCYTES # BLD AUTO: 0.09 THOUSAND/UL (ref 0–0.2)
IMM GRANULOCYTES NFR BLD AUTO: 1 % (ref 0–2)
LYMPHOCYTES # BLD AUTO: 1.79 THOUSANDS/ÂΜL (ref 0.6–4.47)
LYMPHOCYTES NFR BLD AUTO: 22 % (ref 14–44)
MCH RBC QN AUTO: 32.4 PG (ref 26.8–34.3)
MCHC RBC AUTO-ENTMCNC: 32.4 G/DL (ref 31.4–37.4)
MCV RBC AUTO: 100 FL (ref 82–98)
MONOCYTES # BLD AUTO: 0.7 THOUSAND/ÂΜL (ref 0.17–1.22)
MONOCYTES NFR BLD AUTO: 9 % (ref 4–12)
NEUTROPHILS # BLD AUTO: 5.09 THOUSANDS/ÂΜL (ref 1.85–7.62)
NEUTS SEG NFR BLD AUTO: 62 % (ref 43–75)
NRBC BLD AUTO-RTO: 0 /100 WBCS
PLATELET # BLD AUTO: 182 THOUSANDS/UL (ref 149–390)
PMV BLD AUTO: 9.3 FL (ref 8.9–12.7)
POTASSIUM SERPL-SCNC: 3.4 MMOL/L (ref 3.5–5.3)
PROT SERPL-MCNC: 7 G/DL (ref 6.4–8.4)
RBC # BLD AUTO: 4.17 MILLION/UL (ref 3.88–5.62)
SODIUM SERPL-SCNC: 138 MMOL/L (ref 135–147)
WBC # BLD AUTO: 8.11 THOUSAND/UL (ref 4.31–10.16)

## 2024-12-06 PROCEDURE — 85025 COMPLETE CBC W/AUTO DIFF WBC: CPT

## 2024-12-06 PROCEDURE — 87086 URINE CULTURE/COLONY COUNT: CPT

## 2024-12-06 PROCEDURE — 36415 COLL VENOUS BLD VENIPUNCTURE: CPT

## 2024-12-06 PROCEDURE — 80053 COMPREHEN METABOLIC PANEL: CPT

## 2024-12-06 NOTE — RESULT ENCOUNTER NOTE
Labs reviewed.  Please call patient let him know his renal function is improved with hydration.  Continue current medications.  Potassium is slightly low.  Would recommend high potassium content foods for now.  please review with patient.

## 2024-12-06 NOTE — TELEPHONE ENCOUNTER
Pt advised that labs show improved renal function with hydration.  K slightly low.  Per Hyacinth, increase K foods in diet.  Continue current medications.    ----- Message from Hyacinth Johns PA-C sent at 12/6/2024  3:25 PM EST -----  Labs reviewed.  Please call patient let him know his renal function is improved with hydration.  Continue current medications.  Potassium is slightly low.  Would recommend high potassium content foods for now.  please review with patient.

## 2024-12-07 LAB — BACTERIA UR CULT: NORMAL

## 2024-12-10 ENCOUNTER — ANESTHESIA (OUTPATIENT)
Dept: GASTROENTEROLOGY | Facility: HOSPITAL | Age: 79
End: 2024-12-10
Payer: MEDICARE

## 2024-12-10 ENCOUNTER — HOSPITAL ENCOUNTER (OUTPATIENT)
Dept: GASTROENTEROLOGY | Facility: HOSPITAL | Age: 79
Setting detail: OUTPATIENT SURGERY
Discharge: HOME | End: 2024-12-10
Attending: INTERNAL MEDICINE
Payer: MEDICARE

## 2024-12-10 ENCOUNTER — ANESTHESIA EVENT (OUTPATIENT)
Dept: GASTROENTEROLOGY | Facility: HOSPITAL | Age: 79
End: 2024-12-10
Payer: MEDICARE

## 2024-12-10 VITALS
OXYGEN SATURATION: 98 % | DIASTOLIC BLOOD PRESSURE: 71 MMHG | RESPIRATION RATE: 13 BRPM | WEIGHT: 175 LBS | TEMPERATURE: 98 F | HEART RATE: 69 BPM | SYSTOLIC BLOOD PRESSURE: 141 MMHG | HEIGHT: 70 IN | BODY MASS INDEX: 25.05 KG/M2

## 2024-12-10 DIAGNOSIS — B37.81 CANDIDA ESOPHAGITIS (HCC): Primary | ICD-10-CM

## 2024-12-10 DIAGNOSIS — K22.70 BARRETT'S ESOPHAGUS WITHOUT DYSPLASIA: ICD-10-CM

## 2024-12-10 DIAGNOSIS — Z12.11 COLON CANCER SCREENING: ICD-10-CM

## 2024-12-10 LAB — GLUCOSE SERPL-MCNC: 121 MG/DL (ref 65–140)

## 2024-12-10 PROCEDURE — 82948 REAGENT STRIP/BLOOD GLUCOSE: CPT

## 2024-12-10 PROCEDURE — 43251 EGD REMOVE LESION SNARE: CPT | Performed by: INTERNAL MEDICINE

## 2024-12-10 PROCEDURE — 88313 SPECIAL STAINS GROUP 2: CPT | Performed by: PATHOLOGY

## 2024-12-10 PROCEDURE — 88312 SPECIAL STAINS GROUP 1: CPT | Performed by: PATHOLOGY

## 2024-12-10 PROCEDURE — 88305 TISSUE EXAM BY PATHOLOGIST: CPT | Performed by: PATHOLOGY

## 2024-12-10 PROCEDURE — 45385 COLONOSCOPY W/LESION REMOVAL: CPT | Performed by: INTERNAL MEDICINE

## 2024-12-10 PROCEDURE — 88341 IMHCHEM/IMCYTCHM EA ADD ANTB: CPT | Performed by: PATHOLOGY

## 2024-12-10 PROCEDURE — 88360 TUMOR IMMUNOHISTOCHEM/MANUAL: CPT | Performed by: PATHOLOGY

## 2024-12-10 PROCEDURE — 45381 COLONOSCOPY SUBMUCOUS NJX: CPT | Performed by: INTERNAL MEDICINE

## 2024-12-10 PROCEDURE — 43239 EGD BIOPSY SINGLE/MULTIPLE: CPT | Performed by: INTERNAL MEDICINE

## 2024-12-10 PROCEDURE — 88342 IMHCHEM/IMCYTCHM 1ST ANTB: CPT | Performed by: PATHOLOGY

## 2024-12-10 RX ORDER — PROPOFOL 10 MG/ML
INJECTION, EMULSION INTRAVENOUS AS NEEDED
Status: DISCONTINUED | OUTPATIENT
Start: 2024-12-10 | End: 2024-12-10

## 2024-12-10 RX ORDER — PHENYLEPHRINE HCL IN 0.9% NACL 1 MG/10 ML
SYRINGE (ML) INTRAVENOUS AS NEEDED
Status: DISCONTINUED | OUTPATIENT
Start: 2024-12-10 | End: 2024-12-10

## 2024-12-10 RX ORDER — PROPOFOL 10 MG/ML
INJECTION, EMULSION INTRAVENOUS CONTINUOUS PRN
Status: DISCONTINUED | OUTPATIENT
Start: 2024-12-10 | End: 2024-12-10

## 2024-12-10 RX ORDER — SODIUM CHLORIDE, SODIUM LACTATE, POTASSIUM CHLORIDE, CALCIUM CHLORIDE 600; 310; 30; 20 MG/100ML; MG/100ML; MG/100ML; MG/100ML
INJECTION, SOLUTION INTRAVENOUS CONTINUOUS PRN
Status: DISCONTINUED | OUTPATIENT
Start: 2024-12-10 | End: 2024-12-10

## 2024-12-10 RX ORDER — NYSTATIN 100000 [USP'U]/ML
500000 SUSPENSION ORAL 4 TIMES DAILY
Qty: 600 ML | Refills: 0 | Status: SHIPPED | OUTPATIENT
Start: 2024-12-10

## 2024-12-10 RX ADMIN — Medication 100 MCG: at 08:42

## 2024-12-10 RX ADMIN — PROPOFOL 20 MG: 10 INJECTION, EMULSION INTRAVENOUS at 08:40

## 2024-12-10 RX ADMIN — PROPOFOL 100 MG: 10 INJECTION, EMULSION INTRAVENOUS at 08:18

## 2024-12-10 RX ADMIN — Medication 100 MCG: at 08:31

## 2024-12-10 RX ADMIN — PROPOFOL 40 MG: 10 INJECTION, EMULSION INTRAVENOUS at 08:45

## 2024-12-10 RX ADMIN — Medication 40 MG: at 08:46

## 2024-12-10 RX ADMIN — PROPOFOL 40 MG: 10 INJECTION, EMULSION INTRAVENOUS at 08:33

## 2024-12-10 RX ADMIN — PROPOFOL 40 MG: 10 INJECTION, EMULSION INTRAVENOUS at 08:37

## 2024-12-10 RX ADMIN — SODIUM CHLORIDE, SODIUM LACTATE, POTASSIUM CHLORIDE, AND CALCIUM CHLORIDE: .6; .31; .03; .02 INJECTION, SOLUTION INTRAVENOUS at 08:03

## 2024-12-10 RX ADMIN — PROPOFOL 100 MCG/KG/MIN: 10 INJECTION, EMULSION INTRAVENOUS at 08:18

## 2024-12-10 RX ADMIN — PROPOFOL 40 MG: 10 INJECTION, EMULSION INTRAVENOUS at 08:47

## 2024-12-10 NOTE — ANESTHESIA POSTPROCEDURE EVALUATION
Post-Op Assessment Note    CV Status:  Stable  Pain Score: 0    Pain management: adequate       Mental Status:  Alert and awake   Hydration Status:  Euvolemic   PONV Controlled:  Controlled   Airway Patency:  Patent     Post Op Vitals Reviewed: Yes    No anethesia notable event occurred.    Staff: Anesthesiologist, CRNA           Last Filed PACU Vitals:  Vitals Value Taken Time   Temp     Pulse     BP     Resp     SpO2         Modified Poppy:  Activity: 2 (12/10/2024  7:30 AM)  Respiration: 2 (12/10/2024  7:30 AM)  Circulation: 2 (12/10/2024  7:30 AM)  Consciousness: 2 (12/10/2024  7:30 AM)  Oxygen Saturation: 2 (12/10/2024  7:30 AM)  Modified Poppy Score: 10 (12/10/2024  7:30 AM)

## 2024-12-10 NOTE — H&P
"History and Physical -  [facility]  Bean Lopez 79 y.o. male MRN: [id number]                  HPI: Bean Lopez is a 79 y.o. year old male who presents for surveillance of Gee's esophagus, screening colonoscopy      REVIEW OF SYSTEMS: Per the HPI, and otherwise unremarkable.    Historical Information   Past Medical History:   Diagnosis Date    Anemia     Arthritis     Gee's esophagus     Breast lump     Cancer (HCC)     Chronic kidney disease     Cirrhosis (HCC)     COPD (chronic obstructive pulmonary disease) (HCC)     Coronary artery disease     cardiac cath; stents     Diabetes mellitus (HCC)     H/O heart artery stent     Hypertension     Inguinal hernia     Right    Sarcoidosis     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HCC)     Skin cancer     SOB (shortness of breath)     Upper respiratory infection, viral 05/31/2022     Past Surgical History:   Procedure Laterality Date    CARDIAC CATHETERIZATION      x 5 stents-    CATARACT EXTRACTION, BILATERAL      left eye 05/08 and right 5/22/2022    CERVICAL FUSION      CHOLECYSTECTOMY      COLONOSCOPY  04/11/2016    COLONOSCOPY      EGD AND COLONOSCOPY  06/24/2019    ERCP  09/11/2014    transab esop hiat griffin rpr    ESOPHAGOGASTRIC FUNDOPLASTY      HERNIA REPAIR Right     p I/griffin init reduc >5 yr    HIP SURGERY Right     Replacement    KIDNEY STONE SURGERY      Fragmenting    LIVER BIOPSY      MULTIPLE TOOTH EXTRACTIONS      \"extraction of all maxillary teeth\"    FL BX PROSTATE STRTCTC SATURATION SAMPLING IMG GID N/A 08/15/2023    Procedure: TRANSPERINEAL MRI FUSION BIOPSY PROSTATE;  Surgeon: Cory Carreon MD;  Location: [facility];  Service: Urology    SKIN BIOPSY  05/2020    THUMB FUSION      with graft    TONSILLECTOMY      UPPER GASTROINTESTINAL ENDOSCOPY      US GUIDANCE  10/22/2014     Social History   Social History     Substance and Sexual Activity   Alcohol Use Yes    Comment: Alcohol intake:   Occasional wine - As per Mena      Social History " "    Substance and Sexual Activity   Drug Use No     Social History     Tobacco Use   Smoking Status Never   Smokeless Tobacco Never   Tobacco Comments    RETIRED     Family History   Problem Relation Age of Onset    Hypertension Mother     Atrial fibrillation Mother     Heart disease Mother     Kidney disease Father     COPD Father     Heart disease Father     Asthma Neg Hx     Lung cancer Neg Hx        Meds/Allergies       Current Outpatient Medications:     allopurinol (ZYLOPRIM) 300 mg tablet    amLODIPine (NORVASC) 5 mg tablet    atorvastatin (LIPITOR) 80 mg tablet    azilsartan medoxomil (Edarbi) 40 MG tablet    dexlansoprazole (DEXILANT) 60 MG capsule    Empagliflozin 25 MG TABS    famotidine (PEPCID) 40 MG tablet    ipratropium-albuterol (DUO-NEB) 0.5-2.5 mg/3 mL nebulizer solution    isosorbide mononitrate (IMDUR) 30 mg 24 hr tablet    Januvia 100 MG tablet    magnesium chloride-calcium (SLOW-MAG) 71.5-119 mg    metoprolol succinate (TOPROL-XL) 50 mg 24 hr tablet    albuterol (PROVENTIL HFA,VENTOLIN HFA) 90 mcg/act inhaler    Aspirin 81 MG CAPS    b complex vitamins capsule    chlorthalidone 25 mg tablet    Cholecalciferol (VITAMIN D3) 2000 units TABS    co-enzyme Q-10 50 MG capsule    colchicine (COLCRYS) 0.6 mg tablet    Ferrous Sulfate (IRON) 325 (65 Fe) MG TABS    Fluticasone Furoate-Vilanterol 100-25 mcg/actuation inhaler    Incruse Ellipta 62.5 MCG/ACT AEPB inhaler    nitroglycerin (NITROSTAT) 0.4 mg SL tablet    polyethylene glycol (GOLYTELY) 4000 mL solution    sulfaSALAzine (AZULFIDINE) 500 mg tablet    vitamin B-12 (VITAMIN B-12) 1,000 mcg tablet    Allergies   Allergen Reactions    Oxycodone GI Intolerance       Objective     /77   Pulse 86   Temp 98.4 °F (36.9 °C) (Temporal)   Resp 14   Ht 5' 10\" (1.778 m)   Wt 79.4 kg (175 lb)   SpO2 98%   BMI 25.11 kg/m²       PHYSICAL EXAM    Gen: NAD  Head: NCAT  CV: RRR  CHEST: Clear  ABD: soft, NT/ND  EXT: no edema      ASSESSMENT/PLAN:  This " is a 79 y.o. year old male here for EGD, colonoscopy, and he is stable and optimized for his procedure.

## 2024-12-10 NOTE — ANESTHESIA PREPROCEDURE EVALUATION
Procedure:  EGD  COLONOSCOPY    Relevant Problems   ANESTHESIA (within normal limits)      CARDIO   (+) Coronary artery disease involving native coronary artery of native heart without angina pectoris   (+) Coronary artery disease of native heart with stable angina pectoris, unspecified vessel or lesion type ([facility])   (+) History of PTCA   (+) Hypertension, essential   (+) Parenchymal renal hypertension      ENDO   (+) Type 2 diabetes mellitus with stage 3 chronic kidney disease ([facility])   (+) Type 2 diabetes mellitus without complication, without long-term current use of insulin (HCC)      /RENAL   (+) Chronic kidney disease, stage III (moderate) (HCC)   (+) Diabetic nephropathy associated with type 2 diabetes mellitus ([facility])   (+) Hypertensive chronic kidney disease with stage 1 through stage 4 chronic kidney disease, or unspecified chronic kidney disease   (+) Parenchymal renal hypertension   (+) Prostate cancer (HCC)      MUSCULOSKELETAL   (+) Ankylosing spondylitis of site in spine ([facility])   (+) Other spondylosis, thoracolumbar region      PULMONARY   (+) COPD (chronic obstructive pulmonary disease) ([facility])        Physical Exam    Airway    Mallampati score: III  TM Distance: >3 FB  Neck ROM: limited     Dental    upper dentures and lower dentures,     Cardiovascular      Pulmonary      Other Findings        Anesthesia Plan  ASA Score- 3     Anesthesia Type- IV sedation with anesthesia with ASA Monitors.         Additional Monitors:     Airway Plan:            Plan Factors-Exercise tolerance (METS): >4 METS.    Chart reviewed. EKG reviewed.  Existing labs reviewed. Patient summary reviewed.    Patient is not a current smoker.              Induction- intravenous.    Postoperative Plan-         Informed Consent- Anesthetic plan and risks discussed with patient.  I personally reviewed this patient with the CRNA. Discussed and agreed on the Anesthesia Plan with the CRNA..

## 2024-12-12 DIAGNOSIS — R80.1 PERSISTENT PROTEINURIA: ICD-10-CM

## 2024-12-12 DIAGNOSIS — I12.9 HYPERTENSIVE CHRONIC KIDNEY DISEASE WITH STAGE 1 THROUGH STAGE 4 CHRONIC KIDNEY DISEASE, OR UNSPECIFIED CHRONIC KIDNEY DISEASE: ICD-10-CM

## 2024-12-12 DIAGNOSIS — E78.5 DYSLIPIDEMIA: ICD-10-CM

## 2024-12-12 DIAGNOSIS — I25.10 CORONARY ARTERY DISEASE INVOLVING NATIVE CORONARY ARTERY OF NATIVE HEART WITHOUT ANGINA PECTORIS: ICD-10-CM

## 2024-12-12 DIAGNOSIS — I12.9 PARENCHYMAL RENAL HYPERTENSION, STAGE 1 THROUGH STAGE 4 OR UNSPECIFIED CHRONIC KIDNEY DISEASE: ICD-10-CM

## 2024-12-12 DIAGNOSIS — N18.30 CHRONIC KIDNEY DISEASE, STAGE III (MODERATE) (HCC): ICD-10-CM

## 2024-12-12 DIAGNOSIS — J43.9 PULMONARY EMPHYSEMA, UNSPECIFIED EMPHYSEMA TYPE (HCC): ICD-10-CM

## 2024-12-12 RX ORDER — UMECLIDINIUM 62.5 UG/1
1 AEROSOL, POWDER ORAL DAILY
Qty: 30 EACH | Refills: 5 | Status: SHIPPED | OUTPATIENT
Start: 2024-12-12 | End: 2025-01-11

## 2024-12-13 RX ORDER — AMLODIPINE BESYLATE 5 MG/1
5 TABLET ORAL DAILY
Qty: 90 TABLET | Refills: 1 | Status: SHIPPED | OUTPATIENT
Start: 2024-12-13

## 2024-12-13 RX ORDER — AZILSARTAN KAMEDOXOMIL 40 MG/1
40 TABLET ORAL
Qty: 90 TABLET | Refills: 1 | Status: SHIPPED | OUTPATIENT
Start: 2024-12-13

## 2024-12-13 RX ORDER — ATORVASTATIN CALCIUM 80 MG/1
80 TABLET, FILM COATED ORAL DAILY
Qty: 90 TABLET | Refills: 1 | Status: SHIPPED | OUTPATIENT
Start: 2024-12-13

## 2024-12-13 NOTE — TELEPHONE ENCOUNTER
Refill must be reviewed and completed by the office or provider. The refill is unable to be approved or denied by the medication management team.    The original prescription was discontinued on 11/26/2024 by Kathryn Rendon RN.   Please review to see if the refill is appropriate.

## 2024-12-16 RX ORDER — CLOPIDOGREL BISULFATE 75 MG/1
75 TABLET ORAL DAILY
Qty: 60 TABLET | Refills: 5 | Status: SHIPPED | OUTPATIENT
Start: 2024-12-16

## 2024-12-19 PROCEDURE — 88342 IMHCHEM/IMCYTCHM 1ST ANTB: CPT | Performed by: PATHOLOGY

## 2024-12-19 PROCEDURE — 88312 SPECIAL STAINS GROUP 1: CPT | Performed by: PATHOLOGY

## 2024-12-19 PROCEDURE — 88313 SPECIAL STAINS GROUP 2: CPT | Performed by: PATHOLOGY

## 2024-12-19 PROCEDURE — 88341 IMHCHEM/IMCYTCHM EA ADD ANTB: CPT | Performed by: PATHOLOGY

## 2024-12-19 PROCEDURE — 88305 TISSUE EXAM BY PATHOLOGIST: CPT | Performed by: PATHOLOGY

## 2024-12-19 PROCEDURE — 88360 TUMOR IMMUNOHISTOCHEM/MANUAL: CPT | Performed by: PATHOLOGY

## 2024-12-30 DIAGNOSIS — K21.00 GASTROESOPHAGEAL REFLUX DISEASE WITH ESOPHAGITIS, UNSPECIFIED WHETHER HEMORRHAGE: ICD-10-CM

## 2024-12-30 DIAGNOSIS — K22.70 BARRETT'S ESOPHAGUS WITHOUT DYSPLASIA: ICD-10-CM

## 2024-12-31 RX ORDER — FAMOTIDINE 40 MG/1
40 TABLET, FILM COATED ORAL DAILY
Qty: 90 TABLET | Refills: 1 | Status: SHIPPED | OUTPATIENT
Start: 2024-12-31

## 2025-01-07 ENCOUNTER — RESULTS FOLLOW-UP (OUTPATIENT)
Dept: GASTROENTEROLOGY | Facility: CLINIC | Age: 80
End: 2025-01-07

## 2025-01-07 NOTE — RESULT ENCOUNTER NOTE
Please call the patient regarding results.  Biopsies all benign.  Again showed evidence of Gee's esophagus but without progression toward cancer.  Colon polyp was a benign adenoma.  Repeat colonoscopy in 6 months.   Stomach polyp was a benign hyperplastic polyp.  This type of polyp does have a small risk of progressing to cancer and may sometimes return after removal.  Recommend repeat EGD in 1 year for reevaluation

## 2025-01-08 ENCOUNTER — OFFICE VISIT (OUTPATIENT)
Dept: UROLOGY | Facility: CLINIC | Age: 80
End: 2025-01-08
Payer: MEDICARE

## 2025-01-08 VITALS
BODY MASS INDEX: 24.48 KG/M2 | HEIGHT: 70 IN | DIASTOLIC BLOOD PRESSURE: 74 MMHG | SYSTOLIC BLOOD PRESSURE: 144 MMHG | HEART RATE: 51 BPM | OXYGEN SATURATION: 100 % | WEIGHT: 171 LBS

## 2025-01-08 DIAGNOSIS — C61 PROSTATE CANCER (HCC): ICD-10-CM

## 2025-01-08 PROCEDURE — 99213 OFFICE O/P EST LOW 20 MIN: CPT

## 2025-01-08 NOTE — PROGRESS NOTES
1/8/2025      No chief complaint on file.        Assessment and Plan    79 y.o. male managed by Dr. Stallworth    1.  Newly diagnosed low risk prostate cancer  -Diagnosis: August 15, 2023 (transperineal MR fusion biopsy)  -Cristobal 3+3=6;  1 of 6 cores from solitary right anterior lesion, all cores on systematic biopsy were negative  -PSA at the time of diagnosis: 6.8-8.3  -Most recent PSA 15.711 (10/15/24)  -Defers BELLA.  -Multiparametric MRI: (June 5, 2023): 38 g gland, solitary 1.4 cm right base lesion  -Multiparametric MRI: (Oct 01, 2024): 47 g gland, solitary stable focal lesion in anterior transitional zone. Given patient's increase in PSA, he will be getting a repeat fusion biopsy with Dr. Hernadez scheduled for 1/30/25.          History of Present Illness  Bean Lopez is a 79 y.o. male here with hx of Cristobal 3+3=6 prostate cancer presenting today for follow-up. He has a past medical history of Type II DM, COPD, pulmonary fibrosis, CAD, and Stage III CKD. His PSA is 15.. His PSA at time of diagnosis ranged from 6.8-8.3. He defers BELLA today. He has no urinary complaints. He states he feels good. Denies bone pain, night sweats, and weight loss.      Patient did state he had an EGD and colonscopy on 12/10/24. His polyps were benign. He did have an ascending colon resolution clip device ([employer]) inserted on 12/10/24.        PSA Lab trends:  15.711 (10/15/24)  8.50 (5/1/24)  6.83 (5/26/23)  8.3 (5/15/23)  4.9 (10/12/22)  7.1 (8/2/22)       Review of Systems   Constitutional:  Negative for activity change, chills, fatigue and fever.   HENT:  Negative for congestion, rhinorrhea and sore throat.    Eyes:  Negative for photophobia, redness and visual disturbance.   Respiratory:  Negative for cough, shortness of breath and wheezing.    Cardiovascular:  Negative for chest pain, palpitations and leg swelling.   Gastrointestinal:  Negative for abdominal pain, diarrhea, nausea and vomiting.   Genitourinary:   "Negative for difficulty urinating, dysuria, flank pain, frequency, hematuria and urgency.   Neurological:  Negative for weakness, light-headedness and headaches.                Vitals  Vitals:    01/08/25 0915   BP: 144/74   BP Location: Left arm   Patient Position: Sitting   Cuff Size: Large   Pulse: (!) 51   SpO2: 100%   Weight: 77.6 kg (171 lb)   Height: 5' 10\" (1.778 m)       Physical Exam  Constitutional:       Appearance: Normal appearance. He is not toxic-appearing.   HENT:      Head: Normocephalic.      Mouth/Throat:      Pharynx: Oropharynx is clear.   Eyes:      Extraocular Movements: Extraocular movements intact.      Pupils: Pupils are equal, round, and reactive to light.   Pulmonary:      Effort: Pulmonary effort is normal. No respiratory distress.   Musculoskeletal:         General: Normal range of motion.      Cervical back: Normal range of motion.   Neurological:      Mental Status: He is alert and oriented to person, place, and time. Mental status is at baseline.      Gait: Gait normal.   Psychiatric:         Mood and Affect: Mood normal.         Behavior: Behavior normal.         Thought Content: Thought content normal.         Judgment: Judgment normal.           Past History  Past Medical History:   Diagnosis Date    Anemia     Arthritis     Gee's esophagus     Breast lump     Cancer (HCC)     Chronic kidney disease     Cirrhosis (HCC)     COPD (chronic obstructive pulmonary disease) (HCC)     Coronary artery disease     cardiac cath; stents     Diabetes mellitus (HCC)     H/O heart artery stent     Hypertension     Inguinal hernia     Right    Sarcoidosis     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HCC)     Skin cancer     SOB (shortness of breath)     Upper respiratory infection, viral 05/31/2022     Social History     Socioeconomic History    Marital status:      Spouse name: None    Number of children: 0    Years of education: None    Highest education level: Associate degree: occupational, " technical, or vocational program   Occupational History    Occupation: RETIRED     Comment: jewlery repair   Tobacco Use    Smoking status: Never    Smokeless tobacco: Never    Tobacco comments:     RETIRED   Vaping Use    Vaping status: Never Used   Substance and Sexual Activity    Alcohol use: Yes     Comment: Alcohol intake:   Occasional wine - As per [place]     Drug use: No    Sexual activity: None   Other Topics Concern    None   Social History Narrative    · Most recent tobacco use screenin2020      · Do you currently or have you served in the [employer]:   No      · Were you activated, into active duty, as a member of the [employer] or as a Reservist:   No      · Alcohol intake:   Occasional,Wine     · Asbestos exposure:   No      · TB exposure:   No      · Animal exposure:   No      · Exercise level:   Heavy      · Caffeine intake:   Moderate                 no cpap or oxygen     - As per Mena      Social Drivers of Health     Financial Resource Strain: Low Risk  (2024)    Received from [facility]    Overall Financial Resource Strain (CARDIA)     Difficulty of Paying Living Expenses: Not hard at all   Food Insecurity: No Food Insecurity (2024)    Nursing - Inadequate Food Risk Classification     Worried About Running Out of Food in the Last Year: Never true     Ran Out of Food in the Last Year: Never true     Ran Out of Food in the Last Year: Not on file   Transportation Needs: No Transportation Needs (2024)    PRAPARE - Transportation     Lack of Transportation (Medical): No     Lack of Transportation (Non-Medical): No   Physical Activity: Sufficiently Active (6/10/2020)    Exercise Vital Sign     Days of Exercise per Week: 7 days     Minutes of Exercise per Session: 30 min   Stress: No Stress Concern Present (6/10/2020)    [employer] - Occupational Stress Questionnaire     Feeling of Stress : Not at all   Social Connections:  Moderately Isolated (6/10/2020)    Social Connection and Isolation Panel [NHANES]     Frequency of Communication with Friends and Family: More than three times a week     Frequency of Social Gatherings with Friends and Family: More than three times a week     Attends Spiritism Services: Never     Active Member of Clubs or Organizations: Yes     Attends Club or Organization Meetings: 1 to 4 times per year     Marital Status:    Intimate Partner Violence: Not At Risk (6/11/2024)    Received from [facility]    Humiliation, Afraid, Rape, and Kick questionnaire     Fear of Current or Ex-Partner: No     Emotionally Abused: No     Physically Abused: No     Sexually Abused: No   Housing Stability: Low Risk  (8/9/2024)    Housing Stability Vital Sign     Unable to Pay for Housing in the Last Year: No     Number of Times Moved in the Last Year: 1     Homeless in the Last Year: No     Social History     Tobacco Use   Smoking Status Never   Smokeless Tobacco Never   Tobacco Comments    RETIRED     Family History   Problem Relation Age of Onset    Hypertension Mother     Atrial fibrillation Mother     Heart disease Mother     Kidney disease Father     COPD Father     Heart disease Father     Asthma Neg Hx     Lung cancer Neg Hx        The following portions of the patient's history were reviewed and updated as appropriate: allergies, current medications, past medical history, past social history, past surgical history and problem list.    Results  No results found for this or any previous visit (from the past hour).]  Lab Results   Component Value Date    PSA 15.711 (H) 10/15/2024    PSA 8.50 (H) 05/01/2024    PSA 6.83 (H) 05/26/2023    PSA 8.3 (H) 05/15/2023     Lab Results   Component Value Date    CALCIUM 9.4 12/06/2024    K 3.4 (L) 12/06/2024    CO2 26 12/06/2024     12/06/2024    BUN 34 (H) 12/06/2024    CREATININE 1.71 (H) 12/06/2024     Lab Results   Component Value Date    WBC 8.11  12/06/2024    HGB 13.5 12/06/2024    HCT 41.7 12/06/2024     (H) 12/06/2024     12/06/2024       GUILLERMINA Camilo

## 2025-01-13 NOTE — TELEPHONE ENCOUNTER
----- Message from Nicole VALENTINO sent at 1/8/2025  1:50 PM EST -----  Left detailed message regarding results and recommendations, Please reach out to pt to schedule 6 month recall colonoscopy, EGD recall entered. OV scheduled.

## 2025-01-14 ENCOUNTER — APPOINTMENT (OUTPATIENT)
Dept: LAB | Facility: HOSPITAL | Age: 80
End: 2025-01-14
Payer: MEDICARE

## 2025-01-14 ENCOUNTER — APPOINTMENT (OUTPATIENT)
Dept: LAB | Facility: HOSPITAL | Age: 80
End: 2025-01-14
Attending: INTERNAL MEDICINE
Payer: MEDICARE

## 2025-01-14 ENCOUNTER — APPOINTMENT (OUTPATIENT)
Dept: LAB | Facility: HOSPITAL | Age: 80
End: 2025-01-14
Attending: UROLOGY
Payer: MEDICARE

## 2025-01-14 DIAGNOSIS — N18.31 STAGE 3A CHRONIC KIDNEY DISEASE (HCC): ICD-10-CM

## 2025-01-14 DIAGNOSIS — R39.89 SUSPECTED UTI: ICD-10-CM

## 2025-01-14 DIAGNOSIS — C61 MALIGNANT NEOPLASM OF PROSTATE (HCC): ICD-10-CM

## 2025-01-14 DIAGNOSIS — R97.20 ELEVATED PROSTATE SPECIFIC ANTIGEN (PSA): ICD-10-CM

## 2025-01-14 DIAGNOSIS — Z01.810 PRE-OPERATIVE CARDIOVASCULAR EXAMINATION: ICD-10-CM

## 2025-01-14 DIAGNOSIS — Z01.812 PRE-OPERATIVE LABORATORY EXAMINATION: ICD-10-CM

## 2025-01-14 DIAGNOSIS — I12.9 HYPERTENSIVE CHRONIC KIDNEY DISEASE WITH STAGE 1 THROUGH STAGE 4 CHRONIC KIDNEY DISEASE, OR UNSPECIFIED CHRONIC KIDNEY DISEASE: ICD-10-CM

## 2025-01-14 DIAGNOSIS — R80.1 PERSISTENT PROTEINURIA: ICD-10-CM

## 2025-01-14 DIAGNOSIS — E11.21 DIABETIC NEPHROPATHY ASSOCIATED WITH TYPE 2 DIABETES MELLITUS (HCC): ICD-10-CM

## 2025-01-14 DIAGNOSIS — I12.9 PARENCHYMAL RENAL HYPERTENSION, STAGE 1 THROUGH STAGE 4 OR UNSPECIFIED CHRONIC KIDNEY DISEASE: ICD-10-CM

## 2025-01-14 DIAGNOSIS — E78.5 DYSLIPIDEMIA: ICD-10-CM

## 2025-01-14 DIAGNOSIS — Z01.818 PRE-OP EVALUATION: ICD-10-CM

## 2025-01-14 LAB
ALBUMIN SERPL BCG-MCNC: 4.1 G/DL (ref 3.5–5)
ALP SERPL-CCNC: 87 U/L (ref 34–104)
ALT SERPL W P-5'-P-CCNC: 19 U/L (ref 7–52)
ANION GAP SERPL CALCULATED.3IONS-SCNC: 9 MMOL/L (ref 4–13)
AST SERPL W P-5'-P-CCNC: 27 U/L (ref 13–39)
BASOPHILS # BLD AUTO: 0.06 THOUSANDS/ΜL (ref 0–0.1)
BASOPHILS NFR BLD AUTO: 1 % (ref 0–1)
BILIRUB SERPL-MCNC: 0.59 MG/DL (ref 0.2–1)
BUN SERPL-MCNC: 31 MG/DL (ref 5–25)
CALCIUM SERPL-MCNC: 9.6 MG/DL (ref 8.4–10.2)
CHLORIDE SERPL-SCNC: 104 MMOL/L (ref 96–108)
CO2 SERPL-SCNC: 28 MMOL/L (ref 21–32)
CREAT SERPL-MCNC: 1.7 MG/DL (ref 0.6–1.3)
CREAT UR-MCNC: 74.6 MG/DL
EOSINOPHIL # BLD AUTO: 0.38 THOUSAND/ΜL (ref 0–0.61)
EOSINOPHIL NFR BLD AUTO: 4 % (ref 0–6)
ERYTHROCYTE [DISTWIDTH] IN BLOOD BY AUTOMATED COUNT: 13.8 % (ref 11.6–15.1)
GFR SERPL CREATININE-BSD FRML MDRD: 38 ML/MIN/1.73SQ M
GLUCOSE P FAST SERPL-MCNC: 143 MG/DL (ref 65–99)
HCT VFR BLD AUTO: 41.6 % (ref 36.5–49.3)
HGB BLD-MCNC: 13.7 G/DL (ref 12–17)
IMM GRANULOCYTES # BLD AUTO: 0.03 THOUSAND/UL (ref 0–0.2)
IMM GRANULOCYTES NFR BLD AUTO: 0 % (ref 0–2)
LYMPHOCYTES # BLD AUTO: 1.44 THOUSANDS/ΜL (ref 0.6–4.47)
LYMPHOCYTES NFR BLD AUTO: 17 % (ref 14–44)
MAGNESIUM SERPL-MCNC: 2.3 MG/DL (ref 1.9–2.7)
MCH RBC QN AUTO: 32.9 PG (ref 26.8–34.3)
MCHC RBC AUTO-ENTMCNC: 32.9 G/DL (ref 31.4–37.4)
MCV RBC AUTO: 100 FL (ref 82–98)
MONOCYTES # BLD AUTO: 0.85 THOUSAND/ΜL (ref 0.17–1.22)
MONOCYTES NFR BLD AUTO: 10 % (ref 4–12)
NEUTROPHILS # BLD AUTO: 5.88 THOUSANDS/ΜL (ref 1.85–7.62)
NEUTS SEG NFR BLD AUTO: 68 % (ref 43–75)
NRBC BLD AUTO-RTO: 0 /100 WBCS
PHOSPHATE SERPL-MCNC: 3.9 MG/DL (ref 2.3–4.1)
PLATELET # BLD AUTO: 158 THOUSANDS/UL (ref 149–390)
PMV BLD AUTO: 9.5 FL (ref 8.9–12.7)
POTASSIUM SERPL-SCNC: 4.5 MMOL/L (ref 3.5–5.3)
PROT SERPL-MCNC: 6.9 G/DL (ref 6.4–8.4)
PROT UR-MCNC: 18.6 MG/DL
PROT/CREAT UR: 0.2 MG/G{CREAT} (ref 0–0.1)
RBC # BLD AUTO: 4.17 MILLION/UL (ref 3.88–5.62)
SODIUM SERPL-SCNC: 141 MMOL/L (ref 135–147)
WBC # BLD AUTO: 8.64 THOUSAND/UL (ref 4.31–10.16)

## 2025-01-14 PROCEDURE — 83735 ASSAY OF MAGNESIUM: CPT

## 2025-01-14 PROCEDURE — 84156 ASSAY OF PROTEIN URINE: CPT

## 2025-01-14 PROCEDURE — 80053 COMPREHEN METABOLIC PANEL: CPT

## 2025-01-14 PROCEDURE — 87086 URINE CULTURE/COLONY COUNT: CPT

## 2025-01-14 PROCEDURE — 36415 COLL VENOUS BLD VENIPUNCTURE: CPT

## 2025-01-14 PROCEDURE — 93005 ELECTROCARDIOGRAM TRACING: CPT

## 2025-01-14 PROCEDURE — 85025 COMPLETE CBC W/AUTO DIFF WBC: CPT

## 2025-01-14 PROCEDURE — 84100 ASSAY OF PHOSPHORUS: CPT

## 2025-01-14 PROCEDURE — 82570 ASSAY OF URINE CREATININE: CPT

## 2025-01-15 LAB — BACTERIA UR CULT: NORMAL

## 2025-01-20 LAB
ATRIAL RATE: 55 BPM
P AXIS: 36 DEGREES
PR INTERVAL: 192 MS
QRS AXIS: -32 DEGREES
QRSD INTERVAL: 102 MS
QT INTERVAL: 436 MS
QTC INTERVAL: 417 MS
T WAVE AXIS: 38 DEGREES
VENTRICULAR RATE: 55 BPM

## 2025-01-20 PROCEDURE — 93010 ELECTROCARDIOGRAM REPORT: CPT | Performed by: INTERNAL MEDICINE

## 2025-01-25 PROBLEM — Z09 HOSPITAL DISCHARGE FOLLOW-UP: Status: RESOLVED | Noted: 2024-01-17 | Resolved: 2025-01-25

## 2025-01-25 PROBLEM — E11.22 TYPE 2 DIABETES MELLITUS WITH STAGE 3 CHRONIC KIDNEY DISEASE (HCC): Status: RESOLVED | Noted: 2021-04-26 | Resolved: 2025-01-25

## 2025-01-25 PROBLEM — E11.21 DIABETIC NEPHROPATHY ASSOCIATED WITH TYPE 2 DIABETES MELLITUS (HCC): Status: RESOLVED | Noted: 2021-01-25 | Resolved: 2025-01-25

## 2025-01-25 PROBLEM — N18.30 TYPE 2 DIABETES MELLITUS WITH STAGE 3 CHRONIC KIDNEY DISEASE (HCC): Status: RESOLVED | Noted: 2021-04-26 | Resolved: 2025-01-25

## 2025-01-25 NOTE — ASSESSMENT & PLAN NOTE
At baseline continue current treatment    Orders:    Basic metabolic panel; Future    Magnesium; Future    Protein / creatinine ratio, urine; Future    Comprehensive metabolic panel; Future    CBC; Future    Lipid Panel with Direct LDL reflex; Future    Magnesium; Future    Protein / creatinine ratio, urine; Future

## 2025-01-25 NOTE — ASSESSMENT & PLAN NOTE
Has been at goal no change  Orders:    Basic metabolic panel; Future    Magnesium; Future    Protein / creatinine ratio, urine; Future    Comprehensive metabolic panel; Future    CBC; Future    Lipid Panel with Direct LDL reflex; Future    Magnesium; Future    Protein / creatinine ratio, urine; Future

## 2025-01-25 NOTE — ASSESSMENT & PLAN NOTE
On SGLT2 inhibitor   Orders:    Basic metabolic panel; Future    Magnesium; Future    Protein / creatinine ratio, urine; Future    Comprehensive metabolic panel; Future    CBC; Future    Lipid Panel with Direct LDL reflex; Future    Magnesium; Future    Protein / creatinine ratio, urine; Future

## 2025-01-25 NOTE — ASSESSMENT & PLAN NOTE
At goal continue current treatment  Orders:    Basic metabolic panel; Future    Magnesium; Future    Protein / creatinine ratio, urine; Future    Comprehensive metabolic panel; Future    CBC; Future    Lipid Panel with Direct LDL reflex; Future    Magnesium; Future    Protein / creatinine ratio, urine; Future

## 2025-01-25 NOTE — ASSESSMENT & PLAN NOTE
Doing extremely well no changes  Orders:    Basic metabolic panel; Future    Magnesium; Future    Protein / creatinine ratio, urine; Future    Comprehensive metabolic panel; Future    CBC; Future    Lipid Panel with Direct LDL reflex; Future    Magnesium; Future    Protein / creatinine ratio, urine; Future

## 2025-01-25 NOTE — PROGRESS NOTES
Name: Bean Lopez      : 1945      MRN: [id number]  Encounter Provider: Misael Yun MD  Encounter Date: 2025   Encounter department: [facility] [place]  :  Assessment & Plan  Stage 3b chronic kidney disease (HCC)  At baseline continue current treatment    Orders:    Basic metabolic panel; Future    Magnesium; Future    Protein / creatinine ratio, urine; Future    Comprehensive metabolic panel; Future    CBC; Future    Lipid Panel with Direct LDL reflex; Future    Magnesium; Future    Protein / creatinine ratio, urine; Future    Type 2 diabetes mellitus without complication, without long-term current use of insulin (HCC)  On SGLT2 inhibitor   Orders:    Basic metabolic panel; Future    Magnesium; Future    Protein / creatinine ratio, urine; Future    Comprehensive metabolic panel; Future    CBC; Future    Lipid Panel with Direct LDL reflex; Future    Magnesium; Future    Protein / creatinine ratio, urine; Future    Parenchymal renal hypertension, stage 1 through stage 4 or unspecified chronic kidney disease  Doing extremely well no changes  Orders:    Basic metabolic panel; Future    Magnesium; Future    Protein / creatinine ratio, urine; Future    Comprehensive metabolic panel; Future    CBC; Future    Lipid Panel with Direct LDL reflex; Future    Magnesium; Future    Protein / creatinine ratio, urine; Future    Dyslipidemia  Has been at goal no change  Orders:    Basic metabolic panel; Future    Magnesium; Future    Protein / creatinine ratio, urine; Future    Comprehensive metabolic panel; Future    CBC; Future    Lipid Panel with Direct LDL reflex; Future    Magnesium; Future    Protein / creatinine ratio, urine; Future    Persistent proteinuria  At goal continue current treatment  Orders:    Basic metabolic panel; Future    Magnesium; Future    Protein / creatinine ratio, urine; Future    Comprehensive metabolic panel; Future    CBC; Future    Lipid Panel with Direct LDL  reflex; Future    Magnesium; Future    Protein / creatinine ratio, urine; Future    Hyperkalemia  Acceptable potassium continue on low potassium diet  Orders:    Basic metabolic panel; Future    Magnesium; Future    Protein / creatinine ratio, urine; Future    Comprehensive metabolic panel; Future    CBC; Future    Lipid Panel with Direct LDL reflex; Future    Magnesium; Future    Protein / creatinine ratio, urine; Future        History of Present Illness   HPI  Bean Lopez is a 79 y.o. male who presents follow-up regarding CKD 3B  There has been no hospitalizations or acute illnesses since last visit.  The patient overall is feeling well.  Good appetite and good energy  No fevers, chills, or cough or colds.  No hematuria, dysuria, voiding symptoms or foamy urine: For prostate biopsy for potential cancer followed by urology  No gastrointestinal symptoms  No cardiovascular symptoms including swelling of the legs except for the right lower extremity because of hip replacement  No headaches, dizziness or lightheadedness  Blood pressure medications:  Edarbi 20 mg at 5 PM  Chlorthalidone 12.5 mg daily in the morning  Toprol XL 50 mg daily in the morning  Amlodipine 5 mg daily in the morning  Isosorbide 30 mg daily in the morning    Renal pertinent medications:  Allopurinol 150 mg daily  Aspirin 81 mg daily  Plavix 75 mg daily  Slow-Mag to twice a day  Iron daily  Vitamin D 2000 units daily  Atorvastatin 80 mg daily  Jardiance 25 mg daily      Review of Systems  Please see HPI, otherwise the review of systems as completely reviewed with the patient are negative    Pertinent Medical History   1. CKD stage 3.   Etiology: Diabetic nephropathy/hypertensive nephrosclerosis/arteriolar nephrosclerosis/episodes of SHAY in the past. All serologies were negative including multiple myeloma evaluation.  Baseline creatinine: Recently 1.5-2.0  Current creatinine: 1.70 from 1/14/2025 at baseline  Urine protein creatinine ratio: 0.20  g at goal  UA: Trace proteinuria, 0-1 RBCs as of 5/1/2024  Recommendations:   Treat hypertension-please see below   Treat dyslipidemia-please see below   Maintain proteinuria less than 1 g or as low as possible   Avoid nephrotoxic agents such as NSAIDs, patient counseled as such  2. Volume: Euvolemic      3. Hypertension:      Current blood pressure averages:  AM: 126/64, 118/67 standing  PM: 123/62, 115/64 standing  Heart rate: 60 range     Goal blood pressure: Less than 125/80 given CAD  Recommendations:   Push nonmedical regimen including weight loss, isotonic exercise and avoidance of salt; patient counseled as such   Medication changes today:   Doing extremely well no changes  4. Electrolytes: All acceptable including potassium of 4.5  5. Mineral bone disorder:   Calcium/magnesium/phosphorus: All acceptable  PTH intact: 43.7 which is normal  Vitamin-D:Currently 46.4 from 5/1/2024  6. Dyslipidemia:   Goal LDL: Less than 70 given CAD   Current lipid profile: LDL 46/HDL 43/triglycerides 156 from 11/29/2024  Recommendations: At goal so no changes   7. Anemia: normal hemoglobin at 13.7  8. Other problems:   Diabetes mellitus on Jardiance  CAD followed by Dr. Hess [facility]. Status post MI last October involving to cardiac stents. : Recent emesis of LAD lesion with repeat PTCA drug-eluting stent 2018. Circumflex artery and right coronary artery 50% stenosis. Ejection fraction 55%.   MRI 11/18/2023 [facility] status post cardiac catheterization with PTCA and stent of distal RCA.  Patent stents in the mid LAD continue medical therapy  Chest pain 6/11/2024 was musculoskeletal followed up by Dr. Bee     Sarcoidosis followed by    Kyphoscoliosis   Cirrhosis of the liver   Gout   Ankylosing spondylitis  Elevated PSA being followed by Dr. Stallworth-Dr. Carreon/Dr. Stallworth: Positive cancer will be followed at this juncture        GI health maintenance: Last colonoscopy: 12/10/2024  "recommended 6-month follow-up June 8, 2025 per Dr. Espinosa given poor prep     Medical History Reviewed by provider this encounter:     .  Past Medical History   Past Medical History:   Diagnosis Date    Anemia     Arthritis     Gee's esophagus     Breast lump     Cancer (HCC)     Chronic kidney disease     Cirrhosis (HCC)     COPD (chronic obstructive pulmonary disease) (HCC)     Coronary artery disease     cardiac cath; stents     Diabetes mellitus (HCC)     GERD (gastroesophageal reflux disease)     H/O heart artery stent     x 5    Hyperlipidemia     Hypertension     Inguinal hernia     Right    Myocardial infarction (HCC) 2020    and 2023    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HCC)     Skin cancer     SOB (shortness of breath)     Upper respiratory infection, viral 05/31/2022    Wears dentures      Past Surgical History:   Procedure Laterality Date    CARDIAC CATHETERIZATION  11/18/2023    x 5 stents-    CATARACT EXTRACTION, BILATERAL      left eye 05/08 and right 5/22/2022    CERVICAL FUSION      C2-3    CHOLECYSTECTOMY      COLONOSCOPY  04/11/2016    COLONOSCOPY      EGD AND COLONOSCOPY  06/24/2019 12/2024    ERCP  09/11/2014    transab esop hiat griffin rpr    ESOPHAGOGASTRIC FUNDOPLASTY      HERNIA REPAIR Right     p I/griffin init reduc >5 yr    HIP SURGERY Right     partial replacement    KIDNEY STONE SURGERY      Fragmenting    LIVER BIOPSY      LUNG BIOPSY      MULTIPLE TOOTH EXTRACTIONS      \"extraction of all maxillary teeth\"    ID BX PROSTATE STRTCTC SATURATION SAMPLING IMG GID N/A 08/15/2023    Procedure: TRANSPERINEAL MRI FUSION BIOPSY PROSTATE;  Surgeon: Cory Carreon MD;  Location: [facility];  Service: Urology    SKIN BIOPSY  05/2020    THUMB FUSION      with graft    TONSILLECTOMY      UPPER GASTROINTESTINAL ENDOSCOPY      US GUIDANCE  10/22/2014     Family History   Problem Relation Age of Onset    Hypertension Mother     Atrial fibrillation Mother     Heart disease Mother     Kidney disease Father     COPD " "Father     Heart disease Father     Asthma Neg Hx     Lung cancer Neg Hx       reports that he has never smoked. He has never used smokeless tobacco. He reports current alcohol use. He reports that he does not use drugs.  Current Outpatient Medications on File Prior to Visit   Medication Sig Dispense Refill    allopurinol (ZYLOPRIM) 300 mg tablet \"ONE-HALF\" TABLET EVERY DAY 45 tablet 3    amLODIPine (NORVASC) 5 mg tablet TAKE 1 TABLET (5 MG TOTAL) BY MOUTH DAILY. 90 tablet 1    Aspirin 81 MG CAPS Take 81 mg by mouth in the morning      atorvastatin (LIPITOR) 80 mg tablet Take 1 tablet (80 mg total) by mouth daily 90 tablet 1    azilsartan medoxomil (Edarbi) 40 MG tablet TAKE 1 TABLET (40 MG TOTAL) BY MOUTH DAILY AFTER DINNER 90 tablet 1    b complex vitamins capsule Take 1 capsule by mouth daily      chlorthalidone 25 mg tablet TAKE \"ONE-HALF\" TABLET (12.5 MG) BY MOUTH DAILY 45 tablet 4    Cholecalciferol (VITAMIN D3) 2000 units TABS Take 1 tablet by mouth daily      clopidogrel (PLAVIX) 75 mg tablet Take 75 mg by mouth daily      co-enzyme Q-10 50 MG capsule Take 100 mg by mouth in the morning.      colchicine (COLCRYS) 0.6 mg tablet TAKE 1 TABLET (0.6 MG TOTAL) BY MOUTH DAILY 30 tablet 5    dexlansoprazole (DEXILANT) 60 MG capsule TAKE 1 CAPSULE (60 MG TOTAL) BY MOUTH DAILY 90 capsule 1    Empagliflozin 25 MG TABS Take 1 tablet (25 mg total) by mouth daily 30 tablet 5    famotidine (PEPCID) 40 MG tablet TAKE 1 TABLET (40 MG TOTAL) BY MOUTH DAILY TAKE 1 TABLET WITH DINNER 90 tablet 1    Ferrous Sulfate (IRON) 325 (65 Fe) MG TABS Take 1 tablet by mouth daily      Incruse Ellipta 62.5 MCG/ACT AEPB inhaler INHALE 1 PUFF DAILY 30 each 5    isosorbide mononitrate (IMDUR) 30 mg 24 hr tablet Take 30 mg by mouth daily      Januvia 100 MG tablet TAKE 1 TABLET (100 MG TOTAL) BY MOUTH DAILY 30 tablet 5    magnesium chloride-calcium (SLOW-MAG) 71.5-119 mg Take 2 tablets by mouth 2 (two) times a day      metoprolol succinate " "(TOPROL-XL) 50 mg 24 hr tablet TAKE 1 TABLET (50 MG TOTAL) BY MOUTH DAILY 90 tablet 1    nitroglycerin (NITROSTAT) 0.4 mg SL tablet Place 0.4 mg under the tongue every 5 (five) minutes as needed      sulfaSALAzine (AZULFIDINE) 500 mg tablet ONE AND \"ONE-HALF\" TABLETS (75OMG) EVERY DAY 45 tablet 3    vitamin B-12 (VITAMIN B-12) 1,000 mcg tablet Take 2,500 mcg by mouth daily      albuterol (PROVENTIL HFA,VENTOLIN HFA) 90 mcg/act inhaler Inhale 2 puffs every 6 (six) hours as needed for wheezing or shortness of breath 6.7 g 2    Fluticasone Furoate-Vilanterol 100-25 mcg/actuation inhaler INHALE 1 PUFF DAILY RINSE MOUTH AFTER USE. 60 each 5    ipratropium-albuterol (DUO-NEB) 0.5-2.5 mg/3 mL nebulizer solution Take 3 mL by nebulization 4 (four) times a day (Patient taking differently: Take 3 mL by nebulization every 6 (six) hours as needed for shortness of breath or wheezing) 360 mL 3     No current facility-administered medications on file prior to visit.     Allergies   Allergen Reactions    Oxycodone GI Intolerance      Current Outpatient Medications on File Prior to Visit   Medication Sig Dispense Refill    allopurinol (ZYLOPRIM) 300 mg tablet \"ONE-HALF\" TABLET EVERY DAY 45 tablet 3    amLODIPine (NORVASC) 5 mg tablet TAKE 1 TABLET (5 MG TOTAL) BY MOUTH DAILY. 90 tablet 1    Aspirin 81 MG CAPS Take 81 mg by mouth in the morning      atorvastatin (LIPITOR) 80 mg tablet Take 1 tablet (80 mg total) by mouth daily 90 tablet 1    azilsartan medoxomil (Edarbi) 40 MG tablet TAKE 1 TABLET (40 MG TOTAL) BY MOUTH DAILY AFTER DINNER 90 tablet 1    b complex vitamins capsule Take 1 capsule by mouth daily      chlorthalidone 25 mg tablet TAKE \"ONE-HALF\" TABLET (12.5 MG) BY MOUTH DAILY 45 tablet 4    Cholecalciferol (VITAMIN D3) 2000 units TABS Take 1 tablet by mouth daily      clopidogrel (PLAVIX) 75 mg tablet Take 75 mg by mouth daily      co-enzyme Q-10 50 MG capsule Take 100 mg by mouth in the morning.      colchicine (COLCRYS) " "0.6 mg tablet TAKE 1 TABLET (0.6 MG TOTAL) BY MOUTH DAILY 30 tablet 5    dexlansoprazole (DEXILANT) 60 MG capsule TAKE 1 CAPSULE (60 MG TOTAL) BY MOUTH DAILY 90 capsule 1    Empagliflozin 25 MG TABS Take 1 tablet (25 mg total) by mouth daily 30 tablet 5    famotidine (PEPCID) 40 MG tablet TAKE 1 TABLET (40 MG TOTAL) BY MOUTH DAILY TAKE 1 TABLET WITH DINNER 90 tablet 1    Ferrous Sulfate (IRON) 325 (65 Fe) MG TABS Take 1 tablet by mouth daily      Incruse Ellipta 62.5 MCG/ACT AEPB inhaler INHALE 1 PUFF DAILY 30 each 5    isosorbide mononitrate (IMDUR) 30 mg 24 hr tablet Take 30 mg by mouth daily      Januvia 100 MG tablet TAKE 1 TABLET (100 MG TOTAL) BY MOUTH DAILY 30 tablet 5    magnesium chloride-calcium (SLOW-MAG) 71.5-119 mg Take 2 tablets by mouth 2 (two) times a day      metoprolol succinate (TOPROL-XL) 50 mg 24 hr tablet TAKE 1 TABLET (50 MG TOTAL) BY MOUTH DAILY 90 tablet 1    nitroglycerin (NITROSTAT) 0.4 mg SL tablet Place 0.4 mg under the tongue every 5 (five) minutes as needed      sulfaSALAzine (AZULFIDINE) 500 mg tablet ONE AND \"ONE-HALF\" TABLETS (75OMG) EVERY DAY 45 tablet 3    vitamin B-12 (VITAMIN B-12) 1,000 mcg tablet Take 2,500 mcg by mouth daily      albuterol (PROVENTIL HFA,VENTOLIN HFA) 90 mcg/act inhaler Inhale 2 puffs every 6 (six) hours as needed for wheezing or shortness of breath 6.7 g 2    Fluticasone Furoate-Vilanterol 100-25 mcg/actuation inhaler INHALE 1 PUFF DAILY RINSE MOUTH AFTER USE. 60 each 5    ipratropium-albuterol (DUO-NEB) 0.5-2.5 mg/3 mL nebulizer solution Take 3 mL by nebulization 4 (four) times a day (Patient taking differently: Take 3 mL by nebulization every 6 (six) hours as needed for shortness of breath or wheezing) 360 mL 3     No current facility-administered medications on file prior to visit.      Social History     Tobacco Use    Smoking status: Never    Smokeless tobacco: Never    Tobacco comments:     RETIRED   Vaping Use    Vaping status: Never Used   Substance " "and Sexual Activity    Alcohol use: Yes     Comment: Alcohol intake:   Occasional wine - As per Mena     Drug use: No    Sexual activity: Not on file        Objective   Pulse 66   Ht 5' 10\" (1.778 m)   Wt 80.3 kg (177 lb)   SpO2 98%   BMI 25.40 kg/m²      Physical Exam  BP sitting on right: 138/58 with a heart rate of 60 and regular  BP sitting on left: 142/58 with a heart rate of 60 and regular  BP standing on right: 140/64 with a heart rate of 76 and regular  Physical Exam: General:  No acute distress  Skin:  No acute rash  Eyes:  No scleral icterus and noninjected  ENT:  Moist mucous membranes  Back: No CVA tenderness positive scoliosis  Neck:  Supple, no jugular venous distention, trachea midline, overall appearance is normal  Chest:  Clear to auscultation  CVS:  Regular rate and rhythm, without a rub or gallops  Abdomen:  Normal bowel sounds, soft and nontender and nondistended  Extremities:  No edema, and no cyanosis, no significant arthritic changes  Neuro:  No gross focality  Psych:  Alert and oriented and appropriate        "

## 2025-01-25 NOTE — ASSESSMENT & PLAN NOTE
Acceptable potassium continue on low potassium diet  Orders:    Basic metabolic panel; Future    Magnesium; Future    Protein / creatinine ratio, urine; Future    Comprehensive metabolic panel; Future    CBC; Future    Lipid Panel with Direct LDL reflex; Future    Magnesium; Future    Protein / creatinine ratio, urine; Future

## 2025-01-29 ENCOUNTER — ANESTHESIA EVENT (OUTPATIENT)
Dept: PERIOP | Facility: AMBULARY SURGERY CENTER | Age: 80
End: 2025-01-29
Payer: MEDICARE

## 2025-01-29 NOTE — PRE-PROCEDURE INSTRUCTIONS
Pre-Surgery Instructions:   Medication Instructions    albuterol (PROVENTIL HFA,VENTOLIN HFA) 90 mcg/act inhaler Uses PRN- OK to take day of surgery    allopurinol (ZYLOPRIM) 300 mg tablet Take day of surgery.    amLODIPine (NORVASC) 5 mg tablet Take day of surgery.    Aspirin 81 MG CAPS Instructions provided by MD-received instructions from cardiologist to hold for 5 days prior to surgery    atorvastatin (LIPITOR) 80 mg tablet Take night before surgery    azilsartan medoxomil (Edarbi) 40 MG tablet Take night before surgery    b complex vitamins capsule Hold day of surgery.    chlorthalidone 25 mg tablet Hold day of surgery.    Cholecalciferol (VITAMIN D3) 2000 units TABS Hold day of surgery.    co-enzyme Q-10 50 MG capsule Stop taking 7 days prior to surgery.    colchicine (COLCRYS) 0.6 mg tablet Take day of surgery.    dexlansoprazole (DEXILANT) 60 MG capsule Take day of surgery.    Empagliflozin 25 MG TABS Stop taking 4 days prior to surgery.    famotidine (PEPCID) 40 MG tablet Take night before surgery    Ferrous Sulfate (IRON) 325 (65 Fe) MG TABS Hold day of surgery.    Fluticasone Furoate-Vilanterol 100-25 mcg/actuation inhaler Take day of surgery.    Incruse Ellipta 62.5 MCG/ACT AEPB inhaler Take day of surgery.    ipratropium-albuterol (DUO-NEB) 0.5-2.5 mg/3 mL nebulizer solution Uses PRN- OK to take day of surgery    isosorbide mononitrate (IMDUR) 30 mg 24 hr tablet Take day of surgery.    Januvia 100 MG tablet Hold day of surgery.    magnesium chloride-calcium (SLOW-MAG) 71.5-119 mg Hold day of surgery.    metoprolol succinate (TOPROL-XL) 50 mg 24 hr tablet Take day of surgery.    nitroglycerin (NITROSTAT) 0.4 mg SL tablet Uses PRN- OK to take day of surgery    sulfaSALAzine (AZULFIDINE) 500 mg tablet Stop taking 7 days prior to surgery.    vitamin B-12 (VITAMIN B-12) 1,000 mcg tablet Hold day of surgery.

## 2025-02-03 DIAGNOSIS — M1A.9XX0 CHRONIC GOUT WITHOUT TOPHUS, UNSPECIFIED CAUSE, UNSPECIFIED SITE: ICD-10-CM

## 2025-02-04 ENCOUNTER — OFFICE VISIT (OUTPATIENT)
Dept: NEPHROLOGY | Facility: CLINIC | Age: 80
End: 2025-02-04
Payer: MEDICARE

## 2025-02-04 VITALS — WEIGHT: 177 LBS | HEIGHT: 70 IN | OXYGEN SATURATION: 98 % | HEART RATE: 66 BPM | BODY MASS INDEX: 25.34 KG/M2

## 2025-02-04 DIAGNOSIS — E11.9 TYPE 2 DIABETES MELLITUS WITHOUT COMPLICATION, WITHOUT LONG-TERM CURRENT USE OF INSULIN (HCC): Primary | ICD-10-CM

## 2025-02-04 DIAGNOSIS — E78.5 DYSLIPIDEMIA: ICD-10-CM

## 2025-02-04 DIAGNOSIS — E87.5 HYPERKALEMIA: ICD-10-CM

## 2025-02-04 DIAGNOSIS — I12.9 PARENCHYMAL RENAL HYPERTENSION, STAGE 1 THROUGH STAGE 4 OR UNSPECIFIED CHRONIC KIDNEY DISEASE: ICD-10-CM

## 2025-02-04 DIAGNOSIS — R80.1 PERSISTENT PROTEINURIA: ICD-10-CM

## 2025-02-04 DIAGNOSIS — N18.32 STAGE 3B CHRONIC KIDNEY DISEASE (HCC): ICD-10-CM

## 2025-02-04 PROCEDURE — G2211 COMPLEX E/M VISIT ADD ON: HCPCS | Performed by: INTERNAL MEDICINE

## 2025-02-04 PROCEDURE — 99214 OFFICE O/P EST MOD 30 MIN: CPT | Performed by: INTERNAL MEDICINE

## 2025-02-04 RX ORDER — COLCHICINE 0.6 MG/1
TABLET ORAL
Qty: 30 TABLET | Refills: 5 | Status: SHIPPED | OUTPATIENT
Start: 2025-02-04

## 2025-02-04 RX ORDER — CLOPIDOGREL BISULFATE 75 MG/1
75 TABLET ORAL DAILY
COMMUNITY

## 2025-02-04 NOTE — PATIENT INSTRUCTIONS
Visit summary:  - Overall kidney function is very stable!  - The rest your labs including the cholesterol looks excellent  -Your blood pressure is also doing extremely well        1. Medication changes today:  No medication changes today    2.  General instructions:  Continue to avoid salt  Remain as active as you can        3.  Please take 1 week a blood pressure readings prior to your appointment in 6 months    AS FOLLOWS  MORNING AND EVENING, SITTING AND STANDING as follows:  TAKE THE MORNING READINGS BEFORE ANY MEDICATIONS AND WHEN YOU ARE RELAXED FOR SEVERAL MINUTES  TAKE THE EVENING READINGS:  BETWEEN 7-10 P.M.; PRIOR TO ANY MEDICATIONS; AT LEAST IN OUR  FROM DINNER; AND CERTAINLY AFTER RELAXING FOR A FEW MINUTES  PLEASE INCLUDE HEART RATE WITH YOUR BLOOD PRESSURE READINGS  When taking standing readings, keep your arm supported at heart level and not dangling  Make sure you are sitting with your back supported and feet on the ground and do not cross your legs or feet  Make sure you have not taken any coffee or caffeine products or exercised or smoke cigarettes at least 30 minutes before taking your blood pressure  Then please mail these readings into the office      4.  In 3 months:  Please go for nonfasting lab work but in the morning        5.  Follow-up in 6 months  Please bring in 1 week a blood pressure readings morning evening, sitting and standing is outlined above  PLEASE BRING AN YOUR BLOOD PRESSURE MACHINE TO CORRELATE WITH THE OFFICE MACHINE AT THIS NEXT SCHEDULED VISIT  Please go for fasting lab work 1-2 weeks prior to your appointment      6.  General non medical recommendations:  AVOID SALT BUT NOT ADDING AN READING LABELS TO MAKE SURE THERE IS LOW-SALT IN THE FOOD THAT YOU ARE EATING  Goal is less than 2 g of sodium intake or less than 5 g of sodium chloride intake per day    Avoid nonsteroidal anti-inflammatory drugs such as Naprosyn, ibuprofen, Aleve, Advil, Celebrex, Meloxicam  (Mobic) etc.  You can use Tylenol as needed if you do not have any liver condition to be concerned about    Avoid medications such as Sudafed or decongestants and antihistamines that contained the D component which is the decongestant.  You can take antihistamines without the decongestant or D component.    Try to avoid medications such as pantoprazole or  Protonix/Nexium or Esomeprazole)/Prilosec or omeprazole/Prevacid or lansoprazole/AcipHex or Rabeprazole.  If you are able to, use Pepcid as this is safer for your kidneys.    Try to exercise at least 30 minutes 3 days a week to begin with with an ultimate goal of 5 days a week for at least 30 minutes    Please do not drink more than 2 glasses of alcohol/wine on a daily basis as this may contribute to your high blood pressure.

## 2025-02-04 NOTE — LETTER
2025     MARGE Ellis DO  [address]  [address]  [place] [address]    Patient: Bean Lopez   YOB: 1945   Date of Visit: 2025       Dear Dr. Ellis:    Thank you for referring Bean Lopez to me for evaluation. Below are my notes for this consultation.    If you have questions, please do not hesitate to call me. I look forward to following your patient along with you.         Sincerely,        Misael Yun MD        CC: No Recipients    Misael Yun MD  2025  8:14 AM  Sign when Signing Visit  Name: Bean Lopez      : 1945      MRN: [id number]  Encounter Provider: Misael Yun MD  Encounter Date: 2025   Encounter department: [facility]  :  Assessment & Plan  Stage 3b chronic kidney disease (HCC)  At baseline continue current treatment    Orders:  •  Basic metabolic panel; Future  •  Magnesium; Future  •  Protein / creatinine ratio, urine; Future  •  Comprehensive metabolic panel; Future  •  CBC; Future  •  Lipid Panel with Direct LDL reflex; Future  •  Magnesium; Future  •  Protein / creatinine ratio, urine; Future    Type 2 diabetes mellitus without complication, without long-term current use of insulin (HCC)  On SGLT2 inhibitor   Orders:  •  Basic metabolic panel; Future  •  Magnesium; Future  •  Protein / creatinine ratio, urine; Future  •  Comprehensive metabolic panel; Future  •  CBC; Future  •  Lipid Panel with Direct LDL reflex; Future  •  Magnesium; Future  •  Protein / creatinine ratio, urine; Future    Parenchymal renal hypertension, stage 1 through stage 4 or unspecified chronic kidney disease  Doing extremely well no changes  Orders:  •  Basic metabolic panel; Future  •  Magnesium; Future  •  Protein / creatinine ratio, urine; Future  •  Comprehensive metabolic panel; Future  •  CBC; Future  •  Lipid Panel with Direct LDL reflex; Future  •  Magnesium; Future  •  Protein / creatinine ratio, urine;  Future    Dyslipidemia  Has been at goal no change  Orders:  •  Basic metabolic panel; Future  •  Magnesium; Future  •  Protein / creatinine ratio, urine; Future  •  Comprehensive metabolic panel; Future  •  CBC; Future  •  Lipid Panel with Direct LDL reflex; Future  •  Magnesium; Future  •  Protein / creatinine ratio, urine; Future    Persistent proteinuria  At goal continue current treatment  Orders:  •  Basic metabolic panel; Future  •  Magnesium; Future  •  Protein / creatinine ratio, urine; Future  •  Comprehensive metabolic panel; Future  •  CBC; Future  •  Lipid Panel with Direct LDL reflex; Future  •  Magnesium; Future  •  Protein / creatinine ratio, urine; Future    Hyperkalemia  Acceptable potassium continue on low potassium diet  Orders:  •  Basic metabolic panel; Future  •  Magnesium; Future  •  Protein / creatinine ratio, urine; Future  •  Comprehensive metabolic panel; Future  •  CBC; Future  •  Lipid Panel with Direct LDL reflex; Future  •  Magnesium; Future  •  Protein / creatinine ratio, urine; Future        History of Present Illness  HPI  Bean Lopez is a 79 y.o. male who presents follow-up regarding CKD 3B  There has been no hospitalizations or acute illnesses since last visit.  The patient overall is feeling well.  Good appetite and good energy  No fevers, chills, or cough or colds.  No hematuria, dysuria, voiding symptoms or foamy urine: For prostate biopsy for potential cancer followed by urology  No gastrointestinal symptoms  No cardiovascular symptoms including swelling of the legs except for the right lower extremity because of hip replacement  No headaches, dizziness or lightheadedness  Blood pressure medications:  Edarbi 20 mg at 5 PM  Chlorthalidone 12.5 mg daily in the morning  Toprol XL 50 mg daily in the morning  Amlodipine 5 mg daily in the morning  Isosorbide 30 mg daily in the morning    Renal pertinent medications:  Allopurinol 150 mg daily  Aspirin 81 mg daily  Plavix 75 mg  daily  Slow-Mag to twice a day  Iron daily  Vitamin D 2000 units daily  Atorvastatin 80 mg daily  Jardiance 25 mg daily      Review of Systems  Please see HPI, otherwise the review of systems as completely reviewed with the patient are negative    Pertinent Medical History  1. CKD stage 3.   Etiology: Diabetic nephropathy/hypertensive nephrosclerosis/arteriolar nephrosclerosis/episodes of SHAY in the past. All serologies were negative including multiple myeloma evaluation.  Baseline creatinine: Recently 1.5-2.0  Current creatinine: 1.70 from 1/14/2025 at baseline  Urine protein creatinine ratio: 0.20 g at goal  UA: Trace proteinuria, 0-1 RBCs as of 5/1/2024  Recommendations:   Treat hypertension-please see below   Treat dyslipidemia-please see below   Maintain proteinuria less than 1 g or as low as possible   Avoid nephrotoxic agents such as NSAIDs, patient counseled as such  2. Volume: Euvolemic      3. Hypertension:      Current blood pressure averages:  AM: 126/64, 118/67 standing  PM: 123/62, 115/64 standing  Heart rate: 60 range     Goal blood pressure: Less than 125/80 given CAD  Recommendations:   Push nonmedical regimen including weight loss, isotonic exercise and avoidance of salt; patient counseled as such   Medication changes today:   Doing extremely well no changes  4. Electrolytes: All acceptable including potassium of 4.5  5. Mineral bone disorder:   Calcium/magnesium/phosphorus: All acceptable  PTH intact: 43.7 which is normal  Vitamin-D:Currently 46.4 from 5/1/2024  6. Dyslipidemia:   Goal LDL: Less than 70 given CAD   Current lipid profile: LDL 46/HDL 43/triglycerides 156 from 11/29/2024  Recommendations: At goal so no changes   7. Anemia: normal hemoglobin at 13.7  8. Other problems:   Diabetes mellitus on Jardiance  CAD followed by Dr. Hess through [facility]. Status post MI last October involving to cardiac stents. : Recent emesis of LAD lesion with repeat PTCA drug-eluting stent 2018.  Circumflex artery and right coronary artery 50% stenosis. Ejection fraction 55%.   MRI 11/18/2023 [facility] status post cardiac catheterization with PTCA and stent of distal RCA.  Patent stents in the mid LAD continue medical therapy  Chest pain 6/11/2024 was musculoskeletal followed up by Dr. Bee     Sarcoidosis followed by    Kyphoscoliosis   Cirrhosis of the liver   Gout   Ankylosing spondylitis  Elevated PSA being followed by Dr. Stallworth-Dr. Carreon/Dr. Stallworth: Positive cancer will be followed at this juncture        GI health maintenance: Last colonoscopy: 12/10/2024 recommended 6-month follow-up June 8, 2025 per Dr. Espinosa given poor prep     Medical History Reviewed by provider this encounter:     .  Past Medical History  Past Medical History:   Diagnosis Date   • Anemia    • Arthritis    • Gee's esophagus    • Breast lump    • Cancer (HCC)    • Chronic kidney disease    • Cirrhosis (HCC)    • COPD (chronic obstructive pulmonary disease) (HCC)    • Coronary artery disease     cardiac cath; stents    • Diabetes mellitus (HCC)    • GERD (gastroesophageal reflux disease)    • H/O heart artery stent     x 5   • Hyperlipidemia    • Hypertension    • Inguinal hernia     Right   • Myocardial infarction (HCC) 2020    and 2023   • 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HCC)    • Skin cancer    • SOB (shortness of breath)    • Upper respiratory infection, viral 05/31/2022   • Wears dentures      Past Surgical History:   Procedure Laterality Date   • CARDIAC CATHETERIZATION  11/18/2023    x 5 stents-   • CATARACT EXTRACTION, BILATERAL      left eye 05/08 and right 5/22/2022   • CERVICAL FUSION      C2-3   • CHOLECYSTECTOMY     • COLONOSCOPY  04/11/2016   • COLONOSCOPY     • EGD AND COLONOSCOPY  06/24/2019 12/2024   • ERCP  09/11/2014    transab esop hiat griffin rpr   • ESOPHAGOGASTRIC FUNDOPLASTY     • HERNIA REPAIR Right     p I/griffin init reduc >5 yr   • HIP SURGERY Right     partial replacement  "  • KIDNEY STONE SURGERY      Fragmenting   • LIVER BIOPSY     • LUNG BIOPSY     • MULTIPLE TOOTH EXTRACTIONS      \"extraction of all maxillary teeth\"   • TN BX PROSTATE STRTCTC SATURATION SAMPLING IMG GID N/A 08/15/2023    Procedure: TRANSPERINEAL MRI FUSION BIOPSY PROSTATE;  Surgeon: Cory Carreon MD;  Location: [facility];  Service: Urology   • SKIN BIOPSY  05/2020   • THUMB FUSION      with graft   • TONSILLECTOMY     • UPPER GASTROINTESTINAL ENDOSCOPY     • US GUIDANCE  10/22/2014     Family History   Problem Relation Age of Onset   • Hypertension Mother    • Atrial fibrillation Mother    • Heart disease Mother    • Kidney disease Father    • COPD Father    • Heart disease Father    • Asthma Neg Hx    • Lung cancer Neg Hx       reports that he has never smoked. He has never used smokeless tobacco. He reports current alcohol use. He reports that he does not use drugs.  Current Outpatient Medications on File Prior to Visit   Medication Sig Dispense Refill   • allopurinol (ZYLOPRIM) 300 mg tablet \"ONE-HALF\" TABLET EVERY DAY 45 tablet 3   • amLODIPine (NORVASC) 5 mg tablet TAKE 1 TABLET (5 MG TOTAL) BY MOUTH DAILY. 90 tablet 1   • Aspirin 81 MG CAPS Take 81 mg by mouth in the morning     • atorvastatin (LIPITOR) 80 mg tablet Take 1 tablet (80 mg total) by mouth daily 90 tablet 1   • azilsartan medoxomil (Edarbi) 40 MG tablet TAKE 1 TABLET (40 MG TOTAL) BY MOUTH DAILY AFTER DINNER 90 tablet 1   • b complex vitamins capsule Take 1 capsule by mouth daily     • chlorthalidone 25 mg tablet TAKE \"ONE-HALF\" TABLET (12.5 MG) BY MOUTH DAILY 45 tablet 4   • Cholecalciferol (VITAMIN D3) 2000 units TABS Take 1 tablet by mouth daily     • clopidogrel (PLAVIX) 75 mg tablet Take 75 mg by mouth daily     • co-enzyme Q-10 50 MG capsule Take 100 mg by mouth in the morning.     • colchicine (COLCRYS) 0.6 mg tablet TAKE 1 TABLET (0.6 MG TOTAL) BY MOUTH DAILY 30 tablet 5   • dexlansoprazole (DEXILANT) 60 MG capsule TAKE 1 CAPSULE (60 MG " "TOTAL) BY MOUTH DAILY 90 capsule 1   • Empagliflozin 25 MG TABS Take 1 tablet (25 mg total) by mouth daily 30 tablet 5   • famotidine (PEPCID) 40 MG tablet TAKE 1 TABLET (40 MG TOTAL) BY MOUTH DAILY TAKE 1 TABLET WITH DINNER 90 tablet 1   • Ferrous Sulfate (IRON) 325 (65 Fe) MG TABS Take 1 tablet by mouth daily     • Incruse Ellipta 62.5 MCG/ACT AEPB inhaler INHALE 1 PUFF DAILY 30 each 5   • isosorbide mononitrate (IMDUR) 30 mg 24 hr tablet Take 30 mg by mouth daily     • Januvia 100 MG tablet TAKE 1 TABLET (100 MG TOTAL) BY MOUTH DAILY 30 tablet 5   • magnesium chloride-calcium (SLOW-MAG) 71.5-119 mg Take 2 tablets by mouth 2 (two) times a day     • metoprolol succinate (TOPROL-XL) 50 mg 24 hr tablet TAKE 1 TABLET (50 MG TOTAL) BY MOUTH DAILY 90 tablet 1   • nitroglycerin (NITROSTAT) 0.4 mg SL tablet Place 0.4 mg under the tongue every 5 (five) minutes as needed     • sulfaSALAzine (AZULFIDINE) 500 mg tablet ONE AND \"ONE-HALF\" TABLETS (75OMG) EVERY DAY 45 tablet 3   • vitamin B-12 (VITAMIN B-12) 1,000 mcg tablet Take 2,500 mcg by mouth daily     • albuterol (PROVENTIL HFA,VENTOLIN HFA) 90 mcg/act inhaler Inhale 2 puffs every 6 (six) hours as needed for wheezing or shortness of breath 6.7 g 2   • Fluticasone Furoate-Vilanterol 100-25 mcg/actuation inhaler INHALE 1 PUFF DAILY RINSE MOUTH AFTER USE. 60 each 5   • ipratropium-albuterol (DUO-NEB) 0.5-2.5 mg/3 mL nebulizer solution Take 3 mL by nebulization 4 (four) times a day (Patient taking differently: Take 3 mL by nebulization every 6 (six) hours as needed for shortness of breath or wheezing) 360 mL 3     No current facility-administered medications on file prior to visit.     Allergies   Allergen Reactions   • Oxycodone GI Intolerance      Current Outpatient Medications on File Prior to Visit   Medication Sig Dispense Refill   • allopurinol (ZYLOPRIM) 300 mg tablet \"ONE-HALF\" TABLET EVERY DAY 45 tablet 3   • amLODIPine (NORVASC) 5 mg tablet TAKE 1 TABLET (5 MG " "TOTAL) BY MOUTH DAILY. 90 tablet 1   • Aspirin 81 MG CAPS Take 81 mg by mouth in the morning     • atorvastatin (LIPITOR) 80 mg tablet Take 1 tablet (80 mg total) by mouth daily 90 tablet 1   • azilsartan medoxomil (Edarbi) 40 MG tablet TAKE 1 TABLET (40 MG TOTAL) BY MOUTH DAILY AFTER DINNER 90 tablet 1   • b complex vitamins capsule Take 1 capsule by mouth daily     • chlorthalidone 25 mg tablet TAKE \"ONE-HALF\" TABLET (12.5 MG) BY MOUTH DAILY 45 tablet 4   • Cholecalciferol (VITAMIN D3) 2000 units TABS Take 1 tablet by mouth daily     • clopidogrel (PLAVIX) 75 mg tablet Take 75 mg by mouth daily     • co-enzyme Q-10 50 MG capsule Take 100 mg by mouth in the morning.     • colchicine (COLCRYS) 0.6 mg tablet TAKE 1 TABLET (0.6 MG TOTAL) BY MOUTH DAILY 30 tablet 5   • dexlansoprazole (DEXILANT) 60 MG capsule TAKE 1 CAPSULE (60 MG TOTAL) BY MOUTH DAILY 90 capsule 1   • Empagliflozin 25 MG TABS Take 1 tablet (25 mg total) by mouth daily 30 tablet 5   • famotidine (PEPCID) 40 MG tablet TAKE 1 TABLET (40 MG TOTAL) BY MOUTH DAILY TAKE 1 TABLET WITH DINNER 90 tablet 1   • Ferrous Sulfate (IRON) 325 (65 Fe) MG TABS Take 1 tablet by mouth daily     • Incruse Ellipta 62.5 MCG/ACT AEPB inhaler INHALE 1 PUFF DAILY 30 each 5   • isosorbide mononitrate (IMDUR) 30 mg 24 hr tablet Take 30 mg by mouth daily     • Januvia 100 MG tablet TAKE 1 TABLET (100 MG TOTAL) BY MOUTH DAILY 30 tablet 5   • magnesium chloride-calcium (SLOW-MAG) 71.5-119 mg Take 2 tablets by mouth 2 (two) times a day     • metoprolol succinate (TOPROL-XL) 50 mg 24 hr tablet TAKE 1 TABLET (50 MG TOTAL) BY MOUTH DAILY 90 tablet 1   • nitroglycerin (NITROSTAT) 0.4 mg SL tablet Place 0.4 mg under the tongue every 5 (five) minutes as needed     • sulfaSALAzine (AZULFIDINE) 500 mg tablet ONE AND \"ONE-HALF\" TABLETS (75OMG) EVERY DAY 45 tablet 3   • vitamin B-12 (VITAMIN B-12) 1,000 mcg tablet Take 2,500 mcg by mouth daily     • albuterol (PROVENTIL HFA,VENTOLIN HFA) 90 " "mcg/act inhaler Inhale 2 puffs every 6 (six) hours as needed for wheezing or shortness of breath 6.7 g 2   • Fluticasone Furoate-Vilanterol 100-25 mcg/actuation inhaler INHALE 1 PUFF DAILY RINSE MOUTH AFTER USE. 60 each 5   • ipratropium-albuterol (DUO-NEB) 0.5-2.5 mg/3 mL nebulizer solution Take 3 mL by nebulization 4 (four) times a day (Patient taking differently: Take 3 mL by nebulization every 6 (six) hours as needed for shortness of breath or wheezing) 360 mL 3     No current facility-administered medications on file prior to visit.      Social History     Tobacco Use   • Smoking status: Never   • Smokeless tobacco: Never   • Tobacco comments:     RETIRED   Vaping Use   • Vaping status: Never Used   Substance and Sexual Activity   • Alcohol use: Yes     Comment: Alcohol intake:   Occasional wine - As per Mena    • Drug use: No   • Sexual activity: Not on file        Objective  Pulse 66   Ht 5' 10\" (1.778 m)   Wt 80.3 kg (177 lb)   SpO2 98%   BMI 25.40 kg/m²      Physical Exam  BP sitting on right: 138/58 with a heart rate of 60 and regular  BP sitting on left: 142/58 with a heart rate of 60 and regular  BP standing on right: 140/64 with a heart rate of 76 and regular  Physical Exam: General:  No acute distress  Skin:  No acute rash  Eyes:  No scleral icterus and noninjected  ENT:  Moist mucous membranes  Back: No CVA tenderness positive scoliosis  Neck:  Supple, no jugular venous distention, trachea midline, overall appearance is normal  Chest:  Clear to auscultation  CVS:  Regular rate and rhythm, without a rub or gallops  Abdomen:  Normal bowel sounds, soft and nontender and nondistended  Extremities:  No edema, and no cyanosis, no significant arthritic changes  Neuro:  No gross focality  Psych:  Alert and oriented and appropriate        "

## 2025-02-06 ENCOUNTER — ANESTHESIA (OUTPATIENT)
Dept: PERIOP | Facility: AMBULARY SURGERY CENTER | Age: 80
End: 2025-02-06
Payer: MEDICARE

## 2025-02-06 ENCOUNTER — HOSPITAL ENCOUNTER (OUTPATIENT)
Facility: AMBULARY SURGERY CENTER | Age: 80
Setting detail: OUTPATIENT SURGERY
Discharge: HOME | End: 2025-02-06
Attending: UROLOGY | Admitting: UROLOGY
Payer: MEDICARE

## 2025-02-06 ENCOUNTER — RA CDI HCC (OUTPATIENT)
Dept: OTHER | Facility: HOSPITAL | Age: 80
End: 2025-02-06

## 2025-02-06 VITALS
DIASTOLIC BLOOD PRESSURE: 72 MMHG | BODY MASS INDEX: 25.05 KG/M2 | WEIGHT: 175 LBS | OXYGEN SATURATION: 98 % | SYSTOLIC BLOOD PRESSURE: 159 MMHG | HEIGHT: 70 IN | RESPIRATION RATE: 18 BRPM | HEART RATE: 70 BPM | TEMPERATURE: 97.2 F

## 2025-02-06 DIAGNOSIS — C61 MALIGNANT NEOPLASM OF PROSTATE (HCC): ICD-10-CM

## 2025-02-06 DIAGNOSIS — R97.20 ELEVATED PROSTATE SPECIFIC ANTIGEN (PSA): ICD-10-CM

## 2025-02-06 DIAGNOSIS — C61 PROSTATE CANCER (HCC): Primary | ICD-10-CM

## 2025-02-06 LAB — GLUCOSE SERPL-MCNC: 146 MG/DL (ref 65–140)

## 2025-02-06 PROCEDURE — NC001 PR NO CHARGE: Performed by: UROLOGY

## 2025-02-06 PROCEDURE — 88342 IMHCHEM/IMCYTCHM 1ST ANTB: CPT | Performed by: STUDENT IN AN ORGANIZED HEALTH CARE EDUCATION/TRAINING PROGRAM

## 2025-02-06 PROCEDURE — G0416 PROSTATE BIOPSY, ANY MTHD: HCPCS | Performed by: STUDENT IN AN ORGANIZED HEALTH CARE EDUCATION/TRAINING PROGRAM

## 2025-02-06 PROCEDURE — 76942 ECHO GUIDE FOR BIOPSY: CPT | Performed by: UROLOGY

## 2025-02-06 PROCEDURE — 82948 REAGENT STRIP/BLOOD GLUCOSE: CPT

## 2025-02-06 PROCEDURE — 55700 PR PROSTATE NEEDLE BIOPSY ANY APPROACH: CPT | Performed by: UROLOGY

## 2025-02-06 PROCEDURE — 88344 IMHCHEM/IMCYTCHM EA MLT ANTB: CPT | Performed by: STUDENT IN AN ORGANIZED HEALTH CARE EDUCATION/TRAINING PROGRAM

## 2025-02-06 RX ORDER — SODIUM CHLORIDE, SODIUM LACTATE, POTASSIUM CHLORIDE, CALCIUM CHLORIDE 600; 310; 30; 20 MG/100ML; MG/100ML; MG/100ML; MG/100ML
INJECTION, SOLUTION INTRAVENOUS CONTINUOUS PRN
Status: DISCONTINUED | OUTPATIENT
Start: 2025-02-06 | End: 2025-02-06

## 2025-02-06 RX ORDER — PROPOFOL 10 MG/ML
INJECTION, EMULSION INTRAVENOUS CONTINUOUS PRN
Status: DISCONTINUED | OUTPATIENT
Start: 2025-02-06 | End: 2025-02-06

## 2025-02-06 RX ORDER — SODIUM CHLORIDE 9 MG/ML
125 INJECTION, SOLUTION INTRAVENOUS CONTINUOUS
Status: DISCONTINUED | OUTPATIENT
Start: 2025-02-06 | End: 2025-02-06 | Stop reason: HOSPADM

## 2025-02-06 RX ORDER — LIDOCAINE HYDROCHLORIDE 20 MG/ML
INJECTION, SOLUTION EPIDURAL; INFILTRATION; INTRACAUDAL; PERINEURAL AS NEEDED
Status: DISCONTINUED | OUTPATIENT
Start: 2025-02-06 | End: 2025-02-06 | Stop reason: HOSPADM

## 2025-02-06 RX ORDER — FENTANYL CITRATE 50 UG/ML
INJECTION, SOLUTION INTRAMUSCULAR; INTRAVENOUS AS NEEDED
Status: DISCONTINUED | OUTPATIENT
Start: 2025-02-06 | End: 2025-02-06

## 2025-02-06 RX ORDER — PROPOFOL 10 MG/ML
INJECTION, EMULSION INTRAVENOUS AS NEEDED
Status: DISCONTINUED | OUTPATIENT
Start: 2025-02-06 | End: 2025-02-06

## 2025-02-06 RX ORDER — CEFAZOLIN SODIUM 2 G/50ML
2000 SOLUTION INTRAVENOUS ONCE
Status: COMPLETED | OUTPATIENT
Start: 2025-02-06 | End: 2025-02-06

## 2025-02-06 RX ADMIN — PROPOFOL 10 MG: 10 INJECTION, EMULSION INTRAVENOUS at 10:18

## 2025-02-06 RX ADMIN — PROPOFOL 70 MCG/KG/MIN: 10 INJECTION, EMULSION INTRAVENOUS at 10:07

## 2025-02-06 RX ADMIN — CEFAZOLIN SODIUM 2000 MG: 2 SOLUTION INTRAVENOUS at 10:00

## 2025-02-06 RX ADMIN — FENTANYL CITRATE 25 MCG: 50 INJECTION INTRAMUSCULAR; INTRAVENOUS at 10:18

## 2025-02-06 RX ADMIN — SODIUM CHLORIDE, SODIUM LACTATE, POTASSIUM CHLORIDE, AND CALCIUM CHLORIDE: .6; .31; .03; .02 INJECTION, SOLUTION INTRAVENOUS at 09:48

## 2025-02-06 RX ADMIN — FENTANYL CITRATE 25 MCG: 50 INJECTION INTRAMUSCULAR; INTRAVENOUS at 10:26

## 2025-02-06 RX ADMIN — FENTANYL CITRATE 25 MCG: 50 INJECTION INTRAMUSCULAR; INTRAVENOUS at 10:00

## 2025-02-06 RX ADMIN — FENTANYL CITRATE 25 MCG: 50 INJECTION INTRAMUSCULAR; INTRAVENOUS at 10:08

## 2025-02-06 NOTE — H&P
"Bean is a 79-year-old male known to Dr. Stallworth.  He has a history of [place] 6 prostate cancer on active surveillance.  His initial biopsy was performed in August of 2023.  His most recent PSA level is 15.71 up from his baseline of between 5 and 8.  This prompted a repeat multiparametric MRI of the prostate performed in October 2024.  A 47 g gland was identified.  A single PI-RADS 4 lesion was identified in the anterior transition zone.  He presents today for a transperineal saturation biopsy along with an MRI fusion biopsy of the target lesion.    /79   Pulse 61   Temp 97.7 °F (36.5 °C) (Temporal)   Resp 17   Ht 5' 10\" (1.778 m)   Wt 79.4 kg (175 lb)   SpO2 99%   BMI 25.11 kg/m²     On examination he is in no acute distress.  Cardiac regular.  Respiratory no distress.  Abdomen soft nontender nondistended.  Skin is warm.  Extremities without edema.    Impression: [place] 6 prostate cancer on active surveillance, rising PSA 15.71, multiparametric MRI of the prostate with PI-RADS 4 scoring, 47 g gland    Plan: I recommend a standard transperineal saturation biopsy along with 4 additional cores from the PI-RADS 4 target lesion.  Risks of the procedure including, not limited to bleeding, infection, hematuria, and urinary retention were discussed.  Informed consent obtained.  "

## 2025-02-06 NOTE — OP NOTE
OPERATIVE REPORT  PATIENT NAME: Bean Lopez    :  1945  MRN: [id number]  Pt Location: [facility] OR ROOM 04    SURGERY DATE: 2025    Surgeons and Role:     * Zay Mcmillan MD - Primary    Preop Diagnosis:  Malignant neoplasm of prostate (HCC) [C61]  Elevated prostate specific antigen (PSA) [R97.20]    Post-Op Diagnosis Codes:     * Malignant neoplasm of prostate (HCC) [C61]     * Elevated prostate specific antigen (PSA) [R97.20]    Procedure(s):  TRANSPERINEAL MRI FUSION BIOPSY PROSTATE    Specimen(s):  ID Type Source Tests Collected by Time Destination   1 : RIGHT ANTERIOR MEDIAL Tissue Prostate TISSUE EXAM Zay Mcmillan MD 2025    A : Right Anterior lateral Tissue Prostate  Zay Mcmillan MD 2025    B : Right posterior lateral Tissue Prostate  Zay Mcmillan MD 2025    C : Left Anterior Medial Tissue Prostate  Zay Mcmillan MD 2025    D : left anterior lateral Tissue Prostate  Zay Mcmillan MD 2025    E : Left posterior lateral Tissue Prostate  Zay Mcmillan MD 2025    F : left posterior medial Tissue Prostate  Zay Mcmillan MD 2025    G : Left base Tissue Prostate  Zay Mcmillan MD 2025    H : right posterior medial Tissue Prostate  Zay Mcmillan MD 2025    I : right base Tissue Prostate  Zay Mcmillan MD 2025        Estimated Blood Loss:   Minimal    Drains:  * No LDAs found *    Anesthesia Type:   IV Sedation with Anesthesia    Operative Indications:  Malignant neoplasm of prostate (HCC) [C61]  Elevated prostate specific antigen (PSA) [R97.20]      Operative Findings:  Standard 20 core transperineal fusion biopsy with 4 cores taken from the PI-RADS 4 region of interest located in the anterior transition zone at the base of the prostate      Complications:   None    Procedure and Technique:  Bean Lopez is a  79-year-old male with a history of [place] 6 prostate cancer on active surveillance.  His baseline PSA is between 6 and 8.  Most recently his PSA jumped to 15.  This prompted a multiparametric MRI of the prostate revealing a PI-RADS 4 lesion located in the anterior transition zone at the base of the prostate.  Prostate size 47 g.    Procedure was transperineal saturation prostate biopsy with MRI fusion sampling of lesion or lesions utilizing the Precision Point perineal access device utilized to map the standard geographic sites of the prostate.    Bean Lopez is a 79 y.o. male with history of prostate cancer on surveillance. Patient has undergone prior biopsy of the prostate she has a history of [place] 6 prostate cancer on active surveillance.    Patient did undergo pre biopsy multiparametric MRI of the prostate. There were 1 lesions with highest PIRADs rating 4 so the patient was scheduled for transperineal saturation biopsy with addition with MRI fusion of the abnormal lesions.    The patient was scheduled for his procedure in an outpatient surgical context with the assistance of the anesthesia team for sedation. He was met in the preoperative holding area by the performing urologist, the anesthesia team and the intraoperative nursing staff. The procedure along with its risks and benefits were discussed and reviewed. Patient signed an informed consent.   Patient was then transferred to procedure suite. Pre-procedural time out was performed with all parties present. He was treated with periprocedural antibiotics, Ancef. He was placed in dorsal lithotomy with care to pad all pressure points. His perineum and genitalia were shave/prepped with ChloraPrep. The scrotum was elevated a secured aware from the perineum above the rectum.      Side-fire, biplanar transrectal ultrasound was introduced and the prostate was imaged in the sagittal and axial views. Prostate gland measurements taken by MRI and the prostate  measured 47 g.      With the assistance of the UroNav technician, a survey of the prostate anatomy was performed. The technician then linked the previously obtained MRI images to the real-time ultrasound. The PIRADs lesions seen on MRI were identified on the ultrasound.     In the midportion of the left and right prostate, a juan j was denoted in the perineal skin. Skin wheal was elevated with 1% lidocaine plain on either side.    The PrecisionPoint access needle was then introduced into the left perineal subcutaneous tissue. We visualized the tip of the needle. Spinal needle was introduced through the subcutaneous fat and into the pelvic floor muscle where a perineal pudendal block was performed with additional local anesthetic. This was repeated on the patient's right side.    Utilizing the Precision Point access needle and stepper, ultrasound guidance was utilized to place the spring-loaded biopsy needle into MRI lesions. The first lesion that was a PIRADs 4 in the right anterior prostate. 4 samples were taken. We confirmed good position of the needle strikes utilizing the fusion software.      We now moved to take our standard transperineal samples, which allowed us to carefully map and saturate each of the 10 zones that have subdivided the prostate into zonal anatomy.     The Precision Point access needle and stepper was positioned into the RIGHT perineal space and ultrasound guidance was utilized to place the spring-loaded biopsy needle into the following areas    RIGHT anterior medial: 2 biopsies taken  RIGHT posterior medial: 2 biopsies taken  RIGHT posterior lateral: 2 biopsies taken  RIGHT base: 2 biopsies taken  RIGHT anterior lateral: 2 biopsies taken  The Precision Point access needle and stepper was re-positioned into the LEFT perineal space and ultrasound guidance was utilized to place the spring-loaded biopsy needle into the following areas  LEFT anterior medial: 2 biopsies taken  LEFT posterior  medial: 2 biopsies taken  LEFT posterior lateral: 2 biopsies taken  LEFT base: 2 biopsies taken  LEFT anterior lateral: 2 biopsies taken  LEFT anterior medial: 2 biopsies taken    A total of 24 biopsies (including standard transperineal saturation and additional fusion biopsy):24    Transrectal ultrasound removed. The scrotum was released. Some gentle pressure was placed on the perineum for approximately 5 minutes for hemostasis.    At the completion of the procedure, the patient was taken out of dorsal lithotomy, recovered from their anesthesia, and transferred to PACU in good condition.     Overall, the patient tolerated the procedure and there were no complications.    Plan: The patient will be monitored in recovery, allowed to void prior to discharge and return for biopsy pathology review in the office with their primary urologist.   Patient Disposition:  PACU              SIGNATURE: Zay Mcmillan MD  DATE: February 6, 2025  TIME: 10:07 AM

## 2025-02-06 NOTE — DISCHARGE INSTR - AVS FIRST PAGE
Bean,    Today you underwent a transperineal MRI fusion biopsy.  Call for follow-up with Dr. Stallworth to review results of the biopsy.  Expect some blood in the urine.  Call if you are unable to urinate.    Dr. Mcmillan  [phone]

## 2025-02-06 NOTE — ANESTHESIA PREPROCEDURE EVALUATION
Procedure:  TRANSPERINEAL MRI FUSION BIOPSY PROSTATE (Perineum)    Relevant Problems   CARDIO   (+) Coronary artery disease involving native coronary artery of native heart without angina pectoris   (+) Coronary artery disease of native heart with stable angina pectoris, unspecified vessel or lesion type ([facility])   (+) History of PTCA   (+) Hypertension, essential   (+) Parenchymal renal hypertension      ENDO   (+) Type 2 diabetes mellitus without complication, without long-term current use of insulin (HCC)      /RENAL   (+) Chronic kidney disease, stage III (moderate) ([facility])   (+) Hypertensive chronic kidney disease with stage 1 through stage 4 chronic kidney disease, or unspecified chronic kidney disease   (+) Parenchymal renal hypertension   (+) Prostate cancer (HCC)      MUSCULOSKELETAL   (+) Ankylosing spondylitis of site in spine ([facility])   (+) Other spondylosis, thoracolumbar region      PULMONARY   (+) COPD (chronic obstructive pulmonary disease) ([facility])        Physical Exam    Airway    Mallampati score: II  TM Distance: >3 FB  Neck ROM: full     Dental   No notable dental hx lower dentures    Cardiovascular  Rhythm: regular, Rate: normal    Pulmonary   Breath sounds clear to auscultation    Other Findings        Anesthesia Plan  ASA Score- 3     Anesthesia Type- IV sedation with anesthesia with ASA Monitors.         Additional Monitors:     Airway Plan:            Plan Factors-Exercise tolerance (METS): >4 METS.    Chart reviewed.   Existing labs reviewed. Patient summary reviewed.    Patient is not a current smoker.      Obstructive sleep apnea risk education given perioperatively.        Induction- intravenous.    Postoperative Plan-         Informed Consent- Anesthetic plan and risks discussed with patient.  I personally reviewed this patient with the CRNA. Discussed and agreed on the Anesthesia Plan with the CRNA..      NPO Status:  Vitals Value Taken Time   Date of last liquid 02/05/25 02/06/25 0842   Time  of last liquid 2230 02/06/25 0842   Date of last solid 02/05/25 02/06/25 0842   Time of last solid 2230 02/06/25 0842

## 2025-02-07 ENCOUNTER — TELEPHONE (OUTPATIENT)
Age: 80
End: 2025-02-07

## 2025-02-07 NOTE — ANESTHESIA POSTPROCEDURE EVALUATION
Post-Op Assessment Note    CV Status:  Stable  Pain Score: 0    Pain management: adequate       Mental Status:  Alert and awake   Hydration Status:  Euvolemic   PONV Controlled:  Controlled   Airway Patency:  Patent  Two or more mitigation strategies used for obstructive sleep apnea   Post Op Vitals Reviewed: Yes    No anethesia notable event occurred.            Last Filed PACU Vitals:  Vitals Value Taken Time   Temp 97.7 °F (36.5 °C) 02/06/25 1055   Pulse 67 02/06/25 1055   /68 02/06/25 1055   Resp 12 02/06/25 1046   SpO2 99 % 02/06/25 1055   Vitals shown include unfiled device data.    Modified Poppy:     Vitals Value Taken Time   Activity 2 02/06/25 1055   Respiration 2 02/06/25 1055   Circulation 2 02/06/25 1055   Consciousness 2 02/06/25 1055   Oxygen Saturation 2 02/06/25 1055     Modified Poppy Score: 10

## 2025-02-07 NOTE — TELEPHONE ENCOUNTER
Pt had a missed call.  the numbers that he said called him were not urology. Pt stated he is feeling good after procedure yesterday.

## 2025-02-07 NOTE — TELEPHONE ENCOUNTER
Pt called in stating has a missed call from the gen surg #. Pt was called from the GI dept. Warm-transferred.

## 2025-02-10 DIAGNOSIS — E11.9 TYPE 2 DIABETES MELLITUS WITHOUT COMPLICATION, WITHOUT LONG-TERM CURRENT USE OF INSULIN (HCC): ICD-10-CM

## 2025-02-11 RX ORDER — SITAGLIPTIN 100 MG/1
100 TABLET, FILM COATED ORAL DAILY
Qty: 30 TABLET | Refills: 5 | Status: SHIPPED | OUTPATIENT
Start: 2025-02-11

## 2025-02-13 ENCOUNTER — OFFICE VISIT (OUTPATIENT)
Dept: FAMILY MEDICINE CLINIC | Facility: CLINIC | Age: 80
End: 2025-02-13
Payer: MEDICARE

## 2025-02-13 VITALS
WEIGHT: 174 LBS | HEART RATE: 60 BPM | SYSTOLIC BLOOD PRESSURE: 110 MMHG | HEIGHT: 70 IN | TEMPERATURE: 98.2 F | OXYGEN SATURATION: 99 % | DIASTOLIC BLOOD PRESSURE: 60 MMHG | BODY MASS INDEX: 24.91 KG/M2

## 2025-02-13 DIAGNOSIS — E55.9 VITAMIN D INSUFFICIENCY: ICD-10-CM

## 2025-02-13 DIAGNOSIS — N18.31 STAGE 3A CHRONIC KIDNEY DISEASE (HCC): ICD-10-CM

## 2025-02-13 DIAGNOSIS — I10 HYPERTENSION, ESSENTIAL: Primary | ICD-10-CM

## 2025-02-13 DIAGNOSIS — E11.9 TYPE 2 DIABETES MELLITUS WITHOUT COMPLICATION, WITHOUT LONG-TERM CURRENT USE OF INSULIN (HCC): ICD-10-CM

## 2025-02-13 DIAGNOSIS — J44.9 CHRONIC OBSTRUCTIVE PULMONARY DISEASE, UNSPECIFIED COPD TYPE (HCC): ICD-10-CM

## 2025-02-13 DIAGNOSIS — C61: ICD-10-CM

## 2025-02-13 PROCEDURE — 99214 OFFICE O/P EST MOD 30 MIN: CPT | Performed by: FAMILY MEDICINE

## 2025-02-13 PROCEDURE — G2211 COMPLEX E/M VISIT ADD ON: HCPCS | Performed by: FAMILY MEDICINE

## 2025-02-13 NOTE — PROGRESS NOTES
Name: Bean Lopez      : 1945      MRN: [id number]  Encounter Provider: MARGE Ellis DO  Encounter Date: 2025   Encounter department: [facility]    Assessment & Plan  Hypertension, essential  Blood pressure controlled at 110/60 patient denies orthopnea, dizziness or any other cerebral insufficiency send       Type 2 diabetes mellitus without complication, without long-term current use of insulin (HCC)    Lab Results   Component Value Date    HGBA1C 7.3 (H) 10/15/2024   Continues with Jardiance 25 mg OD and Januvia 100 mg OD with reasonable blood sugar control and A1c 7.3 he follows diet and tries to exercise he reports no hypoglycemic         Stage 3a chronic kidney disease (HCC)  Lab Results   Component Value Date    EGFR 38 2025    EGFR 37 2024    EGFR 32 2024    CREATININE 1.70 (H) 2025    CREATININE 1.71 (H) 2024    CREATININE 1.93 (H) 2024     Long discussion with regarding GFR.  Obviously he has multisystem multi organ insufficiency dysfunction and this is not unexpected at his age of 79 and his clinical/metabolic state.  Will continue to monitor       Vitamin D insufficiency  Appropriately taking vitamin D 3 as necessary and will recheck his level       Chronic obstructive pulmonary disease, unspecified COPD type (HCC)         Adenocarcinoma of prostate with [place] score X (HCC)  Previous [place] score 6--slow and nonaggressive cancer.  Was just biopsied few days ago and full biopsy report still pending to determine the present [place] score since it may have escalated pressure         Assessment & Plan  1. Prostate cancer: Cristobal 6.  - PSA 15.7 on 10/2024, up from baseline of 5-8. MRI identified a 47 g gland and a single para lesion 4 lesion in the anterior transition zone. Biopsy confirmed prostatic adenocarcinoma. Complete Cristobal scores pending.  - Consider radiation therapy as a treatment option.    Follow-up  - Follow up in  3 months.    PROCEDURE  Colonoscopy and EGD were performed previously.    Depression Screening and Follow-up Plan: Patient was screened for depression during today's encounter. They screened negative with a PHQ-2 score of 0.        History of Present Illness     History of Present Illness  The patient is a 79-year-old male who presents for evaluation of prostate cancer.    Prostate Cancer  - He was scheduled for a biopsy at [facility], which was subsequently canceled due to his aspirin intake.  - He was advised to discontinue aspirin 7 days prior to the procedure.  - His cardiologist, Dr. Bee, recommended a 5-day cessation period.  - The biopsy was then rescheduled and performed by Dr. Montano.  - He experienced a severe headache post-procedure, which took 3 days to resolve.  - He has a follow-up appointment with Dr. Stallworth next week.  - He has expressed interest in surgical intervention for his prostate cancer but was informed that his age may preclude him from surviving the operation.  -We talked at length about radiation therapy which he can further explore with Dr. Stallworth    Supplemental Information  He has undergone both colonoscopy and EGD procedures, with no history of bowel surgery or resection.    MEDICATIONS  Current: Metoprolol, Breo, Incruse, Dexilant, amlodipine, allopurinol.     Review of Systems   Constitutional:  Negative for activity change, appetite change, chills, diaphoresis, fatigue, fever and unexpected weight change.   HENT:  Negative for congestion, dental problem, drooling, ear discharge, ear pain, facial swelling, mouth sores, nosebleeds, postnasal drip, rhinorrhea, trouble swallowing and voice change.    Eyes:  Negative for photophobia, pain, discharge, redness, itching and visual disturbance.   Respiratory:  Negative for apnea, cough, choking, chest tightness and shortness of breath.    Cardiovascular:  Negative for chest pain and leg swelling.   Gastrointestinal:  Negative  "for abdominal distention, abdominal pain, constipation, diarrhea and nausea.   Endocrine: Negative for polydipsia, polyphagia and polyuria.   Genitourinary:  Negative for decreased urine volume, difficulty urinating, dysuria, enuresis and hematuria.   Musculoskeletal:  Negative for arthralgias, back pain, gait problem and joint swelling.   Skin:  Negative for color change, pallor, rash and wound.   Allergic/Immunologic: Negative for immunocompromised state.   Neurological:  Negative for dizziness, seizures, syncope, facial asymmetry, speech difficulty, light-headedness and headaches.   Hematological:  Negative for adenopathy.   Psychiatric/Behavioral:  Negative for agitation, behavioral problems, confusion and decreased concentration.      Objective   /60 (BP Location: Left arm, Patient Position: Sitting, Cuff Size: Standard)   Pulse 60   Temp 98.2 °F (36.8 °C) (Temporal)   Ht 5' 10\" (1.778 m)   Wt 78.9 kg (174 lb)   SpO2 99%   BMI 24.97 kg/m²     Physical Exam  The patient is in no acute distress.  Lungs were auscultated.  Heart was examined.    Vital Signs  Blood pressure is 166/78.  Physical Exam  Vitals and nursing note reviewed.   Constitutional:       General: He is not in acute distress.     Appearance: Normal appearance. He is well-developed. He is not ill-appearing, toxic-appearing or diaphoretic.   HENT:      Head: Normocephalic and atraumatic.      Nose: Nose normal.      Mouth/Throat:      Mouth: Mucous membranes are moist.   Eyes:      Extraocular Movements: Extraocular movements intact.      Conjunctiva/sclera: Conjunctivae normal.      Pupils: Pupils are equal, round, and reactive to light.   Cardiovascular:      Rate and Rhythm: Normal rate and regular rhythm.      Pulses: Normal pulses.      Heart sounds: Normal heart sounds. No murmur heard.     No friction rub.   Pulmonary:      Effort: Pulmonary effort is normal. No respiratory distress.      Breath sounds: Normal breath sounds. No " stridor. No wheezing or rhonchi.   Abdominal:      Palpations: Abdomen is soft.      Tenderness: There is no abdominal tenderness.   Musculoskeletal:         General: No swelling.      Cervical back: Neck supple.   Skin:     General: Skin is warm and dry.      Coloration: Skin is not jaundiced or pale.      Findings: No erythema or rash.   Neurological:      General: No focal deficit present.      Mental Status: He is alert and oriented to person, place, and time. Mental status is at baseline.   Psychiatric:         Mood and Affect: Mood normal.         Behavior: Behavior normal.         Thought Content: Thought content normal.         Judgment: Judgment normal.     Administrative Statements   I have spent a total time of 30 minutes in caring for this patient on the day of the visit/encounter including Diagnostic results, Prognosis, Risks and benefits of tx options, Instructions for management, Patient and family education, Importance of tx compliance, Risk factor reductions, Impressions, Counseling / Coordination of care, Documenting in the medical record, Reviewing / ordering tests, medicine, procedures  , and Obtaining or reviewing history  .

## 2025-02-13 NOTE — ASSESSMENT & PLAN NOTE
Blood pressure controlled at 110/60 patient denies orthopnea, dizziness or any other cerebral insufficiency send

## 2025-02-13 NOTE — ASSESSMENT & PLAN NOTE
Lab Results   Component Value Date    EGFR 38 01/14/2025    EGFR 37 12/06/2024    EGFR 32 11/29/2024    CREATININE 1.70 (H) 01/14/2025    CREATININE 1.71 (H) 12/06/2024    CREATININE 1.93 (H) 11/29/2024     Long discussion with regarding GFR.  Obviously he has multisystem multi organ insufficiency dysfunction and this is not unexpected at his age of 79 and his clinical/metabolic state.  Will continue to monitor

## 2025-02-13 NOTE — ASSESSMENT & PLAN NOTE
Lab Results   Component Value Date    HGBA1C 7.3 (H) 10/15/2024   Continues with Jardiance 25 mg OD and Januvia 100 mg OD with reasonable blood sugar control and A1c 7.3 he follows diet and tries to exercise he reports no hypoglycemic

## 2025-02-19 ENCOUNTER — OFFICE VISIT (OUTPATIENT)
Dept: UROLOGY | Facility: CLINIC | Age: 80
End: 2025-02-19
Payer: MEDICARE

## 2025-02-19 VITALS
SYSTOLIC BLOOD PRESSURE: 150 MMHG | WEIGHT: 173 LBS | BODY MASS INDEX: 24.77 KG/M2 | HEART RATE: 65 BPM | OXYGEN SATURATION: 95 % | DIASTOLIC BLOOD PRESSURE: 80 MMHG | HEIGHT: 70 IN

## 2025-02-19 DIAGNOSIS — C61 PROSTATE CANCER (HCC): Primary | ICD-10-CM

## 2025-02-19 PROCEDURE — 88344 IMHCHEM/IMCYTCHM EA MLT ANTB: CPT | Performed by: STUDENT IN AN ORGANIZED HEALTH CARE EDUCATION/TRAINING PROGRAM

## 2025-02-19 PROCEDURE — 99213 OFFICE O/P EST LOW 20 MIN: CPT | Performed by: UROLOGY

## 2025-02-19 PROCEDURE — 88342 IMHCHEM/IMCYTCHM 1ST ANTB: CPT | Performed by: STUDENT IN AN ORGANIZED HEALTH CARE EDUCATION/TRAINING PROGRAM

## 2025-02-19 PROCEDURE — G0416 PROSTATE BIOPSY, ANY MTHD: HCPCS | Performed by: STUDENT IN AN ORGANIZED HEALTH CARE EDUCATION/TRAINING PROGRAM

## 2025-02-19 NOTE — PROGRESS NOTES
Referring Physician: MARGE Ellis DO  A copy of this note was sent to the referring physician.       Diagnoses and all orders for this visit:    Prostate cancer (HCC)  -     PSA Total, Diagnostic; Future            Assessment and plan:       1.  low risk prostate cancer, stable on surveillance  -Diagnosis: August 15, 2023 (transperineal MR fusion biopsy)  -Cristobal 3+3 equal 6 disease, 1 of 6 cores from solitary right anterior lesion, all cores on systematic biopsy were negative  - PSA at the time of diagnosis: 6.8-8.3  -Multiparametric MRI: (June 5, 2023): 38 g gland, solitary 1.4 cm right base lesion  -I recommended watchful waiting  - Patient recent underwent a repeat biopsy prompted by an increase in his PSA up to 15  - Biopsy is thankfully revealing only [place] 6 disease again, nonclinically significant prostate cancer    2.  Medical comorbidities: Type 2 diabetes, COPD, pulmonary fibrosis, coronary artery disease, stage III chronic kidney disease    I am thrilled to review the results with the patient.  Again the biopsy demonstrates nonclinically significant cancer despite his rising PSA.  I have recommended no further treatment aside from annual PSA monitoring.  This is the standard of care.  Patient has multiple competing comorbidities    PSA only follow-up at this point.  Patient is amenable    He will follow-up next year with her advanced practitioner team with a repeat PSA prior  .    Fco Stallworth MD      Chief Complaint     Biopsy discussion      History of Present Illness     Bean Lopez is a 79 y.o. now returns in follow-up for elevated PSA status post prostate biopsy    Detailed Urologic History     - please refer to HPI    Review of Systems     Review of Systems   Constitutional: Negative for activity change and fatigue.   HENT: Negative for congestion.    Eyes: Negative for visual disturbance.   Respiratory: Negative for shortness of breath and wheezing.    Cardiovascular: Negative for  "chest pain and leg swelling.   Gastrointestinal: Negative for abdominal pain.   Endocrine: Negative for polyuria.   Genitourinary: Negative for dysuria, flank pain, hematuria and urgency.   Musculoskeletal: Negative for back pain.   Allergic/Immunologic: Negative for immunocompromised state.   Neurological: Negative for dizziness and numbness.   Psychiatric/Behavioral: Negative for dysphoric mood.   All other systems reviewed and are negative.          Allergies     Allergies   Allergen Reactions    Oxycodone GI Intolerance       Physical Exam       Physical Exam  Constitutional:       General: He is not in acute distress.     Appearance: He is well-developed.   HENT:      Head: Normocephalic and atraumatic.   Cardiovascular:      Comments: Negative lower extremity edema  Pulmonary:      Effort: Pulmonary effort is normal.      Breath sounds: Normal breath sounds.   Abdominal:      Palpations: Abdomen is soft.   Musculoskeletal:         General: Normal range of motion.      Cervical back: Normal range of motion.   Skin:     General: Skin is warm.   Neurological:      Mental Status: He is alert and oriented to person, place, and time.   Psychiatric:         Behavior: Behavior normal.           Vital Signs  Vitals:    02/19/25 1553   BP: 150/80   BP Location: Left arm   Patient Position: Sitting   Cuff Size: Adult   Pulse: 65   SpO2: 95%   Weight: 78.5 kg (173 lb)   Height: 5' 10\" (1.778 m)         Current Medications       Current Outpatient Medications:     albuterol (PROVENTIL HFA,VENTOLIN HFA) 90 mcg/act inhaler, Inhale 2 puffs every 6 (six) hours as needed for wheezing or shortness of breath, Disp: 6.7 g, Rfl: 2    allopurinol (ZYLOPRIM) 300 mg tablet, \"ONE-HALF\" TABLET EVERY DAY, Disp: 45 tablet, Rfl: 3    amLODIPine (NORVASC) 5 mg tablet, TAKE 1 TABLET (5 MG TOTAL) BY MOUTH DAILY., Disp: 90 tablet, Rfl: 1    Aspirin 81 MG CAPS, Take 81 mg by mouth in the morning, Disp: , Rfl:     atorvastatin (LIPITOR) 80 mg " "tablet, Take 1 tablet (80 mg total) by mouth daily, Disp: 90 tablet, Rfl: 1    azilsartan medoxomil (Edarbi) 40 MG tablet, TAKE 1 TABLET (40 MG TOTAL) BY MOUTH DAILY AFTER DINNER, Disp: 90 tablet, Rfl: 1    b complex vitamins capsule, Take 1 capsule by mouth daily, Disp: , Rfl:     chlorthalidone 25 mg tablet, TAKE \"ONE-HALF\" TABLET (12.5 MG) BY MOUTH DAILY, Disp: 45 tablet, Rfl: 4    Cholecalciferol (VITAMIN D3) 2000 units TABS, Take 1 tablet by mouth daily, Disp: , Rfl:     clopidogrel (PLAVIX) 75 mg tablet, Take 75 mg by mouth daily, Disp: , Rfl:     co-enzyme Q-10 50 MG capsule, Take 100 mg by mouth in the morning., Disp: , Rfl:     colchicine (COLCRYS) 0.6 mg tablet, TAKE 1 TABLET (0.6 MG TOTAL) BY MOUTH DAILY, Disp: 30 tablet, Rfl: 5    dexlansoprazole (DEXILANT) 60 MG capsule, TAKE 1 CAPSULE (60 MG TOTAL) BY MOUTH DAILY, Disp: 90 capsule, Rfl: 1    Empagliflozin 25 MG TABS, Take 1 tablet (25 mg total) by mouth daily, Disp: 30 tablet, Rfl: 5    famotidine (PEPCID) 40 MG tablet, TAKE 1 TABLET (40 MG TOTAL) BY MOUTH DAILY TAKE 1 TABLET WITH DINNER, Disp: 90 tablet, Rfl: 1    Ferrous Sulfate (IRON) 325 (65 Fe) MG TABS, Take 1 tablet by mouth daily, Disp: , Rfl:     Fluticasone Furoate-Vilanterol 100-25 mcg/actuation inhaler, INHALE 1 PUFF DAILY RINSE MOUTH AFTER USE., Disp: 60 each, Rfl: 5    Incruse Ellipta 62.5 MCG/ACT AEPB inhaler, INHALE 1 PUFF DAILY, Disp: 30 each, Rfl: 5    ipratropium-albuterol (DUO-NEB) 0.5-2.5 mg/3 mL nebulizer solution, Take 3 mL by nebulization 4 (four) times a day (Patient taking differently: Take 3 mL by nebulization every 6 (six) hours as needed for shortness of breath or wheezing), Disp: 360 mL, Rfl: 3    isosorbide mononitrate (IMDUR) 30 mg 24 hr tablet, Take 30 mg by mouth daily, Disp: , Rfl:     magnesium chloride-calcium (SLOW-MAG) 71.5-119 mg, Take 2 tablets by mouth 2 (two) times a day, Disp: , Rfl:     metoprolol succinate (TOPROL-XL) 50 mg 24 hr tablet, TAKE 1 TABLET (50 MG " "TOTAL) BY MOUTH DAILY, Disp: 90 tablet, Rfl: 1    nitroglycerin (NITROSTAT) 0.4 mg SL tablet, Place 0.4 mg under the tongue every 5 (five) minutes as needed, Disp: , Rfl:     sitaGLIPtin (Januvia) 100 mg tablet, TAKE 1 TABLET (100 MG TOTAL) BY MOUTH DAILY, Disp: 30 tablet, Rfl: 5    sulfaSALAzine (AZULFIDINE) 500 mg tablet, ONE AND \"ONE-HALF\" TABLETS (75OMG) EVERY DAY, Disp: 45 tablet, Rfl: 3    vitamin B-12 (VITAMIN B-12) 1,000 mcg tablet, Take 2,500 mcg by mouth daily, Disp: , Rfl:       Active Problems     Patient Active Problem List   Diagnosis    Chronic kidney disease, stage III (moderate) (HCC)    Dyslipidemia    Persistent proteinuria    Hypertensive chronic kidney disease with stage 1 through stage 4 chronic kidney disease, or unspecified chronic kidney disease    Hyperkalemia    Hypertension, essential    COPD (chronic obstructive pulmonary disease) (HCC)    Sarcoidosis    Other spondylosis, thoracolumbar region    Type 2 diabetes mellitus without complication, without long-term current use of insulin (HCC)    Coronary artery disease of native heart with stable angina pectoris, unspecified vessel or lesion type (HCC)    COVID-19    Pulmonary fibrosis (HCC)    History of PTCA    Prostate cancer (HCC)    Encounter to discuss test results    Medication management    Encounter for long-term (current) use of medications    Multiple medical problems    Parenchymal renal hypertension    Need for vaccination    Cough with hemoptysis    Ankylosing spondylitis of site in spine (HCC)    Coronary artery disease involving native coronary artery of native heart without angina pectoris    Tinnitus of both ears         Past Medical History     Past Medical History:   Diagnosis Date    Anemia     Arthritis     Gee's esophagus     Breast lump     Cancer (HCC)     Chronic kidney disease     Cirrhosis (HCC)     COPD (chronic obstructive pulmonary disease) (HCC)     Coronary artery disease     cardiac cath; stents     " "Diabetes mellitus (HCC)     GERD (gastroesophageal reflux disease)     H/O heart artery stent     x 5    Hyperlipidemia     Hypertension     Inguinal hernia     Right    Myocardial infarction (HCC) 2020    and 2023    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HCC)     Skin cancer     SOB (shortness of breath)     Upper respiratory infection, viral 05/31/2022    Wears dentures          Surgical History     Past Surgical History:   Procedure Laterality Date    CARDIAC CATHETERIZATION  11/18/2023    x 5 stents-    CATARACT EXTRACTION, BILATERAL      left eye 05/08 and right 5/22/2022    CERVICAL FUSION      C2-3    CHOLECYSTECTOMY      COLONOSCOPY  04/11/2016    COLONOSCOPY      EGD AND COLONOSCOPY  06/24/2019 12/2024    ERCP  09/11/2014    transab esop hiat griffin rpr    ESOPHAGOGASTRIC FUNDOPLASTY      HERNIA REPAIR Right     p I/griffin init reduc >5 yr    HIP SURGERY Right     partial replacement    KIDNEY STONE SURGERY      Fragmenting    LIVER BIOPSY      LUNG BIOPSY      MULTIPLE TOOTH EXTRACTIONS      \"extraction of all maxillary teeth\"    AK BX PROSTATE STRTCTC SATURATION SAMPLING IMG GID N/A 08/15/2023    Procedure: TRANSPERINEAL MRI FUSION BIOPSY PROSTATE;  Surgeon: Cory Carreon MD;  Location: [facility];  Service: Urology    AK PROSTATE NEEDLE BIOPSY ANY APPROACH N/A 2/6/2025    Procedure: TRANSPERINEAL MRI FUSION BIOPSY PROSTATE;  Surgeon: Zay Mcmillan MD;  Location: [facility];  Service: Urology    SKIN BIOPSY  05/2020    THUMB FUSION      with graft    TONSILLECTOMY      UPPER GASTROINTESTINAL ENDOSCOPY      US GUIDANCE  10/22/2014         Family History     Family History   Problem Relation Age of Onset    Hypertension Mother     Atrial fibrillation Mother     Heart disease Mother     Kidney disease Father     COPD Father     Heart disease Father     Asthma Neg Hx     Lung cancer Neg Hx          Social History     Social History     Social History     Tobacco Use   Smoking Status Never   Smokeless Tobacco Never " "  Tobacco Comments    RETIRED         Pertinent Lab Values     Lab Results   Component Value Date    CREATININE 1.70 (H) 01/14/2025       Lab Results   Component Value Date    PSA 15.711 (H) 10/15/2024    PSA 8.50 (H) 05/01/2024    PSA 6.83 (H) 05/26/2023       @RESULTRCNT(1H])@      Pertinent Imaging       No results found.      Portions of the record may have been created with voice recognition software.  Occasional wrong word or \"sound a like\" substitutions may have occurred due to the inherent limitations of voice recognition software.  In addition some of the content generated from this outpatient encounter includes information designed for patient education and/or communication back to the referring provider.  Read the chart carefully and recognize, using context, where substitutions have occurred.    "

## 2025-02-26 ENCOUNTER — TELEPHONE (OUTPATIENT)
Age: 80
End: 2025-02-26

## 2025-02-26 ENCOUNTER — OFFICE VISIT (OUTPATIENT)
Age: 80
End: 2025-02-26
Payer: MEDICARE

## 2025-02-26 VITALS
SYSTOLIC BLOOD PRESSURE: 141 MMHG | HEIGHT: 70 IN | HEART RATE: 59 BPM | DIASTOLIC BLOOD PRESSURE: 74 MMHG | WEIGHT: 173 LBS | BODY MASS INDEX: 24.77 KG/M2

## 2025-02-26 DIAGNOSIS — K22.70 BARRETT'S ESOPHAGUS WITHOUT DYSPLASIA: ICD-10-CM

## 2025-02-26 DIAGNOSIS — B37.81 ESOPHAGEAL CANDIDIASIS (HCC): ICD-10-CM

## 2025-02-26 DIAGNOSIS — K31.7 GASTRIC POLYP: ICD-10-CM

## 2025-02-26 DIAGNOSIS — D12.6 COLON ADENOMA: Primary | ICD-10-CM

## 2025-02-26 PROCEDURE — 99214 OFFICE O/P EST MOD 30 MIN: CPT | Performed by: INTERNAL MEDICINE

## 2025-02-26 RX ORDER — SODIUM CHLORIDE, SODIUM LACTATE, POTASSIUM CHLORIDE, CALCIUM CHLORIDE 600; 310; 30; 20 MG/100ML; MG/100ML; MG/100ML; MG/100ML
125 INJECTION, SOLUTION INTRAVENOUS CONTINUOUS
OUTPATIENT
Start: 2025-02-26

## 2025-02-26 RX ORDER — POLYETHYLENE GLYCOL 3350, SODIUM CHLORIDE, SODIUM BICARBONATE, POTASSIUM CHLORIDE 420; 11.2; 5.72; 1.48 G/4L; G/4L; G/4L; G/4L
4000 POWDER, FOR SOLUTION ORAL ONCE
Qty: 4000 ML | Refills: 0 | Status: SHIPPED | OUTPATIENT
Start: 2025-02-26 | End: 2025-02-26

## 2025-02-26 NOTE — PROGRESS NOTES
"Name: Bean Lopez      : 1945      MRN: [id number]  Encounter Provider: Lorenzo Espinosa MD  Encounter Date: 2025   Encounter department: [facility]  :  Assessment & Plan  Colon adenoma  Will plan repeat colonoscopy with 2-day extended prep.  Orders:    Colonoscopy; Future    polyethylene glycol-electrolytes (TriLyte) 4000 mL solution; Take 4,000 mL by mouth once for 1 dose Take 4000 mL by mouth once for 1 dose. 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  Will plan repeat EGD in 3 to 5 years for surveillance       Gastric polyp  Hyperplastic polyp with osseous metaplasia.  This is a benign finding but has low risk of recurrence or malignant transformation.  Will plan repeat EGD at the end of the year     Esophageal candidiasis (HCC)  Asymptomatic.  Status post treatment with nystatin           History of Present Illness   Bean Lopez is a 79 y.o. male who presents in follow-up to recent EGD and colonoscopy.  He was noted to have long segment nondysplastic Gee's esophagus.  Previously indefinite for dysplasia in .  Significant heartburn, dysphagia or odynophagia.  Several adenomatous polyps removed including a large adenoma removed with EMR and clipped.  Prep was suboptimal  HPI    Review of Systems A complete review of systems is negative other than that noted above in the HPI.      Current Outpatient Medications   Medication Sig Dispense Refill    albuterol (PROVENTIL HFA,VENTOLIN HFA) 90 mcg/act inhaler Inhale 2 puffs every 6 (six) hours as needed for wheezing or shortness of breath 6.7 g 2    allopurinol (ZYLOPRIM) 300 mg tablet \"ONE-HALF\" TABLET EVERY DAY 45 tablet 3    amLODIPine (NORVASC) 5 mg tablet TAKE 1 TABLET (5 MG TOTAL) BY MOUTH DAILY. 90 tablet 1    Aspirin 81 MG CAPS Take 81 mg by mouth in the morning      atorvastatin (LIPITOR) 80 " "mg tablet Take 1 tablet (80 mg total) by mouth daily 90 tablet 1    azilsartan medoxomil (Edarbi) 40 MG tablet TAKE 1 TABLET (40 MG TOTAL) BY MOUTH DAILY AFTER DINNER 90 tablet 1    b complex vitamins capsule Take 1 capsule by mouth daily      chlorthalidone 25 mg tablet TAKE \"ONE-HALF\" TABLET (12.5 MG) BY MOUTH DAILY 45 tablet 4    Cholecalciferol (VITAMIN D3) 2000 units TABS Take 1 tablet by mouth daily      clopidogrel (PLAVIX) 75 mg tablet Take 75 mg by mouth daily      co-enzyme Q-10 50 MG capsule Take 100 mg by mouth in the morning.      colchicine (COLCRYS) 0.6 mg tablet TAKE 1 TABLET (0.6 MG TOTAL) BY MOUTH DAILY 30 tablet 5    dexlansoprazole (DEXILANT) 60 MG capsule TAKE 1 CAPSULE (60 MG TOTAL) BY MOUTH DAILY 90 capsule 1    Empagliflozin 25 MG TABS Take 1 tablet (25 mg total) by mouth daily 30 tablet 5    famotidine (PEPCID) 40 MG tablet TAKE 1 TABLET (40 MG TOTAL) BY MOUTH DAILY TAKE 1 TABLET WITH DINNER 90 tablet 1    Ferrous Sulfate (IRON) 325 (65 Fe) MG TABS Take 1 tablet by mouth daily      Fluticasone Furoate-Vilanterol 100-25 mcg/actuation inhaler INHALE 1 PUFF DAILY RINSE MOUTH AFTER USE. 60 each 5    Incruse Ellipta 62.5 MCG/ACT AEPB inhaler INHALE 1 PUFF DAILY 30 each 5    ipratropium-albuterol (DUO-NEB) 0.5-2.5 mg/3 mL nebulizer solution Take 3 mL by nebulization 4 (four) times a day 360 mL 3    isosorbide mononitrate (IMDUR) 30 mg 24 hr tablet Take 30 mg by mouth daily      magnesium chloride-calcium (SLOW-MAG) 71.5-119 mg Take 2 tablets by mouth 2 (two) times a day      metoprolol succinate (TOPROL-XL) 50 mg 24 hr tablet TAKE 1 TABLET (50 MG TOTAL) BY MOUTH DAILY 90 tablet 1    polyethylene glycol-electrolytes (TriLyte) 4000 mL solution Take 4,000 mL by mouth once for 1 dose Take 4000 mL by mouth once for 1 dose. Use as directed 4000 mL 0    sitaGLIPtin (Januvia) 100 mg tablet TAKE 1 TABLET (100 MG TOTAL) BY MOUTH DAILY 30 tablet 5    sulfaSALAzine (AZULFIDINE) 500 mg tablet ONE AND " "\"ONE-HALF\" TABLETS (75OMG) EVERY DAY 45 tablet 3    vitamin B-12 (VITAMIN B-12) 1,000 mcg tablet Take 2,500 mcg by mouth daily      nitroglycerin (NITROSTAT) 0.4 mg SL tablet Place 0.4 mg under the tongue every 5 (five) minutes as needed       No current facility-administered medications for this visit.     Objective   /74 (BP Location: Left arm, Patient Position: Sitting, Cuff Size: Standard)   Pulse 59   Ht 5' 10\" (1.778 m)   Wt 78.5 kg (173 lb)   BMI 24.82 kg/m²     Physical Exam       Lab Results: I personally reviewed relevant lab results.       Results for orders placed during the hospital encounter of 12/10/24    Colonoscopy    Narrative  Table formatting from the original result was not included.  [facility]  [address] [place] [id number] [phone]      DATE OF SERVICE:  12/10/24    PHYSICIAN(S):  Attending:  Lorenzo Espinosa MD    Fellow:  No Staff Documented      INDICATION:  Colon cancer screening      POST-OP DIAGNOSIS:  See the impression below.    HISTORY:  Prior colonoscopy: 5 years ago.    BOWEL PREPARATION:  Golytely/Colyte/Trilyte      PREPROCEDURE:  Informed consent was obtained for the procedure, including sedation. Risks including but not limited to bleeding, infection, perforation, adverse drug reaction and aspiration were explained in detail. Also explained about less than 100% sensitivity with the exam and other alternatives. The patient was placed in the left lateral decubitus position.    Procedure: Colonoscopy    DETAILS OF PROCEDURE:  Patient was taken to the procedure room where a time out was performed to confirm correct patient and correct procedure. The patient underwent monitored anesthesia care, which was administered by an anesthesia professional. The patient's blood pressure, ECG, ETCO2, heart rate, level of consciousness, oxygen and respirations were monitored throughout the procedure. A digital rectal exam was performed. The scope " was introduced through the anus and advanced to the cecum. Retroflexion was performed in the rectum. The quality of bowel preparation was evaluated using the [place] Bowel Preparation Scale with scores of: right colon = 2, transverse colon = 1, left colon = 1. The total BBPS score was 4. Bowel prep was not adequate. The patient's estimated blood loss was minimal (<5 mL). The procedure was not difficult. The patient tolerated the procedure well. There were no apparent adverse events.      ANESTHESIA INFORMATION:  ASA: III  Anesthesia Type: IV Sedation with Anesthesia    MEDICATIONS:  simethicone (MYLICON) 40 mg in sterile water 60 mL 40 mg  (Totals for administrations occurring from 0800 to 0905 on 12/10/24)        FINDINGS:  Sessile polyp measuring 5-9 mm in the ascending colon; performed cold snare removal  Sessile and adenomatous-appearing polyp measuring 10-19 mm in the ascending colon; lift performed with Eleview injected into the submucosa; completely removed target lesion en bloc by EMR and retrieved specimen. EMR was performed with a hot snare; placed 1 clip successfully  Few scattered diverticula in the transverse colon, descending colon and sigmoid colon      EVENTS:  Procedure Events  Event Event Time  ENDO CECUM REACHED 12/10/2024  8:47 AM  ENDO SCOPE OUT TIME 12/10/2024  9:03 AM      SPECIMENS:  ID Type Source Tests Collected by Time Destination  1 : COLD SNARE, GASTRIC POLYP Tissue Stomach TISSUE EXAM Lorenzo Espinosa MD 12/10/2024  8:21 AM  2 : COLD BIOPSY, ESOPHAGUS 37 CM, HISTORY OF BARRETTS Tissue Esophagus TISSUE EXAM Lorenzo Espinosa MD 12/10/2024  8:22 AM  3 : COLD BIOPSY, ESOPHAGUS 35 CM, HISTORY OF BARRETTS Tissue Esophagus TISSUE EXAM Lorenzo Espinosa MD 12/10/2024  8:25 AM  4 : COLD BIOPSY, ESOPHAGUS 33 CM, HISTORY OF BARRETTS Tissue Esophagus TISSUE EXAM Lorenzo Espinosa MD 12/10/2024  8:28 AM  5 : COLD BIOPSY, ESOPHAGUS 31 CM, HISTORY OF BARRETTS Tissue Esophagus TISSUE EXAM Lorenzo Espinosa MD  12/10/2024  8:29 AM  6 : HOT SNARE, ASCENDING COLON POLYP Tissue Large Intestine, Right/Ascending Colon TISSUE EXAM Lorenzo Espinosa MD 12/10/2024  8:50 AM      EQUIPMENT:  Colonoscope -[id number]          Impression  Subcentimeter polyp in the ascending colon; performed cold snare  Polyp measuring 10-19 mm in the ascending colon; lift performed; removed by EMR; placed 1 clip successfully  Scattered diverticulosis in the transverse colon, descending colon and sigmoid colon        RECOMMENDATION:  Await pathology results  Repeat colonoscopy in 6 months, due: 6/8/2025  Inadequate bowel preparation  Personal history of colon polyps              Lorenzo Espinosa MD

## 2025-02-26 NOTE — TELEPHONE ENCOUNTER
Scheduled date of colonoscopy (as of today):June 13, 2025  Physician performing colonoscopy:Dr. Espinosa  Location of colonoscopy:[facility].  Bowel prep reviewed with patient:Ken 2 day prep  Instructions reviewed with patient by:LUIGI  Clearances: NA      Pt takes Januvia, Jardiance, Iron

## 2025-03-11 DIAGNOSIS — M45.9 ANKYLOSING SPONDYLITIS OF SITE IN SPINE (HCC): ICD-10-CM

## 2025-03-12 RX ORDER — SULFASALAZINE 500 MG/1
TABLET ORAL
Qty: 45 TABLET | Refills: 12 | Status: SHIPPED | OUTPATIENT
Start: 2025-03-12

## 2025-03-26 DIAGNOSIS — E11.22 TYPE 2 DIABETES MELLITUS WITH STAGE 3A CHRONIC KIDNEY DISEASE, UNSPECIFIED WHETHER LONG TERM INSULIN USE (HCC): ICD-10-CM

## 2025-03-26 DIAGNOSIS — N18.31 TYPE 2 DIABETES MELLITUS WITH STAGE 3A CHRONIC KIDNEY DISEASE, UNSPECIFIED WHETHER LONG TERM INSULIN USE (HCC): ICD-10-CM

## 2025-03-27 RX ORDER — EMPAGLIFLOZIN 25 MG/1
25 TABLET, FILM COATED ORAL DAILY
Qty: 30 TABLET | Refills: 0 | Status: SHIPPED | OUTPATIENT
Start: 2025-03-27

## 2025-04-03 DIAGNOSIS — K22.70 BARRETT'S ESOPHAGUS WITHOUT DYSPLASIA: ICD-10-CM

## 2025-04-03 DIAGNOSIS — K21.00 GASTROESOPHAGEAL REFLUX DISEASE WITH ESOPHAGITIS, UNSPECIFIED WHETHER HEMORRHAGE: ICD-10-CM

## 2025-04-03 RX ORDER — DEXLANSOPRAZOLE 60 MG/1
1 CAPSULE, DELAYED RELEASE ORAL DAILY
Qty: 90 CAPSULE | Refills: 1 | Status: SHIPPED | OUTPATIENT
Start: 2025-04-03

## 2025-04-28 ENCOUNTER — RESULTS FOLLOW-UP (OUTPATIENT)
Dept: OTHER | Facility: HOSPITAL | Age: 80
End: 2025-04-28

## 2025-04-28 ENCOUNTER — APPOINTMENT (OUTPATIENT)
Dept: LAB | Facility: HOSPITAL | Age: 80
End: 2025-04-28
Attending: INTERNAL MEDICINE
Payer: MEDICARE

## 2025-04-28 DIAGNOSIS — R80.1 PERSISTENT PROTEINURIA: ICD-10-CM

## 2025-04-28 DIAGNOSIS — E78.5 DYSLIPIDEMIA: ICD-10-CM

## 2025-04-28 DIAGNOSIS — E11.9 TYPE 2 DIABETES MELLITUS WITHOUT COMPLICATION, WITHOUT LONG-TERM CURRENT USE OF INSULIN (HCC): ICD-10-CM

## 2025-04-28 DIAGNOSIS — N18.32 STAGE 3B CHRONIC KIDNEY DISEASE (HCC): ICD-10-CM

## 2025-04-28 DIAGNOSIS — I12.9 PARENCHYMAL RENAL HYPERTENSION, STAGE 1 THROUGH STAGE 4 OR UNSPECIFIED CHRONIC KIDNEY DISEASE: ICD-10-CM

## 2025-04-28 DIAGNOSIS — C61 PROSTATE CANCER (HCC): ICD-10-CM

## 2025-04-28 DIAGNOSIS — E87.5 HYPERKALEMIA: ICD-10-CM

## 2025-04-28 LAB
ANION GAP SERPL CALCULATED.3IONS-SCNC: 8 MMOL/L (ref 4–13)
BUN SERPL-MCNC: 33 MG/DL (ref 5–25)
CALCIUM SERPL-MCNC: 9.6 MG/DL (ref 8.4–10.2)
CHLORIDE SERPL-SCNC: 103 MMOL/L (ref 96–108)
CO2 SERPL-SCNC: 29 MMOL/L (ref 21–32)
CREAT SERPL-MCNC: 1.78 MG/DL (ref 0.6–1.3)
CREAT UR-MCNC: 68.9 MG/DL
ERYTHROCYTE [DISTWIDTH] IN BLOOD BY AUTOMATED COUNT: 13.7 % (ref 11.6–15.1)
GFR SERPL CREATININE-BSD FRML MDRD: 35 ML/MIN/1.73SQ M
GLUCOSE P FAST SERPL-MCNC: 144 MG/DL (ref 65–99)
HCT VFR BLD AUTO: 41.5 % (ref 36.5–49.3)
HGB BLD-MCNC: 13.4 G/DL (ref 12–17)
MAGNESIUM SERPL-MCNC: 2.2 MG/DL (ref 1.9–2.7)
MCH RBC QN AUTO: 31.8 PG (ref 26.8–34.3)
MCHC RBC AUTO-ENTMCNC: 32.3 G/DL (ref 31.4–37.4)
MCV RBC AUTO: 98 FL (ref 82–98)
PLATELET # BLD AUTO: 153 THOUSANDS/UL (ref 149–390)
PMV BLD AUTO: 9.2 FL (ref 8.9–12.7)
POTASSIUM SERPL-SCNC: 4.6 MMOL/L (ref 3.5–5.3)
PROT UR-MCNC: 17.4 MG/DL
PROT/CREAT UR: 0.3 MG/G{CREAT}
PSA SERPL-MCNC: 12.89 NG/ML (ref 0–4)
RBC # BLD AUTO: 4.22 MILLION/UL (ref 3.88–5.62)
SODIUM SERPL-SCNC: 140 MMOL/L (ref 135–147)
WBC # BLD AUTO: 7.64 THOUSAND/UL (ref 4.31–10.16)

## 2025-04-28 PROCEDURE — 80048 BASIC METABOLIC PNL TOTAL CA: CPT

## 2025-04-28 PROCEDURE — 83735 ASSAY OF MAGNESIUM: CPT

## 2025-04-28 PROCEDURE — 85027 COMPLETE CBC AUTOMATED: CPT

## 2025-04-28 PROCEDURE — 82570 ASSAY OF URINE CREATININE: CPT

## 2025-04-28 PROCEDURE — 84153 ASSAY OF PSA TOTAL: CPT

## 2025-04-28 PROCEDURE — 36415 COLL VENOUS BLD VENIPUNCTURE: CPT

## 2025-04-28 PROCEDURE — 84156 ASSAY OF PROTEIN URINE: CPT

## 2025-04-28 NOTE — RESULT ENCOUNTER NOTE
Covering Dr. Yun in basket.  Labs reviewed and renal function stable at baseline.  Please call patient let him know his labs are stable.

## 2025-04-29 ENCOUNTER — TELEPHONE (OUTPATIENT)
Dept: FAMILY MEDICINE CLINIC | Facility: CLINIC | Age: 80
End: 2025-04-29

## 2025-04-29 NOTE — TELEPHONE ENCOUNTER
Spoke to the patient about recent lab results renal functions are stable .No changes . Patient expressed understanding and thanked use for the call.                ----- Message from Hyacinth Johns PA-C sent at 4/28/2025  4:08 PM EDT -----  Covering Dr. Yun in basket.  Labs reviewed and renal function stable at baseline.  Please call patient let him know his labs are stable.

## 2025-04-29 NOTE — TELEPHONE ENCOUNTER
Called pt and left msg stating that his appt with dr costa on  June 4 has been cancelled due to provider not available to call back and reschedule appt with dr costa

## 2025-05-05 DIAGNOSIS — I10 HYPERTENSION, ESSENTIAL: ICD-10-CM

## 2025-05-05 DIAGNOSIS — N18.30 CHRONIC KIDNEY DISEASE, STAGE III (MODERATE) (HCC): ICD-10-CM

## 2025-05-05 DIAGNOSIS — R80.1 PERSISTENT PROTEINURIA: ICD-10-CM

## 2025-05-05 DIAGNOSIS — I12.9 HYPERTENSIVE CHRONIC KIDNEY DISEASE WITH STAGE 1 THROUGH STAGE 4 CHRONIC KIDNEY DISEASE, OR UNSPECIFIED CHRONIC KIDNEY DISEASE: ICD-10-CM

## 2025-05-05 DIAGNOSIS — E78.5 DYSLIPIDEMIA: ICD-10-CM

## 2025-05-06 RX ORDER — AMLODIPINE BESYLATE 5 MG/1
5 TABLET ORAL DAILY
Qty: 90 TABLET | Refills: 1 | Status: SHIPPED | OUTPATIENT
Start: 2025-05-06

## 2025-05-06 RX ORDER — METOPROLOL SUCCINATE 50 MG/1
50 TABLET, EXTENDED RELEASE ORAL DAILY
Qty: 90 TABLET | Refills: 1 | Status: SHIPPED | OUTPATIENT
Start: 2025-05-06

## 2025-06-05 DIAGNOSIS — E11.22 TYPE 2 DIABETES MELLITUS WITH STAGE 3A CHRONIC KIDNEY DISEASE, UNSPECIFIED WHETHER LONG TERM INSULIN USE (HCC): ICD-10-CM

## 2025-06-05 DIAGNOSIS — N18.31 TYPE 2 DIABETES MELLITUS WITH STAGE 3A CHRONIC KIDNEY DISEASE, UNSPECIFIED WHETHER LONG TERM INSULIN USE (HCC): ICD-10-CM

## 2025-06-05 NOTE — TELEPHONE ENCOUNTER
Reason for call:   [x] Refill   [] Prior Auth  [] Other:     Office:   [x] PCP/Provider -   [] Specialty/Provider -     Medication:   Empagliflozin (Jardiance) 25 MG       Dose/Frequency: TAKE 1 TABLET (25 MG TOTAL) BY MOUTH DAILY     Quantity: 30    Pharmacy: [id number] - [place], [place] - [address] [place] [address] Pharmacy   Does the patient have enough for 3 days?   [x] Yes   [] No - Send as HP to POD    Mail Away Pharmacy   Does the patient have enough for 10 days?   [] Yes   [] No - Send as HP to POD

## 2025-06-10 ENCOUNTER — OFFICE VISIT (OUTPATIENT)
Dept: FAMILY MEDICINE CLINIC | Facility: CLINIC | Age: 80
End: 2025-06-10
Payer: MEDICARE

## 2025-06-10 VITALS
TEMPERATURE: 98.1 F | SYSTOLIC BLOOD PRESSURE: 130 MMHG | DIASTOLIC BLOOD PRESSURE: 64 MMHG | HEART RATE: 51 BPM | BODY MASS INDEX: 23.82 KG/M2 | OXYGEN SATURATION: 98 % | HEIGHT: 70 IN | WEIGHT: 166.4 LBS

## 2025-06-10 DIAGNOSIS — C61 PROSTATE CANCER (HCC): ICD-10-CM

## 2025-06-10 DIAGNOSIS — Z71.2 ENCOUNTER TO DISCUSS TEST RESULTS: ICD-10-CM

## 2025-06-10 DIAGNOSIS — Z79.899 MEDICATION MANAGEMENT: ICD-10-CM

## 2025-06-10 DIAGNOSIS — I10 HYPERTENSION, ESSENTIAL: Primary | ICD-10-CM

## 2025-06-10 DIAGNOSIS — Z79.899 ENCOUNTER FOR LONG-TERM (CURRENT) USE OF MEDICATIONS: ICD-10-CM

## 2025-06-10 PROCEDURE — G2211 COMPLEX E/M VISIT ADD ON: HCPCS | Performed by: FAMILY MEDICINE

## 2025-06-10 PROCEDURE — 99214 OFFICE O/P EST MOD 30 MIN: CPT | Performed by: FAMILY MEDICINE

## 2025-06-10 NOTE — ASSESSMENT & PLAN NOTE
Had MRI October 1 it shows 1 point by 1.3 Centimeter enclosed lesion likely cancer in the prostate not penetrating the wall.   The following is the note from urology:  From the prostate bx on 2/6/25:    Final Diagnosis   A. Prostate, Right Anterior Medial:    - Prostatic adenocarcinoma, acinar type, [place] score 3+3=6 (Prognostic Grade Group 1), discontinuously involving 2 of 2 cores (60%, 8 mm; 20%, 2 mm); see note.    - A tertiary pattern 4 cannot be entirely ruled out but the focus is too small to further characterize.    - Extraprostatic extension: Not identified.     - Perineural invasion: Not identified.     B. Prostate, Right Base:    - Benign prostatic parenchyma.    - Negative for high-grade prostatic intraepithelial neoplasia (HGPIN) or malignancy.       C. Prostate, Right Posterior Medial:    - Benign prostatic parenchyma.    - Negative for high-grade prostatic intraepithelial neoplasia (HGPIN) or malignancy.       D. Prostate, Left Base:    - Benign prostatic parenchyma.    - Negative for high-grade prostatic intraepithelial neoplasia (HGPIN) or malignancy.       E. Prostate, Right Anterior Lateral:    - Benign prostatic parenchyma.    - Negative for high-grade prostatic intraepithelial neoplasia (HGPIN) or malignancy.       F. Prostate, Left Posterior Medial:    - Benign prostatic parenchyma.    - Negative for high-grade prostatic intraepithelial neoplasia (HGPIN) or malignancy.       G. Prostate, Left Posterior Lateral:    - Focal high-grade prostatic intraepithelial neoplasia (HGPIN); see note.    - Negative for carcinoma.       H. Prostate, Left Anterior Lateral:    - Atypical small acinar proliferation (ASAP), favor atrophic / reactive; see note.      I. Prostate, Left Anterior Medial:    - Prostatic adenocarcinoma, acinar type, Cristobal score 3+3=6 (Prognostic Grade Group 1), continuously involving 1 of 2 cores (75%, 13 mm); see note.    - Extraprostatic extension: Not identified.     - Perineural  invasion: Not identified.     J. Prostate, Right Posterior Lateral:    - Benign prostatic parenchyma.    - Negative for high-grade prostatic intraepithelial neoplasia (HGPIN) or malignancy.    This is the A and P of Dr. Stallworth from 2/19/25    1.  low risk prostate cancer, stable on surveillance  -Diagnosis: August 15, 2023 (transperineal MR fusion biopsy)  -Cristobal 3+3 equal 6 disease, 1 of 6 cores from solitary right anterior lesion, all cores on systematic biopsy were negative  - PSA at the time of diagnosis: 6.8-8.3  -Multiparametric MRI: (June 5, 2023): 38 g gland, solitary 1.4 cm right base lesion  -I recommended watchful waiting  - Patient recent underwent a repeat biopsy prompted by an increase in his PSA up to 15  - Biopsy is thankfully revealing only Cristobal 6 disease again, nonclinically significant prostate cancer

## 2025-06-10 NOTE — ASSESSMENT & PLAN NOTE
See prostate bx on 2/6/25: as noted above    Will have colonoscopy in 3 days. Dr. Espinosa will do--discussed holding Jardiance for 1 week before procedure which she is already doing and Januvia 2 days before procedure which she will do beginning tomorrow

## 2025-06-10 NOTE — PROGRESS NOTES
Name: Bean Lopez      : 1945      MRN: [id number]  Encounter Provider: MARGE Ellis DO  Encounter Date: 6/10/2025   Encounter department: [facility]    :  Assessment & Plan  Hypertension, essential  Blood pressure 130/64 doing well on present medical regime of amlodipine 5, ARB 40 chlorthalidone 12.5, metoprolol succinate 50       Medication management  All medications reviewed for safety efficacy and tolerance       Prostate cancer (HCC)  Had MRI  it shows 1 point by 1.3 Centimeter enclosed lesion likely cancer in the prostate not penetrating the wall.   The following is the note from urology:  From the prostate bx on 25:    Final Diagnosis   A. Prostate, Right Anterior Medial:    - Prostatic adenocarcinoma, acinar type, Cristobal score 3+3=6 (Prognostic Grade Group 1), discontinuously involving 2 of 2 cores (60%, 8 mm; 20%, 2 mm); see note.    - A tertiary pattern 4 cannot be entirely ruled out but the focus is too small to further characterize.    - Extraprostatic extension: Not identified.     - Perineural invasion: Not identified.     B. Prostate, Right Base:    - Benign prostatic parenchyma.    - Negative for high-grade prostatic intraepithelial neoplasia (HGPIN) or malignancy.       C. Prostate, Right Posterior Medial:    - Benign prostatic parenchyma.    - Negative for high-grade prostatic intraepithelial neoplasia (HGPIN) or malignancy.       D. Prostate, Left Base:    - Benign prostatic parenchyma.    - Negative for high-grade prostatic intraepithelial neoplasia (HGPIN) or malignancy.       E. Prostate, Right Anterior Lateral:    - Benign prostatic parenchyma.    - Negative for high-grade prostatic intraepithelial neoplasia (HGPIN) or malignancy.       F. Prostate, Left Posterior Medial:    - Benign prostatic parenchyma.    - Negative for high-grade prostatic intraepithelial neoplasia (HGPIN) or malignancy.       G. Prostate, Left Posterior Lateral:    -  Focal high-grade prostatic intraepithelial neoplasia (HGPIN); see note.    - Negative for carcinoma.       H. Prostate, Left Anterior Lateral:    - Atypical small acinar proliferation (ASAP), favor atrophic / reactive; see note.      I. Prostate, Left Anterior Medial:    - Prostatic adenocarcinoma, acinar type, Cristobal score 3+3=6 (Prognostic Grade Group 1), continuously involving 1 of 2 cores (75%, 13 mm); see note.    - Extraprostatic extension: Not identified.     - Perineural invasion: Not identified.     J. Prostate, Right Posterior Lateral:    - Benign prostatic parenchyma.    - Negative for high-grade prostatic intraepithelial neoplasia (HGPIN) or malignancy.    This is the A and P of Dr. Stallworth from 2/19/25    1.  low risk prostate cancer, stable on surveillance  -Diagnosis: August 15, 2023 (transperineal MR fusion biopsy)  -[place] 3+3 equal 6 disease, 1 of 6 cores from solitary right anterior lesion, all cores on systematic biopsy were negative  - PSA at the time of diagnosis: 6.8-8.3  -Multiparametric MRI: (June 5, 2023): 38 g gland, solitary 1.4 cm right base lesion  -I recommended watchful waiting  - Patient recent underwent a repeat biopsy prompted by an increase in his PSA up to 15  - Biopsy is thankfully revealing only Cristobal 6 disease again, nonclinically significant prostate cancer       Encounter for long-term (current) use of medications  All meds reviewed and dsicussed       Encounter to discuss test results  See prostate bx on 2/6/25: as noted above    Will have colonoscopy in 3 days. Dr. Espinosa will do--discussed holding Jardiance for 1 week before procedure which she is already doing and Januvia 2 days before procedure which she will do beginning tomorrow         Assessment & Plan  1. Diabetes Mellitus: Stable.  - Blood glucose levels consistently in the range of 143 to 144.  - Discontinue Jardiance for a week prior to colonoscopy.  - Cease Januvia intake two days before the procedure,  starting from tomorrow.  - Maintain a balanced diet and regular exercise regimen.    2. Prostate Cancer: Stable.  - PSA level previously recorded at 12.8.  - Biopsy on 02/06/2025 revealed prostatic adenocarcinoma, acinar type, Cristobal score 6, prognostic grade group 1.  - Cancer discontinuously involving two of two cores, 60% in an 8 mm core and 20% in a 2 mm core.  - Continue with watchful waiting as recommended by urologist.    3. Health Maintenance.  - Up-to-date with immunizations, including RSV and Tdap.  - Received one dose of the Zoster vaccine in 2017.  - Consider getting the Shingrix vaccine if not already done.  - Verify the date of the Shingrix vaccine with the pharmacy and update records accordingly.    Follow-up  - Scheduled to follow up in 3 months.           History of Present Illness     History of Present Illness  The patient presents for evaluation of diabetes mellitus, prostate cancer, and health maintenance.    Diabetes Mellitus  - He is currently preparing for a colonoscopy scheduled for Friday.  - He has been advised to discontinue Jardiance until the procedure is completed.  - Blood glucose levels are influenced by dietary intake and physical activity.  - Concerns about potential hypoglycemia due to fasting prior to the colonoscopy have been expressed.  - He has been instructed to stop taking Jardiance one week before the procedure and Januvia two days before the procedure.    Prostate Cancer  - Prostate-specific antigen (PSA) levels are being monitored by Dr. Stallworth.  - A biopsy performed on 02/06/2025 revealed prostatic adenocarcinoma with a [place] score of 6.  - The patient reports that the cancer is slow-growing and nonclinically significant.  - He has not had any follow-up appointments since the biopsy.    Supplemental information: Transperineal prostate biopsy on 08/2023     Review of Systems   Constitutional:  Negative for chills and fever.   HENT:  Negative for ear pain and sore  "throat.    Eyes:  Negative for pain and visual disturbance.   Respiratory:  Negative for cough and shortness of breath.    Cardiovascular:  Negative for chest pain and palpitations.   Gastrointestinal:  Negative for abdominal pain and vomiting.   Genitourinary:  Negative for dysuria and hematuria.   Musculoskeletal:  Negative for arthralgias and back pain.   Skin:  Negative for color change and rash.   Neurological:  Negative for seizures and syncope.   All other systems reviewed and are negative.    Objective   /64 (BP Location: Left arm, Patient Position: Sitting, Cuff Size: Standard)   Pulse (!) 51   Temp 98.1 °F (36.7 °C) (Temporal)   Ht 5' 10\" (1.778 m)   Wt 75.5 kg (166 lb 6.4 oz)   SpO2 98%   BMI 23.88 kg/m²     Physical Exam  Respiratory: Clear to auscultation, no wheezing, rales or rhonchi  Physical Exam  Vitals and nursing note reviewed.   Constitutional:       General: He is not in acute distress.     Appearance: Normal appearance. He is not ill-appearing, toxic-appearing or diaphoretic.   HENT:      Head: Normocephalic and atraumatic.      Nose: Nose normal. No congestion or rhinorrhea.      Mouth/Throat:      Mouth: Mucous membranes are moist.     Eyes:      General: No scleral icterus.     Extraocular Movements: Extraocular movements intact.      Pupils: Pupils are equal, round, and reactive to light.       Cardiovascular:      Rate and Rhythm: Normal rate and regular rhythm.   Pulmonary:      Effort: Pulmonary effort is normal.      Breath sounds: Normal breath sounds.   Abdominal:      General: Abdomen is flat.     Musculoskeletal:      Right lower leg: No edema.      Left lower leg: No edema.     Skin:     General: Skin is warm and dry.     Neurological:      General: No focal deficit present.      Mental Status: He is alert and oriented to person, place, and time. Mental status is at baseline.     Psychiatric:         Mood and Affect: Mood normal.         Behavior: Behavior normal.    "      Thought Content: Thought content normal.         Judgment: Judgment normal.       Administrative Statements   I have spent a total time of 30 minutes in caring for this patient on the day of the visit/encounter including Diagnostic results, Prognosis, Risks and benefits of tx options, Instructions for management, Patient and family education, Importance of tx compliance, Risk factor reductions, Impressions, Counseling / Coordination of care, Documenting in the medical record, Reviewing/placing orders in the medical record (including tests, medications, and/or procedures), and Obtaining or reviewing history  .

## 2025-06-10 NOTE — ASSESSMENT & PLAN NOTE
Blood pressure 130/64 doing well on present medical regime of amlodipine 5, ARB 40 chlorthalidone 12.5, metoprolol succinate 50

## 2025-06-11 DIAGNOSIS — K22.70 BARRETT'S ESOPHAGUS WITHOUT DYSPLASIA: ICD-10-CM

## 2025-06-11 DIAGNOSIS — K21.00 GASTROESOPHAGEAL REFLUX DISEASE WITH ESOPHAGITIS, UNSPECIFIED WHETHER HEMORRHAGE: ICD-10-CM

## 2025-06-11 RX ORDER — DEXLANSOPRAZOLE 60 MG/1
1 CAPSULE, DELAYED RELEASE ORAL DAILY
Qty: 90 CAPSULE | Refills: 1 | Status: SHIPPED | OUTPATIENT
Start: 2025-06-11

## 2025-06-11 NOTE — TELEPHONE ENCOUNTER
Patient came into office stating new pharmacy will not dispense his prescription for dexilant 60 mg OD. Informed patient that this Rx has never been sent to new pharmacy -- only on file at [facility]. Informed him I would route at this time for approval to [facility] on [facility]. Routed to Dr. Espinosa as he is currently managing rx.

## 2025-06-13 ENCOUNTER — ANESTHESIA EVENT (OUTPATIENT)
Dept: GASTROENTEROLOGY | Facility: HOSPITAL | Age: 80
End: 2025-06-13
Payer: MEDICARE

## 2025-06-13 ENCOUNTER — HOSPITAL ENCOUNTER (OUTPATIENT)
Dept: GASTROENTEROLOGY | Facility: HOSPITAL | Age: 80
Setting detail: OUTPATIENT SURGERY
End: 2025-06-13
Attending: INTERNAL MEDICINE
Payer: MEDICARE

## 2025-06-13 ENCOUNTER — ANESTHESIA (OUTPATIENT)
Dept: GASTROENTEROLOGY | Facility: HOSPITAL | Age: 80
End: 2025-06-13
Payer: MEDICARE

## 2025-06-13 VITALS
OXYGEN SATURATION: 97 % | HEART RATE: 69 BPM | HEIGHT: 70 IN | DIASTOLIC BLOOD PRESSURE: 67 MMHG | WEIGHT: 168 LBS | BODY MASS INDEX: 24.05 KG/M2 | RESPIRATION RATE: 18 BRPM | TEMPERATURE: 98 F | SYSTOLIC BLOOD PRESSURE: 119 MMHG

## 2025-06-13 DIAGNOSIS — D12.6 COLON ADENOMA: ICD-10-CM

## 2025-06-13 PROBLEM — I48.91 ATRIAL FIBRILLATION (HCC): Status: ACTIVE | Noted: 2025-06-13

## 2025-06-13 LAB — GLUCOSE SERPL-MCNC: 110 MG/DL (ref 65–140)

## 2025-06-13 PROCEDURE — 88305 TISSUE EXAM BY PATHOLOGIST: CPT | Performed by: PATHOLOGY

## 2025-06-13 PROCEDURE — 93005 ELECTROCARDIOGRAM TRACING: CPT

## 2025-06-13 PROCEDURE — 45385 COLONOSCOPY W/LESION REMOVAL: CPT | Performed by: INTERNAL MEDICINE

## 2025-06-13 PROCEDURE — 82948 REAGENT STRIP/BLOOD GLUCOSE: CPT

## 2025-06-13 RX ORDER — PROPOFOL 10 MG/ML
INJECTION, EMULSION INTRAVENOUS AS NEEDED
Status: DISCONTINUED | OUTPATIENT
Start: 2025-06-13 | End: 2025-06-13

## 2025-06-13 RX ORDER — EPHEDRINE SULFATE 50 MG/ML
INJECTION INTRAVENOUS AS NEEDED
Status: DISCONTINUED | OUTPATIENT
Start: 2025-06-13 | End: 2025-06-13

## 2025-06-13 RX ORDER — SODIUM CHLORIDE, SODIUM LACTATE, POTASSIUM CHLORIDE, CALCIUM CHLORIDE 600; 310; 30; 20 MG/100ML; MG/100ML; MG/100ML; MG/100ML
INJECTION, SOLUTION INTRAVENOUS CONTINUOUS PRN
Status: DISCONTINUED | OUTPATIENT
Start: 2025-06-13 | End: 2025-06-13

## 2025-06-13 RX ORDER — SODIUM CHLORIDE, SODIUM LACTATE, POTASSIUM CHLORIDE, CALCIUM CHLORIDE 600; 310; 30; 20 MG/100ML; MG/100ML; MG/100ML; MG/100ML
125 INJECTION, SOLUTION INTRAVENOUS CONTINUOUS
Status: DISCONTINUED | OUTPATIENT
Start: 2025-06-13 | End: 2025-06-17 | Stop reason: HOSPADM

## 2025-06-13 RX ORDER — LIDOCAINE HYDROCHLORIDE 20 MG/ML
INJECTION, SOLUTION EPIDURAL; INFILTRATION; INTRACAUDAL; PERINEURAL AS NEEDED
Status: DISCONTINUED | OUTPATIENT
Start: 2025-06-13 | End: 2025-06-13

## 2025-06-13 RX ADMIN — SODIUM CHLORIDE, SODIUM LACTATE, POTASSIUM CHLORIDE, AND CALCIUM CHLORIDE: .6; .31; .03; .02 INJECTION, SOLUTION INTRAVENOUS at 07:24

## 2025-06-13 RX ADMIN — PROPOFOL 50 MG: 10 INJECTION, EMULSION INTRAVENOUS at 07:33

## 2025-06-13 RX ADMIN — PROPOFOL 20 MG: 10 INJECTION, EMULSION INTRAVENOUS at 07:36

## 2025-06-13 RX ADMIN — LIDOCAINE HYDROCHLORIDE 60 MG: 20 INJECTION, SOLUTION EPIDURAL; INFILTRATION; INTRACAUDAL; PERINEURAL at 07:33

## 2025-06-13 RX ADMIN — PROPOFOL 40 MG: 10 INJECTION, EMULSION INTRAVENOUS at 07:47

## 2025-06-13 RX ADMIN — EPHEDRINE SULFATE 10 MG: 50 INJECTION, SOLUTION INTRAVENOUS at 07:49

## 2025-06-13 RX ADMIN — PROPOFOL 20 MG: 10 INJECTION, EMULSION INTRAVENOUS at 07:41

## 2025-06-13 NOTE — ANESTHESIA POSTPROCEDURE EVALUATION
Post-Op Assessment Note    CV Status:  Stable    Pain management: adequate       Mental Status:  Sleepy and arousable   Hydration Status:  Euvolemic   PONV Controlled:  Controlled   Airway Patency:  Patent  Two or more mitigation strategies used for obstructive sleep apnea   Post Op Vitals Reviewed: Yes    No anethesia notable event occurred.    Staff: CRNA           Last Filed PACU Vitals:  Vitals Value Taken Time   Temp 97.8 °F (36.6 °C) 06/13/25 07:56   Pulse 68 06/13/25 07:56   /62 06/13/25 07:56   Resp 19 06/13/25 07:56   SpO2 99 % 06/13/25 07:56

## 2025-06-13 NOTE — ANESTHESIA PREPROCEDURE EVALUATION
Procedure:  COLONOSCOPY    Relevant Problems   ANESTHESIA (within normal limits)      CARDIO   (+) Atrial fibrillation (HCC)   (+) Coronary artery disease involving native coronary artery of native heart without angina pectoris   (+) Coronary artery disease of native heart with stable angina pectoris, unspecified vessel or lesion type (HCC)   (+) History of PTCA   (+) Hypertension, essential   (+) Parenchymal renal hypertension      ENDO   (+) Type 2 diabetes mellitus without complication, without long-term current use of insulin (HCC)      /RENAL   (+) Chronic kidney disease, stage III (moderate) (HCC)   (+) Hypertensive chronic kidney disease with stage 1 through stage 4 chronic kidney disease, or unspecified chronic kidney disease   (+) Parenchymal renal hypertension   (+) Prostate cancer (HCC)      MUSCULOSKELETAL   (+) Ankylosing spondylitis of site in spine (HCC)      PULMONARY   (+) COPD (chronic obstructive pulmonary disease) (HCC)      Respiratory/Allergy   (+) Pulmonary fibrosis (HCC)      Other   (+) Dyslipidemia   (+) Sarcoidosis   Left Ventricle   Left ventricle is normal in size. There is mild concentric hypertrophy. Systolic function is low normal with an ejection fraction of 50-55%. There is severe hypokinesis of the inferior wall from base to mid wall. There is grade I (mild) diastolic dysfunction.     Right Ventricle   Right ventricle cavity is normal. Systolic function is normal.     Left Atrium   Left atrium volume index is mildly increased.     Right Atrium   Right atrium cavity is normal.     IVC/SVC   The inferior vena cava demonstrates a diameter of <=21 mm and collapses >50%; therefore, the right atrial pressure is estimated at 0-5 mmHg.     Mitral Valve   Mitral valve structure is normal. There is mild regurgitation.     Tricuspid Valve   Tricuspid valve structure is normal. There is mild regurgitation.     Aortic Valve   The aortic valve is trileaflet. The leaflets are mildly thickened.  The leaflets are mildly calcified. There is mild regurgitation.     Pulmonic Valve   Pulmonic valve structure is normal. There is trace regurgitation. The estimated pulmonary artery systolic pressure is 41.0 mmHg. There is mild pulmonary hypertension.     Ascending Aorta   The aorta appears normal in size.     Pericardium   There is no pericardial effusion.       Sinus bradycardia  Left axis deviation  Abnormal ECG  When compared with ECG of 02-Aug-2023 08:48,  No significant change was found     Physical Exam    Airway     Mallampati score: III  TM Distance: >3 FB  Neck ROM: full      Cardiovascular  Rhythm: irregular, Rate: normal    Dental   No notable dental hx edentulous    Pulmonary  Pulmonary exam normal Breath sounds clear to auscultation    Neurological  - normal exam  He appears awake, alert and oriented x3.      Other Findings        Anesthesia Plan  ASA Score- 3     Anesthesia Type- IV sedation with anesthesia with ASA Monitors.         Additional Monitors:     Airway Plan:            Plan Factors-Exercise tolerance (METS): >4 METS.    Chart reviewed. EKG reviewed.   Patient summary reviewed.    Patient is not a current smoker.              Induction-     Postoperative Plan- .   Monitoring Plan - Monitoring plan - standard ASA monitoring  Post Operative Pain Plan - multimodal analgesia        Informed Consent- Anesthetic plan and risks discussed with patient.  I personally reviewed this patient with the CRNA. Discussed and agreed on the Anesthesia Plan with the CRNA..      NPO Status:  Vitals Value Taken Time   Date of last liquid 06/13/25 06/13/25 06:44   Time of last liquid 0030 06/13/25 06:44   Date of last solid 06/11/25 06/13/25 06:44   Time of last solid         Patient in A.fib today upon admission. VSS. Can still proceed . Advised patient to contact cardiologist asap for further management

## 2025-06-13 NOTE — H&P
"History and Physical -  [facility]  Bean Lopez 79 y.o. male MRN: [id number]                  HPI: Bean Lopez is a 79 y.o. year old male who presents for history of colon polyps      REVIEW OF SYSTEMS: Per the HPI, and otherwise unremarkable.    Historical Information   Past Medical History[1]  Past Surgical History[2]  Social History   Social History     Substance and Sexual Activity   Alcohol Use Yes    Comment: Alcohol intake:   Occasional wine - As per Mena      Social History     Substance and Sexual Activity   Drug Use No     Tobacco Use History[3]  Family History[4]    Meds/Allergies     Current Medications[5]    Allergies[6]    Objective     /78   Pulse 86   Temp (!) 97.4 °F (36.3 °C) (Temporal)   Resp 16   Ht 5' 10\" (1.778 m)   Wt 76.2 kg (168 lb)   SpO2 97%   BMI 24.11 kg/m²       PHYSICAL EXAM    Gen: NAD  Head: NCAT  CV: Afib  CHEST: Clear  ABD: soft, NT/ND  EXT: no edema      ASSESSMENT/PLAN:  This is a 79 y.o. year old male here for colonoscopy, and he is stable and optimized for his procedure.             [1]   Past Medical History:  Diagnosis Date    Anemia     Arthritis     Ege's esophagus     Breast lump     Cancer (HCC)     Chronic kidney disease     Cirrhosis (HCC)     COPD (chronic obstructive pulmonary disease) (HCC)     Coronary artery disease     cardiac cath; stents     Diabetes mellitus (HCC)     GERD (gastroesophageal reflux disease)     H/O heart artery stent     x 5    Hyperlipidemia     Hypertension     Inguinal hernia     Right    Myocardial infarction (HCC) 2020    and 2023    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HCC)     Skin cancer     SOB (shortness of breath)     Upper respiratory infection, viral 05/31/2022    Wears dentures    [2]   Past Surgical History:  Procedure Laterality Date    CARDIAC CATHETERIZATION  11/18/2023    x 5 stents-    CATARACT EXTRACTION, BILATERAL      left eye 05/08 and right 5/22/2022    CERVICAL FUSION      C2-3    " "CHOLECYSTECTOMY      COLONOSCOPY  04/11/2016    COLONOSCOPY      EGD AND COLONOSCOPY  06/24/2019 12/2024    ERCP  09/11/2014    transab esop hiat griffin rpr    ESOPHAGOGASTRIC FUNDOPLASTY      HERNIA REPAIR Right     p I/griffin init reduc >5 yr    HIP SURGERY Right     partial replacement    KIDNEY STONE SURGERY      Fragmenting    LIVER BIOPSY      LUNG BIOPSY      MULTIPLE TOOTH EXTRACTIONS      \"extraction of all maxillary teeth\"    MS BX PROSTATE STRTCTC SATURATION SAMPLING IMG GID N/A 08/15/2023    Procedure: TRANSPERINEAL MRI FUSION BIOPSY PROSTATE;  Surgeon: Cory Carreon MD;  Location: [facility];  Service: Urology    MS PROSTATE NEEDLE BIOPSY ANY APPROACH N/A 2/6/2025    Procedure: TRANSPERINEAL MRI FUSION BIOPSY PROSTATE;  Surgeon: Zay Mcmillan MD;  Location: [facility];  Service: Urology    SKIN BIOPSY  05/2020    THUMB FUSION      with graft    TONSILLECTOMY      UPPER GASTROINTESTINAL ENDOSCOPY      US GUIDANCE  10/22/2014   [3]   Social History  Tobacco Use   Smoking Status Never   Smokeless Tobacco Never   Tobacco Comments    RETIRED   [4]   Family History  Problem Relation Name Age of Onset    Hypertension Mother      Atrial fibrillation Mother      Heart disease Mother      Kidney disease Father      COPD Father      Heart disease Father      Asthma Neg Hx      Lung cancer Neg Hx     [5]   Current Outpatient Medications:     allopurinol (ZYLOPRIM) 300 mg tablet    amLODIPine (NORVASC) 5 mg tablet    Aspirin 81 MG CAPS    azilsartan medoxomil (Edarbi) 40 MG tablet    chlorthalidone 25 mg tablet    metoprolol succinate (TOPROL-XL) 50 mg 24 hr tablet    albuterol (PROVENTIL HFA,VENTOLIN HFA) 90 mcg/act inhaler    atorvastatin (LIPITOR) 80 mg tablet    b complex vitamins capsule    Cholecalciferol (VITAMIN D3) 2000 units TABS    co-enzyme Q-10 50 MG capsule    colchicine (COLCRYS) 0.6 mg tablet    dexlansoprazole (DEXILANT) 60 MG capsule    Empagliflozin (Jardiance) 25 MG TABS    famotidine " (PEPCID) 40 MG tablet    Ferrous Sulfate (IRON) 325 (65 Fe) MG TABS    Fluticasone Furoate-Vilanterol 100-25 mcg/actuation inhaler    Incruse Ellipta 62.5 MCG/ACT AEPB inhaler    ipratropium-albuterol (DUO-NEB) 0.5-2.5 mg/3 mL nebulizer solution    isosorbide mononitrate (IMDUR) 30 mg 24 hr tablet    magnesium chloride-calcium (SLOW-MAG) 71.5-119 mg    nitroglycerin (NITROSTAT) 0.4 mg SL tablet    polyethylene glycol-electrolytes (TriLyte) 4000 mL solution    sitaGLIPtin (Januvia) 100 mg tablet    sulfaSALAzine (AZULFIDINE) 500 mg tablet    vitamin B-12 (VITAMIN B-12) 1,000 mcg tablet    Current Facility-Administered Medications:     lactated ringers infusion, 125 mL/hr, Intravenous, Continuous  [6]   Allergies  Allergen Reactions    Oxycodone GI Intolerance

## 2025-06-15 ENCOUNTER — RESULTS FOLLOW-UP (OUTPATIENT)
Dept: FAMILY MEDICINE CLINIC | Facility: CLINIC | Age: 80
End: 2025-06-15

## 2025-06-17 PROCEDURE — 88305 TISSUE EXAM BY PATHOLOGIST: CPT | Performed by: PATHOLOGY

## 2025-06-21 DIAGNOSIS — J43.9 PULMONARY EMPHYSEMA, UNSPECIFIED EMPHYSEMA TYPE (HCC): ICD-10-CM

## 2025-06-23 RX ORDER — FLUTICASONE FUROATE AND VILANTEROL TRIFENATATE 100; 25 UG/1; UG/1
POWDER RESPIRATORY (INHALATION)
Qty: 60 EACH | Refills: 5 | Status: SHIPPED | OUTPATIENT
Start: 2025-06-23

## 2025-06-24 LAB
ATRIAL RATE: 0 BPM
PR INTERVAL: 180 MS
QRS AXIS: -41 DEGREES
QRSD INTERVAL: 99 MS
QT INTERVAL: 401 MS
QTC INTERVAL: 430 MS
T WAVE AXIS: 55 DEGREES
VENTRICULAR RATE: 69 BPM

## 2025-06-24 PROCEDURE — 93010 ELECTROCARDIOGRAM REPORT: CPT | Performed by: INTERNAL MEDICINE

## 2025-06-25 DIAGNOSIS — J43.9 PULMONARY EMPHYSEMA, UNSPECIFIED EMPHYSEMA TYPE (HCC): ICD-10-CM

## 2025-06-25 RX ORDER — UMECLIDINIUM 62.5 UG/1
1 AEROSOL, POWDER ORAL DAILY
Qty: 30 EACH | Refills: 3 | Status: SHIPPED | OUTPATIENT
Start: 2025-06-25 | End: 2025-12-22

## 2025-06-25 NOTE — TELEPHONE ENCOUNTER
Reason for call:   [x] Refill   [] Prior Auth  [x] Other: Pharmacy change.     Office:   [x] PCP/Provider -   [] Specialty/Provider -     Medication: Incruse Ellipta 62.5 MCG/ACT AEPB inhaler     Dose/Frequency: INHALE 1 PUFF DAILY     Quantity: 30    Pharmacy: [facility] - [place], [place]     Local Pharmacy   Does the patient have enough for 3 days?   [] Yes   [x] No - Send as HP to POD

## 2025-08-03 PROBLEM — I12.9 HYPERTENSIVE CHRONIC KIDNEY DISEASE WITH STAGE 1 THROUGH STAGE 4 CHRONIC KIDNEY DISEASE, OR UNSPECIFIED CHRONIC KIDNEY DISEASE: Status: RESOLVED | Noted: 2018-05-16 | Resolved: 2025-08-03

## 2025-08-07 ENCOUNTER — TELEPHONE (OUTPATIENT)
Dept: NEPHROLOGY | Facility: CLINIC | Age: 80
End: 2025-08-07

## 2025-08-08 ENCOUNTER — APPOINTMENT (OUTPATIENT)
Dept: LAB | Facility: HOSPITAL | Age: 80
End: 2025-08-08
Attending: INTERNAL MEDICINE
Payer: MEDICARE

## 2025-08-13 ENCOUNTER — OFFICE VISIT (OUTPATIENT)
Dept: NEPHROLOGY | Facility: CLINIC | Age: 80
End: 2025-08-13
Payer: MEDICARE

## 2025-08-13 PROBLEM — N18.32 TYPE 2 DIABETES MELLITUS WITH STAGE 3B CHRONIC KIDNEY DISEASE, WITH LONG-TERM CURRENT USE OF INSULIN (HCC): Status: ACTIVE | Noted: 2025-08-13

## 2025-08-13 PROBLEM — E11.22 TYPE 2 DIABETES MELLITUS WITH STAGE 3B CHRONIC KIDNEY DISEASE, WITH LONG-TERM CURRENT USE OF INSULIN (HCC): Status: ACTIVE | Noted: 2025-08-13

## 2025-08-13 PROBLEM — Z79.4 TYPE 2 DIABETES MELLITUS WITH STAGE 3B CHRONIC KIDNEY DISEASE, WITH LONG-TERM CURRENT USE OF INSULIN (HCC): Status: ACTIVE | Noted: 2025-08-13

## 2025-08-20 DIAGNOSIS — K21.00 GASTROESOPHAGEAL REFLUX DISEASE WITH ESOPHAGITIS, UNSPECIFIED WHETHER HEMORRHAGE: ICD-10-CM

## 2025-08-20 DIAGNOSIS — K22.70 BARRETT'S ESOPHAGUS WITHOUT DYSPLASIA: ICD-10-CM

## 2025-08-20 RX ORDER — FAMOTIDINE 40 MG/1
40 TABLET, FILM COATED ORAL
Qty: 90 TABLET | Refills: 0 | Status: SHIPPED | OUTPATIENT
Start: 2025-08-20

## (undated) DEVICE — 3M™ IOBAN™ 2 ANTIMICROBIAL INCISE DRAPE 6650EZ: Brand: IOBAN™ 2

## (undated) DEVICE — GLOVE INDICATOR PI UNDERGLOVE SZ 8 BLUE

## (undated) DEVICE — SPECIMEN CONTAINER STERILE PEEL PACK

## (undated) DEVICE — SYSTEM TRANSPERINEAL ACCESS PRECISIONPOINT

## (undated) DEVICE — LUBRICANT JELLY SURGILUBE TUBE 2OZ FLIP TOP

## (undated) DEVICE — FORMALIN PRE-FILLED 10 PCT PROSTATE BIOPSY

## (undated) DEVICE — RAZOR STERILE

## (undated) DEVICE — MAX-CORE® DISPOSABLE CORE BIOPSY INSTRUMENT, 18G X 25CM: Brand: MAX-CORE

## (undated) DEVICE — SYRINGE,TOOMEY,IRRIGATION,70CC,STERILE: Brand: MEDLINE

## (undated) DEVICE — TELFA NON-ADHERENT ABSORBENT DRESSING: Brand: TELFA

## (undated) DEVICE — SYRINGE 10ML LL

## (undated) DEVICE — GLOVE SRG BIOGEL 7.5

## (undated) DEVICE — UTILITY MARKER,BLACK WITH LABELS: Brand: DEVON

## (undated) DEVICE — PREP PAD BNS: Brand: CONVERTORS

## (undated) DEVICE — SPONGE 4 X 4 XRAY 16 PLY STRL LF RFD

## (undated) DEVICE — CHLORAPREP HI-LITE 26ML ORANGE

## (undated) DEVICE — ULTRASOUND GEL STERILE FOIL PK

## (undated) DEVICE — 3M™ TRANSPORE™ WHITE SURGICAL TAPE 1534-1, 1 INCH X 10 YARD (2,5CM X 9,1M), 12 ROLLS/CARTON 10 CARTONS/CASE: Brand: 3M™ TRANSPORE™

## (undated) DEVICE — NEEDLE 25G X 1 1/2